# Patient Record
Sex: FEMALE | Race: BLACK OR AFRICAN AMERICAN | Employment: FULL TIME | URBAN - METROPOLITAN AREA
[De-identification: names, ages, dates, MRNs, and addresses within clinical notes are randomized per-mention and may not be internally consistent; named-entity substitution may affect disease eponyms.]

---

## 2017-03-01 DIAGNOSIS — E13.9 LADA (LATENT AUTOIMMUNE DIABETES IN ADULTS), MANAGED AS TYPE 1 (HCC): ICD-10-CM

## 2017-03-30 ENCOUNTER — OFFICE VISIT (OUTPATIENT)
Dept: ENDOCRINOLOGY | Age: 52
End: 2017-03-30

## 2017-03-30 VITALS
SYSTOLIC BLOOD PRESSURE: 121 MMHG | HEART RATE: 76 BPM | HEIGHT: 61 IN | WEIGHT: 103.5 LBS | BODY MASS INDEX: 19.54 KG/M2 | DIASTOLIC BLOOD PRESSURE: 81 MMHG

## 2017-03-30 DIAGNOSIS — E13.9 LADA (LATENT AUTOIMMUNE DIABETES IN ADULTS), MANAGED AS TYPE 1 (HCC): Primary | ICD-10-CM

## 2017-03-30 DIAGNOSIS — J45.20 MILD INTERMITTENT ASTHMA WITHOUT COMPLICATION: ICD-10-CM

## 2017-03-30 RX ORDER — ALBUTEROL SULFATE 90 UG/1
2 AEROSOL, METERED RESPIRATORY (INHALATION)
Qty: 1 INHALER | Refills: 7 | Status: SHIPPED | OUTPATIENT
Start: 2017-03-30 | End: 2018-05-18

## 2017-03-30 NOTE — LETTER
NOTIFICATION RETURN TO WORK / SCHOOL 
 
3/30/2017 4:41 PM 
 
Ms. Urvashi Shultz [address] [address] To Whom It May Concern: 
 
Urvashi Shultz is currently under the care of [address] [place] [address]. She was present today with us in clinic for management of her medical conditions. If there are questions or concerns please have the patient contact our office. Sincerely, Xochilt Martinez MD

## 2017-03-30 NOTE — PROGRESS NOTES
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 female with a PMHx as noted below who is presenting to our endocrine clinic for the f/u evaluation of recently diagnosed diabetes. History of Type 1 Diabetes:  Patient reports that they were diagnosed in the ED with A1c of 15% June 2016     Pancreatic atrophy on CT   Family history is positive for diabetes in mother and maternal grandmother, both on insulin but type of diabetes unclear  Was started on novolog 70/30 insulin    INTERVAL HISTORY:  Current home regimen includes:   Novolog 70/30 Pen, 5-10 U with B, 17 U with Dinner    Home Blood glucose review:   AM:    [address]  Lunch  [address]  Dinner  [address]  Bedtime  [address]    Patient notes that she would like looking into insulin pumps. Had a death in the family, uncle was battling against pancreatic cancer    PAST MEDICAL/SURGICAL HISTORY:   Past Medical History:   Diagnosis Date    Asthma     [facility] ([facility]     Elevated transaminase level 6/29/2016    Insulin dependent diabetes mellitus ([facility] 6/20/2016    Pancreatic atrophy 6/20/2016     Past Surgical History:   Procedure Laterality Date    HX HEENT         ALLERGIES:   Allergies   Allergen Reactions    Contrast Agent [Iodine] Itching    Hydrocodone Rash    Percocet [Oxycodone-Acetaminophen] Rash    Codeine Nausea and Vomiting       MEDICATIONS ON ADMISSION:     Current Outpatient Prescriptions:     insulin NPH/insulin regular (NOVOLIN 70/30) 100 unit/mL (70-30) injection, by SubCUTAneous route.  5 units in AM; 10-15 in PM, Disp: , Rfl:     albuterol (PROVENTIL VENTOLIN) 2.5 mg /3 mL (0.083 %) nebulizer solution, , Disp: , Rfl: 0    Insulin Needles, Disposable, 31 gauge x 5/16\" ndle, Use as directed, Disp: 1 Package, Rfl: 11    glucose blood VI test strips () strip, Use as directed, Disp: 100 Strip, Rfl: 1    Insulin Syringes, Disposable, 1 mL syrg, Use as directed, Disp: 100 Syringe, Rfl: 2    albuterol (PROVENTIL HFA, VENTOLIN HFA, PROAIR HFA) 90 mcg/actuation inhaler, Take 2 Puffs by inhalation every four (4) hours as needed for Wheezing., Disp: 1 Inhaler, Rfl: 0    Lancets misc, Use as directed, Disp: 1 Each, Rfl: 3    SOCIAL HISTORY:   Social History     Social History    Marital status: SINGLE     Spouse name: N/A    Number of children: N/A    Years of education: N/A     Occupational History    Not on file. Social History Main Topics    Smoking status: Never Smoker    Smokeless tobacco: Never Used    Alcohol use No    Drug use: No    Sexual activity: Not Currently     Other Topics Concern    Not on file     Social History Narrative       FAMILY HISTORY:  Family History   Problem Relation Age of Onset    Cancer Father     Diabetes Mother     Diabetes Maternal Grandmother        REVIEW OF SYSTEMS: Complete ROS assessed and noted for that which is described above, all else are negative. Eyes: normal  ENT: normal  CVS: normal  Resp: normal  GI: normal  : normal  GYN: normal  Endocrine: normal  Integument: normal  Musculoskeletal: normal  Neuro: normal  Psych: normal      PHYSICAL EXAMINATION:    VITAL SIGNS:  Visit Vitals    /81 (BP 1 Location: Left arm, BP Patient Position: Sitting)    Pulse 76    Ht 5' 1\" (1.549 m)    Wt 103 lb 8 oz (46.9 kg)    BMI 19.56 kg/m2       GENERAL: NCAT, Sitting comfortably, NAD  EYES: EOMI, non-icteric, no proptosis  HEENT: NCAT, no inflammation, no masses  LYMPH NODES: No LAD  CARDIOVASCULAR: S1 S2, RRR, No murmur, 2+ radial pulses  RESPIRATORY: CTA b/l, no wheeze/rales  ABDOMEN: BS normal  NEUROLOGIC: 5/5 power all extremities, no parasthesias, AAOx3  EXTREMITIES: warm, no edema/rash/ or other skin changes, good pedal pulses      REVIEW OF LABORATORY AND RADIOLOGY DATA:   Labs and documentation have been reviewed as described above.        ASSESSMENT AND PLAN: Zurdo Camarillo is a 46 y.o. female with a PMHx as noted below who is presenting to our endocrine clinic for the f/u evaluation of recent onset diabetes. SHANTEL, late onset autoimmune diabetes of the adult (type-1 like diabetes)    Stable, some higher numbers with dinner but not often  A1c pending    Plan:  Medications:  - 70/30 insulin 10U Breakfast, increase to 17U Dinner  - Advised checking glucose AM, Dinnertime, and Bedtime.   - To provide a 1-2 week log with each visit  - REFERRED to diabetes education to discuss insulin pump options and start learning about carb-counting. Ideally to be on long and short acting (basal bolus) insulin prior to insulin pump therapy but she has been doing very well and we will be able to recommend good starting settings for her if she should move forward with a pump. Suggestions are also welcomed. BP: Stable, not on antihypertensives  Cholesterol: Fasting lipids stable, not on statins    RTC: I would like to see them back in 3 months, advised to call or message through [facility]\" if needed prior to the next visit. >25 minutes spent together with patient today of which >50% of this time was spent in counseling and coordination of care. Nicanor Hicks.  [address] Endocrinology  [address] [place] [address]

## 2017-03-30 NOTE — PATIENT INSTRUCTIONS
Continue current medications  70/30 insulin 7-10 units with breakfast, 17 units with dinner  Check blood sugar regularly as you are, your doing a great job,    ----------------------------------------------------------------------------------------------------------------------    Below you will find a glucose log sheet which you can use to record your blood sugars. Without checking and recording what your home glucose levels are, it will be difficult to make any changes to your medication dose, even when significant changes may be needed. Please feel free to use the log below to record your home glucose levels. At the very least, I would like for you to login the entire 2-3 weeks just before your visit so we can make your visit much more productive and beneficial to you. GLUCOSE LOG SHEET:    Date Breakfast Lunch Dinner Bedtime Comments ? GLUCOSE LOG SHEET:    Date Breakfast Lunch Dinner Bedtime Comments ? GLUCOSE LOG SHEET:    Date Breakfast Lunch Dinner Bedtime Comments ?

## 2017-03-30 NOTE — MR AVS SNAPSHOT
Visit Information Date & Time Provider Department Dept. Phone Encounter #  
 3/30/2017  4:10 PM Daren Valdez, [address] [place] [address] Diabetes and Endocrinology [phone] [id number] Follow-up Instructions Return in about 3 months (around 6/30/2017). Your Appointments 4/20/2017  4:00 PM  
ROUTINE CARE with Laureen Mendez MD  
[facility] [address]) Appt Note: 6m fu  
 [id number] [place] [address] Upcoming Health Maintenance Date Due Hepatitis C Screening 1965 EYE EXAM RETINAL OR DILATED Q1 9/21/1973 Pneumococcal 19-64 Highest Risk (1 of 3 - PCV13) 9/21/1982 DTaP/Tdap/Td series (1 - Tdap) 9/21/1984 PAP AKA CERVICAL CYTOLOGY 9/21/1984 MICROALBUMIN Q1 6/20/2017 HEMOGLOBIN A1C Q6M 6/21/2017 LIPID PANEL Q1 7/27/2017 FOBT Q 1 YEAR AGE 50-75 10/19/2017 FOOT EXAM Q1 12/30/2017 BREAST CANCER SCRN MAMMOGRAM 7/26/2018 Allergies as of 3/30/2017  Review Complete On: 3/30/2017 By: Daren Valdez MD  
  
 Severity Noted Reaction Type Reactions Contrast Agent [Iodine] Medium 06/29/2016    Itching Hydrocodone Medium 10/19/2016    Rash Percocet [Oxycodone-acetaminophen] Medium 10/19/2016    Rash Codeine  12/01/2014   Intolerance Nausea and Vomiting Current Immunizations  Never Reviewed Name Date Influenza Vaccine 9/1/2014 Not reviewed this visit You Were Diagnosed With   
  
 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  
 19.56 kg/m 2 1.42 m 2 Preferred Pharmacy Pharmacy Name Phone [facility] [address] [place] [address] [phone] Your Updated Medication List  
  
   
This list is accurate as of: 3/30/17  4:40 PM.  Always use your most recent med list.  
  
  
  
  
 * albuterol 2.5 mg /3 mL (0.083 %) nebulizer solution Commonly known as:  PROVENTIL VENTOLIN  
  
 * albuterol 90 mcg/actuation inhaler Commonly known as:  PROVENTIL HFA, VENTOLIN HFA, PROAIR HFA Take 2 Puffs by inhalation every four (4) hours as needed for Wheezing. glucose blood VI test strips strip Commonly known as:   For use with Prodigy Meter, Check glucose 4x/day, Dx E13.9 Insulin Needles (Disposable) 31 gauge x 5/16\" Ndle Use as directed Insulin Syringes (Disposable) 1 mL Syrg Use as directed Lancets Misc Use as directed NovoLIN 70/30 100 unit/mL (70-30) injection Generic drug:  insulin NPH/insulin regular  
by SubCUTAneous route. 5 units in AM; 10-15 in PM  
  
 * Notice: This list has 2 medication(s) that are the same as other medications prescribed for you. Read the directions carefully, and ask your doctor or other care provider to review them with you. Prescriptions Sent to Pharmacy Refills  
 albuterol (PROVENTIL HFA, VENTOLIN HFA, PROAIR HFA) 90 mcg/actuation inhaler 7 Sig: Take 2 Puffs by inhalation every four (4) hours as needed for Wheezing. Class: Normal  
 Pharmacy: [facility] Ph #: [phone] Route: Inhalation  
 glucose blood VI test strips () strip 7 Sig: For use with Prodigy Meter, Check glucose 4x/day, Dx E13.9  Class: Normal  
 Pharmacy: [address] Ph #: [phone] We Performed the Following REFERRAL TO DIABETIC EDUCATION [REF20 Custom] Comments:  
 Wishes to discuss insulin pumps. Follow-up Instructions Return in about 3 months (around 6/30/2017). Referral Information Referral ID Referred By Referred To  
  
 [id number] Maricruz Camp Not Available Visits Status Start Date End Date 1 New Request 3/30/17 3/30/18 If your referral has a status of pending review or denied, additional information will be sent to support the outcome of this decision. Patient Instructions Continue current medications 70/30 insulin 7-10 units with breakfast, 17 units with dinner Check blood sugar regularly as you are, your doing a great job, 
 
---------------------------------------------------------------------------------------------------------------------- Below you will find a glucose log sheet which you can use to record your blood sugars. Without checking and recording what your home glucose levels are, it will be difficult to make any changes to your medication dose, even when significant changes may be needed. Please feel free to use the log below to record your home glucose levels. At the very least, I would like for you to login the entire 2-3 weeks just before your visit so we can make your visit much more productive and beneficial to you. GLUCOSE LOG SHEET: 
 
Date Breakfast Lunch Dinner Bedtime Comments ? GLUCOSE LOG SHEET: 
 
Date Breakfast Lunch Dinner Bedtime Comments ? GLUCOSE LOG SHEET: 
 
Date Breakfast Lunch Dinner Bedtime Comments ? Introducing [facility]! [employer] introduces [employer] patient portal. Now you can access parts of your medical record, email your doctor's office, and request medication refills online. 1. In your internet browser, go to [email or web address] 2. Click on the First Time User? Click Here link in the Sign In box. You will see the New Member Sign Up page. 3. Enter your [employer] Access Code exactly as it appears below. You will not need to use this code after youve completed the sign-up process. If you do not sign up before the expiration date, you must request a new code. · [employer] Access Code: [email or web address] Expires: 6/28/2017  4:25 PM 
 
4. Enter the last four digits of your Social Security Number (xxxx) and Date of Birth (mm/dd/yyyy) as indicated and click Submit. You will be taken to the next sign-up page. 5. Create a [employer] ID. This will be your [employer] login ID and cannot be changed, so think of one that is secure and easy to remember. 6. Create a [employer] password. You can change your password at any time. 7. Enter your Password Reset Question and Answer. This can be used at a later time if you forget your password. 8. Enter your e-mail address. You will receive e-mail notification when new information is available in [address] 9. Click Sign Up. You can now view and download portions of your medical record. 10. Click the Download Summary menu link to download a portable copy of your medical information.  
 
If you have questions, please visit the Frequently Asked Questions section of the [employer]. Remember, [employer] is NOT to be used for urgent needs. For medical emergencies, dial 911. Now available from your iPhone and Android! Please provide this summary of care documentation to your next provider. Your primary care clinician is listed as Genevieve Jennings. If you have any questions after today's visit, please call [phone].

## 2017-03-31 ENCOUNTER — TELEPHONE (OUTPATIENT)
Dept: ENDOCRINOLOGY | Age: 52
End: 2017-03-31

## 2017-03-31 LAB
EST. AVERAGE GLUCOSE BLD GHB EST-MCNC: 229 MG/DL
HBA1C MFR BLD: 9.6 % (ref 4.8–5.6)

## 2017-03-31 NOTE — PROGRESS NOTES
Anayeli's A1c went up from 6.9% to 9.6%. Her numbers we reviewed were much better than this, so I suspect that she had much higher numbers in the prior month or so. She should be advised to use the 10U dose with her breakfast more often since her dinner numbers were running closer to the 160's. She can send us a glucose log after a couple weeks to see how she is doing and if she is having any lows, she can let us know. This is a sudden change from the good sugar control we had previously. Thanks,  Josh Philippe.  [address] Endocrinology  [address] [place] [address]

## 2017-03-31 NOTE — TELEPHONE ENCOUNTER
----- Message from Emil Hull MD sent at 3/31/2017  9:29 AM EDT -----  Abhijit Carvalho A1c went up from 6.9% to 9.6%. Her numbers we reviewed were much better than this, so I suspect that she had much higher numbers in the prior month or so. She should be advised to use the 10U dose with her breakfast more often since her dinner numbers were running closer to the 160's. She can send us a glucose log after a couple weeks to see how she is doing and if she is having any lows, she can let us know. This is a sudden change from the good sugar control we had previously. Thanks,  Bryce Lee. [address]    ------------------------------------------    3/31/2017, 10:13 AM    Attempted to call Solmon Brittle, reached her sister. I left a message to return my call. Silvana Griffiths       -------------------------------------------------------------      Addendum: 3/31/2017, 10:27 AM    Spoke with Solmon Brittle by phone and relayed the above message. She understood the information and will do as instructed.       Silvana Griffiths

## 2017-04-14 ENCOUNTER — HOSPITAL ENCOUNTER (OUTPATIENT)
Dept: DIABETES SERVICES | Age: 52
Discharge: HOME | End: 2017-04-14
Payer: COMMERCIAL

## 2017-04-14 PROCEDURE — G0108 DIAB MANAGE TRN  PER INDIV: HCPCS

## 2017-04-14 NOTE — DIABETES MGMT
DTC Progress Note    Recommendations/ Comments: If appropriate, please consider:  1) follow-up discussions with insurer related to coverage options and out of pocket responsibilities for pump/CGM and supplies. 2) review with insurer about coverage for rapid acting insulin as this is the medication that will be used w/in pump. 3) will need assessment and instruction on carb counting prior to starting pump therapy. 4) pt to contact CDE, CPT to arrange for saline trial of insulin pumps if conclusion is to move forward with pump therapy. Chart reviewed on [facility] and visited with pt today to discuss exploring insulin pump systems and therapy for the management of her Type 1 DM. Patient is a 46 y.o. female with known Type 1 Diabetes on Novolin 70/30 10 units at home. Pt is using a Prodigy talking meter for managing her BG - currently testing bid. Pt shared that she is not using intensive insulin therapy at this time due to out-of-pocket expenses with rapid acting insulin ($200/month). The following topics were discussed and pt was provided with pump comparison table along w/ list of topics to investigate with her insurer:  1) insulins used with pump therapy and how insulin to carb ratio along w/ correction factors are used when programming pump. 2) stand alone pump and sensor versus integrated pump/CGM systems. 3) pros and cons to using insulin pump therapy. 4) testing commitment, warranty and features to pump systems and unique to certain companies. 5) personal features to be assessed by patient that are important in her daily life/lifestyle. As we reviewed the comparison table, pt identified both tubeless and tubing pump systems for potential saline trial in future.      A1c:   Lab Results   Component Value Date/Time    Hemoglobin A1c 9.6 03/30/2017 03:08 PM    Hemoglobin A1c 6.9 12/21/2016 07:41 AM       Lab Results   Component Value Date/Time    Creatinine 0.93 10/11/2016 07:49 AM Current DM medication: Novolin 70/30 10 units bid 30 minutes prior to meals. Will continue to follow as needed.     Thank you  Tano Quiroga RD, CDE

## 2018-01-10 ENCOUNTER — APPOINTMENT (OUTPATIENT)
Dept: GENERAL RADIOLOGY | Age: 53
End: 2018-01-10
Payer: COMMERCIAL

## 2018-01-10 ENCOUNTER — HOSPITAL ENCOUNTER (EMERGENCY)
Age: 53
Discharge: HOME | End: 2018-01-10
Attending: EMERGENCY MEDICINE
Payer: COMMERCIAL

## 2018-01-10 VITALS
WEIGHT: 101.19 LBS | RESPIRATION RATE: 16 BRPM | HEART RATE: 92 BPM | OXYGEN SATURATION: 100 % | DIASTOLIC BLOOD PRESSURE: 87 MMHG | TEMPERATURE: 99 F | BODY MASS INDEX: 19.11 KG/M2 | HEIGHT: 61 IN | SYSTOLIC BLOOD PRESSURE: 132 MMHG

## 2018-01-10 DIAGNOSIS — J20.9 ACUTE BRONCHITIS, UNSPECIFIED ORGANISM: Primary | ICD-10-CM

## 2018-01-10 DIAGNOSIS — F17.200 TOBACCO DEPENDENCE: ICD-10-CM

## 2018-01-10 DIAGNOSIS — Z87.09 HISTORY OF ASTHMA: ICD-10-CM

## 2018-01-10 PROCEDURE — 99281 EMR DPT VST MAYX REQ PHY/QHP: CPT

## 2018-01-10 PROCEDURE — 71046 X-RAY EXAM CHEST 2 VIEWS: CPT

## 2018-01-10 RX ORDER — PROMETHAZINE HYDROCHLORIDE AND DEXTROMETHORPHAN HYDROBROMIDE 6.25; 15 MG/5ML; MG/5ML
5 SYRUP ORAL
Qty: 118 ML | Refills: 0 | Status: SHIPPED | OUTPATIENT
Start: 2018-01-10 | End: 2018-01-17

## 2018-01-10 RX ORDER — AZITHROMYCIN 250 MG/1
TABLET, FILM COATED ORAL
Qty: 6 TAB | Refills: 0 | Status: SHIPPED | OUTPATIENT
Start: 2018-01-10 | End: 2018-01-17

## 2018-01-10 RX ORDER — ONDANSETRON 4 MG/1
4 TABLET, ORALLY DISINTEGRATING ORAL
Qty: 12 TAB | Refills: 0 | Status: SHIPPED | OUTPATIENT
Start: 2018-01-10 | End: 2018-01-22

## 2018-01-10 NOTE — LETTER
[address] 
[facility] EMERGENCY DEPT 
[address] [place] [address] [address] [address] [phone] Work/School Note Date: 1/10/2018 To Whom It May concern: 
 
Rito Duque was seen and treated today in the emergency room. She may return to work in 1 to 2 days, as symptoms improve. Sincerely, Christine Augustine

## 2018-01-11 NOTE — DISCHARGE INSTRUCTIONS
Bronchitis: Care Instructions  Your Care Instructions    Bronchitis is inflammation of the bronchial tubes, which carry air to the lungs. The tubes swell and produce mucus, or phlegm. The mucus and inflamed bronchial tubes make you cough. You may have trouble breathing. Most cases of bronchitis are caused by viruses like those that cause colds. Antibiotics usually do not help and they may be harmful. Bronchitis usually develops rapidly and lasts about 2 to 3 weeks in otherwise healthy people. Follow-up care is a key part of your treatment and safety. Be sure to make and go to all appointments, and call your doctor if you are having problems. It's also a good idea to know your test results and keep a list of the medicines you take. How can you care for yourself at home? · Take all medicines exactly as prescribed. Call your doctor if you think you are having a problem with your medicine. · Get some extra rest.  · Take an over-the-counter pain medicine, such as acetaminophen (Tylenol), ibuprofen (Advil, Motrin), or naproxen (Aleve) to reduce fever and relieve body aches. Read and follow all instructions on the label. · Do not take two or more pain medicines at the same time unless the doctor told you to. Many pain medicines have acetaminophen, which is Tylenol. Too much acetaminophen (Tylenol) can be harmful. · Take an over-the-counter cough medicine that contains dextromethorphan to help quiet a dry, hacking cough so that you can sleep. Avoid cough medicines that have more than one active ingredient. Read and follow all instructions on the label. · Breathe moist air from a humidifier, hot shower, or sink filled with hot water. The heat and moisture will thin mucus so you can cough it out. · Do not smoke. Smoking can make bronchitis worse. If you need help quitting, talk to your doctor about stop-smoking programs and medicines. These can increase your chances of quitting for good.   When should you call for help? Call 911 anytime you think you may need emergency care. For example, call if:  ? · You have severe trouble breathing. ?Call your doctor now or seek immediate medical care if:  ? · You have new or worse trouble breathing. ? · You cough up dark brown or bloody mucus (sputum). ? · You have a new or higher fever. ? · You have a new rash. ? Watch closely for changes in your health, and be sure to contact your doctor if:  ? · You cough more deeply or more often, especially if you notice more mucus or a change in the color of your mucus. ? · You are not getting better as expected. Where can you learn more? Go to http://jurgen-molly.info/. Enter H333 in the search box to learn more about \"Bronchitis: Care Instructions. \"  Current as of: May 12, 2017  Content Version: 11.4  © 9957-9306 [employer] Care instructions adapted under license by [employer] (which disclaims liability or warranty for this information). If you have questions about a medical condition or this instruction, always ask your healthcare professional. [address] any warranty or liability for your use of this information.

## 2018-01-11 NOTE — ED PROVIDER NOTES
EMERGENCY DEPARTMENT HISTORY AND PHYSICAL EXAM      Date: 1/10/2018  Patient Name: Lily Kelly    History of Presenting Illness     Chief Complaint   Patient presents with    Chills     pt c/o chills, headache, nausea and cough x3 days. Pt reported her co-worker has been sick with simliar symptoms.  Headache    Nausea       History Provided By: Patient    HPI: Lily Kelly, 45 y.o. female with PMHx significant for asthma, DM, presents ambulatory to the ED with cc of 2 days of mild to moderate intermittent nonproductive cough. Pt reports no improvement at home with home remedies. She also c/o subjective fever, chills, HA, myalgia, and one episode of vomiting and diarrhea but now just some mild nausea remains. Pt states her BG has remained well controlled at home. She specifically denies any urianry sx. Pt denies tobacco use. She has come into contact wiht sick coworkers. Pt declines inhaler refills. PCP: Theda Rubinstein, MD    There are no other complaints, changes, or physical findings at this time. Current Outpatient Prescriptions   Medication Sig Dispense Refill    promethazine-dextromethorphan (PROMETHAZINE-DM) 6.25-15 mg/5 mL syrup Take 5 mL by mouth every four (4) hours as needed for Cough for up to 6 days. 118 mL 0    ondansetron (ZOFRAN ODT) 4 mg disintegrating tablet Take 1 Tab by mouth every eight (8) hours as needed for Nausea for up to 12 days. 12 Tab 0    azithromycin (ZITHROMAX Z-SUGAR) 250 mg tablet Take as directed: 2 pills day 1, 1 pill daily days 2-4 6 Tab 0    albuterol (PROVENTIL HFA, VENTOLIN HFA, PROAIR HFA) 90 mcg/actuation inhaler Take 2 Puffs by inhalation every four (4) hours as needed for Wheezing. 1 Inhaler 7    glucose blood VI test strips () strip For use with Prodigy Meter, Check glucose 4x/day, Dx E13.9 200 Strip 7    insulin NPH/insulin regular (NOVOLIN 70/30) 100 unit/mL (70-30) injection by SubCUTAneous route.  5 units in AM; 10-15 in PM      albuterol (PROVENTIL VENTOLIN) 2.5 mg /3 mL (0.083 %) nebulizer solution   0    Insulin Needles, Disposable, 31 gauge x 5/16\" ndle Use as directed 1 Package 11    Lancets misc Use as directed 1 Each 3    Insulin Syringes, Disposable, 1 mL syrg Use as directed 100 Syringe 2       Past History     Past Medical History:  Past Medical History:   Diagnosis Date    Asthma     Diabetes ([facility] [address]     Elevated transaminase level 6/29/2016    Insulin dependent diabetes mellitus ([facility] [address] 6/20/2016    Pancreatic atrophy 6/20/2016       Past Surgical History:  Past Surgical History:   Procedure Laterality Date    HX HEENT         Family History:  Family History   Problem Relation Age of Onset    Cancer Father     Diabetes Mother     Diabetes Maternal Grandmother        Social History:  Social History   Substance Use Topics    Smoking status: Never Smoker    Smokeless tobacco: Never Used    Alcohol use No       Allergies: Allergies   Allergen Reactions    Contrast Agent [Iodine] Itching    Hydrocodone Rash    Percocet [Oxycodone-Acetaminophen] Rash    Codeine Nausea and Vomiting         Review of Systems   Review of Systems   Constitutional: Positive for chills and fever. HENT: Negative for congestion, rhinorrhea and sore throat. Respiratory: Positive for cough. Negative for shortness of breath. Cardiovascular: Negative for chest pain and palpitations. Gastrointestinal: Positive for diarrhea, nausea and vomiting. Negative for abdominal pain. Genitourinary: Negative for dysuria and hematuria. Musculoskeletal: Negative for neck pain and neck stiffness. Skin: Negative for rash and wound. Neurological: Positive for headaches. Negative for dizziness. Psychiatric/Behavioral: Negative for agitation and confusion. Physical Exam   Physical Exam   Constitutional: She is oriented to person, place, and time. She appears well-developed and well-nourished. No distress. Thin female. NAD. Alert.     HENT:   Head: Normocephalic and atraumatic. Nose: Nose normal.   Mouth/Throat: Oropharynx is clear and moist. No oropharyngeal exudate. Boggy nasal mucosa, clear rhinorrhea, posterior oropharynx injected without exudate. Increased effusion noted to bilat TMs, no erythema, good light reflex noted. Eyes: Conjunctivae and EOM are normal. Right eye exhibits no discharge. Left eye exhibits no discharge. No scleral icterus. Neck: Normal range of motion. Neck supple. No JVD present. No tracheal deviation present. No thyromegaly present. Cardiovascular: Normal rate, regular rhythm and normal heart sounds. Pulmonary/Chest: Effort normal and breath sounds normal. No respiratory distress. She has no wheezes. Lungs clear   Abdominal: Soft. There is no tenderness. Musculoskeletal: Normal range of motion. She exhibits no edema. Lymphadenopathy:     She has no cervical adenopathy. Neurological: She is alert and oriented to person, place, and time. She exhibits normal muscle tone. Coordination normal.   Skin: Skin is warm and dry. She is not diaphoretic. Psychiatric: She has a normal mood and affect. Her behavior is normal. Judgment normal.   Nursing note and vitals reviewed. Diagnostic Study Results     Labs -   No results found for this or any previous visit (from the past 12 hour(s)). Radiologic Studies -   XR CHEST PA LAT   Final Result        CT Results  (Last 48 hours)    None        CXR Results  (Last 48 hours)               01/10/18 2000  XR CHEST PA LAT Final result    Impression:  IMPRESSION: No acute process           Narrative:  INDICATION:  cough        COMPARISON: 6/10/16       FINDINGS: PA and lateral views of the chest demonstrate a stable   cardiomediastinal silhouette and clear lungs bilaterally. The visualized osseous   structures are unremarkable. Medical Decision Making   I am the first provider for this patient.     I reviewed the vital signs, available nursing notes, past medical history, past surgical history, family history and social history. Vital Signs-Reviewed the patient's vital signs. Patient Vitals for the past 12 hrs:   Temp Pulse Resp BP SpO2   01/10/18 1748 99 °F (37.2 °C) 92 16 132/87 100 %       Pulse Oximetry Analysis - 100% on RA      Records Reviewed: Nursing Notes    Provider Notes (Medical Decision Making):   DDx: uri, bronchitis, pna    ED Course:   Initial assessment performed. The patients presenting problems have been discussed, and they are in agreement with the care plan formulated and outlined with them. I have encouraged them to ask questions as they arise throughout their visit. Critical Care Time: none    Disposition:    Discharge Note:  8:27 PM   The pt is ready for discharge. The pt's signs, symptoms, diagnosis, and discharge instructions have been discussed and pt has conveyed their understanding. The pt is to follow up as recommended or return to ER should their symptoms worsen. Plan has been discussed and pt is in agreement. PLAN:  1. Current Discharge Medication List      START taking these medications    Details   promethazine-dextromethorphan (PROMETHAZINE-DM) 6.25-15 mg/5 mL syrup Take 5 mL by mouth every four (4) hours as needed for Cough for up to 6 days. Qty: 118 mL, Refills: 0      ondansetron (ZOFRAN ODT) 4 mg disintegrating tablet Take 1 Tab by mouth every eight (8) hours as needed for Nausea for up to 12 days. Qty: 12 Tab, Refills: 0      azithromycin (ZITHROMAX Z-SUGAR) 250 mg tablet Take as directed: 2 pills day 1, 1 pill daily days 2-4  Qty: 6 Tab, Refills: 0           2.    Follow-up Information     Follow up With Details Comments Contact Info    Cynthia Ortiz MD   [address] [place] [address] [address] [place] [address]  [phone]      [facility] EMERGENCY DEPT  If symptoms worsen [address] [facility] [address]  [phone]        Return to ED if worse     Diagnosis     Clinical Impression:   1. Acute bronchitis, unspecified organism    2. History of asthma    3. Tobacco dependence    4. IDDM (insulin dependent diabetes mellitus) ([facility] [address]        Attestations: This note is prepared by Eric Almeida, acting as Scribe for [employer]. The scribe's documentation has been prepared under my direction and personally reviewed by me in its entirety. I confirm that the note above accurately reflects all work, treatment, procedures, and medical decision making performed by me.

## 2018-01-14 ENCOUNTER — APPOINTMENT (OUTPATIENT)
Dept: GENERAL RADIOLOGY | Age: 53
DRG: 638 | End: 2018-01-14
Attending: EMERGENCY MEDICINE
Payer: COMMERCIAL

## 2018-01-14 ENCOUNTER — APPOINTMENT (OUTPATIENT)
Dept: CT IMAGING | Age: 53
DRG: 638 | End: 2018-01-14
Attending: EMERGENCY MEDICINE
Payer: COMMERCIAL

## 2018-01-14 ENCOUNTER — HOSPITAL ENCOUNTER (INPATIENT)
Age: 53
LOS: 3 days | Discharge: HOME | DRG: 638 | End: 2018-01-17
Attending: EMERGENCY MEDICINE | Admitting: INTERNAL MEDICINE
Payer: COMMERCIAL

## 2018-01-14 DIAGNOSIS — E86.0 DEHYDRATION: ICD-10-CM

## 2018-01-14 DIAGNOSIS — R73.9 HYPERGLYCEMIA: Primary | ICD-10-CM

## 2018-01-14 DIAGNOSIS — N17.9 AKI (ACUTE KIDNEY INJURY) (HCC): ICD-10-CM

## 2018-01-14 PROBLEM — E11.00 UNCONTROLLED TYPE 2 DM WITH HYPEROSMOLAR NONKETOTIC HYPERGLYCEMIA (HCC): Status: ACTIVE | Noted: 2018-01-14

## 2018-01-14 LAB
ADMINISTERED INITIALS, ADMINIT: NORMAL
ALBUMIN SERPL-MCNC: 3.5 G/DL (ref 3.5–5)
ALBUMIN SERPL-MCNC: 3.9 G/DL (ref 3.5–5)
ALBUMIN/GLOB SERPL: 1.1 {RATIO} (ref 1.1–2.2)
ALBUMIN/GLOB SERPL: 1.1 {RATIO} (ref 1.1–2.2)
ALP SERPL-CCNC: 225 U/L (ref 45–117)
ALP SERPL-CCNC: 270 U/L (ref 45–117)
ALT SERPL-CCNC: 45 U/L (ref 12–78)
ALT SERPL-CCNC: 53 U/L (ref 12–78)
ANION GAP SERPL CALC-SCNC: 7 MMOL/L (ref 5–15)
ANION GAP SERPL CALC-SCNC: 7 MMOL/L (ref 5–15)
APPEARANCE UR: CLEAR
AST SERPL-CCNC: 34 U/L (ref 15–37)
AST SERPL-CCNC: 40 U/L (ref 15–37)
BACTERIA URNS QL MICRO: NEGATIVE /HPF
BASOPHILS # BLD: 0 K/UL (ref 0–0.1)
BASOPHILS NFR BLD: 0 % (ref 0–1)
BILIRUB SERPL-MCNC: 0.3 MG/DL (ref 0.2–1)
BILIRUB SERPL-MCNC: 0.5 MG/DL (ref 0.2–1)
BILIRUB UR QL: NEGATIVE
BUN SERPL-MCNC: 15 MG/DL (ref 6–20)
BUN SERPL-MCNC: 18 MG/DL (ref 6–20)
BUN/CREAT SERPL: 9 (ref 12–20)
BUN/CREAT SERPL: 9 (ref 12–20)
CALCIUM SERPL-MCNC: 8.6 MG/DL (ref 8.5–10.1)
CALCIUM SERPL-MCNC: 9.3 MG/DL (ref 8.5–10.1)
CHLORIDE SERPL-SCNC: 80 MMOL/L (ref 97–108)
CHLORIDE SERPL-SCNC: 97 MMOL/L (ref 97–108)
CK SERPL-CCNC: 65 U/L (ref 26–192)
CO2 SERPL-SCNC: 31 MMOL/L (ref 21–32)
CO2 SERPL-SCNC: 33 MMOL/L (ref 21–32)
COLOR UR: ABNORMAL
CREAT SERPL-MCNC: 1.66 MG/DL (ref 0.55–1.02)
CREAT SERPL-MCNC: 1.99 MG/DL (ref 0.55–1.02)
D50 ADMINISTERED, D50ADM: 0 ML
D50 ORDER, D50ORD: 0 ML
EOSINOPHIL # BLD: 0 K/UL (ref 0–0.4)
EOSINOPHIL NFR BLD: 0 % (ref 0–7)
EPITH CASTS URNS QL MICRO: ABNORMAL /LPF
ERYTHROCYTE [DISTWIDTH] IN BLOOD BY AUTOMATED COUNT: 13 % (ref 11.5–14.5)
EST. AVERAGE GLUCOSE BLD GHB EST-MCNC: ABNORMAL MG/DL
GLOBULIN SER CALC-MCNC: 3.3 G/DL (ref 2–4)
GLOBULIN SER CALC-MCNC: 3.7 G/DL (ref 2–4)
GLSCOM COMMENTS: NORMAL
GLUCOSE BLD STRIP.AUTO-MCNC: 301 MG/DL (ref 65–100)
GLUCOSE BLD STRIP.AUTO-MCNC: 353 MG/DL (ref 65–100)
GLUCOSE BLD STRIP.AUTO-MCNC: 513 MG/DL (ref 65–100)
GLUCOSE BLD STRIP.AUTO-MCNC: >600 MG/DL (ref 65–100)
GLUCOSE SERPL-MCNC: 1038 MG/DL (ref 65–100)
GLUCOSE SERPL-MCNC: >1500 MG/DL (ref 65–100)
GLUCOSE UR STRIP.AUTO-MCNC: >1000 MG/DL
GLUCOSE, GLC: 301 MG/DL
GLUCOSE, GLC: 353 MG/DL
GLUCOSE, GLC: 513 MG/DL
GLUCOSE, GLC: 601 MG/DL
GLUCOSE, GLC: 601 MG/DL
HBA1C MFR BLD: >16 % (ref 4.2–6.3)
HCT VFR BLD AUTO: 42.3 % (ref 35–47)
HGB BLD-MCNC: 12.5 G/DL (ref 11.5–16)
HGB UR QL STRIP: NEGATIVE
HIGH TARGET, HITG: 300 MG/DL
INSULIN ADMINSTERED, INSADM: 10.8 UNITS/HOUR
INSULIN ADMINSTERED, INSADM: 11.7 UNITS/HOUR
INSULIN ADMINSTERED, INSADM: 12.1 UNITS/HOUR
INSULIN ADMINSTERED, INSADM: 16.2 UNITS/HOUR
INSULIN ADMINSTERED, INSADM: 18.1 UNITS/HOUR
INSULIN ORDER, INSORD: 10.8 UNITS/HOUR
INSULIN ORDER, INSORD: 11.7 UNITS/HOUR
INSULIN ORDER, INSORD: 12.1 UNITS/HOUR
INSULIN ORDER, INSORD: 16.2 UNITS/HOUR
INSULIN ORDER, INSORD: 18.1 UNITS/HOUR
KETONES UR QL STRIP.AUTO: NEGATIVE MG/DL
LEUKOCYTE ESTERASE UR QL STRIP.AUTO: NEGATIVE
LIPASE SERPL-CCNC: 361 U/L (ref 73–393)
LOW TARGET, LOT: 200 MG/DL
LYMPHOCYTES # BLD: 0.4 K/UL (ref 0.8–3.5)
LYMPHOCYTES NFR BLD: 4 % (ref 12–49)
MCH RBC QN AUTO: 29.5 PG (ref 26–34)
MCHC RBC AUTO-ENTMCNC: 29.6 G/DL (ref 30–36.5)
MCV RBC AUTO: 99.8 FL (ref 80–99)
MINUTES UNTIL NEXT BG, NBG: 60 MIN
MONOCYTES # BLD: 0.2 K/UL (ref 0–1)
MONOCYTES NFR BLD: 2 % (ref 5–13)
MULTIPLIER, MUL: 0.02
MULTIPLIER, MUL: 0.03
MULTIPLIER, MUL: 0.04
MULTIPLIER, MUL: 0.04
MULTIPLIER, MUL: 0.05
NEUTS SEG # BLD: 8.7 K/UL (ref 1.8–8)
NEUTS SEG NFR BLD: 94 % (ref 32–75)
NITRITE UR QL STRIP.AUTO: NEGATIVE
ORDER INITIALS, ORDINIT: NORMAL
PH UR STRIP: 6 [PH] (ref 5–8)
PLATELET # BLD AUTO: 165 K/UL (ref 150–400)
POTASSIUM SERPL-SCNC: 4.3 MMOL/L (ref 3.5–5.1)
POTASSIUM SERPL-SCNC: 4.7 MMOL/L (ref 3.5–5.1)
PROT SERPL-MCNC: 6.8 G/DL (ref 6.4–8.2)
PROT SERPL-MCNC: 7.6 G/DL (ref 6.4–8.2)
PROT UR STRIP-MCNC: NEGATIVE MG/DL
RBC # BLD AUTO: 4.24 M/UL (ref 3.8–5.2)
RBC #/AREA URNS HPF: ABNORMAL /HPF (ref 0–5)
RBC MORPH BLD: ABNORMAL
SERVICE CMNT-IMP: ABNORMAL
SODIUM SERPL-SCNC: 120 MMOL/L (ref 136–145)
SODIUM SERPL-SCNC: 135 MMOL/L (ref 136–145)
SP GR UR REFRACTOMETRY: 1.03 (ref 1–1.03)
TROPONIN I SERPL-MCNC: <0.04 NG/ML
UA: UC IF INDICATED,UAUC: ABNORMAL
UROBILINOGEN UR QL STRIP.AUTO: 0.2 EU/DL (ref 0.2–1)
WBC # BLD AUTO: 9.3 K/UL (ref 3.6–11)
WBC URNS QL MICRO: ABNORMAL /HPF (ref 0–4)

## 2018-01-14 PROCEDURE — 93005 ELECTROCARDIOGRAM TRACING: CPT

## 2018-01-14 PROCEDURE — 96365 THER/PROPH/DIAG IV INF INIT: CPT

## 2018-01-14 PROCEDURE — 65270000029 HC RM PRIVATE

## 2018-01-14 PROCEDURE — 71046 X-RAY EXAM CHEST 2 VIEWS: CPT

## 2018-01-14 PROCEDURE — 83036 HEMOGLOBIN GLYCOSYLATED A1C: CPT | Performed by: EMERGENCY MEDICINE

## 2018-01-14 PROCEDURE — 74011250637 HC RX REV CODE- 250/637: Performed by: EMERGENCY MEDICINE

## 2018-01-14 PROCEDURE — 83690 ASSAY OF LIPASE: CPT | Performed by: EMERGENCY MEDICINE

## 2018-01-14 PROCEDURE — 84484 ASSAY OF TROPONIN QUANT: CPT | Performed by: EMERGENCY MEDICINE

## 2018-01-14 PROCEDURE — 74011250636 HC RX REV CODE- 250/636: Performed by: EMERGENCY MEDICINE

## 2018-01-14 PROCEDURE — 96361 HYDRATE IV INFUSION ADD-ON: CPT

## 2018-01-14 PROCEDURE — 74011250636 HC RX REV CODE- 250/636: Performed by: INTERNAL MEDICINE

## 2018-01-14 PROCEDURE — 36415 COLL VENOUS BLD VENIPUNCTURE: CPT | Performed by: EMERGENCY MEDICINE

## 2018-01-14 PROCEDURE — 82550 ASSAY OF CK (CPK): CPT | Performed by: EMERGENCY MEDICINE

## 2018-01-14 PROCEDURE — 99285 EMERGENCY DEPT VISIT HI MDM: CPT

## 2018-01-14 PROCEDURE — 81001 URINALYSIS AUTO W/SCOPE: CPT | Performed by: EMERGENCY MEDICINE

## 2018-01-14 PROCEDURE — 85025 COMPLETE CBC W/AUTO DIFF WBC: CPT | Performed by: EMERGENCY MEDICINE

## 2018-01-14 PROCEDURE — 82962 GLUCOSE BLOOD TEST: CPT

## 2018-01-14 PROCEDURE — 74011000258 HC RX REV CODE- 258: Performed by: EMERGENCY MEDICINE

## 2018-01-14 PROCEDURE — 80053 COMPREHEN METABOLIC PANEL: CPT | Performed by: EMERGENCY MEDICINE

## 2018-01-14 PROCEDURE — 70450 CT HEAD/BRAIN W/O DYE: CPT

## 2018-01-14 PROCEDURE — 74011636637 HC RX REV CODE- 636/637: Performed by: EMERGENCY MEDICINE

## 2018-01-14 RX ORDER — ACETAMINOPHEN 325 MG/1
650 TABLET ORAL
Status: COMPLETED | OUTPATIENT
Start: 2018-01-14 | End: 2018-01-14

## 2018-01-14 RX ORDER — INSULIN LISPRO 100 [IU]/ML
INJECTION, SOLUTION INTRAVENOUS; SUBCUTANEOUS
Status: DISCONTINUED | OUTPATIENT
Start: 2018-01-14 | End: 2018-01-14

## 2018-01-14 RX ORDER — DEXTROSE 50 % IN WATER (D50W) INTRAVENOUS SYRINGE
12.5-25 AS NEEDED
Status: DISCONTINUED | OUTPATIENT
Start: 2018-01-14 | End: 2018-01-17 | Stop reason: HOSPADM

## 2018-01-14 RX ORDER — MAGNESIUM SULFATE 100 %
4 CRYSTALS MISCELLANEOUS AS NEEDED
Status: DISCONTINUED | OUTPATIENT
Start: 2018-01-14 | End: 2018-01-17 | Stop reason: HOSPADM

## 2018-01-14 RX ORDER — SODIUM CHLORIDE 9 MG/ML
1500 INJECTION, SOLUTION INTRAVENOUS ONCE
Status: COMPLETED | OUTPATIENT
Start: 2018-01-14 | End: 2018-01-14

## 2018-01-14 RX ORDER — SODIUM CHLORIDE 9 MG/ML
150 INJECTION, SOLUTION INTRAVENOUS CONTINUOUS
Status: DISCONTINUED | OUTPATIENT
Start: 2018-01-14 | End: 2018-01-15

## 2018-01-14 RX ADMIN — SODIUM CHLORIDE 150 ML/HR: 900 INJECTION, SOLUTION INTRAVENOUS at 22:09

## 2018-01-14 RX ADMIN — SODIUM CHLORIDE 12.1 UNITS/HR: 900 INJECTION, SOLUTION INTRAVENOUS at 23:09

## 2018-01-14 RX ADMIN — SODIUM CHLORIDE 10.8 UNITS/HR: 900 INJECTION, SOLUTION INTRAVENOUS at 18:39

## 2018-01-14 RX ADMIN — ACETAMINOPHEN 650 MG: 325 TABLET ORAL at 15:31

## 2018-01-14 RX ADMIN — SODIUM CHLORIDE 1500 ML: 900 INJECTION, SOLUTION INTRAVENOUS at 15:32

## 2018-01-14 NOTE — IP AVS SNAPSHOT
[address] 
[phone] Patient: Mitul Taylor MRN: [id number] DVU:[id number] A check humaira indicates which time of day the medication should be taken. My Medications CHANGE how you take these medications Instructions Each Dose to Equal  
 Morning Noon Evening Bedtime  
 insulin NPH/insulin regular 100 unit/mL (70-30) injection Commonly known as:  NovoLIN 70/30 What changed:   
- how much to take - when to take this 
- additional instructions Your last dose was: Your next dose is:    
   
   
 20 Units by SubCUTAneous route two (2) times a day. 20 Units CONTINUE taking these medications Instructions Each Dose to Equal  
 Morning Noon Evening Bedtime * albuterol 2.5 mg /3 mL (0.083 %) nebulizer solution Commonly known as:  PROVENTIL VENTOLIN Your last dose was: Your next dose is:    
   
   
      
   
   
   
  
 * albuterol 90 mcg/actuation inhaler Commonly known as:  PROVENTIL HFA, VENTOLIN HFA, PROAIR HFA Your last dose was: Your next dose is: Take 2 Puffs by inhalation every four (4) hours as needed for Wheezing. 2 Puff  
    
   
   
   
  
 glucose blood VI test strips strip Commonly known as:   Your last dose was: Your next dose is: For use with Prodigy Meter, Check glucose 4x/day, Dx E13.9 Insulin Needles (Disposable) 31 gauge x 5/16\" Ndle Your last dose was: Your next dose is:    
   
   
 Use as directed Insulin Syringes (Disposable) 1 mL Syrg Your last dose was: Your next dose is:    
   
   
 Use as directed Lancets Misc Your last dose was: Your next dose is:    
   
   
 Use as directed  
     
   
   
   
  
 ondansetron 4 mg disintegrating tablet Commonly known as:  ZOFRAN ODT Your last dose was: Your next dose is: Take 1 Tab by mouth every eight (8) hours as needed for Nausea for up to 12 days. 4 mg * Notice: This list has 2 medication(s) that are the same as other medications prescribed for you. Read the directions carefully, and ask your doctor or other care provider to review them with you. STOP taking these medications   
 azithromycin 250 mg tablet Commonly known as:  ZITHROMAX Z-SUGAR  
   
  
 promethazine-dextromethorphan 6.25-15 mg/5 mL syrup Commonly known as:  PROMETHAZINE-DM Where to Get Your Medications Information on where to get these meds will be given to you by the nurse or doctor. ! Ask your nurse or doctor about these medications  
  insulin NPH/insulin regular 100 unit/mL (70-30) injection

## 2018-01-14 NOTE — ED TRIAGE NOTES
Assumed care of pt ambulatory from triage. Pt reports CC of chills and headache with N/V. Pt was seen in this ER Wed. For same. While in waiting room labs were drawn and BG is reading > 1500. POC reading is high. Pt reports when she was at home this morning her machine read 263. Pt A&O x4. On monitor x 3 VSS.

## 2018-01-14 NOTE — ED PROVIDER NOTES
EMERGENCY DEPARTMENT HISTORY AND PHYSICAL EXAM      Date: 1/14/2018  Patient Name: Uriel Dubon    History of Presenting Illness     Chief Complaint   Patient presents with    Headache     Pt presents to ED for headache, nausea, chills and 2 episodes of vomiting x today, recently at ER for similiar symptoms got z-pack and cough medication then     Nausea       History Provided By: Patient    HPI: Uriel Dubon, 45 y.o. female with PMHx significant for IDDM and asthma, presents ambulatory to the ED with cc of persistent chills x 1 week. Pt notes associated symptoms of mild headache, nausea, vomiting, dry cough, urinary frequency, and increased thrist. Pt reports she was seen on 01/10 for similar symptoms and prescribed a Z-fariha, a cough syrup, and zofran without relief. Pt notes her blood glucose was \"more than 200\" when she checked it prior to arriving to ED. She states she takes Novolin (70-30) 10 units BID. Pt denies hx of infectious disease, hx of cancer, dysuria, cardiac hx, tobacco use, chest pain, fever, diarrhea, or any exacerbating/modifying factors. PCP: Pro Dickson MD    There are no other complaints, changes, or physical findings at this time. Current Facility-Administered Medications   Medication Dose Route Frequency Provider Last Rate Last Dose    insulin regular (NOVOLIN R, HUMULIN R) 100 Units in 0.9% sodium chloride 100 mL infusion  0-50 Units/hr IntraVENous TITRATE Sujata Burkett MD        insulin lispro (HUMALOG) injection   SubCUTAneous TIDAC [employer]. Ember Burkett MD        glucose chewable tablet 16 g  4 Tab Oral PRN [employer]. Ember Burkett MD        dextrose (D50W) injection syrg 12.5-25 g  12.5-25 g IntraVENous PRN [employer]. Ember Burkett MD        glucagon [facility]) injection 1 mg  1 mg IntraMUSCular PRN [employer].  Ember Burkett MD         Current Outpatient Prescriptions   Medication Sig Dispense Refill    promethazine-dextromethorphan (PROMETHAZINE-DM) 6.25-15 mg/5 mL syrup Take 5 mL by mouth every four (4) hours as needed for Cough for up to 6 days. 118 mL 0    ondansetron (ZOFRAN ODT) 4 mg disintegrating tablet Take 1 Tab by mouth every eight (8) hours as needed for Nausea for up to 12 days. 12 Tab 0    azithromycin (ZITHROMAX Z-SUGAR) 250 mg tablet Take as directed: 2 pills day 1, 1 pill daily days 2-4 6 Tab 0    albuterol (PROVENTIL HFA, VENTOLIN HFA, PROAIR HFA) 90 mcg/actuation inhaler Take 2 Puffs by inhalation every four (4) hours as needed for Wheezing. 1 Inhaler 7    glucose blood VI test strips () strip For use with Prodigy Meter, Check glucose 4x/day, Dx E13.9 200 Strip 7    insulin NPH/insulin regular (NOVOLIN 70/30) 100 unit/mL (70-30) injection by SubCUTAneous route. 5 units in AM; 10-15 in PM      albuterol (PROVENTIL VENTOLIN) 2.5 mg /3 mL (0.083 %) nebulizer solution   0    Insulin Needles, Disposable, 31 gauge x 5/16\" ndle Use as directed 1 Package 11    Lancets misc Use as directed 1 Each 3    Insulin Syringes, Disposable, 1 mL syrg Use as directed 100 Syringe 2       Past History     Past Medical History:  Past Medical History:   Diagnosis Date    Asthma     Diabetes ([facility] [address]     Elevated transaminase level 6/29/2016    Insulin dependent diabetes mellitus ([facility] [address] 6/20/2016    Pancreatic atrophy 6/20/2016       Past Surgical History:  Past Surgical History:   Procedure Laterality Date    HX HEENT         Family History:  Family History   Problem Relation Age of Onset    Cancer Father     Diabetes Mother     Diabetes Maternal Grandmother        Social History:  Social History   Substance Use Topics    Smoking status: Never Smoker    Smokeless tobacco: Never Used    Alcohol use No       Allergies: Allergies   Allergen Reactions    Contrast Agent [Iodine] Itching    Hydrocodone Rash    Percocet [Oxycodone-Acetaminophen] Rash    Codeine Nausea and Vomiting         Review of Systems   Review of Systems   Constitutional: Positive for chills. Negative for fever. HENT: Negative for congestion. Eyes: Negative for visual disturbance. Respiratory: Positive for cough (dry). Negative for chest tightness. Cardiovascular: Negative for chest pain and leg swelling. Gastrointestinal: Positive for nausea and vomiting. Negative for abdominal pain and diarrhea. Endocrine: Positive for polydipsia. Negative for polyuria. Genitourinary: Positive for frequency. Negative for dysuria. Musculoskeletal: Negative for myalgias. Skin: Negative for color change. Allergic/Immunologic: Negative for immunocompromised state. Neurological: Positive for headaches (mild). Negative for numbness. Physical Exam   Physical Exam  Nursing note and vitals reviewed.   General appearance: non-toxic, resting comfortably   Eyes: PERRL, EOMI, conjunctiva normal, anicteric sclera  HEENT: mucous membranes are dry, oropharynx is clear, right TM partially occluded by cerumen, no erythema noted, left TM occluded by cerumen   Pulmonary: scant expiratory wheeze to left base, otherwise clear  Cardiac: normal rate and regular rhythm, no murmurs, gallops, or rubs, palpable DP pulses, 2+ radial pulses  Abdomen: soft, nontender, nondistended, bowel sounds present  MSK: no pre-tibial edema, no steps offs C/T/L spine, neck supple  Neuro: Alert, answers questions appropriately, CN II-XII intact, VFF, finger to nose normal, strength of extremities grossly intact  Skin: capillary refill 3-4 seconds      Diagnostic Study Results     Labs -     Recent Results (from the past 12 hour(s))   URINALYSIS W/ REFLEX CULTURE    Collection Time: 01/14/18 12:37 PM   Result Value Ref Range    Color YELLOW/STRAW      Appearance CLEAR CLEAR      Specific gravity 1.027 1.003 - 1.030      pH (UA) 6.0 5.0 - 8.0      Protein NEGATIVE  NEG mg/dL    Glucose >1000 (A) NEG mg/dL    Ketone NEGATIVE  NEG mg/dL    Bilirubin NEGATIVE  NEG      Blood NEGATIVE  NEG      Urobilinogen 0.2 0.2 - 1.0 EU/dL    Nitrites NEGATIVE  NEG Leukocyte Esterase NEGATIVE  NEG      WBC 0-4 0 - 4 /hpf    RBC 0-5 0 - 5 /hpf    Epithelial cells FEW FEW /lpf    Bacteria NEGATIVE  NEG /hpf    UA:UC IF INDICATED CULTURE NOT INDICATED BY UA RESULT CNI     CBC WITH AUTOMATED DIFF    Collection Time: 01/14/18  1:51 PM   Result Value Ref Range    WBC 9.3 3.6 - 11.0 K/uL    RBC 4.24 3.80 - 5.20 M/uL    HGB 12.5 11.5 - 16.0 g/dL    HCT 42.3 35.0 - 47.0 %    MCV 99.8 (H) 80.0 - 99.0 FL    MCH 29.5 26.0 - 34.0 PG    MCHC 29.6 (L) 30.0 - 36.5 g/dL    RDW 13.0 11.5 - 14.5 %    PLATELET 075 910 - 377 K/uL    NEUTROPHILS 94 (H) 32 - 75 %    LYMPHOCYTES 4 (L) 12 - 49 %    MONOCYTES 2 (L) 5 - 13 %    EOSINOPHILS 0 0 - 7 %    BASOPHILS 0 0 - 1 %    ABS. NEUTROPHILS 8.7 (H) 1.8 - 8.0 K/UL    ABS. LYMPHOCYTES 0.4 (L) 0.8 - 3.5 K/UL    ABS. MONOCYTES 0.2 0.0 - 1.0 K/UL    ABS. EOSINOPHILS 0.0 0.0 - 0.4 K/UL    ABS. BASOPHILS 0.0 0.0 - 0.1 K/UL    RBC COMMENTS NORMOCYTIC, NORMOCHROMIC     METABOLIC PANEL, COMPREHENSIVE    Collection Time: 01/14/18  1:51 PM   Result Value Ref Range    Sodium 120 (L) 136 - 145 mmol/L    Potassium 4.7 3.5 - 5.1 mmol/L    Chloride 80 (L) 97 - 108 mmol/L    CO2 33 (H) 21 - 32 mmol/L    Anion gap 7 5 - 15 mmol/L    Glucose >1500 (HH) 65 - 100 mg/dL    BUN 18 6 - 20 MG/DL    Creatinine 1.99 (H) 0.55 - 1.02 MG/DL    BUN/Creatinine ratio 9 (L) 12 - 20      GFR est AA 32 (L) >60 ml/min/1.73m2    GFR est non-AA 26 (L) >60 ml/min/1.73m2    Calcium 9.3 8.5 - 10.1 MG/DL    Bilirubin, total 0.5 0.2 - 1.0 MG/DL    ALT (SGPT) 53 12 - 78 U/L    AST (SGOT) 40 (H) 15 - 37 U/L    Alk.  phosphatase 270 (H) 45 - 117 U/L    Protein, total 7.6 6.4 - 8.2 g/dL    Albumin 3.9 3.5 - 5.0 g/dL    Globulin 3.7 2.0 - 4.0 g/dL    A-G Ratio 1.1 1.1 - 2.2     LIPASE    Collection Time: 01/14/18  1:51 PM   Result Value Ref Range    Lipase 361 73 - 393 U/L   CK    Collection Time: 01/14/18  1:51 PM   Result Value Ref Range    CK 65 26 - 192 U/L   TROPONIN I    Collection Time: 01/14/18 1:51 PM   Result Value Ref Range    Troponin-I, Qt. <0.04 <0.05 ng/mL   GLUCOSE, POC    Collection Time: 01/14/18  2:58 PM   Result Value Ref Range    Glucose (POC) >600 (HH) 65 - 100 mg/dL    Performed by Aleksandr Mccormack    GLUCOSE, POC    Collection Time: 01/14/18  4:24 PM   Result Value Ref Range    Glucose (POC) >600 (HH) 65 - 100 mg/dL    Performed by Jean Raymundo    METABOLIC PANEL, COMPREHENSIVE    Collection Time: 01/14/18  5:21 PM   Result Value Ref Range    Sodium 135 (L) 136 - 145 mmol/L    Potassium 4.3 3.5 - 5.1 mmol/L    Chloride 97 97 - 108 mmol/L    CO2 31 21 - 32 mmol/L    Anion gap 7 5 - 15 mmol/L    Glucose 1038 (HH) 65 - 100 mg/dL    BUN 15 6 - 20 MG/DL    Creatinine 1.66 (H) 0.55 - 1.02 MG/DL    BUN/Creatinine ratio 9 (L) 12 - 20      GFR est AA 39 (L) >60 ml/min/1.73m2    GFR est non-AA 32 (L) >60 ml/min/1.73m2    Calcium 8.6 8.5 - 10.1 MG/DL    Bilirubin, total 0.3 0.2 - 1.0 MG/DL    ALT (SGPT) 45 12 - 78 U/L    AST (SGOT) 34 15 - 37 U/L    Alk. phosphatase 225 (H) 45 - 117 U/L    Protein, total 6.8 6.4 - 8.2 g/dL    Albumin 3.5 3.5 - 5.0 g/dL    Globulin 3.3 2.0 - 4.0 g/dL    A-G Ratio 1.1 1.1 - 2.2         Radiologic Studies -   XR CHEST PA LAT   Final Result      CT HEAD WO CONT   Final Result        CT Results  (Last 48 hours)               01/14/18 1555  CT HEAD WO CONT Final result    Impression:  IMPRESSION:        There is no acute intracranial abnormality. Narrative:  EXAM:  CT HEAD WO CONT       INDICATION: Headache with nausea, chills, and vomiting today. COMPARISON: None       TECHNIQUE: Noncontrast head CT. Coronal and sagittal reformats. CT dose   reduction was achieved through use of a standardized protocol tailored for this   examination and automatic exposure control for dose modulation. FINDINGS: The ventricles and sulci are age-appropriate without hydrocephalus. There is no mass effect or midline shift.  There is no intracranial hemorrhage or extra-axial fluid collection. There is no abnormal parenchymal attenuation. The   gray-white matter differentiation is maintained. The basal cisterns are patent. The osseous structures are intact. The visualized paranasal sinuses and mastoid   air cells are clear. CXR Results  (Last 48 hours)               01/14/18 1605  XR CHEST PA LAT Final result    Impression:  IMPRESSION:       No acute process. Stable exam.           Narrative:  EXAM:  XR CHEST PA LAT       INDICATION:  Cough. Hyperglycemia. COMPARISON: 1/10/2018       TECHNIQUE: PA and lateral chest views       FINDINGS: Cardiac monitoring wires overlie the thorax. The cardiomediastinal   contours are stable. The cardiomediastinal and hilar contours are within normal   limits. The pulmonary vasculature is within normal limits. The lungs and pleural spaces are clear. There is no pneumothorax. The bones and   upper abdomen are stable. Medical Decision Making   I am the first provider for this patient. I reviewed the vital signs, available nursing notes, past medical history, past surgical history, family history and social history. Vital Signs-Reviewed the patient's vital signs. Patient Vitals for the past 12 hrs:   Temp Pulse Resp BP SpO2   01/14/18 1523 - - - - 97 %   01/14/18 1516 - 89 18 124/89 97 %   01/14/18 1355 97.5 °F (36.4 °C) (!) 115 17 143/81 98 %   01/14/18 1205 97.9 °F (36.6 °C) (!) 111 16 151/76 96 %       Pulse Oximetry Analysis - 98% on room air    Cardiac Monitor:   Rate: 87 bpm  Rhythm: Normal Sinus Rhythm      EKG interpretation: (Preliminary) 1615  Rhythm: normal sinus rhythm; and regular . Rate (approx.): 67; Axis: normal; QRS interval: normal ; ST/T wave: no ST elevation, no ST depression;   MO interval 116 ms, QRS 66 ms,  ms, QTc 424 ms. Written by Merlin Vázquez, as dictated by Christian Jacob MD.    Records Reviewed:  Old Medical Records    Provider Notes (Medical Decision Making):   DDx: pneumonia, viral syndrome, UTI, medication non-compliance, dietary indescretion    ED Course:   Initial assessment performed. The patients presenting problems have been discussed, and they are in agreement with the care plan formulated and outlined with them. I have encouraged them to ask questions as they arise throughout their visit. CONSULT NOTE:   5:35 PM  Michi Blackwell MD spoke with Dr. Ruba Smith,   Specialty: Hospitalist  Discussed pt's hx, disposition, and available diagnostic and imaging results. Reviewed care plans. Consultant will admit pt. Written by JALEN Miltonibe, as dictated by Michi Blackwell MD.      Critical Care Time:   CRITICAL CARE NOTE :    5:02 PM      IMPENDING DETERIRATION -Metabolic and Renal    ASSOCIATED RISK FACTORS - Metabolic changes and Dehydration    MANAGEMENT- Bedside Assessment and Supervision of Care    INTERPRETATION -  Xrays, CT Scan, ECG and Blood Pressure    INTERVENTIONS - Metobolic interventions    CASE REVIEW - Hospitalist and Nursing    TREATMENT RESPONSE -Improved and Stable    PERFORMED BY - Self        NOTES   :      I have spent 35 minutes of critical care time involved in lab review, consultations with specialist, family decision- making, bedside attention and documentation. During this entire length of time I was immediately available to the patient . Michi Blackwell MD    Disposition:  6:01 PM  Patient is being admitted to the hospital by Dr. Ruba Smith. The results of their tests and reasons for their admission have been discussed with them and/or available family. They convey agreement and understanding for the need to be admitted and for their admission diagnosis. Consultation has been made with the inpatient physician specialist for hospitalization. Written by JALEN Milton, as dictated by Michi Blackwell MD.    PLAN:  1. Admit to hospitalist    Diagnosis     Clinical Impression:   1. Hyperglycemia    2. Dehydration    3. NEERAJ (acute kidney injury) ([facility] [address]        Attestations: This note is prepared by [employer], acting as Scribe for Karuna Dowd MD.    The scribe's documentation has been prepared under my direction and personally reviewed by me in its entirety. I confirm that the note above accurately reflects all work, treatment, procedures, and medical decision making performed by me.   Karuna Dowd MD

## 2018-01-14 NOTE — IP AVS SNAPSHOT
[address] [facility] [address]. 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 [phone] Brooklynn Boyd MD In 1 week Call Sooner, As needed, If symptoms worsen [address] [facility] [address] [facility]. [phone] Discharge Orders None A check humaira indicates which time of day the medication should be taken. My Medications CHANGE how you take these medications Instructions Each Dose to Equal  
 Morning Noon Evening Bedtime  
 insulin NPH/insulin regular 100 unit/mL (70-30) injection Commonly known as:  NovoLIN 70/30 What changed:   
- how much to take - when to take this 
- additional instructions Your last dose was: Your next dose is:    
   
   
 20 Units by SubCUTAneous route two (2) times a day. 20 Units CONTINUE taking these medications Instructions Each Dose to Equal  
 Morning Noon Evening Bedtime * albuterol 2.5 mg /3 mL (0.083 %) nebulizer solution Commonly known as:  PROVENTIL VENTOLIN Your last dose was: Your next dose is:    
   
   
      
   
   
   
  
 * albuterol 90 mcg/actuation inhaler Commonly known as:  PROVENTIL HFA, VENTOLIN HFA, PROAIR HFA Your last dose was: Your next dose is: Take 2 Puffs by inhalation every four (4) hours as needed for Wheezing. 2 Puff  
    
   
   
   
  
 glucose blood VI test strips strip Commonly known as:   Your last dose was: Your next dose is: For use with Prodigy Meter, Check glucose 4x/day, Dx E13.9 Insulin Needles (Disposable) 31 gauge x 5/16\" Ndle Your last dose was: Your next dose is:    
   
   
 Use as directed Insulin Syringes (Disposable) 1 mL Syrg Your last dose was: Your next dose is:    
   
   
 Use as directed Lancets Misc Your last dose was: Your next dose is:    
   
   
 Use as directed  
     
   
   
   
  
 ondansetron 4 mg disintegrating tablet Commonly known as:  ZOFRAN ODT Your last dose was: Your next dose is: Take 1 Tab by mouth every eight (8) hours as needed for Nausea for up to 12 days. 4 mg * Notice: This list has 2 medication(s) that are the same as other medications prescribed for you. Read the directions carefully, and ask your doctor or other care provider to review them with you. STOP taking these medications   
 azithromycin 250 mg tablet Commonly known as:  ZITHROMAX Z-SUGAR  
   
  
 promethazine-dextromethorphan 6.25-15 mg/5 mL syrup Commonly known as:  PROMETHAZINE-DM Where to Get Your Medications Information on where to get these meds will be given to you by the nurse or doctor. ! Ask your nurse or doctor about these medications  
  insulin NPH/insulin regular 100 unit/mL (70-30) injection Discharge Instructions DISCHARGE DIAGNOSIS: 
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 
· It is important that you take the medication exactly as they are prescribed. · Keep your medication in the bottles provided by the pharmacist and keep a list of the medication names, dosages, and times to be taken in your wallet. · Do not take other medications without consulting your doctor. Pain Management: per above medications What to do at [facility] Recommended diet:  Diabetic Diet Recommended activity: Activity as tolerated If you have questions regarding the hospital related prescriptions or hospital related issues please call [address] [place] [address] at . You can always direct your questions to your primary care doctor if you are unable to reach your hospital physician; your PCP works as an extension of your hospital doctor just like your hospital doctor is an extension of your PCP for your time at the hospital [facility], [facility]). If you experience any of the following symptoms then please call your primary care physician or return to the emergency room if you cannot get hold of your doctor: 
Fever, chills, nausea, vomiting, diarrhea, change in mentation, falling, bleeding, shortness of breath Check your blood sugars as instructed by Dr Cheri Welch. NOTE the changes to your medications MyChart Announcement We are excited to announce that we are making your provider's discharge notes available to you in [employer]. You will see these notes when they are completed and signed by the physician that discharged you from your recent hospital stay.   If you have any questions or concerns about any information you see in 9GAG, please call the Health Information Department where you were seen or reach out to your Primary Care Provider for more information about your plan of care. Introducing [facility]! Bailey Marquez introduces 9GAG patient portal. Now you can access parts of your medical record, email your doctor's office, and request medication refills online. 1. In your internet browser, go to [email or web address] 2. Click on the First Time User? Click Here link in the Sign In box. You will see the New Member Sign Up page. 3. Enter your 9GAG Access Code exactly as it appears below. You will not need to use this code after youve completed the sign-up process. If you do not sign up before the expiration date, you must request a new code. · 9GAG Access Code: [email or web address] Expires: 4/10/2018  8:14 PM 
 
4. Enter the last four digits of your Social Security Number (xxxx) and Date of Birth (mm/dd/yyyy) as indicated and click Submit. You will be taken to the next sign-up page. 5. Create a 9GAG ID. This will be your 9GAG login ID and cannot be changed, so think of one that is secure and easy to remember. 6. Create a 9GAG password. You can change your password at any time. 7. Enter your Password Reset Question and Answer. This can be used at a later time if you forget your password. 8. Enter your e-mail address. You will receive e-mail notification when new information is available in [address] 9. Click Sign Up. You can now view and download portions of your medical record. 10. Click the Download Summary menu link to download a portable copy of your medical information. If you have questions, please visit the Frequently Asked Questions section of the 9GAG website. Remember, 9GAG is NOT to be used for urgent needs. For medical emergencies, dial 911. Now available from your iPhone and Android! Providers Seen During Your Hospitalization Provider Specialty Primary office phone Rafaela Bolaños. Nikole Harmon MD Emergency Medicine [phone] Jarad Can MD Internal Medicine [phone] Your Primary Care Physician (PCP) Primary Care Physician Office Phone Office Fax Mariia Cuellar [phone] You are allergic to the following Allergen Reactions Contrast Agent (Iodine) Itching Hydrocodone Rash Percocet (Oxycodone-Acetaminophen) Rash Codeine Nausea and Vomiting Recent Documentation Height Weight BMI OB Status Smoking Status 1.549 m 43.9 kg 18.29 kg/m2 Menopause Never Smoker Emergency Contacts Name Discharge Info Relation Home Work Mobile [address] CAREGIVER [3] Other Relative [6] [phone] Patient Belongings The following personal items are in your possession at time of discharge: 
  Dental Appliances: None  Visual Aid: None      Home Medications: None   Jewelry: None  Clothing: At bedside, Pants, Shirt, Socks, Undergarments    Other Valuables: None Please provide this summary of care documentation to your next provider. Signatures-by signing, you are acknowledging that this After Visit Summary has been reviewed with you and you have received a copy. Patient Signature:  ____________________________________________________________ Date:  ____________________________________________________________  
  
[facility] Provider Signature:  ____________________________________________________________ Date:  ____________________________________________________________

## 2018-01-15 ENCOUNTER — TELEPHONE (OUTPATIENT)
Dept: ENDOCRINOLOGY | Age: 53
End: 2018-01-15

## 2018-01-15 LAB
ADMINISTERED INITIALS, ADMINIT: NORMAL
ANION GAP SERPL CALC-SCNC: 4 MMOL/L (ref 5–15)
ANION GAP SERPL CALC-SCNC: 5 MMOL/L (ref 5–15)
ATRIAL RATE: 67 BPM
BUN SERPL-MCNC: 10 MG/DL (ref 6–20)
BUN SERPL-MCNC: 11 MG/DL (ref 6–20)
BUN/CREAT SERPL: 11 (ref 12–20)
BUN/CREAT SERPL: 9 (ref 12–20)
CALCIUM SERPL-MCNC: 8.2 MG/DL (ref 8.5–10.1)
CALCIUM SERPL-MCNC: 8.7 MG/DL (ref 8.5–10.1)
CALCULATED P AXIS, ECG09: 57 DEGREES
CALCULATED R AXIS, ECG10: 56 DEGREES
CALCULATED T AXIS, ECG11: 46 DEGREES
CARB RATIO, CHOR: 15
CARBOHYDRATE EATEN, CHO: 45 G
CHLORIDE SERPL-SCNC: 107 MMOL/L (ref 97–108)
CHLORIDE SERPL-SCNC: 111 MMOL/L (ref 97–108)
CO2 SERPL-SCNC: 31 MMOL/L (ref 21–32)
CO2 SERPL-SCNC: 31 MMOL/L (ref 21–32)
CREAT SERPL-MCNC: 1.03 MG/DL (ref 0.55–1.02)
CREAT SERPL-MCNC: 1.15 MG/DL (ref 0.55–1.02)
D50 ADMINISTERED, D50ADM: 0 ML
D50 ORDER, D50ORD: 0 ML
DIAGNOSIS, 93000: NORMAL
EST. AVERAGE GLUCOSE BLD GHB EST-MCNC: ABNORMAL MG/DL
GLSCOM COMMENTS: NORMAL
GLUCOSE BLD STRIP.AUTO-MCNC: 113 MG/DL (ref 65–100)
GLUCOSE BLD STRIP.AUTO-MCNC: 131 MG/DL (ref 65–100)
GLUCOSE BLD STRIP.AUTO-MCNC: 132 MG/DL (ref 65–100)
GLUCOSE BLD STRIP.AUTO-MCNC: 159 MG/DL (ref 65–100)
GLUCOSE BLD STRIP.AUTO-MCNC: 161 MG/DL (ref 65–100)
GLUCOSE BLD STRIP.AUTO-MCNC: 162 MG/DL (ref 65–100)
GLUCOSE BLD STRIP.AUTO-MCNC: 168 MG/DL (ref 65–100)
GLUCOSE BLD STRIP.AUTO-MCNC: 170 MG/DL (ref 65–100)
GLUCOSE BLD STRIP.AUTO-MCNC: 175 MG/DL (ref 65–100)
GLUCOSE BLD STRIP.AUTO-MCNC: 176 MG/DL (ref 65–100)
GLUCOSE BLD STRIP.AUTO-MCNC: 183 MG/DL (ref 65–100)
GLUCOSE BLD STRIP.AUTO-MCNC: 193 MG/DL (ref 65–100)
GLUCOSE BLD STRIP.AUTO-MCNC: 201 MG/DL (ref 65–100)
GLUCOSE BLD STRIP.AUTO-MCNC: 203 MG/DL (ref 65–100)
GLUCOSE BLD STRIP.AUTO-MCNC: 206 MG/DL (ref 65–100)
GLUCOSE BLD STRIP.AUTO-MCNC: 228 MG/DL (ref 65–100)
GLUCOSE BLD STRIP.AUTO-MCNC: 308 MG/DL (ref 65–100)
GLUCOSE BLD STRIP.AUTO-MCNC: 308 MG/DL (ref 65–100)
GLUCOSE BLD STRIP.AUTO-MCNC: 338 MG/DL (ref 65–100)
GLUCOSE BLD STRIP.AUTO-MCNC: 345 MG/DL (ref 65–100)
GLUCOSE BLD STRIP.AUTO-MCNC: 450 MG/DL (ref 65–100)
GLUCOSE SERPL-MCNC: 192 MG/DL (ref 65–100)
GLUCOSE SERPL-MCNC: 374 MG/DL (ref 65–100)
GLUCOSE, GLC: 113 MG/DL
GLUCOSE, GLC: 131 MG/DL
GLUCOSE, GLC: 159 MG/DL
GLUCOSE, GLC: 161 MG/DL
GLUCOSE, GLC: 162 MG/DL
GLUCOSE, GLC: 168 MG/DL
GLUCOSE, GLC: 170 MG/DL
GLUCOSE, GLC: 175 MG/DL
GLUCOSE, GLC: 176 MG/DL
GLUCOSE, GLC: 183 MG/DL
GLUCOSE, GLC: 193 MG/DL
GLUCOSE, GLC: 201 MG/DL
GLUCOSE, GLC: 203 MG/DL
GLUCOSE, GLC: 206 MG/DL
GLUCOSE, GLC: 228 MG/DL
GLUCOSE, GLC: 308 MG/DL
GLUCOSE, GLC: 308 MG/DL
GLUCOSE, GLC: 338 MG/DL
GLUCOSE, GLC: 345 MG/DL
GLUCOSE, GLC: 450 MG/DL
HBA1C MFR BLD: >16 % (ref 4.2–6.3)
HIGH TARGET, HITG: 180 MG/DL
HIGH TARGET, HITG: 300 MG/DL
INSULIN ADMINSTERED, INSADM: 0 UNITS/HOUR
INSULIN ADMINSTERED, INSADM: 0 UNITS/HOUR
INSULIN ADMINSTERED, INSADM: 0.1 UNITS/HOUR
INSULIN ADMINSTERED, INSADM: 1.1 UNITS/HOUR
INSULIN ADMINSTERED, INSADM: 1.6 UNITS/HOUR
INSULIN ADMINSTERED, INSADM: 1.6 UNITS/HOUR
INSULIN ADMINSTERED, INSADM: 2 UNITS/HOUR
INSULIN ADMINSTERED, INSADM: 2.1 UNITS/HOUR
INSULIN ADMINSTERED, INSADM: 2.9 UNITS/HOUR
INSULIN ADMINSTERED, INSADM: 3.3 UNITS/HOUR
INSULIN ADMINSTERED, INSADM: 3.9 UNITS/HOUR
INSULIN ADMINSTERED, INSADM: 4 UNITS/HOUR
INSULIN ADMINSTERED, INSADM: 4.6 UNITS/HOUR
INSULIN ADMINSTERED, INSADM: 4.9 UNITS/HOUR
INSULIN ADMINSTERED, INSADM: 5.3 UNITS/HOUR
INSULIN ADMINSTERED, INSADM: 5.6 UNITS/HOUR
INSULIN ADMINSTERED, INSADM: 6.7 UNITS/HOUR
INSULIN ADMINSTERED, INSADM: 7.3 UNITS/HOUR
INSULIN ADMINSTERED, INSADM: 7.4 UNITS/HOUR
INSULIN ADMINSTERED, INSADM: 9.9 UNITS/HOUR
INSULIN BOLUS ADMINISTERED, INSBOLADM: 3 UNITS/HOUR
INSULIN BOLUS ORDERED, INSBOLORD: 3 UNITS/HOUR
INSULIN ORDER, INSORD: 0 UNITS/HOUR
INSULIN ORDER, INSORD: 0 UNITS/HOUR
INSULIN ORDER, INSORD: 0.1 UNITS/HOUR
INSULIN ORDER, INSORD: 1.1 UNITS/HOUR
INSULIN ORDER, INSORD: 1.6 UNITS/HOUR
INSULIN ORDER, INSORD: 1.6 UNITS/HOUR
INSULIN ORDER, INSORD: 2 UNITS/HOUR
INSULIN ORDER, INSORD: 2.1 UNITS/HOUR
INSULIN ORDER, INSORD: 2.9 UNITS/HOUR
INSULIN ORDER, INSORD: 3.3 UNITS/HOUR
INSULIN ORDER, INSORD: 3.9 UNITS/HOUR
INSULIN ORDER, INSORD: 4 UNITS/HOUR
INSULIN ORDER, INSORD: 4.6 UNITS/HOUR
INSULIN ORDER, INSORD: 4.9 UNITS/HOUR
INSULIN ORDER, INSORD: 5.3 UNITS/HOUR
INSULIN ORDER, INSORD: 5.6 UNITS/HOUR
INSULIN ORDER, INSORD: 6.7 UNITS/HOUR
INSULIN ORDER, INSORD: 7.3 UNITS/HOUR
INSULIN ORDER, INSORD: 7.4 UNITS/HOUR
INSULIN ORDER, INSORD: 9.9 UNITS/HOUR
LOW TARGET, LOT: 140 MG/DL
LOW TARGET, LOT: 200 MG/DL
MINUTES UNTIL NEXT BG, NBG: 60 MIN
MULTIPLIER, MUL: 0
MULTIPLIER, MUL: 0.01
MULTIPLIER, MUL: 0.02
MULTIPLIER, MUL: 0.02
MULTIPLIER, MUL: 0.03
MULTIPLIER, MUL: 0.04
MULTIPLIER, MUL: 0.05
ORDER INITIALS, ORDINIT: NORMAL
P-R INTERVAL, ECG05: 116 MS
POTASSIUM SERPL-SCNC: 3 MMOL/L (ref 3.5–5.1)
POTASSIUM SERPL-SCNC: 4.3 MMOL/L (ref 3.5–5.1)
Q-T INTERVAL, ECG07: 402 MS
QRS DURATION, ECG06: 66 MS
QTC CALCULATION (BEZET), ECG08: 424 MS
SERVICE CMNT-IMP: ABNORMAL
SODIUM SERPL-SCNC: 142 MMOL/L (ref 136–145)
SODIUM SERPL-SCNC: 147 MMOL/L (ref 136–145)
VENTRICULAR RATE, ECG03: 67 BPM

## 2018-01-15 PROCEDURE — 65660000000 HC RM CCU STEPDOWN

## 2018-01-15 PROCEDURE — 74011636637 HC RX REV CODE- 636/637: Performed by: INTERNAL MEDICINE

## 2018-01-15 PROCEDURE — 74011000258 HC RX REV CODE- 258: Performed by: EMERGENCY MEDICINE

## 2018-01-15 PROCEDURE — 82962 GLUCOSE BLOOD TEST: CPT

## 2018-01-15 PROCEDURE — 36415 COLL VENOUS BLD VENIPUNCTURE: CPT | Performed by: INTERNAL MEDICINE

## 2018-01-15 PROCEDURE — 74011000258 HC RX REV CODE- 258: Performed by: GENERAL ACUTE CARE HOSPITAL

## 2018-01-15 PROCEDURE — 83036 HEMOGLOBIN GLYCOSYLATED A1C: CPT | Performed by: INTERNAL MEDICINE

## 2018-01-15 PROCEDURE — 80048 BASIC METABOLIC PNL TOTAL CA: CPT | Performed by: INTERNAL MEDICINE

## 2018-01-15 PROCEDURE — 74011250637 HC RX REV CODE- 250/637: Performed by: GENERAL ACUTE CARE HOSPITAL

## 2018-01-15 PROCEDURE — 74011636637 HC RX REV CODE- 636/637: Performed by: EMERGENCY MEDICINE

## 2018-01-15 PROCEDURE — 74011000258 HC RX REV CODE- 258: Performed by: INTERNAL MEDICINE

## 2018-01-15 PROCEDURE — 74011250636 HC RX REV CODE- 250/636: Performed by: INTERNAL MEDICINE

## 2018-01-15 PROCEDURE — 74011250637 HC RX REV CODE- 250/637: Performed by: INTERNAL MEDICINE

## 2018-01-15 PROCEDURE — 74011250636 HC RX REV CODE- 250/636: Performed by: GENERAL ACUTE CARE HOSPITAL

## 2018-01-15 RX ORDER — SODIUM CHLORIDE 0.9 % (FLUSH) 0.9 %
5-10 SYRINGE (ML) INJECTION EVERY 8 HOURS
Status: DISCONTINUED | OUTPATIENT
Start: 2018-01-15 | End: 2018-01-17 | Stop reason: HOSPADM

## 2018-01-15 RX ORDER — ACETAMINOPHEN 325 MG/1
325 TABLET ORAL
Status: DISCONTINUED | OUTPATIENT
Start: 2018-01-15 | End: 2018-01-15

## 2018-01-15 RX ORDER — DEXTROSE, SODIUM CHLORIDE, AND POTASSIUM CHLORIDE 5; .45; .15 G/100ML; G/100ML; G/100ML
100 INJECTION INTRAVENOUS CONTINUOUS
Status: DISCONTINUED | OUTPATIENT
Start: 2018-01-15 | End: 2018-01-16

## 2018-01-15 RX ORDER — POTASSIUM CHLORIDE 750 MG/1
40 TABLET, FILM COATED, EXTENDED RELEASE ORAL
Status: COMPLETED | OUTPATIENT
Start: 2018-01-15 | End: 2018-01-15

## 2018-01-15 RX ORDER — INSULIN LISPRO 100 [IU]/ML
INJECTION, SOLUTION INTRAVENOUS; SUBCUTANEOUS
Status: DISCONTINUED | OUTPATIENT
Start: 2018-01-15 | End: 2018-01-16

## 2018-01-15 RX ORDER — POTASSIUM CHLORIDE 14.9 MG/ML
10 INJECTION INTRAVENOUS
Status: DISPENSED | OUTPATIENT
Start: 2018-01-15 | End: 2018-01-15

## 2018-01-15 RX ORDER — SODIUM CHLORIDE 0.9 % (FLUSH) 0.9 %
5-10 SYRINGE (ML) INJECTION AS NEEDED
Status: DISCONTINUED | OUTPATIENT
Start: 2018-01-15 | End: 2018-01-17 | Stop reason: HOSPADM

## 2018-01-15 RX ORDER — ONDANSETRON 2 MG/ML
4 INJECTION INTRAMUSCULAR; INTRAVENOUS
Status: DISCONTINUED | OUTPATIENT
Start: 2018-01-15 | End: 2018-01-17 | Stop reason: HOSPADM

## 2018-01-15 RX ORDER — DEXTROSE MONOHYDRATE AND SODIUM CHLORIDE 5; .9 G/100ML; G/100ML
75 INJECTION, SOLUTION INTRAVENOUS CONTINUOUS
Status: DISCONTINUED | OUTPATIENT
Start: 2018-01-15 | End: 2018-01-15

## 2018-01-15 RX ORDER — ACETAMINOPHEN 325 MG/1
650 TABLET ORAL
Status: DISCONTINUED | OUTPATIENT
Start: 2018-01-15 | End: 2018-01-17 | Stop reason: HOSPADM

## 2018-01-15 RX ADMIN — DEXTROSE MONOHYDRATE AND SODIUM CHLORIDE 150 ML/HR: 5; .9 INJECTION, SOLUTION INTRAVENOUS at 00:31

## 2018-01-15 RX ADMIN — ACETAMINOPHEN 650 MG: 325 TABLET ORAL at 05:21

## 2018-01-15 RX ADMIN — INSULIN LISPRO 3 UNITS: 100 INJECTION, SOLUTION INTRAVENOUS; SUBCUTANEOUS at 18:48

## 2018-01-15 RX ADMIN — POTASSIUM CHLORIDE 10 MEQ: 200 INJECTION, SOLUTION INTRAVENOUS at 07:22

## 2018-01-15 RX ADMIN — POTASSIUM CHLORIDE 10 MEQ: 200 INJECTION, SOLUTION INTRAVENOUS at 09:30

## 2018-01-15 RX ADMIN — POTASSIUM CHLORIDE 10 MEQ: 200 INJECTION, SOLUTION INTRAVENOUS at 13:00

## 2018-01-15 RX ADMIN — DEXTROSE MONOHYDRATE AND SODIUM CHLORIDE 75 ML/HR: 5; .9 INJECTION, SOLUTION INTRAVENOUS at 08:55

## 2018-01-15 RX ADMIN — Medication 10 ML: at 04:30

## 2018-01-15 RX ADMIN — SODIUM CHLORIDE 5.3 UNITS/HR: 900 INJECTION, SOLUTION INTRAVENOUS at 01:36

## 2018-01-15 RX ADMIN — SODIUM CHLORIDE 7.4 UNITS/HR: 900 INJECTION, SOLUTION INTRAVENOUS at 12:52

## 2018-01-15 RX ADMIN — SODIUM CHLORIDE 6.7 UNITS/HR: 900 INJECTION, SOLUTION INTRAVENOUS at 15:31

## 2018-01-15 RX ADMIN — SODIUM CHLORIDE 2.1 UNITS/HR: 900 INJECTION, SOLUTION INTRAVENOUS at 23:41

## 2018-01-15 RX ADMIN — POTASSIUM CHLORIDE 10 MEQ: 200 INJECTION, SOLUTION INTRAVENOUS at 10:32

## 2018-01-15 RX ADMIN — Medication 10 ML: at 16:05

## 2018-01-15 RX ADMIN — SODIUM CHLORIDE 9.9 UNITS/HR: 900 INJECTION, SOLUTION INTRAVENOUS at 13:56

## 2018-01-15 RX ADMIN — SODIUM CHLORIDE 0.1 UNITS/HR: 900 INJECTION, SOLUTION INTRAVENOUS at 08:22

## 2018-01-15 RX ADMIN — DEXTROSE MONOHYDRATE, SODIUM CHLORIDE, AND POTASSIUM CHLORIDE 150 ML/HR: 50; 4.5; 1.49 INJECTION, SOLUTION INTRAVENOUS at 10:31

## 2018-01-15 RX ADMIN — POTASSIUM CHLORIDE 40 MEQ: 750 TABLET, FILM COATED, EXTENDED RELEASE ORAL at 17:58

## 2018-01-15 RX ADMIN — ONDANSETRON HYDROCHLORIDE 4 MG: 2 INJECTION, SOLUTION INTRAMUSCULAR; INTRAVENOUS at 05:21

## 2018-01-15 RX ADMIN — Medication 10 ML: at 21:29

## 2018-01-15 RX ADMIN — POTASSIUM CHLORIDE 10 MEQ: 200 INJECTION, SOLUTION INTRAVENOUS at 11:42

## 2018-01-15 RX ADMIN — SODIUM CHLORIDE 5.6 UNITS/HR: 900 INJECTION, SOLUTION INTRAVENOUS at 11:46

## 2018-01-15 RX ADMIN — DEXTROSE MONOHYDRATE, SODIUM CHLORIDE, AND POTASSIUM CHLORIDE 100 ML/HR: 50; 4.5; 1.49 INJECTION, SOLUTION INTRAVENOUS at 22:32

## 2018-01-15 RX ADMIN — POTASSIUM CHLORIDE 10 MEQ: 200 INJECTION, SOLUTION INTRAVENOUS at 08:26

## 2018-01-15 RX ADMIN — SODIUM CHLORIDE 3.9 UNITS/HR: 900 INJECTION, SOLUTION INTRAVENOUS at 09:29

## 2018-01-15 RX ADMIN — POTASSIUM CHLORIDE 10 MEQ: 200 INJECTION, SOLUTION INTRAVENOUS at 13:04

## 2018-01-15 RX ADMIN — SODIUM CHLORIDE 2.9 UNITS/HR: 900 INJECTION, SOLUTION INTRAVENOUS at 10:40

## 2018-01-15 NOTE — ED NOTES
Bedside shift change report given to [address]  (oncoming nurse) by Essence RN  (offgoing nurse). Report included the following information SBAR, Kardex, ED Summary and MAR. 1st run of potassium infusing at time.

## 2018-01-15 NOTE — ED NOTES
Bedside and Verbal shift change report given to [facility] (oncoming nurse) by Galo Gilbert RN (offgoing nurse). Report included the following information SBAR and ED Summary.

## 2018-01-15 NOTE — ED NOTES
Case D/W Dr. Bimal Morfin advised to administer all K(+) runs and to change IVF maintenance over when possible.

## 2018-01-15 NOTE — PROGRESS NOTES
1530: TRANSFER - IN REPORT:    Verbal report received from \[facility]\"" (name) on Libertad Atwood  being received from ED (unit) for routine progression of care      Report consisted of patients Situation, Background, Assessment and   Recommendations(SBAR). Information from the following report(s) SBAR, Kardex, ED Summary, Intake/Output, MAR, Recent Results and Cardiac Rhythm NSR was reviewed with the receiving nurse. Opportunity for questions and clarification was provided. Assessment completed upon patients arrival to unit and care assumed. Primary Nurse Angel Cedillo RN and Gretta Steinberg RN performed a dual skin assessment on this patient No impairment noted  Vinh score is 20    1650: Only 7 of 8 runs of IV potassium was given to patient. Spoke with Dr. Devonte Wagner, will give patient  40 meq PO instead of 8th run of Potassium. Spoke with Pharmacy and made them aware.    1900: Bedside shift [facility] by Oriana Zambrano RN (offgoing nurse). Report included the following information SBAR, Kardex, ED Summary, Procedure Summary, Intake/Output, MAR, Recent Results and Cardiac Rhythm NSR.

## 2018-01-15 NOTE — PROGRESS NOTES
Hospitalist Progress Note    NAME: Helen Root   :  1965   MRN:  [id number]     Assessment / Plan:  Non ketotic Hyperglycemia with BS > 1,500, POA in IDDM, SHANTEL  ( Latent Autoimmune  Diabetes in Adults)   Acute renal failure due to Severe Dehydration from hyperglycemia  Hypernatremia, corrected Na of 150  Pancreatic Atrophy ( CT of Abdomen in 2016 shows an atrophic pancreatic head with absent Pancreatic Body and tail)  Chronically elevated  Alk Phos  Asthma with Viral URI, now much improved  Sinus tachycardia  -remains on glucommander at this time and bs fluctuating but overall dropping from ED   -bs is <250 but only briefly then back >400  -fluids have been adjusted to include dextrose even though the sugars are oscillating  -will need potassium added to IVF and supplemented  -hypernatremia mostly corrected with dropping bs  -continue to adjust IVF for insensible losses  -a1c of >16 either means there is underlying hematologic disorder or horrible control of her underlying diabetes  -continue 2 large bore IV at all times  -up OOB when able  -needs PT/OT  -sinus tachycardia resolving with correction of dehydration  -increase diet as tolerated    Code status: Full  Prophylaxis: Hep SQ  Recommended Disposition: Home w/Family, SNF/LTC and  PT, OT, RN     Subjective:     Chief Complaint / Reason for Physician Visit  \"i feel better - I am hungry\". Discussed with RN events overnight. bs up and down    Review of Systems:  Symptom Y/N Comments  Symptom Y/N Comments   Fever/Chills n   Chest Pain     Poor Appetite n   Edema     Cough n   Abdominal Pain n    Sputum n   Joint Pain     SOB/GARLAND n   Pruritis/Rash     Nausea/vomit n   Tolerating PT/OT     Diarrhea    Tolerating Diet y    Constipation    Other       Could NOT obtain due to:      Objective:     VITALS:   Last 24hrs VS reviewed since prior progress note.  Most recent are:  Patient Vitals for the past 24 hrs:   Temp Pulse Resp BP SpO2   01/15/18 0807 - 73 12 - 99 %   01/15/18 0800 - 72 19 106/78 100 %   01/15/18 0745 - 71 22 - 98 %   01/15/18 0736 - 72 18 - 99 %   01/15/18 0715 - 70 21 125/70 98 %   01/15/18 0630 - 74 11 122/73 99 %   01/15/18 0545 - 81 15 126/78 100 %   01/15/18 0415 98.1 °F (36.7 °C) 83 15 118/71 97 %   01/15/18 0330 - 85 12 130/75 99 %   01/15/18 0245 - 88 9 127/74 98 %   01/15/18 0215 - 89 13 128/78 99 %   01/15/18 0145 - 85 20 123/74 99 %   01/15/18 0115 - 100 14 119/70 99 %   01/15/18 0100 - (!) 104 15 110/71 98 %   01/15/18 0030 - (!) 105 15 119/76 99 %   01/15/18 0015 - (!) 104 12 109/68 99 %   01/14/18 2329 - 96 15 - 99 %   01/14/18 2300 - (!) 111 15 109/78 98 %   01/14/18 2200 - (!) 121 16 111/73 99 %   01/14/18 2130 - (!) 104 15 128/78 99 %   01/14/18 2100 - (!) 126 20 127/86 97 %   01/14/18 2000 - (!) 136 17 (!) 136/98 99 %   01/14/18 1930 - (!) 110 21 124/80 99 %   01/14/18 1900 - 80 16 119/77 98 %   01/14/18 1523 - - - - 97 %   01/14/18 1516 - 89 18 124/89 97 %   01/14/18 1355 97.5 °F (36.4 °C) (!) 115 17 143/81 98 %   01/14/18 1205 97.9 °F (36.6 °C) (!) 111 16 151/76 96 %     No intake or output data in the 24 hours ending 01/15/18 0900     PHYSICAL EXAM:  General: WD, thin f sitting up in bed, loquacious, Alert, cooperative, no acute distress    EENT:  EOMI. Anicteric sclerae. MM dry  Resp:  CTA bilaterally, no wheezing or rales. No accessory muscle use  CV:  Regular  rhythm,  No edema  GI:  Soft, Non distended, Non tender.  +Bowel sounds  Neurologic:  Alert and oriented X 3, normal speech  Psych:   Good insight. Not anxious nor agitated  Skin:  no rashes or ulcers.   No jaundice    Reviewed most current lab test results and cultures  YES  Reviewed most current radiology test results   YES  Review and summation of old records today    NO  Reviewed patient's current orders and MAR    YES  PMH/SH reviewed - no change compared to H&P  ________________________________________________________________________  Care Plan discussed with: Comments   Patient x Discussed with patient in room. POC discussed. Questions answered (22   Family      RN x    Care Manager     Consultant:                       Multidiciplinary team rounds were held today with , nursing, pharmacist and clinical coordinator. Patient's plan of care was discussed; medications were reviewed and discharge planning was addressed. ________________________________________________________________________  Total NON critical care TIME:  45   Minutes    Total CRITICAL CARE TIME Spent:   Minutes non procedure based. I have provided critical care time. During this entire length of time I was immediately available to the patient. The reason for providing this level of medical care was due to a critical illness that impaired one or more vital organ systems, such that there was a high probability of imminent or life threatening deterioration in the patient's condition. This care involved high complexity decision making which includes reviewing the patient's past medical records, current laboratory results, and actual Xray films in order to assess, support vital system function, and to treat this degree of vital organ system failure, and to prevent further life threatening deterioration of the patients condition. Comments   >50% of visit spent in counseling and coordination of care x See above   ________________________________________________________________________  Procedures: see electronic medical records for all procedures/Xrays and details which were not copied into this note but were reviewed prior to creation of Plan.       LABS:  Recent Labs      01/14/18   1351   WBC  9.3   HGB  12.5   HCT  42.3   PLT  165     Recent Labs      01/15/18   0433  01/14/18   1721  01/14/18   1351   NA  147*  135*  120*   K  3.0*  4.3  4.7   CL  111*  97  80*   CO2  31  31  33*   BUN  11  15  18   CREA  1.03*  1.66*  1.99*   GLU  192*  1038*  >1500*   CA  8.7  8.6  9.3     Recent Labs      01/14/18   1721  01/14/18   1351   SGOT  34  40*   ALT  45  53   AP  225*  270*   TBILI  0.3  0.5   TP  6.8  7.6   ALB  3.5  3.9   GLOB  3.3  3.7   LPSE   --   361     No results for input(s): INR, PTP, APTT in the last 72 hours. No lab exists for component: INREXT   No results for input(s): FE, TIBC, PSAT, FERR in the last 72 hours. No results found for: FOL, RBCF   No results for input(s): PH, PCO2, PO2 in the last 72 hours. No results for input(s): PHI, PO2I, PCO2I in the last 72 hours. Recent Labs      01/14/18   1351   CPK  65   TROIQ  <0.04     Lab Results   Component Value Date/Time    Cholesterol, total 181 07/27/2016 08:58 AM    HDL Cholesterol 106 07/27/2016 08:58 AM    LDL, calculated 62 07/27/2016 08:58 AM    Triglyceride 67 07/27/2016 08:58 AM     Lab Results   Component Value Date/Time    Glucose (POC) 203 01/15/2018 08:12 AM    Glucose (POC) 175 01/15/2018 07:21 AM    Glucose (POC) 159 01/15/2018 06:27 AM    Glucose (POC) 168 01/15/2018 05:19 AM    Glucose (POC) 162 01/15/2018 04:07 AM     Lab Results   Component Value Date/Time    Color YELLOW/STRAW 01/14/2018 12:37 PM    Appearance CLEAR 01/14/2018 12:37 PM    Specific gravity 1.027 01/14/2018 12:37 PM    pH (UA) 6.0 01/14/2018 12:37 PM    Protein NEGATIVE  01/14/2018 12:37 PM    Glucose >1000 01/14/2018 12:37 PM    Ketone NEGATIVE  01/14/2018 12:37 PM    Bilirubin NEGATIVE  01/14/2018 12:37 PM    Urobilinogen 0.2 01/14/2018 12:37 PM    Nitrites NEGATIVE  01/14/2018 12:37 PM    Leukocyte Esterase NEGATIVE  01/14/2018 12:37 PM    Epithelial cells FEW 01/14/2018 12:37 PM    Bacteria NEGATIVE  01/14/2018 12:37 PM    WBC 0-4 01/14/2018 12:37 PM    RBC 0-5 01/14/2018 12:37 PM       RADIOGRAPHIC STUDIES:  CXR Results  (Last 48 hours)               01/14/18 1605  XR CHEST PA LAT Final result    Impression:  IMPRESSION:       No acute process. Stable exam.           Narrative:  EXAM:  XR CHEST PA LAT       INDICATION:  Cough. Hyperglycemia. COMPARISON: 1/10/2018       TECHNIQUE: PA and lateral chest views       FINDINGS: Cardiac monitoring wires overlie the thorax. The cardiomediastinal   contours are stable. The cardiomediastinal and hilar contours are within normal   limits. The pulmonary vasculature is within normal limits. The lungs and pleural spaces are clear. There is no pneumothorax. The bones and   upper abdomen are stable. CT Results  (Last 48 hours)               01/14/18 1555  CT HEAD WO CONT Final result    Impression:  IMPRESSION:        There is no acute intracranial abnormality. Narrative:  EXAM:  CT HEAD WO CONT       INDICATION: Headache with nausea, chills, and vomiting today. COMPARISON: None       TECHNIQUE: Noncontrast head CT. Coronal and sagittal reformats. CT dose   reduction was achieved through use of a standardized protocol tailored for this   examination and automatic exposure control for dose modulation. FINDINGS: The ventricles and sulci are age-appropriate without hydrocephalus. There is no mass effect or midline shift. There is no intracranial hemorrhage or   extra-axial fluid collection. There is no abnormal parenchymal attenuation. The   gray-white matter differentiation is maintained. The basal cisterns are patent. The osseous structures are intact. The visualized paranasal sinuses and mastoid   air cells are clear. Echo Results  (Last 48 hours)    None          VENOUS DOPPLER results  (Last 48 hours)    None          CULTURES:    No results found for: SDES Lab Results   Component Value Date/Time    Culture result: CITROBACTER AMALONATICUS 12/01/2014 01:42 PM          Signed: Oj Cortes MD    This note will not be viewable in [address]

## 2018-01-15 NOTE — ED NOTES
Bedside and Verbal shift change report given to [facility]  (oncoming nurse) by [facility] (offgoing nurse). Report included the following information SBAR, ED Summary and Recent Results.

## 2018-01-15 NOTE — ED NOTES
Bedside shift change report given to [facility]  (oncoming nurse) by Herb Rockwell (offgoing nurse). Report included the following information SBAR, Kardex, ED Summary and MAR. Pt resting in bed after using bed side commode.

## 2018-01-15 NOTE — ED NOTES
Message sent to pharmacy about K infusion needed. 1559: pharmacy called about K needed was told its going to be delivered. 0715: K arrived in ED.

## 2018-01-15 NOTE — ED NOTES
Bedside and Verbal shift change report given to EMERSON Norman (oncoming nurse) by Cris Stone (offgoing nurse). Report included the following information SBAR, ED Summary, Intake/Output, MAR and Recent Results. Monitor x 3. Call bell in reach. Bed in lowest position, with side rails up x 2. Pt updated on plan of care. Resting at this time.

## 2018-01-15 NOTE — TELEPHONE ENCOUNTER
Sarah(Nurse [phone] [facility] is calling for a hospital consult for Dr. Steph Ruiz.     Patient Lidia Ghosh  :  1965  Room 2261    Reason:    Blood sugars over 1500/ on an insulin drip    Attending:  Dr. Addis High

## 2018-01-15 NOTE — ED NOTES
Pt resting in bed, eating meal tray. Denies nausea or headache. Side rails up for pt safety. Call light within reach.

## 2018-01-15 NOTE — H&P
Hospitalist Admission Note    NAME: Segundo Weinstein   :  1965   MRN:  [id number]     Date/Time:  2018 9:12 PM    Patient PCP: Anderson Farfan MD  ________________________________________________________________________    My assessment of this patient's clinical condition and my plan of care is as follows. 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 She has been started on an insulin Drip and needs to be admitted to the hospital for further Management. Admit to PCU  Aggressive Hydration , IVF, May start Clear liquids with NCS when BS is less than 500  No evidence or identified source of Infection, as a trigger of Hyperglycemia  Glucomander  Titrate Insulin to lower BS into the 200-300 range and not to drop it quickly. Check Lipase ( benign) , check A1c. Consult Endo. Dr. Sophie Goff in am as patient's maintenance will likely need to be increased but             Code Status: Full Code  Surrogate Decision Maker: Puma Jessica, Aunt    DVT Prophylaxis: lovenox  GI Prophylaxis: not indicated    Baseline: works in production   In a [address] [place] [address]        Subjective:   CHIEF COMPLAINT: Headache, felt feverish  HISTORY OF PRESENT ILLNESS:     Lidia Ghosh is a 45 y.o.  female who presents with not feeling well , feverish, cough. PAtient has known Asthma and was also dx with IDDm about 1 year ago.  She developed URI symptoms, and subjective fevers, cough  And was seen in the ED on [address] 10 and dx with a URI and prescribed  A Zpack and cough syrup NO Steroids . . Patient states that she  Took the cough syrup only 1 day. Her cough has improved significantly. She denies noticing more frequent urination, does not particularly feel  More thirsty than usual. She states that she monitors her BS daily and has been running in the mid 100's until today her BS at home were in the  Mid 200 range. She is surprised  To know that her BS he in ED is over 1500. She denies previous drug use, no ETOH, no hx of previous Pancreatitis or Pancreatic Insufficiency or Diarrhea. She states she eats well and otherwise  Has been at her baseline state of health until her URI    We were asked to admit for work up and evaluation of the above problems. Past Medical History:   Diagnosis Date    Asthma     Diabetes ([facility] [address] [facility]     Elevated transaminase level 6/29/2016    Insulin dependent diabetes mellitus ([facility] [address] 6/20/2016    Pancreatic atrophy 6/20/2016        Past Surgical History:   Procedure Laterality Date    HX HEENT         Social History   Substance Use Topics    Smoking status: Never Smoker    Smokeless tobacco: Never Used    Alcohol use No        Family History   Problem Relation Age of Onset    Cancer Father     Diabetes Mother     Diabetes Maternal Grandmother      Allergies   Allergen Reactions    Contrast Agent [Iodine] Itching    Hydrocodone Rash    Percocet [Oxycodone-Acetaminophen] Rash    Codeine Nausea and Vomiting        Prior to Admission medications    Medication Sig Start Date End Date Taking? Authorizing Provider   promethazine-dextromethorphan (PROMETHAZINE-DM) 6.25-15 mg/5 mL syrup Take 5 mL by mouth every four (4) hours as needed for Cough for up to 6 days. 1/10/18 1/16/18  ROWENA Dutton   ondansetron (ZOFRAN ODT) 4 mg disintegrating tablet Take 1 Tab by mouth every eight (8) hours as needed for Nausea for up to 12 days.  1/10/18 1/22/18  ROWENA Dutton   azithromycin (ZITHROMAX Z-SUGAR) 250 mg tablet Take as directed: 2 pills day 1, 1 pill daily days 2-4 1/10/18 1/15/18  ROWENA Sharma   albuterol (PROVENTIL HFA, VENTOLIN HFA, PROAIR HFA) 90 mcg/actuation inhaler Take 2 Puffs by inhalation every four (4) hours as needed for Wheezing. 3/30/17   Luis Daniel Jernigan MD   glucose blood VI test strips () strip For use with Prodigy Meter, Check glucose 4x/day, Dx E13.9 3/30/17   Luis Daniel Jernigan MD   insulin NPH/insulin regular (NOVOLIN 70/30) 100 unit/mL (70-30) injection by SubCUTAneous route. 5 units in AM; 10-15 in PM    Historical Provider   albuterol (PROVENTIL VENTOLIN) 2.5 mg /3 mL (0.083 %) nebulizer solution  5/13/16   Historical Provider   Insulin Needles, Disposable, 31 gauge x 5/16\" ndle Use as directed 6/13/16   Iam Fields MD   Lancets misc Use as directed 6/13/16   Iam Fields MD   Insulin Syringes, Disposable, 1 mL syrg Use as directed 6/13/16   Iam Fields MD       REVIEW OF SYSTEMS:     I am not able to complete the review of systems because:    The patient is intubated and sedated    The patient has altered mental status due to his acute medical problems    The patient has baseline aphasia from prior stroke(s)    The patient has baseline dementia and is not reliable historian    The patient is in acute medical distress and unable to provide information           Total of 12 systems reviewed as follows:       POSITIVE= underlined text  Negative = text not underlined  General:  fever, chills, sweats, generalized weakness, weight loss/gain,      loss of appetite   Eyes:    blurred vision, eye pain, loss of vision, double vision  ENT:    rhinorrhea, pharyngitis   Respiratory:   cough, sputum production, SOB, GARLAND, wheezing, pleuritic pain   Cardiology:   chest pain, palpitations, orthopnea, PND, edema, syncope   Gastrointestinal:  abdominal pain , N/V, diarrhea, dysphagia, constipation, bleeding   Genitourinary:  frequency, urgency, dysuria, hematuria, incontinence   Muskuloskeletal :  arthralgia, myalgia, back pain  Hematology:  easy bruising, nose or gum bleeding, lymphadenopathy   Dermatological: rash, ulceration, pruritis, color change / jaundice  Endocrine:   hot flashes or polydipsia   Neurological:  headache, dizziness, confusion, focal weakness, paresthesia,     Speech difficulties, memory loss, gait difficulty  Psychological: Feelings of anxiety, depression, agitation    Objective:   VITALS:    Visit Vitals    /86    Pulse (!) 126    Temp 97.5 °F (36.4 °C)    Resp 20    Ht 5' 1\" (1.549 m)    Wt 43.9 kg (96 lb 12.5 oz)    SpO2 97%    BMI 18.29 kg/m2       PHYSICAL EXAM:    General:    Alert, cooperative, no distress, appears stated age. HEENT: Atraumatic, anicteric sclerae, pink conjunctivae     No oral ulcers, mucosa Dry, throat clear, dentition fair  Neck:  Supple, symmetrical,  thyroid: non tender  Lungs:   Clear to auscultation bilaterally. No Wheezing or Rhonchi. No rales. Chest wall:  No tenderness  No Accessory muscle use. Heart:   Regular  Rhythm tachycardica ,  No  murmur   No edema  Abdomen:   Soft, non-tender. Not distended. Bowel sounds normal  Extremities: No cyanosis. No clubbing,      Skin turgor normal, Capillary refill normal, Radial dial pulse 2+  Skin:     Not pale. Not Jaundiced  No rashes   Psych:  Good insight. Not depressed. Not anxious or agitated. Neurologic: EOMs intact. No facial asymmetry. No aphasia or slurred speech. Symmetrical strength, Sensation grossly intact.  Alert and oriented X 4.     _______________________________________________________________________  Care Plan discussed with:    Comments   Patient y    Family  y    RN     Care Manager                    Consultant:      _______________________________________________________________________  Expected  Disposition:   Home with Family y   HH/PT/OT/RN    SNF/LTC    JABARI    ________________________________________________________________________  TOTAL TIME:  61 Minutes    Critical Care Provided     Minutes non procedure based Comments    yes Reviewed previous records   >50% of visit spent in counseling and coordination of care  Discussion with patient and/or family and questions answered       ________________________________________________________________________  Signed: Flaco Shin MD    Procedures: see electronic medical records for all procedures/Xrays and details which were not copied into this note but were reviewed prior to creation of Plan. LAB DATA REVIEWED:    Recent Results (from the past 24 hour(s))   URINALYSIS W/ REFLEX CULTURE    Collection Time: 01/14/18 12:37 PM   Result Value Ref Range    Color YELLOW/STRAW      Appearance CLEAR CLEAR      Specific gravity 1.027 1.003 - 1.030      pH (UA) 6.0 5.0 - 8.0      Protein NEGATIVE  NEG mg/dL    Glucose >1000 (A) NEG mg/dL    Ketone NEGATIVE  NEG mg/dL    Bilirubin NEGATIVE  NEG      Blood NEGATIVE  NEG      Urobilinogen 0.2 0.2 - 1.0 EU/dL    Nitrites NEGATIVE  NEG      Leukocyte Esterase NEGATIVE  NEG      WBC 0-4 0 - 4 /hpf    RBC 0-5 0 - 5 /hpf    Epithelial cells FEW FEW /lpf    Bacteria NEGATIVE  NEG /hpf    UA:UC IF INDICATED CULTURE NOT INDICATED BY UA RESULT CNI     CBC WITH AUTOMATED DIFF    Collection Time: 01/14/18  1:51 PM   Result Value Ref Range    WBC 9.3 3.6 - 11.0 K/uL    RBC 4.24 3.80 - 5.20 M/uL    HGB 12.5 11.5 - 16.0 g/dL    HCT 42.3 35.0 - 47.0 %    MCV 99.8 (H) 80.0 - 99.0 FL    MCH 29.5 26.0 - 34.0 PG    MCHC 29.6 (L) 30.0 - 36.5 g/dL    RDW 13.0 11.5 - 14.5 %    PLATELET 837 383 - 368 K/uL    NEUTROPHILS 94 (H) 32 - 75 %    LYMPHOCYTES 4 (L) 12 - 49 %    MONOCYTES 2 (L) 5 - 13 %    EOSINOPHILS 0 0 - 7 %    BASOPHILS 0 0 - 1 %    ABS. NEUTROPHILS 8.7 (H) 1.8 - 8.0 K/UL    ABS. LYMPHOCYTES 0.4 (L) 0.8 - 3.5 K/UL    ABS. MONOCYTES 0.2 0.0 - 1.0 K/UL    ABS. EOSINOPHILS 0.0 0.0 - 0.4 K/UL    ABS.  BASOPHILS 0.0 0.0 - 0.1 K/UL    RBC COMMENTS NORMOCYTIC, NORMOCHROMIC     METABOLIC PANEL, COMPREHENSIVE    Collection Time: 01/14/18  1:51 PM   Result Value Ref Range    Sodium 120 (L) 136 - 145 mmol/L    Potassium 4.7 3.5 - 5.1 mmol/L    Chloride 80 (L) 97 - 108 mmol/L    CO2 33 (H) 21 - 32 mmol/L    Anion gap 7 5 - 15 mmol/L    Glucose >1500 (HH) 65 - 100 mg/dL    BUN 18 6 - 20 MG/DL    Creatinine 1.99 (H) 0.55 - 1.02 MG/DL    BUN/Creatinine ratio 9 (L) 12 - 20      GFR est AA 32 (L) >60 ml/min/1.73m2    GFR est non-AA 26 (L) >60 ml/min/1.73m2    Calcium 9.3 8.5 - 10.1 MG/DL    Bilirubin, total 0.5 0.2 - 1.0 MG/DL    ALT (SGPT) 53 12 - 78 U/L    AST (SGOT) 40 (H) 15 - 37 U/L    Alk. phosphatase 270 (H) 45 - 117 U/L    Protein, total 7.6 6.4 - 8.2 g/dL    Albumin 3.9 3.5 - 5.0 g/dL    Globulin 3.7 2.0 - 4.0 g/dL    A-G Ratio 1.1 1.1 - 2.2     LIPASE    Collection Time: 01/14/18  1:51 PM   Result Value Ref Range    Lipase 361 73 - 393 U/L   CK    Collection Time: 01/14/18  1:51 PM   Result Value Ref Range    CK 65 26 - 192 U/L   TROPONIN I    Collection Time: 01/14/18  1:51 PM   Result Value Ref Range    Troponin-I, Qt. <0.04 <0.05 ng/mL   GLUCOSE, POC    Collection Time: 01/14/18  2:58 PM   Result Value Ref Range    Glucose (POC) >600 (HH) 65 - 100 mg/dL    Performed by Norberto Amador    GLUCOSE, POC    Collection Time: 01/14/18  4:24 PM   Result Value Ref Range    Glucose (POC) >600 (HH) 65 - 100 mg/dL    Performed by Jospeh Cid    METABOLIC PANEL, COMPREHENSIVE    Collection Time: 01/14/18  5:21 PM   Result Value Ref Range    Sodium 135 (L) 136 - 145 mmol/L    Potassium 4.3 3.5 - 5.1 mmol/L    Chloride 97 97 - 108 mmol/L    CO2 31 21 - 32 mmol/L    Anion gap 7 5 - 15 mmol/L    Glucose 1038 (HH) 65 - 100 mg/dL    BUN 15 6 - 20 MG/DL    Creatinine 1.66 (H) 0.55 - 1.02 MG/DL    BUN/Creatinine ratio 9 (L) 12 - 20      GFR est AA 39 (L) >60 ml/min/1.73m2    GFR est non-AA 32 (L) >60 ml/min/1.73m2    Calcium 8.6 8.5 - 10.1 MG/DL    Bilirubin, total 0.3 0.2 - 1.0 MG/DL    ALT (SGPT) 45 12 - 78 U/L    AST (SGOT) 34 15 - 37 U/L    Alk.  phosphatase 225 (H) 45 - 117 U/L    Protein, total 6.8 6.4 - 8.2 g/dL    Albumin 3.5 3.5 - 5.0 g/dL    Globulin 3.3 2.0 - 4.0 g/dL    A-G Ratio 1.1 1.1 - 2.2     GLUCOSTABILIZER    Collection Time: 01/14/18  6:38 PM   Result Value Ref Range    Glucose 601 mg/dL    Insulin order 10.8 units/hour    Insulin adminstered 10.8 units/hour    Multiplier 0.020     Low target 200 mg/dL    High target 300 mg/dL    D50 order 0.0 ml    D50 administered 0.00 ml    Minutes until next BG 60 min    Order initials JSP     Administered initials JSP     GLSCOM Comments     GLUCOSE, POC    Collection Time: 01/14/18  7:46 PM   Result Value Ref Range    Glucose (POC) >600 (HH) 65 - 100 mg/dL    Performed by Haja Shea    Collection Time: 01/14/18  7:46 PM   Result Value Ref Range    Glucose 601 mg/dL    Insulin order 16.2 units/hour    Insulin adminstered 16.2 units/hour    Multiplier 0.030     Low target 200 mg/dL    High target 300 mg/dL    D50 order 0.0 ml    D50 administered 0.00 ml    Minutes until next BG 60 min    Order initials JSP     Administered initials EUGENIO     GLSCOM Comments     GLUCOSE, POC    Collection Time: 01/14/18  8:57 PM   Result Value Ref Range    Glucose (POC) 513 (H) 65 - 100 mg/dL    Performed by Adeline Whiteside    Collection Time: 01/14/18  9:00 PM   Result Value Ref Range    Glucose 513 mg/dL    Insulin order 18.1 units/hour    Insulin adminstered 18.1 units/hour    Multiplier 0.040     Low target 200 mg/dL    High target 300 mg/dL    D50 order 0.0 ml    D50 administered 0.00 ml    Minutes until next BG 60 min    Order initials SK     Administered initials SK     GLSCOM Comments

## 2018-01-15 NOTE — ED NOTES
Pt's  - spoke with Dr. Carmen Barrera, order received to start D5 NS at 150 mL/hr. Pt up to bedside commode without any assistance.

## 2018-01-15 NOTE — ED NOTES
Assumed care of pt from Iesha Mcbride RN. Pt resting comfortably with side rails up and call bell in reach. Pt aware of plan to have BG checked Q1 hour.

## 2018-01-15 NOTE — ED NOTES
TRANSFER - OUT REPORT:    Verbal report given to Alfredo Kolb RN (name) on Tania Mancilla  being transferred to PCU(unit) for routine progression of care       Report consisted of patients Situation, Background, Assessment and   Recommendations(SBAR). Information from the following report(s) SBAR, Kardex, ED Summary, Intake/Output, MAR, Recent Results and Med Rec Status was reviewed with the receiving nurse. Lines:   Peripheral IV 01/14/18 Right Antecubital (Active)   Site Assessment Clean, dry, & intact 1/14/2018  3:25 PM   Phlebitis Assessment 0 1/14/2018  3:25 PM   Infiltration Assessment 0 1/14/2018  3:25 PM   Dressing Status Clean, dry, & intact 1/14/2018  3:25 PM   Dressing Type Transparent 1/14/2018  3:25 PM   Hub Color/Line Status Pink;Flushed 1/14/2018  3:25 PM       Peripheral IV 01/15/18 Left Forearm (Active)   Site Assessment Clean, dry, & intact 1/15/2018  5:46 AM   Phlebitis Assessment 0 1/15/2018  5:46 AM   Infiltration Assessment 0 1/15/2018  5:46 AM   Dressing Status Clean, dry, & intact 1/15/2018  5:46 AM   Dressing Type Tape;Transparent 1/15/2018  5:46 AM   Hub Color/Line Status Pink 1/15/2018  5:46 AM   Alcohol Cap Used Yes 1/15/2018  5:46 AM        Opportunity for questions and clarification was provided.       Patient transported with:   Registered Nurse

## 2018-01-15 NOTE — DIABETES MGMT
DTC Progress Note    Recommendations/ Comments: Discussed pt with Dr. Lili Ramirez. Plan to continue insulin gtt overnight secondary to pt likely glucose toxic, decrease target range to 140-180 to bring BG down further and begin humalog prandial CHO coverage. Discussed with nurse. Chart reviewed on Liliya Betancourt. Patient is a 45 y.o. female with known SHANTEL DM on Novolin 70/30 5units am, 10-15units pm at home. A1c:   Lab Results   Component Value Date/Time    Hemoglobin A1c >16.0 01/15/2018 04:33 AM    Hemoglobin A1c >16.0 01/14/2018 05:21 PM       Recent Glucose Results:   Lab Results   Component Value Date/Time     (H) 01/15/2018 10:15 AM     (H) 01/15/2018 04:33 AM    GLU 1038 (HH) 01/14/2018 05:21 PM    GLUCPOC 228 (H) 01/15/2018 03:27 PM    GLUCPOC 308 (H) 01/15/2018 01:55 PM    GLUCPOC 308 (H) 01/15/2018 12:51 PM        Lab Results   Component Value Date/Time    Creatinine 1.15 01/15/2018 10:15 AM     Estimated Creatinine Clearance: 38.8 mL/min (based on Cr of 1.15). Active Orders   Diet    DIET GI LITE (POST SURGICAL)        PO intake: No data found. Current hospital DM medication: insulin gtt    Will continue to follow as needed.     Thank you  NARDA LopezN, RN, [address] [place] [address]

## 2018-01-15 NOTE — ED NOTES
Monitor x 3. Call bell in reach. Bed in lowest position, with side rails up x 2. Pt updated on plan of care. Appears to be sleeping at this time. Bedside and Verbal shift change report given to EMERSON Dodd (oncoming nurse) by Allison Oconnor (offgoing nurse). Report included the following information SBAR, ED Summary, Intake/Output, MAR and Recent Results. Insulin gtt infusing as ordered.

## 2018-01-16 LAB
ADMINISTERED INITIALS, ADMINIT: NORMAL
ANION GAP SERPL CALC-SCNC: 3 MMOL/L (ref 5–15)
BASOPHILS # BLD: 0 K/UL (ref 0–0.1)
BASOPHILS NFR BLD: 0 % (ref 0–1)
BUN SERPL-MCNC: 3 MG/DL (ref 6–20)
BUN/CREAT SERPL: 4 (ref 12–20)
CALCIUM SERPL-MCNC: 8 MG/DL (ref 8.5–10.1)
CHLORIDE SERPL-SCNC: 109 MMOL/L (ref 97–108)
CO2 SERPL-SCNC: 27 MMOL/L (ref 21–32)
CREAT SERPL-MCNC: 0.82 MG/DL (ref 0.55–1.02)
D50 ADMINISTERED, D50ADM: 0 ML
D50 ORDER, D50ORD: 0 ML
EOSINOPHIL # BLD: 0.5 K/UL (ref 0–0.4)
EOSINOPHIL NFR BLD: 5 % (ref 0–7)
ERYTHROCYTE [DISTWIDTH] IN BLOOD BY AUTOMATED COUNT: 12.1 % (ref 11.5–14.5)
GLSCOM COMMENTS: NORMAL
GLUCOSE BLD STRIP.AUTO-MCNC: 108 MG/DL (ref 65–100)
GLUCOSE BLD STRIP.AUTO-MCNC: 111 MG/DL (ref 65–100)
GLUCOSE BLD STRIP.AUTO-MCNC: 138 MG/DL (ref 65–100)
GLUCOSE BLD STRIP.AUTO-MCNC: 164 MG/DL (ref 65–100)
GLUCOSE BLD STRIP.AUTO-MCNC: 186 MG/DL (ref 65–100)
GLUCOSE BLD STRIP.AUTO-MCNC: 195 MG/DL (ref 65–100)
GLUCOSE BLD STRIP.AUTO-MCNC: 197 MG/DL (ref 65–100)
GLUCOSE BLD STRIP.AUTO-MCNC: 198 MG/DL (ref 65–100)
GLUCOSE BLD STRIP.AUTO-MCNC: 203 MG/DL (ref 65–100)
GLUCOSE BLD STRIP.AUTO-MCNC: 208 MG/DL (ref 65–100)
GLUCOSE BLD STRIP.AUTO-MCNC: 210 MG/DL (ref 65–100)
GLUCOSE BLD STRIP.AUTO-MCNC: 230 MG/DL (ref 65–100)
GLUCOSE SERPL-MCNC: 143 MG/DL (ref 65–100)
GLUCOSE, GLC: 108 MG/DL
GLUCOSE, GLC: 111 MG/DL
GLUCOSE, GLC: 138 MG/DL
GLUCOSE, GLC: 164 MG/DL
GLUCOSE, GLC: 195 MG/DL
GLUCOSE, GLC: 197 MG/DL
GLUCOSE, GLC: 198 MG/DL
GLUCOSE, GLC: 230 MG/DL
HCT VFR BLD AUTO: 31.5 % (ref 35–47)
HGB BLD-MCNC: 10.5 G/DL (ref 11.5–16)
HIGH TARGET, HITG: 180 MG/DL
INSULIN ADMINSTERED, INSADM: 0 UNITS/HOUR
INSULIN ADMINSTERED, INSADM: 0.5 UNITS/HOUR
INSULIN ADMINSTERED, INSADM: 1 UNITS/HOUR
INSULIN ADMINSTERED, INSADM: 1.4 UNITS/HOUR
INSULIN ADMINSTERED, INSADM: 3.1 UNITS/HOUR
INSULIN ADMINSTERED, INSADM: 3.4 UNITS/HOUR
INSULIN ADMINSTERED, INSADM: 4.1 UNITS/HOUR
INSULIN ADMINSTERED, INSADM: 5.5 UNITS/HOUR
INSULIN ORDER, INSORD: 0 UNITS/HOUR
INSULIN ORDER, INSORD: 0.5 UNITS/HOUR
INSULIN ORDER, INSORD: 1 UNITS/HOUR
INSULIN ORDER, INSORD: 1.4 UNITS/HOUR
INSULIN ORDER, INSORD: 3.1 UNITS/HOUR
INSULIN ORDER, INSORD: 3.4 UNITS/HOUR
INSULIN ORDER, INSORD: 4.1 UNITS/HOUR
INSULIN ORDER, INSORD: 5.5 UNITS/HOUR
LOW TARGET, LOT: 140 MG/DL
LYMPHOCYTES # BLD: 1.7 K/UL (ref 0.8–3.5)
LYMPHOCYTES NFR BLD: 18 % (ref 12–49)
MAGNESIUM SERPL-MCNC: 1.1 MG/DL (ref 1.6–2.4)
MCH RBC QN AUTO: 29.2 PG (ref 26–34)
MCHC RBC AUTO-ENTMCNC: 33.3 G/DL (ref 30–36.5)
MCV RBC AUTO: 87.7 FL (ref 80–99)
MINUTES UNTIL NEXT BG, NBG: 60 MIN
MONOCYTES # BLD: 0.5 K/UL (ref 0–1)
MONOCYTES NFR BLD: 5 % (ref 5–13)
MULTIPLIER, MUL: 0
MULTIPLIER, MUL: 0.01
MULTIPLIER, MUL: 0.01
MULTIPLIER, MUL: 0.02
MULTIPLIER, MUL: 0.02
MULTIPLIER, MUL: 0.03
MULTIPLIER, MUL: 0.03
MULTIPLIER, MUL: 0.04
NEUTS SEG # BLD: 6.7 K/UL (ref 1.8–8)
NEUTS SEG NFR BLD: 72 % (ref 32–75)
ORDER INITIALS, ORDINIT: NORMAL
PHOSPHATE SERPL-MCNC: 1.1 MG/DL (ref 2.6–4.7)
PLATELET # BLD AUTO: 137 K/UL (ref 150–400)
POTASSIUM SERPL-SCNC: 4.2 MMOL/L (ref 3.5–5.1)
RBC # BLD AUTO: 3.59 M/UL (ref 3.8–5.2)
SERVICE CMNT-IMP: ABNORMAL
SODIUM SERPL-SCNC: 139 MMOL/L (ref 136–145)
WBC # BLD AUTO: 9.3 K/UL (ref 3.6–11)

## 2018-01-16 PROCEDURE — 65660000000 HC RM CCU STEPDOWN

## 2018-01-16 PROCEDURE — 83735 ASSAY OF MAGNESIUM: CPT | Performed by: INTERNAL MEDICINE

## 2018-01-16 PROCEDURE — 74011250636 HC RX REV CODE- 250/636: Performed by: INTERNAL MEDICINE

## 2018-01-16 PROCEDURE — 36415 COLL VENOUS BLD VENIPUNCTURE: CPT | Performed by: INTERNAL MEDICINE

## 2018-01-16 PROCEDURE — 74011636637 HC RX REV CODE- 636/637: Performed by: INTERNAL MEDICINE

## 2018-01-16 PROCEDURE — 84100 ASSAY OF PHOSPHORUS: CPT | Performed by: INTERNAL MEDICINE

## 2018-01-16 PROCEDURE — 82962 GLUCOSE BLOOD TEST: CPT

## 2018-01-16 PROCEDURE — 74011250636 HC RX REV CODE- 250/636: Performed by: GENERAL ACUTE CARE HOSPITAL

## 2018-01-16 PROCEDURE — 85025 COMPLETE CBC W/AUTO DIFF WBC: CPT | Performed by: INTERNAL MEDICINE

## 2018-01-16 PROCEDURE — 80048 BASIC METABOLIC PNL TOTAL CA: CPT | Performed by: INTERNAL MEDICINE

## 2018-01-16 PROCEDURE — 74011000250 HC RX REV CODE- 250: Performed by: INTERNAL MEDICINE

## 2018-01-16 RX ORDER — INSULIN LISPRO 100 [IU]/ML
10 INJECTION, SOLUTION INTRAVENOUS; SUBCUTANEOUS
Status: DISCONTINUED | OUTPATIENT
Start: 2018-01-16 | End: 2018-01-17

## 2018-01-16 RX ORDER — INSULIN LISPRO 100 [IU]/ML
7 INJECTION, SOLUTION INTRAVENOUS; SUBCUTANEOUS
Status: DISCONTINUED | OUTPATIENT
Start: 2018-01-16 | End: 2018-01-16

## 2018-01-16 RX ORDER — MAGNESIUM SULFATE HEPTAHYDRATE 40 MG/ML
2 INJECTION, SOLUTION INTRAVENOUS
Status: COMPLETED | OUTPATIENT
Start: 2018-01-16 | End: 2018-01-17

## 2018-01-16 RX ORDER — INSULIN LISPRO 100 [IU]/ML
1-10 INJECTION, SOLUTION INTRAVENOUS; SUBCUTANEOUS
Status: DISCONTINUED | OUTPATIENT
Start: 2018-01-16 | End: 2018-01-17 | Stop reason: HOSPADM

## 2018-01-16 RX ADMIN — Medication 10 ML: at 22:10

## 2018-01-16 RX ADMIN — INSULIN LISPRO 2 UNITS: 100 INJECTION, SOLUTION INTRAVENOUS; SUBCUTANEOUS at 08:42

## 2018-01-16 RX ADMIN — DEXTROSE MONOHYDRATE, SODIUM CHLORIDE, AND POTASSIUM CHLORIDE 100 ML/HR: 50; 4.5; 1.49 INJECTION, SOLUTION INTRAVENOUS at 09:37

## 2018-01-16 RX ADMIN — MAGNESIUM SULFATE IN WATER 2 G: 40 INJECTION, SOLUTION INTRAVENOUS at 23:17

## 2018-01-16 RX ADMIN — MAGNESIUM SULFATE IN WATER 2 G: 40 INJECTION, SOLUTION INTRAVENOUS at 20:23

## 2018-01-16 RX ADMIN — INSULIN LISPRO 7 UNITS: 100 INJECTION, SOLUTION INTRAVENOUS; SUBCUTANEOUS at 12:03

## 2018-01-16 RX ADMIN — Medication 10 ML: at 05:32

## 2018-01-16 RX ADMIN — MAGNESIUM SULFATE IN WATER 2 G: 40 INJECTION, SOLUTION INTRAVENOUS at 22:05

## 2018-01-16 RX ADMIN — INSULIN LISPRO 10 UNITS: 100 INJECTION, SOLUTION INTRAVENOUS; SUBCUTANEOUS at 17:28

## 2018-01-16 RX ADMIN — Medication 10 ML: at 15:26

## 2018-01-16 RX ADMIN — Medication 10 ML: at 03:21

## 2018-01-16 RX ADMIN — ONDANSETRON HYDROCHLORIDE 4 MG: 2 INJECTION, SOLUTION INTRAMUSCULAR; INTRAVENOUS at 03:21

## 2018-01-16 RX ADMIN — INSULIN HUMAN 20 UNITS: 100 INJECTION, SUSPENSION SUBCUTANEOUS at 10:05

## 2018-01-16 RX ADMIN — INSULIN LISPRO 2 UNITS: 100 INJECTION, SOLUTION INTRAVENOUS; SUBCUTANEOUS at 17:28

## 2018-01-16 RX ADMIN — POTASSIUM PHOSPHATE, MONOBASIC AND POTASSIUM PHOSPHATE, DIBASIC: 224; 236 INJECTION, SOLUTION INTRAVENOUS at 19:57

## 2018-01-16 RX ADMIN — INSULIN HUMAN 20 UNITS: 100 INJECTION, SUSPENSION SUBCUTANEOUS at 22:09

## 2018-01-16 NOTE — PROGRESS NOTES
Initial Nutrition Assessment:    INTERVENTIONS/RECOMMENDATIONS:   · Meals/Snacks: General/healthful diet: Consistent carbohydrate diet    ASSESSMENT:   Patient medically noted for uncontrolled DM. PMH for pancreatic atrophy. Patient reports an excellent appetite currently and PTA; no recent weight loss. Actually complained that we weren't feeding her enough (in reference to small portions on GI lite diet, now advanced to CCD). Discussed consistent carbohydrate diet and carb counting. Patient able to name carbohydrate foods and appropriate meals. Knowledgeable on CCD and what she needs to do. Using menu and carbohydrate counts to help with meal ordering. Diet Order: Consistent carb  % Eaten:  No data found. Pertinent Medications: [x]Reviewed []Other: humalog, NPH, D5% IVF + KCl  Pertinent Labs: [x]Reviewed []Other: Mg 1.1, Phos 1.1, A1c >16, -[phone]  Food Allergies: [x]None []Other   Last BM:    []Active     []Hyperactive  []Hypoactive       [] Absent BS  Skin:    [x] Intact   [] Incision  [] Breakdown  [] Other:    Anthropometrics:   Height: 5' 1\" (154.9 cm) Weight: 43.9 kg (96 lb 12.5 oz)   IBW (%IBW):   ( ) UBW (%UBW):   (  %)   Last Weight Metrics:  Weight Loss Metrics 1/14/2018 1/10/2018 3/30/2017 12/30/2016 10/19/2016 9/15/2016 8/18/2016   Today's Wt 96 lb 12.5 oz 101 lb 3.1 oz 103 lb 8 oz 103 lb 9.6 oz 107 lb 6.4 oz 106 lb 3.2 oz 105 lb 12.8 oz   BMI 18.29 kg/m2 19.12 kg/m2 19.56 kg/m2 19.58 kg/m2 20.29 kg/m2 20.07 kg/m2 19.99 kg/m2       BMI: Body mass index is 18.29 kg/(m^2). This BMI is indicative of:   [x]Underweight    []Normal    []Overweight    [] Obesity   [] Extreme Obesity (BMI>40)     Estimated Nutrition Needs (Based on):   1521 Kcals/day (BMR (977) x 1. 3AF+250kcal) , 48 g (-57g (1.1-1.3 g/kg bw)) Protein  Carbohydrate:  At Least 130 g/day  Fluids: 1500 mL/day (1ml/kcal)    Pt expected to meet estimated nutrient needs: [x]Yes []No    NUTRITION DIAGNOSES:   Problem:  Altered nutrition-related lab values      Etiology: related to DM     Signs/Symptoms: as evidenced by , A1c >16      NUTRITION INTERVENTIONS:  Meals/Snacks: General/healthful diet                  GOAL:   PO intake >70% of meals/snacks next 5-7 days    LEARNING NEEDS (Diet, Food/Nutrient-Drug Interaction):    [x] None Identified   [] Identified and Education Provided/Documented   [] Identified and Pt declined/was not appropriate     Cultureal, Restorationism, OR Ethnic Dietary Needs:    [x] None Identified   [] Identified and Addressed     [x] Interdisciplinary Care Plan Reviewed/Documented    [x] Discharge Planning: CCD      MONITORING /EVALUATION:   Food/Nutrient Intake Outcomes:  Total energy intake  Physical Signs/Symptoms Outcomes: Weight/weight change, Glucose profile, Electrolyte and renal profile    NUTRITION RISK:    [] High              [] Moderate           [x]  Low  []  Minimal/Uncompromised    PT SEEN FOR:    []  MD Consult: []Calorie Count      []Diabetic Diet Education        []Diet Education     []Electrolyte Management     []General Nutrition Management and Supplements     []Management of Tube Feeding     []TPN Recommendations    []  RN Referral:  []MST score >=2     []Enteral/Parenteral Nutrition PTA     []Pregnant: Gestational DM or Multigestation     []Pressure Ulcer/Wound Care needs        [x]  Low BMI, elevated A1c      Milad Victor  Pager [phone]                 Weekend Pager [phone]

## 2018-01-16 NOTE — CONSULTS
CONSULTATION REQUESTED BY: Teri Vargas MD    REASON FOR CONSULT: Uncontrolled Insulin Dependent Diabetes (SHANTEL)    CHIEF COMPLAINT: uncontrolled sugars    HISTORY OF PRESENT ILLNESS:   Sage Spencer is a 45 y.o. female with a PMHx as noted below who was admitted to the hospital on 1/14/2018  2:49 PM with a diagnosis of Uncontrolled type 2 DM with hyperosmolar nonketotic hyperglycemia ([address] Endocrinology was consulted for the evaluation of her diabetes. .     Patient is known to me in the endocrine clinic. 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 Her diabetes had been controlled previously with A1c  Just below 7% and the patient adapted well to insulin injections. After this, she stopped coming to clinic regularly and her diabetes started to become uncontrolled. Patient was last seen in the clinic in March 2017, during which she continued on Novolin 70/30 insulin at 10 units with breakfast with 17 units suggested with dinner, however, she reports that prior to her recent illness she has continued to take only 10 units BID. In the past week or two she notes she had increased her insulin up to 20 units BID in the setting of recent infection. She describes having developed chills and a cough with recent ED visit, describes that she was not evaluated with blood work, was provided an antibiotic, and discharged. She describes that she went home and then her blood sugars continued to rise regularly. Review of patients labs does not reveal DKA, rather her sugars notably quite elevated. Patient received fluids, IV insulin infusion, potassium, etc. Blood sugars have come under control. Patient is laying in bed awake and alert, communicating well.               PAST MEDICAL/SURGICAL HISTORY:   Past Medical History:   Diagnosis Date    Asthma     Diabetes ([address]     Elevated transaminase level 6/29/2016    Insulin dependent diabetes mellitus ([address] 6/20/2016    Pancreatic atrophy 6/20/2016     Past Surgical History:   Procedure Laterality Date    HX HEENT         ALLERGIES:   Allergies   Allergen Reactions    Contrast Agent [Iodine] Itching    Hydrocodone Rash    Percocet [Oxycodone-Acetaminophen] Rash    Codeine Nausea and Vomiting       MEDICATIONS ON ADMISSION:     Current Facility-Administered Medications:     sodium chloride (NS) flush 5-10 mL, 5-10 mL, IntraVENous, Q8H, Tony Cuenca MD, 10 mL at 01/15/18 1605    sodium chloride (NS) flush 5-10 mL, 5-10 mL, IntraVENous, PRN, Jodee Spain MD    ondansetron (ZOFRAN) injection 4 mg, 4 mg, IntraVENous, Q4H PRN, Lacey Delgado MD, 4 mg at 01/15/18 0521    acetaminophen (TYLENOL) tablet 650 mg, 650 mg, Oral, Q8H PRN, Lacey Delgado MD, 650 mg at 01/15/18 0521    dextrose 5% - 0.45% NaCl with KCl 20 mEq/L infusion, 100 mL/hr, IntraVENous, CONTINUOUS, Darian Santoyo MD, Last Rate: 100 mL/hr at 01/15/18 1634, 100 mL/hr at 01/15/18 1634    insulin lispro (HUMALOG) injection, , SubCUTAneous, TIDAC, Darian Santoyo MD, 3 Units at 01/15/18 1848    insulin regular (NOVOLIN R, HUMULIN R) 100 Units in 0.9% sodium chloride 100 mL infusion, 0-50 Units/hr, IntraVENous, TITRATE, Darian Santoyo MD, Last Rate: 1.6 mL/hr at 01/15/18 2013, 1.6 Units/hr at 01/15/18 2013    glucose chewable tablet 16 g, 4 Tab, Oral, PRN, [employer]. Ember Burkett MD    dextrose (D50W) injection syrg 12.5-25 g, 12.5-25 g, IntraVENous, PRN, [employer]. Ember Burkett MD    glucagon [facility]) injection 1 mg, 1 mg, IntraMUSCular, PRN, [employer]. Ember Burkett MD    SOCIAL HISTORY:   Social History     Social History    Marital status: SINGLE     Spouse name: N/A    Number of children: N/A    Years of education: N/A     Occupational History    Not on file.      Social History Main Topics    Smoking status: Never Smoker    Smokeless tobacco: Never Used    Alcohol use No    Drug use: No    Sexual activity: Not Currently     Other Topics Concern    Not on file     Social History Narrative       FAMILY HISTORY:  Family History   Problem Relation Age of Onset    Cancer Father     Diabetes Mother     Diabetes Maternal Grandmother        REVIEW OF SYSTEMS: Complete ROS assessed and noted for that which is described above, all else are negative. Eyes: normal  ENT: normal  CVS: normal  Resp: normal  GI: normal  : normal  GYN: normal  Endocrine: normal  Integument: normal  Musculoskeletal: normal  Neuro: normal  Psych: normal      PHYSICAL EXAMINATION:    VITAL SIGNS:  Visit Vitals    /64 (BP 1 Location: Left arm, BP Patient Position: At rest)    Pulse 78    Temp 98.7 °F (37.1 °C)    Resp 16    Ht 5' 1\" (1.549 m)    Wt 96 lb 12.5 oz (43.9 kg)    SpO2 100%    BMI 18.29 kg/m2       GENERAL: NCAT, Sitting up comfortably, NAD  EYES: EOMI, non-icteric, no proptosis  Ear/Nose/Throat: NCAT, no inflammation, no masses  LYMPH NODES: No LAD  CARDIOVASCULAR: no JVD, No edema, normal peripheral perfusion  RESPIRATORY: no cyanosis, normal chest expansion, comfortable respirations  GASTROINTESTINAL: soft NT, ND,  MUSCULOSKELETAL: Normal ROM, no atrophy  SKIN: warm, no edema/rash/ or other skin changes  NEUROLOGIC: 5/5 power all extremities, no tremors, AAOx3  PSYCHIATRIC: Normal affect, Normal insight and judgement    REVIEW OF LABORATORY AND RADIOLOGY DATA:   Labs and documentation have been reviewed as described above. ASSESSMENT AND PLAN:   Edy Denis is a 45 y.o. female with a PMHx as noted below who was admitted to the hospital on 1/14/2018  2:49 PM with a diagnosis of Uncontrolled type 2 DM with hyperosmolar nonketotic hyperglycemia ([facility] [facility] Endocrinology was consulted for the evaluation of her uncontrolled diabetes.     Diagnoses:  Diabetes (SHANTEL), uncontrolled    Assessment:  Patient who previously had good diabetes control when she was visiting the endocrine clinic regularly until last March when she did not follow up, A1c steadily sarita from <7% up to >16%. This seems to me like a real estimate considering her blood sugars and lack of follow up in clinic. I am not aware of any changes in her social status, support, etc. I do caution however, though she had not developed DKA, she is predisposed to develop this considering her CT findings of pancreatic atrophy, insulin sensitivity, etc. Currently she is well treated as inpatient, and will work on transitioning her to sub q insulin tomorrow AM, with plan for her to f/u with me in the clinic thereafter. Plan:  IV Insulin, +/- potassium overnight is a good idea,  As she will transition back to her 70/30 insulin at home, will use NPH BID + humalog TID with meals while she is here, this will allow her to transition easily to her next dose of 70/30 insulin at home. Dose to be determined based on overnight and AM blood sugars. I will touch base in the AM,    Thank you, will continue to follow along,    Madison Palomo.  [address] [facility]

## 2018-01-16 NOTE — DIABETES MGMT
DTC Consult Note    Recommendations/ Comments: Noted plan to transition off insulin gtt per Dr. Luz Marina Wang orders. Discussed with nurse re current IV fluid. Nurse to discussed removing dextrose from fluid with MD.    Pt seen for elevated a1c and admission for DKA. Chart reviewed and initial evaluation complete on Rupert Barahona. Patient is a 45 y.o. female with known SHANTEL DM dx 1 yr ago on novolin 70/30 recently 20units ac b/d with illness, however, was previously taking 10units ac b/d at home. Pt reports checking her BG 3x/day with results 100s-200s which is inconsistent with current a1c. Pt was previously seen in the outpt setting by Dr. Luz Marina Wang who has seen pt today and pt plans to f/u with him again after discharge. Pt reports she was ill with an URI prior to admission. She increased her insulin when she noticed her BG running higher. She has never been taught ketone testing. Discussed with pt how to obtain ketostix and how to use. Discussed sick day guidelines. Assessed and instructed patient on the following:   ·  interpretation of lab results, illness management and referred to Diabetes Educator    Provided patient with the following: [x]             Survival skills education materials               [x]             Insulin education materials               []             CHO counting education materials               [x]             Outpatient DTC contact number               []             Glucometer                 Discussed with patient and/or family need for follow up appointment for diabetes management after discharge.       A1c:   Lab Results   Component Value Date/Time    Hemoglobin A1c >16.0 01/15/2018 04:33 AM       Recent Glucose Results:   Lab Results   Component Value Date/Time     (H) 01/16/2018 02:59 AM    GLUCPOC 186 (H) 01/16/2018 11:26 AM    GLUCPOC 208 (H) 01/16/2018 09:46 AM    GLUCPOC 197 (H) 01/16/2018 08:42 AM        Lab Results   Component Value Date/Time Creatinine 0.82 01/16/2018 02:59 AM     Estimated Creatinine Clearance: 54.4 mL/min (based on Cr of 0.82). Active Orders   Diet    DIET DIABETIC CONSISTENT CARB Regular        PO intake: No data found. Current hospital DM medication: insulin gtt    Will continue to follow as needed. Thank you.   NARDA BeanN, RN, [address]

## 2018-01-16 NOTE — PROGRESS NOTES
PCU SHIFT NURSING NOTE      Bedside shift change report given to Rodney Edward RN (oncoming nurse) by Claudia Melton RN (offgoing nurse). Report included the following information SBAR, Kardex, Intake/Output, MAR and Recent Results. Shift Summary:     7:10 PM  Pt resting comfortably in bed with no complaints at this time. Admission Date 1/14/2018   Admission Diagnosis Uncontrolled type 2 DM with hyperosmolar nonketotic hyperglycemia ([facility] [address]   Consults IP CONSULT TO ENDOCRINOLOGY        Consults   []PT   []OT   []Speech   []Case Management      [] Palliative      Cardiac Monitoring Order   []Yes   []No     IV drips   []Yes    Drip:                            Dose:  Drip:                            Dose:  Drip:                            Dose:   []No     GI Prophylaxis   []Yes   []No         DVT Prophylaxis   SCDs:             Yusef stockings:         [] Medication   []Contraindicated   []None      Activity Level Activity Level: Up with Assistance     Activity Assistance: Partial (one person)   Purposeful Rounding every 1-2 hour? [x]Yes   Matute Score  Total Score: 1   Bed Alarm (If score 3 or >)   [x]Yes   [] Refused (See signed refusal form in chart)   Vinh Score  Vinh Score: 20   Vinh Score (if score 14 or less)   []PMT consult   []Wound Care consult      []Specialty bed   [] Nutrition consult          Needs prior to discharge:   Home O2 required:    []Yes   []No    If yes, how much O2 required?     Other:    Last Bowel Movement:        Influenza Vaccine Received Flu Vaccine for Current Season (usually Sept-March): Yes        Pneumonia Vaccine           Diet Active Orders   Diet    DIET GI LITE (POST SURGICAL)      LDAs               Peripheral IV 01/14/18 Right Antecubital (Active)   Site Assessment Clean, dry, & intact 1/15/2018  7:12 PM   Phlebitis Assessment 0 1/15/2018  7:12 PM   Infiltration Assessment 0 1/15/2018  7:12 PM   Dressing Status Clean, dry, & intact 1/15/2018  7:12 PM   Dressing Type Transparent;Tape 1/15/2018  7:12 PM   Hub Color/Line Status Pink;Flushed;Capped 1/15/2018  7:12 PM       Peripheral IV 01/15/18 Left Forearm (Active)   Site Assessment Clean, dry, & intact 1/15/2018  7:12 PM   Phlebitis Assessment 0 1/15/2018  7:12 PM   Infiltration Assessment 0 1/15/2018  7:12 PM   Dressing Status Clean, dry, & intact 1/15/2018  7:12 PM   Dressing Type Transparent;Tape 1/15/2018  7:12 PM   Hub Color/Line Status Pink; Infusing 1/15/2018  7:12 PM   Alcohol Cap Used Yes 1/15/2018  5:46 AM                      Urinary Catheter      Intake & Output   Date 01/15/18 0700 - 01/16/18 0659 01/16/18 0700 - 01/17/18 0659   Shift 2919-3991 5865-8467 24 Hour Total 7496-1373 8495-4016 24 Hour Total   I  N  T  A  K  E   Shift Total  (mL/kg)         O  U  T  P  U  T   Urine  (mL/kg/hr)  550 550         Urine Voided  550 550         Urine Occurrence(s) 2 x 2 x 4 x       Shift Total  (mL/kg)  550  (12.5) 550  (12.5)      NET  -550 -550      Weight (kg) 43.9 43.9 43.9 43.9 43.9 43.9         Readmission Risk Assessment Tool Score Low Risk            4       Total Score        4 Charlson Comorbidity Score (Age + Comorbid Conditions)        Criteria that do not apply:    Has Seen PCP in Last 6 Months (Yes=3, No=0)    . Living with Significant Other. Assisted Living. LTAC. SNF. or   Rehab    Patient Length of Stay (>5 days = 3)    IP Visits Last 12 Months (1-3=4, 4=9, >4=11)    Pt.  Coverage (Medicare=5 , Medicaid, or Self-Pay=4)       Expected Length of Stay 2d 21h   Actual Length of Stay 2

## 2018-01-16 NOTE — PROGRESS NOTES
PCU SHIFT NURSING NOTE      Bedside and Verbal shift change report given to Zulma Lyle RN (oncoming nurse) by Remi Rasmussen RN (offgoing nurse). Report included the following information SBAR, Kardex, MAR, Recent Results and Cardiac Rhythm NSR. Shift Summary:   1000:  Dr. Blanca Ryan has changed insulin orders. Called and spoke with him. Discontinue the insulin infusion with administration of NPH. Will continue to monitor glucose. MD stated he would monitor throughout the day as well. 1315:  Received call from Diabetes treatment asking about the D5 in the IV fluids. Called and spoke with Dr. Bernadette Stewart and he will look at the order and the patient to determine if the fluids need to be changed. Admission Date 1/14/2018   Admission Diagnosis Uncontrolled type 2 DM with hyperosmolar nonketotic hyperglycemia ([facility] [address]   Consults IP CONSULT TO ENDOCRINOLOGY        Consults   []PT   []OT   []Speech   []Case Management      [] Palliative      Cardiac Monitoring Order   [x]Yes   []No     IV drips   []Yes    Drip:                            Dose:  Drip:                            Dose:  Drip:                            Dose:   [x]No     GI Prophylaxis   []Yes   [x]No         DVT Prophylaxis   SCDs:             Yusef stockings:         [] Medication   []Contraindicated   []None      Activity Level Activity Level: Up with Assistance     Activity Assistance: Partial (one person)   Purposeful Rounding every 1-2 hour? [x]Yes   Matute Score  Total Score: 1   Bed Alarm (If score 3 or >)   []Yes   [] Refused (See signed refusal form in chart)   Vinh Score  Vinh Score: 20   Vinh Score (if score 14 or less)   []PMT consult   []Wound Care consult      []Specialty bed   [x] Nutrition consult          Needs prior to discharge:   Home O2 required:    []Yes   [x]No    If yes, how much O2 required?     Other:    Last Bowel Movement:        Influenza Vaccine Received Flu Vaccine for Current Season (usually Sept-March): Yes        Pneumonia Vaccine           Diet Active Orders   Diet    DIET GI LITE (POST SURGICAL)      LDAs               Peripheral IV 01/14/18 Right Antecubital (Active)   Site Assessment Clean, dry, & intact 1/16/2018  2:54 AM   Phlebitis Assessment 0 1/15/2018  7:12 PM   Infiltration Assessment 0 1/15/2018  7:12 PM   Dressing Status Clean, dry, & intact 1/15/2018  7:12 PM   Dressing Type Transparent;Tape 1/15/2018  7:12 PM   Hub Color/Line Status Pink;Flushed;Capped 1/15/2018  7:12 PM       Peripheral IV 01/15/18 Left Forearm (Active)   Site Assessment Clean, dry, & intact 1/15/2018  7:12 PM   Phlebitis Assessment 0 1/15/2018  7:12 PM   Infiltration Assessment 0 1/15/2018  7:12 PM   Dressing Status Clean, dry, & intact 1/15/2018  7:12 PM   Dressing Type Transparent;Tape 1/15/2018  7:12 PM   Hub Color/Line Status Pink; Infusing 1/15/2018  7:12 PM   Alcohol Cap Used Yes 1/15/2018  5:46 AM                      Urinary Catheter      Intake & Output   Date 01/15/18 0700 - 01/16/18 0659 01/16/18 0700 - 01/17/18 0659   Shift 9420-3232 5641-2216 24 Hour Total 6159-5541 6472-3441 24 Hour Total   I  N  T  A  K  E   Shift Total  (mL/kg)         O  U  T  P  U  T   Urine  (mL/kg/hr)  1050  (2) 1050  (1)         Urine Voided  1050 1050         Urine Occurrence(s) 2 x 4 x 6 x       Shift Total  (mL/kg)  1050  (23.9) 1050  (23.9)      NET  -1050 -1050      Weight (kg) 43.9 43.9 43.9 43.9 43.9 43.9         Readmission Risk Assessment Tool Score Low Risk            4       Total Score        4 Charlson Comorbidity Score (Age + Comorbid Conditions)        Criteria that do not apply:    Has Seen PCP in Last 6 Months (Yes=3, No=0)    . Living with Significant Other. Assisted Living. LTAC. SNF. or   Rehab    Patient Length of Stay (>5 days = 3)    IP Visits Last 12 Months (1-3=4, 4=9, >4=11)    Pt.  Coverage (Medicare=5 , Medicaid, or Self-Pay=4)       Expected Length of Stay 2d 21h   Actual Length of Stay 2

## 2018-01-16 NOTE — CDMP QUERY
Patient is noted to have a BMI of 18.29. Please clarify if this patient is:     =>Underweight  =>Cachexia  =>Failure to Thrive  =>Other explanation of clinical findings  =>Unable to determine (no explanation for clinical findings)    Presentation: 5' 1\",96 lbs = BMI 18.29    Please clarify and document your clinical opinion in the progress notes and discharge summary, including the definitive and or presumptive diagnosis, (suspected or probable), related to the above clinical findings. Please include clinical findings supporting your diagnosis.    Thank [address] [place] [address], [address] [place] [address] Rd     [phone]

## 2018-01-16 NOTE — PROGRESS NOTES
Endocrine Chart Review      Lower basal rates overnight, fluctuations with snacks/meals as anticipated. Should be ok to transition to sub q insulin. With consideration to the patient planning to use 70/30 insulin at home, NPH + Humalog while here would be a good transition regimen which would allow her to continue her BID home regimen whenever she is discharged (long actin insulin such as lantus would overlap and create difficulty restarting her mixed insulin at home). Plan:  NPH 20 units BID for now,  Humalog 7 units TID AC for now,  All doses will need modification as her infection resolves (and thus insulin resistance improves),    Upon discharge, if discharged today:   Patient would resume the following home regimen starting with dinner dose:   70/30 insulin:   25 units BID for 2 days,   To decrease by 2 units each day as sugars improve. Patient may need to be more aggressive if sugars improve quickly. * Patient may contact me at the clinic from home for recommendations based on home blood sugars. Thank you,  Feel free to touch base with concerns,    Jemma Ren. [address] [place] [address] Endocrinology  [address] [place] [address]    ------------------------------  Addendum: 1/16/2018, 4:44 PM  As blood sugar rising a bit during the day on injections, I have modified the regimen as follows:     NPH 20 units BID (no change)  Humalog 10 units with meals (up from 3)  Start correction scale of 1:40>150  Discontinued Dextrose containing fluids, Encourage PO intake, GFR >60, labs improved.      Discharge plan:  If goes home,  Novolin 70/30 insulin:  30 units BID  She should call the clinic if sugars start getting lower at home for dose adjustment    Nidia Bran MD

## 2018-01-16 NOTE — PROGRESS NOTES
Hospitalist Progress Note    NAME: Rachel Maynard   :  1965   MRN:  [id number]     Assessment / Plan:  Severe hypomagnesemia  Severe hypophosphatemia  Non ketotic Hyperglycemia with BS > 1,500, POA in IDDM, SHANTEL  ( Latent Autoimmune  Diabetes in Adults)   Acute renal failure due to Severe Dehydration from hyperglycemia  Hypernatremia, corrected Na of 150  Pancreatic Atrophy ( CT of Abdomen in 2016 shows an atrophic pancreatic head with absent Pancreatic Body and tail)  Chronically elevated  Alk Phos  Asthma with Viral URI, now much improved  Sinus tachycardia  -patient tolerating well as we have transitioned off of insulin gtt now to nph bid and scheduled regular with mealtime  -continue to encourage PO intake  -IVF NS  -watch labs in AM if stable then consider dc to home in AM  -sodium has finally corrected  -profoundly low magnesium and Phos - will correct IV now and overnight    Code status: Full  Prophylaxis: Hep SQ  Recommended Disposition: Home w/Family, SNF/LTC and  PT, OT, RN     Subjective:     Chief Complaint / Reason for Physician Visit  \"i feel good\" up moving in room. Off insulin gtt this afternoon. Discussed with RN events overnight. bs overall down    Review of Systems:  Symptom Y/N Comments  Symptom Y/N Comments   Fever/Chills n   Chest Pain     Poor Appetite n   Edema n    Cough n   Abdominal Pain n    Sputum    Joint Pain     SOB/GARLAND    Pruritis/Rash     Nausea/vomit n   Tolerating PT/OT     Diarrhea n   Tolerating Diet y    Constipation n   Other       Could NOT obtain due to:      Objective:     VITALS:   Last 24hrs VS reviewed since prior progress note.  Most recent are:  Patient Vitals for the past 24 hrs:   Temp Pulse Resp BP SpO2   18 1525 98.7 °F (37.1 °C) 74 18 114/74 100 %   18 1135 98.6 °F (37 °C) 76 18 118/72 99 %   18 0727 98.4 °F (36.9 °C) 77 16 116/62 100 %   18 0254 97.8 °F (36.6 °C) 74 16 119/71 100 %   01/15/18 2337 98.1 °F (36.7 °C) 75 16 109/67 99 % 01/15/18 1912 98.7 °F (37.1 °C) 78 16 118/64 100 %       Intake/Output Summary (Last 24 hours) at 01/16/18 1815  Last data filed at 01/16/18 1529   Gross per 24 hour   Intake             3622 ml   Output             1050 ml   Net             2572 ml        PHYSICAL EXAM:  General: WD, thin f interactive in room, Alert, cooperative, no acute distress    EENT:  EOMI. Anicteric sclerae. MM dry  Resp:  CTA bilaterally, no wheezing or rales. No accessory muscle use  CV:  Regular  rhythm,  No edema  GI:  Soft, Non distended, Non tender.  +Bowel sounds  Neurologic:  Alert and oriented X 3, normal speech  Skin:  no rashes or ulcers. No jaundice    Reviewed most current lab test results and cultures  YES  Reviewed most current radiology test results   YES  Review and summation of old records today    NO  Reviewed patient's current orders and MAR    YES  PMH/ reviewed - no change compared to H&P  ________________________________________________________________________  Care Plan discussed with:    Comments   Patient x Discussed with patient in room. POC discussed. Questions answered (25   Family      RN x    Care Manager     Consultant: deb Marinelli Core team rounds were held today with , nursing, pharmacist and clinical coordinator. Patient's plan of care was discussed; medications were reviewed and discharge planning was addressed. ________________________________________________________________________  Total NON critical care TIME:    Minutes    Total CRITICAL CARE TIME Spent:   Minutes non procedure based. I have provided critical care time. During this entire length of time I was immediately available to the patient. The reason for providing this level of medical care was due to a critical illness that impaired one or more vital organ systems, such that there was a high probability of imminent or life threatening deterioration in the patient's condition.  This care involved high complexity decision making which includes reviewing the patient's past medical records, current laboratory results, and actual Xray films in order to assess, support vital system function, and to treat this degree of vital organ system failure, and to prevent further life threatening deterioration of the patients condition. Comments   >50% of visit spent in counseling and coordination of care x See above   ________________________________________________________________________  Procedures: see electronic medical records for all procedures/Xrays and details which were not copied into this note but were reviewed prior to creation of Plan. LABS:  Recent Labs      01/16/18   0259  01/14/18   1351   WBC  9.3  9.3   HGB  10.5*  12.5   HCT  31.5*  42.3   PLT  137*  165     Recent Labs      01/16/18   0259  01/15/18   1015  01/15/18   0433   NA  139  142  147*   K  4.2  4.3  3.0*   CL  109*  107  111*   CO2  27  31  31   BUN  3*  10  11   CREA  0.82  1.15*  1.03*   GLU  143*  374*  192*   CA  8.0*  8.2*  8.7   MG  1.1*   --    --    PHOS  1.1*   --    --      Recent Labs      01/14/18   1721  01/14/18   1351   SGOT  34  40*   ALT  45  53   AP  225*  270*   TBILI  0.3  0.5   TP  6.8  7.6   ALB  3.5  3.9   GLOB  3.3  3.7   LPSE   --   361     No results for input(s): INR, PTP, APTT in the last 72 hours. No lab exists for component: INREXT, INREXT   No results for input(s): FE, TIBC, PSAT, FERR in the last 72 hours. No results found for: FOL, RBCF   No results for input(s): PH, PCO2, PO2 in the last 72 hours. No results for input(s): PHI, PO2I, PCO2I in the last 72 hours.   Recent Labs      01/14/18   1351   CPK  72   TROIQ  <0.04     Lab Results   Component Value Date/Time    Cholesterol, total 181 07/27/2016 08:58 AM    HDL Cholesterol 106 07/27/2016 08:58 AM    LDL, calculated 62 07/27/2016 08:58 AM    Triglyceride 67 07/27/2016 08:58 AM     Lab Results   Component Value Date/Time Glucose (POC) 210 01/16/2018 04:31 PM    Glucose (POC) 186 01/16/2018 11:26 AM    Glucose (POC) 208 01/16/2018 09:46 AM    Glucose (POC) 197 01/16/2018 08:42 AM    Glucose (POC) 164 01/16/2018 07:21 AM     Lab Results   Component Value Date/Time    Color YELLOW/STRAW 01/14/2018 12:37 PM    Appearance CLEAR 01/14/2018 12:37 PM    Specific gravity 1.027 01/14/2018 12:37 PM    pH (UA) 6.0 01/14/2018 12:37 PM    Protein NEGATIVE  01/14/2018 12:37 PM    Glucose >1000 01/14/2018 12:37 PM    Ketone NEGATIVE  01/14/2018 12:37 PM    Bilirubin NEGATIVE  01/14/2018 12:37 PM    Urobilinogen 0.2 01/14/2018 12:37 PM    Nitrites NEGATIVE  01/14/2018 12:37 PM    Leukocyte Esterase NEGATIVE  01/14/2018 12:37 PM    Epithelial cells FEW 01/14/2018 12:37 PM    Bacteria NEGATIVE  01/14/2018 12:37 PM    WBC 0-4 01/14/2018 12:37 PM    RBC 0-5 01/14/2018 12:37 PM       RADIOGRAPHIC STUDIES:  CXR Results  (Last 48 hours)    None          CT Results  (Last 48 hours)    None          Echo Results  (Last 48 hours)    None          VENOUS DOPPLER results  (Last 48 hours)    None          CULTURES:    No results found for: SDES Lab Results   Component Value Date/Time    Culture result: CITROBACTER AMALONATICUS 12/01/2014 01:42 PM          Signed: Reji Boyce MD    This note will not be viewable in [address]

## 2018-01-17 VITALS
BODY MASS INDEX: 18.27 KG/M2 | SYSTOLIC BLOOD PRESSURE: 110 MMHG | HEART RATE: 88 BPM | DIASTOLIC BLOOD PRESSURE: 54 MMHG | RESPIRATION RATE: 16 BRPM | TEMPERATURE: 98.5 F | WEIGHT: 96.78 LBS | HEIGHT: 61 IN | OXYGEN SATURATION: 99 %

## 2018-01-17 LAB
ANION GAP SERPL CALC-SCNC: 5 MMOL/L (ref 5–15)
BUN SERPL-MCNC: 10 MG/DL (ref 6–20)
BUN/CREAT SERPL: 10 (ref 12–20)
CALCIUM SERPL-MCNC: 7.6 MG/DL (ref 8.5–10.1)
CHLORIDE SERPL-SCNC: 103 MMOL/L (ref 97–108)
CO2 SERPL-SCNC: 29 MMOL/L (ref 21–32)
CREAT SERPL-MCNC: 1.01 MG/DL (ref 0.55–1.02)
GLUCOSE BLD STRIP.AUTO-MCNC: 171 MG/DL (ref 65–100)
GLUCOSE BLD STRIP.AUTO-MCNC: 86 MG/DL (ref 65–100)
GLUCOSE SERPL-MCNC: 75 MG/DL (ref 65–100)
MAGNESIUM SERPL-MCNC: 3 MG/DL (ref 1.6–2.4)
PHOSPHATE SERPL-MCNC: 4 MG/DL (ref 2.6–4.7)
POTASSIUM SERPL-SCNC: 4.5 MMOL/L (ref 3.5–5.1)
SERVICE CMNT-IMP: ABNORMAL
SERVICE CMNT-IMP: NORMAL
SODIUM SERPL-SCNC: 137 MMOL/L (ref 136–145)

## 2018-01-17 PROCEDURE — 74011636637 HC RX REV CODE- 636/637: Performed by: INTERNAL MEDICINE

## 2018-01-17 PROCEDURE — 80048 BASIC METABOLIC PNL TOTAL CA: CPT | Performed by: INTERNAL MEDICINE

## 2018-01-17 PROCEDURE — 74011250637 HC RX REV CODE- 250/637: Performed by: GENERAL ACUTE CARE HOSPITAL

## 2018-01-17 PROCEDURE — 82962 GLUCOSE BLOOD TEST: CPT

## 2018-01-17 PROCEDURE — 36415 COLL VENOUS BLD VENIPUNCTURE: CPT | Performed by: INTERNAL MEDICINE

## 2018-01-17 PROCEDURE — 84100 ASSAY OF PHOSPHORUS: CPT | Performed by: INTERNAL MEDICINE

## 2018-01-17 PROCEDURE — 83735 ASSAY OF MAGNESIUM: CPT | Performed by: INTERNAL MEDICINE

## 2018-01-17 RX ORDER — INSULIN LISPRO 100 [IU]/ML
7 INJECTION, SOLUTION INTRAVENOUS; SUBCUTANEOUS
Status: DISCONTINUED | OUTPATIENT
Start: 2018-01-17 | End: 2018-01-17 | Stop reason: HOSPADM

## 2018-01-17 RX ADMIN — INSULIN LISPRO 1 UNITS: 100 INJECTION, SOLUTION INTRAVENOUS; SUBCUTANEOUS at 12:06

## 2018-01-17 RX ADMIN — INSULIN HUMAN 15 UNITS: 100 INJECTION, SUSPENSION SUBCUTANEOUS at 09:29

## 2018-01-17 RX ADMIN — ACETAMINOPHEN 650 MG: 325 TABLET ORAL at 07:48

## 2018-01-17 RX ADMIN — INSULIN LISPRO 7 UNITS: 100 INJECTION, SOLUTION INTRAVENOUS; SUBCUTANEOUS at 12:06

## 2018-01-17 RX ADMIN — Medication 10 ML: at 05:30

## 2018-01-17 NOTE — PROGRESS NOTES
CM Initial Assessment        PMHX--  Significant for IDDM and asthma. Current admission assessment--  Patient came to ed with chills, mild headache, nausea, vomiting , dry cough and urinary frequency along with increased thirst. Per medical records patient blood sugar was over 1500 prior to coming to the hospital.. She lives in one story home alone. She states she was independent in adl's and iadl's prior to coming to the hospital. Patient states she drove herself to the hospital. She works. She sees Dr Christopher Chavarria for her diabetes. She plans to return home when discharged. Care Management Interventions  PCP Verified by CM:  Yes  Discharge Durable Medical Equipment:  (Patient has two glucometer machines at home. )  Current Support Network: Lives Alone  Confirm Follow Up Transport: Family  Plan discussed with Pt/Family/Caregiver: Yes  Discharge Location  Discharge Placement: Home                Chidi NORMANBSNCRM EXT 1920

## 2018-01-17 NOTE — PROGRESS NOTES
PCU SHIFT NURSING NOTE      Bedside and Verbal shift change report given to Giovanna Perera RN (oncoming nurse) by Renetta Prather RN (offgoing nurse). Report included the following information SBAR, Kardex, MAR, Recent Results and Cardiac Rhythm NSR. Shift Summary:   0740:  Pt am glucose 86. Will speak with MD prior to giving insulin  0820:  Called Dr. Anayeli Bennett. 0915:  Dr. Anayeli Bennett changed insulin orders. 1400:  Went over discharge instructions with pt. Pt allowed to ask questions and all questions answered. IV discontinued. Pt given 1 Rx and work note. Pt dressed and out to car in wheelchair with tech. Admission Date 1/14/2018   Admission Diagnosis Uncontrolled type 2 DM with hyperosmolar nonketotic hyperglycemia ([facility] [address]   Consults IP CONSULT TO ENDOCRINOLOGY        Consults   []PT   []OT   []Speech   []Case Management      [] Palliative      Cardiac Monitoring Order   [x]Yes   []No     IV drips   []Yes    Drip:                            Dose:  Drip:                            Dose:  Drip:                            Dose:   [x]No     GI Prophylaxis   []Yes   [x]No         DVT Prophylaxis   SCDs:             Yusef stockings:         [x] Medication   []Contraindicated   []None      Activity Level Activity Level: Up with Assistance     Activity Assistance: No assistance needed   Purposeful Rounding every 1-2 hour? [x]Yes   Matute Score  Total Score: 1   Bed Alarm (If score 3 or >)   []Yes   [] Refused (See signed refusal form in chart)   Vinh Score  Vinh Score: 20   Vinh Score (if score 14 or less)   []PMT consult   []Wound Care consult      []Specialty bed   [] Nutrition consult          Needs prior to discharge:   Home O2 required:    []Yes   [x]No    If yes, how much O2 required?     Other:    Last Bowel Movement: Last Bowel Movement Date: 01/16/18      Influenza Vaccine Received Flu Vaccine for Current Season (usually Sept-March): Yes        Pneumonia Vaccine           Diet Active Orders   Diet    DIET DIABETIC CONSISTENT CARB Regular      LDAs               Peripheral IV 01/15/18 Left Forearm (Active)   Site Assessment Clean, dry, & intact 1/17/2018  7:48 AM   Phlebitis Assessment 0 1/17/2018  7:48 AM   Infiltration Assessment 0 1/17/2018  7:48 AM   Dressing Status Clean, dry, & intact 1/17/2018  7:48 AM   Dressing Type Tape;Transparent 1/17/2018  7:48 AM   Hub Color/Line Status Pink 1/17/2018  7:48 AM   Alcohol Cap Used Yes 1/15/2018  5:46 AM                      Urinary Catheter      Intake & Output   Date 01/16/18 0700 - 01/17/18 0659 01/17/18 0700 - 01/18/18 0659   Shift 5756-1633 7303-7157 24 Hour Total 2821-5648 2391-0075 24 Hour Total   I  N  T  A  K  E   I.V.  (mL/kg/hr) 860.2  (1.6)  860.2  (0.8)         Insulin Volume 10.2  10.2         Volume (dextrose 5% - 0.45% NaCl with KCl 20 mEq/L infusion) 850  850       Shift Total  (mL/kg) 860.2  (19.6)  860.2  (19.6)      O  U  T  P  U  T   Shift Total  (mL/kg)         .2  860.2      Weight (kg) 43.9 43.9 43.9 43.9 43.9 43.9         Readmission Risk Assessment Tool Score Low Risk            4       Total Score        4 Charlson Comorbidity Score (Age + Comorbid Conditions)        Criteria that do not apply:    Has Seen PCP in Last 6 Months (Yes=3, No=0)    . Living with Significant Other. Assisted Living. LTAC. SNF. or   Rehab    Patient Length of Stay (>5 days = 3)    IP Visits Last 12 Months (1-3=4, 4=9, >4=11)    Pt.  Coverage (Medicare=5 , Medicaid, or Self-Pay=4)       Expected Length of Stay 2d 21h   Actual Length of Stay 3

## 2018-01-17 NOTE — DISCHARGE SUMMARY
Hospitalist Discharge Summary    NAME: Pierce Nance   :  1965   MRN:  [id number]     DISCHARGE DIAGNOSIS:  Non ketotic Hyperglycemia with BS > 1,500, POA in IDDM, SHANTEL  ( Latent Autoimmune  Diabetes in Adults)   Acute renal failure due to Severe Dehydration from hyperglycemia  Hypernatremia, corrected Na of 150  Pancreatic Atrophy ( CT of Abdomen in 2016 shows an atrophic pancreatic head with absent Pancreatic Body and tail)  Severe hypomagnesemia  Severe hypophosphatemia  Chronically elevated  Alk Phos  Asthma with Viral URI, now much improved  Sinus tachycardia    CONSULTATIONS:  Endocrine    Follow Up: Follow-up Information     Follow up With Details Comments [address] [facility] MD Shalini Schedule an appointment as soon as possible for a visit in 1 week Call Sooner, As needed, If symptoms worsen [address]      Margaree Hatchet, MD In 1 week Call Sooner, As needed, If symptoms worsen [address] [facility]  [phone]            Procedures: see electronic medical records for all procedures/Xrays and details which were not copied into this note but were reviewed prior to creation of Plan. Please follow-up tests/labs that are still pendin. None     PMH/SH reviewed - no change compared to H&P    DISCHARGE SUMMARY/HOSPITAL COURSE: for full details see H&P, daily progress notes, labs, consult notes. Briefly As Per HPI:   Josselin Ocampo is a 45 y.o.  female who presents with not feeling well , feverish, cough.     PAtient has known Asthma and was also dx with IDDm about 1 year ago. She developed URI symptoms, and subjective fevers, cough  And was seen in the ED on [address] and dx with a URI and prescribed  A Zpack and cough syrup NO Steroids . Levoclayton Yanes Patient states that she  Took the cough syrup only 1 day. Her cough has improved significantly.  She denies noticing more frequent urination, does not particularly feel  More thirsty than usual. She states that she monitors her BS daily and has been running in the mid 100's until today her BS at home were in the  Mid 200 range. She is surprised  To know that her BS he in ED is over 1500.     She denies previous drug use, no ETOH, no hx of previous Pancreatitis or Pancreatic Insufficiency or Diarrhea. She states she eats well and otherwise  Has been at her baseline state of health until her URI     We were asked to admit for work up and evaluation of the above problems. The patient's hospital course was complicated by:  Non ketotic Hyperglycemia with BS > 1,500, POA in IDDM, SHANTEL  ( Latent Autoimmune  Diabetes in Adults)   Acute renal failure due to Severe Dehydration from hyperglycemia  Hypernatremia, corrected Na of 150  Pancreatic Atrophy ( CT of Abdomen in 2016 shows an atrophic pancreatic head with absent Pancreatic Body and tail)  Severe hypomagnesemia  Severe hypophosphatemia  Chronically elevated  Alk Phos  Asthma with Viral URI, now much improved  Sinus tachycardia    Patient with complex inpatient course. Please see all notes, labs, vitals, testing and procedures for details, briefly the patient was admitted following presentation to ED with headache and feeling poorly found to have bs > 1500. She was placed on insulin gtt, seen by Endocrinology whom helped with transition off gtt and on to nph. She will remain on 20u bid at this time. Safe for dc with correction of lytes with correction of bs and supplementation. _______________________________________________________________________   Patient seen and examined by me on day of discharge. Pertinent findings are:  Gen: thin f in nad  HEENT: nc at  Chest: cta b  Cv: no r/g  Abd: s/nd/tn +bs  Neuro: cn 2-12 gi    See Discharge Instructions for further details.   _______________________________________________________________________    Medications Reviewed:  Current Discharge Medication List      CONTINUE these medications which have CHANGED    Details   insulin NPH/insulin regular (NOVOLIN 70/30) 100 unit/mL (70-30) injection 20 Units by SubCUTAneous route two (2) times a day. Qty: 10 mL, Refills: 1         CONTINUE these medications which have NOT CHANGED    Details   ondansetron (ZOFRAN ODT) 4 mg disintegrating tablet Take 1 Tab by mouth every eight (8) hours as needed for Nausea for up to 12 days. Qty: 12 Tab, Refills: 0      albuterol (PROVENTIL HFA, VENTOLIN HFA, PROAIR HFA) 90 mcg/actuation inhaler Take 2 Puffs by inhalation every four (4) hours as needed for Wheezing.   Qty: 1 Inhaler, Refills: 7    Associated Diagnoses: Mild intermittent asthma without complication      glucose blood VI test strips () strip For use with Prodigy Meter, Check glucose 4x/day, Dx E13.9  Qty: 200 Strip, Refills: 7      albuterol (PROVENTIL VENTOLIN) 2.5 mg /3 mL (0.083 %) nebulizer solution Refills: 0      Insulin Needles, Disposable, 31 gauge x 5/16\" ndle Use as directed  Qty: 1 Package, Refills: 11      Lancets misc Use as directed  Qty: 1 Each, Refills: 3      Insulin Syringes, Disposable, 1 mL syrg Use as directed  Qty: 100 Syringe, Refills: 2         STOP taking these medications       promethazine-dextromethorphan (PROMETHAZINE-DM) 6.25-15 mg/5 mL syrup Comments:   Reason for Stopping:         azithromycin (ZITHROMAX Z-SUGAR) 250 mg tablet Comments:   Reason for Stopping:             _______________________________________________________________________    Risk of deterioration: Moderate  ________________________________________________________________________    Disposition  Home with family, no needs  ________________________________________________________________________    Care Plan discussed with:   Patient, RN, Care Manager, Consultant  Comment:   ________________________________________________________________________    Code Status: Full Code  ________________________________________________________________________    Total time spent in discharge (min): 39   ________________________________________________________________________    CDMP Checked: Yes    Signed: Teri Vargas MD    This note will not be viewable in [address]

## 2018-01-17 NOTE — PROGRESS NOTES
A f/u with Dr Barbara Brian is scheduled for Jan 19 at 10;30am    A PCP f/u is scheduled with Dr Gregg Guerra for Jan 29 at 12;00pm

## 2018-01-17 NOTE — PROGRESS NOTES
Endocrine Chart Review:    Overnight blood sugars assessed while having been of dextrose containing fluids. AM Sugar in 80's. Recommend discharge on her 70/30 insulin at the following dose: 20 units with breakfast and dinner,     Inpatient regimen modified in case she stays today: NPH 15u BID, Humalog 7 units with meals. Anticipating lower insulin needs over time. Thanks ! Feel free to touch base with any questions,        Allison Chavez.  [address] Endocrinology  [address] [place] [address]

## 2018-01-17 NOTE — DISCHARGE INSTRUCTIONS
DISCHARGE DIAGNOSIS:  Non ketotic Hyperglycemia with BS > 1,500, POA in IDDM, SHANTEL  ( Latent Autoimmune  Diabetes in Adults)   Acute renal failure due to Severe Dehydration from hyperglycemia  Hypernatremia, corrected Na of 150  Pancreatic Atrophy ( CT of Abdomen in 2016 shows an atrophic pancreatic head with absent Pancreatic Body and tail)  Severe hypomagnesemia  Severe hypophosphatemia  Chronically elevated  Alk Phos  Asthma with Viral URI, now much improved  Sinus tachycardia    MEDICATIONS:  · It is important that you take the medication exactly as they are prescribed. · Keep your medication in the bottles provided by the pharmacist and keep a list of the medication names, dosages, and times to be taken in your wallet. · Do not take other medications without consulting your doctor. Pain Management: per above medications    What to do at Home    Recommended diet:  Diabetic Diet    Recommended activity: Activity as tolerated    If you have questions regarding the hospital related prescriptions or hospital related issues please call [employer] [facility] [address] at . You can always direct your questions to your primary care doctor if you are unable to reach your hospital physician; your PCP works as an extension of your hospital doctor just like your hospital doctor is an extension of your PCP for your time at the hospital [facility]). If you experience any of the following symptoms then please call your primary care physician or return to the emergency room if you cannot get hold of your doctor:  Fever, chills, nausea, vomiting, diarrhea, change in mentation, falling, bleeding, shortness of breath    Check your blood sugars as instructed by Dr Bon Garces.  NOTE the changes to your medications

## 2018-01-17 NOTE — PROGRESS NOTES
[address]            1/17/2018      RE: Lily Kelly (YOB: 1965)    To Whom it May Concern: This is to certify that Lily Kelly was admitted to [facility] from 1/14/2018 to 1/17/2018. She is advised to rest for a few days and may return to work on 1/18/18 with no new restrictions. Please feel free to contact my office if you have any questions or concerns. Thank you for your assistance in this matter.         Vincent Daniels MD  [phone] office

## 2018-01-17 NOTE — PROGRESS NOTES
PCU SHIFT NURSING NOTE      Bedside shift change report given to Julieta Mota RN (oncoming nurse) by Dharmesh Avalos RN (offgoing nurse). Report included the following information SBAR, Kardex, Intake/Output, MAR and Recent Results. Shift Summary:     7:15 PM  Pt resting comfortably in bed. 8:15 PM  Spoke with pharmacist, Terry Ortiz, in relation to running potassium phosphate and magnesium together and Terry Ortiz verified that it is fine to do so. Admission Date 1/14/2018   Admission Diagnosis Uncontrolled type 2 DM with hyperosmolar nonketotic hyperglycemia ([facility] [address]   Consults IP CONSULT TO ENDOCRINOLOGY        Consults   []PT   []OT   []Speech   []Case Management      [] Palliative      Cardiac Monitoring Order   []Yes   []No     IV drips   []Yes    Drip:                            Dose:  Drip:                            Dose:  Drip:                            Dose:   []No     GI Prophylaxis   []Yes   []No         DVT Prophylaxis   SCDs:             Yusef stockings:         [] Medication   []Contraindicated   []None      Activity Level Activity Level: Up with Assistance     Activity Assistance: Partial (one person)   Purposeful Rounding every 1-2 hour? [x]Yes   Matute Score  Total Score: 1   Bed Alarm (If score 3 or >)   [x]Yes   [] Refused (See signed refusal form in chart)   Vinh Score  Vinh Score: 18   Vinh Score (if score 14 or less)   []PMT consult   []Wound Care consult      []Specialty bed   [] Nutrition consult          Needs prior to discharge:   Home O2 required:    []Yes   []No    If yes, how much O2 required?     Other:    Last Bowel Movement:        Influenza Vaccine Received Flu Vaccine for Current Season (usually Sept-March): Yes        Pneumonia Vaccine           Diet Active Orders   Diet    DIET DIABETIC CONSISTENT CARB Regular      LDAs               Peripheral IV 01/15/18 Left Forearm (Active)   Site Assessment Clean, dry, & intact 1/16/2018  3:29 PM   Phlebitis Assessment 0 1/16/2018  3:29 PM Infiltration Assessment 0 1/16/2018  3:29 PM   Dressing Status Clean, dry, & intact 1/16/2018  3:29 PM   Dressing Type Transparent 1/16/2018  3:29 PM   Hub Color/Line Status Pink; Infusing 1/16/2018  3:29 PM   Alcohol Cap Used Yes 1/15/2018  5:46 AM                      Urinary Catheter      Intake & Output   Date 01/15/18 1900 - 01/16/18 0659 01/16/18 0700 - 01/17/18 0659   Shift 5963-4320 24 Hour Total 0153-4313 7630-8550 24 Hour Total   I  N  T  A  K  E   P.O. 240 240         P. O. 240 240       I.V.  (mL/kg/hr) 2521.9 2521.9 860.2  (1.6)  860.2      Insulin Volume 172.7 172.7 10.2  10.2      Volume (dextrose 5% - 0.45% NaCl with KCl 20 mEq/L infusion) 2349.2 2349. 2 850  850    Shift Total  (mL/kg) 2761.9  (62.9) 2761.9  (62.9) 860.2  (19.6)  860.2  (19.6)   O  U  T  P  U  T   Urine  (mL/kg/hr) 1050 1050         Urine Voided 1050 1050         Urine Occurrence(s) 4 x 6 x       Shift Total  (mL/kg) 1050  (23.9) 1050  (23.9)      NET 1711.9 1711.9 860.2  860.2   Weight (kg) 43.9 43.9 43.9 43.9 43.9         Readmission Risk Assessment Tool Score Low Risk            4       Total Score        4 Charlson Comorbidity Score (Age + Comorbid Conditions)        Criteria that do not apply:    Has Seen PCP in Last 6 Months (Yes=3, No=0)    . Living with Significant Other. Assisted Living. LTAC. SNF. or   Rehab    Patient Length of Stay (>5 days = 3)    IP Visits Last 12 Months (1-3=4, 4=9, >4=11)    Pt.  Coverage (Medicare=5 , Medicaid, or Self-Pay=4)       Expected Length of Stay 2d 21h   Actual Length of Stay 2

## 2018-01-19 ENCOUNTER — OFFICE VISIT (OUTPATIENT)
Dept: ENDOCRINOLOGY | Age: 53
End: 2018-01-19

## 2018-01-19 VITALS
DIASTOLIC BLOOD PRESSURE: 71 MMHG | HEART RATE: 118 BPM | SYSTOLIC BLOOD PRESSURE: 107 MMHG | WEIGHT: 99.3 LBS | BODY MASS INDEX: 18.75 KG/M2 | HEIGHT: 61 IN

## 2018-01-19 DIAGNOSIS — E13.9 LADA (LATENT AUTOIMMUNE DIABETES IN ADULTS), MANAGED AS TYPE 1 (HCC): Primary | ICD-10-CM

## 2018-01-19 NOTE — LETTER
NOTIFICATION RETURN TO WORK / SCHOOL 
 
1/19/2018 11:07 AM 
 
Ms. Nidhi Jauregui [address] [place] [id number] To Whom It May Concern: 
 
Nidhi Jauregui is currently under the care of [address] [place] [address]. She will return to work/school on Monday 1/22/18. If there are questions or concerns please have the patient contact our office. Sincerely, Rozella Harada, MD

## 2018-01-19 NOTE — MR AVS SNAPSHOT
[address] [facility] [address] [id number] [phone] Patient: Johann Wilson MRN: [id number] SANTOS:2[birth year]0247 Visit Information Date & Time Provider Department Dept. Phone Encounter #  
 1/19/2018 10:30 AM Jorge Cedeno, [address] [place] [address] Diabetes and Endocrinology [phone] [id number] Follow-up Instructions Return in about 2 months (around 3/19/2018). Your Appointments 1/29/2018 12:00 PM  
Office Visit with Leighton Barron MD  
[facility] at [facility] [address]) Appt Note: [facility] d/c 1/17/2018  
 [address] [address]  
[facility] Upcoming Health Maintenance Date Due Hepatitis C Screening 1965 EYE EXAM RETINAL OR DILATED Q1 9/21/1973 Pneumococcal 19-64 Medium Risk (1 of 1 - PPSV23) 9/21/1982 DTaP/Tdap/Td series (1 - Tdap) 9/21/1984 PAP AKA CERVICAL CYTOLOGY 9/21/1984 MICROALBUMIN Q1 6/20/2017 LIPID PANEL Q1 7/27/2017 Influenza Age 5 to Adult 8/1/2017 FOBT Q 1 YEAR AGE 50-75 10/19/2017 FOOT EXAM Q1 12/30/2017 HEMOGLOBIN A1C Q6M 7/15/2018 BREAST CANCER SCRN MAMMOGRAM 7/26/2018 Allergies as of 1/19/2018  Review Complete On: 1/19/2018 By: Jorge Cedeno MD  
  
 Severity Noted Reaction Type Reactions Contrast Agent [Iodine] Medium 06/29/2016    Itching Hydrocodone Medium 10/19/2016    Rash Percocet [Oxycodone-acetaminophen] Medium 10/19/2016    Rash Codeine  12/01/2014   Intolerance Nausea and Vomiting Current Immunizations  Never Reviewed Name Date Influenza Vaccine 9/1/2014 Not reviewed this visit You Were Diagnosed With   
  
 Codes Comments 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), [facility] as [facility]    -  Primary ICD-10-CM: E10.9 ICD-9-CM: 250.00 Vitals BP Pulse Height(growth percentile) Weight(growth percentile) BMI OB Status 107/71 (BP 1 Location: Left arm, BP Patient Position: Sitting) (!) 118 5' 1\" (1.549 m) 99 lb 4.8 oz (45 kg) 18.76 kg/m2 Menopause Smoking Status Never Smoker BMI and BSA Data Body Mass Index Body Surface Area 18.76 kg/m 2 1.39 m 2 Preferred Pharmacy Pharmacy Name Phone [id number]- [place], [address] [facility] [phone] Your Updated Medication List  
  
   
This list is accurate as of: 1/19/18 11:03 AM.  Always use your most recent med list.  
  
  
  
  
 * albuterol 2.5 mg /3 mL (0.083 %) nebulizer solution Commonly known as:  PROVENTIL VENTOLIN  
  
 * albuterol 90 mcg/actuation inhaler Commonly known as:  PROVENTIL HFA, VENTOLIN HFA, PROAIR HFA Take 2 Puffs by inhalation every four (4) hours as needed for Wheezing. glucose blood VI test strips strip Commonly known as:   For use with Prodigy Meter, Check glucose 4x/day, Dx E13.9 Insulin Needles (Disposable) 31 gauge x 5/16\" Ndle Use as directed  
  
 insulin NPH/insulin regular 100 unit/mL (70-30) injection Commonly known as:  NovoLIN 70/30  
20 Units by SubCUTAneous route two (2) times a day. Insulin Syringes (Disposable) 1 mL Syrg Use as directed Lancets Misc Use as directed  
  
 ondansetron 4 mg disintegrating tablet Commonly known as:  ZOFRAN ODT Take 1 Tab by mouth every eight (8) hours as needed for Nausea for up to 12 days. * Notice: This list has 2 medication(s) that are the same as other medications prescribed for you. Read the directions carefully, and ask your doctor or other care provider to review them with you. We Performed the Following C-PEPTIDE P8627564 CPT(R)] GLUTAMIC ACID DECARB AB [57878 CPT(R)]  DIABETES FOOT EXAM [HM7 Custom] LIPID PANEL [97010 CPT(R)] METABOLIC PANEL, COMPREHENSIVE [19207 CPT(R)] MICROALBUMIN, UR, RAND W/ MICROALBUMIN/CREA RATIO [id number] CPT(R)] Follow-up Instructions Return in about 2 months (around 3/19/2018). To-Do List   
 01/19/2018 Lab:  IA-2 AUTO-ANTIBODY Patient Instructions Blood work at the lab at [facility], should be fasting in the AM. Insulin dose adjustment:  
Breakfast 25 units Dinner 20 units Also can use this correction scale to determine how many extra units you may need to take in addition to the insulin dose above. This is particularly useful when your sugars are higher temporarily due to infection etc.  
 
Correction Scale   1:30>150 IF GLUCOSE IS:                 THEN TAKE: 
    0   Extra Unit 151-180   1   Extra Unit 181-210   2   Extra Units 211-240   3   Extra Units 241-270   4   Extra Units 271-300   5   Extra Units >300    6   Extra Units Jessie Vallejo ........... ---------------------------------------------------------------------------------------------------------------------- Below you will find a glucose log sheet which you can use to record your blood sugars. Without checking and recording what your home glucose levels are, it will be difficult to make any changes to your medication dose, even when significant changes may be needed. Please feel free to use the log below to record your home glucose levels. At the very least, I would like for you to login the entire 2-3 weeks just before your visit so we can make your visit much more productive and beneficial to you. GLUCOSE LOG SHEET: 
 
Date Breakfast Lunch Dinner Bedtime Comments ? GLUCOSE LOG SHEET: 
 
Date Breakfast Lunch Dinner Bedtime Comments ? GLUCOSE LOG SHEET: 
 
Date Breakfast Lunch Dinner Bedtime Comments ? Introducing [facility]! [facility] introduces [employer] patient portal. Now you can access parts of your medical record, email your doctor's office, and request medication refills online. 1. In your internet browser, go to [email or web address]. [employer] 2. Click on the First Time User? Click Here link in the Sign In box. You will see the New Member Sign Up page. 3. Enter your [employer] Access Code exactly as it appears below. You will not need to use this code after youve completed the sign-up process. If you do not sign up before the expiration date, you must request a new code. · [employer] Access Code: [email or web address] Expires: 4/10/2018  8:14 PM 
 
4. Enter the last four digits of your Social Security Number (xxxx) and Date of Birth (mm/dd/yyyy) as indicated and click Submit. You will be taken to the next sign-up page. 5. Create a [employer]urlink ID. This will be your [employer] login ID and cannot be changed, so think of one that is secure and easy to remember. 6. Create a [employer] password. You can change your password at any time. 7. Enter your Password Reset Question and Answer. This can be used at a later time if you forget your password. 8. Enter your e-mail address. You will receive e-mail notification when new information is available in [address] 9. Click Sign Up. You can now view and download portions of your medical record. 10. Click the Download Summary menu link to download a portable copy of your medical information. If you have questions, please visit the Frequently Asked Questions section of the [employer] website. Remember, [employer] is NOT to be used for urgent needs. For medical emergencies, dial 911. Now available from your iPhone and Android! Please provide this summary of care documentation to your next provider. Your primary care clinician is listed as Rodolfo Chatterjee. If you have any questions after today's visit, please call [phone].

## 2018-01-19 NOTE — PATIENT INSTRUCTIONS
Blood work at the lab at [facility], should be fasting in the AM.     Insulin dose adjustment:   Breakfast 25 units  Dinner 20 units    Also can use this correction scale to determine how many extra units you may need to take in addition to the insulin dose above. This is particularly useful when your sugars are higher temporarily due to infection etc.     Correction Scale   1:30>150  IF GLUCOSE IS:                 THEN TAKE:      0   Extra Unit  151-180   1   Extra Unit  181-210   2   Extra Units  211-240   3   Extra Units  241-270   4   Extra Units  271-300   5   Extra Units  >300    6   Extra Units    . ........... ----------------------------------------------------------------------------------------------------------------------    Below you will find a glucose log sheet which you can use to record your blood sugars. Without checking and recording what your home glucose levels are, it will be difficult to make any changes to your medication dose, even when significant changes may be needed. Please feel free to use the log below to record your home glucose levels. At the very least, I would like for you to login the entire 2-3 weeks just before your visit so we can make your visit much more productive and beneficial to you. GLUCOSE LOG SHEET:    Date Breakfast Lunch Dinner Bedtime Comments ? GLUCOSE LOG SHEET:    Date Breakfast Lunch Dinner Bedtime Comments ?                                                                                                                                                                                                                                                  GLUCOSE LOG SHEET:    Date Breakfast Lunch Dinner Bedtime Comments ?

## 2018-01-19 NOTE — PROGRESS NOTES
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 female with a PMHx as noted below who is presenting to our endocrine clinic for the f/u evaluation of recently diagnosed diabetes. History of Type 1 Diabetes:  Patient reports that they were diagnosed in the ED with A1c of 15% June 2016     Pancreatic atrophy on CT   Family history is positive for diabetes in mother and maternal grandmother, both on insulin but type of diabetes unclear  Was started on novolog 70/30 insulin    INTERVAL HISTORY:    I had recently seen the patient in the hospital as she was admitted with very high blood sugars in the setting of recent infection. We had adjusted her inpatient dose, and then recommended discharge on home regiment of 20 units BID mixed insulin. Recent A1c >16% for reasons unclear. Current home regimen includes:   Novolog 70/30 Pen, 20 units BID    Home Blood glucose review: No log, reported sugars.   AM:     100-200  Lunch    Dinner  200 +/-  Bedtime  100-200    Review of most recent diabetes-related labs:  Lab Results   Component Value Date    HBA1C >16.0 (H) 01/15/2018    HBA1C >16.0 (H) 01/14/2018    HBA1C 9.6 (H) 03/30/2017    CHOL 181 07/27/2016    LDLC 62 07/27/2016    GFRAA >60 01/17/2018    GFRNA 57 (L) 01/17/2018    MCACR 32.5 (H) 06/20/2016    TSH 1.010 06/20/2016     Lab Key:  [id number] = IA-2 pancreatic islet cell autoantibody  GADLT = JORDANA-65 autoantibody   MCACR = Urine Microalbumin  INSUL = Insulin level  CPEPL = C-peptide level        PAST MEDICAL/SURGICAL HISTORY:   Past Medical History:   Diagnosis Date    Asthma     Diabetes ([address]     Elevated transaminase level 6/29/2016    Insulin dependent diabetes mellitus ([address] 6/20/2016    Pancreatic atrophy 6/20/2016     Past Surgical History:   Procedure Laterality Date    HX HEENT         ALLERGIES:   Allergies   Allergen Reactions    Contrast Agent [Iodine] Itching    Hydrocodone Rash    Percocet [Oxycodone-Acetaminophen] Rash    Codeine Nausea and Vomiting       MEDICATIONS ON ADMISSION:     Current Outpatient Prescriptions:     insulin NPH/insulin regular (NOVOLIN 70/30) 100 unit/mL (70-30) injection, 20 Units by SubCUTAneous route two (2) times a day., Disp: 10 mL, Rfl: 1    glucose blood VI test strips () strip, For use with Prodigy Meter, Check glucose 4x/day, Dx E13.9, Disp: 200 Strip, Rfl: 7    Insulin Needles, Disposable, 31 gauge x 5/16\" ndle, Use as directed, Disp: 1 Package, Rfl: 11    Lancets misc, Use as directed, Disp: 1 Each, Rfl: 3    Insulin Syringes, Disposable, 1 mL syrg, Use as directed, Disp: 100 Syringe, Rfl: 2    ondansetron (ZOFRAN ODT) 4 mg disintegrating tablet, Take 1 Tab by mouth every eight (8) hours as needed for Nausea for up to 12 days. , Disp: 12 Tab, Rfl: 0    albuterol (PROVENTIL HFA, VENTOLIN HFA, PROAIR HFA) 90 mcg/actuation inhaler, Take 2 Puffs by inhalation every four (4) hours as needed for Wheezing., Disp: 1 Inhaler, Rfl: 7    albuterol (PROVENTIL VENTOLIN) 2.5 mg /3 mL (0.083 %) nebulizer solution, , Disp: , Rfl: 0    SOCIAL HISTORY:   Social History     Social History    Marital status: SINGLE     Spouse name: N/A    Number of children: N/A    Years of education: N/A     Occupational History    Not on file. Social History Main Topics    Smoking status: Never Smoker    Smokeless tobacco: Never Used    Alcohol use No    Drug use: No    Sexual activity: Not Currently     Other Topics Concern    Not on file     Social History Narrative       FAMILY HISTORY:  Family History   Problem Relation Age of Onset    Cancer Father     Diabetes Mother     Diabetes Maternal Grandmother        REVIEW OF SYSTEMS: Complete ROS assessed and noted for that which is described above, all else are negative.   Eyes: normal  ENT: normal  CVS: normal  Resp: normal  GI: normal  : normal  GYN: normal  Endocrine: normal  Integument: normal  Musculoskeletal: normal  Neuro: normal  Psych: normal      PHYSICAL EXAMINATION:    VITAL SIGNS:  Visit Vitals    /71 (BP 1 Location: Left arm, BP Patient Position: Sitting)    Pulse (!) 118    Ht 5' 1\" (1.549 m)    Wt 99 lb 4.8 oz (45 kg)    BMI 18.76 kg/m2       GENERAL: NCAT, Sitting comfortably, NAD  EYES: EOMI, non-icteric, no proptosis  HEENT: NCAT, no inflammation, no masses  LYMPH NODES: No LAD  CARDIOVASCULAR: S1 S2, RRR, No murmur, 2+ radial pulses  RESPIRATORY: CTA b/l, no wheeze/rales  ABDOMEN: BS normal  NEUROLOGIC: 5/5 power all extremities, no parasthesias, AAOx3  EXTREMITIES: warm, no edema/rash/ or other skin changes, good pedal pulses      REVIEW OF LABORATORY AND RADIOLOGY DATA:   Labs and documentation have been reviewed as described above. Diabetic foot exam performed by Aletha Ruff MD     Measurement  Response Nurse Comment Physician Comment   Monofilament  R - normal sensation with micro filament  L - normal sensation with micro filament     Pulse DP R - 2+ (normal)  L - 2+ (normal)     Vibration R - Normal    L - Normal     Structural deformity R - None  L - None     Skin Integrity / Deformity R - None  L - Yes - callus at ball of foot        Reviewed by:          ASSESSMENT AND PLAN:   Rachel Maynard is a 45 y.o. female with a PMHx as noted below who is presenting to our endocrine clinic for the f/u evaluation of recent onset diabetes. 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 BP: Stable, not on antihypertensives  Cholesterol: rechecking lipids    Labs: Checking annual DM panel.  Will include pancreatic autoantibodies and c-peptide level considering her sugar were so high during recent illness/hospitalizaiton w/o any sign of ketosis or DKA in general.     RTC: I would like to see them back in 2 months    >25 minutes spent together with patient today of which >50% of this time was spent in counseling and coordination of care. Rik Valverde.  [facility] Endocrinology  [address] [place] [address]

## 2018-01-24 RX ORDER — INSULIN PUMP SYRINGE, 3 ML
EACH MISCELLANEOUS
Qty: 1 KIT | Refills: 0 | Status: SHIPPED | OUTPATIENT
Start: 2018-01-24 | End: 2018-08-24

## 2018-02-21 ENCOUNTER — TELEPHONE (OUTPATIENT)
Dept: ENDOCRINOLOGY | Age: 53
End: 2018-02-21

## 2018-02-21 NOTE — TELEPHONE ENCOUNTER
Patient is requesting a call back regarding her meter. She stated that her sugars have been running high. She can be reached at [phone].

## 2018-02-21 NOTE — TELEPHONE ENCOUNTER
2/21/2018, 4:00 PM    Attempted to call Noris Clark, reached voice mail. I left a message to return my call.       Soni Mckee

## 2018-03-10 LAB
ALBUMIN SERPL-MCNC: 4.3 G/DL (ref 3.5–5.5)
ALBUMIN/CREAT UR: 24.9 MG/G CREAT (ref 0–30)
ALBUMIN/GLOB SERPL: 1.9 {RATIO} (ref 1.2–2.2)
ALP SERPL-CCNC: 219 IU/L (ref 39–117)
ALT SERPL-CCNC: 50 IU/L (ref 0–32)
AST SERPL-CCNC: 44 IU/L (ref 0–40)
BILIRUB SERPL-MCNC: 0.5 MG/DL (ref 0–1.2)
BUN SERPL-MCNC: 20 MG/DL (ref 6–24)
BUN/CREAT SERPL: 20 (ref 9–23)
C PEPTIDE SERPL-MCNC: 2.2 NG/ML (ref 1.1–4.4)
CALCIUM SERPL-MCNC: 9.5 MG/DL (ref 8.7–10.2)
CHLORIDE SERPL-SCNC: 97 MMOL/L (ref 96–106)
CHOLEST SERPL-MCNC: 202 MG/DL (ref 100–199)
CO2 SERPL-SCNC: 27 MMOL/L (ref 18–29)
CREAT SERPL-MCNC: 0.99 MG/DL (ref 0.57–1)
CREAT UR-MCNC: 41 MG/DL
GAD65 AB SER IA-ACNC: <5 U/ML (ref 0–5)
GFR SERPLBLD CREATININE-BSD FMLA CKD-EPI: 65 ML/MIN/1.73
GFR SERPLBLD CREATININE-BSD FMLA CKD-EPI: 75 ML/MIN/1.73
GLOBULIN SER CALC-MCNC: 2.3 G/DL (ref 1.5–4.5)
GLUCOSE SERPL-MCNC: 325 MG/DL (ref 65–99)
HDLC SERPL-MCNC: 125 MG/DL
INTERPRETATION, 910389: NORMAL
ISLET CELL512 AB SER-ACNC: <1 U/ML
LDLC SERPL CALC-MCNC: 66 MG/DL (ref 0–99)
MICROALBUMIN UR-MCNC: 10.2 UG/ML
POTASSIUM SERPL-SCNC: 4.7 MMOL/L (ref 3.5–5.2)
PROT SERPL-MCNC: 6.6 G/DL (ref 6–8.5)
SODIUM SERPL-SCNC: 140 MMOL/L (ref 134–144)
TRIGL SERPL-MCNC: 53 MG/DL (ref 0–149)
VLDLC SERPL CALC-MCNC: 11 MG/DL (ref 5–40)

## 2018-03-13 ENCOUNTER — TELEPHONE (OUTPATIENT)
Dept: ENDOCRINOLOGY | Age: 53
End: 2018-03-13

## 2018-03-13 ENCOUNTER — OFFICE VISIT (OUTPATIENT)
Dept: ENDOCRINOLOGY | Age: 53
End: 2018-03-13

## 2018-03-13 VITALS
HEART RATE: 102 BPM | WEIGHT: 102.4 LBS | HEIGHT: 61 IN | DIASTOLIC BLOOD PRESSURE: 75 MMHG | BODY MASS INDEX: 19.33 KG/M2 | SYSTOLIC BLOOD PRESSURE: 121 MMHG

## 2018-03-13 DIAGNOSIS — E10.65 HYPERGLYCEMIA DUE TO TYPE 1 DIABETES MELLITUS (HCC): Primary | ICD-10-CM

## 2018-03-13 RX ORDER — INSULIN GLARGINE 100 [IU]/ML
INJECTION, SOLUTION SUBCUTANEOUS
Qty: 10 PEN | Refills: 3 | Status: SHIPPED | OUTPATIENT
Start: 2018-03-13 | End: 2018-06-05 | Stop reason: ALTCHOICE

## 2018-03-13 RX ORDER — METFORMIN HYDROCHLORIDE 500 MG/1
1000 TABLET, EXTENDED RELEASE ORAL
Qty: 360 TAB | Refills: 2 | Status: SHIPPED | OUTPATIENT
Start: 2018-03-13 | End: 2018-03-22 | Stop reason: ALTCHOICE

## 2018-03-13 NOTE — MR AVS SNAPSHOT
[address] [facility] [address] [id number] [phone] Patient: Patrica Hare MRN: [id number] OSJ:[id number]34[id number] Visit Information Date & Time Provider Department Dept. Phone Encounter #  
 3/13/2018  4:10 PM Yoselyn Barrett, [address] [place] [address] Diabetes and Endocrinology [phone] [id number] Follow-up Instructions Return in about 6 weeks (around 4/24/2018). Upcoming Health Maintenance Date Due Hepatitis C Screening 1965 EYE EXAM RETINAL OR DILATED Q1 9/21/1973 Pneumococcal 19-64 Medium Risk (1 of 1 - PPSV23) 9/21/1982 DTaP/Tdap/Td series (1 - Tdap) 9/21/1984 PAP AKA CERVICAL CYTOLOGY 9/21/1984 Influenza Age 5 to Adult 8/1/2017 FOBT Q 1 YEAR AGE 50-75 10/19/2017 HEMOGLOBIN A1C Q6M 7/15/2018 BREAST CANCER SCRN MAMMOGRAM 7/26/2018 FOOT EXAM Q1 1/19/2019 MICROALBUMIN Q1 3/6/2019 LIPID PANEL Q1 3/6/2019 Allergies as of 3/13/2018  Review Complete On: 3/13/2018 By: Krystal Taylor Severity Noted Reaction Type Reactions Contrast Agent [Iodine] Medium 06/29/2016    Itching Hydrocodone Medium 10/19/2016    Rash Percocet [Oxycodone-acetaminophen] Medium 10/19/2016    Rash Codeine  12/01/2014   Intolerance Nausea and Vomiting Current Immunizations  Never Reviewed Name Date Influenza Vaccine 9/1/2014 Not reviewed this visit You Were Diagnosed With   
  
 Codes Comments Hyperglycemia due to type 1 diabetes mellitus ([facility]    -  Primary ICD-10-CM: E10.65 ICD-9-CM: 250.01 Vitals BP Pulse Height(growth percentile) Weight(growth percentile) BMI OB Status 121/75 (BP 1 Location: Left arm, BP Patient Position: Sitting) (!) 102 5' 1\" (1.549 m) 102 lb 6.4 oz (46.4 kg) 19.35 kg/m2 Menopause Smoking Status Never Smoker BMI and BSA Data Body Mass Index Body Surface Area  
 19.35 kg/m 2 1.41 m 2 Preferred Pharmacy Pharmacy Name Phone [facility]/PHARMACY #[id number] Schmitz, [address] [facility] [address] [phone] Your Updated Medication List  
  
   
This list is accurate as of 3/13/18  5:20 PM.  Always use your most recent med list.  
  
  
  
  
 * albuterol 2.5 mg /3 mL (0.083 %) nebulizer solution Commonly known as:  PROVENTIL VENTOLIN  
  
 * albuterol 90 mcg/actuation inhaler Commonly known as:  PROVENTIL HFA, VENTOLIN HFA, PROAIR HFA Take 2 Puffs by inhalation every four (4) hours as needed for Wheezing. Blood-Glucose Meter monitoring kit One Touch Ultras glucometer, Diagnosis E10.9  
  
 glucose blood VI test strips strip Commonly known as:  PHARMACIST CHOICE For use with Prodigy meter; Check glucose 4 times daily, Diagnosis E10.9  
  
 insulin glargine 100 unit/mL (3 mL) Inpn Commonly known as:  BASAGLAR KWIKPEN U-100 INSULIN Take 40 units once daily subcutaneously Insulin Needles (Disposable) 31 gauge x 5/16\" Ndle Use as directed Insulin Syringes (Disposable) 1 mL Syrg Use as directed Lancets Misc Use as directed  
  
 metFORMIN  mg tablet Commonly known as:  GLUCOPHAGE XR Take 2 Tabs by mouth Before breakfast and dinner. * Notice: This list has 2 medication(s) that are the same as other medications prescribed for you. Read the directions carefully, and ask your doctor or other care provider to review them with you. Prescriptions Sent to Pharmacy Refills  
 insulin glargine (BASAGLAR KWIKPEN U-100 INSULIN) 100 unit/mL (3 mL) inpn 3 Sig: Take 40 units once daily subcutaneously Class: Normal  
 Pharmacy: [facility]/pharmacy #[id number] [place] [address] Ph #: [phone]  
 metFORMIN ER (GLUCOPHAGE XR) 500 mg tablet 2 Sig: Take 2 Tabs by mouth Before breakfast and dinner.   
 Class: Normal  
 Pharmacy: [facility]pharmacy #[id number] [facility] #: [phone] Route: Oral  
 glucose blood VI test strips (PHARMACIST CHOICE) strip 3 Sig: For use with Prodigy meter; Check glucose 4 times daily, Diagnosis E10.9 Class: Normal  
 Pharmacy: [address] [place] [address] Ph #: [phone] We Performed the Following METABOLIC PANEL, COMPREHENSIVE [11173 CPT(R)] Follow-up Instructions Return in about 6 weeks (around 4/24/2018). Patient Instructions Stop the mixed insulin for now, We will try a new regimen of the following:  
 
Basaglar (once daily long acting insulin) 40 units once daily, Metformin 500 mg twice daily, we will increase further to 1000 mg (2 tabs) twice daily if you tolerate it after 2 weeks, 
 
------------- Blood test using slip provided in 2 weeks,  
 
------------ Plan for follow up in clinic in 6 weeks,  
 
---------------------------------------------------------------------------------------------------------------------- Below you will find a glucose log sheet which you can use to record your blood sugars. Without checking and recording what your home glucose levels are, it will be difficult to make any changes to your medication dose, even when significant changes may be needed. Please feel free to use the log below to record your home glucose levels. At the very least, I would like for you to login the entire 2-3 weeks just before your visit so we can make your visit much more productive and beneficial to you. GLUCOSE LOG SHEET: 
 
Date Breakfast Lunch Dinner Bedtime Comments ? GLUCOSE LOG SHEET: 
 
Date Breakfast Lunch Dinner Bedtime Comments ? GLUCOSE LOG SHEET: 
 
Date Breakfast Lunch Dinner Bedtime Comments ? Introducing [facility]! [employer] introduces treadalong patient portal. Now you can access parts of your medical record, email your doctor's office, and request medication refills online. 1. In your internet browser, go to [email or web address] 2. Click on the First Time User? Click Here link in the Sign In box. You will see the New Member Sign Up page. 3. Enter your treadalong Access Code exactly as it appears below. You will not need to use this code after youve completed the sign-up process. If you do not sign up before the expiration date, you must request a new code. · treadalong Access Code: [email or web address] Expires: 4/10/2018  9:14 PM 
 
4. Enter the last four digits of your Social Security Number (xxxx) and Date of Birth (mm/dd/yyyy) as indicated and click Submit. You will be taken to the next sign-up page. 5. Create a [employer] ID. This will be your treadalong login ID and cannot be changed, so think of one that is secure and easy to remember. 6. Create a treadalong password. You can change your password at any time. 7. Enter your Password Reset Question and Answer. This can be used at a later time if you forget your password. 8. Enter your e-mail address. You will receive e-mail notification when new information is available in [address] 9. Click Sign Up. You can now view and download portions of your medical record. 10. Click the Download Summary menu link to download a portable copy of your medical information. If you have questions, please visit the Frequently Asked Questions section of the [email or web address] website. Remember, [email or web address] is NOT to be used for urgent needs. For medical emergencies, dial 911. Now available from your iPhone and Android! Please provide this summary of care documentation to your next provider. Your primary care clinician is listed as Sylvie Chan. If you have any questions after today's visit, please call [phone].

## 2018-03-13 NOTE — PATIENT INSTRUCTIONS
Stop the mixed insulin for now,     We will try a new regimen of the following:     Basaglar (once daily long acting insulin) 40 units once daily,   Metformin 500 mg twice daily, we will increase further to 1000 mg (2 tabs) twice daily if you tolerate it after 2 weeks,    -------------    Blood test using slip provided in 2 weeks,     ------------    Plan for follow up in clinic in 6 weeks,     ----------------------------------------------------------------------------------------------------------------------    Below you will find a glucose log sheet which you can use to record your blood sugars. Without checking and recording what your home glucose levels are, it will be difficult to make any changes to your medication dose, even when significant changes may be needed. Please feel free to use the log below to record your home glucose levels. At the very least, I would like for you to login the entire 2-3 weeks just before your visit so we can make your visit much more productive and beneficial to you. GLUCOSE LOG SHEET:    Date Breakfast Lunch Dinner Bedtime Comments ? GLUCOSE LOG SHEET:    Date Breakfast Lunch Dinner Bedtime Comments ? GLUCOSE LOG SHEET:    Date Breakfast Lunch Dinner Bedtime Comments ?

## 2018-03-13 NOTE — PROGRESS NOTES
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 female with a PMHx as noted below who is presenting to our endocrine clinic for the f/u evaluation of recently diagnosed diabetes. History of Type 1 Diabetes:  Patient reports that they were diagnosed in the ED with A1c of 15% June 2016     Pancreatic atrophy on CT   Family history is positive for diabetes in mother and maternal grandmother, both on insulin but type of diabetes unclear  Was started on novolog 70/30 insulin    INTERVAL HISTORY:    Previously hospitalized with hyperglycemia without DKA, this raised question about her diagnosis. We checked her JORDANA and AI-2 antibodies and both were negative. We checked a c-peptide level and it was normal. This raises question of whether she is type 1 or type 2 diabetic. Current home regimen includes:   Novolog 70/30 Pen, 25/20    Home Blood glucose review: No log, reported sugars.   AM:         Review of most recent diabetes-related labs:  Lab Results   Component Value Date    HBA1C >16.0 (H) 01/15/2018    HBA1C >16.0 (H) 01/14/2018    HBA1C 9.6 (H) 03/30/2017    CHOL 202 (H) 03/06/2018    LDLC 66 03/06/2018    GFRAA 75 03/06/2018    GFRNA 65 03/06/2018    MCACR 24.9 03/06/2018    TSH 1.010 06/20/2016    173777 <1.0 03/06/2018    GADLT <5.0 03/06/2018    CPEPLT 2.2 03/06/2018     Lab Key:  [id number] = IA-2 pancreatic islet cell autoantibody  GADLT = JORDANA-65 autoantibody   MCACR = Urine Microalbumin  INSUL = Insulin level  CPEPL = C-peptide level        PAST MEDICAL/SURGICAL HISTORY:   Past Medical History:   Diagnosis Date    Asthma     Diabetes ([address]     Elevated transaminase level 6/29/2016    Insulin dependent diabetes mellitus ([address] 6/20/2016    Pancreatic atrophy 6/20/2016     Past Surgical History:   Procedure Laterality Date    HX HEENT         ALLERGIES:   Allergies   Allergen Reactions    Contrast Agent [Iodine] Itching    Hydrocodone Rash    Percocet [Oxycodone-Acetaminophen] Rash    Codeine Nausea and Vomiting       MEDICATIONS ON ADMISSION:     Current Outpatient Prescriptions:     glucose blood VI test strips (PHARMACIST CHOICE) strip, For use with One Touch Ultra; Check glucose 4 times daily, Diagnosis E10.9, Disp: 400 Strip, Rfl: 3    insulin NPH/insulin regular (NOVOLIN 70/30) 100 unit/mL (70-30) injection, 20 Units by SubCUTAneous route two (2) times a day., Disp: 10 mL, Rfl: 1    Insulin Needles, Disposable, 31 gauge x 5/16\" ndle, Use as directed, Disp: 1 Package, Rfl: 11    Lancets misc, Use as directed, Disp: 1 Each, Rfl: 3    Insulin Syringes, Disposable, 1 mL syrg, Use as directed, Disp: 100 Syringe, Rfl: 2    Blood-Glucose Meter monitoring kit, One Touch Ultras glucometer, Diagnosis E10.9, Disp: 1 Kit, Rfl: 0    albuterol (PROVENTIL HFA, VENTOLIN HFA, PROAIR HFA) 90 mcg/actuation inhaler, Take 2 Puffs by inhalation every four (4) hours as needed for Wheezing., Disp: 1 Inhaler, Rfl: 7    albuterol (PROVENTIL VENTOLIN) 2.5 mg /3 mL (0.083 %) nebulizer solution, , Disp: , Rfl: 0    SOCIAL HISTORY:   Social History     Social History    Marital status: SINGLE     Spouse name: N/A    Number of children: N/A    Years of education: N/A     Occupational History    Not on file. Social History Main Topics    Smoking status: Never Smoker    Smokeless tobacco: Never Used    Alcohol use No    Drug use: No    Sexual activity: Not Currently     Other Topics Concern    Not on file     Social History Narrative       FAMILY HISTORY:  Family History   Problem Relation Age of Onset    Cancer Father     Diabetes Mother     Diabetes Maternal Grandmother        REVIEW OF SYSTEMS: Complete ROS assessed and noted for that which is described above, all else are negative.   Eyes: normal  ENT: normal  CVS: normal  Resp: normal  GI: normal  : normal  GYN: normal  Endocrine: normal  Integument: normal  Musculoskeletal: normal  Neuro: normal  Psych: normal      PHYSICAL EXAMINATION:    VITAL SIGNS:  Visit Vitals    /75 (BP 1 Location: Left arm, BP Patient Position: Sitting)    Pulse (!) 102    Ht 5' 1\" (1.549 m)    Wt 102 lb 6.4 oz (46.4 kg)    BMI 19.35 kg/m2       GENERAL: NCAT, Sitting comfortably, NAD  EYES: EOMI, non-icteric, no proptosis  HEENT: NCAT, no inflammation, no masses  LYMPH NODES: No LAD  CARDIOVASCULAR: S1 S2, RRR, No murmur, 2+ radial pulses  RESPIRATORY: CTA b/l, no wheeze/rales  ABDOMEN: BS normal  NEUROLOGIC: 5/5 power all extremities, no parasthesias, AAOx3  EXTREMITIES: warm, no edema/rash/ or other skin changes, good pedal pulses      REVIEW OF LABORATORY AND RADIOLOGY DATA:   Labs and documentation have been reviewed as described above. Diabetic foot exam performed by Yue Bui MD     Measurement  Response Nurse Comment Physician Comment   Monofilament  R - normal sensation with micro filament  L - normal sensation with micro filament     Pulse DP R - 2+ (normal)  L - 2+ (normal)     Vibration R - Normal    L - Normal     Structural deformity R - None  L - None     Skin Integrity / Deformity R - None  L - Yes - callus at ball of foot        Reviewed by:          ASSESSMENT AND PLAN:   Helen Root is a 45 y.o. female with a PMHx as noted below who is presenting to our endocrine clinic for the f/u evaluation of recent onset diabetes. 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 We would consider this carefully because although she has negative antibodies and a normal c-peptide level, she has pancreatic atrophy on a prior CT. We did consider trial of a basal insulin along with metformin.  Once per day insulin that she can take at bedtime along with metformin will accommodate her better with her working schedule and may improve her blood sugar control as well. A1c prev >16%, today is 10.7%. Plan:  Medications:  STOP 70/30 insulin: increase to 25 units breakfast,  20 units dinner  Start Basaglar 40 units at bedtime, to taper down as appropriate with starting metformin,  Start metformin 500mg BID, to increase further to 1000mg BID if tolerating,  CMP in 2 weeks    BP: Stable, not on antihypertensives  Cholesterol: LDL 66      RTC: I would like to see them back in 6 weeks    >25 minutes spent together with patient today of which >50% of this time was spent in counseling and coordination of care. Mary Langston.  [facility] Endocrinology  [address] [place] [address]

## 2018-03-13 NOTE — TELEPHONE ENCOUNTER
Eileen Dumont,  Could you let the patient know 2 important points. 1. As she is on basaglar, she should not attempt to increase it based on prior sliding scale as she did with other mixed insulin when her sugar is higher than usual. She cannot do this with long acting insulin. 2. As her sugars come down with starting metformin, she will need to reduce her basaglar insulin incrementally as needed to make sure her AM blood sugars are not dropping below 100. Thanks !

## 2018-03-14 LAB — HBA1C MFR BLD HPLC: 10.7 %

## 2018-03-14 NOTE — TELEPHONE ENCOUNTER
I attempted to call Ms. Troy. I reached her voice mail and left a message to return my call.   Jarett Johnson

## 2018-03-14 NOTE — TELEPHONE ENCOUNTER
Patient stated that she is returning your call and can be reached at [phone].  She stated that she is on her lunch break until 12:30pm.

## 2018-03-14 NOTE — TELEPHONE ENCOUNTER
I called Ms Mabel Miranda again and relayed the massage from Dr. Charmayne Malm. She understood the information and will do as instructed.   María Cohen

## 2018-03-16 ENCOUNTER — TELEPHONE (OUTPATIENT)
Dept: ENDOCRINOLOGY | Age: 53
End: 2018-03-16

## 2018-03-16 RX ORDER — PEN NEEDLE, DIABETIC 30 GX3/16"
NEEDLE, DISPOSABLE MISCELLANEOUS
Qty: 3 PACKAGE | Refills: 6 | Status: SHIPPED | OUTPATIENT
Start: 2018-03-16 | End: 2018-08-24

## 2018-03-16 NOTE — TELEPHONE ENCOUNTER
----- Message from Vivi Aldana sent at 3/16/2018  8:19 AM EDT -----  Regarding: Dr. Moris Oconnor  Pt is requesting the needles for her pen that was given to her last week to be called into her [facility] pharmacy which is on file with the practice. Pt best contact number is [phone].

## 2018-03-18 ENCOUNTER — TELEPHONE (OUTPATIENT)
Dept: ENDOCRINOLOGY | Age: 53
End: 2018-03-18

## 2018-03-24 LAB
ALBUMIN SERPL-MCNC: 4.1 G/DL (ref 3.5–5.5)
ALBUMIN/GLOB SERPL: 1.7 {RATIO} (ref 1.2–2.2)
ALP SERPL-CCNC: 196 IU/L (ref 39–117)
ALT SERPL-CCNC: 59 IU/L (ref 0–32)
AST SERPL-CCNC: 61 IU/L (ref 0–40)
BILIRUB SERPL-MCNC: 0.6 MG/DL (ref 0–1.2)
BUN SERPL-MCNC: 20 MG/DL (ref 6–24)
BUN/CREAT SERPL: 20 (ref 9–23)
CALCIUM SERPL-MCNC: 9.1 MG/DL (ref 8.7–10.2)
CHLORIDE SERPL-SCNC: 97 MMOL/L (ref 96–106)
CO2 SERPL-SCNC: 26 MMOL/L (ref 18–29)
CREAT SERPL-MCNC: 1.01 MG/DL (ref 0.57–1)
GFR SERPLBLD CREATININE-BSD FMLA CKD-EPI: 63 ML/MIN/1.73
GFR SERPLBLD CREATININE-BSD FMLA CKD-EPI: 73 ML/MIN/1.73
GLOBULIN SER CALC-MCNC: 2.4 G/DL (ref 1.5–4.5)
GLUCOSE SERPL-MCNC: 310 MG/DL (ref 65–99)
POTASSIUM SERPL-SCNC: 4.5 MMOL/L (ref 3.5–5.2)
PROT SERPL-MCNC: 6.5 G/DL (ref 6–8.5)
SODIUM SERPL-SCNC: 139 MMOL/L (ref 134–144)

## 2018-03-26 ENCOUNTER — TELEPHONE (OUTPATIENT)
Dept: ENDOCRINOLOGY | Age: 53
End: 2018-03-26

## 2018-03-26 NOTE — TELEPHONE ENCOUNTER
Patient is requesting a call back in regards to [facility]. She stated that she cannot take that medication because it is making her sick. She can be reached at [phone].

## 2018-03-26 NOTE — PROGRESS NOTES
Serum bicarb level is normal,  No evidence of acidosis  Liver enzymes remain elevated will need to monitor, alk phos slowly trending down over past few months,   Ok to continue trial of basal + januvia (didnt tolerate metformin)    Samul Osler T.  [facility] Endocrinology  [address] [place] [address]

## 2018-03-27 NOTE — TELEPHONE ENCOUNTER
3/27/2018, 3:19 PM    Attempted to call Genevieve Wing, reached voice mail. I left a message to return my call.       Yina Anderson

## 2018-04-06 NOTE — TELEPHONE ENCOUNTER
I attempted to call Ms. Troy again and again reached her voice mail. I left another message for her to return my call.   Zollie Skiff

## 2018-04-10 NOTE — TELEPHONE ENCOUNTER
Ms. 44 Cir[address] Barreto called me back. She has not been taking her Januvia due to stomach issues. She has been doing diet control and her sugars have been running in the low 100's. She has a appointment here on 4/27 and will bring in her sugar logs then.   Pedro Nguyen

## 2018-04-27 ENCOUNTER — OFFICE VISIT (OUTPATIENT)
Dept: ENDOCRINOLOGY | Age: 53
End: 2018-04-27

## 2018-04-27 VITALS
HEIGHT: 61 IN | HEART RATE: 74 BPM | SYSTOLIC BLOOD PRESSURE: 126 MMHG | WEIGHT: 100.4 LBS | DIASTOLIC BLOOD PRESSURE: 78 MMHG | BODY MASS INDEX: 18.96 KG/M2

## 2018-04-27 DIAGNOSIS — E13.9 LADA (LATENT AUTOIMMUNE DIABETES IN ADULTS), MANAGED AS TYPE 1 (HCC): Primary | ICD-10-CM

## 2018-04-27 DIAGNOSIS — E10.65 HYPERGLYCEMIA DUE TO TYPE 1 DIABETES MELLITUS (HCC): ICD-10-CM

## 2018-04-27 NOTE — PROGRESS NOTES
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 female with a PMHx as noted below who is presenting to our endocrine clinic for the f/u evaluation of recently diagnosed diabetes. History of Type 1 Diabetes:  Patient reports that they were diagnosed in the ED with A1c of 15% June 2016     Pancreatic atrophy on CT   Family history is positive for diabetes in mother and maternal grandmother, both on insulin but type of diabetes unclear  Was started on novolog 70/30 insulin  Previously hospitalized with hyperglycemia without DKA, this raised question about her diagnosis. We checked her JORDANA and AI-2 antibodies and both were negative. We checked a c-peptide level and it was normal. This raises question of whether she is type 1 or type 2 diabetic. INTERVAL HISTORY:  Patient did not tolerate metformin previously and we considered a trial of januvia with her basal insulin, basaglar. Metabolic function and acid-base status appeared to be stable when evaluated on basaglar/januvia. Today patient notes that [place] had made her her nauseous also, and in fact she stopped taking the basaglar and the [place] all together. She report changing her diet and drinking more water and her sugars had stayed stable. Current home regimen includes:   basaglar 40 units once daily (Not taking)  januvia 100mg once daily (Not taking)    Home Blood glucose review:   AM:   90, 100  Lunch:  95, 145, 88, 215  Dinner: 115, 150  Bedtime 120, 140  The above reflects the past 2 weeks, as she checked once per day at different times.      Review of most recent diabetes-related labs:  Lab Results   Component Value Date    HBA1C >16.0 (H) 01/15/2018    HBA1C >16.0 (H) 01/14/2018    HBA1C 9.6 (H) 03/30/2017    LSI6YLSZ 10.7 03/13/2018    CHOL 202 (H) 03/06/2018    LDLC 66 03/06/2018    GFRAA 73 03/23/2018    GFRNA 63 03/23/2018    MCACR 24.9 03/06/2018    TSH 1.010 06/20/2016    [id number] <1.0 03/06/2018    GADLT <5.0 03/06/2018    CPEPLT 2.2 03/06/2018     Lab Key:  [id number] = IA-2 pancreatic islet cell autoantibody  GADLT = OJRDANA-65 autoantibody   MCACR = Urine Microalbumin  INSUL = Insulin level  CPEPL = C-peptide level    PAST MEDICAL/SURGICAL HISTORY:   Past Medical History:   Diagnosis Date    Asthma     Diabetes [facility] [address]     Elevated transaminase level 6/29/2016    Insulin dependent diabetes mellitus ([facility] [address] 6/20/2016    Pancreatic atrophy 6/20/2016     Past Surgical History:   Procedure Laterality Date    HX HEENT         ALLERGIES:   Allergies   Allergen Reactions    Contrast Agent [Iodine] Itching    Hydrocodone Rash    Percocet [Oxycodone-Acetaminophen] Rash    Codeine Nausea and Vomiting       MEDICATIONS ON ADMISSION:     Current Outpatient Prescriptions:     SITagliptin (JANUVIA) 100 mg tablet, Take 1 Tab by mouth daily. , Disp: 90 Tab, Rfl: 2    Insulin Needles, Disposable, 31 gauge x 5/16\" ndle, Use with Basaglar insulin pen, Disp: 3 Package, Rfl: 6    insulin glargine (BASAGLAR KWIKPEN U-100 INSULIN) 100 unit/mL (3 mL) inpn, Take 40 units once daily subcutaneously, Disp: 10 Pen, Rfl: 3    glucose blood VI test strips (PHARMACIST CHOICE) strip, For use with Prodigy meter;  Check glucose 4 times daily, Diagnosis E10.9, Disp: 400 Strip, Rfl: 3    Blood-Glucose Meter monitoring kit, One Touch Ultras glucometer, Diagnosis E10.9, Disp: 1 Kit, Rfl: 0    albuterol (PROVENTIL HFA, VENTOLIN HFA, PROAIR HFA) 90 mcg/actuation inhaler, Take 2 Puffs by inhalation every four (4) hours as needed for Wheezing., Disp: 1 Inhaler, Rfl: 7    albuterol (PROVENTIL VENTOLIN) 2.5 mg /3 mL (0.083 %) nebulizer solution, , Disp: , Rfl: 0    Lancets misc, Use as directed, Disp: 1 Each, Rfl: 3    Insulin Syringes, Disposable, 1 mL syrg, Use as directed, Disp: 100 Syringe, Rfl: 2    SOCIAL HISTORY:   Social History     Social History    Marital status: SINGLE Spouse name: N/A    Number of children: N/A    Years of education: N/A     Occupational History    Not on file. Social History Main Topics    Smoking status: Never Smoker    Smokeless tobacco: Never Used    Alcohol use No    Drug use: No    Sexual activity: Not Currently     Other Topics Concern    Not on file     Social History Narrative       FAMILY HISTORY:  Family History   Problem Relation Age of Onset    Cancer Father     Diabetes Mother     Diabetes Maternal Grandmother        REVIEW OF SYSTEMS: Complete ROS assessed and noted for that which is described above, all else are negative. Eyes: normal  ENT: normal  CVS: normal  Resp: normal  GI: normal  : normal  GYN: normal  Endocrine: normal  Integument: normal  Musculoskeletal: normal  Neuro: normal  Psych: normal      PHYSICAL EXAMINATION:    VITAL SIGNS:  Visit Vitals    /78 (BP 1 Location: Left arm, BP Patient Position: Sitting)    Pulse 74    Ht 5' 1\" (1.549 m)    Wt 100 lb 6.4 oz (45.5 kg)    BMI 18.97 kg/m2       GENERAL: NCAT, Sitting comfortably, NAD  EYES: EOMI, non-icteric, no proptosis  HEENT: NCAT, no inflammation, no masses  LYMPH NODES: No LAD  CARDIOVASCULAR: S1 S2, RRR, No murmur, 2+ radial pulses  RESPIRATORY: CTA b/l, no wheeze/rales  ABDOMEN: BS normal  NEUROLOGIC: 5/5 power all extremities, no parasthesias, AAOx3  EXTREMITIES: warm, no edema/rash/ or other skin changes, good pedal pulses      REVIEW OF LABORATORY AND RADIOLOGY DATA:   Labs and documentation have been reviewed as described above. ASSESSMENT AND PLAN:   Sandhya Clancy is a 45 y.o. female with a PMHx as noted below who is presenting to our endocrine clinic for the f/u evaluation of recent onset diabetes. SHANTEL, late onset autoimmune diabetes of the adult (type-1 like diabetes)     Today we discussed her having not taken her medications. She reports feeling well and without symptoms. She has kept her sugars in a reasonable range.  I have considered these things along with the fact of negative pancreatic antibodies and a normal c-peptide level. I have also weighed this against her hospitalization earlier this year during which her sugar was quite high, but again she had not developed DKA in that setting and had been metabolically stable at the time. Patient is aware that if she continues of her insulin / medications, that it is at her own risk with consideration to potential associated complications, and that she is welcome to call me with any concerns. This is how she wishes to proceed and I advised we will check a CMP before she leaves today to assure stability. Plan:  Medications:  Off meds for now as above    BP: Stable, not on antihypertensives  Cholesterol: LDL 66 stable    RTC: I would like to see them back in 3 months, call with concerns, CMP/C-peptide level prior to visit    >25 minutes spent together with patient today of which >50% of this time was spent in counseling and coordination of care. Niyah Burkett.  [address] Endocrinology  [address] [place] [address]

## 2018-04-27 NOTE — MR AVS SNAPSHOT
[address] [facility] [address] [address] [phone] Patient: Ravi Blandon MRN: [id number] JPB:[id number] Visit Information Date & Time Provider Department Dept. Phone Encounter #  
 4/27/2018  3:30 PM [facility] [id number] Follow-up Instructions Return in about 3 months (around 7/27/2018). Upcoming Health Maintenance Date Due Hepatitis C Screening 1965 EYE EXAM RETINAL OR DILATED Q1 9/21/1973 Pneumococcal 19-64 Medium Risk (1 of 1 - PPSV23) 9/21/1982 DTaP/Tdap/Td series (1 - Tdap) 9/21/1984 PAP AKA CERVICAL CYTOLOGY 9/21/1984 FOBT Q 1 YEAR AGE 50-75 10/19/2017 BREAST CANCER SCRN MAMMOGRAM 7/26/2018 Influenza Age 5 to Adult 8/1/2018 HEMOGLOBIN A1C Q6M 9/13/2018 FOOT EXAM Q1 1/19/2019 MICROALBUMIN Q1 3/6/2019 LIPID PANEL Q1 3/6/2019 Allergies as of 4/27/2018  Review Complete On: 4/27/2018 By: Flower Bonilla MD  
  
 Severity Noted Reaction Type Reactions Contrast Agent [Iodine] Medium 06/29/2016    Itching Hydrocodone Medium 10/19/2016    Rash Percocet [Oxycodone-acetaminophen] Medium 10/19/2016    Rash Codeine  12/01/2014   Intolerance Nausea and Vomiting Current Immunizations  Never Reviewed Name Date Influenza Vaccine 9/1/2014 Not reviewed this visit You Were Diagnosed With   
  
 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  
 18.97 kg/m 2 1.4 m 2 Preferred Pharmacy Pharmacy Name Phone [id number] [place], [address] [facility] [phone] Your Updated Medication List  
  
   
This list is accurate as of 4/27/18  3:50 PM.  Always use your most recent med list.  
  
  
  
  
 * albuterol 2.5 mg /3 mL (0.083 %) nebulizer solution Commonly known as:  PROVENTIL VENTOLIN  
  
 * albuterol 90 mcg/actuation inhaler Commonly known as:  PROVENTIL HFA, VENTOLIN HFA, PROAIR HFA Take 2 Puffs by inhalation every four (4) hours as needed for Wheezing. Blood-Glucose Meter monitoring kit One Touch Ultras glucometer, Diagnosis E10.9  
  
 glucose blood VI test strips strip Commonly known as:  PHARMACIST CHOICE For use with Prodigy meter; Check glucose 4 times daily, Diagnosis E10.9  
  
 insulin glargine 100 unit/mL (3 mL) Inpn Commonly known as:  BASAGLAR KWIKPEN U-100 INSULIN Take 40 units once daily subcutaneously Insulin Needles (Disposable) 31 gauge x 5/16\" Ndle Use with Basaglar insulin pen Insulin Syringes (Disposable) 1 mL Syrg Use as directed Lancets Misc Use as directed SITagliptin 100 mg tablet Commonly known as:  Sandi Bodily Take 1 Tab by mouth daily. * Notice: This list has 2 medication(s) that are the same as other medications prescribed for you. Read the directions carefully, and ask your doctor or other care provider to review them with you. We Performed the Following METABOLIC PANEL, COMPREHENSIVE [02116 CPT(R)] Follow-up Instructions Return in about 3 months (around 7/27/2018). Introducing [facility]! [facility] introduces [employer] patient portal. Now you can access parts of your medical record, email your doctor's office, and request medication refills online.    
 
1. In your internet browser, go to [email or web address] 2. Click on the First Time User? Click Here link in the Sign In box. You will see the New Member Sign Up page. 3. Enter your Quikey Access Code exactly as it appears below. You will not need to use this code after youve completed the sign-up process. If you do not sign up before the expiration date, you must request a new code. · Quikey Access Code: [email or web address] Expires: 7/26/2018  3:50 PM 
 
4. Enter the last four digits of your Social Security Number (xxxx) and Date of Birth (mm/dd/yyyy) as indicated and click Submit. You will be taken to the next sign-up page. 5. Create a Quikey ID. This will be your Quikey login ID and cannot be changed, so think of one that is secure and easy to remember. 6. Create a Quikey password. You can change your password at any time. 7. Enter your Password Reset Question and Answer. This can be used at a later time if you forget your password. 8. Enter your e-mail address. You will receive e-mail notification when new information is available in [address] 9. Click Sign Up. You can now view and download portions of your medical record. 10. Click the Download Summary menu link to download a portable copy of your medical information. If you have questions, please visit the Frequently Asked Questions section of the Quikey website. Remember, Quikey is NOT to be used for urgent needs. For medical emergencies, dial 911. Now available from your iPhone and Android! Please provide this summary of care documentation to your next provider. Your primary care clinician is listed as Michelle Vilchis. If you have any questions after today's visit, please call [phone].

## 2018-04-28 ENCOUNTER — TELEPHONE (OUTPATIENT)
Dept: ENDOCRINOLOGY | Age: 53
End: 2018-04-28

## 2018-04-28 LAB
ALBUMIN SERPL-MCNC: 4.6 G/DL (ref 3.5–5.5)
ALBUMIN/GLOB SERPL: 1.8 {RATIO} (ref 1.2–2.2)
ALP SERPL-CCNC: 257 IU/L (ref 39–117)
ALT SERPL-CCNC: 45 IU/L (ref 0–32)
AST SERPL-CCNC: 35 IU/L (ref 0–40)
BILIRUB SERPL-MCNC: 0.3 MG/DL (ref 0–1.2)
BUN SERPL-MCNC: 16 MG/DL (ref 6–24)
BUN/CREAT SERPL: 12 (ref 9–23)
CALCIUM SERPL-MCNC: 10 MG/DL (ref 8.7–10.2)
CHLORIDE SERPL-SCNC: 88 MMOL/L (ref 96–106)
CO2 SERPL-SCNC: 26 MMOL/L (ref 18–29)
CREAT SERPL-MCNC: 1.35 MG/DL (ref 0.57–1)
GFR SERPLBLD CREATININE-BSD FMLA CKD-EPI: 45 ML/MIN/1.73
GFR SERPLBLD CREATININE-BSD FMLA CKD-EPI: 51 ML/MIN/1.73
GLOBULIN SER CALC-MCNC: 2.5 G/DL (ref 1.5–4.5)
GLUCOSE SERPL-MCNC: 597 MG/DL (ref 65–99)
INTERPRETATION: NORMAL
POTASSIUM SERPL-SCNC: 4.5 MMOL/L (ref 3.5–5.2)
PROT SERPL-MCNC: 7.1 G/DL (ref 6–8.5)
SODIUM SERPL-SCNC: 131 MMOL/L (ref 134–144)

## 2018-04-28 NOTE — TELEPHONE ENCOUNTER
[employer] paged for a critical glucose of 597. AG calculates to 17. Cr has increased to 1.35 from previous value of ~1.0. See below for results. I called pt with this information and to advise she seek urgent medical attention if her blood sugar is still this high, however the call went straight to . I left a message with my advice. She obviously was not forthcoming at her appointment yesterday. She reported her blood sugars had normalized off medication and no POC glucose was checked. Lab Results   Component Value Date/Time    Sodium 131 (L) 04/27/2018 03:59 PM    Potassium 4.5 04/27/2018 03:59 PM    Chloride 88 (L) 04/27/2018 03:59 PM    CO2 26 04/27/2018 03:59 PM    Anion gap 5 01/17/2018 05:35 AM    Glucose 597 (>) 04/27/2018 03:59 PM    BUN 16 04/27/2018 03:59 PM    Creatinine 1.35 (H) 04/27/2018 03:59 PM    BUN/Creatinine ratio 12 04/27/2018 03:59 PM    GFR est AA 51 (L) 04/27/2018 03:59 PM    GFR est non-AA 45 (L) 04/27/2018 03:59 PM    Calcium 10.0 04/27/2018 03:59 PM    Bilirubin, total 0.3 04/27/2018 03:59 PM    AST (SGOT) 35 04/27/2018 03:59 PM    Alk.  phosphatase 257 (H) 04/27/2018 03:59 PM    Protein, total 7.1 04/27/2018 03:59 PM    Albumin 4.6 04/27/2018 03:59 PM    Globulin 3.3 01/14/2018 05:21 PM    A-G Ratio 1.8 04/27/2018 03:59 PM    ALT (SGPT) 45 (H) 04/27/2018 03:59 PM

## 2018-04-29 NOTE — TELEPHONE ENCOUNTER
Received a notice from my partner Dr. Wanetta Gowers who is on call, who noted the result of a CMP I obtained yesterday in clinic after finding that the patient had self discontinued their insulin / meds for the past 2 weeks. The findings are consistent with hyperglycemia >500's with clear evidence of a rising creatinine and decreasing GFR. I called the patient and advised them of this, and recommended they restart their lantus at their usual dose which can be adjusted as needed. She agreed. Advised that she let me know if she feels sick, and that she should rehydrate herself with water. See recent clinic note for full details which preceded. Shireen Ambrosio.  [address] [facility] Endocrinology  [facility]

## 2018-05-18 ENCOUNTER — HOSPITAL ENCOUNTER (EMERGENCY)
Age: 53
Discharge: HOME | End: 2018-05-18
Attending: EMERGENCY MEDICINE
Payer: COMMERCIAL

## 2018-05-18 VITALS
SYSTOLIC BLOOD PRESSURE: 145 MMHG | OXYGEN SATURATION: 99 % | BODY MASS INDEX: 19.26 KG/M2 | WEIGHT: 102 LBS | RESPIRATION RATE: 16 BRPM | HEIGHT: 61 IN | DIASTOLIC BLOOD PRESSURE: 86 MMHG | TEMPERATURE: 98 F | HEART RATE: 77 BPM

## 2018-05-18 DIAGNOSIS — J45.21 MILD INTERMITTENT ASTHMA WITH ACUTE EXACERBATION: Primary | ICD-10-CM

## 2018-05-18 DIAGNOSIS — R07.89 CHEST TIGHTNESS: ICD-10-CM

## 2018-05-18 PROCEDURE — 93005 ELECTROCARDIOGRAM TRACING: CPT

## 2018-05-18 PROCEDURE — 74011636637 HC RX REV CODE- 636/637: Performed by: EMERGENCY MEDICINE

## 2018-05-18 PROCEDURE — 74011000250 HC RX REV CODE- 250: Performed by: EMERGENCY MEDICINE

## 2018-05-18 PROCEDURE — 77030029684 HC NEB SM VOL KT MONA -A

## 2018-05-18 PROCEDURE — 94640 AIRWAY INHALATION TREATMENT: CPT

## 2018-05-18 PROCEDURE — 99283 EMERGENCY DEPT VISIT LOW MDM: CPT

## 2018-05-18 RX ORDER — PREDNISONE 20 MG/1
60 TABLET ORAL
Status: COMPLETED | OUTPATIENT
Start: 2018-05-18 | End: 2018-05-18

## 2018-05-18 RX ORDER — IPRATROPIUM BROMIDE AND ALBUTEROL SULFATE 2.5; .5 MG/3ML; MG/3ML
3 SOLUTION RESPIRATORY (INHALATION)
Status: COMPLETED | OUTPATIENT
Start: 2018-05-18 | End: 2018-05-18

## 2018-05-18 RX ORDER — ALBUTEROL SULFATE 90 UG/1
2 AEROSOL, METERED RESPIRATORY (INHALATION)
Qty: 1 INHALER | Refills: 0 | Status: SHIPPED | OUTPATIENT
Start: 2018-05-18 | End: 2019-08-21 | Stop reason: SDUPTHER

## 2018-05-18 RX ORDER — PREDNISONE 50 MG/1
50 TABLET ORAL DAILY
Qty: 3 TAB | Refills: 0 | Status: SHIPPED | OUTPATIENT
Start: 2018-05-18 | End: 2018-05-21

## 2018-05-18 RX ADMIN — PREDNISONE 60 MG: 20 TABLET ORAL at 17:57

## 2018-05-18 RX ADMIN — IPRATROPIUM BROMIDE AND ALBUTEROL SULFATE 3 ML: .5; 3 SOLUTION RESPIRATORY (INHALATION) at 17:57

## 2018-05-18 NOTE — DISCHARGE INSTRUCTIONS
Asthma Attack: Care Instructions  Your Care Instructions    During an asthma attack, the airways swell and narrow. This makes it hard to breathe. Severe asthma attacks can be life-threatening, but you can help prevent them by keeping your asthma under control and treating symptoms before they get bad. Symptoms include being short of breath, having chest tightness, coughing, and wheezing. Noting and treating these symptoms can also help you avoid future trips to the emergency room. The doctor has checked you carefully, but problems can develop later. If you notice any problems or new symptoms, get medical treatment right away. Follow-up care is a key part of your treatment and safety. Be sure to make and go to all appointments, and call your doctor if you are having problems. It's also a good idea to know your test results and keep a list of the medicines you take. How can you care for yourself at home? · Follow your asthma action plan to prevent and treat attacks. If you don't have an asthma action plan, work with your doctor to create one. · Take your asthma medicines exactly as prescribed. Talk to your doctor right away if you have any questions about how to take them. ¨ Use your quick-relief medicine when you have symptoms of an attack. Quick-relief medicine is usually an albuterol inhaler. Some people need to use quick-relief medicine before they exercise. ¨ Take your controller medicine every day, not just when you have symptoms. Controller medicine is usually an inhaled corticosteroid. The goal is to prevent problems before they occur. Don't use your controller medicine to treat an attack that has already started. It doesn't work fast enough to help. ¨ If your doctor prescribed corticosteroid pills to use during an attack, take them exactly as prescribed. It may take hours for the pills to work, but they may make the episode shorter and help you breathe better.   ¨ Keep your quick-relief medicine with you at all times. · Talk to your doctor before using other medicines. Some medicines, such as aspirin, can cause asthma attacks in some people. · If you have a peak flow meter, use it to check how well you are breathing. This can help you predict when an asthma attack is going to occur. Then you can take medicine to prevent the asthma attack or make it less severe. · Do not smoke or allow others to smoke around you. Avoid smoky places. Smoking makes asthma worse. If you need help quitting, talk to your doctor about stop-smoking programs and medicines. These can increase your chances of quitting for good. · Learn what triggers an asthma attack for you, and avoid the triggers when you can. Common triggers include colds, smoke, air pollution, dust, pollen, mold, pets, cockroaches, stress, and cold air. · Avoid colds and the flu. Get a pneumococcal vaccine shot. If you have had one before, ask your doctor if you need a second dose. Get a flu vaccine every fall. If you must be around people with colds or the flu, wash your hands often. When should you call for help? Call 911 anytime you think you may need emergency care. For example, call if:  ? · You have severe trouble breathing. ?Call your doctor now or seek immediate medical care if:  ? · Your symptoms do not get better after you have followed your asthma action plan. ? · You have new or worse trouble breathing. ? · Your coughing and wheezing get worse. ? · You cough up dark brown or bloody mucus (sputum). ? · You have a new or higher fever. ? Watch closely for changes in your health, and be sure to contact your doctor if:  ? · You need to use quick-relief medicine on more than 2 days a week (unless it is just for exercise). ? · You cough more deeply or more often, especially if you notice more mucus or a change in the color of your mucus. ? · You are not getting better as expected. Where can you learn more?   Go to http://jurgen-molly.info/. Enter X445 in the search box to learn more about \"Asthma Attack: Care Instructions. \"  Current as of: May 12, 2017  Content Version: 11.4  © 8401-5024 [employer]. Care instructions adapted under license by [employer] (which disclaims liability or warranty for this information). If you have questions about a medical condition or this instruction, always ask your healthcare professional. [place] [address] any warranty or liability for your use of this information. Musculoskeletal Chest Pain: Care Instructions  Your Care Instructions    Chest pain is not always a sign that something is wrong with your heart or that you have another serious problem. The doctor thinks your chest pain is caused by strained muscles or ligaments, inflamed chest cartilage, or another problem in your chest, rather than by your heart. You may need more tests to find the cause of your chest pain. Follow-up care is a key part of your treatment and safety. Be sure to make and go to all appointments, and call your doctor if you are having problems. It's also a good idea to know your test results and keep a list of the medicines you take. How can you care for yourself at home? · Take pain medicines exactly as directed. ¨ If the doctor gave you a prescription medicine for pain, take it as prescribed. ¨ If you are not taking a prescription pain medicine, ask your doctor if you can take an over-the-counter medicine. · Rest and protect the sore area. · Stop, change, or take a break from any activity that may be causing your pain or soreness. · Put ice or a cold pack on the sore area for 10 to 20 minutes at a time. Try to do this every 1 to 2 hours for the next 3 days (when you are awake) or until the swelling goes down. Put a thin cloth between the ice and your skin.   · After 2 or 3 days, apply a heating pad set on low or a warm cloth to the area that hurts. Some doctors suggest that you go back and forth between hot and cold. · Do not wrap or tape your ribs for support. This may cause you to take smaller breaths, which could increase your risk of lung problems. · Mentholated creams such as Bengay or Icy Hot may soothe sore muscles. Follow the instructions on the package. · Follow your doctor's instructions for exercising. · Gentle stretching and massage may help you get better faster. Stretch slowly to the point just before pain begins, and hold the stretch for at least 15 to 30 seconds. Do this 3 or 4 times a day. Stretch just after you have applied heat. · As your pain gets better, slowly return to your normal activities. Any increased pain may be a sign that you need to rest a while longer. When should you call for help? Call 911 anytime you think you may need emergency care. For example, call if:  ? · You have chest pain or pressure. This may occur with:  ¨ Sweating. ¨ Shortness of breath. ¨ Nausea or vomiting. ¨ Pain that spreads from the chest to the neck, jaw, or one or both shoulders or arms. ¨ Dizziness or lightheadedness. ¨ A fast or uneven pulse. After calling 911, chew 1 adult-strength aspirin. Wait for an ambulance. Do not try to drive yourself. ? · You have sudden chest pain and shortness of breath, or you cough up blood. ?Call your doctor now or seek immediate medical care if:  ? · You have any trouble breathing. ? · Your chest pain gets worse. ? · Your chest pain occurs consistently with exercise and is relieved by rest.   ? Watch closely for changes in your health, and be sure to contact your doctor if:  ? · Your chest pain does not get better after 1 week. Where can you learn more? Go to http://jurgen-molly.info/. Enter V293 in the search box to learn more about \"Musculoskeletal Chest Pain: Care Instructions. \"  Current as of: March 20, 2017  Content Version: 11.4  © 7170-5279 [employer]. Care instructions adapted under license by [employer] (which disclaims liability or warranty for this information). If you have questions about a medical condition or this instruction, always ask your healthcare professional. [address] any warranty or liability for your use of this information.

## 2018-05-18 NOTE — ED NOTES
Pt c/o intermittent mid chest tightness since this morning,denies n/v/d,fever,chills. pt alert,oriented x 4,no s/s of respiratory/cardiac distress noticed on arrival.   Emergency Department Nursing Plan of Care       The Nursing Plan of Care is developed from the Nursing assessment and Emergency Department Attending provider initial evaluation. The plan of care may be reviewed in the ED Provider note.     The Plan of Care was developed with the following considerations:   Patient / Family readiness to learn indicated by:verbalized understanding  Persons(s) to be included in education: patient  Barriers to Learning/Limitations:No    Signed     Tor Bell RN    5/18/2018   5:46 PM

## 2018-05-18 NOTE — ED PROVIDER NOTES
EMERGENCY DEPARTMENT HISTORY AND PHYSICAL EXAM      Date: 5/18/2018  Patient Name: Rosa Mcgarry    History of Presenting Illness     Chief Complaint   Patient presents with    Chest Pain     intermittent starting this morning with mild SOB       History Provided By: Patient    HPI: Rosa Mcgarry, 45 y.o. female with PMHx significant for asthma, DM, and pancreatic atrophy, presents ambulatory to the ED with cc of intermittent chest tightness which started this morning. Pt states the pain is mild. She has tried her nebulizer this morning with minimal relief. Pt states it feels similar to her previous asthma and bronchitis. She explains the sensation is worse with breathing in. Pt states the feeling is not worse with lifting things at work. She reports no recent cough or fevers. Pt is otherwise without complaint. Chief Complaint: chest tightness  Duration: 10 Hours  Timing:  Progressive  Location: chest  Quality: Tightness  Severity: Mild  Modifying Factors: worse with breathing in  Associated Symptoms: denies any other associated signs or symptoms    There are no other complaints, changes, or physical findings at this time. PCP: Eleazar Dye MD    Current Outpatient Prescriptions   Medication Sig Dispense Refill    predniSONE (DELTASONE) 50 mg tablet Take 1 Tab by mouth daily for 3 days. 3 Tab 0    albuterol (PROVENTIL HFA, VENTOLIN HFA, PROAIR HFA) 90 mcg/actuation inhaler Take 2 Puffs by inhalation every four (4) hours as needed for Wheezing. 1 Inhaler 0    albuterol (PROVENTIL VENTOLIN) 2.5 mg /3 mL (0.083 %) nebulizer solution   0    SITagliptin (JANUVIA) 100 mg tablet Take 1 Tab by mouth daily.  90 Tab 2    Insulin Needles, Disposable, 31 gauge x 5/16\" ndle Use with Basaglar insulin pen 3 Package 6    insulin glargine (BASAGLAR KWIKPEN U-100 INSULIN) 100 unit/mL (3 mL) inpn Take 40 units once daily subcutaneously 10 Pen 3    glucose blood VI test strips (PHARMACIST CHOICE) strip For use with Prodigy meter; Check glucose 4 times daily, Diagnosis E10.9 400 Strip 3    Blood-Glucose Meter monitoring kit One Touch Ultras glucometer, Diagnosis E10.9 1 Kit 0    Lancets misc Use as directed 1 Each 3    Insulin Syringes, Disposable, 1 mL syrg Use as directed 100 Syringe 2       Past History     Past Medical History:  Past Medical History:   Diagnosis Date    Asthma     Diabetes ([facility] [address]     Elevated transaminase level 6/29/2016    Insulin dependent diabetes mellitus ([facility] [address] 6/20/2016    Pancreatic atrophy 6/20/2016       Past Surgical History:  Past Surgical History:   Procedure Laterality Date    HX HEENT         Family History:  Family History   Problem Relation Age of Onset    Cancer Father     Diabetes Mother     Diabetes Maternal Grandmother        Social History:  Social History   Substance Use Topics    Smoking status: Never Smoker    Smokeless tobacco: Never Used    Alcohol use No       Allergies: Allergies   Allergen Reactions    Contrast Agent [Iodine] Itching    Hydrocodone Rash    Percocet [Oxycodone-Acetaminophen] Rash    Codeine Nausea and Vomiting         Review of Systems   Review of Systems   Constitutional: Negative for chills and fever. HENT: Negative for congestion, rhinorrhea, sneezing and sore throat. Eyes: Negative for redness and visual disturbance. Respiratory: Positive for chest tightness. Negative for cough and shortness of breath. Cardiovascular: Negative for leg swelling. Gastrointestinal: Negative for abdominal pain, nausea and vomiting. Genitourinary: Negative for difficulty urinating and frequency. Musculoskeletal: Negative for back pain, myalgias and neck stiffness. Skin: Negative for rash. Neurological: Negative for dizziness, syncope, weakness and headaches. Hematological: Negative for adenopathy. All other systems reviewed and are negative. Physical Exam   Physical Exam   Constitutional: She is oriented to person, place, and time. She appears well-developed and well-nourished. HENT:   Head: Normocephalic and atraumatic. Mouth/Throat: Oropharynx is clear and moist.   Eyes: Conjunctivae and EOM are normal.   Neck: Normal range of motion and full passive range of motion without pain. Neck supple. Cardiovascular: Normal rate, regular rhythm, S1 normal, S2 normal, normal heart sounds, intact distal pulses and normal pulses. No murmur heard. Pulmonary/Chest: Effort normal. No respiratory distress. She has wheezes. Speaking in full sentences. Poor air movement. Faint end expiratory wheezing. Abdominal: Soft. Normal appearance and bowel sounds are normal. She exhibits no distension. There is no tenderness. There is no rebound. Musculoskeletal: Normal range of motion. Neurological: She is alert and oriented to person, place, and time. She has normal strength. Skin: Skin is warm, dry and intact. No rash noted. Psychiatric: She has a normal mood and affect. Her speech is normal and behavior is normal. Judgment and thought content normal.   Nursing note and vitals reviewed. Diagnostic Study Results     Labs -     Recent Results (from the past 12 hour(s))   EKG, 12 LEAD, INITIAL    Collection Time: 05/18/18  5:39 PM   Result Value Ref Range    Ventricular Rate 83 BPM    Atrial Rate 83 BPM    P-R Interval 140 ms    QRS Duration 66 ms    Q-T Interval 362 ms    QTC Calculation (Bezet) 425 ms    Calculated P Axis 72 degrees    Calculated R Axis 52 degrees    Calculated T Axis 39 degrees    Diagnosis       Normal sinus rhythm  Normal ECG  When compared with ECG of 14-JAN-2018 16:15,  Nonspecific T wave abnormality, worse in Inferior leads  Nonspecific T wave abnormality, worse in Anterior leads       Medical Decision Making   I am the first provider for this patient. I reviewed the vital signs, available nursing notes, past medical history, past surgical history, family history and social history.     Vital Signs-Reviewed the patient's vital signs. Patient Vitals for the past 12 hrs:   Temp Pulse Resp BP SpO2   05/18/18 1734 98 °F (36.7 °C) 77 16 145/86 99 %       Pulse Oximetry Analysis - 99% on RA    Cardiac Monitor:   Rate: 77 bpm  Rhythm: Normal Sinus Rhythm     EKG interpretation: (Preliminary) 1739  Rhythm: normal sinus rhythm; and regular . Rate (approx.): 83; Axis: normal; AK interval: normal; QRS interval: normal ; ST/T wave: normal; Other findings: normal.  Written by JALEN Duarte, as dictated by Yusef Mancilla MD.    Records Reviewed: Nursing Notes and Old Medical Records    Provider Notes (Medical Decision Making):   DDx: asthma exacerbation, URI, bronchitis. Low concern for ACS. ED Course:   Initial assessment performed. The patients presenting problems have been discussed, and they are in agreement with the care plan formulated and outlined with them. I have encouraged them to ask questions as they arise throughout their visit. 6:38 PM  The patient has been examined after this most recent nebulizer breathing treatment. They are showing some clinically improved aeration upon auscultation and some decreased work of breathing. The patient also states that they are improving. Discussed the affects of steroids on her diabetes. She agrees with steroids for 3 days and will schedule a follow up appointment with her PCP. Disposition:  DISCHARGE NOTE  6:40 PM  The patient has been re-evaluated and is ready for discharge. Reviewed available results with patient. Counseled patient on diagnosis and care plan. Patient has expressed understanding, and all questions have been answered. Patient agrees with plan and agrees to follow up as recommended, or return to the ED if their symptoms worsen. Discharge instructions have been provided and explained to the patient, along with reasons to return to the ED. PLAN:  1.    Current Discharge Medication List      START taking these medications    Details predniSONE (DELTASONE) 50 mg tablet Take 1 Tab by mouth daily for 3 days. Qty: 3 Tab, Refills: 0         CONTINUE these medications which have CHANGED    Details   albuterol (PROVENTIL HFA, VENTOLIN HFA, PROAIR HFA) 90 mcg/actuation inhaler Take 2 Puffs by inhalation every four (4) hours as needed for Wheezing. Qty: 1 Inhaler, Refills: 0         CONTINUE these medications which have NOT CHANGED    Details   albuterol (PROVENTIL VENTOLIN) 2.5 mg /3 mL (0.083 %) nebulizer solution Refills: 0           2. Follow-up Information     Follow up With Details Comments [address] [place] [address] MD Shalini Schedule an appointment as soon as possible for a visit  [facility]   [address] [address] [phone]      [facility] EMERGENCY DEPT  As needed, If symptoms worsen [address] [facility]  [phone]        Return to ED if worse     Diagnosis     Clinical Impression:   1. Mild intermittent asthma with acute exacerbation    2. Chest tightness        Attestations:    Attestation Note:  This note is prepared by R. Merlinda Marlin, acting as Scribe for MD Edwin Campos MD: The scribe's documentation has been prepared under my direction and personally reviewed by me in its entirety. I confirm that the note above accurately reflects all work, treatment, procedures, and medical decision making performed by me.

## 2018-05-18 NOTE — LETTER
[facility] EMERGENCY DEPT 
[address] [facility] [address] [id number] [phone] Work/School Note Date: 5/18/2018 To Whom It May concern: 
 
Madan Zamora was seen and treated today in the emergency room by the following provider(s): 
Attending Provider: Renato Luke MD. Madan Zamora may return to work on 05/22/2018.  
 
Sincerely, 
 
 
 
 
Renato Luke MD

## 2018-05-19 LAB
ATRIAL RATE: 83 BPM
CALCULATED P AXIS, ECG09: 72 DEGREES
CALCULATED R AXIS, ECG10: 52 DEGREES
CALCULATED T AXIS, ECG11: 39 DEGREES
DIAGNOSIS, 93000: NORMAL
P-R INTERVAL, ECG05: 140 MS
Q-T INTERVAL, ECG07: 362 MS
QRS DURATION, ECG06: 66 MS
QTC CALCULATION (BEZET), ECG08: 425 MS
VENTRICULAR RATE, ECG03: 83 BPM

## 2018-05-21 ENCOUNTER — HOSPITAL ENCOUNTER (EMERGENCY)
Age: 53
Discharge: HOME | End: 2018-05-21
Attending: EMERGENCY MEDICINE
Payer: COMMERCIAL

## 2018-05-21 ENCOUNTER — TELEPHONE (OUTPATIENT)
Dept: FAMILY MEDICINE CLINIC | Age: 53
End: 2018-05-21

## 2018-05-21 VITALS
SYSTOLIC BLOOD PRESSURE: 135 MMHG | DIASTOLIC BLOOD PRESSURE: 64 MMHG | HEIGHT: 61 IN | BODY MASS INDEX: 19.26 KG/M2 | TEMPERATURE: 98.2 F | HEART RATE: 94 BPM | OXYGEN SATURATION: 97 % | WEIGHT: 102 LBS | RESPIRATION RATE: 18 BRPM

## 2018-05-21 DIAGNOSIS — R07.89 CHEST WALL PAIN: Primary | ICD-10-CM

## 2018-05-21 LAB
ATRIAL RATE: 80 BPM
CALCULATED P AXIS, ECG09: 71 DEGREES
CALCULATED R AXIS, ECG10: 73 DEGREES
CALCULATED T AXIS, ECG11: 60 DEGREES
DIAGNOSIS, 93000: NORMAL
P-R INTERVAL, ECG05: 140 MS
Q-T INTERVAL, ECG07: 356 MS
QRS DURATION, ECG06: 76 MS
QTC CALCULATION (BEZET), ECG08: 410 MS
VENTRICULAR RATE, ECG03: 80 BPM

## 2018-05-21 PROCEDURE — 93005 ELECTROCARDIOGRAM TRACING: CPT

## 2018-05-21 PROCEDURE — 99283 EMERGENCY DEPT VISIT LOW MDM: CPT

## 2018-05-21 PROCEDURE — 74011250637 HC RX REV CODE- 250/637: Performed by: EMERGENCY MEDICINE

## 2018-05-21 RX ORDER — NAPROXEN 250 MG/1
500 TABLET ORAL
Status: COMPLETED | OUTPATIENT
Start: 2018-05-21 | End: 2018-05-21

## 2018-05-21 RX ORDER — NAPROXEN 500 MG/1
500 TABLET ORAL
Qty: 20 TAB | Refills: 0 | Status: SHIPPED | OUTPATIENT
Start: 2018-05-21 | End: 2018-06-22

## 2018-05-21 RX ADMIN — NAPROXEN 500 MG: 250 TABLET ORAL at 14:46

## 2018-05-21 NOTE — TELEPHONE ENCOUNTER
Pls give pt a call about f/u to ER - no apptmnts available at this office today       Describing Chest wall pain - had EKG already in ER at [address] [place] [address]   She has put a call in to Dr. Dianna Hernandez' -  diabetic MD     Also, she is doing breathing treatments      [phone]

## 2018-05-21 NOTE — DISCHARGE INSTRUCTIONS

## 2018-05-21 NOTE — LETTER
[facility] EMERGENCY DEPT 
[address] [place] [address] [address] 
[phone] Work/School Note Date: 5/21/2018 To Whom It May concern: 
 
Deysi Talbot was seen and treated today in the emergency room by the following provider(s): 
Attending Provider: Alex Tejada MD. Deysi Talbot may return to work on 05/23/2018.  
 
Sincerely, 
 
 
 
 
Alex Tejada MD

## 2018-05-21 NOTE — ED NOTES
Patient (s)   given copy of dc instructions and 1 script(s). Patient(s)   verbalized understanding of instructions and script (s). Patient given a current medication reconciliation form and verbalized understanding of their medications. Patient (s)  verbalized understanding of the importance of discussing medications with  his or her physician or clinic when they follow up. Patient alert and oriented and in no acute distress. Pt verbalizes pain scale of 7 out of 10. Patient discharged home ambulatory with self.

## 2018-05-21 NOTE — ED PROVIDER NOTES
EMERGENCY DEPARTMENT HISTORY AND PHYSICAL EXAM      Date: 5/21/2018  Patient Name: Fermin Sweeney    History of Presenting Illness     Chief Complaint   Patient presents with    Chest Pain     with shortness of breath. Reports being seen for the same on Friday and had an EKG and breathing treatment. History Provided By: Patient    HPI: Fermin Sweeney, 45 y.o. female with PMHx significant for asthma and DM, presents ambulatory to the ED with cc of persistent, constant midline CP, radiating to her left chest x ~3-4 days. Pt reports her pain is exacerbated by deep inspiration. She states she was seen at [facility] ED (5/18/18) for similar symptoms, and has not experienced significant improvement since discharge. Pt states she completed her prescribed steroids today. She states she spoke with on-call physician at her PCP's office, and has follow up scheduled following ED visit today. Pt specifically denies cough, SOB, nausea, vomiting, fever, or chills. There are no other complaints, changes, or physical findings at this time. PCP: Hannah Aguirre MD    Current Facility-Administered Medications   Medication Dose Route Frequency Provider Last Rate Last Dose    naproxen (NAPROSYN) tablet 500 mg  500 mg Oral NOW Julian Gregg MD         Current Outpatient Prescriptions   Medication Sig Dispense Refill    naproxen (NAPROSYN) 500 mg tablet Take 1 Tab by mouth every twelve (12) hours as needed for Pain. 20 Tab 0    albuterol (PROVENTIL HFA, VENTOLIN HFA, PROAIR HFA) 90 mcg/actuation inhaler Take 2 Puffs by inhalation every four (4) hours as needed for Wheezing. 1 Inhaler 0    Insulin Needles, Disposable, 31 gauge x 5/16\" ndle Use with Basaglar insulin pen 3 Package 6    insulin glargine (BASAGLAR KWIKPEN U-100 INSULIN) 100 unit/mL (3 mL) inpn Take 40 units once daily subcutaneously 10 Pen 3    glucose blood VI test strips (PHARMACIST CHOICE) strip For use with Prodigy meter;  Check glucose 4 times daily, Diagnosis E10.9 400 Strip 3    Blood-Glucose Meter monitoring kit One Touch Ultras glucometer, Diagnosis E10.9 1 Kit 0    albuterol (PROVENTIL VENTOLIN) 2.5 mg /3 mL (0.083 %) nebulizer solution   0    Lancets misc Use as directed 1 Each 3    Insulin Syringes, Disposable, 1 mL syrg Use as directed 100 Syringe 2    SITagliptin (JANUVIA) 100 mg tablet Take 1 Tab by mouth daily. 80 Tab 2       Past History     Past Medical History:  Past Medical History:   Diagnosis Date    Asthma     Diabetes ([facility] [address]     Elevated transaminase level 6/29/2016    Insulin dependent diabetes mellitus ([facility] [address] 6/20/2016    Pancreatic atrophy 6/20/2016       Past Surgical History:  Past Surgical History:   Procedure Laterality Date    HX HEENT         Family History:  Family History   Problem Relation Age of Onset    Cancer Father     Diabetes Mother     Diabetes Maternal Grandmother        Social History:  Social History   Substance Use Topics    Smoking status: Never Smoker    Smokeless tobacco: Never Used    Alcohol use No       Allergies: Allergies   Allergen Reactions    Contrast Agent [Iodine] Itching    Hydrocodone Rash    Percocet [Oxycodone-Acetaminophen] Rash    Codeine Nausea and Vomiting         Review of Systems   Review of Systems   Constitutional: Negative for chills and fever. HENT: Negative for congestion, rhinorrhea, sneezing and sore throat. Eyes: Negative for redness and visual disturbance. Respiratory: Negative for cough and shortness of breath. Cardiovascular: Positive for chest pain. Negative for leg swelling. Gastrointestinal: Negative for abdominal pain, nausea and vomiting. Genitourinary: Negative for difficulty urinating and frequency. Musculoskeletal: Negative for back pain, myalgias and neck stiffness. Skin: Negative for rash. Neurological: Negative for dizziness, syncope, weakness and headaches. Hematological: Negative for adenopathy.    All other systems reviewed and are negative. Physical Exam   Physical Exam   Constitutional: She is oriented to person, place, and time. She appears well-developed and well-nourished. HENT:   Head: Normocephalic and atraumatic. Mouth/Throat: Oropharynx is clear and moist.   Eyes: Conjunctivae and EOM are normal.   Neck: Normal range of motion and full passive range of motion without pain. Neck supple. Cardiovascular: Normal rate, regular rhythm, S1 normal, S2 normal, normal heart sounds, intact distal pulses and normal pulses. No murmur heard. Pulmonary/Chest: Effort normal and breath sounds normal. No respiratory distress. She has no wheezes. Abdominal: Soft. Normal appearance and bowel sounds are normal. She exhibits no distension. There is no tenderness. There is no rebound. Musculoskeletal: Normal range of motion. Reproducible chest wall pain   Neurological: She is alert and oriented to person, place, and time. She has normal strength. Skin: Skin is warm, dry and intact. No rash noted. Psychiatric: She has a normal mood and affect. Her speech is normal and behavior is normal. Judgment and thought content normal.   Nursing note and vitals reviewed. Diagnostic Study Results     Labs -     Recent Results (from the past 12 hour(s))   EKG, 12 LEAD, INITIAL    Collection Time: 05/21/18  1:51 PM   Result Value Ref Range    Ventricular Rate 80 BPM    Atrial Rate 80 BPM    P-R Interval 140 ms    QRS Duration 76 ms    Q-T Interval 356 ms    QTC Calculation (Bezet) 410 ms    Calculated P Axis 71 degrees    Calculated R Axis 73 degrees    Calculated T Axis 60 degrees    Diagnosis       Normal sinus rhythm  Possible Left atrial enlargement  Nonspecific T wave abnormality  Abnormal ECG  When compared with ECG of 18-MAY-2018 17:39,  No significant change was found         Medical Decision Making   I am the first provider for this patient.     I reviewed the vital signs, available nursing notes, past medical history, past surgical history, family history and social history. Vital Signs-Reviewed the patient's vital signs. Patient Vitals for the past 12 hrs:   Temp Pulse Resp BP SpO2   05/21/18 1333 98.2 °F (36.8 °C) 94 18 135/64 97 %     Pulse Oximetry Analysis - 97% on RA    EKG interpretation: 13:51  Rhythm: normal sinus rhythm; and regular . Rate (approx.): 80; Axis: normal; PA interval: normal; QRS interval: normal ; ST/T wave: normal; Other findings: normal.    Records Reviewed: Nursing Notes, Old Medical Records, Previous electrocardiograms, Previous Radiology Studies and Previous Laboratory Studies    Provider Notes (Medical Decision Making):     DDx: costochondritis, MSK pain    ED Course:   Initial assessment performed. The patients presenting problems have been discussed, and they are in agreement with the care plan formulated and outlined with them. I have encouraged them to ask questions as they arise throughout their visit. Disposition:  DISCHARGE NOTE:  2:39 PM  The patient is ready for discharge. The patients signs, symptoms, diagnosis, and instructions for discharge have been discussed and the pt has conveyed their understanding. The patient is to follow up as recommended or return to the ER should their symptoms worsen. Plan has been discussed and patient has conveyed their agreement. PLAN:  1. Current Discharge Medication List      START taking these medications    Details   naproxen (NAPROSYN) 500 mg tablet Take 1 Tab by mouth every twelve (12) hours as needed for Pain. Qty: 20 Tab, Refills: 0           2.    Follow-up Information     Follow up With Details Comments [address] MD Shalini Schedule an appointment as soon as possible for a visit  [facility]   [address] [address]  [phone]      [facility] EMERGENCY DEPT  As needed, If symptoms worsen [address] [facility]  [phone]        Return to ED if worse     Diagnosis Clinical Impression:   1. Chest wall pain        Attestations: This note is prepared by Chery Zhu, acting as Scribe for Elaina Bryson MD.    Elaina Bryson MD: The scribe's documentation has been prepared under my direction and personally reviewed by me in its entirety. I confirm that the note above accurately reflects all work, treatment, procedures, and medical decision making performed by me.

## 2018-05-21 NOTE — ED NOTES
Pt arrives in ED with complaints of chest tightness and slight pain x 4 days. Pt reports being seen 4 days ago for same and was given breathing treatment and Prednisone which she has now finished with no improvement.

## 2018-05-22 NOTE — TELEPHONE ENCOUNTER
Pt is returning a call to this #    Currently she is in the [employer] bldg so cannot be on her phone   Call back and leave a message or she will try again later

## 2018-05-23 ENCOUNTER — OFFICE VISIT (OUTPATIENT)
Dept: FAMILY MEDICINE CLINIC | Age: 53
End: 2018-05-23

## 2018-05-23 VITALS
RESPIRATION RATE: 20 BRPM | HEIGHT: 61 IN | OXYGEN SATURATION: 98 % | DIASTOLIC BLOOD PRESSURE: 65 MMHG | BODY MASS INDEX: 18.69 KG/M2 | WEIGHT: 99 LBS | HEART RATE: 78 BPM | SYSTOLIC BLOOD PRESSURE: 121 MMHG | TEMPERATURE: 97.8 F

## 2018-05-23 DIAGNOSIS — R07.9 CHEST PAIN AT REST: ICD-10-CM

## 2018-05-23 DIAGNOSIS — R07.9 CHEST PAIN ON EXERTION: ICD-10-CM

## 2018-05-23 DIAGNOSIS — E11.00 UNCONTROLLED TYPE 2 DM WITH HYPEROSMOLAR NONKETOTIC HYPERGLYCEMIA (HCC): Primary | ICD-10-CM

## 2018-05-23 PROBLEM — E11.21 TYPE 2 DIABETES WITH NEPHROPATHY (HCC): Status: ACTIVE | Noted: 2018-05-23

## 2018-05-23 NOTE — PROGRESS NOTES
Chief Complaint   Patient presents with   [facility] Follow Up     HPI:  Brina Presley is a 45 y.o. AA female presents for hospital follow up. patient was seen in ER Friday then Monday for chest pain. She was treated with breathing treatment. EKG was normal.  She was diagnosed with Costochondritis and started on Naproxen. Today, she is still complaining of chest pain with exertion with sob. Review of Systems  As per hpi    Past Medical History:   Diagnosis Date    Asthma     Diabetes ([facility]     Elevated transaminase level 6/29/2016    Insulin dependent diabetes mellitus ([facility] [address] 6/20/2016    Pancreatic atrophy 6/20/2016     Past Surgical History:   Procedure Laterality Date    HX HEENT       Social History     Social History    Marital status: SINGLE     Spouse name: N/A    Number of children: N/A    Years of education: N/A     Social History Main Topics    Smoking status: Never Smoker    Smokeless tobacco: Never Used    Alcohol use No    Drug use: No    Sexual activity: Not Currently     Other Topics Concern    None     Social History Narrative     Family History   Problem Relation Age of Onset    Cancer Father     Diabetes Mother     Diabetes Maternal Grandmother      Current Outpatient Prescriptions   Medication Sig Dispense Refill    naproxen (NAPROSYN) 500 mg tablet Take 1 Tab by mouth every twelve (12) hours as needed for Pain. 20 Tab 0    albuterol (PROVENTIL HFA, VENTOLIN HFA, PROAIR HFA) 90 mcg/actuation inhaler Take 2 Puffs by inhalation every four (4) hours as needed for Wheezing. 1 Inhaler 0    Insulin Needles, Disposable, 31 gauge x 5/16\" ndle Use with Basaglar insulin pen 3 Package 6    insulin glargine (BASAGLAR KWIKPEN U-100 INSULIN) 100 unit/mL (3 mL) inpn Take 40 units once daily subcutaneously 10 Pen 3    glucose blood VI test strips (PHARMACIST CHOICE) strip For use with Prodigy meter;  Check glucose 4 times daily, Diagnosis E10.9 400 Strip 3    Blood-Glucose Meter monitoring kit One Touch Ultras glucometer, Diagnosis E10.9 1 Kit 0    albuterol (PROVENTIL VENTOLIN) 2.5 mg /3 mL (0.083 %) nebulizer solution   0    Lancets misc Use as directed 1 Each 3    Insulin Syringes, Disposable, 1 mL syrg Use as directed 100 Syringe 2    SITagliptin (JANUVIA) 100 mg tablet Take 1 Tab by mouth daily. 90 Tab 2     Allergies   Allergen Reactions    Contrast Agent [Iodine] Itching    Hydrocodone Rash    Percocet [Oxycodone-Acetaminophen] Rash    Codeine Nausea and Vomiting     Objective:  Visit Vitals    /65    Pulse 78    Temp 97.8 °F (36.6 °C) (Oral)    Resp 20    Ht 5' 1\" (1.549 m)    Wt 99 lb (44.9 kg)    SpO2 98%    BMI 18.71 kg/m2     Physical Exam:   General appearance - alert, well appearing in no distress  EYE-PERRL, EOMI  Neck - supple, no significant adenopathy   Chest - clear to auscultation, no wheezes, rales or rhonchi  Heart - normal rate, regular rhythm, normal blood pressure  Abdomen - soft, nontender, nondistended, no organomegaly  Ext-peripheral pulses normal, no pedal edema  Neuro -alert, oriented, normal speech, no focal findings or movement disorder noted    Assessment/Plan:  Diagnoses and all orders for this visit:    Uncontrolled type 2 DM with hyperosmolar nonketotic hyperglycemia (HCC)    Chest pain at rest  -     [address]  -     ECHO TTE STRESS EXERCISE TREADMILL COMP; Future  -     ECHO TTE STRESS EXRCSE COMP W OR WO CONTR; Future    Chest pain on exertion  -     [facility]  -     ECHO TTE STRESS EXERCISE TREADMILL COMP; Future  -     ECHO TTE STRESS EXRCSE COMP W OR WO CONTR; Future      Patient Instructions          Chest Pain: Care Instructions  Your Care Instructions    There are many things that can cause chest pain. Some are not serious and will get better on their own in a few days. But some kinds of chest pain need more testing and treatment. Your doctor may have recommended a follow-up visit in the next 8 to 12 hours. If you are not getting better, you may need more tests or treatment. Even though your doctor has released you, you still need to watch for any problems. The doctor carefully checked you, but sometimes problems can develop later. If you have new symptoms or if your symptoms do not get better, get medical care right away. If you have worse or different chest pain or pressure that lasts more than 5 minutes or you passed out (lost consciousness), call 911 or seek other emergency help right away. A medical visit is only one step in your treatment. Even if you feel better, you still need to do what your doctor recommends, such as going to all suggested follow-up appointments and taking medicines exactly as directed. This will help you recover and help prevent future problems. How can you care for yourself at home? · Rest until you feel better. · Take your medicine exactly as prescribed. Call your doctor if you think you are having a problem with your medicine. · Do not drive after taking a prescription pain medicine. When should you call for help? Call 911 if:  ? · You passed out (lost consciousness). ? · You have severe difficulty breathing. ? · You have symptoms of a heart attack. These may include:  ¨ Chest pain or pressure, or a strange feeling in your chest.  ¨ Sweating. ¨ Shortness of breath. ¨ Nausea or vomiting. ¨ Pain, pressure, or a strange feeling in your back, neck, jaw, or upper belly or in one or both shoulders or arms. ¨ Lightheadedness or sudden weakness. ¨ A fast or irregular heartbeat. After you call 911, the  may tell you to chew 1 adult-strength or 2 to 4 low-dose aspirin. Wait for an ambulance. Do not try to drive yourself. ?Call your doctor today if:  ? · You have any trouble breathing. ? · Your chest pain gets worse. ? · You are dizzy or lightheaded, or you feel like you may faint. ? · You are not getting better as expected.    ? · You are having new or different chest pain. Where can you learn more? Go to http://jurgen-molly.info/. Enter A120 in the search box to learn more about \"Chest Pain: Care Instructions. \"  Current as of: March 20, 2017  Content Version: 11.4  © 1415-5309 [employer]. Care instructions adapted under license by [employer] (which disclaims liability or warranty for this information). If you have questions about a medical condition or this instruction, always ask your healthcare professional. [place] [address] any warranty or liability for your use of this information. Follow-up Disposition:  Return if symptoms worsen or fail to improve, for with pcp.

## 2018-05-23 NOTE — PATIENT INSTRUCTIONS
Chest Pain: Care Instructions  Your Care Instructions    There are many things that can cause chest pain. Some are not serious and will get better on their own in a few days. But some kinds of chest pain need more testing and treatment. Your doctor may have recommended a follow-up visit in the next 8 to 12 hours. If you are not getting better, you may need more tests or treatment. Even though your doctor has released you, you still need to watch for any problems. The doctor carefully checked you, but sometimes problems can develop later. If you have new symptoms or if your symptoms do not get better, get medical care right away. If you have worse or different chest pain or pressure that lasts more than 5 minutes or you passed out (lost consciousness), call 911 or seek other emergency help right away. A medical visit is only one step in your treatment. Even if you feel better, you still need to do what your doctor recommends, such as going to all suggested follow-up appointments and taking medicines exactly as directed. This will help you recover and help prevent future problems. How can you care for yourself at home? · Rest until you feel better. · Take your medicine exactly as prescribed. Call your doctor if you think you are having a problem with your medicine. · Do not drive after taking a prescription pain medicine. When should you call for help? Call 911 if:  ? · You passed out (lost consciousness). ? · You have severe difficulty breathing. ? · You have symptoms of a heart attack. These may include:  ¨ Chest pain or pressure, or a strange feeling in your chest.  ¨ Sweating. ¨ Shortness of breath. ¨ Nausea or vomiting. ¨ Pain, pressure, or a strange feeling in your back, neck, jaw, or upper belly or in one or both shoulders or arms. ¨ Lightheadedness or sudden weakness. ¨ A fast or irregular heartbeat.   After you call 911, the  may tell you to chew 1 adult-strength or 2 to 4 low-dose aspirin. Wait for an ambulance. Do not try to drive yourself. ?Call your doctor today if:  ? · You have any trouble breathing. ? · Your chest pain gets worse. ? · You are dizzy or lightheaded, or you feel like you may faint. ? · You are not getting better as expected. ? · You are having new or different chest pain. Where can you learn more? Go to http://jurgen-molly.info/. Enter A120 in the search box to learn more about \"Chest Pain: Care Instructions. \"  Current as of: March 20, 2017  Content Version: 11.4  © 7586-8788 [employer]. Care instructions adapted under license by [employer]) (which disclaims liability or warranty for this information). If you have questions about a medical condition or this instruction, always ask your healthcare professional. [address] any warranty or liability for your use of this information.

## 2018-05-23 NOTE — LETTER
NOTIFICATION RETURN TO WORK / SCHOOL 
 
5/23/2018 3:28 PM 
 
Ms. Jer Benson [address] [place] [id number] To Whom It May Concern: 
 
Jer Benson is currently under the care of Jessika Conway. She will return to work/school on: 5/24/2018 If there are questions or concerns please feel free to contact our office. Sincerely, Marguerite Napier MD

## 2018-05-23 NOTE — PROGRESS NOTES
Chief Complaint   [facility]   [facility]     Patient here today for a hospital follow up from the ER on 5/18 and 5/21/2017 for chest pain.

## 2018-05-23 NOTE — MR AVS SNAPSHOT
[facility] [address] [facility] 
[phone] Patient: Brina Presley MRN: [id number] KNM:[id number] Visit Information Date & Time Provider Department Dept. Phone Encounter #  
 5/23/2018  2:15 PM Mike Alfred MD [facility] at [address] [id number] Follow-up Instructions Return if symptoms worsen or fail to improve, for with pcp. Your Appointments 7/30/2018  3:50 PM  
Follow Up with Wanda Sim MD  
[facility]) Appt Note: 3 month f/u  Diabetes [address] [phone] [address] Upcoming Health Maintenance Date Due Hepatitis C Screening 1965 EYE EXAM RETINAL OR DILATED Q1 9/21/1973 Pneumococcal 19-64 Medium Risk (1 of 1 - PPSV23) 9/21/1982 DTaP/Tdap/Td series (1 - Tdap) 9/21/1984 PAP AKA CERVICAL CYTOLOGY 9/21/1984 FOBT Q 1 YEAR AGE 50-75 10/19/2017 BREAST CANCER SCRN MAMMOGRAM 7/26/2018 Influenza Age 5 to Adult 8/1/2018 HEMOGLOBIN A1C Q6M 9/13/2018 FOOT EXAM Q1 1/19/2019 MICROALBUMIN Q1 3/6/2019 LIPID PANEL Q1 3/6/2019 Allergies as of 5/23/2018  Review Complete On: 5/23/2018 By: Mike Alfred MD  
  
 Severity Noted Reaction Type Reactions Contrast Agent [Iodine] Medium 06/29/2016    Itching Hydrocodone Medium 10/19/2016    Rash Percocet [Oxycodone-acetaminophen] Medium 10/19/2016    Rash Codeine  12/01/2014   Intolerance Nausea and Vomiting Current Immunizations  Never Reviewed Name Date Influenza Vaccine 9/1/2014 Not reviewed this visit You Were Diagnosed With   
  
 Codes Comments Uncontrolled type 2 DM with hyperosmolar nonketotic hyperglycemia (HCC)    -  Primary ICD-10-CM: E11.00 ICD-9-CM: 250.22   
 Chest pain at rest     ICD-10-CM: R07.9 ICD-9-CM: 786.50 Chest pain on exertion     ICD-10-CM: R07.9 ICD-9-CM: 786.50 Vitals BP Pulse Temp Resp Height(growth percentile) Weight(growth percentile) 121/65 78 97.8 °F (36.6 °C) (Oral) 20 5' 1\" (1.549 m) 99 lb (44.9 kg) SpO2 BMI OB Status Smoking Status 98% 18.71 kg/m2 Menopause Never Smoker Vitals History BMI and BSA Data Body Mass Index Body Surface Area 18.71 kg/m 2 1.39 m 2 Preferred Pharmacy Pharmacy Name Phone [id number] [place], [address] [facility] 1 [phone] Your Updated Medication List  
  
   
This list is accurate as of 5/23/18  3:22 PM.  Always use your most recent med list.  
  
  
  
  
 * albuterol 2.5 mg /3 mL (0.083 %) nebulizer solution Commonly known as:  PROVENTIL VENTOLIN  
  
 * albuterol 90 mcg/actuation inhaler Commonly known as:  PROVENTIL HFA, VENTOLIN HFA, PROAIR HFA Take 2 Puffs by inhalation every four (4) hours as needed for Wheezing. Blood-Glucose Meter monitoring kit One Touch Ultras glucometer, Diagnosis E10.9  
  
 glucose blood VI test strips strip Commonly known as:  PHARMACIST CHOICE For use with Prodigy meter; Check glucose 4 times daily, Diagnosis E10.9  
  
 insulin glargine 100 unit/mL (3 mL) Inpn Commonly known as:  BASAGLAR KWIKPEN U-100 INSULIN Take 40 units once daily subcutaneously Insulin Needles (Disposable) 31 gauge x 5/16\" Ndle Use with Basaglar insulin pen Insulin Syringes (Disposable) 1 mL Syrg Use as directed Lancets Misc Use as directed  
  
 naproxen 500 mg tablet Commonly known as:  NAPROSYN Take 1 Tab by mouth every twelve (12) hours as needed for Pain. * Notice: This list has 2 medication(s) that are the same as other medications prescribed for you.  Read the directions carefully, and ask your doctor or other care provider to review them with you. We Performed the Following REFERRAL TO CARDIOLOGY [TOG71 Custom] Comments:  
 Please evaluate for intermittent chest pain, patient has uncontrolled diabetes Follow-up Instructions Return if symptoms worsen or fail to improve, for with pcp. To-Do List   
 05/23/2018 ECHO:  ECHO TTE STRESS COMP W OR WO CONTR   
  
 05/23/2018 ECHO:  ECHO TTE STRESS EXERCISE TREADMILL COMP Referral Information Referral ID Referred By Referred To  
  
 [id number] Eleno Delarosa MD   
   [facility] [place] [address] Phone: [phone] Fax: [phone] Visits Status Start Date End Date 1 New Request 5/23/18 5/23/19 If your referral has a status of pending review or denied, additional information will be sent to support the outcome of this decision. Patient Instructions Chest Pain: Care Instructions Your Care Instructions There are many things that can cause chest pain. Some are not serious and will get better on their own in a few days. But some kinds of chest pain need more testing and treatment. Your doctor may have recommended a follow-up visit in the next 8 to 12 hours. If you are not getting better, you may need more tests or treatment. Even though your doctor has released you, you still need to watch for any problems. The doctor carefully checked you, but sometimes problems can develop later. If you have new symptoms or if your symptoms do not get better, get medical care right away. If you have worse or different chest pain or pressure that lasts more than 5 minutes or you passed out (lost consciousness), call 911 or seek other emergency help right away. A medical visit is only one step in your treatment.  Even if you feel better, you still need to do what your doctor recommends, such as going to all suggested follow-up appointments and taking medicines exactly as directed. This will help you recover and help prevent future problems. How can you care for yourself at home? · Rest until you feel better. · Take your medicine exactly as prescribed. Call your doctor if you think you are having a problem with your medicine. · Do not drive after taking a prescription pain medicine. When should you call for help? Call 911 if: 
? · You passed out (lost consciousness). ? · You have severe difficulty breathing. ? · You have symptoms of a heart attack. These may include: ¨ Chest pain or pressure, or a strange feeling in your chest. 
¨ Sweating. ¨ Shortness of breath. ¨ Nausea or vomiting. ¨ Pain, pressure, or a strange feeling in your back, neck, jaw, or upper belly or in one or both shoulders or arms. ¨ Lightheadedness or sudden weakness. ¨ A fast or irregular heartbeat. After you call 911, the  may tell you to chew 1 adult-strength or 2 to 4 low-dose aspirin. Wait for an ambulance. Do not try to drive yourself. ?Call your doctor today if: 
? · You have any trouble breathing. ? · Your chest pain gets worse. ? · You are dizzy or lightheaded, or you feel like you may faint. ? · You are not getting better as expected. ? · You are having new or different chest pain. Where can you learn more? Go to http://jurgen-molly.info/. Enter A120 in the search box to learn more about \"Chest Pain: Care Instructions. \" Current as of: March 20, 2017 Content Version: 11.4 © 8677-3275 cloudControl. Care instructions adapted under license by CinemaNow (which disclaims liability or warranty for this information). If you have questions about a medical condition or this instruction, always ask your healthcare professional. [address] any warranty or liability for your use of this information. Introducing [facility] & HE[facility] SERVICES! [employer] introduces Sandata patient portal. Now you can access parts of your medical record, email your doctor's office, and request medication refills online. 1. In your internet browser, go to [email or web address] 2. Click on the First Time User? Click Here link in the Sign In box. You will see the New Member Sign Up page. 3. Enter your Sandata Access Code exactly as it appears below. You will not need to use this code after youve completed the sign-up process. If you do not sign up before the expiration date, you must request a new code. · Sandata Access Code: [email or web address] Expires: 7/26/2018  3:50 PM 
 
4. Enter the last four digits of your Social Security Number (xxxx) and Date of Birth (mm/dd/yyyy) as indicated and click Submit. You will be taken to the next sign-up page. 5. Create a Sandata ID. This will be your Sandata login ID and cannot be changed, so think of one that is secure and easy to remember. 6. Create a Sandata password. You can change your password at any time. 7. Enter your Password Reset Question and Answer. This can be used at a later time if you forget your password. 8. Enter your e-mail address. You will receive e-mail notification when new information is available in [facility] [address] [place] [address]. 9. Click Sign Up. You can now view and download portions of your medical record. 10. Click the Download Summary menu link to download a portable copy of your medical information. If you have questions, please visit the Frequently Asked Questions section of the Sandata website. Remember, Sandata is NOT to be used for urgent needs. For medical emergencies, dial 911. Now available from your iPhone and Android! Please provide this summary of care documentation to your next provider. Your primary care clinician is listed as Jing Patton. If you have any questions after today's visit, please call [phone].

## 2018-05-29 ENCOUNTER — HOSPITAL ENCOUNTER (OUTPATIENT)
Dept: NON INVASIVE DIAGNOSTICS | Age: 53
Discharge: HOME | End: 2018-05-29
Attending: INTERNAL MEDICINE
Payer: COMMERCIAL

## 2018-05-29 DIAGNOSIS — R07.9 CHEST PAIN ON EXERTION: ICD-10-CM

## 2018-05-29 DIAGNOSIS — R07.9 CHEST PAIN AT REST: ICD-10-CM

## 2018-05-29 LAB
ATTENDING PHYSICIAN, CST07: NORMAL
DIAGNOSIS, 93000: NORMAL
DUKE TM SCORE RESULT, CST14: NORMAL
DUKE TREADMILL SCORE, CST13: 5456
ECG INTERP BEFORE EX, CST11: NORMAL
ECG INTERP DURING EX, CST12: NORMAL
FUNCTIONAL CAPACITY, CST17: NORMAL
KNOWN CARDIAC CONDITION, CST08: NORMAL
MAX. DIASTOLIC BP, CST04: 70 MMHG
MAX. HEART RATE, CST05: 162 BPM
MAX. SYSTOLIC BP, CST03: 140 MMHG
OVERALL BP RESPONSE TO EXERCISE, CST16: NORMAL
OVERALL HR RESPONSE TO EXERCISE, CST15: NORMAL
PEAK EX METS, CST10: 5.2 METS
PROTOCOL NAME, CST01: NORMAL
TEST INDICATION, CST09: NORMAL

## 2018-05-29 PROCEDURE — 93351 STRESS TTE COMPLETE: CPT

## 2018-06-04 ENCOUNTER — TELEPHONE (OUTPATIENT)
Dept: ENDOCRINOLOGY | Age: 53
End: 2018-06-04

## 2018-06-04 NOTE — TELEPHONE ENCOUNTER
I attempted to call Ms. Woodrow. I reached her voice mail and left a message asking her to give me a call back.   Lianne Hassan

## 2018-06-04 NOTE — TELEPHONE ENCOUNTER
Attempted to call mobile number, no answer. Current documented home number is not in service. Patient should consider in this case, going back to the mixed insulin she was on previously or adding short acting insulin to her meals in addition to the basaglar which would mean 4 injections per day. If she desires using the mixed insulin with syringes as prev it will involve 2 injections per day. She can let us know and I will then provide the best possible doses for her at this time. Reggie Santa.  [address] [facility]  [facility]

## 2018-06-04 NOTE — TELEPHONE ENCOUNTER
Patient is returning your call. She can be reached at:  [phone], but her next break at work isn't until 12:00 noon today. MARLENI Lee

## 2018-06-04 NOTE — TELEPHONE ENCOUNTER
----- Message from Palak Cisneros sent at 6/4/2018  7:22 AM EDT -----  Regarding: Dr. Yolis Correa  Pt stated she took a stress test on 05/29/18, and her blood sugar is still elevated and she was advised to let the doctor know,and has an appt with the cardiologist on 06/22/18. Best contact number [phone].

## 2018-06-04 NOTE — TELEPHONE ENCOUNTER
I returned Ms. Troy's call. She said her heart has been racing and she is short of breath for the past 1-2 weeks. She went to the ER on the 25th and they gave her some prednisone which she took for three days. Since then her blood sugar have been in the 300-400 range. She had a stress test which came back normal and has a appointment to see cardiology on the 22nd. She also is taken naproxen. She said that she is taking 40 units of Basaglar at . I told her I would pass this on to Dr. Scott Corbin.   Chris Lake

## 2018-06-04 NOTE — TELEPHONE ENCOUNTER
----- Message from Olaf Harris sent at 6/4/2018  1:02 PM EDT -----  Regarding: Dr. Toyin Flores  Pt returned call from the nurse.   Best contact number [phone](please leave voicemail or call at 3:00 p.m.)

## 2018-06-05 NOTE — TELEPHONE ENCOUNTER
Lets start with 25 units both before breakfast and before dinner. For now she can use additional insulin as needed using the following correction scale until her sugars have stabilized. She can provide us with an updated log in a couple days of using this dose for a dose adjustment. ** Sent to Trell Vallecillo on [address] [facility] for the affordable cost of the ReliOn brand.     ------------ SUMMARY-------------  Novolin 70/30 mixed insulin: 25 units with breakfast and dinner. Correction Scale   1:30>150  IF GLUCOSE IS:                 THEN TAKE:      0   Extra Unit  151-180   1   Extra Unit  181-210   2   Extra Units  211-240   3   Extra Units  241-270   4   Extra Units  271-300   5   Extra Units  >300    6   Extra Units    Thanks,    Mary SZYMANSKI.  [address] [facility]  [address] [place] [address]

## 2018-06-05 NOTE — TELEPHONE ENCOUNTER
Ms. Ruckernda Vannesa called me back and I relayed the message from Dr. Raffaele Hayden. She has decided to do the Mixed Insulin because it will only be 2 injections and it will also be cheaper for her.    Lamont Starkey

## 2018-06-05 NOTE — TELEPHONE ENCOUNTER
Ms. Jonel Abbieirena called me back and I relayed the message from Dr. Jessi Price. She wrote this down and understood the information and will do as instructed.   Nancy

## 2018-06-05 NOTE — TELEPHONE ENCOUNTER
I attempted to call Ms. Woodrow. I reached her voice mail. I left a message for her to give me a call back.   Faith Bethea

## 2018-06-05 NOTE — TELEPHONE ENCOUNTER
6/5/2018, 2:07 PM    Attempted to call Deysi Talbot, reached voice mail. I left a message to return my call.       Montell Cowden

## 2018-06-22 ENCOUNTER — OFFICE VISIT (OUTPATIENT)
Dept: CARDIOLOGY CLINIC | Age: 53
End: 2018-06-22

## 2018-06-22 VITALS
RESPIRATION RATE: 16 BRPM | HEIGHT: 61 IN | WEIGHT: 103 LBS | OXYGEN SATURATION: 99 % | HEART RATE: 76 BPM | DIASTOLIC BLOOD PRESSURE: 76 MMHG | SYSTOLIC BLOOD PRESSURE: 114 MMHG | BODY MASS INDEX: 19.45 KG/M2

## 2018-06-22 DIAGNOSIS — E11.00 UNCONTROLLED TYPE 2 DM WITH HYPEROSMOLAR NONKETOTIC HYPERGLYCEMIA (HCC): ICD-10-CM

## 2018-06-22 DIAGNOSIS — R06.09 DOE (DYSPNEA ON EXERTION): ICD-10-CM

## 2018-06-22 DIAGNOSIS — R00.2 PALPITATION: ICD-10-CM

## 2018-06-22 DIAGNOSIS — R07.9 CHEST PAIN, UNSPECIFIED TYPE: Primary | ICD-10-CM

## 2018-06-22 DIAGNOSIS — Z76.89 ENCOUNTER TO ESTABLISH CARE: ICD-10-CM

## 2018-06-22 RX ORDER — METOPROLOL TARTRATE 25 MG/1
12.5 TABLET, FILM COATED ORAL 2 TIMES DAILY
Qty: 30 TAB | Refills: 6 | Status: SHIPPED | OUTPATIENT
Start: 2018-06-22 | End: 2018-06-22

## 2018-06-22 RX ORDER — INSULIN GLARGINE 100 [IU]/ML
INJECTION, SOLUTION SUBCUTANEOUS
Refills: 3 | COMMUNITY
Start: 2018-05-19 | End: 2018-06-25 | Stop reason: SDUPTHER

## 2018-06-22 RX ORDER — NAPROXEN SODIUM 220 MG
220 TABLET ORAL 2 TIMES DAILY WITH MEALS
Qty: 60 TAB | Refills: 1 | Status: SHIPPED | OUTPATIENT
Start: 2018-06-22 | End: 2018-08-24 | Stop reason: DRUGHIGH

## 2018-06-22 NOTE — MR AVS SNAPSHOT
[address] [place] [facility] 
[phone] Patient: Yuliana Villa MRN: [id number] AGC:6/96/0575 Visit Information Date & Time Provider Department Dept. Phone Encounter #  
 6/22/2018  1:30 PM Apolonia Segun, [address] [facility] [phone] Your Appointments 7/30/2018  3:50 PM  
Follow Up with MD Stephane Hartman Diabetes and Endocrinology [facility] Appt Note: 3 month f/u  Diabetes [address] [id number] [facility] Upcoming Health Maintenance Date Due Hepatitis C Screening 1965 EYE EXAM RETINAL OR DILATED Q1 9/21/1973 Pneumococcal 19-64 Medium Risk (1 of 1 - PPSV23) 9/21/1982 DTaP/Tdap/Td series (1 - Tdap) 9/21/1984 PAP AKA CERVICAL CYTOLOGY 9/21/1984 FOBT Q 1 YEAR AGE 50-75 10/19/2017 BREAST CANCER SCRN MAMMOGRAM 7/26/2018 Influenza Age 5 to Adult 8/1/2018 HEMOGLOBIN A1C Q6M 9/13/2018 FOOT EXAM Q1 1/19/2019 MICROALBUMIN Q1 3/6/2019 LIPID PANEL Q1 3/6/2019 Allergies as of 6/22/2018  Review Complete On: 6/22/2018 By: Arabella Gracia Severity Noted Reaction Type Reactions Contrast Agent [Iodine] Medium 06/29/2016    Itching Hydrocodone Medium 10/19/2016    Rash Percocet [Oxycodone-acetaminophen] Medium 10/19/2016    Rash Codeine  12/01/2014   Intolerance Nausea and Vomiting Current Immunizations  Never Reviewed Name Date Influenza Vaccine 9/1/2014 Not reviewed this visit You Were Diagnosed With   
  
 Codes Comments Chest pain, unspecified type    -  Primary ICD-10-CM: R07.9 ICD-9-CM: 786.50 Palpitation     ICD-10-CM: R00.2 ICD-9-CM: 785.1 GARLAND (dyspnea on exertion)     ICD-10-CM: R06.09 
ICD-9-CM: 786.09  Encounter to establish care     ICD-10-CM: Z76.89 
 ICD-9-CM: V65.8 Uncontrolled type 2 DM with hyperosmolar nonketotic hyperglycemia (HCC)     ICD-10-CM: E11.00 ICD-9-CM: 250.22 Vitals BP Pulse Resp Height(growth percentile) Weight(growth percentile) SpO2  
 114/76 (BP 1 Location: Right arm, BP Patient Position: Sitting) 76 16 5' 1\" (1.549 m) 103 lb (46.7 kg) 99% BMI OB Status Smoking Status 19.46 kg/m2 Menopause Never Smoker Vitals History BMI and BSA Data Body Mass Index Body Surface Area  
 19.46 kg/m 2 1.42 m 2 Preferred Pharmacy Pharmacy Name Phone [id number] [place]ureen Mehta, [address] [facility] [phone] Your Updated Medication List  
  
   
This list is accurate as of 6/22/18  4:10 PM.  Always use your most recent med list.  
  
  
  
  
 * albuterol 2.5 mg /3 mL (0.083 %) nebulizer solution Commonly known as:  PROVENTIL VENTOLIN  
  
 * albuterol 90 mcg/actuation inhaler Commonly known as:  PROVENTIL HFA, VENTOLIN HFA, PROAIR HFA Take 2 Puffs by inhalation every four (4) hours as needed for Wheezing. BASAGLAR KWIKPEN U-100 INSULIN 100 unit/mL (3 mL) Inpn Generic drug:  insulin glargine TAKE 40 UNITS ONCE DAILY SUBCUTANEOUSLY Blood-Glucose Meter monitoring kit One Touch Ultras glucometer, Diagnosis E10.9  
  
 glucose blood VI test strips strip Commonly known as:  PHARMACIST CHOICE For use with Prodigy meter; Check glucose 4 times daily, Diagnosis E10.9 Insulin Needles (Disposable) 31 gauge x 5/16\" Ndle Use with Basaglar insulin pen  
  
 insulin NPH/insulin regular 100 unit/mL (70-30) injection Commonly known as:  NOVOLIN 70/30, HUMULIN 70/30 Novolin 70/30 mixed insulin, 25 units with breakfast and dinner + correction insulin Insulin Syringes (Disposable) 1 mL Syrg Use as directed Lancets Misc Use as directed  
  
 naproxen sodium 220 mg tablet Commonly known as:  Amanda Cummings  
 Take 1 Tab by mouth two (2) times daily (with meals). * Notice: This list has 2 medication(s) that are the same as other medications prescribed for you. Read the directions carefully, and ask your doctor or other care provider to review them with you. Prescriptions Printed Refills  
 naproxen sodium (ALEVE) 220 mg tablet 1 Sig: Take 1 Tab by mouth two (2) times daily (with meals). Class: Print Route: Oral  
  
We Performed the Following AMB POC EKG ROUTINE W/ 12 LEADS, INTER & REP [20577 CPT(R)] THYROID PANEL W/TSH [63917 CPT(R)] To-Do List   
 06/22/2018 Cardiac Services:  LOOP MONITOR Please provide this summary of care documentation to your next provider. Your primary care clinician is listed as Shantell Nance. If you have any questions after today's visit, please call [phone].

## 2018-06-22 NOTE — PROGRESS NOTES
[address] [address]  [phone]     Subjective:      Martín Handley is a 45 y.o. female with pmhx DM, asthma is here to establish care and further evaluation of intermittent SSCP, palpitation with some SOB x 2 mos, 3-4 episodes a week. Denies orthopnea, PND, LE edema, syncope, or presyncope. Seen in the ED 5/18, felt it was d/t asthma and dc on prednisone taper which improved her symptoms. Then went back to ED 5/21 same complaint, felt likely MSK and was recommended to take Naproxen which ddnt help. Cardiac risk factors: DM, post menopausal F    Hx smoking: denies   Alcohol: social   Illegal drug use: denies    Family hx: mother: unk  father: Cancer  Siblings: n/a    Patient Active Problem List    Diagnosis Date Noted    Type 2 diabetes with nephropathy ([address] 05/23/2018    Uncontrolled type 2 DM with hyperosmolar nonketotic hyperglycemia ([address] 01/14/2018    Elevated transaminase level 06/29/2016    Insulin dependent diabetes mellitus ([address] 06/20/2016    Pancreatic atrophy 06/20/2016    Hyperosmolality syndrome 06/10/2016      Jet Quiñonez MD  Past Medical History:   Diagnosis Date    Asthma     Diabetes ([address]     Elevated transaminase level 6/29/2016    Insulin dependent diabetes mellitus ([address] 6/20/2016    Pancreatic atrophy 6/20/2016      Past Surgical History:   Procedure Laterality Date    HX HEENT       Allergies   Allergen Reactions    Contrast Agent [Iodine] Itching    Hydrocodone Rash    Percocet [Oxycodone-Acetaminophen] Rash    Codeine Nausea and Vomiting      Family History   Problem Relation Age of Onset    Cancer Father      prostate and colon    Diabetes Mother     Diabetes Maternal Grandmother     Cancer Maternal Aunt      breast      Social History     Social History    Marital status: SINGLE     Spouse name: N/A    Number of children: N/A    Years of education: N/A     Occupational History    Not on file.      Social History Main Topics    Smoking status: Never Smoker    Smokeless tobacco: Never Used    Alcohol use Yes      Comment: occasionally    Drug use: No    Sexual activity: Not Currently     Other Topics Concern    Not on file     Social History Narrative      Current Outpatient Prescriptions   Medication Sig    BASAGLSAM EDMONDSPEN U-100 INSULIN 100 unit/mL (3 mL) inpn TAKE 40 UNITS ONCE DAILY SUBCUTANEOUSLY    naproxen sodium (ALEVE) 220 mg tablet Take 1 Tab by mouth two (2) times daily (with meals).  albuterol (PROVENTIL HFA, VENTOLIN HFA, PROAIR HFA) 90 mcg/actuation inhaler Take 2 Puffs by inhalation every four (4) hours as needed for Wheezing.  Insulin Needles, Disposable, 31 gauge x 5/16\" ndle Use with Basaglar insulin pen    glucose blood VI test strips (PHARMACIST CHOICE) strip For use with Prodigy meter; Check glucose 4 times daily, Diagnosis E10.9    Blood-Glucose Meter monitoring kit One Touch Ultras glucometer, Diagnosis E10.9    albuterol (PROVENTIL VENTOLIN) 2.5 mg /3 mL (0.083 %) nebulizer solution     Lancets misc Use as directed    Insulin Syringes, Disposable, 1 mL syrg Use as directed    insulin NPH/insulin regular (NOVOLIN 70/30, HUMULIN 70/30) 100 unit/mL (70-30) injection Novolin 70/30 mixed insulin, 25 units with breakfast and dinner + correction insulin     No current facility-administered medications for this visit. Review of Symptoms:  11 systems reviewed, negative other than as stated in the HPI    Physical ExamPhysical Exam:    Vitals:    06/22/18 1440 06/22/18 1453   BP: 110/74 114/76   Pulse: 76    Resp: 16    SpO2: 99%    Weight: 103 lb (46.7 kg)    Height: 5' 1\" (1.549 m)      Body mass index is 19.46 kg/(m^2). General PE   Gen:  NAD  Mental Status - Alert. General Appearance - Not in acute distress. Chest and Lung Exam   Inspection: Accessory muscles - No use of accessory muscles in breathing.    Auscultation:   Breath sounds: - Normal.   Cardiovascular   Inspection: Jugular vein - Bilateral - Inspection Normal.   Palpation/Percussion:   Apical Impulse: - Normal.   Auscultation: Rhythm - Regular. Heart Sounds - S1 WNL and S2 WNL. No S3 or S4. Murmurs & Other Heart Sounds: Auscultation of the heart reveals - No Murmurs. Peripheral Vascular   Upper Extremity: Inspection - Bilateral - No Cyanotic nailbeds or Digital clubbing. Lower Extremity:   Palpation: Edema - Bilateral - No edema. Abdomen:   Soft, non-tender, bowel sounds are active. Neuro: A&O times 3, CN and motor grossly WNL    Labs:   Lab Results   Component Value Date/Time    Cholesterol, total 202 (H) 03/06/2018 07:53 AM    Cholesterol, total 181 07/27/2016 08:58 AM    Cholesterol, Total 175 06/20/2016 03:31 PM    HDL Cholesterol 125 03/06/2018 07:53 AM    HDL Cholesterol 106 07/27/2016 08:58 AM    LDL, calculated 66 03/06/2018 07:53 AM    LDL, calculated 62 07/27/2016 08:58 AM    Triglyceride 53 03/06/2018 07:53 AM    Triglyceride 67 07/27/2016 08:58 AM     Lab Results   Component Value Date/Time    CK 65 01/14/2018 01:51 PM     Lab Results   Component Value Date/Time    Sodium 131 (L) 04/27/2018 03:59 PM    Potassium 4.5 04/27/2018 03:59 PM    Chloride 88 (L) 04/27/2018 03:59 PM    CO2 26 04/27/2018 03:59 PM    Anion gap 5 01/17/2018 05:35 AM    Glucose 597 (>) 04/27/2018 03:59 PM    BUN 16 04/27/2018 03:59 PM    Creatinine 1.35 (H) 04/27/2018 03:59 PM    BUN/Creatinine ratio 12 04/27/2018 03:59 PM    GFR est AA 51 (L) 04/27/2018 03:59 PM    GFR est non-AA 45 (L) 04/27/2018 03:59 PM    Calcium 10.0 04/27/2018 03:59 PM    Bilirubin, total 0.3 04/27/2018 03:59 PM    AST (SGOT) 35 04/27/2018 03:59 PM    Alk. phosphatase 257 (H) 04/27/2018 03:59 PM    Protein, total 7.1 04/27/2018 03:59 PM    Albumin 4.6 04/27/2018 03:59 PM    Globulin 3.3 01/14/2018 05:21 PM    A-G Ratio 1.8 04/27/2018 03:59 PM    ALT (SGPT) 45 (H) 04/27/2018 03:59 PM       EKG:  NSR      Assessment:     Assessment:      1.  Chest pain, unspecified type    2. Palpitation    3. GARLAND (dyspnea on exertion)    4. Encounter to establish care    5. Uncontrolled type 2 DM with hyperosmolar nonketotic hyperglycemia ([address]        Orders Placed This Encounter    THYROID PANEL W/TSH    LOOP MONITOR, Clinic Performed     Standing Status:   Future     Standing Expiration Date:   12/22/2018     Order Specific Question:   Reason for Exam:     Answer:   palpitation    AMB POC EKG ROUTINE W/ 12 LEADS, INTER & REP     Order Specific Question:   Reason for Exam:     Answer:   Routine    BASAGLAR KWIKPEN U-100 INSULIN 100 unit/mL (3 mL) inpn     Sig: TAKE 40 UNITS ONCE DAILY SUBCUTANEOUSLY     Refill:  3    DISCONTD: metoprolol tartrate (LOPRESSOR) 25 mg tablet     Sig: Take 0.5 Tabs by mouth two (2) times a day. Dispense:  30 Tab     Refill:  6    naproxen sodium (ALEVE) 220 mg tablet     Sig: Take 1 Tab by mouth two (2) times daily (with meals). Dispense:  60 Tab     Refill:  1        Plan:     Pt is a 44 YO F with pmhx asthma, DM with pmhx DM, asthma is here to establish care and further evaluation of intermittent SSCP, palpitation with some SOB x 2 mos, 3-4 episodes a week. Atypical SSCP, pain reproducible with palpation  Normal stress echo in 5/18  C/o 3-4 episodes / wk of SSCP both at rest and with exertion x 2 mos. Seen in the ED twice in May 2018. EKG SR. States improvement of symptom with prednisone 3 day taper. Will Start Alleve 220 mg BID     Palpitation, intermittent  Unknown thyroid panel. Denies excessive caffeine intake  Will obtain event monitor to look for underlying arrhythmia as possible etiology of pt's symptom of intermittent palpitation that occur greater than 72 hrs apart.     Will check thryoid    BP normotensive, not on any medications    DM  On Insulin therapy      Lipids and labs followed by PCP      Continue current care and f/u     Kayden Maldonado MD

## 2018-06-22 NOTE — PROGRESS NOTES
1. Have you been to the ER, urgent care clinic since your last visit? Hospitalized since your last visit? Yes, ED [facility] for chest pain on 5/21/18    2. Have you seen or consulted any other health care providers outside of the [address] [place] [address] since your last visit? Include any pap smears or colon screening.  No    Chief Complaint   Patient presents with   [facility]     new patient; referred by pcp for chest discomfort; onset x 1 mo ago; pt reports chest tightness with some stabbing pain; pt denies pain radiating to arms    Shortness of Breath     pt reports hx of asthma    Palpitations

## 2018-06-25 ENCOUNTER — TELEPHONE (OUTPATIENT)
Dept: ENDOCRINOLOGY | Age: 53
End: 2018-06-25

## 2018-06-25 RX ORDER — INSULIN GLARGINE 100 [IU]/ML
INJECTION, SOLUTION SUBCUTANEOUS
Qty: 15 PEN | Refills: 3 | Status: SHIPPED | OUTPATIENT
Start: 2018-06-25 | End: 2018-07-30 | Stop reason: SDUPTHER

## 2018-06-25 NOTE — TELEPHONE ENCOUNTER
Patient is requesting a refill on her test strips and insulin to be sent to [address] [place] [address] [phone].

## 2018-06-25 NOTE — TELEPHONE ENCOUNTER
Ms. Dolly Diego called me back ans said that her numbers were running high with the Novolin so she switched back to just using the [address] [place] [address] and her numbers have been running in the 100\"S.   Federico Killer

## 2018-06-25 NOTE — TELEPHONE ENCOUNTER
I called Ms. Troy back and reached her voice mail. I left her a message letting her know that I did a script for her test strips but we had just done a script for her insulin on 6/5/2018 so she should be all set on that. If that was not the case then I said to give me a call back.   Brandi Claire

## 2018-06-28 LAB
FT4I SERPL CALC-MCNC: 1.8 (ref 1.2–4.9)
T3RU NFR SERPL: 25 % (ref 24–39)
T4 SERPL-MCNC: 7 UG/DL (ref 4.5–12)
TSH SERPL DL<=0.005 MIU/L-ACNC: 1.5 UIU/ML (ref 0.45–4.5)

## 2018-06-29 ENCOUNTER — CLINICAL SUPPORT (OUTPATIENT)
Dept: CARDIOLOGY CLINIC | Age: 53
End: 2018-06-29

## 2018-06-29 DIAGNOSIS — R00.2 PALPITATION: ICD-10-CM

## 2018-07-19 NOTE — PROGRESS NOTES
Spoke with patient  Verified patient with 2 patient identifiers  Informed no abnormal heart beats seen on loop monitor per Dr Quyen Coronado.   Patient verbalized understanding

## 2018-07-24 LAB
ALBUMIN SERPL-MCNC: 4.1 G/DL (ref 3.5–5.5)
ALBUMIN/GLOB SERPL: 1.8 {RATIO} (ref 1.2–2.2)
ALP SERPL-CCNC: 190 IU/L (ref 39–117)
ALT SERPL-CCNC: 41 IU/L (ref 0–32)
AST SERPL-CCNC: 39 IU/L (ref 0–40)
BILIRUB SERPL-MCNC: 0.4 MG/DL (ref 0–1.2)
BUN SERPL-MCNC: 16 MG/DL (ref 6–24)
BUN/CREAT SERPL: 14 (ref 9–23)
C PEPTIDE SERPL-MCNC: 1.7 NG/ML (ref 1.1–4.4)
CALCIUM SERPL-MCNC: 9.1 MG/DL (ref 8.7–10.2)
CHLORIDE SERPL-SCNC: 102 MMOL/L (ref 96–106)
CO2 SERPL-SCNC: 27 MMOL/L (ref 20–29)
CREAT SERPL-MCNC: 1.18 MG/DL (ref 0.57–1)
GLOBULIN SER CALC-MCNC: 2.3 G/DL (ref 1.5–4.5)
GLUCOSE SERPL-MCNC: 239 MG/DL (ref 65–99)
INTERPRETATION: NORMAL
POTASSIUM SERPL-SCNC: 4.8 MMOL/L (ref 3.5–5.2)
PROT SERPL-MCNC: 6.4 G/DL (ref 6–8.5)
SODIUM SERPL-SCNC: 143 MMOL/L (ref 134–144)

## 2018-07-30 ENCOUNTER — OFFICE VISIT (OUTPATIENT)
Dept: ENDOCRINOLOGY | Age: 53
End: 2018-07-30

## 2018-07-30 VITALS
BODY MASS INDEX: 20.01 KG/M2 | DIASTOLIC BLOOD PRESSURE: 75 MMHG | HEIGHT: 61 IN | SYSTOLIC BLOOD PRESSURE: 115 MMHG | HEART RATE: 71 BPM | WEIGHT: 106 LBS

## 2018-07-30 DIAGNOSIS — E13.9 LADA (LATENT AUTOIMMUNE DIABETES IN ADULTS), MANAGED AS TYPE 1 (HCC): Primary | ICD-10-CM

## 2018-07-30 RX ORDER — INSULIN GLARGINE 100 [IU]/ML
INJECTION, SOLUTION SUBCUTANEOUS
Qty: 15 PEN | Refills: 3 | Status: SHIPPED | OUTPATIENT
Start: 2018-07-30 | End: 2018-09-12 | Stop reason: ALTCHOICE

## 2018-07-30 RX ORDER — GLIMEPIRIDE 1 MG/1
1 TABLET ORAL
Qty: 90 TAB | Refills: 3 | Status: SHIPPED | OUTPATIENT
Start: 2018-07-30 | End: 2018-09-06

## 2018-07-30 NOTE — MR AVS SNAPSHOT
[address] [facility] [address] [address] [phone] Patient: Emily Contreras MRN: [id number] CPD:5/50/3385 Visit Information Date & Time Provider Department Dept. Phone Encounter #  
 7/30/2018  3:50 PM Mikel Broderick, [address] [place] [address] Diabetes and Endocrinology [phone] [id number] Follow-up Instructions Return in about 4 months (around 11/30/2018). Upcoming Health Maintenance Date Due Hepatitis C Screening 1965 EYE EXAM RETINAL OR DILATED Q1 9/21/1973 Pneumococcal 19-64 Medium Risk (1 of 1 - PPSV23) 9/21/1982 DTaP/Tdap/Td series (1 - Tdap) 9/21/1984 PAP AKA CERVICAL CYTOLOGY 9/21/1984 FOBT Q 1 YEAR AGE 50-75 10/19/2017 BREAST CANCER SCRN MAMMOGRAM 7/26/2018 Influenza Age 5 to Adult 8/1/2018 HEMOGLOBIN A1C Q6M 9/13/2018 FOOT EXAM Q1 1/19/2019 MICROALBUMIN Q1 3/6/2019 LIPID PANEL Q1 3/6/2019 Allergies as of 7/30/2018  Review Complete On: 7/30/2018 By: Canelo Reynolds Severity Noted Reaction Type Reactions Contrast Agent [Iodine] Medium 06/29/2016    Itching Hydrocodone Medium 10/19/2016    Rash Percocet [Oxycodone-acetaminophen] Medium 10/19/2016    Rash Codeine  12/01/2014   Intolerance Nausea and Vomiting Current Immunizations  Never Reviewed Name Date Influenza Vaccine 9/1/2014 Not reviewed this visit You Were Diagnosed With   
  
 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  
  
  
 Preferred Pharmacy Pharmacy Name Phone [id number]- [place] Pi, [address]  [address] [phone] Your Updated Medication List  
  
   
This list is accurate as of 7/30/18  4:28 PM.  Always use your most recent med list.  
  
  
  
  
 * albuterol 2.5 mg /3 mL (0.083 %) nebulizer solution Commonly known as:  PROVENTIL VENTOLIN  
  
 * albuterol 90 mcg/actuation inhaler Commonly known as:  PROVENTIL HFA, VENTOLIN HFA, PROAIR HFA Take 2 Puffs by inhalation every four (4) hours as needed for Wheezing. BASAGLAR KWIKPEN U-100 INSULIN 100 unit/mL (3 mL) Inpn Generic drug:  insulin glargine TAKE 40 UNITS ONCE DAILY SUBCUTANEOUSLY Blood-Glucose Meter monitoring kit One Touch Ultras glucometer, Diagnosis E10.9  
  
 glimepiride 1 mg tablet Commonly known as:  AMARYL Take 1 Tab by mouth Daily (before breakfast). glucose blood VI test strips strip Commonly known as:  PHARMACIST CHOICE For use with Prodigy meter; Check glucose 4 times daily, Diagnosis E10.9 Insulin Needles (Disposable) 31 gauge x 5/16\" Ndle Use with Basaglar insulin pen Insulin Syringes (Disposable) 1 mL Syrg Use as directed Lancets Misc Use as directed  
  
 naproxen sodium 220 mg tablet Commonly known as:  Juan Antonio Zuleta Take 1 Tab by mouth two (2) times daily (with meals). * Notice: This list has 2 medication(s) that are the same as other medications prescribed for you. Read the directions carefully, and ask your doctor or other care provider to review them with you. Prescriptions Sent to Pharmacy Refills  
 glimepiride (AMARYL) 1 mg tablet 3 Sig: Take 1 Tab by mouth Daily (before breakfast). Class: Normal  
 Pharmacy: [address] [place] [address], [address] [facility]  #: [phone] Route: Oral  
  
Follow-up Instructions Return in about 4 months (around 11/30/2018). Patient Instructions Reduce basaglar to 34 units each night,  
Start glimepiride 1mg each morning before breakfast, Continue to reduce basaglar as needed based on morning blood sugar, can reduce by few units every few days, to keep AM sugars >100. Please note our new policy, you must arrive to the clinic 15 minutes before your appointment time to allow enough time for proper check-in, adequate time to spend with your doctor, and also to respect the appointment time of the next patient. Not arriving 15 minutes in advance may result in having your appointment rescheduled for the next available day/time. 
---------------------------------------------------------------------------------------------------------------------- Below you will find a glucose log sheet which you can use to record your blood sugars. Without checking and recording what your home glucose levels are, it will be difficult to make any changes to your medication dose, even when significant changes may be needed. Please feel free to use the log below to record your home glucose levels. At the very least, I would like for you to login the entire 2-3 weeks just before your visit so we can make your visit much more productive and beneficial to you. GLUCOSE LOG SHEET: 
 
Date Breakfast Lunch Dinner Bedtime Comments ? GLUCOSE LOG SHEET: 
 
Date Breakfast Lunch Dinner Bedtime Comments ? GLUCOSE LOG SHEET: 
 
Date Breakfast Lunch Dinner Bedtime Comments ? Please provide this summary of care documentation to your next provider. Your primary care clinician is listed as Constantin Bonilla. If you have any questions after today's visit, please call [phone].

## 2018-07-30 NOTE — LETTER
NOTIFICATION RETURN TO WORK / SCHOOL 
 
7/30/2018 4:37 PM 
 
Ms. Marlon Ortiz [address] [place] [id number] To Whom It May Concern: 
 
Marlon Ortiz is currently under the care of [address] [place] [address]. She was required to visit my office today for an important medical evaluation. Because of this she may have missed work today. Please excuse her for this. Sincerely, Gigi Fontana MD

## 2018-07-30 NOTE — PATIENT INSTRUCTIONS
Reduce basaglar to 34 units each night,   Start glimepiride 1mg each morning before breakfast,   Continue to reduce basaglar as needed based on morning blood sugar, can reduce by few units every few days, to keep AM sugars >100. Please note our new policy, you must arrive to the clinic 15 minutes before your appointment time to allow enough time for proper check-in, adequate time to spend with your doctor, and also to respect the appointment time of the next patient. Not arriving 15 minutes in advance may result in having your appointment rescheduled for the next available day/time.  ----------------------------------------------------------------------------------------------------------------------    Below you will find a glucose log sheet which you can use to record your blood sugars. Without checking and recording what your home glucose levels are, it will be difficult to make any changes to your medication dose, even when significant changes may be needed. Please feel free to use the log below to record your home glucose levels. At the very least, I would like for you to login the entire 2-3 weeks just before your visit so we can make your visit much more productive and beneficial to you. GLUCOSE LOG SHEET:    Date Breakfast Lunch Dinner Bedtime Comments ? GLUCOSE LOG SHEET:    Date Breakfast Lunch Dinner Bedtime Comments ? GLUCOSE LOG SHEET:    Date Breakfast Lunch Dinner Bedtime Comments ?

## 2018-07-30 NOTE — PROGRESS NOTES
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 female with a PMHx as noted below who is presenting to our endocrine clinic for the f/u evaluation of recently diagnosed diabetes. History of Type 1 Diabetes:  Patient reports that they were diagnosed in the ED with A1c of 15% June 2016     Pancreatic atrophy on CT   Family history is positive for diabetes in mother and maternal grandmother, both on insulin but type of diabetes unclear  Was started on novolog 70/30 insulin, did not tolerate any oral medications (neg antibodies, normal c-peptide)    INTERVAL HISTORY:  Previously patient had taken herself off insulin, however later her labs had suggested this was not a good idea. She was restarted back on insulin, her preference was to restart basaglar. Her HbA1c today is 7.7% down from prior 10.7% which is a good improvement.      Current home regimen includes:   Basaglar, taking 40 units each night    Home Blood glucose review:   AM:   110-140 mostly  Lunch:    Dinner: 130-190 mostly  Bedtime       Review of most recent diabetes-related labs:  Lab Results   Component Value Date    HBA1C >16.0 (H) 01/15/2018    HBA1C >16.0 (H) 01/14/2018    HBA1C 9.6 (H) 03/30/2017    XHM0XILO 10.7 03/13/2018    CHOL 202 (H) 03/06/2018    LDLC 66 03/06/2018    GFRAA 60 07/23/2018    GFRNA 52 (L) 07/23/2018    MCACR 24.9 03/06/2018    TSH 1.500 06/27/2018    [id number] <1.0 03/06/2018    GADLT <5.0 03/06/2018    CPEPLT 1.7 07/23/2018     Lab Key:  [id number] = IA-2 pancreatic islet cell autoantibody  GADLT = JORDANA-65 autoantibody   MCACR = Urine Microalbumin  INSUL = Insulin level  CPEPL = C-peptide level    PAST MEDICAL/SURGICAL HISTORY:   Past Medical History:   Diagnosis Date    Asthma     Diabetes ([address]     Elevated transaminase level 6/29/2016    Insulin dependent diabetes mellitus ([address] 6/20/2016    Pancreatic atrophy 6/20/2016 Past Surgical History:   Procedure Laterality Date    HX HEENT         ALLERGIES:   Allergies   Allergen Reactions    Contrast Agent [Iodine] Itching    Hydrocodone Rash    Percocet [Oxycodone-Acetaminophen] Rash    Codeine Nausea and Vomiting       MEDICATIONS ON ADMISSION:     Current Outpatient Prescriptions:     glimepiride (AMARYL) 1 mg tablet, Take 1 Tab by mouth Daily (before breakfast). , Disp: 90 Tab, Rfl: 3    BASAGLAR KWIKPEN U-100 INSULIN 100 unit/mL (3 mL) inpn, TAKE 34 UNITS ONCE DAILY SUBCUTANEOUSLY, Disp: 15 Pen, Rfl: 3    glucose blood VI test strips (PHARMACIST CHOICE) strip, For use with Prodigy meter; Check glucose 4 times daily, Diagnosis E10.9, Disp: 400 Strip, Rfl: 3    Insulin Needles, Disposable, 31 gauge x 5/16\" ndle, Use with Basaglar insulin pen, Disp: 3 Package, Rfl: 6    Blood-Glucose Meter monitoring kit, One Touch Ultras glucometer, Diagnosis E10.9, Disp: 1 Kit, Rfl: 0    albuterol (PROVENTIL VENTOLIN) 2.5 mg /3 mL (0.083 %) nebulizer solution, , Disp: , Rfl: 0    Lancets misc, Use as directed, Disp: 1 Each, Rfl: 3    Insulin Syringes, Disposable, 1 mL syrg, Use as directed, Disp: 100 Syringe, Rfl: 2    naproxen sodium (ALEVE) 220 mg tablet, Take 1 Tab by mouth two (2) times daily (with meals). , Disp: 60 Tab, Rfl: 1    albuterol (PROVENTIL HFA, VENTOLIN HFA, PROAIR HFA) 90 mcg/actuation inhaler, Take 2 Puffs by inhalation every four (4) hours as needed for Wheezing., Disp: 1 Inhaler, Rfl: 0    SOCIAL HISTORY:   Social History     Social History    Marital status: SINGLE     Spouse name: N/A    Number of children: N/A    Years of education: N/A     Occupational History    Not on file.      Social History Main Topics    Smoking status: Never Smoker    Smokeless tobacco: Never Used    Alcohol use Yes      Comment: occasionally    Drug use: No    Sexual activity: Not Currently     Other Topics Concern    Not on file     Social History Narrative       FAMILY HISTORY:  Family History   Problem Relation Age of Onset   24 [facility] Cancer Father      prostate and colon    Diabetes Mother     Diabetes Maternal Grandmother     Cancer Maternal Aunt      breast       REVIEW OF SYSTEMS: Complete ROS assessed and noted for that which is described above, all else are negative. Eyes: normal  ENT: normal  CVS: normal  Resp: normal  GI: normal  : normal  GYN: normal  Endocrine: normal  Integument: normal  Musculoskeletal: normal  Neuro: normal  Psych: normal      PHYSICAL EXAMINATION:    VITAL SIGNS:  Visit Vitals    /75 (BP 1 Location: Left arm, BP Patient Position: Sitting)    Pulse 71    Ht 5' 1\" (1.549 m)    Wt 106 lb (48.1 kg)    BMI 20.03 kg/m2       GENERAL: NCAT, Sitting comfortably, NAD  EYES: EOMI, non-icteric, no proptosis  HEENT: NCAT, no inflammation, no masses  LYMPH NODES: No LAD  CARDIOVASCULAR: S1 S2, RRR, No murmur, 2+ radial pulses  RESPIRATORY: CTA b/l, no wheeze/rales  ABDOMEN: BS normal  NEUROLOGIC: 5/5 power all extremities, no parasthesias, AAOx3  EXTREMITIES: warm, no edema/rash/ or other skin changes, good pedal pulses      REVIEW OF LABORATORY AND RADIOLOGY DATA:   Labs and documentation have been reviewed as described above. ASSESSMENT AND PLAN:   Rosette Harp is a 45 y.o. female with a PMHx as noted below who is presenting to our endocrine clinic for the f/u evaluation of recent onset diabetes. SHANTEL, late onset autoimmune diabetes of the adult (type-1 like diabetes)     Patient has been on her basaglar and her A1c has come down to 7.7% from almost 11% previously. Her C-peptide level looks normal and her pancreatic antibodies were negative and so it is reasonable to have a trial of a sulfonylurea to help with prandial glucose where it is needed. She did not tolerate metformin or [facility] [place] previously. She is advised of how to manage these two medications to avoid low blood sugars. Her labs and foot exam are up todate. Plan:  Medications:  Basaglar: Reduce to 34 units daily  Start glimepiride 1 mg each morning before breakfast,    If AM sugars start getting low, reduce basaglar by few units as needed to keep AM sugars >100    BP: Stable, not on antihypertensives  Cholesterol: LDL 66 stable    RTC: I would like to see them back in 3 months, call with concerns, CMP/C-peptide level prior to visit    >25 minutes spent together with patient today of which >50% of this time was spent in counseling and coordination of care. Susan Trinidad.  [facility]  [address] [place] [address]

## 2018-07-31 LAB — HBA1C MFR BLD HPLC: 7.7 %

## 2018-08-24 ENCOUNTER — APPOINTMENT (OUTPATIENT)
Dept: INTERVENTIONAL RADIOLOGY/VASCULAR | Age: 53
DRG: 637 | End: 2018-08-24
Attending: INTERNAL MEDICINE
Payer: COMMERCIAL

## 2018-08-24 ENCOUNTER — HOSPITAL ENCOUNTER (INPATIENT)
Age: 53
LOS: 13 days | Discharge: HOME | DRG: 637 | End: 2018-09-06
Attending: EMERGENCY MEDICINE | Admitting: INTERNAL MEDICINE
Payer: COMMERCIAL

## 2018-08-24 ENCOUNTER — APPOINTMENT (OUTPATIENT)
Dept: GENERAL RADIOLOGY | Age: 53
DRG: 637 | End: 2018-08-24
Attending: RADIOLOGY
Payer: COMMERCIAL

## 2018-08-24 ENCOUNTER — APPOINTMENT (OUTPATIENT)
Dept: GENERAL RADIOLOGY | Age: 53
DRG: 637 | End: 2018-08-24
Attending: INTERNAL MEDICINE
Payer: COMMERCIAL

## 2018-08-24 ENCOUNTER — APPOINTMENT (OUTPATIENT)
Dept: ULTRASOUND IMAGING | Age: 53
DRG: 637 | End: 2018-08-24
Attending: EMERGENCY MEDICINE
Payer: COMMERCIAL

## 2018-08-24 ENCOUNTER — APPOINTMENT (OUTPATIENT)
Dept: GENERAL RADIOLOGY | Age: 53
DRG: 637 | End: 2018-08-24
Attending: EMERGENCY MEDICINE
Payer: COMMERCIAL

## 2018-08-24 ENCOUNTER — APPOINTMENT (OUTPATIENT)
Dept: CT IMAGING | Age: 53
DRG: 637 | End: 2018-08-24
Attending: INTERNAL MEDICINE
Payer: COMMERCIAL

## 2018-08-24 DIAGNOSIS — E87.5 ACUTE HYPERKALEMIA: ICD-10-CM

## 2018-08-24 DIAGNOSIS — G93.41 ACUTE METABOLIC ENCEPHALOPATHY: ICD-10-CM

## 2018-08-24 DIAGNOSIS — E13.10 DIABETIC KETOACIDOSIS WITHOUT COMA ASSOCIATED WITH OTHER SPECIFIED DIABETES MELLITUS (HCC): Primary | ICD-10-CM

## 2018-08-24 DIAGNOSIS — N17.9 ACUTE RENAL FAILURE, UNSPECIFIED ACUTE RENAL FAILURE TYPE (HCC): ICD-10-CM

## 2018-08-24 PROBLEM — E11.10 DKA (DIABETIC KETOACIDOSES): Status: ACTIVE | Noted: 2018-08-24

## 2018-08-24 PROBLEM — A41.9 SEPSIS (HCC): Status: ACTIVE | Noted: 2018-08-24

## 2018-08-24 PROBLEM — E87.1 HYPONATREMIA: Status: ACTIVE | Noted: 2018-08-24

## 2018-08-24 LAB
ADMINISTERED INITIALS, ADMINIT: NORMAL
ALBUMIN SERPL-MCNC: 2.4 G/DL (ref 3.5–5)
ALBUMIN/GLOB SERPL: 0.5 {RATIO} (ref 1.1–2.2)
ALP SERPL-CCNC: 238 U/L (ref 45–117)
ALT SERPL-CCNC: 32 U/L (ref 12–78)
ANION GAP BLD CALC-SCNC: 28 MMOL/L (ref 10–20)
ANION GAP SERPL CALC-SCNC: 15 MMOL/L (ref 5–15)
ANION GAP SERPL CALC-SCNC: 16 MMOL/L (ref 5–15)
ANION GAP SERPL CALC-SCNC: 24 MMOL/L (ref 5–15)
APPEARANCE UR: ABNORMAL
AST SERPL-CCNC: 11 U/L (ref 15–37)
ATRIAL RATE: 89 BPM
BACTERIA URNS QL MICRO: ABNORMAL /HPF
BASOPHILS # BLD: 0 K/UL (ref 0–0.1)
BASOPHILS NFR BLD: 0 % (ref 0–1)
BILIRUB SERPL-MCNC: 1.1 MG/DL (ref 0.2–1)
BILIRUB UR QL: NEGATIVE
BUN BLD-MCNC: >140 MG/DL (ref 9–20)
BUN SERPL-MCNC: 151 MG/DL (ref 6–20)
BUN SERPL-MCNC: 187 MG/DL (ref 6–20)
BUN SERPL-MCNC: 198 MG/DL (ref 6–20)
BUN/CREAT SERPL: 19 (ref 12–20)
BUN/CREAT SERPL: 21 (ref 12–20)
BUN/CREAT SERPL: 22 (ref 12–20)
CA-I BLD-MCNC: 0.96 MMOL/L (ref 1.12–1.32)
CALCIUM SERPL-MCNC: 7.5 MG/DL (ref 8.5–10.1)
CALCIUM SERPL-MCNC: 7.8 MG/DL (ref 8.5–10.1)
CALCIUM SERPL-MCNC: 8 MG/DL (ref 8.5–10.1)
CALCULATED P AXIS, ECG09: 81 DEGREES
CALCULATED R AXIS, ECG10: 73 DEGREES
CALCULATED T AXIS, ECG11: 61 DEGREES
CHLORIDE BLD-SCNC: 91 MMOL/L (ref 98–107)
CHLORIDE SERPL-SCNC: 104 MMOL/L (ref 97–108)
CHLORIDE SERPL-SCNC: 87 MMOL/L (ref 97–108)
CHLORIDE SERPL-SCNC: 94 MMOL/L (ref 97–108)
CO2 BLD-SCNC: 13 MMOL/L (ref 21–32)
CO2 SERPL-SCNC: 11 MMOL/L (ref 21–32)
CO2 SERPL-SCNC: 23 MMOL/L (ref 21–32)
CO2 SERPL-SCNC: 24 MMOL/L (ref 21–32)
COLOR UR: ABNORMAL
CREAT BLD-MCNC: 9.5 MG/DL (ref 0.6–1.3)
CREAT SERPL-MCNC: 7.77 MG/DL (ref 0.55–1.02)
CREAT SERPL-MCNC: 8.68 MG/DL (ref 0.55–1.02)
CREAT SERPL-MCNC: 9.64 MG/DL (ref 0.55–1.02)
CREAT UR-MCNC: 30.5 MG/DL
D50 ADMINISTERED, D50ADM: 0 ML
D50 ORDER, D50ORD: 0 ML
DIAGNOSIS, 93000: NORMAL
DIFFERENTIAL METHOD BLD: ABNORMAL
EOSINOPHIL # BLD: 0 K/UL (ref 0–0.4)
EOSINOPHIL NFR BLD: 0 % (ref 0–7)
EPITH CASTS URNS QL MICRO: ABNORMAL /LPF
ERYTHROCYTE [DISTWIDTH] IN BLOOD BY AUTOMATED COUNT: 15.2 % (ref 11.5–14.5)
EST. AVERAGE GLUCOSE BLD GHB EST-MCNC: 197 MG/DL
GLOBULIN SER CALC-MCNC: 4.7 G/DL (ref 2–4)
GLSCOM COMMENTS: NORMAL
GLUCOSE BLD STRIP.AUTO-MCNC: 413 MG/DL (ref 65–100)
GLUCOSE BLD STRIP.AUTO-MCNC: 510 MG/DL (ref 65–100)
GLUCOSE BLD STRIP.AUTO-MCNC: 552 MG/DL (ref 65–100)
GLUCOSE BLD STRIP.AUTO-MCNC: >600 MG/DL (ref 65–100)
GLUCOSE BLD-MCNC: >700 MG/DL (ref 65–100)
GLUCOSE SERPL-MCNC: 1013 MG/DL (ref 65–100)
GLUCOSE SERPL-MCNC: 1338 MG/DL (ref 65–100)
GLUCOSE SERPL-MCNC: 516 MG/DL (ref 65–100)
GLUCOSE UR STRIP.AUTO-MCNC: >1000 MG/DL
GLUCOSE, GLC: 366 MG/DL
GLUCOSE, GLC: 413 MG/DL
GLUCOSE, GLC: 510 MG/DL
GLUCOSE, GLC: 600 MG/DL
GLUCOSE, GLC: 601 MG/DL
GLUCOSE, GLC: 601 MG/DL
HBA1C MFR BLD: 8.5 % (ref 4.2–6.3)
HCT VFR BLD AUTO: 28.5 % (ref 35–47)
HCT VFR BLD CALC: 31 % (ref 35–47)
HGB BLD-MCNC: 9.5 G/DL (ref 11.5–16)
HGB UR QL STRIP: ABNORMAL
HIGH TARGET, HITG: 250 MG/DL
IMM GRANULOCYTES # BLD: 0 K/UL (ref 0–0.04)
IMM GRANULOCYTES NFR BLD AUTO: 0 % (ref 0–0.5)
INSULIN ADMINSTERED, INSADM: 10.8 UNITS/HOUR
INSULIN ADMINSTERED, INSADM: 16.2 UNITS/HOUR
INSULIN ADMINSTERED, INSADM: 21.6 UNITS/HOUR
INSULIN ADMINSTERED, INSADM: 27 UNITS/HOUR
INSULIN ADMINSTERED, INSADM: 30.6 UNITS/HOUR
INSULIN ADMINSTERED, INSADM: 31.8 UNITS/HOUR
INSULIN ADMINSTERED, INSADM: 32.4 UNITS/HOUR
INSULIN ADMINSTERED, INSADM: 36 UNITS/HOUR
INSULIN ADMINSTERED, INSADM: 37.8 UNITS/HOUR
INSULIN ORDER, INSORD: 10.8 UNITS/HOUR
INSULIN ORDER, INSORD: 16.2 UNITS/HOUR
INSULIN ORDER, INSORD: 21.6 UNITS/HOUR
INSULIN ORDER, INSORD: 27 UNITS/HOUR
INSULIN ORDER, INSORD: 30.6 UNITS/HOUR
INSULIN ORDER, INSORD: 31.8 UNITS/HOUR
INSULIN ORDER, INSORD: 32.4 UNITS/HOUR
INSULIN ORDER, INSORD: 36 UNITS/HOUR
INSULIN ORDER, INSORD: 37.8 UNITS/HOUR
KETONES UR QL STRIP.AUTO: 15 MG/DL
LEUKOCYTE ESTERASE UR QL STRIP.AUTO: ABNORMAL
LIPASE SERPL-CCNC: 75 U/L (ref 73–393)
LOW TARGET, LOT: 150 MG/DL
LYMPHOCYTES # BLD: 0 K/UL (ref 0.8–3.5)
LYMPHOCYTES NFR BLD: 0 % (ref 12–49)
MAGNESIUM SERPL-MCNC: 2.6 MG/DL (ref 1.6–2.4)
MAGNESIUM SERPL-MCNC: 2.9 MG/DL (ref 1.6–2.4)
MAGNESIUM SERPL-MCNC: 3.4 MG/DL (ref 1.6–2.4)
MCH RBC QN AUTO: 31.4 PG (ref 26–34)
MCHC RBC AUTO-ENTMCNC: 33.3 G/DL (ref 30–36.5)
MCV RBC AUTO: 94.1 FL (ref 80–99)
MINUTES UNTIL NEXT BG, NBG: 60 MIN
MONOCYTES # BLD: 1.2 K/UL (ref 0–1)
MONOCYTES NFR BLD: 4 % (ref 5–13)
MULTIPLIER, MUL: 0.02
MULTIPLIER, MUL: 0.03
MULTIPLIER, MUL: 0.04
MULTIPLIER, MUL: 0.05
MULTIPLIER, MUL: 0.06
MULTIPLIER, MUL: 0.07
MULTIPLIER, MUL: 0.08
MULTIPLIER, MUL: 0.09
MULTIPLIER, MUL: 0.1
MYELOCYTES NFR BLD MANUAL: 1 %
NEUTS BAND NFR BLD MANUAL: 7 %
NEUTS SEG # BLD: 29.3 K/UL (ref 1.8–8)
NEUTS SEG NFR BLD: 88 % (ref 32–75)
NITRITE UR QL STRIP.AUTO: NEGATIVE
NRBC # BLD: 0 K/UL (ref 0–0.01)
NRBC BLD-RTO: 0 PER 100 WBC
ORDER INITIALS, ORDINIT: NORMAL
P-R INTERVAL, ECG05: 142 MS
PH UR STRIP: 5 [PH] (ref 5–8)
PHOSPHATE SERPL-MCNC: 7.7 MG/DL (ref 2.6–4.7)
PHOSPHATE SERPL-MCNC: 9.7 MG/DL (ref 2.6–4.7)
PLATELET # BLD AUTO: 156 K/UL (ref 150–400)
POTASSIUM BLD-SCNC: 7.7 MMOL/L (ref 3.5–5.1)
POTASSIUM SERPL-SCNC: 3.9 MMOL/L (ref 3.5–5.1)
POTASSIUM SERPL-SCNC: 5.4 MMOL/L (ref 3.5–5.1)
POTASSIUM SERPL-SCNC: 7.4 MMOL/L (ref 3.5–5.1)
PROT SERPL-MCNC: 7.1 G/DL (ref 6.4–8.2)
PROT UR STRIP-MCNC: 100 MG/DL
Q-T INTERVAL, ECG07: 382 MS
QRS DURATION, ECG06: 88 MS
QTC CALCULATION (BEZET), ECG08: 464 MS
RBC # BLD AUTO: 3.03 M/UL (ref 3.8–5.2)
RBC #/AREA URNS HPF: ABNORMAL /HPF (ref 0–5)
RBC MORPH BLD: ABNORMAL
SERVICE CMNT-IMP: ABNORMAL
SODIUM BLD-SCNC: 124 MMOL/L (ref 136–145)
SODIUM SERPL-SCNC: 122 MMOL/L (ref 136–145)
SODIUM SERPL-SCNC: 134 MMOL/L (ref 136–145)
SODIUM SERPL-SCNC: 142 MMOL/L (ref 136–145)
SODIUM UR-SCNC: 71 MMOL/L
SP GR UR REFRACTOMETRY: 1.02 (ref 1–1.03)
UA: UC IF INDICATED,UAUC: ABNORMAL
UROBILINOGEN UR QL STRIP.AUTO: 1 EU/DL (ref 0.2–1)
VENTRICULAR RATE, ECG03: 89 BPM
WBC # BLD AUTO: 30.8 K/UL (ref 3.6–11)
WBC URNS QL MICRO: ABNORMAL /HPF (ref 0–4)

## 2018-08-24 PROCEDURE — 93005 ELECTROCARDIOGRAM TRACING: CPT

## 2018-08-24 PROCEDURE — 80047 BASIC METABLC PNL IONIZED CA: CPT

## 2018-08-24 PROCEDURE — 80053 COMPREHEN METABOLIC PANEL: CPT | Performed by: EMERGENCY MEDICINE

## 2018-08-24 PROCEDURE — 74011000258 HC RX REV CODE- 258: Performed by: INTERNAL MEDICINE

## 2018-08-24 PROCEDURE — 77030034849

## 2018-08-24 PROCEDURE — 76937 US GUIDE VASCULAR ACCESS: CPT

## 2018-08-24 PROCEDURE — 83735 ASSAY OF MAGNESIUM: CPT | Performed by: EMERGENCY MEDICINE

## 2018-08-24 PROCEDURE — 96365 THER/PROPH/DIAG IV INF INIT: CPT

## 2018-08-24 PROCEDURE — 74011250637 HC RX REV CODE- 250/637: Performed by: INTERNAL MEDICINE

## 2018-08-24 PROCEDURE — 65660000000 HC RM CCU STEPDOWN

## 2018-08-24 PROCEDURE — 74011636637 HC RX REV CODE- 636/637: Performed by: INTERNAL MEDICINE

## 2018-08-24 PROCEDURE — 74011250636 HC RX REV CODE- 250/636: Performed by: EMERGENCY MEDICINE

## 2018-08-24 PROCEDURE — 83036 HEMOGLOBIN GLYCOSYLATED A1C: CPT | Performed by: EMERGENCY MEDICINE

## 2018-08-24 PROCEDURE — 74011250636 HC RX REV CODE- 250/636: Performed by: INTERNAL MEDICINE

## 2018-08-24 PROCEDURE — 70450 CT HEAD/BRAIN W/O DYE: CPT

## 2018-08-24 PROCEDURE — 76770 US EXAM ABDO BACK WALL COMP: CPT

## 2018-08-24 PROCEDURE — 85025 COMPLETE CBC W/AUTO DIFF WBC: CPT | Performed by: EMERGENCY MEDICINE

## 2018-08-24 PROCEDURE — 87340 HEPATITIS B SURFACE AG IA: CPT | Performed by: INTERNAL MEDICINE

## 2018-08-24 PROCEDURE — 96375 TX/PRO/DX INJ NEW DRUG ADDON: CPT

## 2018-08-24 PROCEDURE — 82570 ASSAY OF URINE CREATININE: CPT | Performed by: INTERNAL MEDICINE

## 2018-08-24 PROCEDURE — 77030018719 HC DRSG PTCH ANTIMIC J&J -A

## 2018-08-24 PROCEDURE — 90935 HEMODIALYSIS ONE EVALUATION: CPT

## 2018-08-24 PROCEDURE — 80048 BASIC METABOLIC PNL TOTAL CA: CPT | Performed by: INTERNAL MEDICINE

## 2018-08-24 PROCEDURE — 87186 SC STD MICRODIL/AGAR DIL: CPT | Performed by: EMERGENCY MEDICINE

## 2018-08-24 PROCEDURE — 84100 ASSAY OF PHOSPHORUS: CPT | Performed by: INTERNAL MEDICINE

## 2018-08-24 PROCEDURE — C1752 CATH,HEMODIALYSIS,SHORT-TERM: HCPCS

## 2018-08-24 PROCEDURE — 74011000250 HC RX REV CODE- 250: Performed by: INTERNAL MEDICINE

## 2018-08-24 PROCEDURE — 71045 X-RAY EXAM CHEST 1 VIEW: CPT

## 2018-08-24 PROCEDURE — 82962 GLUCOSE BLOOD TEST: CPT

## 2018-08-24 PROCEDURE — 51702 INSERT TEMP BLADDER CATH: CPT

## 2018-08-24 PROCEDURE — 83735 ASSAY OF MAGNESIUM: CPT | Performed by: INTERNAL MEDICINE

## 2018-08-24 PROCEDURE — 36415 COLL VENOUS BLD VENIPUNCTURE: CPT | Performed by: INTERNAL MEDICINE

## 2018-08-24 PROCEDURE — 84300 ASSAY OF URINE SODIUM: CPT | Performed by: INTERNAL MEDICINE

## 2018-08-24 PROCEDURE — 96361 HYDRATE IV INFUSION ADD-ON: CPT

## 2018-08-24 PROCEDURE — 87077 CULTURE AEROBIC IDENTIFY: CPT | Performed by: EMERGENCY MEDICINE

## 2018-08-24 PROCEDURE — 81001 URINALYSIS AUTO W/SCOPE: CPT | Performed by: EMERGENCY MEDICINE

## 2018-08-24 PROCEDURE — 87086 URINE CULTURE/COLONY COUNT: CPT | Performed by: EMERGENCY MEDICINE

## 2018-08-24 PROCEDURE — 84100 ASSAY OF PHOSPHORUS: CPT | Performed by: EMERGENCY MEDICINE

## 2018-08-24 PROCEDURE — 5A1D70Z PERFORMANCE OF URINARY FILTRATION, INTERMITTENT, LESS THAN 6 HOURS PER DAY: ICD-10-PCS | Performed by: INTERNAL MEDICINE

## 2018-08-24 PROCEDURE — 74019 RADEX ABDOMEN 2 VIEWS: CPT

## 2018-08-24 PROCEDURE — 83690 ASSAY OF LIPASE: CPT | Performed by: EMERGENCY MEDICINE

## 2018-08-24 PROCEDURE — 74011250636 HC RX REV CODE- 250/636: Performed by: RADIOLOGY

## 2018-08-24 PROCEDURE — C1892 INTRO/SHEATH,FIXED,PEEL-AWAY: HCPCS

## 2018-08-24 PROCEDURE — 99285 EMERGENCY DEPT VISIT HI MDM: CPT

## 2018-08-24 PROCEDURE — 74176 CT ABD & PELVIS W/O CONTRAST: CPT

## 2018-08-24 PROCEDURE — 74011000258 HC RX REV CODE- 258: Performed by: EMERGENCY MEDICINE

## 2018-08-24 PROCEDURE — 74011636637 HC RX REV CODE- 636/637: Performed by: EMERGENCY MEDICINE

## 2018-08-24 RX ORDER — DEXTROSE 50 % IN WATER (D50W) INTRAVENOUS SYRINGE
25-50 AS NEEDED
Status: DISCONTINUED | OUTPATIENT
Start: 2018-08-24 | End: 2018-08-24

## 2018-08-24 RX ORDER — MORPHINE SULFATE 2 MG/ML
2 INJECTION, SOLUTION INTRAMUSCULAR; INTRAVENOUS
Status: DISCONTINUED | OUTPATIENT
Start: 2018-08-24 | End: 2018-08-24

## 2018-08-24 RX ORDER — INSULIN LISPRO 100 [IU]/ML
INJECTION, SOLUTION INTRAVENOUS; SUBCUTANEOUS
Status: DISCONTINUED | OUTPATIENT
Start: 2018-08-24 | End: 2018-08-24

## 2018-08-24 RX ORDER — LIDOCAINE HYDROCHLORIDE 20 MG/ML
10 INJECTION, SOLUTION INFILTRATION; PERINEURAL ONCE
Status: COMPLETED | OUTPATIENT
Start: 2018-08-24 | End: 2018-08-24

## 2018-08-24 RX ORDER — FENTANYL CITRATE 50 UG/ML
50 INJECTION, SOLUTION INTRAMUSCULAR; INTRAVENOUS
Status: DISCONTINUED | OUTPATIENT
Start: 2018-08-24 | End: 2018-09-06 | Stop reason: HOSPADM

## 2018-08-24 RX ORDER — HEPARIN 100 UNIT/ML
500 SYRINGE INTRAVENOUS ONCE
Status: COMPLETED | OUTPATIENT
Start: 2018-08-24 | End: 2018-08-24

## 2018-08-24 RX ORDER — SODIUM CHLORIDE 9 MG/ML
1000 INJECTION, SOLUTION INTRAVENOUS ONCE
Status: COMPLETED | OUTPATIENT
Start: 2018-08-24 | End: 2018-08-24

## 2018-08-24 RX ORDER — INSULIN LISPRO 100 [IU]/ML
INJECTION, SOLUTION INTRAVENOUS; SUBCUTANEOUS
Status: DISCONTINUED | OUTPATIENT
Start: 2018-08-25 | End: 2018-08-25

## 2018-08-24 RX ORDER — SODIUM CHLORIDE 9 MG/ML
100 INJECTION, SOLUTION INTRAVENOUS CONTINUOUS
Status: DISCONTINUED | OUTPATIENT
Start: 2018-08-24 | End: 2018-08-25

## 2018-08-24 RX ORDER — SODIUM POLYSTYRENE SULFONATE 15 G/60ML
30 SUSPENSION ORAL; RECTAL
Status: COMPLETED | OUTPATIENT
Start: 2018-08-24 | End: 2018-08-24

## 2018-08-24 RX ORDER — MAGNESIUM SULFATE 100 %
4 CRYSTALS MISCELLANEOUS AS NEEDED
Status: DISCONTINUED | OUTPATIENT
Start: 2018-08-24 | End: 2018-08-24

## 2018-08-24 RX ORDER — NAPROXEN SODIUM 220 MG
220 TABLET ORAL
COMMUNITY
End: 2018-09-06

## 2018-08-24 RX ORDER — MAGNESIUM SULFATE 100 %
4 CRYSTALS MISCELLANEOUS AS NEEDED
Status: DISCONTINUED | OUTPATIENT
Start: 2018-08-24 | End: 2018-08-25

## 2018-08-24 RX ORDER — HEPARIN SODIUM 5000 [USP'U]/ML
5000 INJECTION, SOLUTION INTRAVENOUS; SUBCUTANEOUS EVERY 12 HOURS
Status: DISCONTINUED | OUTPATIENT
Start: 2018-08-24 | End: 2018-09-01

## 2018-08-24 RX ORDER — DEXTROSE 50 % IN WATER (D50W) INTRAVENOUS SYRINGE
25-50 AS NEEDED
Status: DISCONTINUED | OUTPATIENT
Start: 2018-08-24 | End: 2018-08-25

## 2018-08-24 RX ORDER — SODIUM BICARBONATE 1 MEQ/ML
100 SYRINGE (ML) INTRAVENOUS
Status: COMPLETED | OUTPATIENT
Start: 2018-08-24 | End: 2018-08-24

## 2018-08-24 RX ORDER — HEPARIN SODIUM 200 [USP'U]/100ML
200 INJECTION, SOLUTION INTRAVENOUS ONCE
Status: COMPLETED | OUTPATIENT
Start: 2018-08-24 | End: 2018-08-24

## 2018-08-24 RX ORDER — ACETAMINOPHEN 325 MG/1
650 TABLET ORAL
Status: DISCONTINUED | OUTPATIENT
Start: 2018-08-24 | End: 2018-09-06 | Stop reason: HOSPADM

## 2018-08-24 RX ORDER — LABETALOL HYDROCHLORIDE 5 MG/ML
10 INJECTION, SOLUTION INTRAVENOUS
Status: DISCONTINUED | OUTPATIENT
Start: 2018-08-24 | End: 2018-09-06 | Stop reason: HOSPADM

## 2018-08-24 RX ORDER — CALCIUM GLUCONATE 94 MG/ML
1 INJECTION, SOLUTION INTRAVENOUS
Status: DISCONTINUED | OUTPATIENT
Start: 2018-08-24 | End: 2018-08-24

## 2018-08-24 RX ADMIN — SODIUM CHLORIDE, PRESERVATIVE FREE 500 UNITS: 5 INJECTION INTRAVENOUS at 16:35

## 2018-08-24 RX ADMIN — SODIUM CHLORIDE 16.2 UNITS/HR: 900 INJECTION, SOLUTION INTRAVENOUS at 15:39

## 2018-08-24 RX ADMIN — SODIUM CHLORIDE 100 ML/HR: 900 INJECTION, SOLUTION INTRAVENOUS at 18:19

## 2018-08-24 RX ADMIN — SODIUM CHLORIDE 1000 ML: 900 INJECTION, SOLUTION INTRAVENOUS at 12:00

## 2018-08-24 RX ADMIN — LIDOCAINE HYDROCHLORIDE 200 MG: 20 INJECTION, SOLUTION INFILTRATION; PERINEURAL at 16:35

## 2018-08-24 RX ADMIN — HEPARIN SODIUM IN SODIUM CHLORIDE 200 UNITS: 200 INJECTION INTRAVENOUS at 16:35

## 2018-08-24 RX ADMIN — SODIUM CHLORIDE 27 UNITS/HR: 900 INJECTION, SOLUTION INTRAVENOUS at 18:18

## 2018-08-24 RX ADMIN — SODIUM CHLORIDE 31.8 UNITS/HR: 900 INJECTION, SOLUTION INTRAVENOUS at 22:55

## 2018-08-24 RX ADMIN — CEFTRIAXONE 1 G: 1 INJECTION, POWDER, FOR SOLUTION INTRAMUSCULAR; INTRAVENOUS at 17:10

## 2018-08-24 RX ADMIN — SODIUM CHLORIDE 32.4 UNITS/HR: 900 INJECTION, SOLUTION INTRAVENOUS at 19:20

## 2018-08-24 RX ADMIN — SODIUM CHLORIDE 1000 ML: 900 INJECTION, SOLUTION INTRAVENOUS at 13:22

## 2018-08-24 RX ADMIN — SODIUM BICARBONATE 100 MEQ: 84 INJECTION INTRAVENOUS at 17:03

## 2018-08-24 RX ADMIN — SODIUM POLYSTYRENE SULFONATE 30 G: 15 SUSPENSION ORAL; RECTAL at 17:15

## 2018-08-24 RX ADMIN — ACETAMINOPHEN 650 MG: 325 TABLET ORAL at 23:51

## 2018-08-24 RX ADMIN — CALCIUM GLUCONATE 1 G: 94 INJECTION, SOLUTION INTRAVENOUS at 14:34

## 2018-08-24 RX ADMIN — HEPARIN SODIUM 5000 UNITS: 5000 INJECTION INTRAVENOUS; SUBCUTANEOUS at 17:15

## 2018-08-24 RX ADMIN — SODIUM CHLORIDE 10.8 UNITS/HR: 900 INJECTION, SOLUTION INTRAVENOUS at 14:26

## 2018-08-24 NOTE — ED NOTES
Bedside and Verbal shift change received from [facility]. Report included the following information: SBAR, ED Summary, MAR and Recent Results. Patient off the floor to CT at this time.

## 2018-08-24 NOTE — IP AVS SNAPSHOT
[address] [place] [address] [address] [facility] 83. 
[phone] Patient: Pari Hercules MRN: [id number] DLU:[id number] About your hospitalization You were admitted on:  August 24, 2018 You last received care in the:  [facility] You were discharged on:  September 6, 2018 Why you were hospitalized Your primary diagnosis was:  Not on File Your diagnoses also included:  Dka (Diabetic Ketoacidoses) (Hcc), Sepsis (Hcc), Uncontrolled Type 2 Dm With Hyperosmolar Nonketotic Hyperglycemia (Hcc), Hyponatremia, Arf (Acute Renal Failure) (Hcc), Hyperkalemia Follow-up Information Follow up With Details Comments Contact Info Caitie Victoria MD Go on 9/12/2018 For hospital follow up appointment at 12:00PM  [address] [facility] [address] [facility] 83. 
[phone] Leonora Merino MD On 9/18/2018 1: 30 pm.  Please come 30 min to 1 hour  because you are a New patient [address] [facility] 
[address] [facility] 83. 
[phone] Edita Herrera MD Schedule an appointment as soon as possible for a visit 7-10 days [address] [facility] [address] [facility] 83. 
[phone] Your Scheduled Appointments Wednesday September 12, 2018 12:00 PM EDT Office Visit with Caitie Victoria MD  
[facility] at [address]) [address] [place] [address] Nathaniel [address] [facility] 83.  
[phone] Discharge Orders None A check humaira indicates which time of day the medication should be taken. My Medications CONTINUE taking these medications Instructions Each Dose to Equal  
 Morning Noon Evening Bedtime * albuterol 2.5 mg /3 mL (0.083 %) nebulizer solution Commonly known as:  PROVENTIL VENTOLIN Your last dose was: Your next dose is: 2.5 mg by Nebulization route every four (4) hours as needed for Shortness of Breath. 2.5 mg  
    
   
   
   
  
 * albuterol 90 mcg/actuation inhaler Commonly known as:  PROVENTIL HFA, VENTOLIN HFA, PROAIR HFA Your last dose was: Your next dose is: Take 2 Puffs by inhalation every four (4) hours as needed for Wheezing. 2 Puff BASAGLAR KWIKPEN U-100 INSULIN 100 unit/mL (3 mL) Inpn Generic drug:  insulin glargine Your last dose was: Your next dose is: TAKE 34 UNITS ONCE DAILY SUBCUTANEOUSLY * Notice: This list has 2 medication(s) that are the same as other medications prescribed for you. Read the directions carefully, and ask your doctor or other care provider to review them with you. STOP taking these medications ALEVE 220 mg tablet Generic drug:  naproxen sodium  
   
  
 glimepiride 1 mg tablet Commonly known as:  HEIDI  
   
  
  
ASK your doctor about these medications Instructions Each Dose to Equal  
 Morning Noon Evening Bedtime  
 naproxen sodium 220 mg tablet Commonly known as:  Anh Said Your last dose was: Your next dose is: Take 1 Tab by mouth two (2) times daily (with meals). 220 mg Discharge Instructions HOSPITALIST DISCHARGE INSTRUCTIONS 
 
NAME: Bonny Werner :  1965 MRN:  [id number] Date/Time:  2018 11:42 AM 
 
ADMIT DATE: 2018 DISCHARGE DATE: 2018 Diagnosis Acute Renal Failure S/P Transient Hemodialysis due to Hyperkalemia 2nd to Dehydration, ATN, ? Role of Nsaids Admitting , Cr 9.64, K was 7.4 Discharge BUN 43    Cr 2.3   K is  4.7 Diabetes, SHANTEL, pancreatic Atrophy Admitting Blood sugar is 1338 A1c is 8.4 E COli UTI, POA  Completed treatment in hospital 
 Severe Leukocytosis likely a reflection of Hemoconcentration ? Dehydration, also now resolved with Hydration Anemia of Chronic Disease 2nd to CKD Patient treated in hospital  8/24 to 9/6, 2018 · It is important that you take the medication exactly as they are prescribed. · Keep your medication in the bottles provided by the pharmacist and keep a list of the medication names, dosages, and times to be taken in your wallet. · Do not take other medications without consulting your doctor. What to do at [facility] Recommended diet:  Diabetic Diet  No concentrated Sweets Recommended activity: Activity as tolerated If you have questions regarding the hospital related prescriptions or hospital related issues please call [facility] at [phone]. You can always direct your questions to your primary care doctor if you are unable to reach your hospital physician; your PCP works as an extension of your hospital doctor just like your hospital doctor is an extension of your PCP for your time at the hospital [facility], [facility]) If you experience any of the following symptoms then please call your primary care physician or return to the emergency room if you cannot get hold of your doctor: 
 
Fever, chills, nausea, vomiting, or persistent diarrhea Worsening weakness or new problems with your speech or balance Dark stools or visible blood in your stools New Leg swelling or shortness of breath as these could be signs of a clot Additional Instructions: 
 
PLease Start Insulin at 20  units a day then increase by 4 units every 3 days or ONLY if your Blood sugars are between 100-200 until  to your usual 34 Units a day STOP AMARYL until you see Dr. Sammy Valero STOP TAKING any Over the counter Pain Meds:  No Alleve, Naproxen, Motrin, Ibuprofen DRINK  At least 3 Pitcher fulls of your Jim May  At least 1.5 Liters to 2 Liters of WATER on a hot day Please make sure you Followup with the Nephrologist 
 
 
 Bring these papers with you to your follow up appointments. The papers will help your doctors be sure to continue the care plan from the hospital. 
 
 
 
 
 
 
Information obtained by : 
I understand that if any problems occur once I am at home I am to contact my physician. I understand and acknowledge receipt of the instructions indicated above. Physician's or R.N.'s Signature                                                                  Date/Time Patient or Representative Signature [employer] Announcement We are excited to announce that we are making your provider's discharge notes available to you in [employer]. You will see these notes when they are completed and signed by the physician that discharged you from your recent hospital stay. If you have any questions or concerns about any information you see in Branded [employer], please call the Health Information Department where you were seen or reach out to your Primary Care Provider for more information about your plan of care. Introducing Lalit Mccoy As a [employer] patient, I wanted to make you aware of our electronic visit tool called Lalit [employer]ik[employer]. [employer] 24/7 allows you to connect within minutes with a medical provider 24 hours a day, seven days a week via a mobile device or tablet or logging into a secure website from your computer. You can access Lalit Mccoy from anywhere in the [place].  
 
A virtual visit might be right for you when you have a simple condition and feel like you just dont want to get out of bed, or cant get away from work for an appointment, when your regular [facility] provider is not available (evenings, weekends or holidays), or when youre out of town and need minor care. Electronic visits cost only $49 and if the [facility] 24/7 provider determines a prescription is needed to treat your condition, one can be electronically transmitted to a nearby pharmacy*. Please take a moment to enroll today if you have not already done so. The enrollment process is free and takes just a few minutes. To enroll, please download the [facility] 24/7 sara to your tablet or phone, or visit [email or web address] to enroll on your computer. And, as an [address] [place] [address] patient with a [employer] account, the results of your visits will be scanned into your electronic medical record and your primary care provider will be able to view the scanned results. We urge you to continue to see your regular [facility] provider for your ongoing medical care. And while your primary care provider may not be the one available when you seek a [facility] virtual visit, the peace of mind you get from getting a real diagnosis real time can be priceless. For more information on [employer], view our Frequently Asked Questions (FAQs) at [email or web address]. Sincerely, 
 
Sabine Hamilton MD 
Chief Medical Officer [address] *:  certain medications cannot be prescribed via [employer] Unresulted Labs-Please follow up with your PCP about these lab tests Order Current Status CULTURE, BLOOD Preliminary result CULTURE, BLOOD Preliminary result Providers Seen During Your Hospitalization Provider Specialty Primary office phone Abdullahi Reyes DO Emergency Medicine [phone] Roddy Oshea MD Internal Medicine [phone] Sukhi Adkins MD Internal Medicine [phone] Your Primary Care Physician (PCP) Primary Care Physician Office Phone Office Fax Norm Lay [phone] You are allergic to the following Allergen Reactions Contrast Agent (Iodine) Itching Hydrocodone Rash Percocet (Oxycodone-Acetaminophen) Rash Codeine Nausea and Vomiting Recent Documentation Height Weight BMI OB Status Smoking Status 1.549 m 44.5 kg 18.54 kg/m2 Menopause Never Smoker Emergency Contacts Name Discharge Info Relation Home Work Mobile [address] CAREGIVER [3] Other Relative [6] [phone] Patient Belongings The following personal items are in your possession at time of discharge: 
  Dental Appliances: None  Visual Aid: None      Home Medications: None   Jewelry: None  Clothing: Footwear, Jacket/Coat, Pants, Pajamas, Shirt, With patient, At bedside    Other Valuables: None Please provide this summary of care documentation to your next provider. Signatures-by signing, you are acknowledging that this After Visit Summary has been reviewed with you and you have received a copy. Patient Signature:  ____________________________________________________________ Date:  ____________________________________________________________  
  
Abel Craft Provider Signature:  ____________________________________________________________ Date:  ____________________________________________________________

## 2018-08-24 NOTE — ED NOTES
Pt to CT. Bedside shift change report given to Chaim Allison 42  (oncoming nurse) by Krys Massey RN (offgoing nurse). Report included the following information SBAR, Kardex, ED Summary and MAR.

## 2018-08-24 NOTE — H&P
Hospitalist Admission Note    NAME: Rosette Harp   :  1965   MRN:  [id number]     Date/Time:  2018 3:11 PM    Patient PCP: Татьяна Smith MD  ______________________________________________________________________  Given the patient's current clinical presentation, I have a high level of concern for decompensation if discharged from the emergency department. Complex decision making was performed, which includes reviewing the patient's available past medical records, laboratory results, and x-ray films. My assessment of this patient's clinical condition and my plan of care is as follows. Assessment / Plan:  DKA: will start Insulin drip, monitor GAP, keep NPO for now, start IVF. ARF: unknown baseline, but seems to be all pre-renal, check US to r/o obstruction, Renal help appreciated. Hyperkalemia: given Insulin, Calcium Gluconate, Bicarbonate and Kayexalate, monitor, Renal help appreciated. Sepsis POA: tachycardia, leukocytosis, UTI, start CTX, F/U cultures. UTI: start CTX, F/U cultures. Hyponatremia: likely 2ry to DKA, c/w IVF and monitor. Abdominal Pain and Gastric Distention: will keep NPO, check CT abdomen, monitor., give symptomatic treatment for now. Code Status: Full Code  Surrogate Decision Maker: Cheryle Genta aunt   DVT Prophylaxis: Heparin  GI Prophylaxis: PPI  Baseline: fairly independent, but very weak lately      Subjective:   CHIEF COMPLAINT: \"I feel very weak and tired\"    HISTORY OF PRESENT ILLNESS:     Eugene Peace is a 45 y.o.  female  with PMHx significant for DM, pancreatic atrophy, presents to the ED with cc of low blood sugar x 1 week. She reports HA, nausea, and mild abd pain x 5 days. Pt reports mild chest discomfort on exertion. Pt's friend reports worsening fatigue, noting pt is not normally this lethargic. Pt states she has been checking her sugars twice a day, once in the morning and once at night.  She notes that her numbers have been in the 50s both times of day over the past week. Pt endorses taking her insulin as directed, 40 units BID; last dose was last night. She affirms eating normally. Pt reports that she saw her doctor 4 months ago at which point everything is fine. She states she has a regular follow up scheduled for November. Pt denies associated syncope. She denies black or bloody stool. At this time patient is lying in bed c/o abdominal pain, feeling weak and tired not eating nor drinking at home, constipated for 1 week having Nausea and vomiting, denies chest pain, no SOB, no fever, no diarrhea, no other associated symptoms. We were asked to admit for work up and evaluation of the above problems. Past Medical History:   Diagnosis Date    Asthma     Diabetes ([facility] [facility]     Elevated transaminase level 6/29/2016    Insulin dependent diabetes mellitus ([facility] [address] 6/20/2016    Pancreatic atrophy 6/20/2016        Past Surgical History:   Procedure Laterality Date    HX HEENT         Social History   Substance Use Topics    Smoking status: Never Smoker    Smokeless tobacco: Never Used    Alcohol use Yes      Comment: occasionally        Family History   Problem Relation Age of Onset    Cancer Father      prostate and colon    Diabetes Mother     Diabetes Maternal Grandmother     Cancer Maternal Aunt      breast     Allergies   Allergen Reactions    Contrast Agent [Iodine] Itching    Hydrocodone Rash    Percocet [Oxycodone-Acetaminophen] Rash    Codeine Nausea and Vomiting        Prior to Admission medications    Medication Sig Start Date End Date Taking? Authorizing Provider   naproxen sodium (ALEVE) 220 mg tablet Take 220 mg by mouth two (2) times daily as needed for Pain. Yes Ede Holland MD   glimepiride (AMARYL) 1 mg tablet Take 1 Tab by mouth Daily (before breakfast).  7/30/18  Yes MD ERNESTO Becker U-100 INSULIN 100 unit/mL (3 mL) inpn TAKE 34 UNITS ONCE DAILY SUBCUTANEOUSLY 7/30/18  Yes Kareem Hale MD   albuterol (PROVENTIL HFA, VENTOLIN HFA, PROAIR HFA) 90 mcg/actuation inhaler Take 2 Puffs by inhalation every four (4) hours as needed for Wheezing. 5/18/18  Yes Aidan Miranda MD   albuterol (PROVENTIL VENTOLIN) 2.5 mg /3 mL (0.083 %) nebulizer solution 2.5 mg by Nebulization route every four (4) hours as needed for Shortness of Breath. 5/13/16  Yes Historical Provider       REVIEW OF SYSTEMS:     I am not able to complete the review of systems because:    The patient is intubated and sedated    The patient has altered mental status due to his acute medical problems    The patient has baseline aphasia from prior stroke(s)    The patient has baseline dementia and is not reliable historian    The patient is in acute medical distress and unable to provide information           Total of 12 systems reviewed as follows:       POSITIVE= underlined text  Negative = text not underlined  General:  fever, chills, sweats, generalized weakness, weight loss/gain,      loss of appetite   Eyes:    blurred vision, eye pain, loss of vision, double vision  ENT:    rhinorrhea, pharyngitis   Respiratory:   cough, sputum production, SOB, GARLAND, wheezing, pleuritic pain   Cardiology:   chest pain, palpitations, orthopnea, PND, edema, syncope   Gastrointestinal:  abdominal pain , N/V, diarrhea, dysphagia, constipation, bleeding   Genitourinary:  frequency, urgency, dysuria, hematuria, incontinence   Muskuloskeletal :  arthralgia, myalgia, back pain  Hematology:  easy bruising, nose or gum bleeding, lymphadenopathy   Dermatological: rash, ulceration, pruritis, color change / jaundice  Endocrine:   hot flashes or polydipsia   Neurological:  headache, dizziness, confusion, focal weakness, paresthesia,     Speech difficulties, memory loss, gait difficulty  Psychological: Feelings of anxiety, depression, agitation    Objective:   VITALS:    Visit Vitals    /60    Pulse 89    Temp 97.3 °F (36.3 °C)    Resp 18    Ht 5' 1\" (1.549 m)    Wt 45.4 kg (100 lb)    SpO2 97%    BMI 18.89 kg/m2       PHYSICAL EXAM:    General:    Alert, cooperative, no distress, appears stated age. HEENT: Atraumatic, anicteric sclerae, pink conjunctivae     No oral ulcers, mucosa moist, throat clear, dentition poor  Neck:  Supple, symmetrical,  thyroid: non tender  Lungs:   Coarse BS  Chest wall:  No tenderness  No Accessory muscle use. Heart:   Regular  rhythm,  No  murmur   No edema  Abdomen:   Soft, diffuse tender. +  distended. Bowel sounds normal  Extremities: No cyanosis. No clubbing,      Skin turgor normal, Capillary refill normal, Radial  pulse 2+  Skin:     Not pale. Not Jaundiced  No rashes   Psych:  Good insight. Not depressed. Not anxious or agitated. Neurologic: EOMs intact. No facial asymmetry. No aphasia or slurred speech. Symmetrical strength, Sensation grossly intact. Alert and oriented X 4.     _______________________________________________________________________  Care Plan discussed with:    Comments   Patient y    Family      RN y    Care Manager                    Consultant:      _______________________________________________________________________  Expected  Disposition:   Home with Family    HH/PT/OT/RN y   SNF/LTC    JABARI    ________________________________________________________________________  TOTAL TIME:   Minutes    Critical Care Provided  61   Minutes non procedure based      Comments    y Reviewed previous records   >50% of visit spent in counseling and coordination of care y Discussion with patient and/or family and questions answered       ________________________________________________________________________  Signed: Vinny Cox MD    Procedures: see electronic medical records for all procedures/Xrays and details which were not copied into this note but were reviewed prior to creation of Plan.     LAB DATA REVIEWED:    Recent Results (from the past 24 hour(s))   GLUCOSE, POC    Collection Time: 08/24/18 11:12 AM   Result Value Ref Range    Glucose (POC) >600 (HH) 65 - 100 mg/dL    Performed by [address] [place] [address] Drive, POC    Collection Time: 08/24/18 11:13 AM   Result Value Ref Range    Glucose (POC) >600 (HH) 65 - 100 mg/dL    Performed by Zeenat Lynne    CBC WITH AUTOMATED DIFF    Collection Time: 08/24/18 11:50 AM   Result Value Ref Range    WBC 30.8 (H) 3.6 - 11.0 K/uL    RBC 3.03 (L) 3.80 - 5.20 M/uL    HGB 9.5 (L) 11.5 - 16.0 g/dL    HCT 28.5 (L) 35.0 - 47.0 %    MCV 94.1 80.0 - 99.0 FL    MCH 31.4 26.0 - 34.0 PG    MCHC 33.3 30.0 - 36.5 g/dL    RDW 15.2 (H) 11.5 - 14.5 %    PLATELET 657 158 - 846 K/uL    NRBC 0.0 0  WBC    ABSOLUTE NRBC 0.00 0.00 - 0.01 K/uL    NEUTROPHILS 88 (H) 32 - 75 %    BAND NEUTROPHILS 7 %    LYMPHOCYTES 0 (L) 12 - 49 %    MONOCYTES 4 (L) 5 - 13 %    EOSINOPHILS 0 0 - 7 %    BASOPHILS 0 0 - 1 %    MYELOCYTES 1 %    IMMATURE GRANULOCYTES 0 0.0 - 0.5 %    ABS. NEUTROPHILS 29.3 (H) 1.8 - 8.0 K/UL    ABS. LYMPHOCYTES 0.0 (L) 0.8 - 3.5 K/UL    ABS. MONOCYTES 1.2 (H) 0.0 - 1.0 K/UL    ABS. EOSINOPHILS 0.0 0.0 - 0.4 K/UL    ABS. BASOPHILS 0.0 0.0 - 0.1 K/UL    ABS. IMM.  GRANS. 0.0 0.00 - 0.04 K/UL    DF MANUAL      RBC COMMENTS NORMOCYTIC, NORMOCHROMIC     HEMOGLOBIN A1C WITH EAG    Collection Time: 08/24/18 11:50 AM   Result Value Ref Range    Hemoglobin A1c 8.5 (H) 4.2 - 6.3 %    Est. average glucose 905 mg/dL   METABOLIC PANEL, COMPREHENSIVE    Collection Time: 08/24/18  1:05 PM   Result Value Ref Range    Sodium 122 (L) 136 - 145 mmol/L    Potassium 7.4 (HH) 3.5 - 5.1 mmol/L    Chloride 87 (L) 97 - 108 mmol/L    CO2 11 (LL) 21 - 32 mmol/L    Anion gap 24 (H) 5 - 15 mmol/L    Glucose 1338 (HH) 65 - 100 mg/dL     (H) 6 - 20 MG/DL    Creatinine 9.64 (H) 0.55 - 1.02 MG/DL    BUN/Creatinine ratio 21 (H) 12 - 20      GFR est AA 5 (L) >60 ml/min/1.73m2    GFR est non-AA 4 (L) >60 ml/min/1.73m2    Calcium 8.0 (L) 8.5 - 10.1 MG/DL    Bilirubin, total 1.1 (H) 0.2 - 1.0 MG/DL    ALT (SGPT) 32 12 - 78 U/L    AST (SGOT) 11 (L) 15 - 37 U/L    Alk. phosphatase 238 (H) 45 - 117 U/L    Protein, total 7.1 6.4 - 8.2 g/dL    Albumin 2.4 (L) 3.5 - 5.0 g/dL    Globulin 4.7 (H) 2.0 - 4.0 g/dL    A-G Ratio 0.5 (L) 1.1 - 2.2     LIPASE    Collection Time: 08/24/18  1:05 PM   Result Value Ref Range    Lipase 75 73 - 393 U/L   MAGNESIUM    Collection Time: 08/24/18  1:05 PM   Result Value Ref Range    Magnesium 3.4 (H) 1.6 - 2.4 mg/dL   PHOSPHORUS    Collection Time: 08/24/18  1:05 PM   Result Value Ref Range    Phosphorus 9.7 (H) 2.6 - 4.7 MG/DL   POC CHEM8    Collection Time: 08/24/18  1:15 PM   Result Value Ref Range    Calcium, ionized (POC) 0.96 (L) 1.12 - 1.32 mmol/L    Sodium (POC) 124 (L) 136 - 145 mmol/L    Potassium (POC) 7.7 (HH) 3.5 - 5.1 mmol/L    Chloride (POC) 91 (L) 98 - 107 mmol/L    CO2 (POC) 13 (LL) 21 - 32 mmol/L    Anion gap (POC) 28 (H) 10 - 20 mmol/L    Glucose (POC) >700 (HH) 65 - 100 mg/dL    BUN (POC) >140 (H) 9 - 20 mg/dL    Creatinine (POC) 9.5 (H) 0.6 - 1.3 mg/dL    GFRAA, POC 5 (L) >60 ml/min/1.73m2    GFRNA, POC 4 (L) >60 ml/min/1.73m2    Hematocrit (POC) 31 (L) 35.0 - 47.0 %    Comment Comment Not Indicated.      URINALYSIS W/ REFLEX CULTURE    Collection Time: 08/24/18  2:13 PM   Result Value Ref Range    Color YELLOW/STRAW      Appearance CLOUDY (A) CLEAR      Specific gravity 1.020 1.003 - 1.030      pH (UA) 5.0 5.0 - 8.0      Protein 100 (A) NEG mg/dL    Glucose >1000 (A) NEG mg/dL    Ketone 15 (A) NEG mg/dL    Bilirubin NEGATIVE  NEG      Blood MODERATE (A) NEG      Urobilinogen 1.0 0.2 - 1.0 EU/dL    Nitrites NEGATIVE  NEG      Leukocyte Esterase MODERATE (A) NEG      WBC  0 - 4 /hpf    RBC 0-5 0 - 5 /hpf    Epithelial cells FEW FEW /lpf    Bacteria 3+ (A) NEG /hpf    UA:UC IF INDICATED URINE CULTURE ORDERED (A) CNI     GLUCOSE, POC    Collection Time: 08/24/18  2:23 PM   Result Value Ref Range    Glucose (POC) >600 (HH) 65 - 100 mg/dL Performed by Isis Felipe    GLUCOSTABILIZER    Collection Time: 08/24/18  2:26 PM   Result Value Ref Range    Glucose 600 mg/dL    Insulin order 10.8 units/hour    Insulin adminstered 10.8 units/hour    Multiplier 0.020     Low target 150 mg/dL    High target 250 mg/dL    D50 order 0.0 ml    D50 administered 0.00 ml    Minutes until next BG 60 min    Order initials mp     Administered initials mp     GLSCOM Comments     EKG, 12 LEAD, INITIAL    Collection Time: 08/24/18  2:56 PM   Result Value Ref Range    Ventricular Rate 89 BPM    Atrial Rate 89 BPM    P-R Interval 142 ms    QRS Duration 88 ms    Q-T Interval 382 ms    QTC Calculation (Bezet) 464 ms    Calculated P Axis 81 degrees    Calculated R Axis 73 degrees    Calculated T Axis 61 degrees    Diagnosis       Normal sinus rhythm  Possible Left atrial enlargement  When compared with ECG of 21-MAY-2018 13:51,  T wave inversion less evident in Anterior leads  QT has lengthened

## 2018-08-24 NOTE — ED NOTES
Family friend Al Paige left and informed this nurse that she does not think she has ate or drank in a week. She left work early on Monday and since then she does not think she has had anything toe at.   However pt says Nighat Crockett can not make that statement because she was not there since she was on vacation\"

## 2018-08-24 NOTE — ED NOTES
Pt reports her insulin was changed a few weeks ago by Dr. Erinn Gallegos from 70/30 NPH to another medication (unable to hear pt say the name) 34units in the morning. Pt took BS which was 54 this morning then took insulin and had breakfast, 2 slices of pizano and 1 egg. Pt reports urinating a lot and increase thirst and dry mouth. FSBS READING high in the ED. Pt very soft spoken and has to repeat herself several times due to miscommunication. Friend reports she usually doesn't speak this low or take this long to talk.

## 2018-08-24 NOTE — ROUTINE PROCESS
TRANSFER - IN REPORT:    Verbal report received from Betsy Esteban RN (name) on Twan Escamilla  being received from ED (unit) for routine progression of care      Report consisted of patients Situation, Background, Assessment and   Recommendations(SBAR). Information from the following report(s) SBAR, ED Summary, Procedure Summary, Intake/Output, MAR and Recent Results was reviewed with the receiving nurse. Opportunity for questions and clarification was provided. Assessment completed upon patients arrival to unit and care assumed.

## 2018-08-24 NOTE — ED NOTES
TRANSFER - OUT REPORT:    Verbal report given to Jose Kennedy RN (name) on Jefe Osei  being transferred to PCU (unit) for routine progression of care       Report consisted of patients Situation, Background, Assessment and   Recommendations(SBAR). Information from the following report(s) SBAR, Kardex, ED Summary, MAR, Accordion, Recent Results, Med Rec Status and Cardiac Rhythm NSR was reviewed with the receiving nurse. Lines:   Peripheral IV 08/24/18 Left Wrist (Active)   Site Assessment Clean, dry, & intact 8/24/2018  2:14 PM   Phlebitis Assessment 0 8/24/2018  2:14 PM   Infiltration Assessment 0 8/24/2018  2:14 PM   Dressing Status Clean, dry, & intact 8/24/2018  2:14 PM       Peripheral IV 08/24/18 Right Hand (Active)   Site Assessment Clean, dry, & intact 8/24/2018  2:28 PM   Phlebitis Assessment 0 8/24/2018  2:28 PM   Infiltration Assessment 0 8/24/2018  2:28 PM   Dressing Status Clean, dry, & intact 8/24/2018  2:28 PM        Opportunity for questions and clarification was provided. Patient transported with:   Monitor  Registered Nurse          IR at bedside for [facility] cath placement then patient will be taken upstairs.

## 2018-08-24 NOTE — PROGRESS NOTES
Pharmacy Clarification of Prior to Admission Medication Regimen     The patient was interviewed regarding clarification of the prior to admission medication regimen. Sister was present in room and obtained permission from patient to discuss drug regimen with visitor(s) present. Patient was questioned regarding use of any other inhalers, topical products, over the counter medications, herbal medications, vitamin products or ophthalmic/nasal/otic medication use. Information Obtained From: Patient, RX Query    Pertinent Pharmacy Findings: NONE    PTA medication list was corrected to the following:     Prior to Admission Medications   Prescriptions Last Dose Informant Patient Reported? Taking? BASAGLAR KWIKPEN U-100 INSULIN 100 unit/mL (3 mL) inpn 2018 at Unknown time Self No Yes   Sig: TAKE 34 UNITS ONCE DAILY SUBCUTANEOUSLY   albuterol (PROVENTIL HFA, VENTOLIN HFA, PROAIR HFA) 90 mcg/actuation inhaler 2018 at Unknown time Self No Yes   Sig: Take 2 Puffs by inhalation every four (4) hours as needed for Wheezing. albuterol (PROVENTIL VENTOLIN) 2.5 mg /3 mL (0.083 %) nebulizer solution 2018 at Unknown time Self Yes Yes   Si.5 mg by Nebulization route every four (4) hours as needed for Shortness of Breath. glimepiride (AMARYL) 1 mg tablet 2018 at Unknown time Self No Yes   Sig: Take 1 Tab by mouth Daily (before breakfast). naproxen sodium (ALEVE) 220 mg tablet 2018 at Unknown time Self Yes Yes   Sig: Take 220 mg by mouth two (2) times daily as needed for Pain.       Facility-Administered Medications: None          Thank you,  Ibis Ortiz CPhT  Medication History Pharmacy Technician

## 2018-08-24 NOTE — DIABETES MGMT
DTC Progress Note      Recommendations/ Comments: If appropriate, please consider continuing insulin gtt until anion gap is less than 12 on two consecutive BMPs and BG is less than 200 mg/dl and transition per hospital guidelines. Would recommend resuming home glargine (Lantus) 34 units to transition off drip per protocol. Chart reviewed on Verenice Feldman. Patient admitted with blood sugar of 1338 mg/dL. Patient currently on insulin drip. Anion gap of 24. Patient sees Dr. Ping Salazar endocrinologist- last seen 7/30/18. DTC to follow up with patient at a later time given current elevated sugars. Patient is a 45 y.o. female with known diabetes on Basaglar (glargine) 34 units daily, and Glimepiride  1 mg acb at home. A1c:   Lab Results   Component Value Date/Time    Hemoglobin A1c 8.5 (H) 08/24/2018 11:50 AM    Hemoglobin A1c >16.0 (H) 01/15/2018 04:33 AM       Recent Glucose Results:   Lab Results   Component Value Date/Time    GLU 1338 (HH) 08/24/2018 01:05 PM    GLUCPOC >600 ([facility]) 08/24/2018 02:23 PM    GLUCPOC >700 ([facility]) 08/24/2018 01:15 PM    GLUCPOC >600 (HH) 08/24/2018 11:13 AM    GLUCPOC >600 () 08/24/2018 11:12 AM        Lab Results   Component Value Date/Time    Creatinine 9.64 (H) 08/24/2018 01:05 PM     Estimated Creatinine Clearance: 4.8 mL/min (based on Cr of 9.64). Active Orders   There are no active orders of the following type(s): Diet. PO intake: No data found. Current hospital DM medication: insulin gtt    Will continue to follow as needed. Thank you.   Jacqui Zamorano, [address], [address]  Office:  [phone]

## 2018-08-24 NOTE — ED PROVIDER NOTES
EMERGENCY DEPARTMENT HISTORY AND PHYSICAL EXAM      Date: 8/24/2018  Patient Name: Urvashi Shultz    History of Presenting Illness     Chief Complaint   Patient presents with    Low Blood Sugar     states her BG was 50 this morning, states she has been running low for the past week. BS HI in triage    Fatigue     increasing this week    Headache       History Provided By: Patient and pt's friend    HPI: Urvashi Shultz, 45 y.o. female with PMHx significant for DM, pancreatic atrophy, presents to the ED with cc of low blood sugar x 1 week. She reports HA, nausea, and mild abd pain x 5 days. Pt reports mild chest discomfort on exertion. Pt's friend reports worsening fatigue, noting pt is not normally this lethargic. Pt states she has been checking her sugars twice a day, once in the morning and once at night. She notes that her numbers have been in the 50s both times of day over the past week. Pt endorses taking her insulin as directed, 40 units once a day; last dose was last night. She affirms eating normally. Pt reports that she saw her doctor 4 months ago at which point everything is fine. She states she has a regular follow up scheduled for November. Pt denies associated syncope. She denies black or bloody stool. There are no other complaints, changes, or physical findings at this time.     PCP: Chandra Sheikh MD    Current Facility-Administered Medications   Medication Dose Route Frequency Provider Last Rate Last Dose    calcium gluconate 1 g in 0.9% sodium chloride 100 mL IVPB  1 g IntraVENous ONCE Jesse Splinter,  mL/hr at 08/24/18 1434 1 g at 08/24/18 1434    sodium bicarbonate 8.4 % (1 mEq/mL) injection 100 mEq  100 mEq IntraVENous NOW Michela Chan III, MD        0.9% sodium chloride infusion  100 mL/hr IntraVENous CONTINUOUS Aliyah Cabello MD        acetaminophen (TYLENOL) tablet 650 mg  650 mg Oral Q4H PRN Aliyah Cabello MD        prochlorperazine (COMPAZINE) with saline injection 10 mg  10 mg IntraVENous Q6H PRN Deni Paula MD        sodium polystyrene (KAYEXALATE) 15 gram/60 mL oral suspension 30 g  30 g Oral NOW Deni Paula MD        labetalol (NORMODYNE;TRANDATE) injection 10 mg  10 mg IntraVENous Q6H PRN Deni Paula MD        morphine injection 2 mg  2 mg IntraVENous Q4H PRN Deni Paula MD       Aecrisseric Nazia Escobedo ON 8/25/2018] pantoprazole (PROTONIX) 40 mg in sodium chloride 0.9% 10 mL injection  40 mg IntraVENous DAILY Deni Paula MD        heparin (porcine) injection 5,000 Units  5,000 Units SubCUTAneous Q12H Deni Paula MD        insulin regular (NOVOLIN R, HUMULIN R) 100 Units in 0.9% sodium chloride 100 mL infusion  0-50 Units/hr IntraVENous TITRATE MD Evelyn Cotto ON 8/25/2018] insulin lispro (HUMALOG) injection   SubCUTAneous Pierce Aj MD        glucose chewable tablet 16 g  4 Tab Oral PRN Deni Paula MD        dextrose (D50W) injection syrg 12.5-25 g  25-50 mL IntraVENous PRN Deni Paula MD        glucagon (GLUCAGEN) injection 1 mg  1 mg IntraMUSCular PRN Deni Paula MD        cefTRIAXone (ROCEPHIN) 1 g in 0.9% sodium chloride (MBP/ADV) 50 mL  1 g IntraVENous Q24H Deni Paula MD         Current Outpatient Prescriptions   Medication Sig Dispense Refill    naproxen sodium (ALEVE) 220 mg tablet Take 220 mg by mouth two (2) times daily as needed for Pain.  glimepiride (AMARYL) 1 mg tablet Take 1 Tab by mouth Daily (before breakfast). 90 Tab 3    BASAGLAR KWIKPEN U-100 INSULIN 100 unit/mL (3 mL) inpn TAKE 34 UNITS ONCE DAILY SUBCUTANEOUSLY 15 Pen 3    albuterol (PROVENTIL HFA, VENTOLIN HFA, PROAIR HFA) 90 mcg/actuation inhaler Take 2 Puffs by inhalation every four (4) hours as needed for Wheezing. 1 Inhaler 0    albuterol (PROVENTIL VENTOLIN) 2.5 mg /3 mL (0.083 %) nebulizer solution 2.5 mg by Nebulization route every four (4) hours as needed for Shortness of Breath.   0       Past History     Past Medical History:  Past Medical History:   Diagnosis Date    Asthma     Diabetes ([facility]     Elevated transaminase level 6/29/2016    Insulin dependent diabetes mellitus ([facility] 6/20/2016    Pancreatic atrophy 6/20/2016       Past Surgical History:  Past Surgical History:   Procedure Laterality Date    HX HEENT         Family History:  Family History   Problem Relation Age of Onset    Cancer Father      prostate and colon    Diabetes Mother     Diabetes Maternal Grandmother     Cancer Maternal Aunt      breast       Social History:  Social History   Substance Use Topics    Smoking status: Never Smoker    Smokeless tobacco: Never Used    Alcohol use Yes      Comment: occasionally       Allergies: Allergies   Allergen Reactions    Contrast Agent [Iodine] Itching    Hydrocodone Rash    Percocet [Oxycodone-Acetaminophen] Rash    Codeine Nausea and Vomiting         Review of Systems   Review of Systems   Constitutional: Positive for fatigue. Negative for appetite change, chills and fever. HENT: Negative. Negative for congestion, rhinorrhea, sinus pressure and sore throat. Eyes: Negative. Respiratory: Negative. Negative for cough, choking, chest tightness, shortness of breath and wheezing. Cardiovascular: Positive for chest pain (on exertion). Negative for palpitations and leg swelling. Gastrointestinal: Positive for abdominal pain and nausea. Negative for blood in stool, constipation, diarrhea and vomiting. Endocrine: Negative. Genitourinary: Negative. Negative for difficulty urinating, dysuria, flank pain and urgency. Musculoskeletal: Negative. Skin: Negative. Neurological: Positive for headaches. Negative for dizziness, syncope, speech difficulty, weakness, light-headedness and numbness. Psychiatric/Behavioral: Negative. All other systems reviewed and are negative.       Physical Exam   Physical Exam   Constitutional: She is oriented to person, place, and time. She appears well-developed and well-nourished. Appears ill     HENT:   Head: Normocephalic and atraumatic. Mouth/Throat: No oropharyngeal exudate. Dry mucous membranes   Eyes: Conjunctivae and EOM are normal. Pupils are equal, round, and reactive to light. Neck: Normal range of motion. Neck supple. No JVD present. No tracheal deviation present. Cardiovascular: Regular rhythm, normal heart sounds and intact distal pulses. No murmur heard. Tachycardic    Pulmonary/Chest: Effort normal and breath sounds normal. No stridor. No respiratory distress. She has no wheezes. She has no rales. She exhibits no tenderness. Abdominal: Soft. She exhibits no distension. There is no tenderness. There is no rebound and no guarding. Musculoskeletal: Normal range of motion. She exhibits no edema or tenderness. Neurological: She is alert and oriented to person, place, and time. No cranial nerve deficit. No gross motor or sensory deficits    Skin: Skin is warm and dry. She is not diaphoretic. Very dry skin, poor turgor   Psychiatric: She has a normal mood and affect. Her behavior is normal.   Nursing note and vitals reviewed.       Diagnostic Study Results     Labs -     Recent Results (from the past 12 hour(s))   GLUCOSE, POC    Collection Time: 08/24/18 11:12 AM   Result Value Ref Range    Glucose (POC) >600 (HH) 65 - 100 mg/dL    Performed by [address] [place] [address], POC    Collection Time: 08/24/18 11:13 AM   Result Value Ref Range    Glucose (POC) >600 (HH) 65 - 100 mg/dL    Performed by Crystal Sifuentes    CBC WITH AUTOMATED DIFF    Collection Time: 08/24/18 11:50 AM   Result Value Ref Range    WBC 30.8 (H) 3.6 - 11.0 K/uL    RBC 3.03 (L) 3.80 - 5.20 M/uL    HGB 9.5 (L) 11.5 - 16.0 g/dL    HCT 28.5 (L) 35.0 - 47.0 %    MCV 94.1 80.0 - 99.0 FL    MCH 31.4 26.0 - 34.0 PG    MCHC 33.3 30.0 - 36.5 g/dL    RDW 15.2 (H) 11.5 - 14.5 %    PLATELET 831 266 - 197 K/uL    NRBC 0.0 0  WBC    ABSOLUTE NRBC 0.00 0.00 - 0.01 K/uL    NEUTROPHILS 88 (H) 32 - 75 %    BAND NEUTROPHILS 7 %    LYMPHOCYTES 0 (L) 12 - 49 %    MONOCYTES 4 (L) 5 - 13 %    EOSINOPHILS 0 0 - 7 %    BASOPHILS 0 0 - 1 %    MYELOCYTES 1 %    IMMATURE GRANULOCYTES 0 0.0 - 0.5 %    ABS. NEUTROPHILS 29.3 (H) 1.8 - 8.0 K/UL    ABS. LYMPHOCYTES 0.0 (L) 0.8 - 3.5 K/UL    ABS. MONOCYTES 1.2 (H) 0.0 - 1.0 K/UL    ABS. EOSINOPHILS 0.0 0.0 - 0.4 K/UL    ABS. BASOPHILS 0.0 0.0 - 0.1 K/UL    ABS. IMM. GRANS. 0.0 0.00 - 0.04 K/UL    DF MANUAL      RBC COMMENTS NORMOCYTIC, NORMOCHROMIC     HEMOGLOBIN A1C WITH EAG    Collection Time: 08/24/18 11:50 AM   Result Value Ref Range    Hemoglobin A1c 8.5 (H) 4.2 - 6.3 %    Est. average glucose 890 mg/dL   METABOLIC PANEL, COMPREHENSIVE    Collection Time: 08/24/18  1:05 PM   Result Value Ref Range    Sodium 122 (L) 136 - 145 mmol/L    Potassium 7.4 (HH) 3.5 - 5.1 mmol/L    Chloride 87 (L) 97 - 108 mmol/L    CO2 11 (LL) 21 - 32 mmol/L    Anion gap 24 (H) 5 - 15 mmol/L    Glucose 1338 (HH) 65 - 100 mg/dL     (H) 6 - 20 MG/DL    Creatinine 9.64 (H) 0.55 - 1.02 MG/DL    BUN/Creatinine ratio 21 (H) 12 - 20      GFR est AA 5 (L) >60 ml/min/1.73m2    GFR est non-AA 4 (L) >60 ml/min/1.73m2    Calcium 8.0 (L) 8.5 - 10.1 MG/DL    Bilirubin, total 1.1 (H) 0.2 - 1.0 MG/DL    ALT (SGPT) 32 12 - 78 U/L    AST (SGOT) 11 (L) 15 - 37 U/L    Alk.  phosphatase 238 (H) 45 - 117 U/L    Protein, total 7.1 6.4 - 8.2 g/dL    Albumin 2.4 (L) 3.5 - 5.0 g/dL    Globulin 4.7 (H) 2.0 - 4.0 g/dL    A-G Ratio 0.5 (L) 1.1 - 2.2     LIPASE    Collection Time: 08/24/18  1:05 PM   Result Value Ref Range    Lipase 75 73 - 393 U/L   MAGNESIUM    Collection Time: 08/24/18  1:05 PM   Result Value Ref Range    Magnesium 3.4 (H) 1.6 - 2.4 mg/dL   PHOSPHORUS    Collection Time: 08/24/18  1:05 PM   Result Value Ref Range    Phosphorus 9.7 (H) 2.6 - 4.7 MG/DL   POC CHEM8    Collection Time: 08/24/18  1:15 PM   Result Value Ref Range    Calcium, ionized (POC) 0.96 (L) 1.12 - 1.32 mmol/L    Sodium (POC) 124 (L) 136 - 145 mmol/L    Potassium (POC) 7.7 (HH) 3.5 - 5.1 mmol/L    Chloride (POC) 91 (L) 98 - 107 mmol/L    CO2 (POC) 13 (LL) 21 - 32 mmol/L    Anion gap (POC) 28 (H) 10 - 20 mmol/L    Glucose (POC) >700 (HH) 65 - 100 mg/dL    BUN (POC) >140 (H) 9 - 20 mg/dL    Creatinine (POC) 9.5 (H) 0.6 - 1.3 mg/dL    GFRAA, POC 5 (L) >60 ml/min/1.73m2    GFRNA, POC 4 (L) >60 ml/min/1.73m2    Hematocrit (POC) 31 (L) 35.0 - 47.0 %    Comment Comment Not Indicated.      URINALYSIS W/ REFLEX CULTURE    Collection Time: 08/24/18  2:13 PM   Result Value Ref Range    Color YELLOW/STRAW      Appearance CLOUDY (A) CLEAR      Specific gravity 1.020 1.003 - 1.030      pH (UA) 5.0 5.0 - 8.0      Protein 100 (A) NEG mg/dL    Glucose >1000 (A) NEG mg/dL    Ketone 15 (A) NEG mg/dL    Bilirubin NEGATIVE  NEG      Blood MODERATE (A) NEG      Urobilinogen 1.0 0.2 - 1.0 EU/dL    Nitrites NEGATIVE  NEG      Leukocyte Esterase MODERATE (A) NEG      WBC  0 - 4 /hpf    RBC 0-5 0 - 5 /hpf    Epithelial cells FEW FEW /lpf    Bacteria 3+ (A) NEG /hpf    UA:UC IF INDICATED URINE CULTURE ORDERED (A) CNI     GLUCOSE, POC    Collection Time: 08/24/18  2:23 PM   Result Value Ref Range    Glucose (POC) >600 (HH) 65 - 100 mg/dL    Performed by [address] [place] [address]    GLUCOSTABILIZER    Collection Time: 08/24/18  2:26 PM   Result Value Ref Range    Glucose 600 mg/dL    Insulin order 10.8 units/hour    Insulin adminstered 10.8 units/hour    Multiplier 0.020     Low target 150 mg/dL    High target 250 mg/dL    D50 order 0.0 ml    D50 administered 0.00 ml    Minutes until next BG 60 min    Order initials mp     Administered initials mp     GLSCOM Comments     EKG, 12 LEAD, INITIAL    Collection Time: 08/24/18  2:56 PM   Result Value Ref Range    Ventricular Rate 89 BPM    Atrial Rate 89 BPM    P-R Interval 142 ms    QRS Duration 88 ms    Q-T Interval 382 ms    QTC Calculation (Bezet) 464 ms    Calculated P Axis 81 degrees Calculated R Axis 73 degrees    Calculated T Axis 61 degrees    Diagnosis       Normal sinus rhythm  Possible Left atrial enlargement  When compared with ECG of 21-MAY-2018 13:51,  T wave inversion less evident in Anterior leads  QT has lengthened         Radiologic Studies -   XR CHEST PORT   Final Result      US RETROPERITONEUM COMP    (Results Pending)   XR ABD FLAT/ ERECT    (Results Pending)   CT ABD PELV WO CONT    (Results Pending)     CT Results  (Last 48 hours)    None        CXR Results  (Last 48 hours)               08/24/18 1222  XR CHEST PORT Final result    Impression:  IMPRESSION: Normal.       Narrative:  EXAM:  XR CHEST PORT       INDICATION:  Chest pain. Low blood sugar and fatigue increased times 1 week. COMPARISON:  Chest x-ray 1/14/2018. FINDINGS: A portable AP radiograph of the chest was obtained at 12:18 hours. The   patient is on a cardiac monitor. The lungs are clear. The cardiac and   mediastinal contours and pulmonary vascularity are normal.  The bones and soft   tissues are grossly within normal limits. Medical Decision Making   I am the first provider for this patient. I reviewed the vital signs, available nursing notes, past medical history, past surgical history, family history and social history. Vital Signs-Reviewed the patient's vital signs.   Patient Vitals for the past 12 hrs:   Temp Pulse Resp BP SpO2   08/24/18 1500 - 92 16 106/54 97 %   08/24/18 1445 - 89 18 101/60 97 %   08/24/18 1432 - 88 19 93/56 99 %   08/24/18 1415 - 92 20 114/54 98 %   08/24/18 1409 - 92 22 - 97 %   08/24/18 1315 - 89 20 111/63 -   08/24/18 1312 - 90 19 - 98 %   08/24/18 1300 - 90 19 101/59 100 %   08/24/18 1210 - - - 98/49 99 %   08/24/18 1115 97.3 °F (36.3 °C) (!) 102 20 105/40 100 %       EKG interpretation: (Preliminary)  Sinus, rate 89, normal axis/pr/qrs, T waves peaked, no acute ST changes, Milan Aranda DO      Records Reviewed: Nursing Notes and Old Medical Records    Provider Notes (Medical Decision Making):   DDx: hyperglycemia, DKA, electrolyte abnormality, thyroid dysfunction, pancreatitis     ED Course:   Initial assessment performed. The patients presenting problems have been discussed, and they are in agreement with the care plan formulated and outlined with them. I have encouraged them to ask questions as they arise throughout their visit. Pt upon arrival tachycardia with glucose > 600, IV fluids started, labs hemolyzed x 2, on third lab draw asked for nursing staff to obtain POC Chem 8. POC CHem 8 and subsequent CMP show acute Renal Failure w/ elevated K+, very high Bun and Cr, will start glucose stabilizer, pt getting IV fluids, given peak T waves, pt given IV Calcium gluconate will consult hospitalist for admission and consult Nephrology. Consult Note:  1:48 PM  Rach Eason DO spoke with Néstor Walter MD  Specialty: Hospitalist  Discussed pt's hx, disposition, and available diagnostic and imaging results. 2:45 PM  Dr. Chato Pastrana in ED to evaluate pt. Likely with hydration and glucose stabilization renal function should improve, hold on Lawrence placement and dialysis. 2:32 PM  Pt's glucose level is 1338.     3:50 PM  DIscussed with Dr. Anneliese Merida fifi place order for Dialysis cath and order dialysis for pt. I called IR to notify them of the order.      Critical Care Time:   3:28 PM    IMPENDING DETERIORATION -Cardiovascular, CNS, Metabolic and Renal  ASSOCIATED RISK FACTORS - Hypotension, Dysrhythmia, Metabolic changes, Dehydration and CNS Decompensation  MANAGEMENT- Bedside Assessment and Supervision of Care  INTERPRETATION -  Xrays, ECG, Blood Pressure, Cardiac Output Measures  and Labs  INTERVENTIONS - hemodynamic mngmt and Neurologic interventions   CASE REVIEW - Hospitalist, Medical Sub-Specialist, Nursing and Family  TREATMENT RESPONSE -Stable  PERFORMED BY - Self      NOTES   :  I have spent 90 minutes of critical care time involved in lab review, consultations with specialist, family decision- making, bedside attention and documentation. During this entire length of time I was immediately available to the patient . Rj Mills, DO    Pt symptoms and evaluation c/w DKA and renal failure, leukocytosis that could be a result of DKA, pt afebrile. Pt had not urinated due to dehydration will have combs cath placed to assess Uo, Hospitalist will follow up on Urinalysis results to assess for need of Antibiotics. Jr Corrales DO    Disposition:  Admit Note:  1:50 PM  Pt is being admitted by Dr. Stefanie Zapata. The results of their tests and reason(s) for their admission have been discussed with pt and/or available family. They convey agreement and understanding for the need to be admitted and for admission diagnosis. Diagnosis     Clinical Impression:   1. Diabetic ketoacidosis without coma associated with other specified diabetes mellitus ([address]    2. Acute renal failure, unspecified acute renal failure type ([address]    3. Acute hyperkalemia    4. Acute metabolic encephalopathy        Attestations: This note is prepared by Cirilo Schneider, acting as a Scribe for Rj Mills, [facility], DO: The scribe's documentation has been prepared under my direction and personally reviewed by me in its entirety. I confirm that the notes above accurately reflects all work, treatment, procedures, and medical decision making performed by me.

## 2018-08-24 NOTE — CONSULTS
Consultation Note    NAME: Shannan Cano   :  1965   MRN:  [id number]     Date/Time:  2018 4:07 PM    I have been asked to see this patient by Dr. Adalberto Serrano  for advice/opinion re: NEERAJ. Assessment :    Plan: NEERAJ  DKA  Pseuohyponatremia  Severe hyperkalemia  proteniuria  hematuria   Peaked T's on EKG. Azotemic. Modest UO. Will plan on HD today with temporary HD cath. Pull no fluid. 1K bath. Reassess in AM.  Suspect her renal function will recover once DKA improves. Subjective:   CHIEF COMPLAINT:  \"My sugar was high. \"    HISTORY OF PRESENT ILLNESS:     Holly Lagunas is a 45 y.o.   female who has a history of DM admitted with DKA. She is a limited historian and additional history is obtained from the chart. She says that she has had DM for 2 years. She says that she had had NEERAJ in the past and that she might have needed dialysis but that she never got it. She says that she does not have a nephrologist.  She says that she regularly checks her blood glucose and that it has been running low. She says it has been in the 50's. She has had increasing fatigue and nausea over the past several days. No emesis. She does c/o fever/chills.              Past Medical History:   Diagnosis Date    Asthma     Diabetes ([address]     Elevated transaminase level 2016    Insulin dependent diabetes mellitus ([facility] [address] 2016    Pancreatic atrophy 2016      Past Surgical History:   Procedure Laterality Date    HX HEENT       Social History   Substance Use Topics    Smoking status: Never Smoker    Smokeless tobacco: Never Used    Alcohol use Yes      Comment: occasionally      Family History   Problem Relation Age of Onset    Cancer Father      prostate and colon    Diabetes Mother     Diabetes Maternal Grandmother     Cancer Maternal Aunt      breast      Allergies   Allergen Reactions    Contrast Agent [Iodine] Itching    Hydrocodone Rash    Percocet [Oxycodone-Acetaminophen] Rash    Codeine Nausea and Vomiting      Prior to Admission medications    Medication Sig Start Date End Date Taking? Authorizing Provider   naproxen sodium (ALEVE) 220 mg tablet Take 220 mg by mouth two (2) times daily as needed for Pain. Yes Ede Holland MD   glimepiride (AMARYL) 1 mg tablet Take 1 Tab by mouth Daily (before breakfast). 7/30/18  Yes MD ERNESTO Miguel U-100 INSULIN 100 unit/mL (3 mL) inpn TAKE 34 UNITS ONCE DAILY SUBCUTANEOUSLY 7/30/18  Yes Susannah Hoyt MD   albuterol (PROVENTIL HFA, VENTOLIN HFA, PROAIR HFA) 90 mcg/actuation inhaler Take 2 Puffs by inhalation every four (4) hours as needed for Wheezing. 5/18/18  Yes Octavio Hernandez MD   albuterol (PROVENTIL VENTOLIN) 2.5 mg /3 mL (0.083 %) nebulizer solution 2.5 mg by Nebulization route every four (4) hours as needed for Shortness of Breath.  5/13/16  Yes Historical Provider     REVIEW OF SYSTEMS:     []  Unable to obtain reliable ROS due to  [] mental status  [] sedated   [] intubated   [x] Total of 12 systems reviewed as follows:  Constitutional: + fever/chills, negative weight loss  Eyes:   negative visual changes  ENT:   negative sore throat, tongue or lip swelling  Respiratory:  negative cough, negative dyspnea  Cards:  negative for chest pain, palpitations, lower extremity edema  GI:   pos for nausea, no vomiting, diarrhea, and no bdominal pain  :  negative for frequency, dysuria  Integument:  negative for rash and pruritus  Heme:  negative for easy bruising and gum/nose bleeding  Musculoskel: negative for myalgias,  back pain and muscle weakness  Neuro:  negative for headaches, dizziness, vertigo  Psych:  negative for feelings of anxiety, depression   Travel?: none    Objective:   VITALS:    Visit Vitals    /63    Pulse 87    Temp 97.3 °F (36.3 °C)    Resp 16    Ht 5' 1\" (1.549 m)    Wt 45.4 kg (100 lb)    SpO2 98%    BMI 18.89 kg/m2     PHYSICAL EXAM:  Gen:  []  WD []  WN  [] cachectic []  thin [] obese []  disheveled             [x]  ill apearing  []   Critical  [x]   Chronic    []  No acute distress    HEENT:   [x] NC/AT/PERRL    [x] pink conjunctivae      [] pale conjunctivae                  PERRL  [] yes  [] no      [] moist mucosa    [] dry mucosa    hearing intact to voice [] yes  [] No                 NECK:   supple [x] yes  [] no        masses [] yes  [x] No               thyroid  []  non tender  []  tender    RESP:   [x] CTA bilaterally/no wheezing/rhonchi/rales/crackles    [] rhonchi bilaterally - no dullness  [] wheezing   [] rhonchi   [] crackles     use of accessory muscles [] yes [] no    CARD:   [x]  regular rate and rhythm/No murmurs/rubs/gallops    murmur  [] yes ()  [] no      Rubs  [] yes  [] no       Gallops [] yes  [] no    Rate []  regular  []  irregular        carotid bruits  [] Right  []  Left                 LE edema [] yes  [x] no           JVP  []  yes   []  no    ABD:    [x] soft/non distended/non tender/+bowel sounds/no HSM/ + Loza    []  Rigid    tenderness [] yes [] no   Liver enlargement  []  yes []  no                Spleen enlargement  []  yes []  no     distended []  yes [] no     bowel sound  [] hypoactive   [] hyperactive    LYMPH:    Neck []  yes [x]  no       Axillae []  yes [x]  no    SKIN:   Rashes []  yes   [x]  no    Ulcers []  yes   [x]  no               [] tight to palpitation    skin turgor []  good  [] poor  [] decreased               Cyanosis/clubbing []  yes []  no    NEUR:   [x] cranial nerves II-XII grossly intact       [] Cranial nerves deficit                 []  facial droop    []  slurred speech   [] aphasic     [] Strength normal     []  weakness  []  LUE  []   RUE/ []  LLE  []   RLE    follows commands  [x]  yes []  no           PSYCH:   insight [x] fair [] good   Alert and Oriented to  [] person  [] place  []  time                    [] depressed [] anxious [] agitated  [] lethargic [] stuporous  [] sedated     LAB DATA REVIEWED:    Recent Labs 08/24/18   1150   WBC  30.8*   HGB  9.5*   HCT  28.5*   PLT  156     Recent Labs      08/24/18   1305   NA  122*   K  7.4*   CL  87*   CO2  11*   BUN  198*   CREA  9.64*   GLU  1338*   CA  8.0*   MG  3.4*   PHOS  9.7*     Recent Labs      08/24/18   1305   SGOT  11*   ALT  32   AP  238*   TBILI  1.1*   ALB  2.4*   GLOB  4.7*   LPSE  75     No results for input(s): INR, PTP, APTT in the last 72 hours. No lab exists for component: INREXT   No results for input(s): FE, TIBC, PSAT, FERR in the last 72 hours. No results for input(s): PH, PCO2, PO2 in the last 72 hours. No results for input(s): CPK, CKMB in the last 72 hours.     No lab exists for component: TROPONINI  Lab Results   Component Value Date/Time    Glucose (POC) >600 (HH) 08/24/2018 04:03 PM    Glucose (POC) >600 (HH) 08/24/2018 03:37 PM    Glucose (POC) >600 (HH) 08/24/2018 03:35 PM    Glucose (POC) >600 (HH) 08/24/2018 02:23 PM    Glucose (POC) >700 (HH) 08/24/2018 01:15 PM    Glucose (POC) >600 (HH) 08/24/2018 11:13 AM       Procedures: see electronic medical records for all procedures/Xrays and details which were not copied into this note but were reviewed prior to creation of Plan.    ________________________________________________________________________       ___________________________________________________  Consulting Physician: Inge Mackay MD

## 2018-08-25 LAB
ADMINISTERED INITIALS, ADMINIT: NORMAL
ALBUMIN SERPL-MCNC: 2 G/DL (ref 3.5–5)
ALBUMIN/GLOB SERPL: 0.5 {RATIO} (ref 1.1–2.2)
ALP SERPL-CCNC: 204 U/L (ref 45–117)
ALT SERPL-CCNC: 36 U/L (ref 12–78)
ANION GAP SERPL CALC-SCNC: 11 MMOL/L (ref 5–15)
ANION GAP SERPL CALC-SCNC: 12 MMOL/L (ref 5–15)
ANION GAP SERPL CALC-SCNC: 13 MMOL/L (ref 5–15)
AST SERPL-CCNC: 73 U/L (ref 15–37)
BASOPHILS # BLD: 0 K/UL (ref 0–0.1)
BASOPHILS NFR BLD: 0 % (ref 0–1)
BILIRUB SERPL-MCNC: 0.9 MG/DL (ref 0.2–1)
BUN SERPL-MCNC: 143 MG/DL (ref 6–20)
BUN SERPL-MCNC: 147 MG/DL (ref 6–20)
BUN SERPL-MCNC: 150 MG/DL (ref 6–20)
BUN/CREAT SERPL: 20 (ref 12–20)
CALCIUM SERPL-MCNC: 7.4 MG/DL (ref 8.5–10.1)
CALCIUM SERPL-MCNC: 7.5 MG/DL (ref 8.5–10.1)
CALCIUM SERPL-MCNC: 7.5 MG/DL (ref 8.5–10.1)
CALCIUM SERPL-MCNC: 7.6 MG/DL (ref 8.5–10.1)
CHLORIDE SERPL-SCNC: 109 MMOL/L (ref 97–108)
CHLORIDE SERPL-SCNC: 110 MMOL/L (ref 97–108)
CHLORIDE SERPL-SCNC: 111 MMOL/L (ref 97–108)
CO2 SERPL-SCNC: 25 MMOL/L (ref 21–32)
CO2 SERPL-SCNC: 25 MMOL/L (ref 21–32)
CO2 SERPL-SCNC: 27 MMOL/L (ref 21–32)
CREAT SERPL-MCNC: 7.26 MG/DL (ref 0.55–1.02)
CREAT SERPL-MCNC: 7.39 MG/DL (ref 0.55–1.02)
CREAT SERPL-MCNC: 7.43 MG/DL (ref 0.55–1.02)
CREAT UR-MCNC: 55.7 MG/DL
D50 ADMINISTERED, D50ADM: 0 ML
D50 ADMINISTERED, D50ADM: 10 ML
D50 ORDER, D50ORD: 0 ML
D50 ORDER, D50ORD: 10 ML
DIFFERENTIAL METHOD BLD: ABNORMAL
EOSINOPHIL # BLD: 0 K/UL (ref 0–0.4)
EOSINOPHIL NFR BLD: 0 % (ref 0–7)
ERYTHROCYTE [DISTWIDTH] IN BLOOD BY AUTOMATED COUNT: 12.4 % (ref 11.5–14.5)
EST. AVERAGE GLUCOSE BLD GHB EST-MCNC: 206 MG/DL
GLOBULIN SER CALC-MCNC: 3.7 G/DL (ref 2–4)
GLSCOM COMMENTS: NORMAL
GLUCOSE BLD STRIP.AUTO-MCNC: 110 MG/DL (ref 65–100)
GLUCOSE BLD STRIP.AUTO-MCNC: 119 MG/DL (ref 65–100)
GLUCOSE BLD STRIP.AUTO-MCNC: 133 MG/DL (ref 65–100)
GLUCOSE BLD STRIP.AUTO-MCNC: 161 MG/DL (ref 65–100)
GLUCOSE BLD STRIP.AUTO-MCNC: 164 MG/DL (ref 65–100)
GLUCOSE BLD STRIP.AUTO-MCNC: 165 MG/DL (ref 65–100)
GLUCOSE BLD STRIP.AUTO-MCNC: 182 MG/DL (ref 65–100)
GLUCOSE BLD STRIP.AUTO-MCNC: 190 MG/DL (ref 65–100)
GLUCOSE BLD STRIP.AUTO-MCNC: 197 MG/DL (ref 65–100)
GLUCOSE BLD STRIP.AUTO-MCNC: 202 MG/DL (ref 65–100)
GLUCOSE BLD STRIP.AUTO-MCNC: 205 MG/DL (ref 65–100)
GLUCOSE BLD STRIP.AUTO-MCNC: 214 MG/DL (ref 65–100)
GLUCOSE BLD STRIP.AUTO-MCNC: 303 MG/DL (ref 65–100)
GLUCOSE BLD STRIP.AUTO-MCNC: 366 MG/DL (ref 65–100)
GLUCOSE BLD STRIP.AUTO-MCNC: 74 MG/DL (ref 65–100)
GLUCOSE BLD STRIP.AUTO-MCNC: 81 MG/DL (ref 65–100)
GLUCOSE BLD STRIP.AUTO-MCNC: 92 MG/DL (ref 65–100)
GLUCOSE SERPL-MCNC: 100 MG/DL (ref 65–100)
GLUCOSE SERPL-MCNC: 197 MG/DL (ref 65–100)
GLUCOSE SERPL-MCNC: 67 MG/DL (ref 65–100)
GLUCOSE, GLC: 110 MG/DL
GLUCOSE, GLC: 119 MG/DL
GLUCOSE, GLC: 133 MG/DL
GLUCOSE, GLC: 161 MG/DL
GLUCOSE, GLC: 164 MG/DL
GLUCOSE, GLC: 165 MG/DL
GLUCOSE, GLC: 182 MG/DL
GLUCOSE, GLC: 197 MG/DL
GLUCOSE, GLC: 205 MG/DL
GLUCOSE, GLC: 214 MG/DL
GLUCOSE, GLC: 303 MG/DL
GLUCOSE, GLC: 74 MG/DL
GLUCOSE, GLC: 81 MG/DL
GLUCOSE, GLC: 92 MG/DL
HBA1C MFR BLD: 8.8 % (ref 4.2–6.3)
HBV SURFACE AG SER QL: <0.1 INDEX
HBV SURFACE AG SER QL: NEGATIVE
HCT VFR BLD AUTO: 21.1 % (ref 35–47)
HGB BLD-MCNC: 7.9 G/DL (ref 11.5–16)
HIGH TARGET, HITG: 250 MG/DL
IMM GRANULOCYTES # BLD: 0 K/UL (ref 0–0.04)
IMM GRANULOCYTES NFR BLD AUTO: 0 % (ref 0–0.5)
INSULIN ADMINSTERED, INSADM: 0 UNITS/HOUR
INSULIN ADMINSTERED, INSADM: 1.2 UNITS/HOUR
INSULIN ADMINSTERED, INSADM: 1.2 UNITS/HOUR
INSULIN ADMINSTERED, INSADM: 1.3 UNITS/HOUR
INSULIN ADMINSTERED, INSADM: 11.6 UNITS/HOUR
INSULIN ADMINSTERED, INSADM: 16.9 UNITS/HOUR
INSULIN ADMINSTERED, INSADM: 2.2 UNITS/HOUR
INSULIN ADMINSTERED, INSADM: 2.6 UNITS/HOUR
INSULIN ADMINSTERED, INSADM: 2.6 UNITS/HOUR
INSULIN ADMINSTERED, INSADM: 26.7 UNITS/HOUR
INSULIN ADMINSTERED, INSADM: 3.1 UNITS/HOUR
INSULIN ADMINSTERED, INSADM: 5.2 UNITS/HOUR
INSULIN ADMINSTERED, INSADM: 6.2 UNITS/HOUR
INSULIN ADMINSTERED, INSADM: 6.5 UNITS/HOUR
INSULIN ORDER, INSORD: 0 UNITS/HOUR
INSULIN ORDER, INSORD: 1.2 UNITS/HOUR
INSULIN ORDER, INSORD: 1.2 UNITS/HOUR
INSULIN ORDER, INSORD: 1.3 UNITS/HOUR
INSULIN ORDER, INSORD: 11.6 UNITS/HOUR
INSULIN ORDER, INSORD: 16.9 UNITS/HOUR
INSULIN ORDER, INSORD: 2.2 UNITS/HOUR
INSULIN ORDER, INSORD: 2.6 UNITS/HOUR
INSULIN ORDER, INSORD: 2.6 UNITS/HOUR
INSULIN ORDER, INSORD: 26.7 UNITS/HOUR
INSULIN ORDER, INSORD: 3.1 UNITS/HOUR
INSULIN ORDER, INSORD: 5.2 UNITS/HOUR
INSULIN ORDER, INSORD: 6.2 UNITS/HOUR
INSULIN ORDER, INSORD: 6.5 UNITS/HOUR
LOW TARGET, LOT: 150 MG/DL
LYMPHOCYTES # BLD: 0.2 K/UL (ref 0.8–3.5)
LYMPHOCYTES NFR BLD: 1 % (ref 12–49)
MAGNESIUM SERPL-MCNC: 2.2 MG/DL (ref 1.6–2.4)
MAGNESIUM SERPL-MCNC: 2.4 MG/DL (ref 1.6–2.4)
MAGNESIUM SERPL-MCNC: 2.4 MG/DL (ref 1.6–2.4)
MCH RBC QN AUTO: 31 PG (ref 26–34)
MCHC RBC AUTO-ENTMCNC: 37.4 G/DL (ref 30–36.5)
MCV RBC AUTO: 82.7 FL (ref 80–99)
MINUTES UNTIL NEXT BG, NBG: 15 MIN
MINUTES UNTIL NEXT BG, NBG: 60 MIN
MONOCYTES # BLD: 1.2 K/UL (ref 0–1)
MONOCYTES NFR BLD: 5 % (ref 5–13)
MULTIPLIER, MUL: 0.03
MULTIPLIER, MUL: 0.04
MULTIPLIER, MUL: 0.06
MULTIPLIER, MUL: 0.07
MULTIPLIER, MUL: 0.09
MULTIPLIER, MUL: 0.11
NEUTS BAND NFR BLD MANUAL: 8 %
NEUTS SEG # BLD: 22.8 K/UL (ref 1.8–8)
NEUTS SEG NFR BLD: 86 % (ref 32–75)
NRBC # BLD: 0 K/UL (ref 0–0.01)
NRBC BLD-RTO: 0 PER 100 WBC
ORDER INITIALS, ORDINIT: NORMAL
PHOSPHATE SERPL-MCNC: 3.1 MG/DL (ref 2.6–4.7)
PLATELET # BLD AUTO: 116 K/UL (ref 150–400)
PMV BLD AUTO: 12.7 FL (ref 8.9–12.9)
POTASSIUM SERPL-SCNC: 3.1 MMOL/L (ref 3.5–5.1)
POTASSIUM SERPL-SCNC: 3.4 MMOL/L (ref 3.5–5.1)
POTASSIUM SERPL-SCNC: 3.7 MMOL/L (ref 3.5–5.1)
PROT SERPL-MCNC: 5.7 G/DL (ref 6.4–8.2)
PROT UR-MCNC: 87 MG/DL (ref 0–11.9)
PROT/CREAT UR-RTO: 1.6
PTH-INTACT SERPL-MCNC: 360.7 PG/ML (ref 18.4–88)
RBC # BLD AUTO: 2.55 M/UL (ref 3.8–5.2)
RBC MORPH BLD: ABNORMAL
SERVICE CMNT-IMP: ABNORMAL
SERVICE CMNT-IMP: NORMAL
SODIUM SERPL-SCNC: 147 MMOL/L (ref 136–145)
SODIUM SERPL-SCNC: 147 MMOL/L (ref 136–145)
SODIUM SERPL-SCNC: 149 MMOL/L (ref 136–145)
WBC # BLD AUTO: 24.2 K/UL (ref 3.6–11)
WBC MORPH BLD: ABNORMAL

## 2018-08-25 PROCEDURE — C9113 INJ PANTOPRAZOLE SODIUM, VIA: HCPCS | Performed by: INTERNAL MEDICINE

## 2018-08-25 PROCEDURE — 82962 GLUCOSE BLOOD TEST: CPT

## 2018-08-25 PROCEDURE — 80053 COMPREHEN METABOLIC PANEL: CPT | Performed by: INTERNAL MEDICINE

## 2018-08-25 PROCEDURE — 83735 ASSAY OF MAGNESIUM: CPT | Performed by: INTERNAL MEDICINE

## 2018-08-25 PROCEDURE — 83970 ASSAY OF PARATHORMONE: CPT | Performed by: INTERNAL MEDICINE

## 2018-08-25 PROCEDURE — 74011000250 HC RX REV CODE- 250: Performed by: INTERNAL MEDICINE

## 2018-08-25 PROCEDURE — 74011250637 HC RX REV CODE- 250/637: Performed by: INTERNAL MEDICINE

## 2018-08-25 PROCEDURE — 74011000258 HC RX REV CODE- 258: Performed by: INTERNAL MEDICINE

## 2018-08-25 PROCEDURE — 74011636637 HC RX REV CODE- 636/637: Performed by: INTERNAL MEDICINE

## 2018-08-25 PROCEDURE — 74011000258 HC RX REV CODE- 258: Performed by: EMERGENCY MEDICINE

## 2018-08-25 PROCEDURE — 84100 ASSAY OF PHOSPHORUS: CPT | Performed by: INTERNAL MEDICINE

## 2018-08-25 PROCEDURE — 85025 COMPLETE CBC W/AUTO DIFF WBC: CPT | Performed by: INTERNAL MEDICINE

## 2018-08-25 PROCEDURE — 84156 ASSAY OF PROTEIN URINE: CPT | Performed by: INTERNAL MEDICINE

## 2018-08-25 PROCEDURE — 65660000000 HC RM CCU STEPDOWN

## 2018-08-25 PROCEDURE — 74011250636 HC RX REV CODE- 250/636: Performed by: INTERNAL MEDICINE

## 2018-08-25 PROCEDURE — 83036 HEMOGLOBIN GLYCOSYLATED A1C: CPT | Performed by: INTERNAL MEDICINE

## 2018-08-25 PROCEDURE — 36415 COLL VENOUS BLD VENIPUNCTURE: CPT | Performed by: INTERNAL MEDICINE

## 2018-08-25 PROCEDURE — 80048 BASIC METABOLIC PNL TOTAL CA: CPT | Performed by: INTERNAL MEDICINE

## 2018-08-25 RX ORDER — NYSTATIN 100000 [USP'U]/ML
500000 SUSPENSION ORAL 4 TIMES DAILY
Status: DISCONTINUED | OUTPATIENT
Start: 2018-08-25 | End: 2018-09-06 | Stop reason: HOSPADM

## 2018-08-25 RX ORDER — INSULIN GLARGINE 100 [IU]/ML
15 INJECTION, SOLUTION SUBCUTANEOUS DAILY
Status: DISCONTINUED | OUTPATIENT
Start: 2018-08-26 | End: 2018-08-26

## 2018-08-25 RX ORDER — SODIUM CHLORIDE 450 MG/100ML
50 INJECTION, SOLUTION INTRAVENOUS CONTINUOUS
Status: DISCONTINUED | OUTPATIENT
Start: 2018-08-25 | End: 2018-08-28

## 2018-08-25 RX ORDER — POTASSIUM CHLORIDE 750 MG/1
20 TABLET, FILM COATED, EXTENDED RELEASE ORAL
Status: COMPLETED | OUTPATIENT
Start: 2018-08-25 | End: 2018-08-25

## 2018-08-25 RX ORDER — GLIMEPIRIDE 1 MG/1
1 TABLET ORAL
Status: DISCONTINUED | OUTPATIENT
Start: 2018-08-25 | End: 2018-08-25

## 2018-08-25 RX ORDER — DEXTROSE MONOHYDRATE AND SODIUM CHLORIDE 5; .9 G/100ML; G/100ML
100 INJECTION, SOLUTION INTRAVENOUS CONTINUOUS
Status: DISCONTINUED | OUTPATIENT
Start: 2018-08-25 | End: 2018-08-25

## 2018-08-25 RX ORDER — FAMOTIDINE 20 MG/1
20 TABLET, FILM COATED ORAL DAILY
Status: DISCONTINUED | OUTPATIENT
Start: 2018-08-26 | End: 2018-09-06 | Stop reason: HOSPADM

## 2018-08-25 RX ORDER — MAGNESIUM SULFATE 100 %
4 CRYSTALS MISCELLANEOUS AS NEEDED
Status: DISCONTINUED | OUTPATIENT
Start: 2018-08-25 | End: 2018-09-06 | Stop reason: HOSPADM

## 2018-08-25 RX ORDER — INSULIN LISPRO 100 [IU]/ML
INJECTION, SOLUTION INTRAVENOUS; SUBCUTANEOUS
Status: DISCONTINUED | OUTPATIENT
Start: 2018-08-25 | End: 2018-09-06

## 2018-08-25 RX ORDER — DEXTROSE MONOHYDRATE AND SODIUM CHLORIDE 5; .45 G/100ML; G/100ML
100 INJECTION, SOLUTION INTRAVENOUS CONTINUOUS
Status: DISPENSED | OUTPATIENT
Start: 2018-08-25 | End: 2018-08-25

## 2018-08-25 RX ORDER — DEXTROSE 50 % IN WATER (D50W) INTRAVENOUS SYRINGE
12.5-25 AS NEEDED
Status: DISCONTINUED | OUTPATIENT
Start: 2018-08-25 | End: 2018-09-06 | Stop reason: HOSPADM

## 2018-08-25 RX ORDER — INSULIN GLARGINE 100 [IU]/ML
34 INJECTION, SOLUTION SUBCUTANEOUS DAILY
Status: DISCONTINUED | OUTPATIENT
Start: 2018-08-25 | End: 2018-08-25

## 2018-08-25 RX ADMIN — NYSTATIN 500000 UNITS: 100000 SUSPENSION ORAL at 22:13

## 2018-08-25 RX ADMIN — INSULIN GLARGINE 34 UNITS: 100 INJECTION, SOLUTION SUBCUTANEOUS at 12:16

## 2018-08-25 RX ADMIN — INSULIN LISPRO 2 UNITS: 100 INJECTION, SOLUTION INTRAVENOUS; SUBCUTANEOUS at 22:13

## 2018-08-25 RX ADMIN — HEPARIN SODIUM 5000 UNITS: 5000 INJECTION INTRAVENOUS; SUBCUTANEOUS at 02:51

## 2018-08-25 RX ADMIN — HEPARIN SODIUM 5000 UNITS: 5000 INJECTION INTRAVENOUS; SUBCUTANEOUS at 15:58

## 2018-08-25 RX ADMIN — DEXTROSE MONOHYDRATE 12.5 G: 25 INJECTION, SOLUTION INTRAVENOUS at 06:55

## 2018-08-25 RX ADMIN — ACETAMINOPHEN 650 MG: 325 TABLET ORAL at 18:26

## 2018-08-25 RX ADMIN — POTASSIUM CHLORIDE 20 MEQ: 750 TABLET, EXTENDED RELEASE ORAL at 12:19

## 2018-08-25 RX ADMIN — SODIUM CHLORIDE 75 ML/HR: 450 INJECTION, SOLUTION INTRAVENOUS at 14:11

## 2018-08-25 RX ADMIN — CEFTRIAXONE 1 G: 1 INJECTION, POWDER, FOR SOLUTION INTRAMUSCULAR; INTRAVENOUS at 15:58

## 2018-08-25 RX ADMIN — SODIUM CHLORIDE 16.9 UNITS/HR: 900 INJECTION, SOLUTION INTRAVENOUS at 02:01

## 2018-08-25 RX ADMIN — NYSTATIN 500000 UNITS: 100000 SUSPENSION ORAL at 17:20

## 2018-08-25 RX ADMIN — DEXTROSE MONOHYDRATE AND SODIUM CHLORIDE 100 ML/HR: 5; .9 INJECTION, SOLUTION INTRAVENOUS at 02:47

## 2018-08-25 RX ADMIN — SODIUM CHLORIDE 40 MG: 9 INJECTION, SOLUTION INTRAMUSCULAR; INTRAVENOUS; SUBCUTANEOUS at 08:01

## 2018-08-25 RX ADMIN — INSULIN LISPRO 2 UNITS: 100 INJECTION, SOLUTION INTRAVENOUS; SUBCUTANEOUS at 17:20

## 2018-08-25 RX ADMIN — ACETAMINOPHEN 650 MG: 325 TABLET ORAL at 09:45

## 2018-08-25 RX ADMIN — DEXTROSE MONOHYDRATE AND SODIUM CHLORIDE 100 ML/HR: 5; .45 INJECTION, SOLUTION INTRAVENOUS at 11:07

## 2018-08-25 RX ADMIN — NYSTATIN 500000 UNITS: 100000 SUSPENSION ORAL at 12:19

## 2018-08-25 NOTE — PROCEDURES
[facility] Team [facility]  [phone]    Vitals   Pre   Post   Assessment   Pre   Post     Temp  Temp: 97.6 °F (36.4 °C) (08/24/18 2037)   LOC  A/OX3 DISORIENTED   HR   Pulse (Heart Rate): (!) 119 (08/24/18 2037) 130 Lungs   CLEAR  CLEAR,RAPID BREATHING   B/P   BP: 96/66 (08/24/18 2037) 62/37 Cardiac   NORMAL SINUS  TACHYCARDIC   Resp   Resp Rate: 20 (08/24/18 2037) 24 Skin   DRY   DRY   Pain level  Pain Intensity 1: 0 (08/24/18 1752)  Edema    NO SIGNS OF EDEMA   NO SIGNS OF EDEMA   Orders:    Duration:   Start:    2037 End:    2100 Total:   20MINUTES   Dialyzer:   Dialyzer/Set Up Inspection: Trish Rodriguez (08/24/18 2037)   K Bath:   Dialysate K (mEq/L): 1 (08/24/18 2037)   Ca Bath:   Dialysate CA (mEq/L): 2.5 (08/24/18 2037)   Na/Bicarb:   Dialysate NA (mEq/L): 140 (08/24/18 2037)   Target Fluid Removal:   Goal/Amount of Fluid to Remove (mL): 0 mL (08/24/18 2037)   Access     Type & Location:   RIJ CVC NON TUNNELED-Assess Lawrence/permcath disinfected with Alcohol per policy. Each lumen aspirated for blood return and flushed with Normal Saline per policy. Dialysis initiated.      Labs     Obtained/Reviewed   Critical Results Called   Date when labs were drawn-  Hgb-    HGB   Date Value Ref Range Status   08/24/2018 9.5 (L) 11.5 - 16.0 g/dL Final     K-    Potassium   Date Value Ref Range Status   08/24/2018 5.4 (H) 3.5 - 5.1 mmol/L Final     Comment:     INVESTIGATED PER DELTA CHECK PROTOCOL  SPECIMEN HEMOLYZED, RESULTS MAY BE AFFECTED       Ca-   Calcium   Date Value Ref Range Status   08/24/2018 7.5 (L) 8.5 - 10.1 MG/DL Final     Bun-   BUN   Date Value Ref Range Status   08/24/2018 187 (H) 6 - 20 MG/DL Final     Creat-   Creatinine   Date Value Ref Range Status   08/24/2018 8.68 (H) 0.55 - 1.02 MG/DL Final        Medications/ Blood Products Given     Name   Dose   Route and Time                     Blood Volume Processed (BVP):    0 Net Fluid   Removed:  0   Comments   Time Out Done: 2030  Primary Nurse Rpt Pre:KAVIN Sung  Primary Nurse Rpt Post:KAVIN Sung  Pt Education:procedural,s/s of infection with catheter,purpose of dialysis,  Care Plan:Continue to follow the orders of nephrologist    Tx Summary:Patient tolerated 1st treatment poorly. Twenty minutes into treatment without any fluid being pulled patient blood pressure went down to 62/37 heart rate of 130bpm.  Patient became disoriented and incoherent. Rapid Response called and Code S.  Gave patient 600mls bolus and rinse back all possible blood back to patient. Central line catheter flushed with normal saline per policy. Ports disinfected with Alcohol per policy and lines disconnected and capped using aseptic technique. See LDA docflowsheet for dressing information. Patient sent to CT. Paged Dr. Krista Perez to make aware. Admiting Diagnosis: Diabetic ketoacidosis,Acute renal failure, Acute hyperkalemia    Pt's previous clinic-n/a  Consent signed - Informed Consent Verified: Yes (08/24/18 2037)  [facility] Consent -Yes  Hepatitis Status- Drawn on [id number]  Machine #- Machine Number: [id number] (08/24/18 2037)  Telemetry status-bedside  Pre-dialysis wt. - Pre-Dialysis Weight: 45.4 kg (100 lb 1.4 oz) (08/24/18 2037)

## 2018-08-25 NOTE — PROGRESS NOTES
0530- follow up note from Code Stroke on 8/24/18 at 2130. Pt now  moving all extremities, A and Ox4  Speech clear. VSS,    Glucose=81. No further actions needed. Neurologist to follow up with pt today.

## 2018-08-25 NOTE — PROGRESS NOTES
Bedside shift change report given to EMERSON Kelly (oncoming nurse) by Reji Edward RN (offgoing nurse). Report included the following information SBAR, Kardex, ED Summary, Procedure Summary, MAR, Accordion and Recent Results. Summary of shift:  Had to call a rapid response when patient became unresponsive with a blood pressure in the 60'Y systolically during her first dialysis treatment. Treatment was immediately stopped and a bolus was given bringing the blood pressure back up. Blood sugar was in the 500's when checked, down from 1300's on admission. Pt. On insulin gtt. Took patient down for head CT and tele neurologist was contacted. Negative head CT. Pt. Was more responsive after head CT and neurologist felt that this was all metabolic due to patient's renal problems. Labs sent. VSS after rapid response. Nephrologist notified of failed H.D. Attempt and will re-evaluate patient in the morning. Pt. Was doing much better by the end of the night, even having to place insulin gtt on hold.

## 2018-08-25 NOTE — PROGRESS NOTES
Responded to RRT call in room 2251/01 at 2058 on 8/24/2018. This 45 y.o.  female,  Vaibhav Florez  is a patient of Selena Leal MD.   Admitting diagnosis DKA (diabetic ketoacidoses) ([facility] [address] Allergies   Allergen Reactions    Contrast Agent [Iodine] Itching    Hydrocodone Rash    Percocet [Oxycodone-Acetaminophen] Rash    Codeine Nausea and Vomiting        Reason for call: AMS She is alert, well appearing, and in no distress and in mild to moderate distress    Blood pressure (!) 62/37, pulse (!) 132, temperature 97.6 °F (36.4 °C), resp. rate 20, height 5' 1\" (1.549 m), weight 45.4 kg (100 lb), SpO2 98 %.     MD Notified: Randolph Hernández were: Machelle Merchant    Past Medical History:   Diagnosis Date    Asthma     Diabetes ([facility] [address]     Elevated transaminase level 6/29/2016    Insulin dependent diabetes mellitus ([facility] [address] 6/20/2016    Pancreatic atrophy 6/20/2016          Past Surgical History:   Procedure Laterality Date    HX HEENT         Outcome: Pending Tele Neuro    Time Ended: 2130    Mavis Coles, EMERSON RRT Nurse

## 2018-08-25 NOTE — PROGRESS NOTES
NAME: Pari Hercules        :  1965        MRN:  [id number]        Assessment :    Plan:  --NEERAJ  DKA  Pseuohyponatremia  Severe hyperkalemia  proteniuria  hematuria --Did not tolerated HD due to drop in BP. Potassium much better. Creatinine trending down. Hold Hd for now. Continue labs q4h. Change to hypotonic fluids. Subjective:     Chief Complaint:  \" I feel OK. \"  No n/V. No dyspnea. No pain. Review of Systems:    Symptom Y/N Comments  Symptom Y/N Comments   Fever/Chills    Chest Pain     Poor Appetite    Edema     Cough    Abdominal Pain     Sputum    Joint Pain     SOB/GARLAND    Pruritis/Rash     Nausea/vomit    Tolerating PT/OT     Diarrhea    Tolerating Diet     Constipation    Other       Could not obtain due to:      Objective:     VITALS:   Last 24hrs VS reviewed since prior progress note.  Most recent are:  Visit Vitals    /57    Pulse 81    Temp 98.6 °F (37 °C)    Resp 20    Ht 5' 1\" (1.549 m)    Wt 45.4 kg (100 lb)    SpO2 97%    BMI 18.89 kg/m2       Intake/Output Summary (Last 24 hours) at 18 1003  Last data filed at 18 0947   Gross per 24 hour   Intake          2459.56 ml   Output              785 ml   Net          1674.56 ml      Telemetry Reviewed:     PHYSICAL EXAM:  General: NAD  CTA  abd soft  No edema      Lab Data Reviewed: (see below)    Medications Reviewed: (see below)    PMH/SH reviewed - no change compared to H&P  ________________________________________________________________________  Care Plan discussed with:  Patient     Family      RN     Care Manager                    Consultant:          Comments   >50% of visit spent in counseling and coordination of care       ________________________________________________________________________  Bharath Deluca MD     Procedures: see electronic medical records for all procedures/Xrays and details which  were not copied into this note but were reviewed prior to creation of Plan. LABS:  Recent Labs      08/25/18   0207  08/24/18   1150   WBC  24.2*  30.8*   HGB  7.9*  9.5*   HCT  21.1*  28.5*   PLT  116*  156     Recent Labs      08/25/18   0617  08/25/18   0207  08/24/18   2156  08/24/18   1732  08/24/18   1305   NA  149*  147*  142  134*  122*   K  3.4*  3.7  3.9  5.4*  7.4*   CL  111*  109*  104  94*  87*   CO2  27  25 23 24  11*   BUN  150*  147*  151*  187*  198*   CREA  7.39*  7.43*  7.77*  8.68*  9.64*   GLU  67  197*  516*  1013*  1338*   CA  7.6*  7.4*  7.8*  7.5*  8.0*   MG  2.4  2.4  2.6*  2.9*  3.4*   PHOS   --   3.1   --   7.7*  9.7*     Recent Labs      08/25/18   0207  08/24/18   1305   SGOT  73*  11*   AP  204*  238*   TP  5.7*  7.1   ALB  2.0*  2.4*   GLOB  3.7  4.7*   LPSE   --   75     No results for input(s): INR, PTP, APTT in the last 72 hours. No lab exists for component: INREXT   No results for input(s): FE, TIBC, PSAT, FERR in the last 72 hours. No results found for: FOL, RBCF   No results for input(s): PH, PCO2, PO2 in the last 72 hours. No results for input(s): CPK, CKMB in the last 72 hours.     No lab exists for component: TROPONINI  No components found for: [place]  Lab Results   Component Value Date/Time    Color YELLOW/STRAW 08/24/2018 02:13 PM    Appearance CLOUDY (A) 08/24/2018 02:13 PM    Specific gravity 1.020 08/24/2018 02:13 PM    pH (UA) 5.0 08/24/2018 02:13 PM    Protein 100 (A) 08/24/2018 02:13 PM    Glucose >1000 (A) 08/24/2018 02:13 PM    Ketone 15 (A) 08/24/2018 02:13 PM    Bilirubin NEGATIVE  08/24/2018 02:13 PM    Urobilinogen 1.0 08/24/2018 02:13 PM    Nitrites NEGATIVE  08/24/2018 02:13 PM    Leukocyte Esterase MODERATE (A) 08/24/2018 02:13 PM    Epithelial cells FEW 08/24/2018 02:13 PM    Bacteria 3+ (A) 08/24/2018 02:13 PM    WBC  08/24/2018 02:13 PM    RBC 0-5 08/24/2018 02:13 PM       MEDICATIONS:  Current Facility-Administered Medications   Medication Dose Route Frequency    dextrose 5 % - 0.45% NaCl infusion  100 mL/hr IntraVENous CONTINUOUS    acetaminophen (TYLENOL) tablet 650 mg  650 mg Oral Q4H PRN    prochlorperazine (COMPAZINE) with saline injection 10 mg  10 mg IntraVENous Q6H PRN    labetalol (NORMODYNE;TRANDATE) injection 10 mg  10 mg IntraVENous Q6H PRN    pantoprazole (PROTONIX) 40 mg in sodium chloride 0.9% 10 mL injection  40 mg IntraVENous DAILY    heparin (porcine) injection 5,000 Units  5,000 Units SubCUTAneous Q12H    insulin regular (NOVOLIN R, HUMULIN R) 100 Units in 0.9% sodium chloride 100 mL infusion  0-50 Units/hr IntraVENous TITRATE    insulin lispro (HUMALOG) injection   SubCUTAneous TIDAC    glucose chewable tablet 16 g  4 Tab Oral PRN    dextrose (D50W) injection syrg 12.5-25 g  25-50 mL IntraVENous PRN    glucagon (GLUCAGEN) injection 1 mg  1 mg IntraMUSCular PRN    cefTRIAXone (ROCEPHIN) 1 g in 0.9% sodium chloride (MBP/ADV) 50 mL  1 g IntraVENous Q24H    fentaNYL citrate (PF) injection 50 mcg  50 mcg IntraVENous Q4H PRN

## 2018-08-25 NOTE — PROGRESS NOTES
PCU SHIFT NURSING NOTE      Shift Summary:   Patient arrived to the unit at [phone]. Report taken from ED nurse at [id number]. A/Ox4. Patient moves all extremities but is extremely weak and BUE are tremorous. Lung sounds clear. Patient reports no pain. IV Insulin drip going at 21.6 units/hr. 1608 Blood sugar check reading \"High\". Documented in [address] as 600 and increased rate per glucostabilizer to 27 units. 1620 Collected urine samples for labs. Received critical lab result call for Glucose on BMP of 1013. Called Dr. Latoya Carlisle to let him know about glucose result and to point out the low Calcium of 7.5. Dr. Latoya Carlisle told me to continue treatment as scheduled. He will consult with Luis Miguel treatment needed for calcium. 1930 Bedside and Verbal shift change report given to EMERSON Kelly (oncoming nurse) by Vikram Manrique RN (offgoing nurse). Report included the following information SBAR, Kardex, ED Summary, Intake/Output, MAR and Recent Results. Admission Date 8/24/2018   Admission Diagnosis DKA (diabetic ketoacidoses) ([facility] [address]   Consults IP CONSULT TO NEPHROLOGY        Consults   []PT   []OT   []Speech   []Case Management      [] Palliative      Cardiac Monitoring Order   [x]Yes   []No     IV drips   [x]Yes    Drip:               Insulin             Dose: Adjusted per Glucostabilizer protocol  Drip:              NS              Dose: 100 ml/hr  Drip:                            Dose:   []No     GI Prophylaxis   [x]Yes   []No         DVT Prophylaxis   SCDs:             Yusef stockings:         [x] Medication   []Contraindicated   []None      Activity Level           Purposeful Rounding every 1-2 hour?    [x]Yes   Matute Score      Bed Alarm (If score 3 or >)   [x]Yes   [] Refused (See signed refusal form in chart)   Vinh Score      Vinh Score (if score 14 or less)   []PMT consult   []Wound Care consult      []Specialty bed   [] Nutrition consult          Needs prior to discharge:   Home O2 required:    []Yes [x]No    If yes, how much O2 required? Other:    Last Bowel Movement: Last Bowel Movement Date: 08/17/18      Influenza Vaccine          Pneumonia Vaccine           Diet Active Orders   Diet    DIET NPO Except Meds      LDAs               Peripheral IV 08/24/18 Left Wrist (Active)   Site Assessment Clean, dry, & intact 8/24/2018  2:14 PM   Phlebitis Assessment 0 8/24/2018  2:14 PM   Infiltration Assessment 0 8/24/2018  2:14 PM   Dressing Status Clean, dry, & intact 8/24/2018  2:14 PM       Peripheral IV 08/24/18 Right Hand (Active)   Site Assessment Clean, dry, & intact 8/24/2018  2:28 PM   Phlebitis Assessment 0 8/24/2018  2:28 PM   Infiltration Assessment 0 8/24/2018  2:28 PM   Dressing Status Clean, dry, & intact 8/24/2018  2:28 PM                      Urinary Catheter Urinary Catheter 08/24/18 2- way-Indications for Use: Accurate measurement of urinary output    Intake & Output        Readmission Risk Assessment Tool Score Medium Risk            14       Total Score        4 IP Visits Last 12 Months (1-3=4, 4=9, >4=11)    10 Charlson Comorbidity Score (Age + Comorbid Conditions)        Criteria that do not apply:    Has Seen PCP in Last 6 Months (Yes=3, No=0)    . Living with Significant Other. Assisted Living. LTAC. SNF. or   Rehab    Patient Length of Stay (>5 days = 3)    Pt.  Coverage (Medicare=5 , Medicaid, or Self-Pay=4)       Expected Length of Stay - - -   Actual Length of Stay 0

## 2018-08-25 NOTE — PROGRESS NOTES
Physical Therapy  Consult received and chart reviewed. Nursing requesting therapy be deferred at this time secondary to patient somewhat lethargic. Will defer therapy today and follow back tomorrow as able.   Thank you,  Marcy Obando, PT

## 2018-08-25 NOTE — PROGRESS NOTES
Rapid response team was called on the patient secondary to altered mental status and also not able to move both her arms  Patient was seen and examined  Patient was admitted earlier for DKA, acute kidney injury and hyperkalemia and is currently undergoing hemodialysis session  Patient is currently alert, awake able to track eyes but not able to move both arms  She is able to wiggle her toes  She did drop her blood pressure to systolic of 60 but got 704 mL of fluid bolus with improvement of blood pressure to 96    Currently heart rate is around 105, blood pressure is 96/60    General examination: Alert and awake  Heart: First and second heart sounds are present regular no murmurs  Lungs: Bilateral air entry is present, no crackles  Abdomen: Soft, nontender    Labs WBC 30,000 hemoglobin 9.5 platelets 756  BMP shows a sodium of 134 potassium 5.4 glucose 1000 and anion gap 16 bicarb 24    Acute encephalopathy likely metabolic and may be related to cerebral hypoperfusion syndrome  Code S  was initiated on this patient and patient was evaluated by telemetry neurologist who felt that patient most likely has acute metabolic encephalopathy and cerebral hypoperfusion syndrome as a combination causing cerebral symptoms  She underwent a CT head as per protocol which showed no evidence of acute abdominal process  Was recommended by telemetry neurologist to treat for medical problems  Continue insulin drip  Continue hemodialysis sessions as tolerated per nephrology recommendations  Obtain serial labs  Discussed with Dr Josh brown who provided above recommendations  I was present at bed side providing critical care for the pt for about 45 min   Total time for direct and indirect patient care is about 65 minutes.

## 2018-08-25 NOTE — PROGRESS NOTES
Hospitalist Progress Note    NAME: Yousif Bocanegra   :  1965   MRN:  [id number]       Assessment / Plan:  DKA (resolved) in setting of uncontrolled insulin-dependent DM2 with nephropathy: HgA1c 8.8  - glucose stabilized and anion gap is closed, will stop insulin gtt  - restart lantus at reduced dose. Holding amaryl.  - lispro sliding scale  - diet as tolerated, supplements added  NEERAJ with severe hyperkalemia in setting of dehydration/DKA and sepsis/UTI, present on admission:   - renal US  normal renal ultrasound examination  - urine cx >[id number] gram negative rods  - con't emperic rocephin  - appreciate nephrology assistance; HD stopped overnight due to symptomatic hypotension. Monitor Cr closely. - con't IV fluids  Pseudohyponatremia, resolved  Abdominal pain, unclear etiology: CT A/P without contrast  negative    Code Status: Full  Surrogate Decision Maker: Ladonna nelsont   DVT Prophylaxis: heparin     Subjective:     Chief Complaint / Reason for Physician Visit  Appears weak, fatigued. No acute complaints  Discussed with RN events overnight. Review of Systems:  Symptom Y/N Comments  Symptom Y/N Comments   Fever/Chills n   Chest Pain n    Poor Appetite y   Edema n    Cough n   Abdominal Pain y    Sputum n   Joint Pain     SOB/GARLAND n   Pruritis/Rash     Nausea/vomit    Tolerating PT/OT     Diarrhea    Tolerating Diet     Constipation    Other       Could NOT obtain due to:      Objective:     VITALS:   Last 24hrs VS reviewed since prior progress note.  Most recent are:  Patient Vitals for the past 24 hrs:   Temp Pulse Resp BP SpO2   18 1100 99 °F (37.2 °C) 81 - 108/56 98 %   18 1000 - 85 - 112/55 98 %   18 0900 - 81 - 109/57 97 %   18 0800 - 85 - 100/59 97 %   18 0700 98.6 °F (37 °C) 74 20 101/55 100 %   18 0600 - 77 - 102/57 100 %   18 0400 98.5 °F (36.9 °C) 94 16 104/54 100 %   18 0000 100.4 °F (38 °C) (!) 115 20 108/49 99 % 08/24/18 2137 - (!) 132 - (!) 62/37 98 %   08/24/18 2130 - (!) 111 - 102/42 100 %   08/24/18 2100 - (!) 130 24 (!) 62/37 98 %   08/24/18 2037 97.6 °F (36.4 °C) (!) 119 20 96/66 98 %   08/24/18 2000 99.8 °F (37.7 °C) (!) 113 18 (!) 142/115 97 %   08/24/18 1752 97.6 °F (36.4 °C) 83 16 119/55 99 %   08/24/18 1600 - 87 16 104/63 98 %   08/24/18 1500 - 92 16 106/54 97 %   08/24/18 1445 - 89 18 101/60 97 %   08/24/18 1432 - 88 19 93/56 99 %   08/24/18 1415 - 92 20 114/54 98 %   08/24/18 1409 - 92 22 - 97 %   08/24/18 1315 - 89 20 111/63 -   08/24/18 1312 - 90 19 - 98 %   08/24/18 1300 - 90 19 101/59 100 %   08/24/18 1210 - - - 98/49 99 %       Intake/Output Summary (Last 24 hours) at 08/25/18 1117  Last data filed at 08/25/18 1111   Gross per 24 hour   Intake          2472.21 ml   Output              832 ml   Net          1640.21 ml        PHYSICAL EXAM:  General: Thin. Alert, cooperative, no acute distress    EENT:  EOMI. Anicteric sclerae. MMM  Resp:  CTA bilaterally, no wheezing or rales. No accessory muscle use  CV:  Regular rhythm,  No edema  GI:  Soft, minimally distended, Non tender.  +Bowel sounds  Neurologic:  Alert and oriented X 3, normal speech,   Psych:   Fair insight. Not anxious nor agitated  Skin:  No rashes. No jaundice    Reviewed most current lab test results and cultures  YES  Reviewed most current radiology test results   YES  Review and summation of old records today    NO  Reviewed patient's current orders and MAR    YES  PMH/SH reviewed - no change compared to H&P  ________________________________________________________________________  Care Plan discussed with:    Comments   Patient x    Family      RN x    Care Manager     Consultant                        Multidiciplinary team rounds were held today with , nursing, pharmacist and clinical coordinator. Patient's plan of care was discussed; medications were reviewed and discharge planning was addressed. ________________________________________________________________________  Total NON critical care TIME:  35 Minutes    Total CRITICAL CARE TIME Spent:   Minutes non procedure based      Comments   >50% of visit spent in counseling and coordination of care x    ________________________________________________________________________  Hodan Quevedo MD     Procedures: see electronic medical records for all procedures/Xrays and details which were not copied into this note but were reviewed prior to creation of Plan. LABS:  I reviewed today's most current labs and imaging studies. Pertinent labs include:  Recent Labs      08/25/18   0207  08/24/18   1150   WBC  24.2*  30.8*   HGB  7.9*  9.5*   HCT  21.1*  28.5*   PLT  116*  156     Recent Labs      08/25/18   1006  08/25/18   0617  08/25/18   0207   08/24/18   1732  08/24/18   1305   NA  147*  149*  147*   < >  134*  122*   K  3.1*  3.4*  3.7   < >  5.4*  7.4*   CL  110*  111*  109*   < >  94*  87*   CO2  25  27  25   < >  24  11*   GLU  100  67  197*   < >  1013*  1338*   BUN  143*  150*  147*   < >  187*  198*   CREA  7.26*  7.39*  7.43*   < >  8.68*  9.64*   CA  7.5*  7.6*  7.4*   < >  7.5*  8.0*   MG  2.2  2.4  2.4   < >  2.9*  3.4*   PHOS   --    --   3.1   --   7.7*  9.7*   ALB   --    --   2.0*   --    --   2.4*   TBILI   --    --   0.9   --    --   1.1*   SGOT   --    --   73*   --    --   11*   ALT   --    --   36   --    --   32    < > = values in this interval not displayed.        Signed: Hodan Quevedo MD

## 2018-08-25 NOTE — PROGRESS NOTES
0700-Bedside shift change report given to Ra Mays (oncoming nurse) by  Nirali Sanchez nurse). Report included the following information SBAR, Kardex and MAR.   0245- Patient assessed. See flowsheet. 1030- Labs drawn and sent. 1100- Reassessed. See flowsheet. 26- Dr. Barry Dickey paged in regards to [facility]. 1216- Lantus given. Reassessed. No changes. 1406- Reassessed. No changes. Dr. Barry Dickey called for maintenance IV fluid orders. Orders Received. 1800- Temp 101.8. Tylenol given. Dr. Barry Dickey notified of temp.   1900- Report given to [address] Lorne Nugent.

## 2018-08-26 LAB
ANION GAP SERPL CALC-SCNC: 13 MMOL/L (ref 5–15)
BACTERIA SPEC CULT: ABNORMAL
BUN SERPL-MCNC: 139 MG/DL (ref 6–20)
BUN/CREAT SERPL: 20 (ref 12–20)
CALCIUM SERPL-MCNC: 7.7 MG/DL (ref 8.5–10.1)
CC UR VC: ABNORMAL
CHLORIDE SERPL-SCNC: 107 MMOL/L (ref 97–108)
CO2 SERPL-SCNC: 24 MMOL/L (ref 21–32)
CREAT SERPL-MCNC: 7.07 MG/DL (ref 0.55–1.02)
ERYTHROCYTE [DISTWIDTH] IN BLOOD BY AUTOMATED COUNT: 13.2 % (ref 11.5–14.5)
GLUCOSE BLD STRIP.AUTO-MCNC: 221 MG/DL (ref 65–100)
GLUCOSE BLD STRIP.AUTO-MCNC: 227 MG/DL (ref 65–100)
GLUCOSE BLD STRIP.AUTO-MCNC: 236 MG/DL (ref 65–100)
GLUCOSE BLD STRIP.AUTO-MCNC: 248 MG/DL (ref 65–100)
GLUCOSE SERPL-MCNC: 234 MG/DL (ref 65–100)
HCT VFR BLD AUTO: 21.2 % (ref 35–47)
HGB BLD-MCNC: 7.9 G/DL (ref 11.5–16)
MAGNESIUM SERPL-MCNC: 2.1 MG/DL (ref 1.6–2.4)
MCH RBC QN AUTO: 30.9 PG (ref 26–34)
MCHC RBC AUTO-ENTMCNC: 37.3 G/DL (ref 30–36.5)
MCV RBC AUTO: 82.8 FL (ref 80–99)
NRBC # BLD: 0 K/UL (ref 0–0.01)
NRBC BLD-RTO: 0 PER 100 WBC
PHOSPHATE SERPL-MCNC: 4 MG/DL (ref 2.6–4.7)
PLATELET # BLD AUTO: 104 K/UL (ref 150–400)
POTASSIUM SERPL-SCNC: 3.1 MMOL/L (ref 3.5–5.1)
RBC # BLD AUTO: 2.56 M/UL (ref 3.8–5.2)
SERVICE CMNT-IMP: ABNORMAL
SODIUM SERPL-SCNC: 144 MMOL/L (ref 136–145)
WBC # BLD AUTO: 23.4 K/UL (ref 3.6–11)

## 2018-08-26 PROCEDURE — 65660000000 HC RM CCU STEPDOWN

## 2018-08-26 PROCEDURE — 74011250636 HC RX REV CODE- 250/636: Performed by: INTERNAL MEDICINE

## 2018-08-26 PROCEDURE — 74011250637 HC RX REV CODE- 250/637: Performed by: INTERNAL MEDICINE

## 2018-08-26 PROCEDURE — 74011000258 HC RX REV CODE- 258: Performed by: INTERNAL MEDICINE

## 2018-08-26 PROCEDURE — 97161 PT EVAL LOW COMPLEX 20 MIN: CPT

## 2018-08-26 PROCEDURE — 85027 COMPLETE CBC AUTOMATED: CPT | Performed by: INTERNAL MEDICINE

## 2018-08-26 PROCEDURE — 97116 GAIT TRAINING THERAPY: CPT

## 2018-08-26 PROCEDURE — 82962 GLUCOSE BLOOD TEST: CPT

## 2018-08-26 PROCEDURE — 74011636637 HC RX REV CODE- 636/637: Performed by: INTERNAL MEDICINE

## 2018-08-26 PROCEDURE — 36415 COLL VENOUS BLD VENIPUNCTURE: CPT | Performed by: INTERNAL MEDICINE

## 2018-08-26 PROCEDURE — 84100 ASSAY OF PHOSPHORUS: CPT | Performed by: INTERNAL MEDICINE

## 2018-08-26 PROCEDURE — 83735 ASSAY OF MAGNESIUM: CPT | Performed by: INTERNAL MEDICINE

## 2018-08-26 PROCEDURE — 80048 BASIC METABOLIC PNL TOTAL CA: CPT | Performed by: INTERNAL MEDICINE

## 2018-08-26 RX ORDER — INSULIN GLARGINE 100 [IU]/ML
25 INJECTION, SOLUTION SUBCUTANEOUS DAILY
Status: DISCONTINUED | OUTPATIENT
Start: 2018-08-27 | End: 2018-08-27

## 2018-08-26 RX ORDER — POTASSIUM CHLORIDE 750 MG/1
40 TABLET, FILM COATED, EXTENDED RELEASE ORAL
Status: COMPLETED | OUTPATIENT
Start: 2018-08-26 | End: 2018-08-26

## 2018-08-26 RX ADMIN — POTASSIUM CHLORIDE 40 MEQ: 750 TABLET, EXTENDED RELEASE ORAL at 09:54

## 2018-08-26 RX ADMIN — INSULIN LISPRO 3 UNITS: 100 INJECTION, SOLUTION INTRAVENOUS; SUBCUTANEOUS at 08:05

## 2018-08-26 RX ADMIN — CEFTRIAXONE 1 G: 1 INJECTION, POWDER, FOR SOLUTION INTRAMUSCULAR; INTRAVENOUS at 15:02

## 2018-08-26 RX ADMIN — INSULIN LISPRO 2 UNITS: 100 INJECTION, SOLUTION INTRAVENOUS; SUBCUTANEOUS at 21:42

## 2018-08-26 RX ADMIN — NYSTATIN 500000 UNITS: 100000 SUSPENSION ORAL at 12:07

## 2018-08-26 RX ADMIN — NYSTATIN 500000 UNITS: 100000 SUSPENSION ORAL at 21:42

## 2018-08-26 RX ADMIN — NYSTATIN 500000 UNITS: 100000 SUSPENSION ORAL at 08:05

## 2018-08-26 RX ADMIN — ACETAMINOPHEN 650 MG: 325 TABLET ORAL at 23:07

## 2018-08-26 RX ADMIN — FAMOTIDINE 20 MG: 20 TABLET ORAL at 08:05

## 2018-08-26 RX ADMIN — INSULIN LISPRO 3 UNITS: 100 INJECTION, SOLUTION INTRAVENOUS; SUBCUTANEOUS at 12:07

## 2018-08-26 RX ADMIN — NYSTATIN 500000 UNITS: 100000 SUSPENSION ORAL at 17:10

## 2018-08-26 RX ADMIN — HEPARIN SODIUM 5000 UNITS: 5000 INJECTION INTRAVENOUS; SUBCUTANEOUS at 03:09

## 2018-08-26 RX ADMIN — HEPARIN SODIUM 5000 UNITS: 5000 INJECTION INTRAVENOUS; SUBCUTANEOUS at 15:02

## 2018-08-26 RX ADMIN — INSULIN LISPRO 3 UNITS: 100 INJECTION, SOLUTION INTRAVENOUS; SUBCUTANEOUS at 15:30

## 2018-08-26 RX ADMIN — INSULIN GLARGINE 15 UNITS: 100 INJECTION, SOLUTION SUBCUTANEOUS at 08:05

## 2018-08-26 NOTE — PROGRESS NOTES
Problem: Mobility Impaired (Adult and Pediatric)  Goal: *Acute Goals and Plan of Care (Insert Text)  Physical Therapy Goals  Initiated 8/26/2018  1. Patient will move from supine to sit and sit to supine , scoot up and down and roll side to side in bed with independence within 7 day(s). 2.  Patient will transfer from bed to chair and chair to bed with supervision using the least restrictive device within 7 day(s). 3.  Patient will perform sit to stand with supervision/set-up within 7 day(s). 4.  Patient will ambulate with supervision/set-up for 656 feet with the least restrictive device within 7 day(s). 5.  Patient will ascend/descend 4 stairs with dual handrail(s) with supervision/set-up within 7 day(s). physical Therapy EVALUATION  Patient: Twan Escamilla (55 y.o. female)  Date: 8/26/2018  Primary Diagnosis: DKA (diabetic ketoacidoses) [facility])        Precautions:  Bed Alarm, Fall    ASSESSMENT :  Based on the objective data described below, the patient presents with generalized weakness, impaired stability, and at times decreased safety awareness impacting functioning. Patient also with extremely flat affect and quiet voice. She reported full I PTA while working and living alone. Received in supine with supportive Aunt present. Patient initially reluctant to participate, appearing frail and concerned over activity ahead. She mobilized with S to EOB where able to connor grippy socks with S and crossed-leg technique. She then stood with CGA, where immediately noted posterior lean onto heels with LOB requiring mod A to sustain upright; with multi-modal cueing able to facilitate return to midline. Patient became increasingly withdrawn at that point, with slight cervical flexion and R rotation although VSS with negative orthostatics. After brief and adequate pre-gait march, mobilized without device into hallway where instability remained with multiple episodes of LOB requiring mod A to correct.  Patient with frequent pauses where due to lack of momentum she would destabilize; also noted minimal arm swing despite facilitation. During activity, educated on benefit from [address] but patient declined in the moment. Returned to room where patient and aunt educated on [address] ahead and benefit from continued upright activity with staff ahead. VS remained stable. Given above deficits, patient would benefit from acute PT ahead. If she was to DC at this time, IP rehabilitation would be most appropriate given large benefit to be had from intensive therapies. She would be able to tolerate 3hrs daily. If progress is made, such as safe use of RW device, consideration of return to home with 24/7 supervision could be pursued with patient/aunt reporting 24/7 support as a possibility. Recommend further workup of flat affect     Patient will benefit from skilled intervention to address the above impairments. Patients rehabilitation potential is considered to be Good  Factors which may influence rehabilitation potential include:   []         None noted  [x]         Mental ability/status  [x]         Medical condition  [x]         Home/family situation and support systems  []         Safety awareness  []         Pain tolerance/management  []         Other:      PLAN :  Recommendations and Planned Interventions:  [x]           Bed Mobility Training             [x]    Neuromuscular Re-Education  [x]           Transfer Training                   []    Orthotic/Prosthetic Training  [x]           Gait Training                         []    Modalities  [x]           Therapeutic Exercises           []    Edema Management/Control  [x]           Therapeutic Activities            [x]    Patient and Family Training/Education  []           Other (comment):    Frequency/Duration: Patient will be followed by physical therapy  5 times a week to address goals. Discharge Recommendations:  To Be Determined - IP rehab vs return to home with 24/7 support  Further Equipment Recommendations for Discharge: defer     SUBJECTIVE:   Patient stated I'm fine.  Patient with very quiet and flat affect throughout. OBJECTIVE DATA SUMMARY:   HISTORY:    Past Medical History:   Diagnosis Date    Asthma     Diabetes ([facility] [address]     Elevated transaminase level 6/29/2016    Insulin dependent diabetes mellitus ([facility] [address] 6/20/2016    Pancreatic atrophy 6/20/2016     Past Surgical History:   Procedure Laterality Date    HX HEENT       Prior Level of Function/Home Situation: Independent community ambulator, driving, working FT (at [employer]), living alone, without falls. She reports that she would be able to have 24/7 S at DC with Aunt present voicing agreement. Personal factors and/or comorbidities impacting plan of care: poor DM management    Home Situation  Home Environment: Private residence  # Steps to Enter: 2  Rails to Enter: Yes  Hand Rails : Bilateral  One/Two Story Residence: One story  Living Alone: Yes  Support Systems: Family member(s)  Patient Expects to be Discharged to[de-identified] Rehabilitation facility  Current DME Used/Available at Home: None  Tub or Shower Type: Tub/Shower combination    EXAMINATION/PRESENTATION/DECISION MAKING:   Critical Behavior:  Neurologic State: Alert  Orientation Level: Oriented X4  Cognition: Follows commands     Hearing: Auditory  Auditory Impairment: None    Range Of Motion:  AROM: Generally decreased, functional                       Strength:    Strength: Within functional limits                    Tone & Sensation:                  Sensation: Intact                    Vision: denies concerns      Functional Mobility:  Bed Mobility:  Rolling: Supervision  Supine to Sit: Supervision     Scooting: Supervision  Transfers:  Sit to Stand: Contact guard assistance  Stand to Sit: Contact guard assistance                       Balance:   Sitting: Intact; Without support  Standing: Impaired  Standing - Static: Poor;Fair;Constant support (initial posterior lean)  Standing - Dynamic : Fair (episodes of poor )  Ambulation/Gait Training:  Distance (ft): 380 Feet (ft)  Assistive Device: Gait belt  Ambulation - Level of Assistance: Minimal assistance; Moderate assistance (episodes of instability with mod A to correct )     Gait Description (WDL): Exceptions to WDL  Gait Abnormalities: Altered arm swing;Decreased step clearance; Path deviations;Lurching        Base of Support: Narrowed     Speed/Connie: Slow;Fluctuations           Interventions: Safety awareness training; Tactile cues; Verbal cues;Manual cues         Frequent pauses led to instability as she had excessive swing phase and lateral LOB from such; would benefit from use of RW ahead. Pain:  Pain Scale 1: Numeric (0 - 10)  Pain Intensity 1: 0              Activity Tolerance:   VSS with negative orthostatics     Please refer to the flowsheet for vital signs taken during this treatment. After treatment:   [x]         Patient left in no apparent distress sitting up in chair  []         Patient left in no apparent distress in bed  [x]         Call bell left within reach  [x]         Nursing notified  [x]         Caregiver present  [x]         Bed alarm activated    COMMUNICATION/EDUCATION:   The patients plan of care was discussed with: Registered Nurse. [x]         Fall prevention education was provided and the patient/caregiver indicated understanding. [x]         Patient/family have participated as able in goal setting and plan of care. [x]         Patient/family agree to work toward stated goals and plan of care. []         Patient understands intent and goals of therapy, but is neutral about his/her participation. []         Patient is unable to participate in goal setting and plan of care.     Thank you for this referral.  Jena Harden, PT, DPT   Board-Certified Geriatric Clinical Specialist   Certified Exercise Expert for Aging Adults      Time Calculation: 20 mins

## 2018-08-26 NOTE — PROGRESS NOTES
PCU SHIFT NURSING NOTE      Bedside and Verbal shift change report given to Lorie Reyes RN (oncoming nurse) by Nisha Man RN (offgoing nurse). Report included the following information SBAR, Kardex, ED Summary, Intake/Output, MAR, Accordion, Recent Results, Med Rec Status and Cardiac Rhythm NSR. Shift Summary:   1912: Pt resting quietly in bed. VSS. HR NSR. No complaints at this time. Pt assisted back to bed from chair & bed alarm placed in bed. Call bell within reach. Will continue to monitor. 2307: PRN tylenol given for temp of 100.4  Bedside and Verbal shift change report given to Maryellen Disla (oncoming nurse) by Lorie Reyes RN (offgoing nurse). Report included the following information SBAR, Kardex, ED Summary, Intake/Output, MAR, Accordion, Recent Results, Med Rec Status and Cardiac Rhythm NSR. Admission Date 8/24/2018   Admission Diagnosis DKA (diabetic ketoacidoses) ([facility] [address]   Consults IP CONSULT TO NEPHROLOGY        Consults   [x]PT   [x]OT   []Speech   []Case Management      [] Palliative      Cardiac Monitoring Order   [x]Yes   []No     IV drips   []Yes    Drip:                            Dose:  Drip:                            Dose:  Drip:                            Dose:   [x]No     GI Prophylaxis   []Yes   []No         DVT Prophylaxis   SCDs:             Yusef stockings:         [x] Medication   []Contraindicated   []None      Activity Level Activity Level: Up with Assistance     Activity Assistance: Partial (one person)   Purposeful Rounding every 1-2 hour? [x]Yes   Matute Score  Total Score: 3   Bed Alarm (If score 3 or >)   [x]Yes   [] Refused (See signed refusal form in chart)   Vinh Score  Vinh Score: 15   Vinh Score (if score 14 or less)   []PMT consult   []Wound Care consult      []Specialty bed   [] Nutrition consult          Needs prior to discharge:   Home O2 required:    []Yes   [x]No    If yes, how much O2 required?     Other:    Last Bowel Movement: Last Bowel Movement Date: 08/25/18 Influenza Vaccine Received Flu Vaccine for Current Season (usually Sept-March): Not Flu Season        Pneumonia Vaccine           Diet Active Orders   Diet    DIET DIABETIC CONSISTENT CARB Regular      LDAs               Peripheral IV 08/24/18 Left Wrist (Active)   Site Assessment Clean, dry, & intact 8/26/2018 12:00 PM   Phlebitis Assessment 0 8/26/2018 12:00 PM   Infiltration Assessment 0 8/26/2018 12:00 PM   Dressing Status Clean, dry, & intact 8/26/2018 12:00 PM   Dressing Type Disk with Chlorhexadine gluconate (CHG); Transparent 8/26/2018 12:00 PM   Hub Color/Line Status Infusing 8/26/2018 12:00 PM   Action Taken Open ports on tubing capped 8/26/2018 12:00 PM   Alcohol Cap Used Yes 8/26/2018 12:00 PM       Peripheral IV 08/24/18 Right Hand (Active)   Site Assessment Clean, dry, & intact 8/26/2018  4:00 PM   Phlebitis Assessment 0 8/26/2018  4:00 PM   Infiltration Assessment 0 8/26/2018  4:00 PM   Dressing Status Clean, dry, & intact 8/26/2018  4:00 PM   Dressing Type Trach dressing 8/26/2018  4:00 PM   Hub Color/Line Status Infusing 8/26/2018  4:00 PM   Action Taken Open ports on tubing capped 8/26/2018  4:00 PM   Alcohol Cap Used Yes 8/26/2018  4:00 PM                      Urinary Catheter Urinary Catheter 08/24/18 2- way-Indications for Use: Accurate measurement of urinary output    Intake & Output   Date 08/25/18 0700 - 08/26/18 0659 08/26/18 0700 - 08/27/18 0659   Shift 9720-0994 9002-6230 24 Hour Total 3980-3629 4357-7168 24 Hour Total   I  N  T  A  K  E   P.O. 480 200 680         P. O. 480 200 680       I.V.  (mL/kg/hr) 1097.5  (2) 881.3  (1.6) 1978.8  (1.8) 844.6  844.6      Insulin Volume 25.9  25.9         Volume (dextrose 5% and 0.9% NaCl infusion) 378. 3  378.3         Volume (dextrose 5 % - 0.45% NaCl infusion) 288.3  288. 3         Volume (0.45% sodium chloride infusion) 305 881.3 1186.3 794.6  794.6      Volume (cefTRIAXone (ROCEPHIN) 1 g in 0.9% sodium chloride (MBP/ADV) 50 mL) 100  100 50  50 Shift Total  (mL/kg) 1577.5  (34.8) 1081.3  (23.8) 2658.8  (58.6) 844.6  (18.6)  844.6  (18.6)   O  U  T  P  U  T   Urine  (mL/kg/hr) 410  (0.8) 450  (0.8) 860  (0.8) 530  530      Urine Output (mL) (Urinary Catheter 08/24/18 2- way) 410 450 860 530  530    Shift Total  (mL/kg) 410  (9) 450  (9.9) 860  (19) 530  (11.7)  530  (11.7)   NET 1167.5 631.3 1798.8 314.6  314.6   Weight (kg) 45.4 45.4 45.4 45.4 45.4 45.4         Readmission Risk Assessment Tool Score Medium Risk            17       Total Score        3 Has Seen PCP in Last 6 Months (Yes=3, No=0)    4 IP Visits Last 12 Months (1-3=4, 4=9, >4=11)    10 Charlson Comorbidity Score (Age + Comorbid Conditions)        Criteria that do not apply:    . Living with Significant Other. Assisted Living. LTAC. SNF. or   Rehab    Patient Length of Stay (>5 days = 3)    Pt.  Coverage (Medicare=5 , Medicaid, or Self-Pay=4)       Expected Length of Stay - - -   Actual Length of Stay 2

## 2018-08-26 NOTE — PROGRESS NOTES
Bedside shift change report given to EMERSON Kelly (oncoming nurse) by Katie Dowling RN (offgoing nurse). Report included the following information SBAR, Kardex, ED Summary, Procedure Summary, Intake/Output, MAR, Recent Results and Alarm Parameters . Pt. Did well overnight. No c/o pain or discomfort. VSS. No longer on insulin gtt. Bedtime bloodsugar 207. Pt taking in liquids well.

## 2018-08-26 NOTE — PROGRESS NOTES
Hospitalist Progress Note    NAME: Rosette Harp   :  1965   MRN:  [id number]       Assessment / Plan:  DKA (resolved) in setting of uncontrolled insulin-dependent DM2 with nephropathy: HgA1c 8.8  - off insulin gtt  - increase lantus tomorrow AM, but not at baseline dose due to ongoing poor po intake  - con't holding amaryl.  - lispro sliding scale  - diet as tolerated, supplements added  NEERAJ and acute encephalopathy with severe hyperkalemia in setting of dehydration/DKA and sepsis/UTI, present on admission:   - renal US  normal renal ultrasound examination  - urine cx >[id number] pansensitive E Coli. - con't rocephin  - appreciate nephrology assistance; HD stopped due to symptomatic hypotension  - con't IV fluids, urine output has been good  Pseudohyponatremia, resolved  Abdominal pain, unclear etiology: CT A/P without contrast  negative     Code Status: Full  Surrogate Decision Maker:  Holger Robertson   DVT Prophylaxis: heparin     Subjective:     Chief Complaint / Reason for Physician Visit  More alert and conversive than yesterday, but remains flat. No complaints at this time. Discussed with RN events overnight. Review of Systems:  Symptom Y/N Comments  Symptom Y/N Comments   Fever/Chills n   Chest Pain n    Poor Appetite n   Edema n    Cough n   Abdominal Pain n    Sputum n   Joint Pain     SOB/GARLAND n   Pruritis/Rash     Nausea/vomit    Tolerating PT/OT     Diarrhea    Tolerating Diet     Constipation    Other       Could NOT obtain due to:      Objective:     VITALS:   Last 24hrs VS reviewed since prior progress note.  Most recent are:  Patient Vitals for the past 24 hrs:   Temp Pulse Resp BP SpO2   18 0900 99 °F (37.2 °C) 70 - 110/60 99 %   18 0400 98.9 °F (37.2 °C) 84 18 118/49 97 %   18 0000 99.1 °F (37.3 °C) 81 18 112/51 99 %   18 2000 99.3 °F (37.4 °C) 93 16 110/53 98 %   18 1800 (!) 101.8 °F (38.8 °C) 90 20 114/55 97 %   18 1700 - 90 - 118/55 96 %   08/25/18 1607 100.4 °F (38 °C) - - - -   08/25/18 1600 - 89 - 115/56 97 %   08/25/18 1500 - 87 - 118/56 98 %   08/25/18 1400 99.2 °F (37.3 °C) 77 - 112/58 98 %   08/25/18 1300 - 77 - 107/54 98 %   08/25/18 1200 - 87 - 96/50 97 %   08/25/18 1100 99 °F (37.2 °C) 81 - 108/56 98 %       Intake/Output Summary (Last 24 hours) at 08/26/18 1040  Last data filed at 08/26/18 0900   Gross per 24 hour   Intake           1977.3 ml   Output              925 ml   Net           1052.3 ml        PHYSICAL EXAM:  General: Thin. Alert, cooperative, no acute distress    EENT:  EOMI. Anicteric sclerae. MMM  Resp:  CTA bilaterally, no wheezing or rales. No accessory muscle use  CV:  Regular  rhythm,  No edema  GI:  Soft, non distended, Non tender.  +Bowel sounds  Neurologic:  Alert and oriented X 3, normal speech,   Psych:   Some insight. Not anxious nor agitated  Skin:  No rashes. No jaundice    Reviewed most current lab test results and cultures  YES  Reviewed most current radiology test results   YES  Review and summation of old records today    NO  Reviewed patient's current orders and MAR    YES  PMH/ reviewed - no change compared to H&P  ________________________________________________________________________  Care Plan discussed with:    Comments   Patient x    Family      RN x    Care Manager     Consultant                        Multidiciplinary team rounds were held today with , nursing, pharmacist and clinical coordinator. Patient's plan of care was discussed; medications were reviewed and discharge planning was addressed.      ________________________________________________________________________  Total NON critical care TIME:  25 Minutes    Total CRITICAL CARE TIME Spent:   Minutes non procedure based      Comments   >50% of visit spent in counseling and coordination of care x    ________________________________________________________________________  Roddy Oshea MD     Procedures: see electronic medical records for all procedures/Xrays and details which were not copied into this note but were reviewed prior to creation of Plan. LABS:  I reviewed today's most current labs and imaging studies. Pertinent labs include:  Recent Labs      08/26/18   0320  08/25/18   0207  08/24/18   1150   WBC  23.4*  24.2*  30.8*   HGB  7.9*  7.9*  9.5*   HCT  21.2*  21.1*  28.5*   PLT  104*  116*  156     Recent Labs      08/26/18   0320  08/25/18   1036  08/25/18   1006  08/25/18   0617  08/25/18   0207   08/24/18   1732  08/24/18   1305   NA  144   --   147*  149*  147*   < >  134*  122*   K  3.1*   --   3.1*  3.4*  3.7   < >  5.4*  7.4*   CL  107   --   110*  111*  109*   < >  94*  87*   CO2  24   --   25  27  25   < >  24  11*   GLU  234*   --   100  67  197*   < >  1013*  1338*   BUN  139*   --   143*  150*  147*   < >  187*  198*   CREA  7.07*   --   7.26*  7.39*  7.43*   < >  8.68*  9.64*   CA  7.7*  7.5*  7.5*  7.6*  7.4*   < >  7.5*  8.0*   MG  2.1   --   2.2  2.4  2.4   < >  2.9*  3.4*   PHOS  4.0   --    --    --   3.1   --   7.7*  9.7*   ALB   --    --    --    --   2.0*   --    --   2.4*   TBILI   --    --    --    --   0.9   --    --   1.1*   SGOT   --    --    --    --   73*   --    --   11*   ALT   --    --    --    --   36   --    --   32    < > = values in this interval not displayed.        Signed: Donnell Kirby MD

## 2018-08-26 NOTE — PROGRESS NOTES
NAME: Nathaniel Montejo        :  1965        MRN:  [id number]        Assessment :    Plan:  --NEERAJ  DKA  Pseuohyponatremia  Severe hyperkalemia  proteniuria  hematuria --Did not tolerate HD due to drop in BP. Creatinine trending down. Hold Hd for now. Good UO.    continue hypotonic fluids. Replete K po. Subjective:     Chief Complaint:  Quiet. Not talking. Review of Systems:    Symptom Y/N Comments  Symptom Y/N Comments   Fever/Chills    Chest Pain     Poor Appetite    Edema     Cough    Abdominal Pain     Sputum    Joint Pain     SOB/GARLAND    Pruritis/Rash     Nausea/vomit    Tolerating PT/OT     Diarrhea    Tolerating Diet     Constipation    Other       Could not obtain due to:      Objective:     VITALS:   Last 24hrs VS reviewed since prior progress note.  Most recent are:  Visit Vitals    /60    Pulse 70    Temp 99 °F (37.2 °C)    Resp 18    Ht 5' 1\" (1.549 m)    Wt 45.4 kg (100 lb)    SpO2 99%    BMI 18.89 kg/m2       Intake/Output Summary (Last 24 hours) at 18 0912  Last data filed at 18 0600   Gross per 24 hour   Intake          2413.01 ml   Output              760 ml   Net          1653.01 ml      Telemetry Reviewed:     PHYSICAL EXAM:  General: NAD  CTA  abd soft  No edema      Lab Data Reviewed: (see below)    Medications Reviewed: (see below)    PMH/SH reviewed - no change compared to H&P  ________________________________________________________________________  Care Plan discussed with:  Patient     Family      RN     Care Manager                    Consultant:          Comments   >50% of visit spent in counseling and coordination of care       ________________________________________________________________________  Angel Rodriguez MD     Procedures: see electronic medical records for all procedures/Xrays and details which  were not copied into this note but were reviewed prior to creation of Plan. LABS:  Recent Labs      08/26/18   0320  08/25/18   0207   WBC  23.4*  24.2*   HGB  7.9*  7.9*   HCT  21.2*  21.1*   PLT  104*  116*     Recent Labs      08/26/18   0320  08/25/18   1036  08/25/18   1006  08/25/18   0617  08/25/18   0207   08/24/18   1732   NA  144   --   147*  149*  147*   < >  134*   K  3.1*   --   3.1*  3.4*  3.7   < >  5.4*   CL  107   --   110*  111*  109*   < >  94*   CO2  24   --   25  27  25   < >  24   BUN  139*   --   143*  150*  147*   < >  187*   CREA  7.07*   --   7.26*  7.39*  7.43*   < >  8.68*   GLU  234*   --   100  67  197*   < >  1013*   CA  7.7*  7.5*  7.5*  7.6*  7.4*   < >  7.5*   MG  2.1   --   2.2  2.4  2.4   < >  2.9*   PHOS  4.0   --    --    --   3.1   --   7.7*    < > = values in this interval not displayed. Recent Labs      08/25/18   0207  08/24/18   1305   SGOT  73*  11*   AP  204*  238*   TP  5.7*  7.1   ALB  2.0*  2.4*   GLOB  3.7  4.7*   LPSE   --   75     No results for input(s): INR, PTP, APTT in the last 72 hours. No lab exists for component: INREXT, INREXT   No results for input(s): FE, TIBC, PSAT, FERR in the last 72 hours. No results found for: FOL, RBCF   No results for input(s): PH, PCO2, PO2 in the last 72 hours. No results for input(s): CPK, CKMB in the last 72 hours.     No lab exists for component: TROPONINI  No components found for: [place]  Lab Results   Component Value Date/Time    Color YELLOW/STRAW 08/24/2018 02:13 PM    Appearance CLOUDY (A) 08/24/2018 02:13 PM    Specific gravity 1.020 08/24/2018 02:13 PM    pH (UA) 5.0 08/24/2018 02:13 PM    Protein 100 (A) 08/24/2018 02:13 PM    Glucose >1000 (A) 08/24/2018 02:13 PM    Ketone 15 (A) 08/24/2018 02:13 PM    Bilirubin NEGATIVE  08/24/2018 02:13 PM    Urobilinogen 1.0 08/24/2018 02:13 PM    Nitrites NEGATIVE  08/24/2018 02:13 PM    Leukocyte Esterase MODERATE (A) 08/24/2018 02:13 PM    Epithelial cells FEW 08/24/2018 02:13 PM    Bacteria 3+ (A) 08/24/2018 02:13 PM    WBC  08/24/2018 02:13 PM    RBC 0-5 08/24/2018 02:13 PM       MEDICATIONS:  Current Facility-Administered Medications   Medication Dose Route Frequency    potassium chloride SR (KLOR-CON 10) tablet 40 mEq  40 mEq Oral NOW    nystatin (MYCOSTATIN) 100,000 unit/mL oral suspension 500,000 Units  500,000 Units Oral QID    insulin lispro (HUMALOG) injection   SubCUTAneous AC&HS    glucose chewable tablet 16 g  4 Tab Oral PRN    dextrose (D50W) injection syrg 12.5-25 g  12.5-25 g IntraVENous PRN    glucagon (GLUCAGEN) injection 1 mg  1 mg IntraMUSCular PRN    0.45% sodium chloride infusion  75 mL/hr IntraVENous CONTINUOUS    famotidine (PEPCID) tablet 20 mg  20 mg Oral DAILY    insulin glargine (LANTUS) injection 15 Units  15 Units SubCUTAneous DAILY    acetaminophen (TYLENOL) tablet 650 mg  650 mg Oral Q4H PRN    prochlorperazine (COMPAZINE) with saline injection 10 mg  10 mg IntraVENous Q6H PRN    labetalol (NORMODYNE;TRANDATE) injection 10 mg  10 mg IntraVENous Q6H PRN    heparin (porcine) injection 5,000 Units  5,000 Units SubCUTAneous Q12H    cefTRIAXone (ROCEPHIN) 1 g in 0.9% sodium chloride (MBP/ADV) 50 mL  1 g IntraVENous Q24H    fentaNYL citrate (PF) injection 50 mcg  50 mcg IntraVENous Q4H PRN

## 2018-08-27 LAB
ANION GAP SERPL CALC-SCNC: 10 MMOL/L (ref 5–15)
BUN SERPL-MCNC: 138 MG/DL (ref 6–20)
BUN/CREAT SERPL: 21 (ref 12–20)
CALCIUM SERPL-MCNC: 7.9 MG/DL (ref 8.5–10.1)
CHLORIDE SERPL-SCNC: 105 MMOL/L (ref 97–108)
CO2 SERPL-SCNC: 23 MMOL/L (ref 21–32)
CREAT SERPL-MCNC: 6.7 MG/DL (ref 0.55–1.02)
ERYTHROCYTE [DISTWIDTH] IN BLOOD BY AUTOMATED COUNT: 14.5 % (ref 11.5–14.5)
GLUCOSE BLD STRIP.AUTO-MCNC: 151 MG/DL (ref 65–100)
GLUCOSE BLD STRIP.AUTO-MCNC: 201 MG/DL (ref 65–100)
GLUCOSE BLD STRIP.AUTO-MCNC: 215 MG/DL (ref 65–100)
GLUCOSE BLD STRIP.AUTO-MCNC: 383 MG/DL (ref 65–100)
GLUCOSE BLD STRIP.AUTO-MCNC: 399 MG/DL (ref 65–100)
GLUCOSE SERPL-MCNC: 163 MG/DL (ref 65–100)
HCT VFR BLD AUTO: 20.2 % (ref 35–47)
HGB BLD-MCNC: 7.1 G/DL (ref 11.5–16)
MCH RBC QN AUTO: 30.2 PG (ref 26–34)
MCHC RBC AUTO-ENTMCNC: 35.1 G/DL (ref 30–36.5)
MCV RBC AUTO: 86 FL (ref 80–99)
NRBC # BLD: 0 K/UL (ref 0–0.01)
NRBC BLD-RTO: 0 PER 100 WBC
PLATELET # BLD AUTO: 107 K/UL (ref 150–400)
PMV BLD AUTO: 12.5 FL (ref 8.9–12.9)
POTASSIUM SERPL-SCNC: 3.7 MMOL/L (ref 3.5–5.1)
RBC # BLD AUTO: 2.35 M/UL (ref 3.8–5.2)
SERVICE CMNT-IMP: ABNORMAL
SODIUM SERPL-SCNC: 138 MMOL/L (ref 136–145)
WBC # BLD AUTO: 25.1 K/UL (ref 3.6–11)

## 2018-08-27 PROCEDURE — 36415 COLL VENOUS BLD VENIPUNCTURE: CPT | Performed by: INTERNAL MEDICINE

## 2018-08-27 PROCEDURE — G8988 SELF CARE GOAL STATUS: HCPCS

## 2018-08-27 PROCEDURE — 85027 COMPLETE CBC AUTOMATED: CPT | Performed by: INTERNAL MEDICINE

## 2018-08-27 PROCEDURE — 74011250636 HC RX REV CODE- 250/636: Performed by: INTERNAL MEDICINE

## 2018-08-27 PROCEDURE — 74011636637 HC RX REV CODE- 636/637: Performed by: INTERNAL MEDICINE

## 2018-08-27 PROCEDURE — 97116 GAIT TRAINING THERAPY: CPT

## 2018-08-27 PROCEDURE — G8987 SELF CARE CURRENT STATUS: HCPCS

## 2018-08-27 PROCEDURE — 74011000258 HC RX REV CODE- 258: Performed by: INTERNAL MEDICINE

## 2018-08-27 PROCEDURE — 80048 BASIC METABOLIC PNL TOTAL CA: CPT | Performed by: INTERNAL MEDICINE

## 2018-08-27 PROCEDURE — 97165 OT EVAL LOW COMPLEX 30 MIN: CPT

## 2018-08-27 PROCEDURE — 82962 GLUCOSE BLOOD TEST: CPT

## 2018-08-27 PROCEDURE — 74011250637 HC RX REV CODE- 250/637: Performed by: INTERNAL MEDICINE

## 2018-08-27 PROCEDURE — 97535 SELF CARE MNGMENT TRAINING: CPT

## 2018-08-27 PROCEDURE — 65660000000 HC RM CCU STEPDOWN

## 2018-08-27 RX ORDER — INSULIN LISPRO 100 [IU]/ML
14 INJECTION, SOLUTION INTRAVENOUS; SUBCUTANEOUS ONCE
Status: COMPLETED | OUTPATIENT
Start: 2018-08-27 | End: 2018-08-27

## 2018-08-27 RX ORDER — INSULIN GLARGINE 100 [IU]/ML
34 INJECTION, SOLUTION SUBCUTANEOUS DAILY
Status: DISCONTINUED | OUTPATIENT
Start: 2018-08-28 | End: 2018-08-28

## 2018-08-27 RX ORDER — INSULIN LISPRO 100 [IU]/ML
4 INJECTION, SOLUTION INTRAVENOUS; SUBCUTANEOUS
Status: DISCONTINUED | OUTPATIENT
Start: 2018-08-27 | End: 2018-08-29

## 2018-08-27 RX ADMIN — NYSTATIN 500000 UNITS: 100000 SUSPENSION ORAL at 10:14

## 2018-08-27 RX ADMIN — INSULIN LISPRO 3 UNITS: 100 INJECTION, SOLUTION INTRAVENOUS; SUBCUTANEOUS at 18:30

## 2018-08-27 RX ADMIN — INSULIN LISPRO 3 UNITS: 100 INJECTION, SOLUTION INTRAVENOUS; SUBCUTANEOUS at 10:13

## 2018-08-27 RX ADMIN — HEPARIN SODIUM 5000 UNITS: 5000 INJECTION INTRAVENOUS; SUBCUTANEOUS at 14:28

## 2018-08-27 RX ADMIN — NYSTATIN 500000 UNITS: 100000 SUSPENSION ORAL at 18:46

## 2018-08-27 RX ADMIN — HEPARIN SODIUM 5000 UNITS: 5000 INJECTION INTRAVENOUS; SUBCUTANEOUS at 02:52

## 2018-08-27 RX ADMIN — NYSTATIN 500000 UNITS: 100000 SUSPENSION ORAL at 14:00

## 2018-08-27 RX ADMIN — SODIUM CHLORIDE 50 ML/HR: 450 INJECTION, SOLUTION INTRAVENOUS at 14:37

## 2018-08-27 RX ADMIN — FAMOTIDINE 20 MG: 20 TABLET ORAL at 10:14

## 2018-08-27 RX ADMIN — INSULIN LISPRO 7 UNITS: 100 INJECTION, SOLUTION INTRAVENOUS; SUBCUTANEOUS at 13:00

## 2018-08-27 RX ADMIN — NYSTATIN 500000 UNITS: 100000 SUSPENSION ORAL at 21:15

## 2018-08-27 RX ADMIN — INSULIN LISPRO 14 UNITS: 100 INJECTION, SOLUTION INTRAVENOUS; SUBCUTANEOUS at 16:16

## 2018-08-27 RX ADMIN — CEFTRIAXONE 1 G: 1 INJECTION, POWDER, FOR SOLUTION INTRAMUSCULAR; INTRAVENOUS at 16:17

## 2018-08-27 RX ADMIN — INSULIN GLARGINE 25 UNITS: 100 INJECTION, SOLUTION SUBCUTANEOUS at 10:13

## 2018-08-27 NOTE — PROGRESS NOTES
NAME: Pari Hercules        :  1965        MRN:  [id number]        Assessment :    Plan:  --NEERAJ  DKA  Pseuohyponatremia  proteniuria  hematuria --Did not tolerate HD due to drop in BP. Creatinine trending down (7.1 to 6.7). Hold HD for now. Good UO.    hypotonic fluids. hgb 7.1    UOP (1180 ml)           Subjective:     Chief Complaint:  oob in a chair. Not very talkative. Poor historian. Review of Systems:    Symptom Y/N Comments  Symptom Y/N Comments   Fever/Chills    Chest Pain     Poor Appetite    Edema     Cough    Abdominal Pain     Sputum    Joint Pain     SOB/GARLAND    Pruritis/Rash     Nausea/vomit    Tolerating PT/OT     Diarrhea    Tolerating Diet     Constipation    Other       Could not obtain due to: See above     Objective:     VITALS:   Last 24hrs VS reviewed since prior progress note.  Most recent are:  Visit Vitals    /51    Pulse 66    Temp 98.3 °F (36.8 °C)    Resp 16    Ht 5' 1\" (1.549 m)    Wt 45.4 kg (100 lb)    SpO2 99%    BMI 18.89 kg/m2       Intake/Output Summary (Last 24 hours) at 18 0641  Last data filed at 18 0219   Gross per 24 hour   Intake           844.58 ml   Output             1180 ml   Net          -335.42 ml      Telemetry Reviewed:     PHYSICAL EXAM:  General: NAD  CTA  abd soft  No edema      Lab Data Reviewed: (see below)    Medications Reviewed: (see below)    PMH/SH reviewed - no change compared to H&P  ________________________________________________________________________  Care Plan discussed with:  Patient     Family      RN     Care Manager                    Consultant:          Comments   >50% of visit spent in counseling and coordination of care       ________________________________________________________________________  No Mcdaniel, MD     Procedures: see electronic medical records for all procedures/Xrays and details which  were not copied into this note but were reviewed prior to creation of Plan. LABS:  Recent Labs      08/27/18   0258  08/26/18   0320   WBC  25.1*  23.4*   HGB  7.1*  7.9*   HCT  20.2*  21.2*   PLT  107*  104*     Recent Labs      08/27/18   0258  08/26/18   0320  08/25/18   1036  08/25/18   1006  08/25/18   0617  08/25/18   0207   08/24/18   1732   NA  138  144   --   147*  149*  147*   < >  134*   K  3.7  3.1*   --   3.1*  3.4*  3.7   < >  5.4*   CL  105  107   --   110*  111*  109*   < >  94*   CO2  23  24   --   25  27  25   < >  24   BUN  138*  139*   --   143*  150*  147*   < >  187*   CREA  6.70*  7.07*   --   7.26*  7.39*  7.43*   < >  8.68*   GLU  163*  234*   --   100  67  197*   < >  1013*   CA  7.9*  7.7*  7.5*  7.5*  7.6*  7.4*   < >  7.5*   MG   --   2.1   --   2.2  2.4  2.4   < >  2.9*   PHOS   --   4.0   --    --    --   3.1   --   7.7*    < > = values in this interval not displayed. Recent Labs      08/25/18   0207  08/24/18   1305   SGOT  73*  11*   AP  204*  238*   TP  5.7*  7.1   ALB  2.0*  2.4*   GLOB  3.7  4.7*   LPSE   --   75     No results for input(s): INR, PTP, APTT in the last 72 hours. No lab exists for component: INREXT, INREXT   No results for input(s): FE, TIBC, PSAT, FERR in the last 72 hours. No results found for: FOL, RBCF   No results for input(s): PH, PCO2, PO2 in the last 72 hours. No results for input(s): CPK, CKMB in the last 72 hours.     No lab exists for component: TROPONINI  No components found for: [place]  Lab Results   Component Value Date/Time    Color YELLOW/STRAW 08/24/2018 02:13 PM    Appearance CLOUDY (A) 08/24/2018 02:13 PM    Specific gravity 1.020 08/24/2018 02:13 PM    pH (UA) 5.0 08/24/2018 02:13 PM    Protein 100 (A) 08/24/2018 02:13 PM    Glucose >1000 (A) 08/24/2018 02:13 PM    Ketone 15 (A) 08/24/2018 02:13 PM    Bilirubin NEGATIVE  08/24/2018 02:13 PM    Urobilinogen 1.0 08/24/2018 02:13 PM    Nitrites NEGATIVE  08/24/2018 02:13 PM    Leukocyte Esterase MODERATE (A) 08/24/2018 02:13 PM    Epithelial cells FEW 08/24/2018 02:13 PM    Bacteria 3+ (A) 08/24/2018 02:13 PM    WBC  08/24/2018 02:13 PM    RBC 0-5 08/24/2018 02:13 PM       MEDICATIONS:  Current Facility-Administered Medications   Medication Dose Route Frequency    insulin glargine (LANTUS) injection 25 Units  25 Units SubCUTAneous DAILY    nystatin (MYCOSTATIN) 100,000 unit/mL oral suspension 500,000 Units  500,000 Units Oral QID    insulin lispro (HUMALOG) injection   SubCUTAneous AC&HS    glucose chewable tablet 16 g  4 Tab Oral PRN    dextrose (D50W) injection syrg 12.5-25 g  12.5-25 g IntraVENous PRN    glucagon (GLUCAGEN) injection 1 mg  1 mg IntraMUSCular PRN    0.45% sodium chloride infusion  50 mL/hr IntraVENous CONTINUOUS    famotidine (PEPCID) tablet 20 mg  20 mg Oral DAILY    acetaminophen (TYLENOL) tablet 650 mg  650 mg Oral Q4H PRN    prochlorperazine (COMPAZINE) with saline injection 10 mg  10 mg IntraVENous Q6H PRN    labetalol (NORMODYNE;TRANDATE) injection 10 mg  10 mg IntraVENous Q6H PRN    heparin (porcine) injection 5,000 Units  5,000 Units SubCUTAneous Q12H    cefTRIAXone (ROCEPHIN) 1 g in 0.9% sodium chloride (MBP/ADV) 50 mL  1 g IntraVENous Q24H    fentaNYL citrate (PF) injection 50 mcg  50 mcg IntraVENous Q4H PRN

## 2018-08-27 NOTE — PROGRESS NOTES
Problem: Mobility Impaired (Adult and Pediatric)  Goal: *Acute Goals and Plan of Care (Insert Text)  Physical Therapy Goals  Initiated 8/26/2018  1. Patient will move from supine to sit and sit to supine , scoot up and down and roll side to side in bed with independence within 7 day(s). 2.  Patient will transfer from bed to chair and chair to bed with supervision using the least restrictive device within 7 day(s). 3.  Patient will perform sit to stand with supervision/set-up within 7 day(s). 4.  Patient will ambulate with supervision/set-up for 656 feet with the least restrictive device within 7 day(s). 5.  Patient will ascend/descend 4 stairs with dual handrail(s) with supervision/set-up within 7 day(s). physical Therapy TREATMENT  Patient: Emily Contreras (63 y.o. female)  Date: 8/27/2018  Diagnosis: DKA [facility]diabetic ketoacidoses[facility] <principal problem not specified>       Precautions: Bed Alarm, Fall  Chart, physical therapy assessment, plan of care and goals were reviewed. ASSESSMENT:  Pt cleared by nurse to mobilize. Pt received in bed supine. Pt agreeable to therapy. Pt performed supine to sit at supervision. Pt performed sit to stand transfer at min A. Pt with posterior lean with standing. Pt requiring cueing and tactile cueing to improve lean. Pt ambulated 60ft x2 with HHA at min A. Pt with 2 significant LOB requiring mod A to correct. Pt requiring a standing rest break. Pt requiring constant cueing to keep toes on the floor to improve posterior lean. Pt will benefit from IP Rehab to improve strength and balance for safe mobility to return to independent prior level of function. Progression toward goals:  []    Improving appropriately and progressing toward goals  [x]    Improving slowly and progressing toward goals  []    Not making progress toward goals and plan of care will be adjusted     PLAN:  Patient continues to benefit from skilled intervention to address the above impairments. Continue treatment per established plan of care. Discharge Recommendations:  Inpatient Rehab  Further Equipment Recommendations for Discharge:  TBD by rehab      SUBJECTIVE:   Patient stated  I don't talk much when I don't feel well.     OBJECTIVE DATA SUMMARY:   Critical Behavior:  Neurologic State: Alert  Orientation Level: Oriented X4  Cognition: Follows commands     Functional Mobility Training:  Bed Mobility:  Rolling: Supervision  Supine to Sit: Supervision     Scooting: Supervision        Transfers:  Sit to Stand: Minimum assistance (with posterior lean )  Stand to Sit: Contact guard assistance                             Balance:  Sitting: Intact  Standing: Impaired  Standing - Static: Fair;Constant support  Standing - Dynamic : Fair (episodes of poor)  Ambulation/Gait Training:  Distance (ft):  (60ft x2)  Assistive Device: Gait belt (HHA )  Ambulation - Level of Assistance: Minimal assistance (mod A for LOB )        Gait Abnormalities: Decreased step clearance; Step to gait; Altered arm swing (posterior lean )        Base of Support: Narrowed     Speed/Connie: Slow;Shuffled           Interventions: Verbal cues; Safety awareness training  Pain:  Pain Scale 1: Numeric (0 - 10)  Pain Intensity 1: 0              Activity Tolerance:   Pt with difficulty maintaining  balance due to posterior lean.       After treatment:   [x]    Patient left in no apparent distress sitting up in chair  []    Patient left in no apparent distress in bed  [x]    Call bell left within reach  [x]    Nursing notified  []    Caregiver present  [x]    Bed alarm activated    COMMUNICATION/COLLABORATION:   The patients plan of care was discussed with: Registered Nurse    Natasha Clark PTA   Time Calculation: 24 mins

## 2018-08-27 NOTE — DIABETES MGMT
DTC Consult Note    Recommendations/ Comments: Attempted to see pt this am due to consult. Pt working with PT/OT. DTC will f/u with pt when pt available for education. Pt required 11 units of correction yesterday. noted increase in lantus this am to 25 units. If appropriate, please consider :   1. titrating Lantus until fasting BG < 180 mg/dL      Current hospital DM medication:  lantus 25 units (increased from 15 units), lispro correction - normal sensitivity     Consult received for:  [x]             Assessment of home management                [x]      Medication Recommendations                []             Meter/monitoring     []             Insulin instruction     []             New diagnosis     []             Outpatient education     []             Insulin pump patient     []             Insulin infusion     []             DKA/HHS    Chart reviewed and initial evaluation complete on Ira Mccauley. Patient is a 45 y.o. female with known diabetes on Basaglar (glargine) 34 units daily, and Glimepiride  1 mg acb at home. Patient sees Dr. Oc Stroud endocrinologist- last seen 7/30/18. Discussed with patient and/or family need for follow up appointment for diabetes management after discharge. A1c:   Lab Results   Component Value Date/Time    Hemoglobin A1c 8.8 (H) 08/25/2018 10:36 AM       Recent Glucose Results: Lab Results   Component Value Date/Time     (H) 08/27/2018 02:58 AM    GLUCPOC 215 (H) 08/27/2018 07:09 AM    GLUCPOC 236 (H) 08/26/2018 09:11 PM    GLUCPOC 227 (H) 08/26/2018 03:25 PM        Lab Results   Component Value Date/Time    Creatinine 6.70 (H) 08/27/2018 02:58 AM     Estimated Creatinine Clearance: 6.9 mL/min (based on Cr of 6.7). Active Orders   Diet    DIET DIABETIC CONSISTENT CARB Regular        PO intake: Patient Vitals for the past 72 hrs:   % Diet Eaten   08/25/18 1815 0 %   08/25/18 1343 0 %       Will continue to follow as needed. Thank you. Michael Lopez, [facility]    Office: [phone]

## 2018-08-27 NOTE — PROGRESS NOTES
Problem: Pressure Injury - Risk of  Goal: *Prevention of pressure injury  Document Vinh Scale and appropriate interventions in the flowsheet. Outcome: Progressing Towards Goal  Pressure Injury Interventions:  Sensory Interventions: Assess changes in LOC, Turn and reposition approx. every two hours (pillows and wedges if needed), Discuss PT/OT consult with provider, Pressure redistribution bed/mattress (bed type), Minimize linen layers, Keep linens dry and wrinkle-free    Moisture Interventions: Absorbent underpads, Offer toileting Q_hr, Internal/External urinary devices, Check for incontinence Q2 hours and as needed, Limit adult briefs, Minimize layers    Activity Interventions: Increase time out of bed, Pressure redistribution bed/mattress(bed type), PT/OT evaluation    Mobility Interventions: HOB 30 degrees or less, Pressure redistribution bed/mattress (bed type), PT/OT evaluation, Turn and reposition approx.  every two hours(pillow and wedges)    Nutrition Interventions: Document food/fluid/supplement intake, Offer support with meals,snacks and hydration    Friction and Shear Interventions: Lift sheet, HOB 30 degrees or less, Minimize layers, Transferring/repositioning devices

## 2018-08-27 NOTE — PROGRESS NOTES
Problem: Self Care Deficits Care Plan (Adult)  Goal: *Acute Goals and Plan of Care (Insert Text)  Occupational Therapy Goals  Initiated 8/27/2018  1. Patient will perform grooming at the sink with supervision/set-up within 7 day(s). 2.  Patient will perform bathing at the sink with moderate assistance  within 7 day(s). 3.  Patient will perform lower body dressing with moderate assistance  within 7 day(s). 4.  Patient will perform toilet transfers with supervision/set-up within 7 day(s). 5.  Patient will perform all aspects of toileting with supervision/set-up within 7 day(s). 6.  Patient will participate in upper extremity therapeutic exercise/activities with supervision/set-up for 5 minutes within 7 day(s). 7.  Patient will utilize energy conservation techniques during functional activities with verbal cues within 7 day(s). Occupational Therapy EVALUATION  Patient: Valeri Borges (38 y.o. female)  Date: 8/27/2018  Primary Diagnosis: DKA (diabetic ketoacidoses) [facility])        Precautions:   Bed Alarm, Fall    ASSESSMENT :  Based on the objective data described below, the patient presents with generalized weakness, decreased endurance, strength, functional mobility, and ADLs. Pt was living with family and per pt was independent with ADLs prior. She was cleared to be seen for eval and all VSS. Pt was stating that she was hurting all over and all joints were sore. She is very soft spoken and keeps her head down and talks min amount. She was able to perform bed mobility with SBA and was min assist  of 2 for standing and walking due to pt have posterior lean and needed VC and to keep weight on her toes. Pt was able to walk to the [facility] where she asked for sherbet and then back to her room. She ws CGA to min asisst for transfer to toilet and she was able to stand with CGA and walk to the chair.   Pt has functional range in BUE and her strength is functional.  She was left sitting in chair and call bell with in reach and pt states that her body does not feel as sore . Recommend that pt have further therapy at discharge , pt would be a good In pt rehab candidate. Patient will benefit from skilled intervention to address the above impairments. Patients rehabilitation potential is considered to be Good  Factors which may influence rehabilitation potential include:   [x]             None noted  []             Mental ability/status  []             Medical condition  []             Home/family situation and support systems  []             Safety awareness  []             Pain tolerance/management  []             Other:      PLAN :  Recommendations and Planned Interventions:  [x]               Self Care Training                  []        Therapeutic Activities  [x]               Functional Mobility Training    []        Cognitive Retraining  [x]               Therapeutic Exercises           [x]        Endurance Activities  []               Balance Training                   []        Neuromuscular Re-Education  []               Visual/Perceptual Training     [x]   Home Safety Training  [x]               Patient Education                 [x]        Family Training/Education  []               Other (comment):    Frequency/Duration: Patient will be followed by occupational therapy 4 times a week to address goals. Discharge Recommendations: Inpatient Rehab  Further Equipment Recommendations for Discharge: tbd     SUBJECTIVE:   Patient stated I dont talk much when I am feeling bad.     OBJECTIVE DATA SUMMARY:   HISTORY:   Past Medical History:   Diagnosis Date    Asthma     Diabetes ([facility] [address]     Elevated transaminase level 6/29/2016    Insulin dependent diabetes mellitus ([facility] [address] 6/20/2016    Pancreatic atrophy 6/20/2016     Past Surgical History:   Procedure Laterality Date    HX HEENT         Prior Level of Function/Environment/Context: pt lives with family and was independent in ADLs and ILS prior per pt    Expanded or extensive additional review of patient history:     Home Situation  Home Environment: Private residence  # Steps to Enter: 2  Rails to Enter: Yes  Hand Rails : Bilateral  One/Two Story Residence: One story  Living Alone: Yes  Support Systems: Family member(s)  Patient Expects to be Discharged to[de-identified] Rehabilitation facility  Current DME Used/Available at Home: None  Tub or Shower Type: Tub/Shower combination    Hand dominance: Right    EXAMINATION OF PERFORMANCE DEFICITS:  Cognitive/Behavioral Status:           Perception: Appears intact  Perseveration: No perseveration noted  Safety/Judgement: Awareness of environment    Skin: in good heallth    Edema: none     Hearing: Auditory  Auditory Impairment: None    Vision/Perceptual:                 intact                    Range of Motion:    AROM: Generally decreased, functional  PROM: Generally decreased, functional                      Strength:    Strength: Generally decreased, functional                Coordination:  Coordination: Within functional limits  Fine Motor Skills-Upper: Left Intact; Right Intact    Gross Motor Skills-Upper: Left Intact; Right Intact    Tone & Sensation:    Tone: Normal  Sensation: Intact                      Balance:  Sitting: Intact  Standing: Impaired  Standing - Static: Fair;Constant support  Standing - Dynamic : Fair (episodes of poor)    Functional Mobility and Transfers for ADLs:  Bed Mobility:  Rolling: Supervision  Supine to Sit: Supervision  Scooting: Supervision    Transfers:  Sit to Stand: Minimum assistance (with posterior lean )  Stand to Sit: Contact guard assistance  Bed to Chair: Contact guard assistance;Minimum assistance    ADL Assessment:  Feeding: Setup    Oral Facial Hygiene/Grooming: Setup    Bathing: Maximum assistance;Minimum assistance    Upper Body Dressing: Setup    Lower Body Dressing: Maximum assistance;Minimum assistance    Toileting: Minimum assistance;Contact guard assistance Cognitive Retraining  Safety/Judgement: Awareness of environment         Functional Measure:  Barthel Index:    Bathin  Bladder: 0  Bowels: 5  Groomin  Dressin  Feedin  Mobility: 10  Stairs: 0  Toilet Use: 5  Transfer (Bed to Chair and Back): 10  Total: 40       Barthel and G-code impairment scale:  Percentage of impairment CH  0% CI  1-19% CJ  20-39% CK  40-59% CL  60-79% CM  80-99% CN  100%   Barthel Score 0-100 100 99-80 79-60 59-40 20-39 1-19   0   Barthel Score 0-20 20 17-19 13-16 9-12 5-8 1-4 0      The Barthel ADL Index: Guidelines  1. The index should be used as a record of what a patient does, not as a record of what a patient could do. 2. The main aim is to establish degree of independence from any help, physical or verbal, however minor and for whatever reason. 3. The need for supervision renders the patient not independent. 4. A patient's performance should be established using the best available evidence. Asking the patient, friends/relatives and nurses are the usual sources, but direct observation and common sense are also important. However direct testing is not needed. 5. Usually the patient's performance over the preceding 24-48 hours is important, but occasionally longer periods will be relevant. 6. Middle categories imply that the patient supplies over 50 per cent of the effort. 7. Use of aids to be independent is allowed. Ping Foster., Barthel, D.W. (0886). Functional evaluation: the Barthel Index. [facility] (14)2. MÓNICA PotterF, Archibald Art., Conner [place]., [place], [address] [facility] (). Measuring the change indisability after inpatient rehabilitation; comparison of the responsiveness of the Barthel Index and Functional [place] Measure. Journal of Neurology, Neurosurgery, and Psychiatry, 66(4), 204-063. CECELIA Morton.J.PÉREZ, LUCIANA PaytonJ.MINOO, & Sandy Williamson, M.A. (2004.) Assessment of post-stroke quality of life in cost-effectiveness studies:  The usefulness of the Barthel Index and the EuroQoL-5D. Quality of Life Research, 13, 238-06         G codes: In compliance with CMSs Claims Based Outcome Reporting, the following G-code set was chosen for this patient based on their primary functional limitation being treated: The outcome measure chosen to determine the severity of the functional limitation was the Barthel with a score of 40/100 which was correlated with the impairment scale. ? Self Care:     - CURRENT STATUS: CK - 40%-59% impaired, limited or restricted    - GOAL STATUS: CJ - 20%-39% impaired, limited or restricted    - D/C STATUS:  ---------------To be determined---------------     Occupational Therapy Evaluation Charge Determination   History Examination Decision-Making   MEDIUM Complexity : Expanded review of history including physical, cognitive and psychosocial  history  MEDIUM Complexity : 3-5 performance deficits relating to physical, cognitive , or psychosocial skils that result in activity limitations and / or participation restrictions MEDIUM Complexity : Patient may present with comorbidities that affect occupational performnce. Miniml to moderate modification of tasks or assistance (eg, physical or verbal ) with assesment(s) is necessary to enable patient to complete evaluation       Based on the above components, the patient evaluation is determined to be of the following complexity level: MEDIUM     Activity Tolerance:   vss/fair  Please refer to the flowsheet for vital signs taken during this treatment. After treatment:   [x] Patient left in no apparent distress sitting up in chair  [] Patient left in no apparent distress in bed  [x] Call bell left within reach  [x] Nursing notified  [] Caregiver present  [x] Bed alarm activated    COMMUNICATION/EDUCATION:   The patients plan of care was discussed with: Physical Therapist and Registered Nurse.   [x] Home safety education was provided and the patient/caregiver indicated understanding. [] Patient/family have participated as able in goal setting and plan of care. [] Patient/family agree to work toward stated goals and plan of care. [x] Patient understands intent and goals of therapy, but is neutral about his/her participation. [] Patient is unable to participate in goal setting and plan of care. This patients plan of care is appropriate for delegation to PATRIZIA.     Thank you for this referral.  Gilford Milder, OT  Time Calculation: 24 mins

## 2018-08-27 NOTE — DIABETES MGMT
DTC Consult Note    Recommendations/ Comments: Pt with BG > 300 mg/dL. Noted meal time insulin added and basal insulin increased to pt's home dose. Current hospital DM medication:   Lispro correction - normal sensitivity, lispro with meals 4 units, Lantus 34 units     Consult received for:  [x]             Assessment of home management                [x]      Medication Recommendations                []             Meter/monitoring     []             Insulin instruction     []             New diagnosis     []             Outpatient education     []             Insulin pump patient     []             Insulin infusion     []             DKA/HHS    Chart reviewed and initial evaluation complete on Vaibhav Florez. Patient is a 45 y.o. female with known diabetes on Basaglar (glargine) 34 units daily, and Glimepiride  1 mg acb at home. Patient sees Dr. Deluna Call endocrinologist- last seen 7/30/18. Pt shared that she checks her BG 2x/daily before breakfast and lunch and that her BG are in 150 mg/dL range. Pt shared that her meter has not read \"high\" or above 300 mg/dL prior to coming into hospital.     Pt shared that she has not missed taking her insulin, injects insulin pen in abdomen and rotates injection sites. Pt stores insulin in fridge. Pt shared that she eats 3 meals and 3 snacks daily, reported that Dr. Colby Grossman directed her to consume 3 meals and 3 snacks daily (pt last saw Dr Colby Grossman in July 2018 and no dietary hx /suggestions noted). Pt shared that she drinks regular soda  \"on occasion\" and will not drink diet drinks.     Assessed and instructed patient on the following:   ·  blood sugar goals - discussed importance in calling endocrinologist if BG above or below target, discussed checking expiration date on BG strips given pt reported that her BG meter did not indicate that her BG was higher than 150 mg/dL (per pt report), educator suggested that pt purchase new meter if BG values are \"normal on meter\" but s/s of high BG occur, complications of diabetes mellitus, illness management - discussed DKA and dangers of elevated BG, discussed that meal-time insulin is ordered and the difference between basal insulin and meal time insulin, discussed importance in taking insulin as directed and to discuss DM management regimen with endorcinologist,  nutrition - discussed avoiding sugary beverages however pt was not receptive to this, discussed aiming to have balance at meals using MyPlate method - however pt was not interested in education ( pt stated \"I have this whole book because my family has Diabetes), referred to Diabetes Educator - encouraged pt to attend DM education in outpt setting but pt declined and site rotation     Encouraged the following:   · dietary modifications: avoid sugary beverages, avoid over-snacking in between meals, regular blood sugar monitoring: check BG 3x/daily     Provided patient with the following: [x]             Survival skills education materials               []             Insulin education materials               []             CHO counting education materials               [x]             Outpatient DTC contact number               []             Glucometer    Discussed with patient and/or family need for follow up appointment for diabetes management after discharge. Pt was not willing to set outpt education appointment. A1c:   Lab Results   Component Value Date/Time    Hemoglobin A1c 8.8 (H) 08/25/2018 10:36 AM       Recent Glucose Results: Lab Results   Component Value Date/Time     (H) 08/27/2018 02:58 AM    GLUCPOC 399 (H) 08/27/2018 02:11 PM    GLUCPOC 383 (H) 08/27/2018 11:36 AM    GLUCPOC 215 (H) 08/27/2018 07:09 AM        Lab Results   Component Value Date/Time    Creatinine 6.70 (H) 08/27/2018 02:58 AM     Estimated Creatinine Clearance: 6.9 mL/min (based on Cr of 6.7).     Active Orders   Diet    DIET DIABETIC CONSISTENT CARB Regular        PO intake: Patient Vitals for the past 72 hrs:   % Diet Eaten   08/27/18 1600 25 %   08/25/18 1815 0 %   08/25/18 1343 0 %       Will continue to follow as needed. Thank you.          Beckie Donohue, [address]    Office: [phone]

## 2018-08-27 NOTE — PROGRESS NOTES
Hospitalist Progress Note    NAME: Bonny Werner   :  1965   MRN:  [id number]       Assessment / Plan:  DKA (resolved) in setting of uncontrolled insulin-dependent DM2 with nephropathy: HgA1c 8.8  - off insulin gtt  - increase back to home lantus in AM.  Po intake remains poor but glucoses are increasing.  - con't holding amaryl for now  - lispro sliding scale  - diet as tolerated  NEERAJ and acute encephalopathy with severe hyperkalemia in setting of dehydration/DKA and sepsis/UTI, present on admission: febrile again overnight. Cr continues to slowly improve. - renal US  normal renal ultrasound examination  - urine cx >[id number] pansensitive E Coli  - con't rocephin  - appreciate nephrology assistance; HD stopped due to symptomatic hypotension  - con't IV fluids, urine output has been good. Con't to monitor Cr. Pseudohyponatremia, resolved  Abdominal pain, unclear etiology: CT A/P without contrast  negative      Code Status: Full  Surrogate Decision Maker:  Isabella Samuels   DVT Prophylaxis: heparin     Subjective:     Chief Complaint / Reason for Physician Visit  \"I'm feeling a little better\". Up in chair today. Discussed with RN events overnight. Review of Systems:  Symptom Y/N Comments  Symptom Y/N Comments   Fever/Chills n   Chest Pain n    Poor Appetite y   Edema n    Cough n   Abdominal Pain n    Sputum n   Joint Pain     SOB/GARLAND n   Pruritis/Rash     Nausea/vomit    Tolerating PT/OT     Diarrhea    Tolerating Diet     Constipation    Other       Could NOT obtain due to:      Objective:     VITALS:   Last 24hrs VS reviewed since prior progress note.  Most recent are:  Patient Vitals for the past 24 hrs:   Temp Pulse Resp BP SpO2   18 0800 98 °F (36.7 °C) 69 16 115/60 99 %   18 0219 98.3 °F (36.8 °C) 66 16 101/51 99 %   18 2350 98.9 °F (37.2 °C) - - - -   18 2300 100.4 °F (38 °C) 78 16 122/54 100 %   189 - - - 112/54 -   18 1912 99.4 °F (37.4 °C) 88 16 91/61 100 %   08/26/18 1600 99 °F (37.2 °C) 79 - 115/60 100 %   08/26/18 1505 99 °F (37.2 °C) - - - -   08/26/18 1500 - 80 16 111/61 99 %   08/26/18 1200 99.3 °F (37.4 °C) 69 - 114/60 98 %       Intake/Output Summary (Last 24 hours) at 08/27/18 1104  Last data filed at 08/27/18 0755   Gross per 24 hour   Intake           844.58 ml   Output             1280 ml   Net          -435.42 ml        PHYSICAL EXAM:  General: WD, WN. Alert, cooperative, no acute distress    EENT:  EOMI. Anicteric sclerae. MMM  Resp:  CTA bilaterally, no wheezing or rales. No accessory muscle use  CV:  Regular rhythm,  No edema  GI:  Soft, mildly distended, Non tender.  +Bowel sounds  Neurologic:  Alert and oriented X 3, normal speech,   Psych:   Fair insight. Not anxious nor agitated  Skin:  No rashes. No jaundice    Reviewed most current lab test results and cultures  YES  Reviewed most current radiology test results   YES  Review and summation of old records today    NO  Reviewed patient's current orders and MAR    YES  PMH/ reviewed - no change compared to H&P  ________________________________________________________________________  Care Plan discussed with:    Comments   Patient x    Family      RN x    Care Manager x    Consultant                       x Multidiciplinary team rounds were held today with , nursing, pharmacist and clinical coordinator. Patient's plan of care was discussed; medications were reviewed and discharge planning was addressed.      ________________________________________________________________________  Total NON critical care TIME:  30 Minutes    Total CRITICAL CARE TIME Spent:   Minutes non procedure based      Comments   >50% of visit spent in counseling and coordination of care x    ________________________________________________________________________  Tyler Cindi, MD     Procedures: see electronic medical records for all procedures/Xrays and details which were not copied into this note but were reviewed prior to creation of Plan. LABS:  I reviewed today's most current labs and imaging studies. Pertinent labs include:  Recent Labs      08/27/18   0258 08/26/18 0320 08/25/18   0207   WBC  25.1*  23.4*  24.2*   HGB  7.1*  7.9*  7.9*   HCT  20.2*  21.2*  21.1*   PLT  107*  104*  116*     Recent Labs      08/27/18   0258  08/26/18   0320  08/25/18   1036  08/25/18   1006  08/25/18   0617  08/25/18   0207   08/24/18   1732  08/24/18   1305   NA  138  144   --   147*  149*  147*   < >  134*  122*   K  3.7  3.1*   --   3.1*  3.4*  3.7   < >  5.4*  7.4*   CL  105  107   --   110*  111*  109*   < >  94*  87*   CO2  23  24   --   25  27  25   < >  24  11*   GLU  163*  234*   --   100  67  197*   < >  1013*  1338*   BUN  138*  139*   --   143*  150*  147*   < >  187*  198*   CREA  6.70*  7.07*   --   7.26*  7.39*  7.43*   < >  8.68*  9.64*   CA  7.9*  7.7*  7.5*  7.5*  7.6*  7.4*   < >  7.5*  8.0*   MG   --   2.1   --   2.2  2.4  2.4   < >  2.9*  3.4*   PHOS   --   4.0   --    --    --   3.1   --   7.7*  9.7*   ALB   --    --    --    --    --   2.0*   --    --   2.4*   TBILI   --    --    --    --    --   0.9   --    --   1.1*   SGOT   --    --    --    --    --   73*   --    --   11*   ALT   --    --    --    --    --   36   --    --   32    < > = values in this interval not displayed.        Signed: Ismael Caicedo MD

## 2018-08-27 NOTE — PROGRESS NOTES
Reason for Admission:   Patient came to ed with low blood sugar. She reports having headache, nausea and mild abdominal pain along with fatigue. She states she checks her sugars in the morning and at night. She lives in one story home alone. She states she has family to assist if needed. She states prior to becoming ill she was independent in adl's and iadl's. She works. She sees Dr Soren Frias for her diabetes. RRAT Score:     17               Do you (patient/family) have any concerns for transition/discharge? No                  Plan for utilizing home health:     She states she has not used home health or snf services in the past.  Physical and occupational therapy worked with patient today and they are recommending IPR. Discussed this with patient and she states she would like to go to [address] [place] [address] for inpatient rehab. Referral sent to them to review and will await their response. Diabetes staff has been up to talk with patient. Likelihood of readmission? Low to moderate. Transition of Care Plan:    Patient will like to go to [address] [place] [address] for inpatient rehab prior to returning home. Discussed with Dr Nargis Flores and referral sent for inpatient rehab of patient's choice. JAY Ivey was inbasket of patient's admission. Care Management Interventions  PCP Verified by CM:  Yes  Transition of Care Consult (CM Consult): Discharge Planning  Discharge Durable Medical Equipment:  (Patient has glucometer at home. )  Physical Therapy Consult: Yes  Occupational Therapy Consult: Yes  Current Support Network: Lives Alone  Confirm Follow Up Transport: Family  Plan discussed with Pt/Family/Caregiver: Yes  Discharge Location  Discharge Placement: Home

## 2018-08-27 NOTE — PROGRESS NOTES
0725  Bedside and Verbal shift change report received from Ne RN (outgoing nurse) to this nurse, EMERSON nEciso (oncoming nurse). Report included the following information SBAR, Kardex, ED Summary, Intake/Output, MAR, Recent Results, Med Rec Status and Cardiac monitor, SR/ST. 1300  BG results called to MD, order to follow. 1630  Back to bed, up in chair several hours. Tearful at times. Calms with verbal support. 1926  Bedside and Verbal shift change report given to EMERSON Olivia (oncoming nurse) by Kris Seals nurse). Report included the following information SBAR, Kardex, ED Summary, Intake/Output, MAR, Accordion, Recent Results, Med Rec Status and Cardiac Rhythm NSR/ST.

## 2018-08-27 NOTE — PROGRESS NOTES
Problem: Falls - Risk of  Goal: *Absence of Falls  Document Stephen Fall Risk and appropriate interventions in the flowsheet.    Outcome: Progressing Towards Goal  Fall Risk Interventions:  Mobility Interventions: Bed/chair exit alarm, Communicate number of staff needed for ambulation/transfer, OT consult for ADLs, Patient to call before getting OOB, PT Consult for mobility concerns, PT Consult for assist device competence         Medication Interventions: Assess postural VS orthostatic hypotension, Bed/chair exit alarm, Evaluate medications/consider consulting pharmacy, Patient to call before getting OOB, Teach patient to arise slowly    Elimination Interventions: Bed/chair exit alarm, Call light in reach, Patient to call for help with toileting needs, Toilet paper/wipes in reach, Toileting schedule/hourly rounds

## 2018-08-27 NOTE — PROGRESS NOTES
PCU SHIFT NURSING NOTE      Bedside and Verbal shift change report given to May Nieves RN (oncoming nurse) by Vaibhav Mercer RN (offgoing nurse). Report included the following information SBAR, Kardex, ED Summary, Intake/Output, MAR, Accordion, Recent Results, Med Rec Status and Cardiac Rhythm NSR. Shift Summary:   1908: Pt resting quietly in bed. VSS. HR NSR. No complaints at this time. Loza patent and draining. Call bell within reach. Will continue to monitor. 0200: Lab notified RN of critical hct 17.4 & hgb also being 6.2. Paged MD.  8715: Repaged MD Dr. Tatyana Mcgraw. Instructed to call back if no response in 10 minutes and request to call Dr. Tatyana Mcgraw directly. 0230: Spoke with Dr. Tatyana Mcgraw and made aware of lab history, current labs, medications, and history of hospital stay. Per Dr. Tatyana Mcgraw, hold heparin until further instructed by attending MD. [facility] order for 1 unit of PRBC. Due to new policy of physicians needing to obtain & sign consent form for blood transfusion with patient; Dr. Tatyana Mcgraw spoke with pt over the phone and explained reason for blood transfusion & the risks and benefits of receiving transfusion. This RN signed as witness with patient on blood consent form. Will relay to primary dayshift RN to make sure attending MD signs form. 0245: Pink top blood samples sent to lab. Blood bank normally requires 2 pink top samples when pt has not received blood recently in facility. Two pink tops sent one with sun quest label and one with patient label. Notified Destinee Murrieta with the lab of 1 tube having patient label on it since sunquest only gives you one label. 0445: 1 unit PRBC started @ 75ml/hr. This RN in with pt for 1st 15 mins to ensure no allergic reaction. Bedside and Verbal shift change report given to Adolph Crocker 70. (oncoming nurse) by Mario Velazquez (offgoing nurse). Report included the following information SBAR, Kardex, ED Summary, Intake/Output, MAR, Accordion, Recent Results, Med Rec Status and Cardiac Rhythm NSR.        Admission Date 8/24/2018   Admission Diagnosis DKA (diabetic ketoacidoses) ([facility] [address] [facility]   Consults IP CONSULT TO NEPHROLOGY        Consults   [x]PT   [x]OT   []Speech   []Case Management      [] Palliative      Cardiac Monitoring Order   [x]Yes   []No     IV drips   []Yes    Drip:                            Dose:  Drip:                            Dose:  Drip:                            Dose:   [x]No     GI Prophylaxis   []Yes   []No         DVT Prophylaxis   SCDs:             Yusef stockings:         [x] Medication   []Contraindicated   []None      Activity Level Activity Level: Up with Assistance     Activity Assistance: Partial (one person)   Purposeful Rounding every 1-2 hour? [x]Yes   Matute Score  Total Score: 3   Bed Alarm (If score 3 or >)   [x]Yes   [] Refused (See signed refusal form in chart)   Vinh Score  Vinh Score: 16   Vinh Score (if score 14 or less)   []PMT consult   []Wound Care consult      []Specialty bed   [] Nutrition consult          Needs prior to discharge:   Home O2 required:    []Yes   [x]No    If yes, how much O2 required? Other:    Last Bowel Movement: Last Bowel Movement Date: 08/25/18      Influenza Vaccine Received Flu Vaccine for Current Season (usually Sept-March): Not Flu Season        Pneumonia Vaccine           Diet Active Orders   Diet    DIET DIABETIC CONSISTENT CARB Regular      LDAs               Peripheral IV 08/24/18 Left Wrist (Active)   Site Assessment Clean, dry, & intact 8/27/2018  7:08 PM   Phlebitis Assessment 0 8/27/2018  7:08 PM   Infiltration Assessment 0 8/27/2018  7:08 PM   Dressing Status Clean, dry, & intact 8/27/2018  7:08 PM   Dressing Type Tape;Transparent 8/27/2018  7:08 PM   Hub Color/Line Status Pink; Infusing 8/27/2018  7:08 PM   Action Taken Open ports on tubing capped 8/27/2018  7:08 PM   Alcohol Cap Used Yes 8/27/2018  7:08 PM       Peripheral IV 08/24/18 Right Hand (Active)   Site Assessment Clean, dry, & intact 8/27/2018  7:08 PM   Phlebitis Assessment 0 8/27/2018  7:08 PM   Infiltration Assessment 0 8/27/2018  7:08 PM   Dressing Status Clean, dry, & intact 8/27/2018  7:08 PM   Dressing Type Tape;Transparent 8/27/2018  7:08 PM   Hub Color/Line Status Blue;Capped 8/27/2018  7:08 PM   Action Taken Open ports on tubing capped 8/27/2018  7:08 PM   Alcohol Cap Used Yes 8/27/2018  7:08 PM                      Urinary Catheter Urinary Catheter 08/24/18 2- way-Indications for Use: Acute urinary retention/bladder outlet obstruction    Intake & Output   Date 08/26/18 1900 - 08/27/18 0659 08/27/18 0700 - 08/28/18 0659   Shift 0444-3728 24 Hour Total 2329-8692 4727-6580 24 Hour Total   I  N  T  A  K  E   P. O.   640  640      P. O.   640  640    I.V.  (mL/kg/hr)  844.6 450  (0.8)  450      Volume (0.45% sodium chloride infusion)  794.6 450  450      Volume (cefTRIAXone (ROCEPHIN) 1 g in 0.9% sodium chloride (MBP/ADV) 50 mL)  50 0  0    Shift Total  (mL/kg)  844.6  (18.6) 1090  (24)  1090  (24)   O  U  T  P  U  T   Urine  (mL/kg/hr) 650 1180 1750  (3.2)  1750      Urine Voided   325  325      Urine Output (mL) (Urinary Catheter 08/24/18 2- way) 650 1180 1425  1425    Shift Total  (mL/kg) 650  (14.3) 1180  (26) 1750  (38.6)  1750  (38.6)   NET -650 -335.4 -660  -660   Weight (kg) 45.4 45.4 45.4 45.4 45.4         Readmission Risk Assessment Tool Score Medium Risk            17       Total Score        3 Has Seen PCP in Last 6 Months (Yes=3, No=0)    4 IP Visits Last 12 Months (1-3=4, 4=9, >4=11)    10 Charlson Comorbidity Score (Age + Comorbid Conditions)        Criteria that do not apply:    . Living with Significant Other. Assisted Living. LTAC. SNF. or   Rehab    Patient Length of Stay (>5 days = 3)    Pt.  Coverage (Medicare=5 , Medicaid, or Self-Pay=4)       Expected Length of Stay 4d 21h   Actual Length of Stay 3

## 2018-08-28 LAB
ANION GAP SERPL CALC-SCNC: 11 MMOL/L (ref 5–15)
APPEARANCE UR: ABNORMAL
BACTERIA URNS QL MICRO: NEGATIVE /HPF
BILIRUB UR QL: NEGATIVE
BUN SERPL-MCNC: 144 MG/DL (ref 6–20)
BUN/CREAT SERPL: 23 (ref 12–20)
CALCIUM SERPL-MCNC: 8 MG/DL (ref 8.5–10.1)
CHLORIDE SERPL-SCNC: 104 MMOL/L (ref 97–108)
CO2 SERPL-SCNC: 23 MMOL/L (ref 21–32)
COLOR UR: ABNORMAL
CREAT SERPL-MCNC: 6.35 MG/DL (ref 0.55–1.02)
EPITH CASTS URNS QL MICRO: ABNORMAL /LPF
ERYTHROCYTE [DISTWIDTH] IN BLOOD BY AUTOMATED COUNT: 14.9 % (ref 11.5–14.5)
GLUCOSE BLD STRIP.AUTO-MCNC: 196 MG/DL (ref 65–100)
GLUCOSE BLD STRIP.AUTO-MCNC: 196 MG/DL (ref 65–100)
GLUCOSE BLD STRIP.AUTO-MCNC: 197 MG/DL (ref 65–100)
GLUCOSE BLD STRIP.AUTO-MCNC: 262 MG/DL (ref 65–100)
GLUCOSE SERPL-MCNC: 148 MG/DL (ref 65–100)
GLUCOSE UR STRIP.AUTO-MCNC: NEGATIVE MG/DL
HAPTOGLOB SERPL-MCNC: 281 MG/DL (ref 30–200)
HCT VFR BLD AUTO: 17.4 % (ref 35–47)
HGB BLD-MCNC: 6.2 G/DL (ref 11.5–16)
HGB UR QL STRIP: ABNORMAL
HYALINE CASTS URNS QL MICRO: ABNORMAL /LPF (ref 0–5)
KETONES UR QL STRIP.AUTO: NEGATIVE MG/DL
LDH SERPL L TO P-CCNC: 290 U/L (ref 81–246)
LEUKOCYTE ESTERASE UR QL STRIP.AUTO: ABNORMAL
MAGNESIUM SERPL-MCNC: 1.8 MG/DL (ref 1.6–2.4)
MCH RBC QN AUTO: 30.1 PG (ref 26–34)
MCHC RBC AUTO-ENTMCNC: 35.6 G/DL (ref 30–36.5)
MCV RBC AUTO: 84.5 FL (ref 80–99)
MUCOUS THREADS URNS QL MICRO: ABNORMAL /LPF
NITRITE UR QL STRIP.AUTO: NEGATIVE
NRBC # BLD: 0.03 K/UL (ref 0–0.01)
NRBC BLD-RTO: 0.1 PER 100 WBC
PH UR STRIP: 5 [PH] (ref 5–8)
PHOSPHATE SERPL-MCNC: 3.8 MG/DL (ref 2.6–4.7)
PLATELET # BLD AUTO: 105 K/UL (ref 150–400)
PMV BLD AUTO: 12.7 FL (ref 8.9–12.9)
POTASSIUM SERPL-SCNC: 3.5 MMOL/L (ref 3.5–5.1)
PROT UR STRIP-MCNC: ABNORMAL MG/DL
RBC # BLD AUTO: 2.06 M/UL (ref 3.8–5.2)
RBC #/AREA URNS HPF: ABNORMAL /HPF (ref 0–5)
SERVICE CMNT-IMP: ABNORMAL
SODIUM SERPL-SCNC: 138 MMOL/L (ref 136–145)
SP GR UR REFRACTOMETRY: 1.01 (ref 1–1.03)
UROBILINOGEN UR QL STRIP.AUTO: 1 EU/DL (ref 0.2–1)
WBC # BLD AUTO: 30.9 K/UL (ref 3.6–11)
WBC URNS QL MICRO: ABNORMAL /HPF (ref 0–4)

## 2018-08-28 PROCEDURE — 65660000000 HC RM CCU STEPDOWN

## 2018-08-28 PROCEDURE — 30233N1 TRANSFUSION OF NONAUTOLOGOUS RED BLOOD CELLS INTO PERIPHERAL VEIN, PERCUTANEOUS APPROACH: ICD-10-PCS | Performed by: INTERNAL MEDICINE

## 2018-08-28 PROCEDURE — 86256 FLUORESCENT ANTIBODY TITER: CPT | Performed by: INTERNAL MEDICINE

## 2018-08-28 PROCEDURE — 97116 GAIT TRAINING THERAPY: CPT

## 2018-08-28 PROCEDURE — 85027 COMPLETE CBC AUTOMATED: CPT | Performed by: INTERNAL MEDICINE

## 2018-08-28 PROCEDURE — 86900 BLOOD TYPING SEROLOGIC ABO: CPT | Performed by: HOSPITALIST

## 2018-08-28 PROCEDURE — 82962 GLUCOSE BLOOD TEST: CPT

## 2018-08-28 PROCEDURE — 86923 COMPATIBILITY TEST ELECTRIC: CPT | Performed by: HOSPITALIST

## 2018-08-28 PROCEDURE — 83010 ASSAY OF HAPTOGLOBIN QUANT: CPT | Performed by: INTERNAL MEDICINE

## 2018-08-28 PROCEDURE — 84100 ASSAY OF PHOSPHORUS: CPT | Performed by: INTERNAL MEDICINE

## 2018-08-28 PROCEDURE — 36415 COLL VENOUS BLD VENIPUNCTURE: CPT | Performed by: INTERNAL MEDICINE

## 2018-08-28 PROCEDURE — 74011000258 HC RX REV CODE- 258: Performed by: INTERNAL MEDICINE

## 2018-08-28 PROCEDURE — 74011250636 HC RX REV CODE- 250/636: Performed by: INTERNAL MEDICINE

## 2018-08-28 PROCEDURE — 86038 ANTINUCLEAR ANTIBODIES: CPT | Performed by: INTERNAL MEDICINE

## 2018-08-28 PROCEDURE — 80048 BASIC METABOLIC PNL TOTAL CA: CPT | Performed by: INTERNAL MEDICINE

## 2018-08-28 PROCEDURE — 74011636637 HC RX REV CODE- 636/637: Performed by: INTERNAL MEDICINE

## 2018-08-28 PROCEDURE — 36430 TRANSFUSION BLD/BLD COMPNT: CPT

## 2018-08-28 PROCEDURE — 83735 ASSAY OF MAGNESIUM: CPT | Performed by: INTERNAL MEDICINE

## 2018-08-28 PROCEDURE — 81001 URINALYSIS AUTO W/SCOPE: CPT | Performed by: INTERNAL MEDICINE

## 2018-08-28 PROCEDURE — P9016 RBC LEUKOCYTES REDUCED: HCPCS | Performed by: HOSPITALIST

## 2018-08-28 PROCEDURE — 74011250637 HC RX REV CODE- 250/637: Performed by: INTERNAL MEDICINE

## 2018-08-28 PROCEDURE — 83615 LACTATE (LD) (LDH) ENZYME: CPT | Performed by: INTERNAL MEDICINE

## 2018-08-28 PROCEDURE — 83883 ASSAY NEPHELOMETRY NOT SPEC: CPT | Performed by: INTERNAL MEDICINE

## 2018-08-28 PROCEDURE — 97535 SELF CARE MNGMENT TRAINING: CPT

## 2018-08-28 PROCEDURE — 84155 ASSAY OF PROTEIN SERUM: CPT | Performed by: INTERNAL MEDICINE

## 2018-08-28 PROCEDURE — 87086 URINE CULTURE/COLONY COUNT: CPT | Performed by: INTERNAL MEDICINE

## 2018-08-28 RX ORDER — SODIUM CHLORIDE 9 MG/ML
250 INJECTION, SOLUTION INTRAVENOUS AS NEEDED
Status: DISCONTINUED | OUTPATIENT
Start: 2018-08-28 | End: 2018-09-02 | Stop reason: ALTCHOICE

## 2018-08-28 RX ORDER — INSULIN GLARGINE 100 [IU]/ML
38 INJECTION, SOLUTION SUBCUTANEOUS DAILY
Status: DISCONTINUED | OUTPATIENT
Start: 2018-08-29 | End: 2018-08-29

## 2018-08-28 RX ADMIN — CEFTRIAXONE 1 G: 1 INJECTION, POWDER, FOR SOLUTION INTRAMUSCULAR; INTRAVENOUS at 15:42

## 2018-08-28 RX ADMIN — NYSTATIN 500000 UNITS: 100000 SUSPENSION ORAL at 07:57

## 2018-08-28 RX ADMIN — NYSTATIN 500000 UNITS: 100000 SUSPENSION ORAL at 13:45

## 2018-08-28 RX ADMIN — ACETAMINOPHEN 650 MG: 325 TABLET ORAL at 07:57

## 2018-08-28 RX ADMIN — INSULIN LISPRO 4 UNITS: 100 INJECTION, SOLUTION INTRAVENOUS; SUBCUTANEOUS at 11:49

## 2018-08-28 RX ADMIN — HEPARIN SODIUM 5000 UNITS: 5000 INJECTION INTRAVENOUS; SUBCUTANEOUS at 15:43

## 2018-08-28 RX ADMIN — NYSTATIN 500000 UNITS: 100000 SUSPENSION ORAL at 18:06

## 2018-08-28 RX ADMIN — INSULIN LISPRO 3 UNITS: 100 INJECTION, SOLUTION INTRAVENOUS; SUBCUTANEOUS at 22:28

## 2018-08-28 RX ADMIN — INSULIN LISPRO 4 UNITS: 100 INJECTION, SOLUTION INTRAVENOUS; SUBCUTANEOUS at 16:40

## 2018-08-28 RX ADMIN — INSULIN LISPRO 2 UNITS: 100 INJECTION, SOLUTION INTRAVENOUS; SUBCUTANEOUS at 07:58

## 2018-08-28 RX ADMIN — NYSTATIN 500000 UNITS: 100000 SUSPENSION ORAL at 21:31

## 2018-08-28 RX ADMIN — INSULIN LISPRO 4 UNITS: 100 INJECTION, SOLUTION INTRAVENOUS; SUBCUTANEOUS at 07:57

## 2018-08-28 RX ADMIN — FAMOTIDINE 20 MG: 20 TABLET ORAL at 07:58

## 2018-08-28 RX ADMIN — INSULIN LISPRO 2 UNITS: 100 INJECTION, SOLUTION INTRAVENOUS; SUBCUTANEOUS at 16:40

## 2018-08-28 RX ADMIN — INSULIN LISPRO 2 UNITS: 100 INJECTION, SOLUTION INTRAVENOUS; SUBCUTANEOUS at 11:49

## 2018-08-28 RX ADMIN — INSULIN GLARGINE 34 UNITS: 100 INJECTION, SOLUTION SUBCUTANEOUS at 07:57

## 2018-08-28 NOTE — PROGRESS NOTES
Received call from [facility] with [employer] stating patient healthcare plan will  on Friday and is not being renewed . Spoke with patient and she states the plan is expiring on 18 and will renew on the first of September. Informed [facility] of this and she came in to see patient and will follow up with patient's human resource department because they will need to obtain authorization. She will let me know.

## 2018-08-28 NOTE — PROGRESS NOTES
Hospitalist Progress Note    NAME: Elfego Bonilla   :  1965   MRN:  [id number]       Assessment / Plan:  Acute anemia unclear etiology:   - transfused 1u pRBCs overnight  - although already transfused, will check iron studies, ferritin, J96 and folic acid level  - no apparent bleeding in stools per nursing; no recent BM  - does have hematuria but do not feel this is enough blood loss alone to explain anemia  DKA (resolved) in setting of uncontrolled insulin-dependent DM2 with nephropathy: HgA1c 8.8. Now off insulin gtt. - increasing lantus, now above home dose but glucoses stable  - con't holding amaryl for now  - lispro scheduled with meals and per sliding scale  - diet as tolerated  NEERAJ and acute encephalopathy with severe hyperkalemia in setting of dehydration/DKA and sepsis/UTI, present on admission: febrile again overnight. Cr continues to slowly improve but BUN remains very high. HD stopped  due to rapid response for hypotension on HD.  - renal US  normal renal ultrasound examination  - urine cx >[id number] pansensitive E Coli, treated with rocephin  - appreciate nephrology assistance; additional labs sent including LDH/haptoglobin, JUN, ANCA, myeloma. - con't IV fluids, urine output has been good. Con't to monitor Cr. Pseudohyponatremia, resolved  Abdominal pain, unclear etiology: CT A/P without contrast  negative      Code Status: Full  Surrogate Decision Maker: Soumya Figueroa [facility]   DVT Prophylaxis: heparin     Subjective:     Chief Complaint / Reason for Physician Visit  \"Doing okay\". Discussed with RN events overnight.      Review of Systems:  Symptom Y/N Comments  Symptom Y/N Comments   Fever/Chills n   Chest Pain n    Poor Appetite n   Edema n    Cough n   Abdominal Pain n    Sputum n   Joint Pain     SOB/GARLAND n   Pruritis/Rash     Nausea/vomit    Tolerating PT/OT     Diarrhea    Tolerating Diet     Constipation    Other       Could NOT obtain due to:      Objective: VITALS:   Last 24hrs VS reviewed since prior progress note. Most recent are:  Patient Vitals for the past 24 hrs:   Temp Pulse Resp BP SpO2   08/28/18 1052 98 °F (36.7 °C) 72 16 100/51 100 %   08/28/18 0748 98.5 °F (36.9 °C) 66 20 124/57 100 %   08/28/18 0645 98.7 °F (37.1 °C) 63 16 108/48 99 %   08/28/18 0615 98.9 °F (37.2 °C) 64 16 117/48 100 %   08/28/18 0545 98.8 °F (37.1 °C) 64 16 103/44 100 %   08/28/18 0530 98.9 °F (37.2 °C) 66 16 107/46 100 %   08/28/18 0515 98.9 °F (37.2 °C) 71 16 113/44 100 %   08/28/18 0500 98.9 °F (37.2 °C) 74 16 120/47 100 %   08/28/18 0431 98.8 °F (37.1 °C) 77 16 108/43 100 %   08/28/18 0246 98.7 °F (37.1 °C) 76 16 127/53 100 %   08/27/18 1908 98.9 °F (37.2 °C) 83 16 111/52 100 %   08/27/18 1626 99 °F (37.2 °C) 83 - 106/52 -       Intake/Output Summary (Last 24 hours) at 08/28/18 1326  Last data filed at 08/28/18 1143   Gross per 24 hour   Intake          2315.84 ml   Output             2735 ml   Net          -419.16 ml        PHYSICAL EXAM:  General: WD, WN. Alert, cooperative, no acute distress    EENT:  EOMI. Anicteric sclerae. MMM  Resp:  CTA bilaterally, no wheezing or rales. No accessory muscle use  CV:  Regular rhythm,  No edema  GI:  Soft, mildly distended, Non tender.  +Bowel sounds  Neurologic:  Alert and oriented X 3, normal speech,   Psych:   Fair insight. Not anxious nor agitated  Skin:  No rashes. No jaundice    Reviewed most current lab test results and cultures  YES  Reviewed most current radiology test results   YES  Review and summation of old records today    NO  Reviewed patient's current orders and MAR    YES  PMH/SH reviewed - no change compared to H&P  ________________________________________________________________________  Care Plan discussed with:    Comments   Patient x    Family      RN x    Care Manager x    Consultant                       x Multidiciplinary team rounds were held today with , nursing, pharmacist and clinical coordinator. Patient's plan of care was discussed; medications were reviewed and discharge planning was addressed. ________________________________________________________________________  Total NON critical care TIME:  25 Minutes    Total CRITICAL CARE TIME Spent:   Minutes non procedure based      Comments   >50% of visit spent in counseling and coordination of care x    ________________________________________________________________________  Ren Velásquez MD     Procedures: see electronic medical records for all procedures/Xrays and details which were not copied into this note but were reviewed prior to creation of Plan. LABS:  I reviewed today's most current labs and imaging studies.   Pertinent labs include:  Recent Labs      08/28/18   0116  08/27/18 0258  08/26/18   0320   WBC  30.9*  25.1*  23.4*   HGB  6.2*  7.1*  7.9*   HCT  17.4*  20.2*  21.2*   PLT  105*  107*  104*     Recent Labs      08/28/18   0116  08/27/18   0258  08/26/18   0320   NA  138  138  144   K  3.5  3.7  3.1*   CL  104  105  107   CO2  23  23  24   GLU  148*  163*  234*   BUN  144*  138*  139*   CREA  6.35*  6.70*  7.07*   CA  8.0*  7.9*  7.7*   MG  1.8   --   2.1   PHOS  3.8   --   4.0       Signed: Ren Velásquez MD

## 2018-08-28 NOTE — PROGRESS NOTES
Problem: Pressure Injury - Risk of  Goal: *Prevention of pressure injury  Document Vinh Scale and appropriate interventions in the flowsheet. Outcome: Progressing Towards Goal  Pressure Injury Interventions:  Sensory Interventions: Assess changes in LOC, Turn and reposition approx. every two hours (pillows and wedges if needed), Pressure redistribution bed/mattress (bed type), Minimize linen layers, Keep linens dry and wrinkle-free, Discuss PT/OT consult with provider    Moisture Interventions: Absorbent underpads, Internal/External urinary devices, Limit adult briefs, Maintain skin hydration (lotion/cream), Minimize layers, Moisture barrier, Offer toileting Q_hr    Activity Interventions: Increase time out of bed, Pressure redistribution bed/mattress(bed type), PT/OT evaluation    Mobility Interventions: HOB 30 degrees or less, Pressure redistribution bed/mattress (bed type), PT/OT evaluation, Turn and reposition approx.  every two hours(pillow and wedges)    Nutrition Interventions: Document food/fluid/supplement intake, Offer support with meals,snacks and hydration, Discuss nutritional consult with provider    Friction and Shear Interventions: Lift sheet

## 2018-08-28 NOTE — PROGRESS NOTES
0700-Bedside shift change report given to Emmanuel Rose (oncoming nurse) by Chandrika Looney (offgoing nurse). Report included the following information SBAR, Kardex and MAR.   1651- Transfusion completed without incidence. Patient assessed. See flowsheet. 1- Bath given by PCT. Patient OOB to the chair. 1150- Reassessed. No changes. Patient moved back to bed.  1156- Loza removed. [place] drawn and sent. 1500- Reassessed. No changes. 1900-Bedside shift change report given to Yesica Rollins (oncoming nurse) by Emmanuel Rose (offgoing nurse). Report included the following information SBAR, Kardex and MAR.

## 2018-08-28 NOTE — PROGRESS NOTES
Problem: Falls - Risk of  Goal: *Absence of Falls  Document Stephen Fall Risk and appropriate interventions in the flowsheet.    Outcome: Progressing Towards Goal  Fall Risk Interventions:  Mobility Interventions: Bed/chair exit alarm, Communicate number of staff needed for ambulation/transfer, OT consult for ADLs, Patient to call before getting OOB, PT Consult for mobility concerns, PT Consult for assist device competence         Medication Interventions: Assess postural VS orthostatic hypotension, Bed/chair exit alarm, Evaluate medications/consider consulting pharmacy, Patient to call before getting OOB, Teach patient to arise slowly    Elimination Interventions: Bed/chair exit alarm, Call light in reach, Patient to call for help with toileting needs, Toilet paper/wipes in reach, Toileting schedule/hourly rounds    History of Falls Interventions: Bed/chair exit alarm

## 2018-08-28 NOTE — PROGRESS NOTES
Problem: Self Care Deficits Care Plan (Adult)  Goal: *Acute Goals and Plan of Care (Insert Text)  Occupational Therapy Goals  Initiated 8/27/2018  1. Patient will perform grooming at the sink with supervision/set-up within 7 day(s). 2.  Patient will perform bathing at the sink with moderate assistance  within 7 day(s). 3.  Patient will perform lower body dressing with moderate assistance  within 7 day(s). 4.  Patient will perform toilet transfers with supervision/set-up within 7 day(s). 5.  Patient will perform all aspects of toileting with supervision/set-up within 7 day(s). 6.  Patient will participate in upper extremity therapeutic exercise/activities with supervision/set-up for 5 minutes within 7 day(s). 7.  Patient will utilize energy conservation techniques during functional activities with verbal cues within 7 day(s). Occupational Therapy TREATMENT  Patient: Rosette Harp (20 y.o. female)  Date: 8/28/2018  Diagnosis[facility] <principal problem not specified>       Precautions: Bed Alarm, Fall  Chart, occupational therapy assessment, plan of care, and goals were reviewed. ASSESSMENT:  Pt was cleared to be seen for therapy and all VSS and pt was sitting up in chair when OT arrived. She stated that she was not as sore today and had been up in the chair most of the am.  Pt was educated on standing balance with ADLs and she reports that she was bathed by the CPT in bed. OT explained that she needs to stand at the sink and work on increasing her balance and endurance, along with ADLs. Pt was willing to stand at the chair and worked with balance while pt was reaching up over her head, in front of her and down to her knees. Pt had some posterior  lean for the first time she stood and was reaching and simulating ADLS. Pt needed to sit and OT educated pt on squeezing her buttocks and making sure that she keeps weight on her whole foot and not on her heels.   She was able to reach to her ankles and no loss of balance and was able to move more with her simulation of ADLs. Pt walked in the room and her balance was better and pt was CGA for walking and transfers. Pt was left sitting up in chair with call bell with in reach. Recommend that pt have further therapy at discharge and at this time, would benefit from In pt rehab. Progression toward goals:  [x]       Improving appropriately and progressing toward goals  []       Improving slowly and progressing toward goals  []       Not making progress toward goals and plan of care will be adjusted     PLAN:  Patient continues to benefit from skilled intervention to address the above impairments. Continue treatment per established plan of care. Discharge Recommendations:  Inpatient Rehab  Further Equipment Recommendations for Discharge:  tbd     SUBJECTIVE:   Patient stated I guess I feel much better .     OBJECTIVE DATA SUMMARY:   Cognitive/Behavioral Status:  Neurologic State: Alert  Orientation Level: Appropriate for age;Oriented X4  Cognition: Appropriate decision making; Follows commands  Perception: Appears intact  Perseveration: No perseveration noted  Safety/Judgement: Awareness of environment    Functional Mobility and Transfers for ADLs:    Transfers:   CGA          Balance:  Sitting: Intact  Standing: Impaired; Without support  Standing - Static: Good  Standing - Dynamic : Fair    ADL Intervention:  Feeding  Feeding Assistance: Independent     pt is mod to max for ADLs       Toileting  Toileting Assistance: Contact guard assistance  Bladder Hygiene: Contact guard assistance  Bowel Hygiene: Contact guard assistance    Cognitive Retraining  Safety/Judgement: Awareness of environment      Pain:  Pain Scale 1: Numeric (0 - 10)  Pain Intensity 1: 0  Pain Location 1: Abdomen  Pain Orientation 1: Anterior  Pain Description 1: Aching  Pain Intervention(s) 1: Medication (see MAR)  Activity Tolerance:   vss/good  Please refer to the flowsheet for vital signs taken during this treatment.   After treatment:   [x] Patient left in no apparent distress sitting up in chair  [] Patient left in no apparent distress in bed  [x] Call bell left within reach  [x] Nursing notified  [] Caregiver present  [] Bed alarm activated    COMMUNICATION/COLLABORATION:   The patients plan of care was discussed with: Physical Therapist and Registered Nurse    Johnny Ibarra OT  Time Calculation: 28 mins

## 2018-08-28 NOTE — PROGRESS NOTES
Problem: Mobility Impaired (Adult and Pediatric)  Goal: *Acute Goals and Plan of Care (Insert Text)  Physical Therapy Goals  Initiated 8/26/2018  1. Patient will move from supine to sit and sit to supine , scoot up and down and roll side to side in bed with independence within 7 day(s). 2.  Patient will transfer from bed to chair and chair to bed with supervision using the least restrictive device within 7 day(s). 3.  Patient will perform sit to stand with supervision/set-up within 7 day(s). 4.  Patient will ambulate with supervision/set-up for 656 feet with the least restrictive device within 7 day(s). 5.  Patient will ascend/descend 4 stairs with dual handrail(s) with supervision/set-up within 7 day(s). physical Therapy TREATMENT  Patient: Yvette Espinosa (89 y.o. female)  Date: 8/28/2018  Diagn[facility]: [facility] <principal problem not specified>       Precautions: Bed Alarm, Fall  Chart, physical therapy assessment, plan of care and goals were reviewed. ASSESSMENT:  Pt was received standing with OT in the room. She was agreeable to ambulate with therapy. She was able to ambulate into the goins for a total of 100ft with CGA, no major LOB noted. She continued to have slowed gait and shuffling steps. She declined any further activity once returned to the room and left sitting up in the chair. She would benefit from continue therapy to progress with gait and balance training. Continue to recommend IP rehab at discharge due to high PLOF. Noted HGB 6.2 today and had received 1 unit of blood. Progression toward goals:  []    Improving appropriately and progressing toward goals  [x]    Improving slowly and progressing toward goals  []    Not making progress toward goals and plan of care will be adjusted     PLAN:  Patient continues to benefit from skilled intervention to address the above impairments. Continue treatment per established plan of care.   Discharge Recommendations: Inpatient Rehab  Further Equipment Recommendations for Discharge:  TBD     SUBJECTIVE:   Patient stated I don't want to go any further today.     OBJECTIVE DATA SUMMARY:   Critical Behavior:  Neurologic State: Alert  Orientation Level: Oriented X4  Cognition: Follows commands  Safety/Judgement: Awareness of environment  Functional Mobility Training:  Bed Mobility:         session began in standing and ended in sitting           Transfers:     Stand to Sit: Contact guard assistance                             Balance:  Sitting: Intact  Standing: Impaired  Standing - Static: Good  Standing - Dynamic : Fair  Ambulation/Gait Training:  Distance (ft): 100 Feet (ft)  Assistive Device: Gait belt  Ambulation - Level of Assistance: Contact guard assistance        Gait Abnormalities: Decreased step clearance        Base of Support: Narrowed     Speed/Connie: Slow                   Pain:  Pain Scale 1: Numeric (0 - 10)  Pain Intensity 1: 2  Pain Location 1: Abdomen  Pain Orientation 1: Anterior  Pain Description 1: Aching  Pain Intervention(s) 1: Medication (see MAR)  Activity Tolerance:   WFL  Please refer to the flowsheet for vital signs taken during this treatment.   After treatment:   [x]    Patient left in no apparent distress sitting up in chair  []    Patient left in no apparent distress in bed  [x]    Call bell left within reach  [x]    Nursing notified  []    Caregiver present  []    Bed alarm activated    COMMUNICATION/COLLABORATION:   The patients plan of care was discussed with: Occupational Therapist and Registered Nurse    Romulo Alcantara PT, DPT   Time Calculation: 10 mins

## 2018-08-28 NOTE — PROGRESS NOTES
NAME: Meri Woo        :  1965        MRN:  [id number]        Assessment :    Plan:  --NEERAJ    S/p DKA  DM    Anemia  Thrombocytopenia    Leukocytosis  UTI on admission    proteniuria  hematuria --Did not tolerate HD due to drop in BP. Creatinine trending down (7.1 to 6.7 to 6.35). BUN quite high. Hold HD for now, but keep shikha for now. Good UO. Urine with 1.6 grams proteint    D/c combs, d/c IVF's    Likely ATN, but just to make sure, will check LDH/haptoglobin (hgb quite low and platelets mildly low). Urine sediment still not bland. Will send other serologic w/u as well (JUN, ANCA, myeloma).    hgb 6.2; transfusion? UOP (2650 ml)           Subjective:     Chief Complaint:  oob in a chair. More talkative. Tolerating PO. Combs. Review of Systems:    Symptom Y/N Comments  Symptom Y/N Comments   Fever/Chills    Chest Pain n    Poor Appetite    Edema n    Cough    Abdominal Pain n    Sputum    Joint Pain     SOB/GARLAND n   Pruritis/Rash     Nausea/vomit n   Tolerating PT/OT     Diarrhea    Tolerating Diet     Constipation    Other       Could not obtain due to:      Objective:     VITALS:   Last 24hrs VS reviewed since prior progress note.  Most recent are:  Visit Vitals    /51 (BP 1 Location: Right arm, BP Patient Position: Sitting)    Pulse 72    Temp 98 °F (36.7 °C)    Resp 16    Ht 5' 1\" (1.549 m)    Wt 45.4 kg (100 lb)    SpO2 100%    BMI 18.89 kg/m2       Intake/Output Summary (Last 24 hours) at 18 1150  Last data filed at 18 1143   Gross per 24 hour   Intake          2315.84 ml   Output             2735 ml   Net          -419.16 ml      Telemetry Reviewed:     PHYSICAL EXAM:  General: NAD  CTA  abd soft  No edema      Lab Data Reviewed: (see below)    Medications Reviewed: (see below)    PMH/SH reviewed - no change compared to H&P  ________________________________________________________________________  Care Plan discussed with:  Patient     Family      RN     Care Manager                    Consultant:          Comments   >50% of visit spent in counseling and coordination of care       ________________________________________________________________________  Xiomara Aranda MD     Procedures: see electronic medical records for all procedures/Xrays and details which  were not copied into this note but were reviewed prior to creation of Plan. LABS:  Recent Labs      08/28/18 0116 08/27/18   0258   WBC  30.9*  25.1*   HGB  6.2*  7.1*   HCT  17.4*  20.2*   PLT  105*  107*     Recent Labs      08/28/18   0116  08/27/18 0258  08/26/18   0320   NA  138  138  144   K  3.5  3.7  3.1*   CL  104  105  107   CO2  23  23  24   BUN  144*  138*  139*   CREA  6.35*  6.70*  7.07*   GLU  148*  163*  234*   CA  8.0*  7.9*  7.7*   MG  1.8   --   2.1   PHOS  3.8   --   4.0     No results for input(s): SGOT, GPT, AP, TBIL, TP, ALB, GLOB, GGT, AML, LPSE in the last 72 hours. No lab exists for component: AMYP, HLPSE  No results for input(s): INR, PTP, APTT in the last 72 hours. No lab exists for component: INREXT, INREXT   No results for input(s): FE, TIBC, PSAT, FERR in the last 72 hours. No results found for: FOL, RBCF   No results for input(s): PH, PCO2, PO2 in the last 72 hours. No results for input(s): CPK, CKMB in the last 72 hours.     No lab exists for component: TROPONINI  No components found for: [place]  Lab Results   Component Value Date/Time    Color YELLOW/STRAW 08/28/2018 09:26 AM    Appearance CLOUDY (A) 08/28/2018 09:26 AM    Specific gravity 1.010 08/28/2018 09:26 AM    pH (UA) 5.0 08/28/2018 09:26 AM    Protein TRACE (A) 08/28/2018 09:26 AM    Glucose NEGATIVE  08/28/2018 09:26 AM    Ketone NEGATIVE  08/28/2018 09:26 AM    Bilirubin NEGATIVE  08/28/2018 09:26 AM    Urobilinogen 1.0 08/28/2018 09:26 AM    Nitrites NEGATIVE 08/28/2018 09:26 AM    Leukocyte Esterase LARGE (A) 08/28/2018 09:26 AM    Epithelial cells MODERATE (A) 08/28/2018 09:26 AM    Bacteria NEGATIVE  08/28/2018 09:26 AM    WBC 20-50 08/28/2018 09:26 AM    RBC 5-10 08/28/2018 09:26 AM       MEDICATIONS:  Current Facility-Administered Medications   Medication Dose Route Frequency    0.9% sodium chloride infusion 250 mL  250 mL IntraVENous PRN    insulin glargine (LANTUS) injection 34 Units  34 Units SubCUTAneous DAILY    insulin lispro (HUMALOG) injection 4 Units  4 Units SubCUTAneous TIDAC    nystatin (MYCOSTATIN) 100,000 unit/mL oral suspension 500,000 Units  500,000 Units Oral QID    insulin lispro (HUMALOG) injection   SubCUTAneous AC&HS    glucose chewable tablet 16 g  4 Tab Oral PRN    dextrose (D50W) injection syrg 12.5-25 g  12.5-25 g IntraVENous PRN    glucagon (GLUCAGEN) injection 1 mg  1 mg IntraMUSCular PRN    0.45% sodium chloride infusion  50 mL/hr IntraVENous CONTINUOUS    famotidine (PEPCID) tablet 20 mg  20 mg Oral DAILY    acetaminophen (TYLENOL) tablet 650 mg  650 mg Oral Q4H PRN    prochlorperazine (COMPAZINE) with saline injection 10 mg  10 mg IntraVENous Q6H PRN    labetalol (NORMODYNE;TRANDATE) injection 10 mg  10 mg IntraVENous Q6H PRN    heparin (porcine) injection 5,000 Units  5,000 Units SubCUTAneous Q12H    cefTRIAXone (ROCEPHIN) 1 g in 0.9% sodium chloride (MBP/ADV) 50 mL  1 g IntraVENous Q24H    fentaNYL citrate (PF) injection 50 mcg  50 mcg IntraVENous Q4H PRN

## 2018-08-29 LAB
ALBUMIN SERPL-MCNC: 1.8 G/DL (ref 3.5–5)
ALBUMIN/GLOB SERPL: 0.5 {RATIO} (ref 1.1–2.2)
ALP SERPL-CCNC: 284 U/L (ref 45–117)
ALT SERPL-CCNC: 48 U/L (ref 12–78)
ANION GAP SERPL CALC-SCNC: 11 MMOL/L (ref 5–15)
AST SERPL-CCNC: 47 U/L (ref 15–37)
BACTERIA SPEC CULT: NORMAL
BILIRUB SERPL-MCNC: 1.3 MG/DL (ref 0.2–1)
BUN SERPL-MCNC: 133 MG/DL (ref 6–20)
BUN/CREAT SERPL: 23 (ref 12–20)
CALCIUM SERPL-MCNC: 8.2 MG/DL (ref 8.5–10.1)
CC UR VC: NORMAL
CHLORIDE SERPL-SCNC: 105 MMOL/L (ref 97–108)
CO2 SERPL-SCNC: 23 MMOL/L (ref 21–32)
CREAT SERPL-MCNC: 5.88 MG/DL (ref 0.55–1.02)
EOSINOPHIL #/AREA URNS HPF: NEGATIVE /[HPF]
ERYTHROCYTE [DISTWIDTH] IN BLOOD BY AUTOMATED COUNT: 19.1 % (ref 11.5–14.5)
FERRITIN SERPL-MCNC: 1139 NG/ML (ref 8–252)
FOLATE SERPL-MCNC: 19.8 NG/ML (ref 5–21)
GLOBULIN SER CALC-MCNC: 4 G/DL (ref 2–4)
GLUCOSE BLD STRIP.AUTO-MCNC: 153 MG/DL (ref 65–100)
GLUCOSE BLD STRIP.AUTO-MCNC: 181 MG/DL (ref 65–100)
GLUCOSE BLD STRIP.AUTO-MCNC: 234 MG/DL (ref 65–100)
GLUCOSE BLD STRIP.AUTO-MCNC: 47 MG/DL (ref 65–100)
GLUCOSE BLD STRIP.AUTO-MCNC: 52 MG/DL (ref 65–100)
GLUCOSE BLD STRIP.AUTO-MCNC: 85 MG/DL (ref 65–100)
GLUCOSE SERPL-MCNC: 214 MG/DL (ref 65–100)
HCT VFR BLD AUTO: 21.2 % (ref 35–47)
HGB BLD-MCNC: 7.4 G/DL (ref 11.5–16)
IRON SATN MFR SERPL: 74 % (ref 20–50)
IRON SERPL-MCNC: 128 UG/DL (ref 35–150)
MCH RBC QN AUTO: 27.8 PG (ref 26–34)
MCHC RBC AUTO-ENTMCNC: 34.9 G/DL (ref 30–36.5)
MCV RBC AUTO: 79.7 FL (ref 80–99)
NRBC # BLD: 0.02 K/UL (ref 0–0.01)
NRBC BLD-RTO: 0.1 PER 100 WBC
PLATELET # BLD AUTO: 138 K/UL (ref 150–400)
PMV BLD AUTO: 12.8 FL (ref 8.9–12.9)
POTASSIUM SERPL-SCNC: 3.6 MMOL/L (ref 3.5–5.1)
PROT SERPL-MCNC: 5.8 G/DL (ref 6.4–8.2)
RBC # BLD AUTO: 2.66 M/UL (ref 3.8–5.2)
SERVICE CMNT-IMP: ABNORMAL
SERVICE CMNT-IMP: NORMAL
SERVICE CMNT-IMP: NORMAL
SODIUM SERPL-SCNC: 139 MMOL/L (ref 136–145)
TIBC SERPL-MCNC: 173 UG/DL (ref 250–450)
VIT B12 SERPL-MCNC: 1793 PG/ML (ref 193–986)
WBC # BLD AUTO: 27.1 K/UL (ref 3.6–11)

## 2018-08-29 PROCEDURE — 85027 COMPLETE CBC AUTOMATED: CPT | Performed by: INTERNAL MEDICINE

## 2018-08-29 PROCEDURE — 82746 ASSAY OF FOLIC ACID SERUM: CPT | Performed by: INTERNAL MEDICINE

## 2018-08-29 PROCEDURE — 74011250637 HC RX REV CODE- 250/637: Performed by: INTERNAL MEDICINE

## 2018-08-29 PROCEDURE — 36415 COLL VENOUS BLD VENIPUNCTURE: CPT | Performed by: INTERNAL MEDICINE

## 2018-08-29 PROCEDURE — 82728 ASSAY OF FERRITIN: CPT | Performed by: INTERNAL MEDICINE

## 2018-08-29 PROCEDURE — 87205 SMEAR GRAM STAIN: CPT | Performed by: INTERNAL MEDICINE

## 2018-08-29 PROCEDURE — 97116 GAIT TRAINING THERAPY: CPT | Performed by: PHYSICAL THERAPIST

## 2018-08-29 PROCEDURE — 65660000000 HC RM CCU STEPDOWN

## 2018-08-29 PROCEDURE — 83540 ASSAY OF IRON: CPT | Performed by: INTERNAL MEDICINE

## 2018-08-29 PROCEDURE — 74011636637 HC RX REV CODE- 636/637: Performed by: INTERNAL MEDICINE

## 2018-08-29 PROCEDURE — 82607 VITAMIN B-12: CPT | Performed by: INTERNAL MEDICINE

## 2018-08-29 PROCEDURE — 82962 GLUCOSE BLOOD TEST: CPT

## 2018-08-29 PROCEDURE — 80053 COMPREHEN METABOLIC PANEL: CPT | Performed by: INTERNAL MEDICINE

## 2018-08-29 PROCEDURE — 74011250636 HC RX REV CODE- 250/636: Performed by: INTERNAL MEDICINE

## 2018-08-29 PROCEDURE — 74011000258 HC RX REV CODE- 258: Performed by: INTERNAL MEDICINE

## 2018-08-29 RX ORDER — INSULIN GLARGINE 100 [IU]/ML
34 INJECTION, SOLUTION SUBCUTANEOUS DAILY
Status: DISCONTINUED | OUTPATIENT
Start: 2018-08-30 | End: 2018-08-30

## 2018-08-29 RX ADMIN — HEPARIN SODIUM 5000 UNITS: 5000 INJECTION INTRAVENOUS; SUBCUTANEOUS at 15:24

## 2018-08-29 RX ADMIN — NYSTATIN 500000 UNITS: 100000 SUSPENSION ORAL at 08:57

## 2018-08-29 RX ADMIN — INSULIN LISPRO 2 UNITS: 100 INJECTION, SOLUTION INTRAVENOUS; SUBCUTANEOUS at 12:23

## 2018-08-29 RX ADMIN — INSULIN LISPRO 4 UNITS: 100 INJECTION, SOLUTION INTRAVENOUS; SUBCUTANEOUS at 08:56

## 2018-08-29 RX ADMIN — NYSTATIN 500000 UNITS: 100000 SUSPENSION ORAL at 21:01

## 2018-08-29 RX ADMIN — CEFTRIAXONE 1 G: 1 INJECTION, POWDER, FOR SOLUTION INTRAMUSCULAR; INTRAVENOUS at 15:24

## 2018-08-29 RX ADMIN — FAMOTIDINE 20 MG: 20 TABLET ORAL at 08:57

## 2018-08-29 RX ADMIN — INSULIN LISPRO 2 UNITS: 100 INJECTION, SOLUTION INTRAVENOUS; SUBCUTANEOUS at 21:03

## 2018-08-29 RX ADMIN — NYSTATIN 500000 UNITS: 100000 SUSPENSION ORAL at 17:23

## 2018-08-29 RX ADMIN — HEPARIN SODIUM 5000 UNITS: 5000 INJECTION INTRAVENOUS; SUBCUTANEOUS at 03:42

## 2018-08-29 RX ADMIN — INSULIN LISPRO 4 UNITS: 100 INJECTION, SOLUTION INTRAVENOUS; SUBCUTANEOUS at 12:24

## 2018-08-29 RX ADMIN — NYSTATIN 500000 UNITS: 100000 SUSPENSION ORAL at 12:24

## 2018-08-29 RX ADMIN — INSULIN GLARGINE 38 UNITS: 100 INJECTION, SOLUTION SUBCUTANEOUS at 08:56

## 2018-08-29 RX ADMIN — INSULIN LISPRO 2 UNITS: 100 INJECTION, SOLUTION INTRAVENOUS; SUBCUTANEOUS at 08:56

## 2018-08-29 NOTE — PROGRESS NOTES
Primary Nurse Zonia Linn, EMERSON and Yoav Pierre RN performed a dual skin assessment on this patient No impairment noted  Vinh score is 17

## 2018-08-29 NOTE — PROGRESS NOTES
Bedside and Verbal shift change report given to Erika (oncoming nurse) by Sebastian Sweeney RN (offgoing nurse). Report included the following information Kardex, MAR and Recent Results.

## 2018-08-29 NOTE — PROGRESS NOTES
Bedside and Verbal shift change report given to Erika (oncoming nurse) by Beverley Guillaume RN (offgoing nurse). Report included the following information Kardex, MAR and Recent Results.

## 2018-08-29 NOTE — PROGRESS NOTES
Patient transferred to [facility] on yesterday evening. Handoff given to Itzel Bradley CM to follow for dcp.

## 2018-08-29 NOTE — PROGRESS NOTES
[facility] has accepted the pt but will need to obtain [facility]. 1500  D/C plans discussed with Dr Juliet Bryant who does not anticipate d/c until next week. Due to pt's new insurance starting 9/1/18 and Monday being a holiday, Karla Gip will not be initiated until Tuesday.

## 2018-08-29 NOTE — PROGRESS NOTES
Problem: Falls - Risk of  Goal: *Absence of Falls  Document Stephen Fall Risk and appropriate interventions in the flowsheet.    Outcome: Progressing Towards Goal  Fall Risk Interventions:  Mobility Interventions: Bed/chair exit alarm, Communicate number of staff needed for ambulation/transfer, OT consult for ADLs, PT Consult for mobility concerns, Patient to call before getting OOB, PT Consult for assist device competence         Medication Interventions: Assess postural VS orthostatic hypotension, Bed/chair exit alarm, Evaluate medications/consider consulting pharmacy, Patient to call before getting OOB, Teach patient to arise slowly    Elimination Interventions: Bed/chair exit alarm, Call light in reach, Patient to call for help with toileting needs, Toilet paper/wipes in reach, Toileting schedule/hourly rounds    History of Falls Interventions: Bed/chair exit alarm

## 2018-08-29 NOTE — PROGRESS NOTES
Problem: Mobility Impaired (Adult and Pediatric)  Goal: *Acute Goals and Plan of Care (Insert Text)  Physical Therapy Goals  Initiated 8/26/2018  1. Patient will move from supine to sit and sit to supine , scoot up and down and roll side to side in bed with independence within 7 day(s). 2.  Patient will transfer from bed to chair and chair to bed with supervision using the least restrictive device within 7 day(s). 3.  Patient will perform sit to stand with supervision/set-up within 7 day(s). 4.  Patient will ambulate with supervision/set-up for 656 feet with the least restrictive device within 7 day(s). 5.  Patient will ascend/descend 4 stairs with dual handrail(s) with supervision/set-up within 7 day(s). physical Therapy TREATMENT  Seen 1150 to 1210      Patient: Ira Mccauley (73 y.o. female)  Date: 8/29/2018   Diagnosis: DKA [facility]di[facility] k[facility]acidoses[facility] <principal problem not specified>   Precautions: Bed Alarm, Fall  Chart, physical therapy assessment, plan of care and goals were reviewed. ASSESSMENT:  Chart reviewed and cleared by nurse to see. Patient up in recliner chair sleeping upon arrival.  She was easily awakened. Needed lots of encouragement to participate. Stated that she didn't feel like it today. Stated that she felt weak. After much encouragement, she finally agreed to participate. She stood easily with min assist.  Stated that she needed to use the bathroom. Wouldn't go into the bathroom, had to bring the [facility] to her. She was able to void and do her own hygiene. Stood easily from the [facility]. Walked around the room with CGA/verbal cues for safety. 60', brief rest and then another 60'. She firmly declined walking in the hallway, stating that her legs were just too weak. Narrowed BILL. Up in chair at end of session, reclined at her request.  Reviewed LE exercises and encouraged her strongly to be doing these on her own.     Progression toward goals:  []    Improving appropriately and progressing toward goals  [x]    Improving slowly and progressing toward goals  []    Not making progress toward goals and plan of care will be adjusted     PLAN:  Patient continues to benefit from skilled intervention to address the above impairments. Continue treatment per established plan of care. Discharge Recommendations:  Inpatient Rehab  Further Equipment Recommendations for Discharge:  Defer to rehab     SUBJECTIVE:   Patient stated I don't want to go in the hallway, my legs are too weak.     OBJECTIVE DATA SUMMARY:   Critical Behavior:  Neurologic State: Alert, Appropriate for age  Orientation Level: Oriented X4  Cognition: Appropriate decision making, Appropriate for age attention/concentration, Appropriate safety awareness, Follows commands  Safety/Judgement: Awareness of environment     Functional Mobility Training:  Bed Mobility:  Not assessed today, already up in recliner chair and returned to recliner at end of session. Transfers:  Sit to Stand: Minimum assistance  Stand to Sit: Contact guard assistance  Bed to Chair: Contact guard assistance        Balance:  Sitting: Intact  Standing: Impaired; Without support  Standing - Static: Constant support;Good  Standing - Dynamic : Fair    Ambulation/Gait Training:  Distance (ft): 120 Feet (ft) (60', brief rest and then another 60')  Assistive Device: Gait belt  Ambulation - Level of Assistance: Contact guard assistance  Gait Abnormalities: Decreased step clearance  Base of Support: Narrowed  Speed/Connie: Pace decreased (<100 feet/min)  Interventions: Safety awareness training;Verbal cues    Therapeutic Exercises:   Reviewed LE exercises for her to be doing on her own. Pain:  Pain Scale 1: Numeric (0 - 10)  Pain Intensity 1: 0     Activity Tolerance: Tolerated PT session well. Needs encouragement to do more. Please refer to the flowsheet for vital signs taken during this treatment.   After treatment:   [x]    Patient left in no apparent distress sitting up in chair reclined  []    Patient left in no apparent distress in bed  [x]    Call bell left within reach  [x]    Nursing notified  []    Caregiver present  [x]    Bed alarm activated    COMMUNICATION/COLLABORATION:   The patients plan of care was discussed with: Registered Nurse    William Grady, PT  Time Calculation: 20 mins

## 2018-08-29 NOTE — PROGRESS NOTES
Attempted to see patient for OT treatment, but patient presently has a blood glucose level of 52 which is being addressed by nursing.  Will defer and follow back later today or tomorrow

## 2018-08-29 NOTE — PROGRESS NOTES
Bedside shift change report given to Ruddy Ch (oncoming nurse) by Laureano Boss RN (offgoing nurse). Report included the following information SBAR, Kardex, Intake/Output, MAR and Recent Results.

## 2018-08-29 NOTE — PROGRESS NOTES
Problem: Falls - Risk of  Goal: *Absence of Falls  Document Stephen Fall Risk and appropriate interventions in the flowsheet.    Outcome: Progressing Towards Goal  Fall Risk Interventions:  Mobility Interventions: Bed/chair exit alarm, Patient to call before getting OOB, Strengthening exercises (ROM-active/passive)         Medication Interventions: Bed/chair exit alarm, Teach patient to arise slowly, Patient to call before getting OOB    Elimination Interventions: Call light in reach, Patient to call for help with toileting needs, Toileting schedule/hourly rounds    History of Falls Interventions: Bed/chair exit alarm, Room close to nurse's station, Door open when patient unattended

## 2018-08-29 NOTE — PROGRESS NOTES
HYPOGLYCEMIC EPISODE DOCUMENTATION    Patient with hypoglycemic episode(s) at 1600(time) on 08/29(date). BG value(s) pre-treatment 46    Was patient symptomatic?  [] yes, [x] no  Patient was treated with the following rescue medications/treatments: [] D50                [] Glucose tablets                [] Glucagon                [x] 4oz juice                [] 6oz reg soda                [] 8oz low fat milk  BG value post-treatment: 85  Once BG treated and value greater than 80mg/dl, pt was provided with the following:  [x] snack  [] meal  Name of MD notified:Tramaine  The following orders were received: Hold mealtime 4 Units of insulin

## 2018-08-29 NOTE — PROGRESS NOTES
NAME: Corbin Archibald        :  1965        MRN:  [id number]        Assessment :    Plan:  --NEERAJ    S/p DKA  DM    Anemia  Thrombocytopenia    Leukocytosis  UTI on admission    proteniuria  hematuria --Did not tolerate HD due to drop in BP. Creatinine is slowly improving (6.35 to 5.9). BUN a bit better. Hold HD for now, but keep shikha for now. Good UO. Urine with 1.6 grams protein    Likely ATN. Also had been on NSAIDs (check urine eos). pending serologic w/u just to make sure (JUN, ANCA, myeloma). LDH/haptoglobin not c/w hemolysis    hgb 7.4 (s/p prbc's)               Subjective:     Chief Complaint:  oob in a chair. Oriented x 4 (I would think back to baseline). Tolerating PO. Making urine without difficulty - a bit of urgency though. Reviewed her chart and spoke with Ms. Alberto Milly now that her mentation has improved. She was feeling fine (had just seen her endocrine doc on  and all was ok). She had been having some cp, palpitations and sob this summer; prior to admission the ER note indicates HA, nausea and abd pain - she had been on naproxen and advil for the pain. Review of Systems:    Symptom Y/N Comments  Symptom Y/N Comments   Fever/Chills    Chest Pain n    Poor Appetite    Edema n    Cough    Abdominal Pain n    Sputum    Joint Pain     SOB/GARLAND n   Pruritis/Rash     Nausea/vomit n   Tolerating PT/OT     Diarrhea    Tolerating Diet     Constipation    Other       Could not obtain due to:      Objective:     VITALS:   Last 24hrs VS reviewed since prior progress note.  Most recent are:  Visit Vitals    /52 (BP 1 Location: Right arm, BP Patient Position: Sitting)    Pulse 77    Temp 97.9 °F (36.6 °C)    Resp 20    Ht 5' 1\" (1.549 m)    Wt 52.4 kg (115 lb 9.6 oz)    SpO2 98%    BMI 21.84 kg/m2       Intake/Output Summary (Last 24 hours) at 18 1023  Last data filed at 18 1931   Gross per 24 hour   Intake           724.17 ml   Output              900 ml   Net          -175.83 ml      Telemetry Reviewed:     PHYSICAL EXAM:  General: NAD  CTA  abd soft  No edema      Lab Data Reviewed: (see below)    Medications Reviewed: (see below)    PMH/SH reviewed - no change compared to H&P  ________________________________________________________________________  Care Plan discussed with:  Patient y    Family      RN     Care Manager                    Consultant:          Comments   >50% of visit spent in counseling and coordination of care       ________________________________________________________________________  Russel Davila MD     Procedures: see electronic medical records for all procedures/Xrays and details which  were not copied into this note but were reviewed prior to creation of Plan. LABS:  Recent Labs      08/29/18 0314 08/28/18 0116   WBC  27.1*  30.9*   HGB  7.4*  6.2*   HCT  21.2*  17.4*   PLT  138*  105*     Recent Labs      08/29/18 0314 08/28/18 0116 08/27/18   0258   NA  139  138  138   K  3.6  3.5  3.7   CL  105  104  105   CO2  23  23  23   BUN  133*  144*  138*   CREA  5.88*  6.35*  6.70*   GLU  214*  148*  163*   CA  8.2*  8.0*  7.9*   MG   --   1.8   --    PHOS   --   3.8   --      Recent Labs      08/29/18 0314   SGOT  47*   AP  284*   TP  5.8*   ALB  1.8*   GLOB  4.0     No results for input(s): INR, PTP, APTT in the last 72 hours. No lab exists for component: INREXT, INREXT   Recent Labs      08/29/18 0314   TIBC  173*   PSAT  74*   FERR  1139*      Lab Results   Component Value Date/Time    Folate 19.8 08/29/2018 03:14 AM      No results for input(s): PH, PCO2, PO2 in the last 72 hours. No results for input(s): CPK, CKMB in the last 72 hours.     No lab exists for component: TROPONINI  No components found for: [place]  Lab Results   Component Value Date/Time    Color YELLOW/STRAW 08/28/2018 09:26 AM    Appearance CLOUDY (A) 08/28/2018 09:26 AM    Specific gravity 1.010 08/28/2018 09:26 AM    pH (UA) 5.0 08/28/2018 09:26 AM    Protein TRACE (A) 08/28/2018 09:26 AM    Glucose NEGATIVE  08/28/2018 09:26 AM    Ketone NEGATIVE  08/28/2018 09:26 AM    Bilirubin NEGATIVE  08/28/2018 09:26 AM    Urobilinogen 1.0 08/28/2018 09:26 AM    Nitrites NEGATIVE  08/28/2018 09:26 AM    Leukocyte Esterase LARGE (A) 08/28/2018 09:26 AM    Epithelial cells MODERATE (A) 08/28/2018 09:26 AM    Bacteria NEGATIVE  08/28/2018 09:26 AM    WBC 20-50 08/28/2018 09:26 AM    RBC 5-10 08/28/2018 09:26 AM       MEDICATIONS:  Current Facility-Administered Medications   Medication Dose Route Frequency    0.9% sodium chloride infusion 250 mL  250 mL IntraVENous PRN    insulin glargine (LANTUS) injection 38 Units  38 Units SubCUTAneous DAILY    insulin lispro (HUMALOG) injection 4 Units  4 Units SubCUTAneous TIDAC    nystatin (MYCOSTATIN) 100,000 unit/mL oral suspension 500,000 Units  500,000 Units Oral QID    insulin lispro (HUMALOG) injection   SubCUTAneous AC&HS    glucose chewable tablet 16 g  4 Tab Oral PRN    dextrose (D50W) injection syrg 12.5-25 g  12.5-25 g IntraVENous PRN    glucagon (GLUCAGEN) injection 1 mg  1 mg IntraMUSCular PRN    famotidine (PEPCID) tablet 20 mg  20 mg Oral DAILY    acetaminophen (TYLENOL) tablet 650 mg  650 mg Oral Q4H PRN    prochlorperazine (COMPAZINE) with saline injection 10 mg  10 mg IntraVENous Q6H PRN    labetalol (NORMODYNE;TRANDATE) injection 10 mg  10 mg IntraVENous Q6H PRN    heparin (porcine) injection 5,000 Units  5,000 Units SubCUTAneous Q12H    cefTRIAXone (ROCEPHIN) 1 g in 0.9% sodium chloride (MBP/ADV) 50 mL  1 g IntraVENous Q24H    fentaNYL citrate (PF) injection 50 mcg  50 mcg IntraVENous Q4H PRN

## 2018-08-29 NOTE — PROGRESS NOTES
Hospitalist Progress Note    NAME: Rosette Harp   :  1965   MRN:  [id number]       Assessment / Plan:  Persistent leukocytosis, unclear etiology:  - CXR  with no pneumothorax or acute finding. Heart size normal.  - repeat UA sent yesterday, no bacteria. Still on rocephin for E Coli UTI. - blood cultures if temp >99  - +bands on CBC , will resend in AM  Acute anemia, unclear etiology:   - transfused 1u pRBCs overnight  - haptoglobin slightly elevated, but labs drawn after transfused 1u  - iron, A53 and folic acid levels WNL. Ferritin elevated, ?acute phase reactant  - does have hematuria but do not feel this is enough blood loss alone to explain anemia  DKA (resolved) in setting of uncontrolled insulin-dependent DM2 with nephropathy: HgA1c 8.8. Now off insulin gtt. - increasing lantus, now above home dose but glucoses stable  - con't holding amaryl for now, not sure if this will be restarted on discharge as she is already on insulin  - lispro scheduled with meals and per sliding scale  - diet as tolerated  NEERAJ and acute encephalopathy with severe hyperkalemia in setting of dehydration/DKA and sepsis/UTI, present on admission: Cr continues to slowly improve but BUN remains very high. HD stopped  due to rapid response for hypotension on HD.  - renal US  normal renal ultrasound examination  - urine cx >[id number] pansensitive E Coli, treated with rocephin  - appreciate nephrology assistance; additional labs sent including LDH/haptoglobin, JUN, ANCA, myeloma. - con't IV fluids, urine output has been good.  Con't to monitor Cr. Pseudohyponatremia, resolved  Abdominal pain, unclear etiology: CT A/P without contrast  negative      Code Status: Full  Surrogate Decision Maker: Lillian JOHNSON   DVT Prophylaxis: heparin     Subjective:     Chief Complaint / Reason for Physician Visit  \"I'm slowly getting better\". Discussed with RN events overnight.      Review of Systems:  Symptom Y/N Comments  Symptom Y/N Comments   Fever/Chills n   Chest Pain n    Poor Appetite n   Edema n    Cough n   Abdominal Pain n    Sputum n   Joint Pain     SOB/GARLAND n   Pruritis/Rash     Nausea/vomit    Tolerating PT/OT     Diarrhea    Tolerating Diet     Constipation    Other       Could NOT obtain due to:      Objective:     VITALS:   Last 24hrs VS reviewed since prior progress note. Most recent are:  Patient Vitals for the past 24 hrs:   Temp Pulse Resp BP SpO2   08/29/18 0751 97.9 °F (36.6 °C) 77 20 113/52 98 %   08/29/18 0337 98.6 °F (37 °C) 86 18 121/64 99 %   08/28/18 2340 98.2 °F (36.8 °C) 98 18 105/55 98 %   08/28/18 1926 99.1 °F (37.3 °C) 99 20 115/51 99 %   08/28/18 1530 98.6 °F (37 °C) 84 20 139/62 98 %   08/28/18 1052 98 °F (36.7 °C) 72 16 100/51 100 %       Intake/Output Summary (Last 24 hours) at 08/29/18 1021  Last data filed at 08/28/18 1931   Gross per 24 hour   Intake           724.17 ml   Output              900 ml   Net          -175.83 ml        PHYSICAL EXAM:  General: WD, WN. Alert, cooperative, no acute distress    EENT:  EOMI. Anicteric sclerae. MMM  Resp:  CTA bilaterally, no wheezing or rales. No accessory muscle use  CV:  Regular rhythm,  No edema  GI:  Soft, mildly distended, Non tender.  +Bowel sounds  Neurologic:  Alert and oriented X 3, normal speech,   Psych:   Good insight. Not anxious nor agitated  Skin:  No rashes.   No jaundice    Reviewed most current lab test results and cultures  YES  Reviewed most current radiology test results   YES  Review and summation of old records today    NO  Reviewed patient's current orders and MAR    YES  PMH/SH reviewed - no change compared to H&P  ________________________________________________________________________  Care Plan discussed with:    Comments   Patient x    Family      RN x    Care Manager     Consultant                        Multidiciplinary team rounds were held today with , nursing, pharmacist and clinical coordinator. Patient's plan of care was discussed; medications were reviewed and discharge planning was addressed. ________________________________________________________________________  Total NON critical care TIME:  25 Minutes    Total CRITICAL CARE TIME Spent:   Minutes non procedure based      Comments   >50% of visit spent in counseling and coordination of care x    ________________________________________________________________________  Montrell Rodriguez MD     Procedures: see electronic medical records for all procedures/Xrays and details which were not copied into this note but were reviewed prior to creation of Plan. LABS:  I reviewed today's most current labs and imaging studies.   Pertinent labs include:  Recent Labs      08/29/18 0314 08/28/18 0116  08/27/18   0258   WBC  27.1*  30.9*  25.1*   HGB  7.4*  6.2*  7.1*   HCT  21.2*  17.4*  20.2*   PLT  138*  105*  107*     Recent Labs      08/29/18 0314 08/28/18 0116  08/27/18   0258   NA  139  138  138   K  3.6  3.5  3.7   CL  105  104  105   CO2  23  23  23   GLU  214*  148*  163*   BUN  133*  144*  138*   CREA  5.88*  6.35*  6.70*   CA  8.2*  8.0*  7.9*   MG   --   1.8   --    PHOS   --   3.8   --    ALB  1.8*   --    --    TBILI  1.3*   --    --    SGOT  47*   --    --    ALT  48   --    --        Signed: Montrell Rodriguez MD

## 2018-08-30 ENCOUNTER — APPOINTMENT (OUTPATIENT)
Dept: CT IMAGING | Age: 53
DRG: 637 | End: 2018-08-30
Attending: INTERNAL MEDICINE
Payer: COMMERCIAL

## 2018-08-30 LAB
ALBUMIN SERPL ELPH-MCNC: 2.1 G/DL (ref 2.9–4.4)
ALBUMIN SERPL-MCNC: 1.9 G/DL (ref 3.5–5)
ALBUMIN/GLOB SERPL: 0.5 {RATIO} (ref 1.1–2.2)
ALBUMIN/GLOB SERPL: 0.7 {RATIO} (ref 0.7–1.7)
ALP SERPL-CCNC: 275 U/L (ref 45–117)
ALPHA1 GLOB SERPL ELPH-MCNC: 0.4 G/DL (ref 0–0.4)
ALPHA2 GLOB SERPL ELPH-MCNC: 0.9 G/DL (ref 0.4–1)
ALT SERPL-CCNC: 40 U/L (ref 12–78)
ANA SER QL: NEGATIVE
ANION GAP SERPL CALC-SCNC: 12 MMOL/L (ref 5–15)
AST SERPL-CCNC: 40 U/L (ref 15–37)
B-GLOBULIN SERPL ELPH-MCNC: 0.9 G/DL (ref 0.7–1.3)
BILIRUB SERPL-MCNC: 0.9 MG/DL (ref 0.2–1)
BUN SERPL-MCNC: 110 MG/DL (ref 6–20)
BUN/CREAT SERPL: 22 (ref 12–20)
C-ANCA TITR SER IF: NORMAL TITER
CALCIUM SERPL-MCNC: 8.2 MG/DL (ref 8.5–10.1)
CHLORIDE SERPL-SCNC: 107 MMOL/L (ref 97–108)
CO2 SERPL-SCNC: 25 MMOL/L (ref 21–32)
CREAT SERPL-MCNC: 4.98 MG/DL (ref 0.55–1.02)
ERYTHROCYTE [DISTWIDTH] IN BLOOD BY AUTOMATED COUNT: 19.1 % (ref 11.5–14.5)
GAMMA GLOB SERPL ELPH-MCNC: 1 G/DL (ref 0.4–1.8)
GLOBULIN SER CALC-MCNC: 3.1 G/DL (ref 2.2–3.9)
GLOBULIN SER CALC-MCNC: 4 G/DL (ref 2–4)
GLUCOSE BLD STRIP.AUTO-MCNC: 209 MG/DL (ref 65–100)
GLUCOSE BLD STRIP.AUTO-MCNC: 210 MG/DL (ref 65–100)
GLUCOSE BLD STRIP.AUTO-MCNC: 253 MG/DL (ref 65–100)
GLUCOSE BLD STRIP.AUTO-MCNC: 270 MG/DL (ref 65–100)
GLUCOSE BLD STRIP.AUTO-MCNC: 79 MG/DL (ref 65–100)
GLUCOSE BLD STRIP.AUTO-MCNC: 79 MG/DL (ref 65–100)
GLUCOSE BLD STRIP.AUTO-MCNC: 91 MG/DL (ref 65–100)
GLUCOSE SERPL-MCNC: 48 MG/DL (ref 65–100)
HCT VFR BLD AUTO: 20.4 % (ref 35–47)
HGB BLD-MCNC: 7.3 G/DL (ref 11.5–16)
KAPPA LC FREE SER-MCNC: 128.2 MG/L (ref 3.3–19.4)
KAPPA LC FREE/LAMBDA FREE SER: 1.65 {RATIO} (ref 0.26–1.65)
LAMBDA LC FREE SERPL-MCNC: 77.5 MG/L (ref 5.7–26.3)
M PROTEIN SERPL ELPH-MCNC: ABNORMAL G/DL
MAGNESIUM SERPL-MCNC: 1.7 MG/DL (ref 1.6–2.4)
MCH RBC QN AUTO: 28.9 PG (ref 26–34)
MCHC RBC AUTO-ENTMCNC: 35.8 G/DL (ref 30–36.5)
MCV RBC AUTO: 80.6 FL (ref 80–99)
NRBC # BLD: 0.02 K/UL (ref 0–0.01)
NRBC BLD-RTO: 0.1 PER 100 WBC
P-ANCA ATYPICAL TITR SER IF: NORMAL TITER
P-ANCA TITR SER IF: NORMAL TITER
PHOSPHATE SERPL-MCNC: 2.9 MG/DL (ref 2.6–4.7)
PLATELET # BLD AUTO: 177 K/UL (ref 150–400)
PMV BLD AUTO: 12.7 FL (ref 8.9–12.9)
POTASSIUM SERPL-SCNC: 3.6 MMOL/L (ref 3.5–5.1)
PROT SERPL-MCNC: 5.2 G/DL (ref 6–8.5)
PROT SERPL-MCNC: 5.9 G/DL (ref 6.4–8.2)
RBC # BLD AUTO: 2.53 M/UL (ref 3.8–5.2)
SEE BELOW:, 164879: NORMAL
SERVICE CMNT-IMP: ABNORMAL
SERVICE CMNT-IMP: NORMAL
SODIUM SERPL-SCNC: 144 MMOL/L (ref 136–145)
WBC # BLD AUTO: 30 K/UL (ref 3.6–11)

## 2018-08-30 PROCEDURE — 85027 COMPLETE CBC AUTOMATED: CPT | Performed by: INTERNAL MEDICINE

## 2018-08-30 PROCEDURE — 74011000258 HC RX REV CODE- 258: Performed by: INTERNAL MEDICINE

## 2018-08-30 PROCEDURE — 74011250636 HC RX REV CODE- 250/636: Performed by: INTERNAL MEDICINE

## 2018-08-30 PROCEDURE — 80053 COMPREHEN METABOLIC PANEL: CPT | Performed by: INTERNAL MEDICINE

## 2018-08-30 PROCEDURE — 97530 THERAPEUTIC ACTIVITIES: CPT | Performed by: PHYSICAL THERAPIST

## 2018-08-30 PROCEDURE — 71250 CT THORAX DX C-: CPT

## 2018-08-30 PROCEDURE — 83735 ASSAY OF MAGNESIUM: CPT | Performed by: INTERNAL MEDICINE

## 2018-08-30 PROCEDURE — 74011250637 HC RX REV CODE- 250/637: Performed by: INTERNAL MEDICINE

## 2018-08-30 PROCEDURE — 82962 GLUCOSE BLOOD TEST: CPT

## 2018-08-30 PROCEDURE — 97535 SELF CARE MNGMENT TRAINING: CPT

## 2018-08-30 PROCEDURE — 74011000250 HC RX REV CODE- 250: Performed by: INTERNAL MEDICINE

## 2018-08-30 PROCEDURE — 36415 COLL VENOUS BLD VENIPUNCTURE: CPT | Performed by: INTERNAL MEDICINE

## 2018-08-30 PROCEDURE — 84100 ASSAY OF PHOSPHORUS: CPT | Performed by: INTERNAL MEDICINE

## 2018-08-30 PROCEDURE — 74011636637 HC RX REV CODE- 636/637: Performed by: INTERNAL MEDICINE

## 2018-08-30 PROCEDURE — 97116 GAIT TRAINING THERAPY: CPT | Performed by: PHYSICAL THERAPIST

## 2018-08-30 PROCEDURE — 65660000000 HC RM CCU STEPDOWN

## 2018-08-30 RX ORDER — INSULIN GLARGINE 100 [IU]/ML
30 INJECTION, SOLUTION SUBCUTANEOUS DAILY
Status: DISCONTINUED | OUTPATIENT
Start: 2018-08-30 | End: 2018-09-01

## 2018-08-30 RX ADMIN — INSULIN GLARGINE 30 UNITS: 100 INJECTION, SOLUTION SUBCUTANEOUS at 08:28

## 2018-08-30 RX ADMIN — INSULIN LISPRO 3 UNITS: 100 INJECTION, SOLUTION INTRAVENOUS; SUBCUTANEOUS at 21:03

## 2018-08-30 RX ADMIN — INSULIN LISPRO 3 UNITS: 100 INJECTION, SOLUTION INTRAVENOUS; SUBCUTANEOUS at 17:34

## 2018-08-30 RX ADMIN — CEFTRIAXONE 1 G: 1 INJECTION, POWDER, FOR SOLUTION INTRAMUSCULAR; INTRAVENOUS at 16:10

## 2018-08-30 RX ADMIN — INSULIN LISPRO 5 UNITS: 100 INJECTION, SOLUTION INTRAVENOUS; SUBCUTANEOUS at 12:34

## 2018-08-30 RX ADMIN — INSULIN LISPRO 3 UNITS: 100 INJECTION, SOLUTION INTRAVENOUS; SUBCUTANEOUS at 08:27

## 2018-08-30 RX ADMIN — NYSTATIN 500000 UNITS: 100000 SUSPENSION ORAL at 08:29

## 2018-08-30 RX ADMIN — NYSTATIN 500000 UNITS: 100000 SUSPENSION ORAL at 21:03

## 2018-08-30 RX ADMIN — DEXTROSE MONOHYDRATE 25 G: 25 INJECTION, SOLUTION INTRAVENOUS at 04:29

## 2018-08-30 RX ADMIN — HEPARIN SODIUM 5000 UNITS: 5000 INJECTION INTRAVENOUS; SUBCUTANEOUS at 03:08

## 2018-08-30 RX ADMIN — NYSTATIN 500000 UNITS: 100000 SUSPENSION ORAL at 17:35

## 2018-08-30 RX ADMIN — FAMOTIDINE 20 MG: 20 TABLET ORAL at 08:29

## 2018-08-30 RX ADMIN — HEPARIN SODIUM 5000 UNITS: 5000 INJECTION INTRAVENOUS; SUBCUTANEOUS at 16:10

## 2018-08-30 RX ADMIN — NYSTATIN 500000 UNITS: 100000 SUSPENSION ORAL at 12:35

## 2018-08-30 NOTE — PROGRESS NOTES
Problem: Falls - Risk of  Goal: *Absence of Falls  Document Stephen Fall Risk and appropriate interventions in the flowsheet.    Outcome: Progressing Towards Goal  Fall Risk Interventions:  Mobility Interventions: Bed/chair exit alarm, Patient to call before getting OOB         Medication Interventions: Bed/chair exit alarm, Patient to call before getting OOB, Teach patient to arise slowly    Elimination Interventions: Call light in reach, Patient to call for help with toileting needs, Toileting schedule/hourly rounds, Toilet paper/wipes in reach    History of Falls Interventions: Bed/chair exit alarm, Room close to nurse's station, Door open when patient unattended

## 2018-08-30 NOTE — PROGRESS NOTES
0229 - Blood glucose 48, per lab.    0312 - BS rechecked via glucometer resulted 79  0329 - BS rechecked via glucometer resulted 79  1030 -  Dr. Kandi Blevins notified, order received, to give amp of dextrose

## 2018-08-30 NOTE — PROGRESS NOTES
Physical Therapy Goals  Initiated 8/26/2018  1. Patient will move from supine to sit and sit to supine , scoot up and down and roll side to side in bed with independence within 7 day(s). 2.  Patient will transfer from bed to chair and chair to bed with supervision using the least restrictive device within 7 day(s). 3.  Patient will perform sit to stand with supervision/set-up within 7 day(s). 4.  Patient will ambulate with supervision/set-up for 656 feet with the least restrictive device within 7 day(s). 5.  Patient will ascend/descend 4 stairs with dual handrail(s) with supervision/set-up within 7 day(s). physical Therapy TREATMENT  Patient: Yvette Espinosa (8 y.o. female)  Date: 8/30/2018  Diagnosis: DKA (diabetic ketoacidoses) [facility] <principal problem not specified>       Precautions: Bed Alarm, Fall  Chart, physical therapy assessment, plan of care and goals were reviewed. ASSESSMENT: Patient reporting improved LE strength and noted to demonstrate improvements in endurance. Continues to require RW for safe ambulation. Patient able to perform transfers and gait training at [place] [id number] level. Will assess ambulation without assistive device next tx session. Patient lives alone and is completely independent and working full-time at baseline. Would benefit from short course of rehab to regain complete independence prior to returning to home    Progression toward goals:  [x]    Improving appropriately and progressing toward goals  []    Improving slowly and progressing toward goals  []    Not making progress toward goals and plan of care will be adjusted     PLAN:  Patient continues to benefit from skilled intervention to address the above impairments. Continue treatment per established plan of care. Discharge Recommendations:  Rehab  Further Equipment Recommendations for Discharge:  None anticipated     SUBJECTIVE:   Patient stated My legs are feeling stronger today.     OBJECTIVE DATA SUMMARY:   Critical Behavior:  Neurologic State: Alert, Appropriate for age, Eyes open spontaneously  Orientation Level: Oriented X4  Cognition: Appropriate decision making, Appropriate for age attention/concentration, Appropriate safety awareness, Follows commands  Safety/Judgement: Awareness of environment  Functional Mobility Training:  Bed Mobility:      deferred; patient sitting in chair upon arrival              Transfers:  Sit to Stand: Contact guard assistance  Stand to Sit: Contact guard assistance                             Balance:  Sitting: Intact  Standing: Impaired  Standing - Static: Good; Unsupported  Standing - Dynamic : Good (using RW for support)  Ambulation/Gait Training:  Distance (ft):  (125 feet x2 with brief standing rest)  Assistive Device: Walker, rolling;Gait belt  Ambulation - Level of Assistance: Contact guard assistance        Gait Abnormalities: Decreased step clearance (guarded)        Base of Support: Narrowed     Speed/Connie: Pace decreased (<100 feet/min) (minimally)      Gait is steady using RW for support; mildly decreased pace but guarded overall       Pain:   Patient without c/o pain       After treatment:   [x]    Patient left in no apparent distress sitting up in chair  []    Patient left in no apparent distress in bed  [x]    Call bell left within reach  [x]    Nursing notified  []    Caregiver present  [x]    Bed alarm activated    COMMUNICATION/COLLABORATION:   The patients plan of care was discussed with: Registered Nurse    Flavio Stoner, PT   Time Calculation: 24 mins

## 2018-08-30 NOTE — PROGRESS NOTES
Hospitalist Progress Note    NAME: Ira Mccauley   :  1965   MRN:  [id number]       Assessment / Plan:  Persistent leukocytosis, unclear etiology:  Has not had temperatures beyond 99  - CXR  with no pneumothorax or acute finding. Heart size normal.  - will get CT chest  - blood cultures if she has temperature  - E Coli UTI has cleared on repeat urine cutlure  Acute anemia, unclear etiology: likely multifactorial including acute illness and renal failure, hematuria  - transfused 1u pRBCs this admission  - haptoglobin slightly elevated, but labs drawn after transfused 1u  - iron, Z93 and folic acid levels WNL. Ferritin elevated, ?acute phase reactant  - does have hematuria but do not feel this is enough blood loss alone to explain anemia  DKA (resolved) in setting of uncontrolled insulin-dependent DM2 with nephropathy: HgA1c 8.8.  Now off insulin gtt. - hypoglycemic this morning, decreasing lantus  - con't holding amaryl for now, not sure if this will be restarted on discharge as she is already on insulin  - lispro scheduled with meals and per sliding scale  NEERAJ and acute encephalopathy with severe hyperkalemia in setting of dehydration/DKA and sepsis/UTI, present on admission: Cr continues to slowly improve but BUN remains very high.  HD stopped  due to rapid response for hypotension on HD.  - renal US  normal renal ultrasound examination  - urine cx >[id number] pansensitive E Coli, treated with rocephin  - appreciate nephrology assistance; additional labs sent including JUN negative, ANCA pending, normal kappa/lambda ratio. - off IV fluids, urine output has been good.  Con't to monitor Cr. Pseudohyponatremia, resolved  Abdominal pain, unclear etiology: CT A/P without contrast  negative      Code Status: Full  Surrogate Decision Maker: Gwen Motta [facility]   DVT Prophylaxis: heparin     Subjective:     Chief Complaint / Reason for Physician Visit  \"I'm feeling pretty good\". Discussed with RN events overnight. Review of Systems:  Symptom Y/N Comments  Symptom Y/N Comments   Fever/Chills n   Chest Pain n    Poor Appetite n   Edema n    Cough n   Abdominal Pain n    Sputum n   Joint Pain     SOB/GARLAND n   Pruritis/Rash     Nausea/vomit    Tolerating PT/OT     Diarrhea    Tolerating Diet     Constipation    Other       Could NOT obtain due to:      Objective:     VITALS:   Last 24hrs VS reviewed since prior progress note. Most recent are:  Patient Vitals for the past 24 hrs:   Temp Pulse Resp BP SpO2   08/30/18 1209 98.3 °F (36.8 °C) 87 18 116/53 99 %   08/30/18 0755 98.9 °F (37.2 °C) 87 16 106/60 97 %   08/30/18 0234 99.2 °F (37.3 °C) 96 16 121/61 99 %   08/29/18 2312 98.8 °F (37.1 °C) 86 16 133/76 98 %   08/29/18 1947 98.1 °F (36.7 °C) 96 16 118/64 97 %   08/29/18 1552 99 °F (37.2 °C) 100 18 125/67 99 %       Intake/Output Summary (Last 24 hours) at 08/30/18 1547  Last data filed at 08/29/18 1740   Gross per 24 hour   Intake              240 ml   Output              300 ml   Net              -60 ml        PHYSICAL EXAM:  General: WD, WN. Alert, cooperative, no acute distress    EENT:  EOMI. Anicteric sclerae. MMM  Resp:  CTA bilaterally, no wheezing or rales. No accessory muscle use  CV:  Regular rhythm,  No edema  GI:  Soft, Non distended, Non tender.  +Bowel sounds  Neurologic:  Alert and oriented X 3, normal speech,   Psych:   Fair insight. Not anxious nor agitated  Skin:  No rashes.   No jaundice    Reviewed most current lab test results and cultures  YES  Reviewed most current radiology test results   YES  Review and summation of old records today    NO  Reviewed patient's current orders and MAR    YES  PMH/SH reviewed - no change compared to H&P  ________________________________________________________________________  Care Plan discussed with:    Comments   Patient x    Family  x In room   RN x    Care Manager x    Consultant                        Multidiciplinary team rounds were held today with , nursing, pharmacist and clinical coordinator. Patient's plan of care was discussed; medications were reviewed and discharge planning was addressed. ________________________________________________________________________  Total NON critical care TIME:  25 Minutes    Total CRITICAL CARE TIME Spent:   Minutes non procedure based      Comments   >50% of visit spent in counseling and coordination of care x    ________________________________________________________________________  Jakub Rojas MD     Procedures: see electronic medical records for all procedures/Xrays and details which were not copied into this note but were reviewed prior to creation of Plan. LABS:  I reviewed today's most current labs and imaging studies.   Pertinent labs include:  Recent Labs      08/30/18 0229 08/29/18 0314  08/28/18   0116   WBC  30.0*  27.1*  30.9*   HGB  7.3*  7.4*  6.2*   HCT  20.4*  21.2*  17.4*   PLT  177  138*  105*     Recent Labs      08/30/18 0229 08/29/18 0314  08/28/18   0116   NA  144  139  138   K  3.6  3.6  3.5   CL  107  105  104   CO2  25  23  23   GLU  48*  214*  148*   BUN  110*  133*  144*   CREA  4.98*  5.88*  6.35*   CA  8.2*  8.2*  8.0*   MG  1.7   --   1.8   PHOS  2.9   --   3.8   ALB  1.9*  1.8*   --    TBILI  0.9  1.3*   --    SGOT  40*  47*   --    ALT  40  48   --        Signed: Jakub Rojas MD

## 2018-08-30 NOTE — PROGRESS NOTES
Problem: Self Care Deficits Care Plan (Adult)  Goal: *Acute Goals and Plan of Care (Insert Text)  Occupational Therapy Goals  Initiated 8/27/2018  1. Patient will perform grooming at the sink with supervision/set-up within 7 day(s). 2.  Patient will perform bathing at the sink with moderate assistance  within 7 day(s). 3.  Patient will perform lower body dressing with moderate assistance  within 7 day(s). 4.  Patient will perform toilet transfers with supervision/set-up within 7 day(s). 5.  Patient will perform all aspects of toileting with supervision/set-up within 7 day(s). 6.  Patient will participate in upper extremity therapeutic exercise/activities with supervision/set-up for 5 minutes within 7 day(s). 7.  Patient will utilize energy conservation techniques during functional activities with verbal cues within 7 day(s). Occupational Therapy TREATMENT  Patient: Edgar Oates (73 y.o. female)  Date: 8/30/2018  Diagnosis: [facility] <principal problem not specified>       Precautions: Bed Alarm, Fall  Chart, occupational therapy assessment, plan of care, and goals were reviewed. ASSESSMENT:  Nursing cleared for therapy. Received on toilet with PCT present. Provided education on RW placement with toilet transfer and grooming standing at sink, patient resistant to education. CGA for standing at sink secondary to posterior lean. Returned to chair. Encouraged simulated toileting task without RW however patient requesting to rest and visit with sister. Reports fatigue from PT. Provided education on need to increase tolerance for 3 hours of therapy a day for [facility], patient denies any concerns. Recommend with nursing patient to complete as able in order to maintain strength, endurance and independence: ADLs with CGA-supervision, OOB to chair 3x/day and mobilizing to the bathroom for toileting with CGA at RW level. Thank you for your assistance.      Recommend rehab as patient is below baseline. Anticipate good progression with skilled therapy service. Progression toward goals:  []       Improving appropriately and progressing toward goals  [x]       Improving slowly and progressing toward goals  []       Not making progress toward goals and plan of care will be adjusted     PLAN:  Patient continues to benefit from skilled intervention to address the above impairments. Continue treatment per established plan of care. Discharge Recommendations:  Rehab  Further Equipment Recommendations for Discharge:  TBD     SUBJECTIVE:   Patient stated I am going to [facility] Tuesday, I am not worried.     OBJECTIVE DATA SUMMARY:   Cognitive/Behavioral Status:                      Functional Mobility and Transfers for ADLs:  Bed Mobility:       Transfers:  Sit to Stand: Stand-by assistance  Functional Transfers  Toilet Transfer : Stand-by assistance       Balance:  Sitting: Intact  Standing: Impaired  Standing - Static: Good  Standing - Dynamic : Fair    ADL Intervention:    Provided education on RW placement with toilet transfer and grooming standing at sink, patient resistant to education. Not compliant to placing RW anterior with grooming. CGA for tactile support with standing at sink secondary to posterior lean. Amb back to chair with CGA. Recommend additional OT tasks with returning to bathroom with RW as patient reports she was not using RW prior to admission. Patient resistant, wanting to rest from walking with PT earlier this AM.   Provided education on need to increase tolerance for 3 hours of therapy a day for [facility], patient denies any concerns.      Grooming  Washing Hands: Contact guard assistance (mild posterior lean)    Toileting  Bladder Hygiene: Supervision/set-up (seated)         Activity Tolerance:   Impaired reporting fatigue from PT this AM.     After treatment:   [x] Patient left in no apparent distress sitting up in chair  [] Patient left in no apparent distress in bed  [x] Call bell left within reach  [x] Nursing notified  [] Caregiver present  [] Bed alarm activated    COMMUNICATION/COLLABORATION:   The patients plan of care was discussed with: Physical Therapist and Registered Nurse    Tatum Barron OT  Time Calculation: 9 mins

## 2018-08-30 NOTE — PROGRESS NOTES
Bedside and Verbal shift change report given to [facility] (oncoming nurse) by Sotero Dubois (offgoing nurse). Report included the following information Kardex, MAR and Recent Results.

## 2018-08-30 NOTE — PROGRESS NOTES
Bedside shift change report given to Thomas Peterson RN (oncoming nurse) by Patricia Benson (offgoing nurse). Report included the following information Kardex, Intake/Output, MAR and Recent Results.

## 2018-08-30 NOTE — PROGRESS NOTES
NAME: Jhonathan Corrales        :  1965        MRN:  [id number]        Assessment :    Plan:  --NEERAJ    S/p DKA  DM    Anemia  Thrombocytopenia    Leukocytosis  UTI on admission    proteniuria  hematuria --Did not tolerate HD due to drop in BP. Creatinine is slowly improving (6.35 to 5.9 to 5). BUN better. Hold HD for now, but keep shikha for now (likely out tomorrow). Good UO. Urine with 1.6 grams protein    Likely ATN. Also had been on NSAIDs prior to admission. pending serologic w/u just to make sure (JUN, ANCA, myeloma). LDH/haptoglobin not c/w hemolysis. Urine eos neg.    hgb 7.3 (s/p prbc's)               Subjective:     Chief Complaint:  oob in a chair. Oriented x 4. Not a great appetite and some nausea earlier. Still urinary urgency. No vomiting or diarrhea. Review of Systems:    Symptom Y/N Comments  Symptom Y/N Comments   Fever/Chills    Chest Pain n    Poor Appetite    Edema n    Cough    Abdominal Pain n    Sputum    Joint Pain     SOB/GARLAND n   Pruritis/Rash     Nausea/vomit y/n   Tolerating PT/OT     Diarrhea    Tolerating Diet     Constipation    Other       Could not obtain due to:      Objective:     VITALS:   Last 24hrs VS reviewed since prior progress note.  Most recent are:  Visit Vitals    /53 (BP Patient Position: Sitting)    Pulse 87    Temp 98.3 °F (36.8 °C)    Resp 18    Ht 5' 1\" (1.549 m)    Wt 53.7 kg (118 lb 4.8 oz)    SpO2 99%    BMI 22.35 kg/m2       Intake/Output Summary (Last 24 hours) at 18 1230  Last data filed at 18 1740   Gross per 24 hour   Intake              480 ml   Output              600 ml   Net             -120 ml      Telemetry Reviewed:     PHYSICAL EXAM:  General: NAD  CTA  abd soft  No edema      Lab Data Reviewed: (see below)    Medications Reviewed: (see below)    PMH/SH reviewed - no change compared to H&P  ________________________________________________________________________  Care Plan discussed with:  Patient y    Family      RN     Care Manager                    Consultant:          Comments   >50% of visit spent in counseling and coordination of care       ________________________________________________________________________  Raimundo Bentley MD     Procedures: see electronic medical records for all procedures/Xrays and details which  were not copied into this note but were reviewed prior to creation of Plan. LABS:  Recent Labs      08/30/18 0229 08/29/18 0314   WBC  30.0*  27.1*   HGB  7.3*  7.4*   HCT  20.4*  21.2*   PLT  177  138*     Recent Labs      08/30/18 0229 08/29/18 0314 08/28/18   0116   NA  144  139  138   K  3.6  3.6  3.5   CL  107  105  104   CO2  25  23  23   BUN  110*  133*  144*   CREA  4.98*  5.88*  6.35*   GLU  48*  214*  148*   CA  8.2*  8.2*  8.0*   MG  1.7   --   1.8   PHOS  2.9   --   3.8     Recent Labs      08/30/18 0229 08/29/18 0314   SGOT  40*  47*   AP  275*  284*   TP  5.9*  5.8*   ALB  1.9*  1.8*   GLOB  4.0  4.0     No results for input(s): INR, PTP, APTT in the last 72 hours. No lab exists for component: INREXT, INREXT   Recent Labs      08/29/18 0314   TIBC  173*   PSAT  74*   FERR  1139*      Lab Results   Component Value Date/Time    Folate 19.8 08/29/2018 03:14 AM      No results for input(s): PH, PCO2, PO2 in the last 72 hours. No results for input(s): CPK, CKMB in the last 72 hours.     No lab exists for component: TROPONINI  No components found for: Mikie [place]  Lab Results   Component Value Date/Time    Color YELLOW/STRAW 08/28/2018 09:26 AM    Appearance CLOUDY (A) 08/28/2018 09:26 AM    Specific gravity 1.010 08/28/2018 09:26 AM    pH (UA) 5.0 08/28/2018 09:26 AM    Protein TRACE (A) 08/28/2018 09:26 AM    Glucose NEGATIVE  08/28/2018 09:26 AM    Ketone NEGATIVE  08/28/2018 09:26 AM    Bilirubin NEGATIVE  08/28/2018 09:26 AM    Urobilinogen 1.0 08/28/2018 09:26 AM    Nitrites NEGATIVE  08/28/2018 09:26 AM    Leukocyte Esterase LARGE (A) 08/28/2018 09:26 AM    Epithelial cells MODERATE (A) 08/28/2018 09:26 AM    Bacteria NEGATIVE  08/28/2018 09:26 AM    WBC 20-50 08/28/2018 09:26 AM    RBC 5-10 08/28/2018 09:26 AM       MEDICATIONS:  Current Facility-Administered Medications   Medication Dose Route Frequency    insulin glargine (LANTUS) injection 30 Units  30 Units SubCUTAneous DAILY    0.9% sodium chloride infusion 250 mL  250 mL IntraVENous PRN    nystatin (MYCOSTATIN) 100,000 unit/mL oral suspension 500,000 Units  500,000 Units Oral QID    insulin lispro (HUMALOG) injection   SubCUTAneous AC&HS    glucose chewable tablet 16 g  4 Tab Oral PRN    dextrose (D50W) injection syrg 12.5-25 g  12.5-25 g IntraVENous PRN    glucagon (GLUCAGEN) injection 1 mg  1 mg IntraMUSCular PRN    famotidine (PEPCID) tablet 20 mg  20 mg Oral DAILY    acetaminophen (TYLENOL) tablet 650 mg  650 mg Oral Q4H PRN    prochlorperazine (COMPAZINE) with saline injection 10 mg  10 mg IntraVENous Q6H PRN    labetalol (NORMODYNE;TRANDATE) injection 10 mg  10 mg IntraVENous Q6H PRN    heparin (porcine) injection 5,000 Units  5,000 Units SubCUTAneous Q12H    cefTRIAXone (ROCEPHIN) 1 g in 0.9% sodium chloride (MBP/ADV) 50 mL  1 g IntraVENous Q24H    fentaNYL citrate (PF) injection 50 mcg  50 mcg IntraVENous Q4H PRN

## 2018-08-31 LAB
ALBUMIN SERPL-MCNC: 1.9 G/DL (ref 3.5–5)
ALBUMIN/GLOB SERPL: 0.4 {RATIO} (ref 1.1–2.2)
ALP SERPL-CCNC: 280 U/L (ref 45–117)
ALT SERPL-CCNC: 39 U/L (ref 12–78)
ANION GAP SERPL CALC-SCNC: 9 MMOL/L (ref 5–15)
AST SERPL-CCNC: 30 U/L (ref 15–37)
BASOPHILS # BLD: 0 K/UL (ref 0–0.1)
BASOPHILS NFR BLD: 0 % (ref 0–1)
BILIRUB SERPL-MCNC: 0.7 MG/DL (ref 0.2–1)
BUN SERPL-MCNC: 89 MG/DL (ref 6–20)
BUN/CREAT SERPL: 21 (ref 12–20)
CALCIUM SERPL-MCNC: 8.3 MG/DL (ref 8.5–10.1)
CHLORIDE SERPL-SCNC: 106 MMOL/L (ref 97–108)
CO2 SERPL-SCNC: 27 MMOL/L (ref 21–32)
CREAT SERPL-MCNC: 4.26 MG/DL (ref 0.55–1.02)
DIFFERENTIAL METHOD BLD: ABNORMAL
EOSINOPHIL # BLD: 0 K/UL (ref 0–0.4)
EOSINOPHIL NFR BLD: 0 % (ref 0–7)
ERYTHROCYTE [DISTWIDTH] IN BLOOD BY AUTOMATED COUNT: 18.8 % (ref 11.5–14.5)
GLOBULIN SER CALC-MCNC: 4.3 G/DL (ref 2–4)
GLUCOSE BLD STRIP.AUTO-MCNC: 197 MG/DL (ref 65–100)
GLUCOSE BLD STRIP.AUTO-MCNC: 228 MG/DL (ref 65–100)
GLUCOSE BLD STRIP.AUTO-MCNC: 236 MG/DL (ref 65–100)
GLUCOSE BLD STRIP.AUTO-MCNC: 249 MG/DL (ref 65–100)
GLUCOSE SERPL-MCNC: 186 MG/DL (ref 65–100)
HCT VFR BLD AUTO: 18.2 % (ref 35–47)
HGB BLD-MCNC: 6.3 G/DL (ref 11.5–16)
IMM GRANULOCYTES # BLD: 0 K/UL (ref 0–0.04)
IMM GRANULOCYTES NFR BLD AUTO: 0 % (ref 0–0.5)
LYMPHOCYTES # BLD: 0.9 K/UL (ref 0.8–3.5)
LYMPHOCYTES NFR BLD: 3 % (ref 12–49)
MCH RBC QN AUTO: 28.3 PG (ref 26–34)
MCHC RBC AUTO-ENTMCNC: 34.6 G/DL (ref 30–36.5)
MCV RBC AUTO: 81.6 FL (ref 80–99)
METAMYELOCYTES NFR BLD MANUAL: 5 %
MONOCYTES # BLD: 1.5 K/UL (ref 0–1)
MONOCYTES NFR BLD: 5 % (ref 5–13)
MYELOCYTES NFR BLD MANUAL: 4 %
NEUTS BAND NFR BLD MANUAL: 9 %
NEUTS SEG # BLD: 25.2 K/UL (ref 1.8–8)
NEUTS SEG NFR BLD: 74 % (ref 32–75)
NRBC # BLD: 0.05 K/UL (ref 0–0.01)
NRBC BLD-RTO: 0.2 PER 100 WBC
PLATELET # BLD AUTO: 240 K/UL (ref 150–400)
PMV BLD AUTO: 12.3 FL (ref 8.9–12.9)
POTASSIUM SERPL-SCNC: 4.1 MMOL/L (ref 3.5–5.1)
PROT SERPL-MCNC: 6.2 G/DL (ref 6.4–8.2)
RBC # BLD AUTO: 2.23 M/UL (ref 3.8–5.2)
RBC MORPH BLD: ABNORMAL
RBC MORPH BLD: ABNORMAL
SERVICE CMNT-IMP: ABNORMAL
SODIUM SERPL-SCNC: 142 MMOL/L (ref 136–145)
WBC # BLD AUTO: 30.3 K/UL (ref 3.6–11)

## 2018-08-31 PROCEDURE — P9016 RBC LEUKOCYTES REDUCED: HCPCS | Performed by: HOSPITALIST

## 2018-08-31 PROCEDURE — 74011636637 HC RX REV CODE- 636/637: Performed by: INTERNAL MEDICINE

## 2018-08-31 PROCEDURE — 65660000000 HC RM CCU STEPDOWN

## 2018-08-31 PROCEDURE — 36430 TRANSFUSION BLD/BLD COMPNT: CPT

## 2018-08-31 PROCEDURE — 74011250637 HC RX REV CODE- 250/637: Performed by: INTERNAL MEDICINE

## 2018-08-31 PROCEDURE — 80053 COMPREHEN METABOLIC PANEL: CPT | Performed by: INTERNAL MEDICINE

## 2018-08-31 PROCEDURE — 82962 GLUCOSE BLOOD TEST: CPT

## 2018-08-31 PROCEDURE — 85025 COMPLETE CBC W/AUTO DIFF WBC: CPT | Performed by: INTERNAL MEDICINE

## 2018-08-31 PROCEDURE — 97116 GAIT TRAINING THERAPY: CPT | Performed by: PHYSICAL THERAPIST

## 2018-08-31 PROCEDURE — 36415 COLL VENOUS BLD VENIPUNCTURE: CPT | Performed by: INTERNAL MEDICINE

## 2018-08-31 PROCEDURE — 74011250636 HC RX REV CODE- 250/636: Performed by: INTERNAL MEDICINE

## 2018-08-31 RX ORDER — SODIUM CHLORIDE 0.9 % (FLUSH) 0.9 %
5-10 SYRINGE (ML) INJECTION AS NEEDED
Status: DISCONTINUED | OUTPATIENT
Start: 2018-08-31 | End: 2018-09-06 | Stop reason: HOSPADM

## 2018-08-31 RX ORDER — SODIUM CHLORIDE 0.9 % (FLUSH) 0.9 %
5-10 SYRINGE (ML) INJECTION EVERY 8 HOURS
Status: DISCONTINUED | OUTPATIENT
Start: 2018-08-31 | End: 2018-09-06 | Stop reason: HOSPADM

## 2018-08-31 RX ORDER — SODIUM CHLORIDE 9 MG/ML
250 INJECTION, SOLUTION INTRAVENOUS AS NEEDED
Status: DISCONTINUED | OUTPATIENT
Start: 2018-08-31 | End: 2018-09-02 | Stop reason: ALTCHOICE

## 2018-08-31 RX ADMIN — Medication 10 ML: at 10:04

## 2018-08-31 RX ADMIN — NYSTATIN 500000 UNITS: 100000 SUSPENSION ORAL at 09:25

## 2018-08-31 RX ADMIN — NYSTATIN 500000 UNITS: 100000 SUSPENSION ORAL at 12:24

## 2018-08-31 RX ADMIN — INSULIN LISPRO 3 UNITS: 100 INJECTION, SOLUTION INTRAVENOUS; SUBCUTANEOUS at 12:24

## 2018-08-31 RX ADMIN — INSULIN GLARGINE 30 UNITS: 100 INJECTION, SOLUTION SUBCUTANEOUS at 09:25

## 2018-08-31 RX ADMIN — FAMOTIDINE 20 MG: 20 TABLET ORAL at 09:25

## 2018-08-31 RX ADMIN — Medication 10 ML: at 21:20

## 2018-08-31 RX ADMIN — HEPARIN SODIUM 5000 UNITS: 5000 INJECTION INTRAVENOUS; SUBCUTANEOUS at 16:06

## 2018-08-31 RX ADMIN — NYSTATIN 500000 UNITS: 100000 SUSPENSION ORAL at 17:31

## 2018-08-31 RX ADMIN — INSULIN LISPRO 2 UNITS: 100 INJECTION, SOLUTION INTRAVENOUS; SUBCUTANEOUS at 21:20

## 2018-08-31 RX ADMIN — ACETAMINOPHEN 650 MG: 325 TABLET ORAL at 02:06

## 2018-08-31 RX ADMIN — Medication 20 ML: at 08:41

## 2018-08-31 RX ADMIN — INSULIN LISPRO 3 UNITS: 100 INJECTION, SOLUTION INTRAVENOUS; SUBCUTANEOUS at 09:24

## 2018-08-31 RX ADMIN — INSULIN LISPRO 2 UNITS: 100 INJECTION, SOLUTION INTRAVENOUS; SUBCUTANEOUS at 17:31

## 2018-08-31 RX ADMIN — HEPARIN SODIUM 5000 UNITS: 5000 INJECTION INTRAVENOUS; SUBCUTANEOUS at 02:22

## 2018-08-31 NOTE — DIABETES MGMT
DTC Consult Note    Recommendations/ Comments: Noted pt Lantus dose decreased 8/30/18 due to fasting hypoglycemia, however fasting BG now >200mg/dL. If appropriate, please consider increasing Lantus to 35 units daily. Also may consider adding prandial insulin, Humalog 3 units ac tid, to aid in daytime glucose control. Current hospital DM medication:   Lispro correction - normal sensitivity, lispro with meals 4 units, Lantus 30 units       Chart reviewed and initial evaluation complete on Anayeli Troy. Patient is a 45 y.o. female with known diabetes on Basaglar (glargine) 34 units daily, and Glimepiride  1 mg acb at home. A1c:   Lab Results   Component Value Date/Time    Hemoglobin A1c 8.8 (H) 08/25/2018 10:36 AM       Recent Glucose Results:   Lab Results   Component Value Date/Time     (H) 08/31/2018 01:50 AM    GLUCPOC 228 (H) 08/31/2018 08:13 AM    GLUCPOC 253 (H) 08/30/2018 08:36 PM    GLUCPOC 210 (H) 08/30/2018 04:18 PM        Lab Results   Component Value Date/Time    Creatinine 4.26 (H) 08/31/2018 01:50 AM     Estimated Creatinine Clearance: 11.4 mL/min (based on Cr of 4.26). Active Orders   Diet    DIET DIABETIC CONSISTENT CARB Regular        PO intake:   Patient Vitals for the past 72 hrs:   % Diet Eaten   08/30/18 1734 75 %   08/30/18 1500 75 %   08/30/18 0828 50 %   08/29/18 1740 60 %   08/29/18 1500 50 %   08/29/18 1318 50 %   08/29/18 0751 25 %   08/28/18 1700 50 %   08/28/18 1344 25 %       Will continue to follow as needed. Thank you.     Red Dense, RD, [address]

## 2018-08-31 NOTE — PROGRESS NOTES
NAME: Jennifer Bruce        :  1965        MRN:  [id number]        Assessment :    Plan:  --NEERAJ    S/p DKA  DM    Anemia  Thrombocytopenia    Leukocytosis  UTI on admission    proteniuria  hematuria --Creatinine is slowly improving (5 to 4.3). Good UO. Creatinine was 1.2 on 18    Remove shikha (attempted HD on , but BP dropped and HD was stopped after 20 minutes; no other HD needed/attempted). Urine originally with 1.6 grams protein    Likely ATN. Also had been on NSAIDs prior to admission. negative serologic (JUN, ANCA, myeloma). LDH/haptoglobin not c/w hemolysis. Urine eos neg.    hgb 6.3 (prn prbc's - for a unit today - we discussed the need, risks/benefits and signed consent); persistent high WBC               Subjective:     Chief Complaint:  oob in a chair. Oriented x 4. Not a great appetite. less urinary urgency. No vomiting or diarrhea. We discussed the above. Review of Systems:    Symptom Y/N Comments  Symptom Y/N Comments   Fever/Chills    Chest Pain n    Poor Appetite    Edema n    Cough    Abdominal Pain n    Sputum    Joint Pain     SOB/GARLAND n   Pruritis/Rash     Nausea/vomit n   Tolerating PT/OT     Diarrhea    Tolerating Diet     Constipation    Other       Could not obtain due to:      Objective:     VITALS:   Last 24hrs VS reviewed since prior progress note.  Most recent are:  Visit Vitals    /51    Pulse (!) 101    Temp 99.2 °F (37.3 °C)    Resp 16    Ht 5' 1\" (1.549 m)    Wt 54.5 kg (120 lb 3.2 oz)    SpO2 97%    BMI 22.71 kg/m2       Intake/Output Summary (Last 24 hours) at 18 0640  Last data filed at 18 2122   Gross per 24 hour   Intake              680 ml   Output              625 ml   Net               55 ml      Telemetry Reviewed:     PHYSICAL EXAM:  General: NAD  CTA  abd soft  No edema      Lab Data Reviewed: (see below)    Medications Reviewed: (see below)    PMH/SH reviewed - no change compared to H&P  ________________________________________________________________________  Care Plan discussed with:  Patient y    [facility]:          Comments   >50% of visit spent in counseling and coordination of care       ________________________________________________________________________  Severo Holm, MD     Procedures: see electronic medical records for all procedures/Xrays and details which  were not copied into this note but were reviewed prior to creation of Plan. LABS:  Recent Labs      08/31/18 0150 08/30/18 0229   WBC  30.3*  30.0*   HGB  6.3*  7.3*   HCT  18.2*  20.4*   PLT  240  177     Recent Labs      08/31/18 0150 08/30/18 0229 08/29/18 0314   NA  142  144  139   K  4.1  3.6  3.6   CL  106  107  105   CO2  27  25  23   BUN  89*  110*  133*   CREA  4.26*  4.98*  5.88*   GLU  186*  48*  214*   CA  8.3*  8.2*  8.2*   MG   --   1.7   --    PHOS   --   2.9   --      Recent Labs      08/31/18 0150 08/30/18 0229 08/29/18 0314   SGOT  30  40*  47*   AP  280*  275*  284*   TP  6.2*  5.9*  5.8*   ALB  1.9*  1.9*  1.8*   GLOB  4.3*  4.0  4.0     No results for input(s): INR, PTP, APTT in the last 72 hours. No lab exists for component: INREXT, INREXT   Recent Labs      08/29/18 0314   TIBC  173*   PSAT  74*   FERR  1139*      Lab Results   Component Value Date/Time    Folate 19.8 08/29/2018 03:14 AM      No results for input(s): PH, PCO2, PO2 in the last 72 hours. No results for input(s): CPK, CKMB in the last 72 hours.     No lab exists for component: TROPONINI  No components found for: [place]  Lab Results   Component Value Date/Time    Color YELLOW/STRAW 08/28/2018 09:26 AM    Appearance CLOUDY (A) 08/28/2018 09:26 AM    Specific gravity 1.010 08/28/2018 09:26 AM    pH (UA) 5.0 08/28/2018 09:26 AM    Protein TRACE (A) 08/28/2018 09:26 AM    Glucose NEGATIVE  08/28/2018 09:26 AM Ketone NEGATIVE  08/28/2018 09:26 AM    Bilirubin NEGATIVE  08/28/2018 09:26 AM    Urobilinogen 1.0 08/28/2018 09:26 AM    Nitrites NEGATIVE  08/28/2018 09:26 AM    Leukocyte Esterase LARGE (A) 08/28/2018 09:26 AM    Epithelial cells MODERATE (A) 08/28/2018 09:26 AM    Bacteria NEGATIVE  08/28/2018 09:26 AM    WBC 20-50 08/28/2018 09:26 AM    RBC 5-10 08/28/2018 09:26 AM       MEDICATIONS:  Current Facility-Administered Medications   Medication Dose Route Frequency    0.9% sodium chloride infusion 250 mL  250 mL IntraVENous PRN    insulin glargine (LANTUS) injection 30 Units  30 Units SubCUTAneous DAILY    0.9% sodium chloride infusion 250 mL  250 mL IntraVENous PRN    nystatin (MYCOSTATIN) 100,000 unit/mL oral suspension 500,000 Units  500,000 Units Oral QID    insulin lispro (HUMALOG) injection   SubCUTAneous AC&HS    glucose chewable tablet 16 g  4 Tab Oral PRN    dextrose (D50W) injection syrg 12.5-25 g  12.5-25 g IntraVENous PRN    glucagon (GLUCAGEN) injection 1 mg  1 mg IntraMUSCular PRN    famotidine (PEPCID) tablet 20 mg  20 mg Oral DAILY    acetaminophen (TYLENOL) tablet 650 mg  650 mg Oral Q4H PRN    prochlorperazine (COMPAZINE) with saline injection 10 mg  10 mg IntraVENous Q6H PRN    labetalol (NORMODYNE;TRANDATE) injection 10 mg  10 mg IntraVENous Q6H PRN    heparin (porcine) injection 5,000 Units  5,000 Units SubCUTAneous Q12H    fentaNYL citrate (PF) injection 50 mcg  50 mcg IntraVENous Q4H PRN

## 2018-08-31 NOTE — PROGRESS NOTES
Initial Nutrition Assessment:    INTERVENTIONS/RECOMMENDATIONS:   · Meals/Snacks: General/healthful diet: Consistent carbohydrate diet    ASSESSMENT:   Patient medically noted for DKA, acute renal failure, sepsis, and UTI. Receiving a consistent carbohydrate diet which is appropriate to help optimize BG control. BG still elevated. Patient reports her appetite is picking up. PO 50-75% of meals over the past 2 days per flowsheets. Using menu and room service. Noted to decline nutrition education from DTC. Patient without questions or concerns about diet today. Encouraged intake of meals. Diet Order: Consistent carb  % Eaten:  Patient Vitals for the past 72 hrs:   % Diet Eaten   08/30/18 1734 75 %   08/30/18 1500 75 %   08/30/18 0828 50 %   08/29/18 1740 60 %   08/29/18 1500 50 %   08/29/18 1318 50 %   08/29/18 0751 25 %   08/28/18 1700 50 %   08/28/18 1344 25 %       Pertinent Medications: [x]Reviewed []Other: Famotidine, Lantus, Humalog  Pertinent Labs: [x]Reviewed []Other: -[phone], Hgb 6.3  Food Allergies: [x]None []Other   Last BM: 8/30   [x]Active     []Hyperactive  []Hypoactive       [] Absent BS  Skin:    [x] Intact   [] Incision  [] Breakdown: [] Edema []Other:    Anthropometrics:   Height: 5' 1\" (154.9 cm) Weight: 54.5 kg (120 lb 3.2 oz)   IBW (%IBW):   ( ) UBW (%UBW):   (  %)   Last Weight Metrics:  Weight Loss Metrics 8/31/2018 7/30/2018 6/22/2018 5/23/2018 5/21/2018 5/18/2018 4/27/2018   Today's Wt 120 lb 3.2 oz 106 lb 103 lb 99 lb 102 lb 102 lb 100 lb 6.4 oz   BMI 22.71 kg/m2 20.03 kg/m2 19.46 kg/m2 18.71 kg/m2 19.27 kg/m2 19.27 kg/m2 18.97 kg/m2       BMI: Body mass index is 22.71 kg/(m^2). This BMI is indicative of:   []Underweight    [x]Normal    []Overweight    [] Obesity   [] Extreme Obesity (BMI>40)     Estimated Nutrition Needs (Based on):   1414 Kcals/day (BMR (1087) x 1. 3AF) , 44 g (-55g (0.8-1.0 g/kg bw)) Protein  Carbohydrate:  At Least 130 g/day  Fluids: 1400 mL/day (1ml/kcal)    Pt expected to meet estimated nutrient needs: [x]Yes []No    NUTRITION DIAGNOSES:   Problem:  Altered nutrition-related lab values      Etiology: related to DM     Signs/Symptoms: as evidenced by       NUTRITION INTERVENTIONS:  Meals/Snacks: General/healthful diet                  GOAL:   PO intake >70% of meals and BG trend <180 next 5-7 days    LEARNING NEEDS (Diet, Food/Nutrient-Drug Interaction):    [x] None Identified   [] Identified and Education Provided/Documented   [] Identified and Pt declined/was not appropriate     Cultural, Adventism, OR Ethnic Dietary Needs:    [x] None Identified   [] Identified and Addressed     [x] Interdisciplinary Care Plan Reviewed/Documented    [x] Discharge Planning:  Consistent carbohydrate diet     MONITORING /EVALUATION:   Food/Nutrient Intake Outcomes:  Total energy intake  Physical Signs/Symptoms Outcomes: Weight/weight change    NUTRITION RISK:    [] High              [] Moderate           [x]  Low  []  Minimal/Uncompromised    PT SEEN FOR:    []  MD Consult: []Calorie Count      []Diabetic Diet Education        []Diet Education     []Electrolyte Management     []General Nutrition Management and Supplements     []Management of Tube Feeding     []TPN Recommendations    []  RN Referral:  []MST score >=2     []Enteral/Parenteral Nutrition PTA     []Pregnant: Gestational DM or Multigestation     []Pressure Ulcer/Wound Care needs        []  Low BMI  [x]  SONAM Pacheco [facility]  Pager [phone]                 Weekend Pager [phone]

## 2018-08-31 NOTE — PROGRESS NOTES
Hospitalist Progress Note    NAME: Verenice Feldman   :  1965   MRN:  [id number]       Assessment / Plan:  Persistent leukocytosis, unclear etiology:  Temp 99.2 overnight but nursing did not send blood cultures. WBC remains elevated with bandemia.  - CT chest  with no evidence of acute process in the chest  - CT A/P  with no significant gastric distention. No bowel obstruction. No acute finding is noted in the abdomen or pelvis within limits of unenhanced technique. - Arina Virginia catheter to be removed today - ?if this is source. Will monitor WBC now that catheter has been removed. - competed rocephin for E Coli UTI, has cleared on repeat urine cutlure  - blood cultures tonight at [facility] (seems to be highest at night) and if she has temperature  Acute anemia, unclear etiology: likely multifactorial including acute illness and renal failure, hematuria. Hg lower today  - transfusing 1 additional unit today (already transfused 1u pRBCs this admission)  - haptoglobin slightly elevated  - iron, S31 and folic acid levels WNL.  Ferritin elevated, ?acute phase reactant  - does have hematuria but do not feel this is enough blood loss alone to explain anemia  DKA (resolved) in setting of uncontrolled insulin-dependent DM2 with nephropathy: HgA1c 8.8.  Now off insulin gtt. - glucose now high on reduced dose lantus. Has had both hypoglycemia and hyperglycemia.   - con't holding amaryl for now, not sure if this will be restarted on discharge as she is already on insulin  - lispro scheduled with meals and per sliding scale  NEERAJ and acute encephalopathy with severe hyperkalemia in setting of dehydration/DKA and sepsis/UTI, present on admission: Cr continues to improve.  HD stopped  due to rapid response for hypotension on HD.    - renal US  normal renal ultrasound examination  - urine cx >[id number] pansensitive E Coli, treated with rocephin  - appreciate nephrology assistance; additional labs sent including JUN negative, ANCA pending, normal kappa/lambda ratio. - remove Lawrence today as above  Pseudohyponatremia, resolved  Abdominal pain, unclear etiology: CT A/P without contrast 8/24 negative      Code Status: Full  Surrogate Decision Maker: Yaz Hanna [facility]   DVT Prophylaxis: heparin     Subjective:     Chief Complaint / Reason for Physician Visit  No acute complaints; eating better. Discussed with RN events overnight. Review of Systems:  Symptom Y/N Comments  Symptom Y/N Comments   Fever/Chills n   Chest Pain n    Poor Appetite n   Edema n    Cough n   Abdominal Pain n    Sputum n   Joint Pain     SOB/GARLAND n   Pruritis/Rash     Nausea/vomit    Tolerating PT/OT     Diarrhea    Tolerating Diet     Constipation    Other       Could NOT obtain due to:      Objective:     VITALS:   Last 24hrs VS reviewed since prior progress note. Most recent are:  Patient Vitals for the past 24 hrs:   Temp Pulse Resp BP SpO2   08/31/18 0806 97.9 °F (36.6 °C) 88 17 120/55 99 %   08/31/18 0248 99.2 °F (37.3 °C) (!) 101 16 118/51 97 %   08/30/18 2306 99.1 °F (37.3 °C) 89 16 125/62 100 %   08/30/18 1936 97.9 °F (36.6 °C) 87 18 108/50 100 %   08/30/18 1209 98.3 °F (36.8 °C) 87 18 116/53 99 %       Intake/Output Summary (Last 24 hours) at 08/31/18 1028  Last data filed at 08/30/18 2122   Gross per 24 hour   Intake              480 ml   Output              625 ml   Net             -145 ml        PHYSICAL EXAM:  General: WD, WN. Alert, cooperative, no acute distress    EENT:  EOMI. Anicteric sclerae. MMM  Resp:  CTA bilaterally, no wheezing or rales. No accessory muscle use  CV:  Regular rhythm,  No edema  GI:  Soft, Non distended, Non tender.  +Bowel sounds  Neurologic:  Alert and oriented X 3, normal speech,   Psych:   Fair insight. Not anxious nor agitated  Skin:  No rashes.   No jaundice    Reviewed most current lab test results and cultures  YES  Reviewed most current radiology test results   YES  Review and summation of old records today    NO  Reviewed patient's current orders and MAR    YES  PMH/SH reviewed - no change compared to H&P  ________________________________________________________________________  Care Plan discussed with:    Comments   Patient x    Family  x Cousin at bedside   RN x    Care Manager x    Consultant                        Multidiciplinary team rounds were held today with , nursing, pharmacist and clinical coordinator. Patient's plan of care was discussed; medications were reviewed and discharge planning was addressed. ________________________________________________________________________  Total NON critical care TIME:  25 Minutes    Total CRITICAL CARE TIME Spent:   Minutes non procedure based      Comments   >50% of visit spent in counseling and coordination of care x    ________________________________________________________________________  Юлия Huitron MD     Procedures: see electronic medical records for all procedures/Xrays and details which were not copied into this note but were reviewed prior to creation of Plan. LABS:  I reviewed today's most current labs and imaging studies.   Pertinent labs include:  Recent Labs      08/31/18   0150  08/30/18   0229  08/29/18   0314   WBC  30.3*  30.0*  27.1*   HGB  6.3*  7.3*  7.4*   HCT  18.2*  20.4*  21.2*   PLT  240  177  138*     Recent Labs      08/31/18   0150  08/30/18   0229  08/29/18   0314   NA  142  144  139   K  4.1  3.6  3.6   CL  106  107  105   CO2  27  25  23   GLU  186*  48*  214*   BUN  89*  110*  133*   CREA  4.26*  4.98*  5.88*   CA  8.3*  8.2*  8.2*   MG   --   1.7   --    PHOS   --   2.9   --    ALB  1.9*  1.9*  1.8*   TBILI  0.7  0.9  1.3*   SGOT  30  40*  47*   ALT  39  40  48       Signed: Юлия Huitron MD

## 2018-08-31 NOTE — PROGRESS NOTES
Refilled.  VIDAL Mary RN     Unable to start IVP #20 for blood transfusion, Dr. Vanessa Padron notified.  new orders received

## 2018-08-31 NOTE — PROGRESS NOTES
[facility] has accepted the pt but will need to obtain Ashlyn Brown on Tuesday.  D/C plans discussed with Dr Nargis Flores who does not anticipate d/c until next week. Due to pt's new insurance starting 9/1/18 and Monday being a holiday, [place] will not be initiated until Tuesday. Updates ecin'd to [facility]    The family called me and the pt has the new [facility] [employer].   Mariluz Goins from [facility] will pick it up today

## 2018-08-31 NOTE — PROGRESS NOTES
Spiritual Care Assessment/Progress Note  [facility]      NAME: Omar Hussein      MRN: [id number]  AGE: 45 y.o.  SEX: female  Latter-day Affiliation: No preference   Language: English     8/31/2018     Total Time (in minutes): 10     Spiritual Assessment begun in [facility] 3 MED TELE through conversation with:         [x]Patient        [x] Family    [] Friend(s)        Reason for Consult: Initial/Spiritual assessment, patient floor     Spiritual beliefs: (Please include comment if needed)     [x] Identifies with a joshua tradition:         [x] Supported by a joshua community:            [] Claims no spiritual orientation:           [] Seeking spiritual identity:                [] Adheres to an individual form of spirituality:           [] Not able to assess:                           Identified resources for coping:      [x] Prayer                               [] Music                  [] Guided Imagery     [x] Family/friends                 [] Pet visits     [] Devotional reading                         [] Unknown     [x] Other: Anglican Family                                             Interventions offered during this visit: (See comments for more details)    Patient Interventions: Initial/Spiritual assessment, patient floor, Catharsis/review of pertinent events in supportive environment, Prayer (actual), Iconic (affirming the presence of God/Higher Power), Coping skills reviewed/reinforced, Affirmation of joshua           Plan of Care:     [x] Support spiritual and/or cultural needs    [] Support AMD and/or advance care planning process      [] Support grieving process   [] Coordinate Rites and/or Rituals    [] Coordination with community clergy   [] No spiritual needs identified at this time   [] Detailed Plan of Care below (See Comments)  [] Make referral to Music Therapy  [] Make referral to Pet Therapy     [] Make referral to Addiction services  [] Make referral to [facility]  [] Make referral to Spiritual Care Partner  [] No future visits requested        [x] Follow up visits as needed   Sitting up at bedside, good eye contact, smiling, friendly. Says she has been through a great deal in the recent past, but feels that she has reached a turning point; feels her life is finally getting back on track. These events have resulted in connecting more deeply to her joshua and her joshua community. Finds strength in God, in prayer, and in her Gnosticist family. Patient requested prayer and a prayer was offered. Patient appeared comforted as a result of this visit and prayer and expressed gratitude for this prayer and visit. Documented by QUINTIN Cabello on behalf of Kb Suh.

## 2018-08-31 NOTE — PROGRESS NOTES
Physical Therapy Goals  Initiated 8/26/2018  1. Patient will move from supine to sit and sit to supine , scoot up and down and roll side to side in bed with independence within 7 day(s). 2.  Patient will transfer from bed to chair and chair to bed with supervision using the least restrictive device within 7 day(s). 3.  Patient will perform sit to stand with supervision/set-up within 7 day(s). 4.  Patient will ambulate with supervision/set-up for 656 feet with the least restrictive device within 7 day(s). 5.  Patient will ascend/descend 4 stairs with dual handrail(s) with supervision/set-up within 7 day(s). physical Therapy TREATMENT  Patient: Jose C Paul (6 y.o. female)  Date: 8/31/2018  Diagnosis: DKA (diabetic ketoacidoses) [facility] <principal problem not specified>       Precautions: Bed Alarm, Fall  Chart, physical therapy assessment, plan of care and goals were reviewed. ASSESSMENT: Patient's sister present during tx session today and patient demonstrating child-like behaviors. Patient agreeable to therapy and actively participated throughout tx session. Patient able to complete transfers with CGA today. Patient ambulated in halls without AD today- gait is slow and guarded demonstrating very narrowed base of support and absent arm swing; several mild balance checks but no overt LOB noted. Patient progressing well overall. Recommend short course of rehab vs HHPT with 24 hour supervision initially    Progression toward goals:  []    Improving appropriately and progressing toward goals  [x]    Improving slowly and progressing toward goals  []    Not making progress toward goals and plan of care will be adjusted     PLAN:  Patient continues to benefit from skilled intervention to address the above impairments. Continue treatment per established plan of care.   Discharge Recommendations:  Rehab vs home with HHPT with 24 hour supervision  Further Equipment Recommendations for Discharge:  None anticipated     SUBJECTIVE:   Patient stated I'm doing okay today.     OBJECTIVE DATA SUMMARY:   Critical Behavior:  Neurologic State: Alert, Appropriate for age  Orientation Level: Oriented X4  Cognition: Appropriate for age attention/concentration, Appropriate safety awareness, Follows commands  Safety/Judgement: Awareness of environment  Functional Mobility Training:  Bed Mobility:  Rolling: Supervision  Supine to Sit: Supervision     Scooting: Supervision        Transfers:  Sit to Stand: Contact guard assistance  Stand to Sit: Contact guard assistance        Bed to Chair: Contact guard assistance                    Balance:  Sitting: Intact  Standing: Impaired  Standing - Static: Good  Standing - Dynamic : Fair (without use of AD)  Ambulation/Gait Training:     Assistive Device: Gait belt  Ambulation - Level of Assistance: Contact guard assistance        Gait Abnormalities: Decreased step clearance; Altered arm swing (guarded)        Base of Support: Narrowed     Speed/Connie: Pace decreased (<100 feet/min); Slow      Patient demonstrating slow and guarded gait with absent arm swing and very narrowed base of support; occasional increased extraneous trunk movement noted but no overt LOB         Therapeutic Exercises:   Patient performing balance exercises including standing on one foot in which she required min A to maintain balance; tandem standing, standing with eye closed and retrieving pen from floor in which patient required CGA.    Pain:   Patient without c/o pain    After treatment:   [x]    Patient left in no apparent distress sitting up in chair  []    Patient left in no apparent distress in bed  [x]    Call bell left within reach  [x]    Nursing notified  [x]    Caregiver present  [x]    Bed alarm activated    COMMUNICATION/COLLABORATION:   The patients plan of care was discussed with: Occupational Therapist and Registered Nurse    Ana Paula Boucher PT   Time Calculation: 15 mins

## 2018-09-01 LAB
ABO + RH BLD: NORMAL
ANION GAP SERPL CALC-SCNC: 7 MMOL/L (ref 5–15)
BLD PROD TYP BPU: NORMAL
BLD PROD TYP BPU: NORMAL
BLOOD GROUP ANTIBODIES SERPL: NORMAL
BPU ID: NORMAL
BPU ID: NORMAL
BUN SERPL-MCNC: 70 MG/DL (ref 6–20)
BUN/CREAT SERPL: 20 (ref 12–20)
CALCIUM SERPL-MCNC: 8.6 MG/DL (ref 8.5–10.1)
CHLORIDE SERPL-SCNC: 104 MMOL/L (ref 97–108)
CO2 SERPL-SCNC: 28 MMOL/L (ref 21–32)
CREAT SERPL-MCNC: 3.58 MG/DL (ref 0.55–1.02)
CROSSMATCH RESULT,%XM: NORMAL
CROSSMATCH RESULT,%XM: NORMAL
ERYTHROCYTE [DISTWIDTH] IN BLOOD BY AUTOMATED COUNT: 17.9 % (ref 11.5–14.5)
GLUCOSE BLD STRIP.AUTO-MCNC: 166 MG/DL (ref 65–100)
GLUCOSE BLD STRIP.AUTO-MCNC: 245 MG/DL (ref 65–100)
GLUCOSE BLD STRIP.AUTO-MCNC: 250 MG/DL (ref 65–100)
GLUCOSE BLD STRIP.AUTO-MCNC: 267 MG/DL (ref 65–100)
GLUCOSE BLD STRIP.AUTO-MCNC: 537 MG/DL (ref 65–100)
GLUCOSE SERPL-MCNC: 117 MG/DL (ref 65–100)
HCT VFR BLD AUTO: 24.2 % (ref 35–47)
HGB BLD-MCNC: 8.2 G/DL (ref 11.5–16)
MCH RBC QN AUTO: 27.9 PG (ref 26–34)
MCHC RBC AUTO-ENTMCNC: 33.9 G/DL (ref 30–36.5)
MCV RBC AUTO: 82.3 FL (ref 80–99)
NRBC # BLD: 0.04 K/UL (ref 0–0.01)
NRBC BLD-RTO: 0.1 PER 100 WBC
PLATELET # BLD AUTO: 285 K/UL (ref 150–400)
PMV BLD AUTO: 12.1 FL (ref 8.9–12.9)
POTASSIUM SERPL-SCNC: 4.3 MMOL/L (ref 3.5–5.1)
RBC # BLD AUTO: 2.94 M/UL (ref 3.8–5.2)
SERVICE CMNT-IMP: ABNORMAL
SODIUM SERPL-SCNC: 139 MMOL/L (ref 136–145)
SPECIMEN EXP DATE BLD: NORMAL
STATUS OF UNIT,%ST: NORMAL
STATUS OF UNIT,%ST: NORMAL
UNIT DIVISION, %UDIV: 0
UNIT DIVISION, %UDIV: 0
WBC # BLD AUTO: 28.7 K/UL (ref 3.6–11)

## 2018-09-01 PROCEDURE — 74011636637 HC RX REV CODE- 636/637: Performed by: INTERNAL MEDICINE

## 2018-09-01 PROCEDURE — 77030033269 HC SLV COMPR SCD KNE2 CARD -B

## 2018-09-01 PROCEDURE — 36415 COLL VENOUS BLD VENIPUNCTURE: CPT | Performed by: INTERNAL MEDICINE

## 2018-09-01 PROCEDURE — 74011250636 HC RX REV CODE- 250/636: Performed by: INTERNAL MEDICINE

## 2018-09-01 PROCEDURE — 82962 GLUCOSE BLOOD TEST: CPT

## 2018-09-01 PROCEDURE — 65660000000 HC RM CCU STEPDOWN

## 2018-09-01 PROCEDURE — 77030032490 HC SLV COMPR SCD KNE COVD -B

## 2018-09-01 PROCEDURE — 87040 BLOOD CULTURE FOR BACTERIA: CPT | Performed by: INTERNAL MEDICINE

## 2018-09-01 PROCEDURE — 74011250637 HC RX REV CODE- 250/637: Performed by: INTERNAL MEDICINE

## 2018-09-01 PROCEDURE — 85027 COMPLETE CBC AUTOMATED: CPT | Performed by: INTERNAL MEDICINE

## 2018-09-01 PROCEDURE — 80048 BASIC METABOLIC PNL TOTAL CA: CPT | Performed by: INTERNAL MEDICINE

## 2018-09-01 RX ORDER — INSULIN GLARGINE 100 [IU]/ML
34 INJECTION, SOLUTION SUBCUTANEOUS DAILY
Status: DISCONTINUED | OUTPATIENT
Start: 2018-09-02 | End: 2018-09-03

## 2018-09-01 RX ADMIN — INSULIN LISPRO 5 UNITS: 100 INJECTION, SOLUTION INTRAVENOUS; SUBCUTANEOUS at 11:41

## 2018-09-01 RX ADMIN — INSULIN GLARGINE 30 UNITS: 100 INJECTION, SOLUTION SUBCUTANEOUS at 09:18

## 2018-09-01 RX ADMIN — HEPARIN SODIUM 5000 UNITS: 5000 INJECTION INTRAVENOUS; SUBCUTANEOUS at 03:31

## 2018-09-01 RX ADMIN — FAMOTIDINE 20 MG: 20 TABLET ORAL at 09:19

## 2018-09-01 RX ADMIN — INSULIN LISPRO 5 UNITS: 100 INJECTION, SOLUTION INTRAVENOUS; SUBCUTANEOUS at 09:18

## 2018-09-01 RX ADMIN — Medication 10 ML: at 15:39

## 2018-09-01 RX ADMIN — Medication 10 ML: at 03:31

## 2018-09-01 RX ADMIN — Medication 10 ML: at 21:30

## 2018-09-01 RX ADMIN — ACETAMINOPHEN 650 MG: 325 TABLET ORAL at 19:55

## 2018-09-01 RX ADMIN — INSULIN LISPRO 2 UNITS: 100 INJECTION, SOLUTION INTRAVENOUS; SUBCUTANEOUS at 17:08

## 2018-09-01 RX ADMIN — Medication 10 ML: at 19:55

## 2018-09-01 RX ADMIN — ACETAMINOPHEN 650 MG: 325 TABLET ORAL at 03:30

## 2018-09-01 RX ADMIN — INSULIN LISPRO 2 UNITS: 100 INJECTION, SOLUTION INTRAVENOUS; SUBCUTANEOUS at 22:00

## 2018-09-01 RX ADMIN — NYSTATIN 500000 UNITS: 100000 SUSPENSION ORAL at 09:19

## 2018-09-01 NOTE — PROGRESS NOTES
Bedside and Verbal shift change report given to Dom Zaragoza (oncoming nurse) by Krystin Mendoza (offgoing nurse). Report included the following information SBAR, Kardex, Intake/Output, MAR, Recent Results and Med Rec Status.

## 2018-09-01 NOTE — PROGRESS NOTES
Problem: Falls - Risk of  Goal: *Absence of Falls  Document Stephen Fall Risk and appropriate interventions in the flowsheet.    Outcome: Progressing Towards Goal  Fall Risk Interventions:  Mobility Interventions: Bed/chair exit alarm, Communicate number of staff needed for ambulation/transfer, Patient to call before getting OOB         Medication Interventions: Evaluate medications/consider consulting pharmacy, Patient to call before getting OOB, Teach patient to arise slowly    Elimination Interventions: Call light in reach, Patient to call for help with toileting needs    History of Falls Interventions: Bed/chair exit alarm, Door open when patient unattended

## 2018-09-01 NOTE — PROGRESS NOTES
NAME: Jennifer Bruce        :  1965        MRN:  [id number]        Assessment :    Plan:  --NEERAJ    S/p DKA  DM    Anemia  Thrombocytopenia    Leukocytosis  UTI on admission    proteniuria  hematuria --Creatinine is slowly improving . Good UO. Creatinine was 1.2 on 18    Removed shikha (attempted HD on , but BP dropped and HD was stopped after 20 minutes; no other HD needed/attempted). Urine originally with 1.6 grams protein    Likely ATN. Also had been on NSAIDs prior to admission. negative serologic (JUN, ANCA, myeloma). LDH/haptoglobin not c/w hemolysis. Urine eos neg. Hb better,still high WBC             Subjective:     Chief Complaint:   Doing fair    Objective:     VITALS:   Last 24hrs VS reviewed since prior progress note. Most recent are:  Visit Vitals    /67 (BP 1 Location: Right arm, BP Patient Position: At rest)    Pulse 74    Temp 98.8 °F (37.1 °C)    Resp 16    Ht 5' 1\" (1.549 m)    Wt 51.9 kg (114 lb 8 oz)    SpO2 98%    BMI 21.63 kg/m2       Intake/Output Summary (Last 24 hours) at 18 0855  Last data filed at 18 0015   Gross per 24 hour   Intake              360 ml   Output              725 ml   Net             -365 ml      Telemetry Reviewed:     PHYSICAL EXAM:  General: NAD  CTA  abd soft  No edema       ________________________________________________________________________  Ryne Shukla MD     Procedures: see electronic medical records for all procedures/Xrays and details which  were not copied into this note but were reviewed prior to creation of Plan.       LABS:  Recent Labs      18   0150   WBC  28.7*  30.3*   HGB  8.2*  6.3*   HCT  24.2*  18.2*   PLT  285  240     Recent Labs      18   0150  18   0229   NA  139  142  144   K  4.3  4.1  3.6   CL  104  106  107   CO2  28  27  25   BUN  70*  89*  110*   CREA  3.58* 4.26*  4.98*   GLU  117*  186*  48*   CA  8.6  8.3*  8.2*   MG   --    --   1.7   PHOS   --    --   2.9     Recent Labs      08/31/18   0150  08/30/18   0229   SGOT  30  40*   AP  280*  275*   TP  6.2*  5.9*   ALB  1.9*  1.9*   GLOB  4.3*  4.0     No results for input(s): INR, PTP, APTT in the last 72 hours. No lab exists for component: INREXT, INREXT   No results for input(s): FE, TIBC, PSAT, FERR in the last 72 hours. Lab Results   Component Value Date/Time    Folate 19.8 08/29/2018 03:14 AM      No results for input(s): PH, PCO2, PO2 in the last 72 hours. No results for input(s): CPK, CKMB in the last 72 hours.     No lab exists for component: TROPONINI  No components found for: [place]  Lab Results   Component Value Date/Time    Color YELLOW/STRAW 08/28/2018 09:26 AM    Appearance CLOUDY (A) 08/28/2018 09:26 AM    Specific gravity 1.010 08/28/2018 09:26 AM    pH (UA) 5.0 08/28/2018 09:26 AM    Protein TRACE (A) 08/28/2018 09:26 AM    Glucose NEGATIVE  08/28/2018 09:26 AM    Ketone NEGATIVE  08/28/2018 09:26 AM    Bilirubin NEGATIVE  08/28/2018 09:26 AM    Urobilinogen 1.0 08/28/2018 09:26 AM    Nitrites NEGATIVE  08/28/2018 09:26 AM    Leukocyte Esterase LARGE (A) 08/28/2018 09:26 AM    Epithelial cells MODERATE (A) 08/28/2018 09:26 AM    Bacteria NEGATIVE  08/28/2018 09:26 AM    WBC 20-50 08/28/2018 09:26 AM    RBC 5-10 08/28/2018 09:26 AM       MEDICATIONS:

## 2018-09-02 LAB
ALBUMIN SERPL-MCNC: 2.3 G/DL (ref 3.5–5)
ALBUMIN/GLOB SERPL: 0.5 {RATIO} (ref 1.1–2.2)
ALP SERPL-CCNC: 386 U/L (ref 45–117)
ALT SERPL-CCNC: 43 U/L (ref 12–78)
ANION GAP SERPL CALC-SCNC: 7 MMOL/L (ref 5–15)
AST SERPL-CCNC: 36 U/L (ref 15–37)
BASOPHILS # BLD: 0 K/UL (ref 0–0.1)
BASOPHILS NFR BLD: 0 % (ref 0–1)
BILIRUB SERPL-MCNC: 1 MG/DL (ref 0.2–1)
BUN SERPL-MCNC: 60 MG/DL (ref 6–20)
BUN/CREAT SERPL: 18 (ref 12–20)
CALCIUM SERPL-MCNC: 8.8 MG/DL (ref 8.5–10.1)
CHLORIDE SERPL-SCNC: 104 MMOL/L (ref 97–108)
CO2 SERPL-SCNC: 30 MMOL/L (ref 21–32)
CREAT SERPL-MCNC: 3.28 MG/DL (ref 0.55–1.02)
DIFFERENTIAL METHOD BLD: ABNORMAL
EOSINOPHIL # BLD: 0 K/UL (ref 0–0.4)
EOSINOPHIL NFR BLD: 0 % (ref 0–7)
ERYTHROCYTE [DISTWIDTH] IN BLOOD BY AUTOMATED COUNT: 19.7 % (ref 11.5–14.5)
GLOBULIN SER CALC-MCNC: 4.7 G/DL (ref 2–4)
GLUCOSE BLD STRIP.AUTO-MCNC: 114 MG/DL (ref 65–100)
GLUCOSE BLD STRIP.AUTO-MCNC: 144 MG/DL (ref 65–100)
GLUCOSE BLD STRIP.AUTO-MCNC: 263 MG/DL (ref 65–100)
GLUCOSE BLD STRIP.AUTO-MCNC: 277 MG/DL (ref 65–100)
GLUCOSE BLD STRIP.AUTO-MCNC: 284 MG/DL (ref 65–100)
GLUCOSE SERPL-MCNC: 76 MG/DL (ref 65–100)
HCT VFR BLD AUTO: 25 % (ref 35–47)
HGB BLD-MCNC: 8.2 G/DL (ref 11.5–16)
IMM GRANULOCYTES # BLD: 0 K/UL (ref 0–0.04)
IMM GRANULOCYTES NFR BLD AUTO: 0 % (ref 0–0.5)
LYMPHOCYTES # BLD: 2.2 K/UL (ref 0.8–3.5)
LYMPHOCYTES NFR BLD: 7 % (ref 12–49)
MCH RBC QN AUTO: 28 PG (ref 26–34)
MCHC RBC AUTO-ENTMCNC: 32.8 G/DL (ref 30–36.5)
MCV RBC AUTO: 85.3 FL (ref 80–99)
METAMYELOCYTES NFR BLD MANUAL: 5 %
MONOCYTES # BLD: 0.9 K/UL (ref 0–1)
MONOCYTES NFR BLD: 3 % (ref 5–13)
NEUTS BAND NFR BLD MANUAL: 4 %
NEUTS SEG # BLD: 26.8 K/UL (ref 1.8–8)
NEUTS SEG NFR BLD: 81 % (ref 32–75)
NRBC # BLD: 0.02 K/UL (ref 0–0.01)
NRBC BLD-RTO: 0.1 PER 100 WBC
PLATELET # BLD AUTO: 344 K/UL (ref 150–400)
PMV BLD AUTO: 11.3 FL (ref 8.9–12.9)
POTASSIUM SERPL-SCNC: 4.3 MMOL/L (ref 3.5–5.1)
PROT SERPL-MCNC: 7 G/DL (ref 6.4–8.2)
RBC # BLD AUTO: 2.93 M/UL (ref 3.8–5.2)
RBC MORPH BLD: ABNORMAL
RBC MORPH BLD: ABNORMAL
SERVICE CMNT-IMP: ABNORMAL
SODIUM SERPL-SCNC: 141 MMOL/L (ref 136–145)
WBC # BLD AUTO: 31.5 K/UL (ref 3.6–11)

## 2018-09-02 PROCEDURE — 74011636637 HC RX REV CODE- 636/637: Performed by: INTERNAL MEDICINE

## 2018-09-02 PROCEDURE — 85025 COMPLETE CBC W/AUTO DIFF WBC: CPT | Performed by: INTERNAL MEDICINE

## 2018-09-02 PROCEDURE — 65660000000 HC RM CCU STEPDOWN

## 2018-09-02 PROCEDURE — 74011250636 HC RX REV CODE- 250/636: Performed by: INTERNAL MEDICINE

## 2018-09-02 PROCEDURE — 74011000250 HC RX REV CODE- 250: Performed by: INTERNAL MEDICINE

## 2018-09-02 PROCEDURE — 82962 GLUCOSE BLOOD TEST: CPT

## 2018-09-02 PROCEDURE — 74011250637 HC RX REV CODE- 250/637: Performed by: INTERNAL MEDICINE

## 2018-09-02 PROCEDURE — 80053 COMPREHEN METABOLIC PANEL: CPT | Performed by: INTERNAL MEDICINE

## 2018-09-02 PROCEDURE — 36415 COLL VENOUS BLD VENIPUNCTURE: CPT | Performed by: INTERNAL MEDICINE

## 2018-09-02 RX ADMIN — Medication 10 ML: at 13:38

## 2018-09-02 RX ADMIN — Medication 10 ML: at 22:34

## 2018-09-02 RX ADMIN — INSULIN LISPRO 2 UNITS: 100 INJECTION, SOLUTION INTRAVENOUS; SUBCUTANEOUS at 08:58

## 2018-09-02 RX ADMIN — INSULIN LISPRO 3 UNITS: 100 INJECTION, SOLUTION INTRAVENOUS; SUBCUTANEOUS at 22:34

## 2018-09-02 RX ADMIN — INSULIN GLARGINE 34 UNITS: 100 INJECTION, SOLUTION SUBCUTANEOUS at 08:58

## 2018-09-02 RX ADMIN — PROCHLORPERAZINE EDISYLATE 10 MG: 5 INJECTION INTRAMUSCULAR; INTRAVENOUS at 08:56

## 2018-09-02 RX ADMIN — FAMOTIDINE 20 MG: 20 TABLET ORAL at 08:58

## 2018-09-02 RX ADMIN — INSULIN LISPRO 5 UNITS: 100 INJECTION, SOLUTION INTRAVENOUS; SUBCUTANEOUS at 17:31

## 2018-09-02 RX ADMIN — Medication 10 ML: at 04:02

## 2018-09-02 NOTE — PROGRESS NOTES
Hospitalist Progress Note    NAME: Valeri Borges   :  1965   MRN:  [id number]       Assessment / Plan:  SIRS unclear etiology, present on admission:  persistent leukocytosis with temp 100.2 overnight. Pt continues to deny any infectious sx (no rash, dental pain, diarrhea, respiratory or urinary sx). - CT chest  with no evidence of acute process in the chest  - CT A/P  with no significant gastric distention. No bowel obstruction. No acute finding is noted in the abdomen or pelvis within limits of unenhanced technique. - Lawrence catheter removed , possible source but nursing unable to obtain culture off Lawrence prior to its removal.  - ?consider contrasted CT of A/P (oral contrast) or echo (but no positive blood cultures)  - competed rocephin for E Coli UTI, has cleared on repeat urine cutlure  - blood cultures no growth  Acute anemia, unclear etiology: likely multifactorial including acute illness and renal failure, hematuria.  Hg overall stable today but low. - transfused 2u pRBCs this admission  - haptoglobin slightly elevated, but not felt to be c/w hemolysis  - iron, N21 and folic acid levels WNL.  Ferritin elevated, ?acute phase reactant  - does have hematuria but do not feel this is enough blood loss alone to explain anemia  DKA (resolved) in setting of uncontrolled insulin-dependent DM2 with nephropathy: HgA1c 8.8.  Now off insulin gtt. - lantus now at home dose (34u daily)  - con't holding amaryl for now, not sure if this will be restarted on discharge as she is already on insulin  - lispro scheduled with meals and per sliding scale  NEERAJ and acute encephalopathy with severe hyperkalemia in setting of dehydration/DKA and sepsis/UTI, present on admission: Cr continues to improve.  HD stopped  due to rapid response for hypotension on HD.    - renal US  normal renal ultrasound examination  - urine cx >[id number] pansensitive E Coli, completed rocephin as above  - appreciate nephrology assistance; additional labs sent: JUN negative, ANCA pending, normal kappa/lambda ratio, iPTH very elevated  - removed Lawrence 8/31  Pseudohyponatremia, resolved  Abdominal pain, unclear etiology: CT A/P without contrast 8/24 negative      Code Status: full  Surrogate Decision Maker: Aliya Diamond OWBMK   DVT Prophylaxis: SCDs with anemia     Subjective:     Chief Complaint / Reason for Physician Visit  \"I feel fine\". Flat affect today but denies complaints. Discussed with RN events overnight. Review of Systems:  Symptom Y/N Comments  Symptom Y/N Comments   Fever/Chills n   Chest Pain n    Poor Appetite n   Edema n    Cough n   Abdominal Pain n    Sputum n   Joint Pain     SOB/GARLAND n   Pruritis/Rash     Nausea/vomit    Tolerating PT/OT     Diarrhea    Tolerating Diet     Constipation    Other       Could NOT obtain due to:      Objective:     VITALS:   Last 24hrs VS reviewed since prior progress note. Most recent are:  Patient Vitals for the past 24 hrs:   Temp Pulse Resp BP SpO2   09/02/18 0809 98.9 °F (37.2 °C) 99 18 121/62 97 %   09/02/18 0304 97.5 °F (36.4 °C) 84 18 128/58 100 %   09/01/18 2251 98.1 °F (36.7 °C) 89 18 106/50 99 %   09/01/18 1927 100.2 °F (37.9 °C) 100 18 115/67 99 %   09/01/18 1544 97.5 °F (36.4 °C) 98 18 114/68 97 %   09/01/18 1118 98.1 °F (36.7 °C) 92 20 105/55 100 %       Intake/Output Summary (Last 24 hours) at 09/02/18 0944  Last data filed at 09/02/18 4856   Gross per 24 hour   Intake              240 ml   Output             1200 ml   Net             -960 ml        PHYSICAL EXAM:  General: Thin. Alert, cooperative, no acute distress    EENT:  EOMI. Anicteric sclerae. MMM  Resp:  CTA bilaterally, no wheezing or rales. No accessory muscle use  CV:  Regular rhythm,  No edema  GI:  Soft, Non distended, Non tender.  +Bowel sounds  Neurologic:  Alert and oriented X 3, normal speech,   Psych:   Fair insight. Not anxious nor agitated  Skin:  No rashes. No jaundice.   R Lawrence site without erythema or edema. Reviewed most current lab test results and cultures  YES  Reviewed most current radiology test results   YES  Review and summation of old records today    NO  Reviewed patient's current orders and MAR    YES  PMH/SH reviewed - no change compared to H&P  ________________________________________________________________________  Care Plan discussed with:    Comments   Patient x    Family      RN x    Care Manager     Consultant                        Multidiciplinary team rounds were held today with , nursing, pharmacist and clinical coordinator. Patient's plan of care was discussed; medications were reviewed and discharge planning was addressed. ________________________________________________________________________  Total NON critical care TIME:  25 Minutes    Total CRITICAL CARE TIME Spent:   Minutes non procedure based      Comments   >50% of visit spent in counseling and coordination of care x    ________________________________________________________________________  Ismael Caicedo MD     Procedures: see electronic medical records for all procedures/Xrays and details which were not copied into this note but were reviewed prior to creation of Plan. LABS:  I reviewed today's most current labs and imaging studies.   Pertinent labs include:  Recent Labs      09/02/18 0412 09/01/18 0019  08/31/18   0150   WBC  31.5*  28.7*  30.3*   HGB  8.2*  8.2*  6.3*   HCT  25.0*  24.2*  18.2*   PLT  344  285  240     Recent Labs      09/02/18 0412 09/01/18   0019  08/31/18   0150   NA  141  139  142   K  4.3  4.3  4.1   CL  104  104  106   CO2  30  28  27   GLU  76  117*  186*   BUN  60*  70*  89*   CREA  3.28*  3.58*  4.26*   CA  8.8  8.6  8.3*   ALB  2.3*   --   1.9*   TBILI  1.0   --   0.7   SGOT  36   --   30   ALT  43   --   39       Signed: Ismael Caicedo MD

## 2018-09-02 NOTE — PROGRESS NOTES
NAME: Zurdo Camarillo        :  1965        MRN:  [id number]        Assessment :    Plan:  --NEERAJ    S/p DKA  DM    Anemia  Thrombocytopenia    Leukocytosis  UTI on admission    proteniuria  hematuria --Creatinine is slowly improving . Good UO. Creatinine was 1.2 on 18    Removed shikha (attempted HD on , but BP dropped and HD was stopped after 20 minutes; no other HD needed/attempted). Urine originally with 1.6 grams protein    Likely ATN. Also had been on NSAIDs prior to admission. negative serologic (JUN, ANCA, myeloma). LDH/haptoglobin not c/w hemolysis. Urine eos neg. Hb better,still high WBC             Subjective:     Chief Complaint:   \"I'm doing better. \"  No n/V. No dyspnea. No pain. Good appetite. Objective:     VITALS:   Last 24hrs VS reviewed since prior progress note. Most recent are:  Visit Vitals    /62    Pulse 99    Temp 98.9 °F (37.2 °C)    Resp 18    Ht 5' 1\" (1.549 m)    Wt 48.5 kg (107 lb)    SpO2 97%    BMI 20.22 kg/m2       Intake/Output Summary (Last 24 hours) at 18 0929  Last data filed at 18 6623   Gross per 24 hour   Intake              240 ml   Output             1200 ml   Net             -960 ml      Telemetry Reviewed:     PHYSICAL EXAM:  General: NAD  CTA  abd soft  No edema       ________________________________________________________________________  Yoselyn Lewis MD     Procedures: see electronic medical records for all procedures/Xrays and details which  were not copied into this note but were reviewed prior to creation of Plan.       LABS:  Recent Labs      18   001   WBC  31.5*  28.7*   HGB  8.2*  8.2*   HCT  25.0*  24.2*   PLT  344  285     Recent Labs      18   0412  18   0019  18   0150   NA  141  139  142   K  4.3  4.3  4.1   CL  104  104  106   CO2  30  28  27   BUN  60*  70*  89*   CREA  3.28* 3.58*  4.26*   GLU  76  117*  186*   CA  8.8  8.6  8.3*     Recent Labs      09/02/18   0412  08/31/18   0150   SGOT  36  30   AP  386*  280*   TP  7.0  6.2*   ALB  2.3*  1.9*   GLOB  4.7*  4.3*     No results for input(s): INR, PTP, APTT in the last 72 hours. No lab exists for component: INREXT, INREXT   No results for input(s): FE, TIBC, PSAT, FERR in the last 72 hours. Lab Results   Component Value Date/Time    Folate 19.8 08/29/2018 03:14 AM      No results for input(s): PH, PCO2, PO2 in the last 72 hours. No results for input(s): CPK, CKMB in the last 72 hours.     No lab exists for component: TROPONINI  No components found for: [place]  Lab Results   Component Value Date/Time    Color YELLOW/STRAW 08/28/2018 09:26 AM    Appearance CLOUDY (A) 08/28/2018 09:26 AM    Specific gravity 1.010 08/28/2018 09:26 AM    pH (UA) 5.0 08/28/2018 09:26 AM    Protein TRACE (A) 08/28/2018 09:26 AM    Glucose NEGATIVE  08/28/2018 09:26 AM    Ketone NEGATIVE  08/28/2018 09:26 AM    Bilirubin NEGATIVE  08/28/2018 09:26 AM    Urobilinogen 1.0 08/28/2018 09:26 AM    Nitrites NEGATIVE  08/28/2018 09:26 AM    Leukocyte Esterase LARGE (A) 08/28/2018 09:26 AM    Epithelial cells MODERATE (A) 08/28/2018 09:26 AM    Bacteria NEGATIVE  08/28/2018 09:26 AM    WBC 20-50 08/28/2018 09:26 AM    RBC 5-10 08/28/2018 09:26 AM       MEDICATIONS:

## 2018-09-02 NOTE — PROGRESS NOTES
Bedside and Verbal shift change report given to Tylor Phelan (oncoming nurse) by Chelsy Hernandez (offgoing nurse). Report included the following information SBAR, Kardex, Intake/Output, MAR, Recent Results and Med Rec Status.

## 2018-09-02 NOTE — PROGRESS NOTES
Problem: Falls - Risk of  Goal: *Absence of Falls  Document Stephen Fall Risk and appropriate interventions in the flowsheet.    Outcome: Progressing Towards Goal  Fall Risk Interventions:  Mobility Interventions: Communicate number of staff needed for ambulation/transfer, Patient to call before getting OOB         Medication Interventions: Evaluate medications/consider consulting pharmacy, Patient to call before getting OOB, Teach patient to arise slowly    Elimination Interventions: Call light in reach    History of Falls Interventions: Door open when patient unattended, Evaluate medications/consider consulting pharmacy

## 2018-09-03 LAB
ANION GAP SERPL CALC-SCNC: 7 MMOL/L (ref 5–15)
BUN SERPL-MCNC: 50 MG/DL (ref 6–20)
BUN/CREAT SERPL: 17 (ref 12–20)
CALCIUM SERPL-MCNC: 8.6 MG/DL (ref 8.5–10.1)
CHLORIDE SERPL-SCNC: 106 MMOL/L (ref 97–108)
CO2 SERPL-SCNC: 30 MMOL/L (ref 21–32)
CREAT SERPL-MCNC: 3 MG/DL (ref 0.55–1.02)
GLUCOSE BLD STRIP.AUTO-MCNC: 107 MG/DL (ref 65–100)
GLUCOSE BLD STRIP.AUTO-MCNC: 151 MG/DL (ref 65–100)
GLUCOSE BLD STRIP.AUTO-MCNC: 283 MG/DL (ref 65–100)
GLUCOSE BLD STRIP.AUTO-MCNC: 287 MG/DL (ref 65–100)
GLUCOSE BLD STRIP.AUTO-MCNC: 75 MG/DL (ref 65–100)
GLUCOSE SERPL-MCNC: 73 MG/DL (ref 65–100)
POTASSIUM SERPL-SCNC: 4.5 MMOL/L (ref 3.5–5.1)
SERVICE CMNT-IMP: ABNORMAL
SERVICE CMNT-IMP: NORMAL
SODIUM SERPL-SCNC: 143 MMOL/L (ref 136–145)

## 2018-09-03 PROCEDURE — 82962 GLUCOSE BLOOD TEST: CPT

## 2018-09-03 PROCEDURE — 74011636637 HC RX REV CODE- 636/637: Performed by: INTERNAL MEDICINE

## 2018-09-03 PROCEDURE — 65660000000 HC RM CCU STEPDOWN

## 2018-09-03 PROCEDURE — 36415 COLL VENOUS BLD VENIPUNCTURE: CPT | Performed by: INTERNAL MEDICINE

## 2018-09-03 PROCEDURE — 80048 BASIC METABOLIC PNL TOTAL CA: CPT | Performed by: INTERNAL MEDICINE

## 2018-09-03 PROCEDURE — 74011250637 HC RX REV CODE- 250/637: Performed by: INTERNAL MEDICINE

## 2018-09-03 RX ORDER — INSULIN GLARGINE 100 [IU]/ML
17 INJECTION, SOLUTION SUBCUTANEOUS DAILY
Status: DISCONTINUED | OUTPATIENT
Start: 2018-09-04 | End: 2018-09-04

## 2018-09-03 RX ORDER — SODIUM CHLORIDE 450 MG/100ML
75 INJECTION, SOLUTION INTRAVENOUS CONTINUOUS
Status: DISPENSED | OUTPATIENT
Start: 2018-09-04 | End: 2018-09-04

## 2018-09-03 RX ADMIN — INSULIN LISPRO 2 UNITS: 100 INJECTION, SOLUTION INTRAVENOUS; SUBCUTANEOUS at 08:58

## 2018-09-03 RX ADMIN — FAMOTIDINE 20 MG: 20 TABLET ORAL at 08:58

## 2018-09-03 RX ADMIN — Medication 10 ML: at 21:27

## 2018-09-03 RX ADMIN — Medication 10 ML: at 05:16

## 2018-09-03 RX ADMIN — INSULIN GLARGINE 34 UNITS: 100 INJECTION, SOLUTION SUBCUTANEOUS at 08:58

## 2018-09-03 RX ADMIN — INSULIN LISPRO 5 UNITS: 100 INJECTION, SOLUTION INTRAVENOUS; SUBCUTANEOUS at 17:54

## 2018-09-03 RX ADMIN — Medication 10 ML: at 14:55

## 2018-09-03 RX ADMIN — INSULIN LISPRO 5 UNITS: 100 INJECTION, SOLUTION INTRAVENOUS; SUBCUTANEOUS at 12:39

## 2018-09-03 NOTE — PROGRESS NOTES
NAME: Zurdo Camarillo        :  1965        MRN:  [id number]        Assessment :    Plan:  --NEERAJ    S/p DKA  DM    Anemia  Thrombocytopenia    Leukocytosis  UTI on admission    proteniuria  hematuria --Creatinine is slowly improving . Good UO. Creatinine was 1.2 on 18    Removed shikha (attempted HD on , but BP dropped and HD was stopped after 20 minutes; no other HD needed/attempted). Urine originally with 1.6 grams protein    Likely ATN. Also had been on NSAIDs prior to admission. negative serologic (JUN, ANCA, myeloma). LDH/haptoglobin not c/w hemolysis. Urine eos neg. Subjective:     Chief Complaint:   \"I'm feel fine. \"  No n/V. No dyspnea. No pain. Good appetite. Objective:     VITALS:   Last 24hrs VS reviewed since prior progress note. Most recent are:  Visit Vitals    /62 (BP 1 Location: Left arm, BP Patient Position: Sitting)    Pulse 86    Temp 98.6 °F (37 °C)    Resp 20    Ht 5' 1\" (1.549 m)    Wt 44.7 kg (98 lb 9.6 oz)    SpO2 100%    BMI 18.63 kg/m2       Intake/Output Summary (Last 24 hours) at 18 0944  Last data filed at 18 1856   Gross per 24 hour   Intake              560 ml   Output                0 ml   Net              560 ml      Telemetry Reviewed:     PHYSICAL EXAM:  General: NAD  CTA  abd soft  No edema       ________________________________________________________________________  Yoselyn Lewis MD     Procedures: see electronic medical records for all procedures/Xrays and details which  were not copied into this note but were reviewed prior to creation of Plan.       LABS:  Recent Labs      18   001   WBC  31.5*  28.7*   HGB  8.2*  8.2*   HCT  25.0*  24.2*   PLT  344  285     Recent Labs      18   0511  18   0019   NA  143  141  139   K  4.5  4.3  4.3   CL  106  104  104   CO2  30  30  28 BUN  50*  60*  70*   CREA  3.00*  3.28*  3.58*   GLU  73  76  117*   CA  8.6  8.8  8.6     Recent Labs      09/02/18   0412   SGOT  36   AP  386*   TP  7.0   ALB  2.3*   GLOB  4.7*     No results for input(s): INR, PTP, APTT in the last 72 hours. No lab exists for component: INREXT, INREXT   No results for input(s): FE, TIBC, PSAT, FERR in the last 72 hours. Lab Results   Component Value Date/Time    Folate 19.8 08/29/2018 03:14 AM      No results for input(s): PH, PCO2, PO2 in the last 72 hours. No results for input(s): CPK, CKMB in the last 72 hours.     No lab exists for component: TROPONINI  No components found for: [place]  Lab Results   Component Value Date/Time    Color YELLOW/STRAW 08/28/2018 09:26 AM    Appearance CLOUDY (A) 08/28/2018 09:26 AM    Specific gravity 1.010 08/28/2018 09:26 AM    pH (UA) 5.0 08/28/2018 09:26 AM    Protein TRACE (A) 08/28/2018 09:26 AM    Glucose NEGATIVE  08/28/2018 09:26 AM    Ketone NEGATIVE  08/28/2018 09:26 AM    Bilirubin NEGATIVE  08/28/2018 09:26 AM    Urobilinogen 1.0 08/28/2018 09:26 AM    Nitrites NEGATIVE  08/28/2018 09:26 AM    Leukocyte Esterase LARGE (A) 08/28/2018 09:26 AM    Epithelial cells MODERATE (A) 08/28/2018 09:26 AM    Bacteria NEGATIVE  08/28/2018 09:26 AM    WBC 20-50 08/28/2018 09:26 AM    RBC 5-10 08/28/2018 09:26 AM       MEDICATIONS:

## 2018-09-03 NOTE — DIABETES MGMT
DTC Progress Note    Recommendations/ Comments: Noted pt's glucose on blood chemistry this am 73 mg/dL. If appropriate, please consider:  1. Decreasing Lantus to 32 units  2. Adding prandial insulin 3 units  3. Changing correction to high sensitivity given renal status    Current hospital DM medication:   Lispro correction - normal sensitivity, Lantus 34 units      Chart reviewed and initial evaluation complete on Anayeli Troy. Patient is a 45 y.o. female with known diabetes on Basaglar (glargine) 34 units daily, and Glimepiride  1 mg acb at home. A1c:   Lab Results   Component Value Date/Time    Hemoglobin A1c 8.8 (H) 08/25/2018 10:36 AM       Recent Glucose Results:   Lab Results   Component Value Date/Time    GLU 73 09/03/2018 05:11 AM    GLUCPOC 287 (H) 09/03/2018 11:46 AM    GLUCPOC 151 (H) 09/03/2018 08:00 AM    GLUCPOC 263 (H) 09/02/2018 08:48 PM        Lab Results   Component Value Date/Time    Creatinine 3.00 (H) 09/03/2018 05:11 AM     Estimated Creatinine Clearance: 15.1 mL/min (based on Cr of 3). Active Orders   Diet    DIET DIABETIC CONSISTENT CARB Regular        PO intake:   Patient Vitals for the past 72 hrs:   % Diet Eaten   09/02/18 1856 75 %   09/02/18 1146 50 %   09/02/18 0809 0 %   08/31/18 1700 25 %       Will continue to follow as needed. Thank you.     Abram Lujan, [facility]

## 2018-09-03 NOTE — PROGRESS NOTES
Bedside and Verbal shift change report given to [place] (oncoming nurse) by Ignacio Thomas (offgoing nurse). Report included the following information SBAR, Kardex, Intake/Output, MAR and Recent Results.

## 2018-09-03 NOTE — PROGRESS NOTES
Hospitalist Progress Note    NAME: Valeri Borges   :  1965   MRN:  [id number]       Assessment / Plan:  SIRS unclear etiology, present on admission:  persistent leukocytosis with low grade fevers. Pt continues to deny any infectious sx (no rash, dental pain, diarrhea, respiratory or urinary sx). - CT chest  with no evidence of acute process in the chest  - CT A/P  with no significant gastric distention. No bowel obstruction. No acute finding is noted in the abdomen or pelvis within limits of unenhanced technique. - Lawrence catheter removed   - will get HAYDER tomorrow, have consulted cardiology  - competed rocephin for E Coli UTI, has cleared on repeat urine cutlure  - blood cultures no growth  Acute anemia, unclear etiology: likely multifactorial including acute illness and renal failure, hematuria. Hg overall stable. - transfused 2u pRBCs this admission  - haptoglobin slightly elevated, but not felt to be c/w hemolysis  - iron, J23 and folic acid levels WNL.  Ferritin elevated, ?acute phase reactant  - does have hematuria but do not feel this is enough blood loss alone to explain anemia  DKA (resolved) in setting of uncontrolled insulin-dependent DM2 with nephropathy: HgA1c 8.8.  Now off insulin gtt. - lantus dose decreased by half as she will be NPO for HAYDER in AM  - con't holding amaryl for now, not sure if this will be restarted on discharge as she is already on insulin  - lispro scheduled with meals and per sliding scale  NEERAJ and acute encephalopathy with severe hyperkalemia in setting of dehydration/DKA and sepsis/UTI, present on admission: Cr continues to improve. HD stopped  due to rapid response for hypotension on HD.    - renal US  normal renal US examination  - urine cx >[id number] pansensitive E Coli, completed rocephin.  Not currently on Abx.  - appreciate nephrology assistance; additional labs sent: JUN negative, ANCA pending, normal kappa/lambda ratio, iPTH very elevated  - removed Lawrence 8/31  Pseudohyponatremia, resolved  Abdominal pain, unclear etiology: CT A/P without contrast 8/24 negative      Code Status: full  Surrogate Decision Maker: Radames Chaudhry FSBNX [phone]  DVT Prophylaxis: SCDs with anemia     Subjective:     Chief Complaint / Reason for Physician Visit  \"I'm fine\". Denies any sx. Discussed with RN events overnight. Review of Systems:  Symptom Y/N Comments  Symptom Y/N Comments   Fever/Chills n   Chest Pain n    Poor Appetite n   Edema n    Cough n   Abdominal Pain n    Sputum n   Joint Pain     SOB/GARLAND n   Pruritis/Rash     Nausea/vomit    Tolerating PT/OT     Diarrhea    Tolerating Diet     Constipation    Other       Could NOT obtain due to:      Objective:     VITALS:   Last 24hrs VS reviewed since prior progress note. Most recent are:  Patient Vitals for the past 24 hrs:   Temp Pulse Resp BP SpO2   09/03/18 0827 98.6 °F (37 °C) 86 20 109/62 100 %   09/02/18 2312 99.1 °F (37.3 °C) 99 20 110/63 100 %   09/02/18 1548 98.4 °F (36.9 °C) 89 18 115/72 99 %   09/02/18 1146 98.4 °F (36.9 °C) 85 16 117/64 98 %       Intake/Output Summary (Last 24 hours) at 09/03/18 1106  Last data filed at 09/02/18 1856   Gross per 24 hour   Intake              560 ml   Output                0 ml   Net              560 ml        PHYSICAL EXAM:  General: WD, WN. Alert, cooperative, no acute distress    EENT:  EOMI. Anicteric sclerae. MMM  Resp:  CTA bilaterally, no wheezing or rales. No accessory muscle use  CV:  Regular rhythm, +ADALGISA. No edema  GI:  Soft, Non distended, Non tender.  +Bowel sounds  Neurologic:  Alert and oriented X 3, normal speech,   Psych:   Fair insight. Not anxious nor agitated  Skin:  No rashes.   No jaundice    Reviewed most current lab test results and cultures  YES  Reviewed most current radiology test results   YES  Review and summation of old records today    NO  Reviewed patient's current orders and MAR    YES  PMH/SH reviewed - no change compared to H&P  ________________________________________________________________________  Care Plan discussed with:    Comments   Patient x    Family  x Multiple family members in room   RN x    Care Manager     Consultant  x cards                     Multidiciplinary team rounds were held today with , nursing, pharmacist and clinical coordinator. Patient's plan of care was discussed; medications were reviewed and discharge planning was addressed. ________________________________________________________________________  Total NON critical care TIME:  30 Minutes    Total CRITICAL CARE TIME Spent:   Minutes non procedure based      Comments   >50% of visit spent in counseling and coordination of care x    ________________________________________________________________________  Kristyn Singleton MD     Procedures: see electronic medical records for all procedures/Xrays and details which were not copied into this note but were reviewed prior to creation of Plan. LABS:  I reviewed today's most current labs and imaging studies.   Pertinent labs include:  Recent Labs      09/02/18 0412 09/01/18   0019   WBC  31.5*  28.7*   HGB  8.2*  8.2*   HCT  25.0*  24.2*   PLT  344  285     Recent Labs      09/03/18   0511  09/02/18 0412 09/01/18   0019   NA  143  141  139   K  4.5  4.3  4.3   CL  106  104  104   CO2  30  30  28   GLU  73  76  117*   BUN  50*  60*  70*   CREA  3.00*  3.28*  3.58*   CA  8.6  8.8  8.6   ALB   --   2.3*   --    TBILI   --   1.0   --    SGOT   --   36   --    ALT   --   43   --        Signed: Kristyn Singleton MD

## 2018-09-04 LAB
ANION GAP SERPL CALC-SCNC: 7 MMOL/L (ref 5–15)
BASOPHILS # BLD: 0 K/UL (ref 0–0.1)
BASOPHILS NFR BLD: 0 % (ref 0–1)
BUN SERPL-MCNC: 47 MG/DL (ref 6–20)
BUN/CREAT SERPL: 18 (ref 12–20)
CALCIUM SERPL-MCNC: 8.8 MG/DL (ref 8.5–10.1)
CHLORIDE SERPL-SCNC: 102 MMOL/L (ref 97–108)
CO2 SERPL-SCNC: 31 MMOL/L (ref 21–32)
CREAT SERPL-MCNC: 2.56 MG/DL (ref 0.55–1.02)
DIFFERENTIAL METHOD BLD: ABNORMAL
EOSINOPHIL # BLD: 0.2 K/UL (ref 0–0.4)
EOSINOPHIL NFR BLD: 1 % (ref 0–7)
ERYTHROCYTE [DISTWIDTH] IN BLOOD BY AUTOMATED COUNT: 20.2 % (ref 11.5–14.5)
GLUCOSE BLD STRIP.AUTO-MCNC: 151 MG/DL (ref 65–100)
GLUCOSE BLD STRIP.AUTO-MCNC: 160 MG/DL (ref 65–100)
GLUCOSE BLD STRIP.AUTO-MCNC: 285 MG/DL (ref 65–100)
GLUCOSE BLD STRIP.AUTO-MCNC: 335 MG/DL (ref 65–100)
GLUCOSE BLD STRIP.AUTO-MCNC: 58 MG/DL (ref 65–100)
GLUCOSE SERPL-MCNC: 49 MG/DL (ref 65–100)
HCT VFR BLD AUTO: 22.7 % (ref 35–47)
HGB BLD-MCNC: 7.2 G/DL (ref 11.5–16)
IMM GRANULOCYTES # BLD: 0 K/UL (ref 0–0.04)
IMM GRANULOCYTES NFR BLD AUTO: 0 % (ref 0–0.5)
LYMPHOCYTES # BLD: 0.9 K/UL (ref 0.8–3.5)
LYMPHOCYTES NFR BLD: 5 % (ref 12–49)
MCH RBC QN AUTO: 28 PG (ref 26–34)
MCHC RBC AUTO-ENTMCNC: 31.7 G/DL (ref 30–36.5)
MCV RBC AUTO: 88.3 FL (ref 80–99)
METAMYELOCYTES NFR BLD MANUAL: 1 %
MONOCYTES # BLD: 0.2 K/UL (ref 0–1)
MONOCYTES NFR BLD: 1 % (ref 5–13)
MYELOCYTES NFR BLD MANUAL: 1 %
NEUTS BAND NFR BLD MANUAL: 1 %
NEUTS SEG # BLD: 16 K/UL (ref 1.8–8)
NEUTS SEG NFR BLD: 90 % (ref 32–75)
NRBC # BLD: 0 K/UL (ref 0–0.01)
NRBC BLD-RTO: 0 PER 100 WBC
PLATELET # BLD AUTO: 355 K/UL (ref 150–400)
PMV BLD AUTO: 11.2 FL (ref 8.9–12.9)
POTASSIUM SERPL-SCNC: 4.6 MMOL/L (ref 3.5–5.1)
RBC # BLD AUTO: 2.57 M/UL (ref 3.8–5.2)
RBC MORPH BLD: ABNORMAL
SERVICE CMNT-IMP: ABNORMAL
SODIUM SERPL-SCNC: 140 MMOL/L (ref 136–145)
WBC # BLD AUTO: 17.6 K/UL (ref 3.6–11)

## 2018-09-04 PROCEDURE — 87040 BLOOD CULTURE FOR BACTERIA: CPT | Performed by: INTERNAL MEDICINE

## 2018-09-04 PROCEDURE — B246ZZ4 ULTRASONOGRAPHY OF RIGHT AND LEFT HEART, TRANSESOPHAGEAL: ICD-10-PCS | Performed by: INTERNAL MEDICINE

## 2018-09-04 PROCEDURE — 99152 MOD SED SAME PHYS/QHP 5/>YRS: CPT

## 2018-09-04 PROCEDURE — 97535 SELF CARE MNGMENT TRAINING: CPT | Performed by: OCCUPATIONAL THERAPIST

## 2018-09-04 PROCEDURE — 74011636637 HC RX REV CODE- 636/637: Performed by: INTERNAL MEDICINE

## 2018-09-04 PROCEDURE — 65660000000 HC RM CCU STEPDOWN

## 2018-09-04 PROCEDURE — 82962 GLUCOSE BLOOD TEST: CPT

## 2018-09-04 PROCEDURE — 74011000250 HC RX REV CODE- 250: Performed by: INTERNAL MEDICINE

## 2018-09-04 PROCEDURE — 74011250637 HC RX REV CODE- 250/637: Performed by: INTERNAL MEDICINE

## 2018-09-04 PROCEDURE — 93312 ECHO TRANSESOPHAGEAL: CPT

## 2018-09-04 PROCEDURE — 85025 COMPLETE CBC W/AUTO DIFF WBC: CPT | Performed by: INTERNAL MEDICINE

## 2018-09-04 PROCEDURE — 80048 BASIC METABOLIC PNL TOTAL CA: CPT | Performed by: INTERNAL MEDICINE

## 2018-09-04 PROCEDURE — 97530 THERAPEUTIC ACTIVITIES: CPT | Performed by: PHYSICAL THERAPIST

## 2018-09-04 PROCEDURE — 74011250636 HC RX REV CODE- 250/636

## 2018-09-04 PROCEDURE — 74011000258 HC RX REV CODE- 258: Performed by: INTERNAL MEDICINE

## 2018-09-04 PROCEDURE — 97530 THERAPEUTIC ACTIVITIES: CPT | Performed by: OCCUPATIONAL THERAPIST

## 2018-09-04 PROCEDURE — 36415 COLL VENOUS BLD VENIPUNCTURE: CPT | Performed by: INTERNAL MEDICINE

## 2018-09-04 PROCEDURE — 97116 GAIT TRAINING THERAPY: CPT | Performed by: PHYSICAL THERAPIST

## 2018-09-04 PROCEDURE — 74011000250 HC RX REV CODE- 250

## 2018-09-04 PROCEDURE — 74011250636 HC RX REV CODE- 250/636: Performed by: INTERNAL MEDICINE

## 2018-09-04 RX ORDER — LIDOCAINE HYDROCHLORIDE 20 MG/ML
15 SOLUTION OROPHARYNGEAL ONCE
Status: COMPLETED | OUTPATIENT
Start: 2018-09-04 | End: 2018-09-04

## 2018-09-04 RX ORDER — MIDAZOLAM HYDROCHLORIDE 1 MG/ML
INJECTION, SOLUTION INTRAMUSCULAR; INTRAVENOUS
Status: COMPLETED
Start: 2018-09-04 | End: 2018-09-04

## 2018-09-04 RX ORDER — MIDAZOLAM HYDROCHLORIDE 1 MG/ML
.5-2 INJECTION, SOLUTION INTRAMUSCULAR; INTRAVENOUS
Status: DISCONTINUED | OUTPATIENT
Start: 2018-09-04 | End: 2018-09-04 | Stop reason: ALTCHOICE

## 2018-09-04 RX ORDER — INSULIN LISPRO 100 [IU]/ML
5 INJECTION, SOLUTION INTRAVENOUS; SUBCUTANEOUS
Status: DISCONTINUED | OUTPATIENT
Start: 2018-09-04 | End: 2018-09-06

## 2018-09-04 RX ORDER — FENTANYL CITRATE 50 UG/ML
INJECTION, SOLUTION INTRAMUSCULAR; INTRAVENOUS
Status: COMPLETED
Start: 2018-09-04 | End: 2018-09-04

## 2018-09-04 RX ORDER — LIDOCAINE HYDROCHLORIDE 20 MG/ML
SOLUTION OROPHARYNGEAL
Status: COMPLETED
Start: 2018-09-04 | End: 2018-09-04

## 2018-09-04 RX ORDER — INSULIN GLARGINE 100 [IU]/ML
28 INJECTION, SOLUTION SUBCUTANEOUS DAILY
Status: DISCONTINUED | OUTPATIENT
Start: 2018-09-05 | End: 2018-09-05

## 2018-09-04 RX ORDER — SODIUM CHLORIDE 9 MG/ML
100 INJECTION, SOLUTION INTRAVENOUS CONTINUOUS
Status: DISCONTINUED | OUTPATIENT
Start: 2018-09-04 | End: 2018-09-06 | Stop reason: HOSPADM

## 2018-09-04 RX ORDER — FENTANYL CITRATE 50 UG/ML
25-50 INJECTION, SOLUTION INTRAMUSCULAR; INTRAVENOUS
Status: DISCONTINUED | OUTPATIENT
Start: 2018-09-04 | End: 2018-09-04 | Stop reason: ALTCHOICE

## 2018-09-04 RX ADMIN — LIDOCAINE HYDROCHLORIDE 15 ML: 20 SOLUTION OROPHARYNGEAL at 08:20

## 2018-09-04 RX ADMIN — INSULIN LISPRO 7 UNITS: 100 INJECTION, SOLUTION INTRAVENOUS; SUBCUTANEOUS at 11:51

## 2018-09-04 RX ADMIN — Medication 10 ML: at 16:24

## 2018-09-04 RX ADMIN — FAMOTIDINE 20 MG: 20 TABLET ORAL at 10:03

## 2018-09-04 RX ADMIN — FENTANYL CITRATE 25 MCG: 50 INJECTION, SOLUTION INTRAMUSCULAR; INTRAVENOUS at 08:19

## 2018-09-04 RX ADMIN — Medication 10 ML: at 21:43

## 2018-09-04 RX ADMIN — MIDAZOLAM HYDROCHLORIDE 1 MG: 1 INJECTION, SOLUTION INTRAMUSCULAR; INTRAVENOUS at 08:19

## 2018-09-04 RX ADMIN — INSULIN GLARGINE 17 UNITS: 100 INJECTION, SOLUTION SUBCUTANEOUS at 10:03

## 2018-09-04 RX ADMIN — DEXTROSE MONOHYDRATE 25 G: 25 INJECTION, SOLUTION INTRAVENOUS at 06:07

## 2018-09-04 RX ADMIN — MIDAZOLAM HYDROCHLORIDE 1 MG: 1 INJECTION, SOLUTION INTRAMUSCULAR; INTRAVENOUS at 08:26

## 2018-09-04 RX ADMIN — Medication 10 ML: at 06:07

## 2018-09-04 RX ADMIN — INSULIN LISPRO 2 UNITS: 100 INJECTION, SOLUTION INTRAVENOUS; SUBCUTANEOUS at 16:24

## 2018-09-04 RX ADMIN — INSULIN LISPRO 5 UNITS: 100 INJECTION, SOLUTION INTRAVENOUS; SUBCUTANEOUS at 16:24

## 2018-09-04 RX ADMIN — LIDOCAINE HYDROCHLORIDE 15 ML: 20 SOLUTION ORAL; TOPICAL at 08:20

## 2018-09-04 RX ADMIN — BENZOCAINE, BUTAMBEN, AND TETRACAINE HYDROCHLORIDE 1 SPRAY: .028; .004; .004 AEROSOL, SPRAY TOPICAL at 08:21

## 2018-09-04 RX ADMIN — SODIUM CHLORIDE 75 ML/HR: 900 INJECTION, SOLUTION INTRAVENOUS at 21:34

## 2018-09-04 RX ADMIN — SODIUM CHLORIDE 75 ML/HR: 450 INJECTION, SOLUTION INTRAVENOUS at 06:15

## 2018-09-04 NOTE — PROGRESS NOTES
Physical Therapy Goals  Revised 9/4/2018  1. Patient will move from supine to sit and sit to supine , scoot up and down and roll side to side in bed with independence within 7 day(s). 2.  Patient will transfer from bed to chair and chair to bed with independence using the least restrictive device within 7 day(s). 3.  Patient will perform sit to stand with independence within 7 day(s). 4.  Patient will ambulate with independence for 400 feet with the least restrictive device within 7 day(s). 5.  Patient will ascend/descend 4 stairs with 1 handrail(s) with independence within 7 day(s). Physical Therapy Goals  Initiated 8/26/2018  1. Patient will move from supine to sit and sit to supine , scoot up and down and roll side to side in bed with independence within 7 day(s). 2.  Patient will transfer from bed to chair and chair to bed with supervision using the least restrictive device within 7 day(s). 3.  Patient will perform sit to stand with supervision/set-up within 7 day(s). 4.  Patient will ambulate with supervision/set-up for 656 feet with the least restrictive device within 7 day(s). 5.  Patient will ascend/descend 4 stairs with dual handrail(s) with supervision/set-up within 7 day(s). physical Therapy TREATMENT: WEEKLY REASSESSMENT  Patient: Elfego Bonilla (86 y.o. female)  Date: 9/4/2018  Diagnosis: [facility]diabetic k[facility]acidoses[facility] <principal problem not specified>       Precautions: Bed Alarm, Fall  Chart, physical therapy assessment, plan of care and goals were reviewed. ASSESSMENT: Patient demonstrating improvements in all aspects of mobility. Patient only requiring supervision for overall mobility. Gait is slow and mildly guarded demonstrating decreased arm swing but steady overall with no LOB noted. Patient able to ambulate 275 feet in halls today.    At this time recommend OP PT for further balance training and work hardening program. Patient reports concerns regarding ability to return to job at present level of function/deconditioning as she works in a [employer] and is on her feet all day. Patient has good family support and should be able to return to home with brief OP services to ensure safe return to work. Discussed recommendation with patient and family and they appear agreeable to recommendations. Patient's progression toward goals since last assessment: good progress in therapy; goals achieved and updated     PLAN:  Goals have been updated based on progression since last assessment. Patient continues to benefit from skilled intervention to address the above impairments. Continue to follow the patient 3 times a week to address goals. Planned Interventions:  [x]              Bed Mobility Training             []       Neuromuscular Re-Education  [x]              Transfer Training                   []       Orthotic/Prosthetic Training  [x]              Gait Training                         []       Modalities  [x]              Therapeutic Exercises           []       Edema Management/Control  [x]              Therapeutic Activities            [x]       Patient and Family Training/Education  []              Other (comment):  Discharge Recommendations: Outpatient  Further Equipment Recommendations for Discharge: none anticipated     SUBJECTIVE:   Patient stated I'm okay.     OBJECTIVE DATA SUMMARY:   Critical Behavior:  Neurologic State: Alert  Orientation Level: Oriented X4  Cognition: Appropriate decision making, Appropriate for age attention/concentration, Appropriate safety awareness, Follows commands  Safety/Judgement: Awareness of environment, Insight into deficits, Good awareness of safety precautions    Strength:   Strength: Generally decreased, functional   generally decreased but functional                   Functional Mobility Training:  Bed Mobility:     Supine to Sit: Independent  Sit to Supine: Independent  Scooting: Independent        Transfers:  Sit to Stand: Supervision  Stand to Sit: Supervision        Bed to Chair: Supervision                    Balance:  Sitting: Intact  Standing: Impaired  Standing - Static: Good  Standing - Dynamic : Good  Ambulation/Gait Training:  Distance (ft): 275 Feet (ft)  Assistive Device: Gait belt  Ambulation - Level of Assistance: Stand-by assistance        Gait Abnormalities: Altered arm swing (mildly guarded)        Base of Support: Narrowed     Speed/Connie: Pace decreased (<100 feet/min)  Step Length: Left shortened;Right shortened (minimally)      gait is steady overall but mildly guarded; decreased arm swing; no overt LOB noted          Pain:  Pain Scale 1: Numeric (0 - 10)  Pain Intensity 1: 0       After treatment:   [x]  Patient left in no apparent distress sitting up in chair  []  Patient left in no apparent distress in bed  [x]  Call bell left within reach  []  Nursing notified  [x]  Caregiver present  []  Bed alarm activated    COMMUNICATION/COLLABORATION:   The patients plan of care was discussed with: Occupational Therapist, Registered Nurse and     Mabel Caruso PT   Time Calculation: 22 mins

## 2018-09-04 NOTE — DIABETES MGMT
DTC Progress Note    Recommendations/ Comments: Pt with hypoglycemia this am.  Please consider:  1. Resuming Lantus at 28 units  2. Adding prandial humalog insulin 5 units    Current hospital DM medication:   Lispro correction - normal sensitivity, Lantus 17 units      Chart reviewed and initial evaluation complete on Anayeli Troy. Patient is a 45 y.o. female with known diabetes on Basaglar (glargine) 34 units daily, and Glimepiride  1 mg acb at home. A1c:   Lab Results   Component Value Date/Time    Hemoglobin A1c 8.8 (H) 08/25/2018 10:36 AM       Recent Glucose Results:   Lab Results   Component Value Date/Time    GLU 49 (LL) 09/04/2018 04:08 AM    GLUCPOC 335 (H) 09/04/2018 11:39 AM    GLUCPOC 285 (H) 09/04/2018 06:32 AM    GLUCPOC 58 (L) 09/04/2018 06:06 AM        Lab Results   Component Value Date/Time    Creatinine 2.56 (H) 09/04/2018 04:08 AM     Estimated Creatinine Clearance: 17.6 mL/min (based on Cr of 2.56). Active Orders   Diet    DIET DIABETIC CONSISTENT CARB Regular        PO intake:   Patient Vitals for the past 72 hrs:   % Diet Eaten   09/02/18 1856 75 %   09/02/18 1146 50 %   09/02/18 0809 0 %       Will continue to follow as needed. Thank you.   NARDA BerriosN, RN, [address]

## 2018-09-04 NOTE — PROGRESS NOTES
Pt may be too high functioning for [facility] per OT note. They are proceeding with insurance auth pending PT note.

## 2018-09-04 NOTE — PROGRESS NOTES
TRANSFER - OUT REPORT:    Verbal report given to Landon Crews on Omar Keenan  being transferred back to [facility] for routine progression of care       Report consisted of patients Situation, Background, Assessment and   Recommendations(SBAR). Information from the following report(s) Procedure Summary was reviewed with the receiving nurse. Lines:   Peripheral IV 08/30/18 Left Antecubital (Active)   Site Assessment Clean, dry, & intact 9/4/2018  3:35 AM   Phlebitis Assessment 0 9/4/2018  3:35 AM   Infiltration Assessment 0 9/4/2018  3:35 AM   Dressing Status Clean, dry, & intact 9/4/2018  3:35 AM   Dressing Type Tape;Transparent 9/4/2018  3:35 AM   Hub Color/Line Status Yellow;Capped;Flushed 9/4/2018  3:35 AM   Alcohol Cap Used Yes 9/4/2018  3:35 AM       Peripheral IV 09/02/18 Left Antecubital (Active)   Site Assessment Clean, dry, & intact 9/4/2018  3:35 AM   Phlebitis Assessment 0 9/4/2018  3:35 AM   Infiltration Assessment 0 9/4/2018  3:35 AM   Dressing Status Clean, dry, & intact 9/4/2018  3:35 AM   Dressing Type Tape;Transparent 9/4/2018  3:35 AM   Hub Color/Line Status Blue;Capped;Flushed 9/4/2018  3:35 AM   Alcohol Cap Used Yes 9/4/2018  3:35 AM        Opportunity for questions and clarification was provided.

## 2018-09-04 NOTE — PROGRESS NOTES
Pt sedated with 25 mcg IV Fentanyl and 2 mg IV Versed for HAYDER. Pt tolerated well.  Monitored sedation time 8:17 to 8:30

## 2018-09-04 NOTE — PROGRESS NOTES
Problem: Self Care Deficits Care Plan (Adult)  Goal: *Acute Goals and Plan of Care (Insert Text)  Occupational Therapy Goals  Initiated 8/27/2018  1. Patient will perform grooming at the sink with supervision/set-up within 7 day(s). 2.  Patient will perform bathing at the sink with moderate assistance  within 7 day(s). 3.  Patient will perform lower body dressing with moderate assistance  within 7 day(s). 4.  Patient will perform toilet transfers with supervision/set-up within 7 day(s). 5.  Patient will perform all aspects of toileting with supervision/set-up within 7 day(s). 6.  Patient will participate in upper extremity therapeutic exercise/activities with supervision/set-up for 5 minutes within 7 day(s). 7.  Patient will utilize energy conservation techniques during functional activities with verbal cues within 7 day(s). Occupational Therapy TREATMENT/Discharge  Patient: Venancio Salazar (22 y.o. female)  Date: 9/4/2018  Diagnosis: DKA (diabetic ketoacidoses) [facility] <principal problem not specified>       Precautions: Bed Alarm, Fall    ASSESSMENT:  Patient has now made excellent progress, now performing ADLs, functional mobility and an IADL task this session at an independent level, surpassing all set goals. Patient now demonstrating adequate strength, balance, cognitive function and endurance needed to manage independently if she were to be discharged to home. No further OT needs indicated at this time.    Progression toward goals:  [x]       Improving appropriately and progressing toward goals  []       Improving slowly and progressing toward goals  []       Not making progress toward goals and plan of care will be adjusted     PLAN:  Discharge from OT  Discharge Recommendations:  Outpatient PT only VS. None, pending progress with PT  Further Equipment Recommendations for Discharge:  none         OBJECTIVE DATA SUMMARY:   Cognitive/Behavioral Status:  Neurologic State: Alert  Orientation Level: Oriented X4  Cognition: Appropriate decision making; Appropriate for age attention/concentration; Appropriate safety awareness; Follows commands  Perception: Appears intact  Perseveration: No perseveration noted  Safety/Judgement: Awareness of environment; Insight into deficits;Good awareness of safety precautions  Functional Mobility and Transfers for ADLs:  Bed Mobility:     Supine to Sit: Independent  Sit to Supine: Independent  Scooting: Independent  Transfers:  Sit to Stand: Independent     Bed to Chair: Independent          Toilet Transfer : Independent           Bathroom Mobility: Independent      Balance:  Sitting: Intact  Standing: Intact  ADL Intervention:  Grooming  Grooming Assistance: Independent  Washing Hands: Independent    Lower Body Dressing Assistance  Underpants: Independent  Socks: Independent    Toileting  Toileting Assistance: Independent  Bladder Hygiene: Independent  Clothing Management: Independent    Cognitive Retraining  Safety/Judgement: Awareness of environment; Insight into deficits;Good awareness of safety precautions     IADL training:  Patient performed bed making independently     Barthel Index:    Bathin  Bladder: 10  Bowels: 10  Groomin  Dressing: 10  Feeding: 10  Mobility: 15  Stairs: 10  Toilet Use: 10  Transfer (Bed to Chair and Back): 15  Total: 100       Barthel and G-code impairment scale:  Percentage of impairment CH  0% CI  1-19% CJ  20-39% CK  40-59% CL  60-79% CM  80-99% CN  100%   Barthel Score 0-100 100 99-80 79-60 59-40 20-39 1-19   0   Barthel Score 0-20 20 17-19 13-16 9-12 5-8 1-4 0      The Barthel ADL Index: Guidelines  1. The index should be used as a record of what a patient does, not as a record of what a patient could do. 2. The main aim is to establish degree of independence from any help, physical or verbal, however minor and for whatever reason. 3. The need for supervision renders the patient not independent.   4. A patient's performance should be established using the best available evidence. Asking the patient, friends/relatives and nurses are the usual sources, but direct observation and common sense are also important. However direct testing is not needed. 5. Usually the patient's performance over the preceding 24-48 hours is important, but occasionally longer periods will be relevant. 6. Middle categories imply that the patient supplies over 50 per cent of the effort. 7. Use of aids to be independent is allowed. Sangita Negrete., Barthel, DLdW. ([id number]). Functional evaluation: the Barthel Index. [address] [facility] (14)2. Charles Brown marv LARRY Caraballo, Michael Cantu., Adwoa Rosenthalow., [place], [address] (1999). Measuring the change indisability after inpatient rehabilitation; comparison of the responsiveness of the Barthel Index and Functional Keldron Measure. Journal of Neurology, Neurosurgery, and Psychiatry, 66(4), 448-158. Serge Miranda, NLdJ.A, JACKSON Payton, & Lucia Antonio, MLIZETT. (2004.) Assessment of post-stroke quality of life in cost-effectiveness studies: The usefulness of the Barthel Index and the EuroQoL-5D.  Quality of Life Research, 13, 427-43     Pain:  Pain Scale 1: Numeric (0 - 10)  Pain Intensity 1: 0      Activity Tolerance:   Good overall for functional activity     After treatment:   [x] Patient left in no apparent distress sitting up in chair  [] Patient left in no apparent distress in bed  [x] Call bell left within reach  [x] Nursing notified  [] Caregiver present  [] Bed alarm activated    COMMUNICATION/COLLABORATION:   The patients plan of care was discussed with: Physical Therapist and Registered Nurse    Stana Nageotte, OTR/L  Time Calculation: 25 mins

## 2018-09-04 NOTE — PROGRESS NOTES
Pt arrived to Non-Invasive Lab. Pt identified with 2 patient identifiers. HAYDER procedure reviewed with patient and questions answered.   ASA score 2  Mallampati score 2  per MD.

## 2018-09-04 NOTE — PROGRESS NOTES
Bedside and Verbal shift change report given to Israel Calzada (oncoming nurse) by Jessica Almaguer (offgoing nurse). Report included the following information SBAR, Kardex, Intake/Output, MAR and Recent Results.

## 2018-09-05 LAB
ANION GAP SERPL CALC-SCNC: 9 MMOL/L (ref 5–15)
BUN SERPL-MCNC: 47 MG/DL (ref 6–20)
BUN/CREAT SERPL: 19 (ref 12–20)
CALCIUM SERPL-MCNC: 8.8 MG/DL (ref 8.5–10.1)
CHLORIDE SERPL-SCNC: 100 MMOL/L (ref 97–108)
CO2 SERPL-SCNC: 29 MMOL/L (ref 21–32)
CREAT SERPL-MCNC: 2.52 MG/DL (ref 0.55–1.02)
ERYTHROCYTE [DISTWIDTH] IN BLOOD BY AUTOMATED COUNT: 20.3 % (ref 11.5–14.5)
GLUCOSE BLD STRIP.AUTO-MCNC: 211 MG/DL (ref 65–100)
GLUCOSE BLD STRIP.AUTO-MCNC: 317 MG/DL (ref 65–100)
GLUCOSE BLD STRIP.AUTO-MCNC: 65 MG/DL (ref 65–100)
GLUCOSE BLD STRIP.AUTO-MCNC: 68 MG/DL (ref 65–100)
GLUCOSE BLD STRIP.AUTO-MCNC: 76 MG/DL (ref 65–100)
GLUCOSE BLD STRIP.AUTO-MCNC: 80 MG/DL (ref 65–100)
GLUCOSE BLD STRIP.AUTO-MCNC: 80 MG/DL (ref 65–100)
GLUCOSE SERPL-MCNC: 153 MG/DL (ref 65–100)
HCT VFR BLD AUTO: 24.3 % (ref 35–47)
HGB BLD-MCNC: 7.8 G/DL (ref 11.5–16)
MAGNESIUM SERPL-MCNC: 1.5 MG/DL (ref 1.6–2.4)
MCH RBC QN AUTO: 28.3 PG (ref 26–34)
MCHC RBC AUTO-ENTMCNC: 32.1 G/DL (ref 30–36.5)
MCV RBC AUTO: 88 FL (ref 80–99)
NRBC # BLD: 0 K/UL (ref 0–0.01)
NRBC BLD-RTO: 0 PER 100 WBC
PHOSPHATE SERPL-MCNC: 4.2 MG/DL (ref 2.6–4.7)
PLATELET # BLD AUTO: 371 K/UL (ref 150–400)
PMV BLD AUTO: 10.4 FL (ref 8.9–12.9)
POTASSIUM SERPL-SCNC: 4.7 MMOL/L (ref 3.5–5.1)
RBC # BLD AUTO: 2.76 M/UL (ref 3.8–5.2)
SERVICE CMNT-IMP: ABNORMAL
SERVICE CMNT-IMP: ABNORMAL
SERVICE CMNT-IMP: NORMAL
SODIUM SERPL-SCNC: 138 MMOL/L (ref 136–145)
WBC # BLD AUTO: 14.9 K/UL (ref 3.6–11)

## 2018-09-05 PROCEDURE — 83735 ASSAY OF MAGNESIUM: CPT | Performed by: INTERNAL MEDICINE

## 2018-09-05 PROCEDURE — 84100 ASSAY OF PHOSPHORUS: CPT | Performed by: INTERNAL MEDICINE

## 2018-09-05 PROCEDURE — 85027 COMPLETE CBC AUTOMATED: CPT | Performed by: INTERNAL MEDICINE

## 2018-09-05 PROCEDURE — G8979 MOBILITY GOAL STATUS: HCPCS | Performed by: PHYSICAL THERAPIST

## 2018-09-05 PROCEDURE — 74011636637 HC RX REV CODE- 636/637: Performed by: INTERNAL MEDICINE

## 2018-09-05 PROCEDURE — 97116 GAIT TRAINING THERAPY: CPT | Performed by: PHYSICAL THERAPIST

## 2018-09-05 PROCEDURE — 65660000000 HC RM CCU STEPDOWN

## 2018-09-05 PROCEDURE — G8978 MOBILITY CURRENT STATUS: HCPCS | Performed by: PHYSICAL THERAPIST

## 2018-09-05 PROCEDURE — G8980 MOBILITY D/C STATUS: HCPCS | Performed by: PHYSICAL THERAPIST

## 2018-09-05 PROCEDURE — 36415 COLL VENOUS BLD VENIPUNCTURE: CPT | Performed by: INTERNAL MEDICINE

## 2018-09-05 PROCEDURE — 80048 BASIC METABOLIC PNL TOTAL CA: CPT | Performed by: INTERNAL MEDICINE

## 2018-09-05 PROCEDURE — 74011250637 HC RX REV CODE- 250/637: Performed by: INTERNAL MEDICINE

## 2018-09-05 PROCEDURE — 82962 GLUCOSE BLOOD TEST: CPT

## 2018-09-05 RX ORDER — INSULIN GLARGINE 100 [IU]/ML
24 INJECTION, SOLUTION SUBCUTANEOUS DAILY
Status: DISCONTINUED | OUTPATIENT
Start: 2018-09-06 | End: 2018-09-06 | Stop reason: HOSPADM

## 2018-09-05 RX ADMIN — Medication 10 ML: at 17:28

## 2018-09-05 RX ADMIN — Medication 10 ML: at 05:05

## 2018-09-05 RX ADMIN — INSULIN GLARGINE 28 UNITS: 100 INJECTION, SOLUTION SUBCUTANEOUS at 09:30

## 2018-09-05 RX ADMIN — INSULIN LISPRO 5 UNITS: 100 INJECTION, SOLUTION INTRAVENOUS; SUBCUTANEOUS at 09:29

## 2018-09-05 RX ADMIN — Medication 10 ML: at 21:12

## 2018-09-05 RX ADMIN — INSULIN LISPRO 5 UNITS: 100 INJECTION, SOLUTION INTRAVENOUS; SUBCUTANEOUS at 17:44

## 2018-09-05 RX ADMIN — FAMOTIDINE 20 MG: 20 TABLET ORAL at 09:29

## 2018-09-05 RX ADMIN — INSULIN LISPRO 7 UNITS: 100 INJECTION, SOLUTION INTRAVENOUS; SUBCUTANEOUS at 09:29

## 2018-09-05 RX ADMIN — INSULIN LISPRO 2 UNITS: 100 INJECTION, SOLUTION INTRAVENOUS; SUBCUTANEOUS at 17:44

## 2018-09-05 NOTE — PROGRESS NOTES
NAME: Joni Tian        :  1965        MRN:  [id number]        Assessment :    Plan:  --NEERAJ    S/p DKA  DM    Anemia  Thrombocytopenia    Leukocytosis  UTI on admission    proteniuria  hematuria Note plans for dc tomorrow, OK with that  Creatinine improving  Have made her an outpatient with me for   at 1:30 [place] [phone]    Urine originally with 1.6 grams protein    Likely ATN. Also had been on NSAIDs prior to admission. negative serologic (JUN, ANCA, myeloma). LDH/haptoglobin not c/w hemolysis. Urine eos neg. Subjective:     Chief Complaint:   \"I'm feel fine. \"  No n/V. No dyspnea. No pain. Good appetite. Objective:     VITALS:   Last 24hrs VS reviewed since prior progress note. Most recent are:  Visit Vitals    /60 (BP 1 Location: Left arm, BP Patient Position: Sitting)    Pulse 99    Temp 98.7 °F (37.1 °C)    Resp 16    Ht 5' 1\" (1.549 m)    Wt 45.7 kg (100 lb 12 oz)    SpO2 99%    BMI 19.04 kg/m2       Intake/Output Summary (Last 24 hours) at 18 1018  Last data filed at 18 0931   Gross per 24 hour   Intake             1500 ml   Output                0 ml   Net             1500 ml      Telemetry Reviewed:     PHYSICAL EXAM:  General: NAD  CTA  abd soft  No edema       ________________________________________________________________________  Gaye Morales MD     Procedures: see electronic medical records for all procedures/Xrays and details which  were not copied into this note but were reviewed prior to creation of Plan.       LABS:  Recent Labs      18   0408   WBC  14.9*  17.6*   HGB  7.8*  7.2*   HCT  24.3*  22.7*   PLT  371  355     Recent Labs      18   0035  18   0408  18   0511   NA  138  140  143   K  4.7  4.6  4.5   CL  100  102  106   CO2  29  31  30   BUN  47*  47*  50*   CREA  2.52*  2.56*  3.00*   GLU  153*  49* 73   CA  8.8  8.8  8.6   MG  1.5*   --    --    PHOS  4.2   --    --      No results for input(s): SGOT, GPT, AP, TBIL, TP, ALB, GLOB, GGT, AML, LPSE in the last 72 hours. No lab exists for component: AMYP, HLPSE  No results for input(s): INR, PTP, APTT in the last 72 hours. No lab exists for component: INREXT, INREXT   No results for input(s): FE, TIBC, PSAT, FERR in the last 72 hours. Lab Results   Component Value Date/Time    Folate 19.8 08/29/2018 03:14 AM      No results for input(s): PH, PCO2, PO2 in the last 72 hours. No results for input(s): CPK, CKMB in the last 72 hours.     No lab exists for component: TROPONINI  No components found for: [place]  Lab Results   Component Value Date/Time    Color YELLOW/STRAW 08/28/2018 09:26 AM    Appearance CLOUDY (A) 08/28/2018 09:26 AM    Specific gravity 1.010 08/28/2018 09:26 AM    pH (UA) 5.0 08/28/2018 09:26 AM    Protein TRACE (A) 08/28/2018 09:26 AM    Glucose NEGATIVE  08/28/2018 09:26 AM    Ketone NEGATIVE  08/28/2018 09:26 AM    Bilirubin NEGATIVE  08/28/2018 09:26 AM    Urobilinogen 1.0 08/28/2018 09:26 AM    Nitrites NEGATIVE  08/28/2018 09:26 AM    Leukocyte Esterase LARGE (A) 08/28/2018 09:26 AM    Epithelial cells MODERATE (A) 08/28/2018 09:26 AM    Bacteria NEGATIVE  08/28/2018 09:26 AM    WBC 20-50 08/28/2018 09:26 AM    RBC 5-10 08/28/2018 09:26 AM       MEDICATIONS:

## 2018-09-05 NOTE — DIABETES MGMT
DTC Progress Note    Recommendations/ Comments: Pt with hypoglycemia this am after correction of 7 units given. Please consider:  1. Changing correction to high sensitivity     Current hospital DM medication:   Lispro correction - normal sensitivity, Lantus 28  Units, lispro with meals 5 units      Chart reviewed and initial evaluation complete on Anayelijyothi Troy. Patient is a 45 y.o. female with known diabetes on Basaglar (glargine) 34 units daily, and Glimepiride  1 mg acb at home. A1c:   Lab Results   Component Value Date/Time    Hemoglobin A1c 8.8 (H) 08/25/2018 10:36 AM       Recent Glucose Results:   Lab Results   Component Value Date/Time     (H) 09/05/2018 12:35 AM    GLUCPOC 80 09/05/2018 01:11 PM    GLUCPOC 76 09/05/2018 01:10 PM    GLUCPOC 68 09/05/2018 12:47 PM        Lab Results   Component Value Date/Time    Creatinine 2.52 (H) 09/05/2018 12:35 AM     Estimated Creatinine Clearance: 18.4 mL/min (based on Cr of 2.52). Active Orders   Diet    DIET DIABETIC CONSISTENT CARB Regular        PO intake:   Patient Vitals for the past 72 hrs:   % Diet Eaten   09/05/18 0931 80 %   09/02/18 1856 75 %       Will continue to follow as needed. Thank you.   Michiel Hodgkins, [address]

## 2018-09-05 NOTE — PROGRESS NOTES
HYPOGLYCEMIC EPISODE DOCUMENTATION    Patient with hypoglycemic episode(s) at 1310  (time) on 09/05(date). BG value(s) pre-treatment 72   Was patient symptomatic?  [] yes, [x] no  Patient was treated with the following rescue medications/treatments: [] D50                [] Glucose tablets                [] Glucagon                [x] 4oz juice                [] 6oz reg soda                [] 8oz low fat milk  BG value post-treatment: 80  Once BG treated and value greater than 80mg/dl, pt was provided with the following:  [x] snack  [x] meal  Name of MD notified:ZERATE  The following orders were received: no

## 2018-09-05 NOTE — PROGRESS NOTES
Hospitalist Progress Note    NAME: Bonny Werner   :  1965   MRN:  [id number]       Assessment / Plan:  SIRS unclear etiology, present on admission:  persistent leukocytosis with low grade fevers. Pt continues to deny any infectious sx (no rash, dental pain, diarrhea, respiratory or urinary sx). - CT chest  with no evidence of acute process in the chest  - CT A/P  with no significant gastric distention. No bowel obstruction. No acute finding is noted in the abdomen or pelvis within limits of unenhanced technique. - Lawrence catheter removed    HAYDER negative   - competed rocephin for E Coli UTI, has cleared on repeat urine cutlure  - blood cultures no growth  Acute anemia, unclear etiology:  Liekly NEERAJ on CKD anemia likely multifactorial including acute illness and renal failure, hematuria. Hg overall stable. - transfused 2u pRBCs this admission  - haptoglobin slightly elevated, but not felt to be c/w hemolysis  - iron, J81 and folic acid levels WNL.  Ferritin elevated, ?acute phase reactant  - does have hematuria but do not feel this is enough blood loss alone to explain anemia  DKA (resolved) in setting of uncontrolled insulin-dependent DM2 with nephropathy: HgA1c 8.8.  Now off insulin gtt. - lantus dose decreased by half as she will be NPO for HAYDER in AM  - con't holding amaryl for now, not sure if this will be restarted on discharge as she is already on insulin  - lispro scheduled with meals and per sliding scale  NEERAJ and acute encephalopathy with severe hyperkalemia in setting of dehydration/DKA and sepsis/UTI, present on admission: Cr continues to improve. HD stopped  due to rapid response for hypotension on HD.    She gets admitted here for Hyperglycemia this is her Third admissin. She works in a [employer] , unreliable Poor Po liquid and hyperglycemia contributing liekly to chronic dehydration with slow Uremia.   Which responds to  [facility]  - renal US  normal renal US examination  - urine cx >802189 pansensitive E Coli, completed rocephin. Not currently on Abx.  - appreciate nephrology assistance; additional labs sent: JUN negative, ANCA pending, normal kappa/lambda ratio, iPTH very elevated  - removed Lawrence 8/31  Pseudohyponatremia, resolved  Abdominal pain, unclear etiology: CT A/P without contrast 8/24 negative      Code Status: full  Surrogate Decision Maker: Brandon PARRISH   DVT Prophylaxis: SCDs with anemia     Subjective:  Patient is remarkeably aware of her condition     Chief Complaint / Reason for Physician Visit    \"I'm fine\". Denies any sx. Discussed with RN events overnight. Review of Systems:  Symptom Y/N Comments  Symptom Y/N Comments   Fever/Chills n   Chest Pain n    Poor Appetite n   Edema n    Cough n   Abdominal Pain n    Sputum n   Joint Pain     SOB/GARLAND n   Pruritis/Rash     Nausea/vomit    Tolerating PT/OT     Diarrhea    Tolerating Diet     Constipation    Other       Could NOT obtain due to:      Objective:     VITALS:   Last 24hrs VS reviewed since prior progress note. Most recent are:  Patient Vitals for the past 24 hrs:   Temp Pulse Resp BP SpO2   09/05/18 0844 98.7 °F (37.1 °C) 99 16 106/60 99 %   09/04/18 2319 98.2 °F (36.8 °C) (!) 107 14 106/58 97 %       Intake/Output Summary (Last 24 hours) at 09/05/18 1650  Last data filed at 09/05/18 0931   Gross per 24 hour   Intake             1500 ml   Output                0 ml   Net             1500 ml        PHYSICAL EXAM:  General: WD, WN. Alert, cooperative, no acute distress    EENT:  EOMI. Anicteric sclerae. MMM  Resp:  CTA bilaterally, no wheezing or rales. No accessory muscle use  CV:  Regular rhythm, +ADALGISA. No edema  GI:  Soft, Non distended, Non tender.  +Bowel sounds  Neurologic:  Alert and oriented X 3, normal speech,   Psych:   Fair insight. Not anxious nor agitated  Skin:  No rashes.   No jaundice    Reviewed most current lab test results and cultures  YES  Reviewed most current radiology test results   YES  Review and summation of old records today    NO  Reviewed patient's current orders and MAR    YES  PMH/SH reviewed - no change compared to H&P  ________________________________________________________________________  Care Plan discussed with:    Comments   Patient x    Family  x    RN x    Care Manager     Consultant                        Multidiciplinary team rounds were held today with , nursing, pharmacist and clinical coordinator. Patient's plan of care was discussed; medications were reviewed and discharge planning was addressed. ________________________________________________________________________  Total NON critical care TIME:  30 Minutes    Total CRITICAL CARE TIME Spent:   Minutes non procedure based      Comments   >50% of visit spent in counseling and coordination of care x    ________________________________________________________________________  Josephine Sloan MD     Procedures: see electronic medical records for all procedures/Xrays and details which were not copied into this note but were reviewed prior to creation of Plan. LABS:  I reviewed today's most current labs and imaging studies.   Pertinent labs include:  Recent Labs      09/05/18 0035 09/04/18   0408   WBC  14.9*  17.6*   HGB  7.8*  7.2*   HCT  24.3*  22.7*   PLT  371  355     Recent Labs      09/05/18 0035  09/04/18   0408  09/03/18   0511   NA  138  140  143   K  4.7  4.6  4.5   CL  100  102  106   CO2  29  31  30   GLU  153*  49*  73   BUN  47*  47*  50*   CREA  2.52*  2.56*  3.00*   CA  8.8  8.8  8.6   MG  1.5*   --    --    PHOS  4.2   --    --        Signed: Josephine Sloan MD

## 2018-09-05 NOTE — PROGRESS NOTES
D/C plans discussed with MD who anticipates d/c tomorrow to home without services.   I've updated [facility]

## 2018-09-05 NOTE — PROGRESS NOTES
Physical Therapy Goals  Revised 9/4/2018  1. Patient will move from supine to sit and sit to supine , scoot up and down and roll side to side in bed with independence within 7 day(s). 2.  Patient will transfer from bed to chair and chair to bed with independence using the least restrictive device within 7 day(s). 3.  Patient will perform sit to stand with independence within 7 day(s). 4.  Patient will ambulate with independence for 400 feet with the least restrictive device within 7 day(s). 5.  Patient will ascend/descend 4 stairs with 1 handrail(s) with independence within 7 day(s). Physical Therapy Goals  Initiated 8/26/2018  1. Patient will move from supine to sit and sit to supine , scoot up and down and roll side to side in bed with independence within 7 day(s). 2.  Patient will transfer from bed to chair and chair to bed with supervision using the least restrictive device within 7 day(s). 3.  Patient will perform sit to stand with supervision/set-up within 7 day(s). 4.  Patient will ambulate with supervision/set-up for 656 feet with the least restrictive device within 7 day(s). 5.  Patient will ascend/descend 4 stairs with dual handrail(s) with supervision/set-up within 7 day(s). physical Therapy TREATMENT/DISCHARGE  Patient: Jane Mackey (12 y.o. female)  Date: 9/5/2018  Diagnosis: DKA (diabetic ketoacidoses) ([facility] <principal problem not specified>       Precautions: Bed Alarm, Fall  Chart, physical therapy assessment, plan of care and goals were reviewed. ASSESSMENT: Patient demonstrating excellent progress in therapy overall. Patient demonstrating independence with all mobility. Gait is steady and balance is intact. Patient able to ambulate 600 feet in halls without difficulty. No further therapy services indicated at this time. Will sign off.     Progression toward goals:  [x]      Improving appropriately and progressing toward goals  []      Improving slowly and progressing toward goals  []      Not making progress toward goals and plan of care will be adjusted     PLAN:  Patient will be discharged from physical therapy at this time. Rationale for discharge:  [x] Goals Achieved  [] [address]  [] Patient not participating in therapy  [] Other:  Discharge Recommendations:  None  Further Equipment Recommendations for Discharge:  none     SUBJECTIVE:   Patient stated Thanks for all your help. I'm doing much better.     OBJECTIVE DATA SUMMARY:   Critical Behavior:  Neurologic State: Alert  Orientation Level: Oriented X4  Cognition: Follows commands  Safety/Judgement: Awareness of environment, Insight into deficits, Good awareness of safety precautions  Functional Mobility Training:  Bed Mobility:  Rolling: Independent  Supine to Sit: Independent     Scooting: Independent        Transfers:  Sit to Stand: Independent  Stand to Sit: Independent        Bed to Chair: Independent                    Balance:  Sitting: Intact  Standing: Intact  Ambulation/Gait Training:  Distance (ft): 600 Feet (ft)  Assistive Device: Gait belt  Ambulation - Level of Assistance: Independent        Gait Abnormalities:  (no significant gait deficits noted today)        Base of Support: Narrowed     Speed/Connie:  (WFL)  Step Length:  (equal)      Gait is steady with no deviations noted today          Functional Measure:  .Tinetti test:    Sitting Balance: 1  Arises: 2  Attempts to Rise: 2  Immediate Standing Balance: 2  Standing Balance: 2  Nudged: 2  Eyes Closed: 1  Turn 360 Degrees - Continuous/Discontinuous: 1  Turn 360 Degrees - Steady/Unsteady: 1  Sitting Down: 1  Balance Score: 15  Indication of Gait: 1  R Step Length/Height: 1  L Step Length/Height: 1  R Foot Clearance: 1  L Foot Clearance: 1  Step Symmetry: 1  Step Continuity: 1  Path: 2  Trunk: 2  Walking Time: 1  Gait Score: 12  Total Score: 27       Tinetti Test and G-code impairment scale:  Percentage of Impairment CH    0%   CI    1-19% CJ    20-39% CK    40-59% CL    60-79% CM    80-99% CN     100%   Tinetti  Score 0-28 28 23-27 17-22 12-16 6-11 1-5 0       Tinetti Tool Score Risk of Falls  <19 = High Fall Risk  19-24 = Moderate Fall Risk  25-28 = Low Fall Risk  Tinetti ME. Performance-Oriented Assessment of Mobility Problems in Elderly Patients. [facility]; [id number]. (Scoring Description: PT Bulletin Feb. 10, 1993)    Older adults: Morrisadore Zamora et al, 2009; n = 1000 [facility] elderly evaluated with ABC, ALEJANDRA, ADL, and IADL)  · Mean ALEJANDRA score for males aged 69-68 years = 26.21(3.40)  · Mean ALEJANDRA score for females age 69-68 years = 25.16(4.30)  · Mean ALEJANDRA score for males over 80 years = 23.29(6.02)  · Mean ALEJANDRA score for females over 80 years = 17.20(8.32)       G codes: In compliance with CMSs Claims Based Outcome Reporting, the following G-code set was chosen for this patient based on their primary functional limitation being treated: The outcome measure chosen to determine the severity of the functional limitation was the Tinetti with a score of 27/28 which was correlated with the impairment scale.     ? Mobility - Walking and Moving Around:     - CURRENT STATUS: CI - 1%-19% impaired, limited or restricted    - GOAL STATUS: CI - 1%-19% impaired, limited or restricted    - D/C STATUS:  CI - 1%-19% impaired, limited or restricted       Pain:      Patient without c/o pain       After treatment:   [x] Patient left in no apparent distress sitting up in chair  [] Patient left in no apparent distress in bed  [x] Call bell left within reach  [x] Nursing notified  [] Caregiver present  [] Bed alarm activated    COMMUNICATION/COLLABORATION:   The patients plan of care was discussed with: Registered Nurse    Lexie Bethea, PT

## 2018-09-06 VITALS
WEIGHT: 98.1 LBS | RESPIRATION RATE: 14 BRPM | HEIGHT: 61 IN | TEMPERATURE: 98 F | OXYGEN SATURATION: 100 % | BODY MASS INDEX: 18.52 KG/M2 | HEART RATE: 88 BPM | SYSTOLIC BLOOD PRESSURE: 111 MMHG | DIASTOLIC BLOOD PRESSURE: 70 MMHG

## 2018-09-06 LAB
ALBUMIN SERPL-MCNC: 2.4 G/DL (ref 3.5–5)
ALBUMIN/GLOB SERPL: 0.5 {RATIO} (ref 1.1–2.2)
ALP SERPL-CCNC: 333 U/L (ref 45–117)
ALT SERPL-CCNC: 28 U/L (ref 12–78)
ANION GAP SERPL CALC-SCNC: 8 MMOL/L (ref 5–15)
AST SERPL-CCNC: 25 U/L (ref 15–37)
BASOPHILS # BLD: 0 K/UL (ref 0–0.1)
BASOPHILS NFR BLD: 0 % (ref 0–1)
BILIRUB SERPL-MCNC: 0.5 MG/DL (ref 0.2–1)
BUN SERPL-MCNC: 43 MG/DL (ref 6–20)
BUN/CREAT SERPL: 19 (ref 12–20)
CALCIUM SERPL-MCNC: 8.1 MG/DL (ref 8.5–10.1)
CHLORIDE SERPL-SCNC: 107 MMOL/L (ref 97–108)
CO2 SERPL-SCNC: 24 MMOL/L (ref 21–32)
CREAT SERPL-MCNC: 2.3 MG/DL (ref 0.55–1.02)
DIFFERENTIAL METHOD BLD: ABNORMAL
EOSINOPHIL # BLD: 0 K/UL (ref 0–0.4)
EOSINOPHIL NFR BLD: 0 % (ref 0–7)
ERYTHROCYTE [DISTWIDTH] IN BLOOD BY AUTOMATED COUNT: 19.9 % (ref 11.5–14.5)
GLOBULIN SER CALC-MCNC: 4.5 G/DL (ref 2–4)
GLUCOSE BLD STRIP.AUTO-MCNC: 113 MG/DL (ref 65–100)
GLUCOSE BLD STRIP.AUTO-MCNC: 124 MG/DL (ref 65–100)
GLUCOSE SERPL-MCNC: 134 MG/DL (ref 65–100)
HCT VFR BLD AUTO: 22.8 % (ref 35–47)
HGB BLD-MCNC: 7.2 G/DL (ref 11.5–16)
IMM GRANULOCYTES # BLD: 0.1 K/UL (ref 0–0.04)
IMM GRANULOCYTES NFR BLD AUTO: 1 % (ref 0–0.5)
LYMPHOCYTES # BLD: 1 K/UL (ref 0.8–3.5)
LYMPHOCYTES NFR BLD: 8 % (ref 12–49)
MCH RBC QN AUTO: 28.1 PG (ref 26–34)
MCHC RBC AUTO-ENTMCNC: 31.6 G/DL (ref 30–36.5)
MCV RBC AUTO: 89.1 FL (ref 80–99)
MONOCYTES # BLD: 0.7 K/UL (ref 0–1)
MONOCYTES NFR BLD: 5 % (ref 5–13)
NEUTS SEG # BLD: 11.6 K/UL (ref 1.8–8)
NEUTS SEG NFR BLD: 86 % (ref 32–75)
NRBC # BLD: 0 K/UL (ref 0–0.01)
NRBC BLD-RTO: 0 PER 100 WBC
PLATELET # BLD AUTO: 352 K/UL (ref 150–400)
PMV BLD AUTO: 10.7 FL (ref 8.9–12.9)
POTASSIUM SERPL-SCNC: 4.7 MMOL/L (ref 3.5–5.1)
PROT SERPL-MCNC: 6.9 G/DL (ref 6.4–8.2)
RBC # BLD AUTO: 2.56 M/UL (ref 3.8–5.2)
SERVICE CMNT-IMP: ABNORMAL
SERVICE CMNT-IMP: ABNORMAL
SODIUM SERPL-SCNC: 139 MMOL/L (ref 136–145)
WBC # BLD AUTO: 13.6 K/UL (ref 3.6–11)

## 2018-09-06 PROCEDURE — 74011250636 HC RX REV CODE- 250/636: Performed by: INTERNAL MEDICINE

## 2018-09-06 PROCEDURE — 82962 GLUCOSE BLOOD TEST: CPT

## 2018-09-06 PROCEDURE — 85025 COMPLETE CBC W/AUTO DIFF WBC: CPT | Performed by: INTERNAL MEDICINE

## 2018-09-06 PROCEDURE — 36415 COLL VENOUS BLD VENIPUNCTURE: CPT | Performed by: INTERNAL MEDICINE

## 2018-09-06 PROCEDURE — 74011636637 HC RX REV CODE- 636/637: Performed by: INTERNAL MEDICINE

## 2018-09-06 PROCEDURE — 80053 COMPREHEN METABOLIC PANEL: CPT | Performed by: INTERNAL MEDICINE

## 2018-09-06 PROCEDURE — 74011250637 HC RX REV CODE- 250/637: Performed by: INTERNAL MEDICINE

## 2018-09-06 RX ORDER — INSULIN GLARGINE 100 [IU]/ML
12 INJECTION, SOLUTION SUBCUTANEOUS DAILY
Status: DISCONTINUED | OUTPATIENT
Start: 2018-09-06 | End: 2018-09-06 | Stop reason: HOSPADM

## 2018-09-06 RX ADMIN — FAMOTIDINE 20 MG: 20 TABLET ORAL at 09:42

## 2018-09-06 RX ADMIN — INSULIN GLARGINE 12 UNITS: 100 INJECTION, SOLUTION SUBCUTANEOUS at 13:47

## 2018-09-06 RX ADMIN — Medication 10 ML: at 05:41

## 2018-09-06 RX ADMIN — SODIUM CHLORIDE 100 ML/HR: 900 INJECTION, SOLUTION INTRAVENOUS at 05:36

## 2018-09-06 RX ADMIN — INSULIN LISPRO 5 UNITS: 100 INJECTION, SOLUTION INTRAVENOUS; SUBCUTANEOUS at 09:41

## 2018-09-06 NOTE — PROGRESS NOTES
Bedside shift change report given to Edmundo Goodwin RN (oncoming nurse) by Aki Hightower RN (offgoing nurse). Report included the following information SBAR, Kardex, Recent Results and Med Rec Status.

## 2018-09-06 NOTE — PROGRESS NOTES
Patient discharge home with family member. Discharge instruction reviewed with patient. IV removed prior to discharge.

## 2018-09-06 NOTE — PROGRESS NOTES
2018      Re Deedee Montiel      1965        To Whom this May concern    This is to let you know that Ms Dragan Buckley was admitted to [facility]  from 2018 to 2018 under the Hospitalist Service. She is scheduled to be seen by her PCP next week and by her Endocrinologist  And Nephrologist  On 2018. Her possible anticipated    Return to work would be 2018 but that will be determined by her Out of Hospital Physicians based on her follow up Visits      Thank you for your kind consideration. She is also going to be submitting her official [place] paper work.           Sincerely,        Mally Hinojosa MD  Discharging Hospitalist Attending  ( electronically signed)

## 2018-09-06 NOTE — DISCHARGE INSTRUCTIONS
HOSPITALIST DISCHARGE INSTRUCTIONS    NAME: Joni Tian   :  1965   MRN:  [id number]     Date/Time:  2018 11:42 AM    ADMIT DATE: 2018   DISCHARGE DATE: 2018         Diagnosis    Acute Renal Failure S/P Transient Hemodialysis due to Hyperkalemia    2nd to Dehydration, ATN, ? Role of Nsaids          Admitting , Cr 9.64, K was 7.4           Discharge BUN 43    Cr 2.3   K is  4.7  Diabetes, SHANTEL, pancreatic Atrophy           Admitting Blood sugar is 1338           A1c is 8.4  E COli UTI, POA  Completed treatment in hospital  Severe Leukocytosis likely a reflection of Hemoconcentration ? Dehydration, also now resolved with Hydration  Anemia of Chronic Disease 2nd to CKD  Patient treated in hospital   to 2018      · It is important that you take the medication exactly as they are prescribed. · Keep your medication in the bottles provided by the pharmacist and keep a list of the medication names, dosages, and times to be taken in your wallet. · Do not take other medications without consulting your doctor. What to do at Home    Recommended diet:  Diabetic Diet  No concentrated Sweets    Recommended activity: Activity as tolerated      If you have questions regarding the hospital related prescriptions or hospital related issues please call [facility] at [phone].  You can always direct your questions to your primary care doctor if you are unable to reach your hospital physician; your PCP works as an extension of your hospital doctor just like your hospital doctor is an extension of your PCP for your time at the hospital [facility])    If you experience any of the following symptoms then please call your primary care physician or return to the emergency room if you cannot get hold of your doctor:    Fever, chills, nausea, vomiting, or persistent diarrhea  Worsening weakness or new problems with your speech or balance  Dark stools or visible blood in your stools  New Leg swelling or shortness of breath as these could be signs of a clot    Additional Instructions:    PLease Start Insulin at 20  units a day then increase by 4 units every 3 days or ONLY if your Blood sugars are between 100-200 until  to your usual 34 Units a day   STOP AMARYL until you see Dr. Jerzy Mcgee any Over the counter Pain Meds:  No Alleve, Naproxen, Motrin, Ibuprofen  DRINK  At least 3 Pitcher fulls of your Hospital water Pitcher  At least 1.5 Liters to 2 Liters of WATER on a hot day  Please make sure you Followup with the Nephrologist          Bring these papers with you to your follow up appointments. The papers will help your doctors be sure to continue the care plan from the hospital.              Information obtained by :  I understand that if any problems occur once I am at home I am to contact my physician. I understand and acknowledge receipt of the instructions indicated above.                                                                                                                                            Physician's or R.N.'s Signature                                                                  Date/Time                                                                                                                                              Patient or Representative Signature

## 2018-09-06 NOTE — ADT AUTH CERT NOTES
Renal Failure, Acute - Care Day 13 (9/5/2018) by Nunu Patel RN        Review Status Review Entered       Completed 9/6/2018       Details              Care Day: 13 Care Date: 9/5/2018 Level of Care: Telemetry       Guideline Day 4        Clinical Status       (X) * Hemodynamic stability       9/6/2018 4:06 PM EDT by Mima Schilder         VS: 99.2, 91, 16, 107/58, 100%              (X) * Mental status at baseline       9/6/2018 4:06 PM EDT by Mima Schilder         Neurologic:                Alert and oriented X 3, normal speech,              (X) * Tachypnea absent       9/6/2018 4:06 PM EDT by Mima Schilder         VS: 99.2, 91, 16, 107/58, 100%              (X) * Hypoxemia absent       9/6/2018 4:06 PM EDT by Mima Schilder         VS: 99.2, 91, 16, 107/58, 100%              ( ) * Etiology requiring continued inpatient care absent       ( ) * Electrolyte abnormalities absent or acceptable for next level of care       ( ) * Acid-base abnormalities absent       ( ) * Volume status at baseline or acceptable for next level of care       (X) * Diet tolerated       (X) * Dialysis not needed, or access and plan established       ( ) * Discharge plans and education understood              Activity       (X) * Ambulatory[I]       9/6/2018 4:06 PM EDT by Mima Schilder         amb in [facility]                     Routes       (X) * Oral hydration, medications, and diet       9/6/2018 4:06 PM EDT by Mima Schilder         pepcid 20mg PO QD,              (X) Renal diet as tolerated       9/6/2018 4:06 PM EDT by Mima Schilder         diabetic diet                     Interventions       (X) Monitor electrolytes, renal function tests, acid-base, and volume status       9/6/2018 4:06 PM EDT by Mima Schilder         daily CMP                     9/6/2018 4:06 PM EDT by Mima Schilder       Subject: Additional Clinical Information       9/5/18VS: 99.2, 91, 16, 107/58, 100%Abnl labs: WBC 14.9, RBC 2.76, H/H 7.8/24.3, Gluc 153, BUN 47, Creat 2.52, Mag 1.5, Meds: NS 100ml/hr IV cont, lantus 28u SC QD, humalog SC SSI QID, Plan:   IVF, SCDs, daily weight, glucose monitoring, I/O,                                   * Milestone              Additional Notes       9/5/18       Neph: --NEERAJ               S/p DKA       DM               Anemia       Thrombocytopenia               Leukocytosis       UTI on admission               proteniuria       hematuria Note plans for dc tomorrow, OK with that       Creatinine improving       Have made her an outpatient with me for  Tuesday 9/18 at 1:30 [place] [phone]               Urine originally with 1.6 grams protein               Likely ATN. Also had been on NSAIDs prior to admission. negative serologic (JUN, ANCA, myeloma). LDH/haptoglobin not c/w hemolysis. Urine eos neg.              IM:  SIRS unclear etiology, present on admission:  persistent leukocytosis with low grade fevers. Pt continues to deny any infectious sx (no rash, dental pain, diarrhea, respiratory or urinary sx).         - CT chest 8/30 with no evidence of acute process in the chest       - CT A/P 8/24 with no significant gastric distention. No bowel obstruction. No acute finding is noted in the abdomen or pelvis within limits of unenhanced technique.       - Lawrence catheter removed 8/31        [facility] negative        - competed rocephin for E Coli UTI, has cleared on repeat urine cutlure       - blood cultures no growth       Acute anemia, unclear etiology:       Liekly NEERAJ on CKD anemia likely multifactorial including acute illness and renal failure, hematuria.  Hg overall stable.       - transfused 2u pRBCs this admission       - haptoglobin slightly elevated, but not felt to be c/w hemolysis       - iron, I68 and folic acid levels WNL.  Ferritin elevated, ?acute phase reactant       - does have hematuria but do not feel this is enough blood loss alone to explain anemia       DKA (resolved) in setting of uncontrolled insulin-dependent DM2 with nephropathy: HgA1c 8.8.  Now off insulin gtt.       - lantus dose decreased by half as she will be NPO for [facility] in AM       - con't holding amaryl for now, not sure if this will be restarted on discharge as she is already on insulin       - lispro scheduled with meals and per sliding scale       NEERAJ and acute encephalopathy with severe hyperkalemia in setting of dehydration/DKA and sepsis/UTI, present on admission: Cr continues to improve. HD stopped 8/24 due to rapid response for hypotension on HD.         She gets admitted here for Hyperglycemia this is her Third admissin.   She works in a [employer] , unreliable Poor Po liquid and hyperglycemia contributing liekly to chronic dehydration with slow Uremia.  Which responds to  IVHydration       - renal US 8/24 normal renal US examination       - urine cx >[id number] pansensitive E Coli, completed rocephin. Not currently on Abx.        - appreciate nephrology assistance; additional labs sent: JUN negative, ANCA pending, normal kappa/lambda ratio, iPTH very elevated       - removed Lawrence 8/31       Pseudohyponatremia, resolved       Abdominal pain, unclear etiology: CT A/P without contrast 8/24 negative           Renal Failure, Acute - Care Day 12 (9/4/2018) by Emely Eden RN        Review Status Review Entered       Completed 9/6/2018       Details              Care Day: 12 Care Date: 9/4/2018 Level of Care: Telemetry       Guideline Day 4        Clinical Status       (X) * Hemodynamic stability       9/6/2018 3:52 PM EDT by Deja Flojermaine         VS: 99.1, 84, 18, 109/59, 98%              (X) * Mental status at baseline       9/6/2018 3:52 PM EDT by Deja Orozco         Neurologic:                Alert and oriented X 3, normal speech              ( ) * Tachypnea absent       (X) * Hypoxemia absent       9/6/2018 3:52 PM EDT by Deja Flojermaine         VS: 99.1, 84, 18, 109/59, 98%              ( ) * Etiology requiring continued inpatient care absent       (X) * Electrolyte abnormalities absent or acceptable for next level of care       9/6/2018 3:52 PM EDT by Simón Mcgraw         none noted              (X) * Acid-base abnormalities absent       9/6/2018 3:52 PM EDT by Simón Mcgraw         none noted              ( ) * Volume status at baseline or acceptable for next level of care       (X) * Diet tolerated       9/6/2018 3:52 PM EDT by Star Laredo         diabetic diet              (X) * Dialysis not needed, or access and plan established       9/6/2018 3:52 PM EDT by Andrew Melendez continues to improve. HD stopped 8/24 due to rapid response for hypotension on HD.              ( ) * Discharge plans and education understood              Activity       (X) * Ambulatory[I]       9/6/2018 3:52 PM EDT by Simón Mcgraw         amb with assist                     Routes       (X) * Oral hydration, medications, and diet       9/6/2018 3:52 PM EDT by Simón Mcgraw         pepcid 20mg PO QD,              (X) Renal diet as tolerated       9/6/2018 3:52 PM EDT by Star Laredo         diabetic diet                     9/6/2018 3:52 PM EDT by Simón Mcgraw       Subject: Additional Clinical Information       9/4/18VS: 99.1, 84, 18, 109/59, 98%Abnl labs: WBC 17.6, RBC 2.76, H/H 7.2/22.7, neutro 90, lypmh 5, Gluc 49, BUN 47, Creat 2.56, HAY[facility] - Mitral valve: There was mild regurgitation. Meds: NS 75ml/hr IV cont, D50W 25g IV x 1, fentanyl 25mcg IV x 1, lantus 17u SC QD, humalog SC SSI QID, Plan:   IVF, IV pain meds, daily weight, SCDs, glucose monitoring, I/O,                                    * Milestone              Additional Notes       9/4/18       Neph: Renal-out of room at [facility]. NEERAJ improving. WIll fu in am       Tonya Dye MD               IM: SIRS unclear etiology, present on admission:  persistent leukocytosis with low grade fevers. Pt continues to deny any infectious sx (no rash, dental pain, diarrhea, respiratory or urinary sx).          - CT chest 8/30 with no evidence of acute process in the chest       - CT A/P 8/24 with no significant gastric distention. No bowel obstruction. No acute finding is noted in the abdomen or pelvis within limits of unenhanced technique.       - Lawrence catheter removed 8/31        HAYDER negative        - competed rocephin for E Coli UTI, has cleared on repeat urine cutlure       - blood cultures no growth       Acute anemia, unclear etiology:       Liekly NEERAJ on CKD anemia likely multifactorial including acute illness and renal failure, hematuria. Hg overall stable.       - transfused 2u pRBCs this admission       - haptoglobin slightly elevated, but not felt to be c/w hemolysis       - iron, G17 and folic acid levels WNL.  Ferritin elevated, ?acute phase reactant       - does have hematuria but do not feel this is enough blood loss alone to explain anemia       DKA (resolved) in setting of uncontrolled insulin-dependent DM2 with nephropathy: HgA1c 8.8.  Now off insulin gtt.       - lantus dose decreased by half as she will be NPO for HAYDER in AM       - con't holding amaryl for now, not sure if this will be restarted on discharge as she is already on insulin       - lispro scheduled with meals and per sliding scale       NEERAJ and acute encephalopathy with severe hyperkalemia in setting of dehydration/DKA and sepsis/UTI, present on admission: Cr continues to improve. HD stopped 8/24 due to rapid response for hypotension on HD.         She gets admitted here for Hyperglycemia this is her Third admissin.   She works in a [employer] , unreliable Poor Po liquid and hyperglycemia contributing liekly to chronic dehydration with slow Uremia.  Which responds to  IVHydration       - renal US 8/24 normal renal US examination       - urine cx >[id number] pansensitive E Coli, completed rocephin. Not currently on Abx.        - appreciate nephrology assistance; additional labs sent: JUN negative, ANCA pending, normal kappa/lambda ratio, iPTH very elevated       - removed Lawrence 8/31       Pseudohyponatremia, resolved       Abdominal pain, unclear etiology: CT A/P without contrast 8/24 negative           Renal Failure, Acute - Care Day 11 (9/3/2018) by Neli Liu RN        Review Status Review Entered       Completed 9/6/2018       Details              Care Day: 11 Care Date: 9/3/2018 Level of Care: Telemetry       Guideline Day 3        Level Of Care       (X) Floor       9/6/2018 3:38 PM EDT by Bessie Ferro         tele                     Clinical Status       (X) * Mental status at baseline or improved       9/6/2018 3:38 PM EDT by Bessie Ferro         Neurologic:                Alert and oriented X 3, normal speech,              (X) * Respiratory status at baseline or improved       9/6/2018 3:38 PM EDT by Bessie Ferro         Resp:                                   CTA bilaterally, no wheezing or rales.  No accessory muscle use                     Activity       (X) Activity as tolerated       9/6/2018 3:38 PM EDT by Bessie Ferro         amb with assist                     Routes       (X) * Oral hydration, medications, and diet       9/6/2018 3:38 PM EDT by Bessie Ferro         pepcid 20mg PO QD,                     Interventions       (X) Monitor electrolytes, renal function tests, acid-base, and volume status       9/6/2018 3:38 PM EDT by Bessie Ferro         daily CMP                     9/6/2018 3:38 PM EDT by Bessie Ferro       Subject: Additional Clinical Information       9/3/18Vs: 98.0, 99, 20, 114/60, 100%ABnl labs: BUN 50, creat 3.00, Meds: lantus 34u SC QD, humalog SC SSI QID, Plan: daily weight, glucose monitoring, I/O,                                    * Milestone              Additional Notes       9/3/18       Neph: --NEERAJ               S/p DKA       DM               Anemia       Thrombocytopenia               Leukocytosis       UTI on admission               proteniuria       hematuria --Creatinine is slowly improving . Good UO. Creatinine was 1.2 on 7/23/18               Removed lawrence (attempted HD on 8/24, but BP dropped and HD was stopped after 20 minutes; no other HD needed/attempted).                 Urine originally with 1.6 grams protein               Likely ATN. Also had been on NSAIDs prior to admission. negative serologic (JUN, ANCA, myeloma). LDH/haptoglobin not c/w hemolysis. Urine eos neg.              IM: SIRS unclear etiology, present on admission:  persistent leukocytosis with low grade fevers. Pt continues to deny any infectious sx (no rash, dental pain, diarrhea, respiratory or urinary sx).         - CT chest 8/30 with no evidence of acute process in the chest       - CT A/P 8/24 with no significant gastric distention. No bowel obstruction. No acute finding is noted in the abdomen or pelvis within limits of unenhanced technique.       - Lawrence catheter removed 8/31       - will get HAYDER tomorrow, have consulted cardiology       - competed rocephin for E Coli UTI, has cleared on repeat urine cutlure       - blood cultures no growth       Acute anemia, unclear etiology: likely multifactorial including acute illness and renal failure, hematuria.  Hg overall stable.       - transfused 2u pRBCs this admission       - haptoglobin slightly elevated, but not felt to be c/w hemolysis       - iron, K82 and folic acid levels WNL.  Ferritin elevated, ?acute phase reactant       - does have hematuria but do not feel this is enough blood loss alone to explain anemia       DKA (resolved) in setting of uncontrolled insulin-dependent DM2 with nephropathy: HgA1c 8.8.  Now off insulin gtt.       - lantus dose decreased by half as she will be NPO for [facility] in AM       - con't holding amaryl for now, not sure if this will be restarted on discharge as she is already on insulin       - lispro scheduled with meals and per sliding scale       NEERAJ and acute encephalopathy with severe hyperkalemia in setting of dehydration/DKA and sepsis/UTI, present on admission: Cr continues to improve. HD stopped 8/24 due to rapid response for hypotension on HD.         - renal US 8/24 normal renal US examination       - urine cx >[id number] pansensitive E Coli, completed rocephin. Not currently on Abx.        - appreciate nephrology assistance; additional labs sent: JUN negative, ANCA pending, normal kappa/lambda ratio, iPTH very elevated       - removed Lawrence 8/31       Pseudohyponatremia, resolved       Abdominal pain, unclear etiology: CT A/P without contrast 8/24 negative                Code Status: full       Surrogate Decision Maker: Dorothy Bran CDSBR        DVT Prophylaxis: SCDs with anemia

## 2018-09-06 NOTE — DISCHARGE SUMMARY
Hospitalist Discharge Summary     Patient ID:  Jefe Osei  [id number]  37 y.o.  1965    PCP on record: Patrick Vieira MD    Admit date: 8/24/2018  Discharge date and time: 9/6/2018      DISCHARGE DIAGNOSIS:    SIRS unclear etiology, present on admission:  Dehydration with Hemoconcetration   Acute anemia, unclear etiology:   NEERAJ on CKD anemia, Transient HD due to Loura Fisherman  DKA (resolved) in setting of uncontrolled insulin-dependent DM2 with nephropathy:   NEERAJ and acute encephalopathy with severe hyperkalemia in setting of dehydration/DKA and sepsis/UTI,  Pseudohyponatremia, resolved  Abdominal pain, unclear etiology: CT A/P without contrast 8/24 negative        CONSULTATIONS:  IP CONSULT TO NEPHROLOGY  IP CONSULT TO CARDIOLOGY    Excerpted HPI from H&P of Nikos Martinez MD:  Hans Garvin is a 45 y.o.  female  with PMHx significant for DM, pancreatic atrophy, presents to the ED with cc of low blood sugar x 1 week. She reports HA, nausea, and mild abd pain x 5 days. Pt reports mild chest discomfort on exertion. Pt's friend reports worsening fatigue, noting pt is not normally this lethargic. Pt states she has been checking her sugars twice a day, once in the morning and once at night. She notes that her numbers have been in the 50s both times of day over the past week. Pt endorses taking her insulin as directed, 40 units BID; last dose was last night. She affirms eating normally. Pt reports that she saw her doctor 4 months ago at which point everything is fine. She states she has a regular follow up scheduled for November. Pt denies associated syncope. She denies black or bloody stool. At this time patient is lying in bed c/o abdominal pain, feeling weak and tired not eating nor drinking at home, constipated for 1 week having Nausea and vomiting, denies chest pain, no SOB, no fever, no diarrhea, no other associated symptoms.   We were asked to admit for work up and evaluation of the above problems. ______________________________________________________________________  DISCHARGE SUMMARY/HOSPITAL COURSE:  for full details see H&P, daily progress notes, labs, consult notes. SIRS unclear etiology, present on admission:  persistent leukocytosis with low grade fevers. Pt continues to deny any infectious sx (no rash, dental pain, diarrhea, respiratory or urinary sx).    - CT chest 8/30 with no evidence of acute process in the chest  - CT A/P 8/24 with no significant gastric distention. No bowel obstruction. No acute finding is noted in the abdomen or pelvis within limits of unenhanced technique. - Lawrence catheter removed 8/31   HAYDER negative   - competed rocephin for E Coli UTI, has cleared on repeat urine cutlure  - blood cultures no growth  Acute anemia, unclear etiology:  Liekly NEERAJ on CKD anemia likely multifactorial including acute illness and renal failure, hematuria. Hg overall stable. - transfused 2u pRBCs this admission  - haptoglobin slightly elevated, but not felt to be c/w hemolysis  - iron, R15 and folic acid levels WNL.  Ferritin elevated, ?acute phase reactant  - does have hematuria but do not feel this is enough blood loss alone to explain anemia  DKA (resolved) in setting of uncontrolled insulin-dependent DM2 with nephropathy: HgA1c 8.8.  Now off insulin gtt. - lantus dose decreased by half as she will be NPO for [facility] in AM  - con't holding amaryl for now, not sure if this will be restarted on discharge as she is already on insulin  - lispro scheduled with meals and per sliding scale  NEERAJ and acute encephalopathy with severe hyperkalemia in setting of dehydration/DKA and sepsis/UTI, present on admission: Cr continues to improve. HD stopped 8/24 due to rapid response for hypotension on HD.    She gets admitted here for Hyperglycemia this is her Third admissin.    She works in a [employer] , unreliable Poor Po liquid and hyperglycemia contributing [employer] to chronic dehydration with slow Uremia. Which responds to  IVHydration  - renal US 8/24 normal renal US examination  - urine cx >[id number] pansensitive E Coli, completed rocephin. Not currently on Abx.  - appreciate nephrology assistance; additional labs sent: JUN negative, ANCA pending, normal kappa/lambda ratio, iPTH very elevated  - removed Lawrence 8/31  Pseudohyponatremia, resolved  Abdominal pain, unclear etiology: CT A/P without contrast 8/24 negative      Code Status: full  Surrogate Decision Maker: Mayuri Kurt HGBEP [phone]  DVT Prophylaxis: SCDs with anemia        _______________________________________________________________________  Patient seen and examined by me on discharge day. Pertinent Findings:  Gen:    Not in distress  Chest: Clear lungs  CVS:   Regular rhythm. No edema  Abd:  Soft, not distended, not tender  Neuro:  Alert, Ox3  _______________________________________________________________________  DISCHARGE MEDICATIONS:   Current Discharge Medication List      CONTINUE these medications which have NOT CHANGED    Details   BASAGLAR KWIKPEN U-100 INSULIN 100 unit/mL (3 mL) inpn TAKE 34 UNITS ONCE DAILY SUBCUTANEOUSLY  Qty: 15 Pen, Refills: 3      albuterol (PROVENTIL HFA, VENTOLIN HFA, PROAIR HFA) 90 mcg/actuation inhaler Take 2 Puffs by inhalation every four (4) hours as needed for Wheezing. Qty: 1 Inhaler, Refills: 0      albuterol (PROVENTIL VENTOLIN) 2.5 mg /3 mL (0.083 %) nebulizer solution 2.5 mg by Nebulization route every four (4) hours as needed for Shortness of Breath.   Refills: 0         STOP taking these medications       naproxen sodium (ALEVE) 220 mg tablet Comments:   Reason for Stopping:         glimepiride (AMARYL) 1 mg tablet Comments:   Reason for Stopping:               My Recommended Diet, Activity, Wound Care, and follow-up labs are listed in the patient's Discharge Insturctions which I have personally completed and reviewed.     ______________________________________________________________________    Risk of deterioration: Low    Condition at Discharge:  Stable  ______________________________________________________________________    Disposition  Home with family, no needs  ______________________________________________________________________    Care Plan discussed with:   Patient, Family, RN    ______________________________________________________________________    Code Status: Full Code  ______________________________________________________________________      Follow up with:   PCP : Erendira Maza MD  Follow-up Information     Follow up With Details Comments [address] MD Shalini Schedule an appointment as soon as possible for a visit in 1 week [facility] Visit [facility] [address]   [address]      Sheryl Mancini MD On 9/18/2018 1: 30 pm.  Please come 30 min to 1 hour  because you are a New patient [address]  [address]  [address] [facility]      Modesto Fox MD Schedule an appointment as soon as possible for a visit 7-10 days [address]  [phone]                Total time in minutes spent coordinating this discharge (includes going over instructions, follow-up, prescriptions, and preparing report for sign off to her PCP) :  30 minutes    Signed:  Yasmine Go MD

## 2018-09-07 LAB
BACTERIA SPEC CULT: NORMAL
SERVICE CMNT-IMP: NORMAL

## 2018-09-10 LAB
BACTERIA SPEC CULT: NORMAL
SERVICE CMNT-IMP: NORMAL

## 2018-09-11 ENCOUNTER — TELEPHONE (OUTPATIENT)
Dept: ENDOCRINOLOGY | Age: 53
End: 2018-09-11

## 2018-09-11 NOTE — TELEPHONE ENCOUNTER
I returned Ms. Troy's call. She said she was discharged from the hospital a couple of days ago. They gave her 20 units when she was discharged. She took her blood sugar this morning and it was 377 so she took 30 units of insulin. She says she is drinking plenty of water and trying to do the right thing. She took her blood sugar around 5 PM and it was still 377 so she wanted to know what she should do. I spoke with Dr. Salvador Yanez who said for her to stop the Basaglar and start taking her 70/30 insulin. She should take 15 units now and do 18 units at [facility] [employer]. I called her back and relayed this information and she said she would do this. I told her to give me a call tomorrow with her numbers and we would see if we needed to make any further changes.   Cele Bonilla

## 2018-09-11 NOTE — TELEPHONE ENCOUNTER
----- Message from [employer] sent at 9/11/2018  9:20 AM EDT -----  Regarding: Dr. Joel Wu  Pt requested a call back to discuss elevated BS ([phone]). Pt stated she was d/c'd from [facility]  two days ago for this reason and expressed concern. Best contact [phone].

## 2018-09-12 ENCOUNTER — OFFICE VISIT (OUTPATIENT)
Dept: FAMILY MEDICINE CLINIC | Age: 53
End: 2018-09-12

## 2018-09-12 ENCOUNTER — TELEPHONE (OUTPATIENT)
Dept: ENDOCRINOLOGY | Age: 53
End: 2018-09-12

## 2018-09-12 VITALS
DIASTOLIC BLOOD PRESSURE: 69 MMHG | TEMPERATURE: 98.3 F | WEIGHT: 99 LBS | SYSTOLIC BLOOD PRESSURE: 111 MMHG | BODY MASS INDEX: 18.69 KG/M2 | HEIGHT: 61 IN | OXYGEN SATURATION: 96 % | RESPIRATION RATE: 18 BRPM | HEART RATE: 95 BPM

## 2018-09-12 DIAGNOSIS — A41.9 SEPSIS, DUE TO UNSPECIFIED ORGANISM: ICD-10-CM

## 2018-09-12 DIAGNOSIS — Z09 HOSPITAL DISCHARGE FOLLOW-UP: Primary | ICD-10-CM

## 2018-09-12 DIAGNOSIS — Z91.199 NONCOMPLIANCE: ICD-10-CM

## 2018-09-12 DIAGNOSIS — Z11.59 NEED FOR HEPATITIS C SCREENING TEST: ICD-10-CM

## 2018-09-12 DIAGNOSIS — Z23 ENCOUNTER FOR IMMUNIZATION: ICD-10-CM

## 2018-09-12 DIAGNOSIS — D63.1 ANEMIA IN CHRONIC KIDNEY DISEASE, UNSPECIFIED CKD STAGE: ICD-10-CM

## 2018-09-12 DIAGNOSIS — E11.00 UNCONTROLLED TYPE 2 DM WITH HYPEROSMOLAR NONKETOTIC HYPERGLYCEMIA (HCC): ICD-10-CM

## 2018-09-12 DIAGNOSIS — E11.10 DIABETIC KETOACIDOSIS WITHOUT COMA ASSOCIATED WITH TYPE 2 DIABETES MELLITUS (HCC): ICD-10-CM

## 2018-09-12 DIAGNOSIS — N17.9 ACUTE RENAL FAILURE, UNSPECIFIED ACUTE RENAL FAILURE TYPE (HCC): ICD-10-CM

## 2018-09-12 DIAGNOSIS — N18.9 ANEMIA IN CHRONIC KIDNEY DISEASE, UNSPECIFIED CKD STAGE: ICD-10-CM

## 2018-09-12 NOTE — TELEPHONE ENCOUNTER
I returned Ms. Troy's call. She said she took the new dose of the 70/30 insulin last night and this morning and her blood sugar was 273 this morning and 283 around 1:45 PM. I told her I would pass this on to Dr. Janelle Clark and get back to her.   Cony Atwood

## 2018-09-12 NOTE — PATIENT INSTRUCTIONS
Vaccine Information Statement    Influenza (Flu) Vaccine (Inactivated or Recombinant): What you need to know    Many Vaccine Information Statements are available in Korean and other languages. See www.immunize.org/vis  Hojas de Información Sobre Vacunas están disponibles en Español y en muchos otros idiomas. Visite www.immunize.org/vis    1. Why get vaccinated? Influenza (flu) is a contagious disease that spreads around the [place] every year, usually between October and May. Flu is caused by influenza viruses, and is spread mainly by coughing, sneezing, and close contact. Anyone can get flu. Flu strikes suddenly and can last several days. Symptoms vary by age, but can include:   fever/chills   sore throat   muscle aches   fatigue   cough   headache    runny or stuffy nose    Flu can also lead to pneumonia and blood infections, and cause diarrhea and seizures in children. If you have a medical condition, such as heart or lung disease, flu can make it worse. Flu is more dangerous for some people. Infants and young children, people 72years of age and older, pregnant women, and people with certain health conditions or a weakened immune system are at greatest risk. Each year thousands of people in the [facility] die from flu, and many more are hospitalized. Flu vaccine can:   keep you from getting flu,   make flu less severe if you do get it, and   keep you from spreading flu to your family and other people. 2. Inactivated and recombinant flu vaccines    A dose of flu vaccine is recommended every flu season. Children 6 months through 6years of age may need two doses during the same flu season. Everyone else needs only one dose each flu season.        Some inactivated flu vaccines contain a very small amount of a mercury-based preservative called thimerosal. Studies have not shown thimerosal in vaccines to be harmful, but flu vaccines that do not contain thimerosal are available. There is no live flu virus in flu shots. They cannot cause the flu. There are many flu viruses, and they are always changing. Each year a new flu vaccine is made to protect against three or four viruses that are likely to cause disease in the upcoming flu season. But even when the vaccine doesnt exactly match these viruses, it may still provide some protection    Flu vaccine cannot prevent:   flu that is caused by a virus not covered by the vaccine, or   illnesses that look like flu but are not. It takes about 2 weeks for protection to develop after vaccination, and protection lasts through the flu season. 3. Some people should not get this vaccine    Tell the person who is giving you the vaccine:     If you have any severe, life-threatening allergies. If you ever had a life-threatening allergic reaction after a dose of flu vaccine, or have a severe allergy to any part of this vaccine, you may be advised not to get vaccinated. Most, but not all, types of flu vaccine contain a small amount of egg protein.  If you ever had Guillain-Barré Syndrome (also called GBS). Some people with a history of GBS should not get this vaccine. This should be discussed with your doctor.  If you are not feeling well. It is usually okay to get flu vaccine when you have a mild illness, but you might be asked to come back when you feel better. 4. Risks of a vaccine reaction    With any medicine, including vaccines, there is a chance of reactions. These are usually mild and go away on their own, but serious reactions are also possible. Most people who get a flu shot do not have any problems with it.      Minor problems following a flu shot include:    soreness, redness, or swelling where the shot was given     hoarseness   sore, red or itchy eyes   cough   fever   aches   headache   itching   fatigue  If these problems occur, they usually begin soon after the shot and last 1 or 2 days. More serious problems following a flu shot can include the following:     There may be a small increased risk of Guillain-Barré Syndrome (GBS) after inactivated flu vaccine. This risk has been estimated at 1 or 2 additional cases per million people vaccinated. This is much lower than the risk of severe complications from flu, which can be prevented by flu vaccine.  Young children who get the flu shot along with pneumococcal vaccine (PCV13) and/or DTaP vaccine at the same time might be slightly more likely to have a seizure caused by fever. Ask your doctor for more information. Tell your doctor if a child who is getting flu vaccine has ever had a seizure. Problems that could happen after any injected vaccine:      People sometimes faint after a medical procedure, including vaccination. Sitting or lying down for about 15 minutes can help prevent fainting, and injuries caused by a fall. Tell your doctor if you feel dizzy, or have vision changes or ringing in the ears.  Some people get severe pain in the shoulder and have difficulty moving the arm where a shot was given. This happens very rarely.  Any medication can cause a severe allergic reaction. Such reactions from a vaccine are very rare, estimated at about 1 in a million doses, and would happen within a few minutes to a few hours after the vaccination. As with any medicine, there is a very remote chance of a vaccine causing a serious injury or death. The safety of vaccines is always being monitored. For more information, visit: www.cdc.gov/vaccinesafety/    5. What if there is a serious reaction? What should I look for?  Look for anything that concerns you, such as signs of a severe allergic reaction, very high fever, or unusual behavior.     Signs of a severe allergic reaction can include hives, swelling of the face and throat, difficulty breathing, a fast heartbeat, dizziness, and weakness - usually within a few minutes to a few hours after the vaccination. What should I do?  If you think it is a severe allergic reaction or other emergency that cant wait, call 9-1-1 and get the person to the nearest hospital. Otherwise, call your doctor.  Reactions should be reported to the Vaccine Adverse Event Reporting System (VAERS). Your doctor should file this report, or you can do it yourself through  the VAERS web site at www.vaers. [facility].gov, or by calling 9-819.722.2612. VAERS does not give medical advice. 6. The National Vaccine Injury Compensation Program    The [facility] Vaccine Injury Compensation Program (VICP) is a federal program that was created to compensate people who may have been injured by certain vaccines. Persons who believe they may have been injured by a vaccine can learn about the program and about filing a claim by calling 4-449.898.9213 or visiting the [employer] website at www.[facility].gov/vaccinecompensation. There is a time limit to file a claim for compensation. 7. How can I learn more?  Ask your healthcare provider. He or she can give you the vaccine package insert or suggest other sources of information.  Call your local or state health department.  Contact the Centers for Disease Control and Prevention (CDC):  - Call 7-189.800.2158 (1-800-CDC-INFO) or  - Visit CDCs website at www.cdc.gov/flu    Vaccine Information Statement   Inactivated Influenza Vaccine   8/7/2015  42 PAVITHRA Villarreal [id number]    Department of Health and Human Services  Centers for Disease Control and Prevention    Office Use Only

## 2018-09-12 NOTE — LETTER
10/22/2018 3:14 PM 
 
Ms. Zurdo Camarillo [address] [place] [phone] Dear Zurdo Camarillo: 
 
Please find your most recent results below. Resulted Orders HGB & HCT Result Value Ref Range HGB 8.7 (L) 11.1 - 15.9 g/dL HCT 27.4 (L) 34.0 - 46.6 % Narrative Performed at:  [address]  [id number] : Junaid Vazquez MD, Phone:  [phone] METABOLIC PANEL, COMPREHENSIVE Result Value Ref Range Glucose 239 (H) 65 - 99 mg/dL BUN 31 (H) 6 - 24 mg/dL Creatinine 1.96 (H) 0.57 - 1.00 mg/dL GFR est non-AA 28 (L) >59 mL/min/1.73 GFR est AA 33 (L) >59 mL/min/1.73  
 BUN/Creatinine ratio 16 9 - 23 Sodium 140 134 - 144 mmol/L Potassium 4.7 3.5 - 5.2 mmol/L Chloride 97 96 - 106 mmol/L  
 CO2 24 20 - 29 mmol/L Calcium 9.0 8.7 - 10.2 mg/dL Protein, total 8.2 6.0 - 8.5 g/dL Albumin 4.0 3.5 - 5.5 g/dL GLOBULIN, TOTAL 4.2 1.5 - 4.5 g/dL A-G Ratio 1.0 (L) 1.2 - 2.2 Bilirubin, total 0.4 0.0 - 1.2 mg/dL Alk. phosphatase 359 (H) 39 - 117 IU/L  
 AST (SGOT) 37 0 - 40 IU/L  
 ALT (SGPT) 32 0 - 32 IU/L Narrative Performed at:  [address]  [id number] : Junaid Vazquez MD, Phone:  [phone] HEPATITIS C AB Result Value Ref Range Hep C Virus Ab 0.8 0.0 - 0.9 s/co ratio Comment:  
                                     Negative:     < 0.8 Indeterminate: 0.8 - 0.9 Positive:     > 0.9 The CDC recommends that a positive HCV antibody result 
 be followed up with a HCV Nucleic Acid Amplification 
 test ([id number]). Narrative Performed at:  [facility] [address] [place] [address]  [id number] : Junaid Vazquez MD, Phone:  [phone] OCCULT BLOOD IMMUNOASSAY,DIAGNOSTIC Result Value Ref Range Occult blood fecal, by IA Negative Negative Narrative Performed at:  [address]  [id number] : Sabina Bull MD, Phone:  [phone] Please call me if you have any questions: [phone] Sincerely, Constantin Bonilla MD

## 2018-09-12 NOTE — TELEPHONE ENCOUNTER
I called and spoke with patient. Plan:   70/30 insulin, off basaglar,  10 units now  Adjust dose to 25 units with breakfast, 20 units with dinner  She will continue the prior 1:30>150 correction scale    Advised to let me know if sugars don't get under better control,     Rosana Bolaños.  [facility]  [address] [place] [address]

## 2018-09-12 NOTE — PROGRESS NOTES
Chief Complaint   Patient presents with   [facility] Follow Up         Order placed for Flulaval, per Verbal Order from Dr. Abdullahi Lawson on 9/12/2018 due to need for immunization. Abigail Barlow is a 45 y.o. female who presents for routine immunizations. She denies any symptoms , reactions or allergies that would exclude them from being immunized today. Risks and adverse reactions were discussed and the VIS was given to them. All questions were addressed. She was observed for 15 min post injection. There were no reactions observed.     Rosanna Mittal LPN

## 2018-09-12 NOTE — PROGRESS NOTES
Cadence Alfaro is a 45 y.o. female, she's here with her cousin who's also a care taker. Noncompliance. I last saw this patient in 10/2016. Never f/u as directed and noncompliance with meds. has a past medical history of Asthma; Diabetes ([facility] [address] Elevated transaminase level (6/29/2016); Insulin dependent diabetes mellitus ([facility] [address] (6/20/2016); and Pancreatic atrophy (6/20/2016). Hospital discharge follow up  DKA  DM2, Dr. Harsh Devi have called and changed her basaglar to 70/30 mix 18u BID. Just started yesterday. Anemia  Multifactorial  Denies hematuria, hematochezia. Denies dizziness, light headed. NEERAJ on CKD: have appointment with Nephrology 09/18/201    Sepsis: since discharge denies fever, chills, pelvic pain, dysuria. Discussed noncompliance. Reviewed: active problem list, medication list, allergies, notes from last encounter, lab results    A comprehensive review of systems was negative except for that written in the HPI.     -----------------------------------------------------------------------  Admit date: 8/24/2018  Discharge date and time: 9/6/2018        DISCHARGE DIAGNOSIS:     SIRS unclear etiology, present on admission:  Dehydration with Hemoconcetration   Acute anemia, unclear etiology:   NEERAJ on CKD anemia, Transient HD due to [place]  DKA (resolved) in setting of uncontrolled insulin-dependent DM2 with nephropathy:   NEERAJ and acute encephalopathy with severe hyperkalemia in setting of dehydration/DKA and sepsis/UTI,  Pseudohyponatremia, resolved  Abdominal pain, unclear etiology: CT A/P without contrast 8/24 negative         CONSULTATIONS:  IP CONSULT TO NEPHROLOGY  IP CONSULT TO CARDIOLOGY     Excerpted HPI from H&P of Lala Churchill MD:  Laurel Vega a 45 y. o.   female  with PMHx significant for DM, pancreatic atrophy, presents to the ED with cc of low blood sugar x 1 week. She reports HA, nausea, and mild abd pain x 5 days.  Pt reports mild chest discomfort on exertion. Pt's friend reports worsening fatigue, noting pt is not normally this lethargic. Pt states she has been checking her sugars twice a day, once in the morning and once at night. She notes that her numbers have been in the 50s both times of day over the past week. Pt endorses taking her insulin as directed, 40 units BID; last dose was last night. She affirms eating normally. Pt reports that she saw her doctor 4 months ago at which point everything is fine. She states she has a regular follow up scheduled for November. Pt denies associated syncope. She denies black or bloody stool. At this time patient is lying in bed c/o abdominal pain, feeling weak and tired not eating nor drinking at home, constipated for 1 week having Nausea and vomiting, denies chest pain, no SOB, no fever, no diarrhea, no other associated symptoms. We were asked to admit for work up and evaluation of the above problems.     ______________________________________________________________________  DISCHARGE SUMMARY/HOSPITAL COURSE:  for full details see H&P, daily progress notes, labs, consult notes.      SIRS unclear etiology, present on admission:  persistent leukocytosis with low grade fevers. Pt continues to deny any infectious sx (no rash, dental pain, diarrhea, respiratory or urinary sx).    - CT chest 8/30 with no evidence of acute process in the chest  - CT A/P 8/24 with no significant gastric distention. No bowel obstruction. No acute finding is noted in the abdomen or pelvis within limits of unenhanced technique. - Lawrence catheter removed 8/31   HAYDER negative   - competed rocephin for E Coli UTI, has cleared on repeat urine cutlure  - blood cultures no growth  Acute anemia, unclear etiology:  [place] NEERAJ on CKD anemia likely multifactorial including acute illness and renal failure, hematuria. Hg overall stable.   - transfused 2u pRBCs this admission  - haptoglobin slightly elevated, but not felt to be c/w hemolysis  - iron, Z44 and folic acid levels WNL.  Ferritin elevated, ?acute phase reactant  - does have hematuria but do not feel this is enough blood loss alone to explain anemia  DKA (resolved) in setting of uncontrolled insulin-dependent DM2 with nephropathy: HgA1c 8.8.  Now off insulin gtt. - lantus dose decreased by half as she will be NPO for HAYDER in AM  - con't holding amaryl for now, not sure if this will be restarted on discharge as she is already on insulin  - lispro scheduled with meals and per sliding scale  NEERAJ and acute encephalopathy with severe hyperkalemia in setting of dehydration/DKA and sepsis/UTI, present on admission: Cr continues to improve. HD stopped 8/24 due to rapid response for hypotension on HD.    She gets admitted here for Hyperglycemia this is her Third admissin.   She works in a [employer] , unreliable Poor Po liquid and hyperglycemia contributing liekly to chronic dehydration with slow Uremia.  Which responds to  IVHydration  - renal US 8/24 normal renal US examination  - urine cx >[id number] pansensitive E Coli, completed rocephin. Not currently on Abx.  - appreciate nephrology assistance; additional labs sent: JUN negative, ANCA pending, normal kappa/lambda ratio, iPTH very elevated  - removed Lawrence 8/31  Pseudohyponatremia, resolved  Abdominal pain, unclear etiology: CT A/P without contrast 8/24 negative      Code Status: full  Surrogate Decision Maker: Edu Cross [facility]   DVT Prophylaxis: SCDs with anemia     _______________________________________________________________________  DISCHARGE MEDICATIONS:   CONTINUE these medications which have NOT CHANGED     Details   BASAGLAR KWIKPEN U-100 INSULIN 100 unit/mL (3 mL) inpn TAKE 34 UNITS ONCE DAILY SUBCUTANEOUSLY  Qty: 15 Pen, Refills: 3       albuterol (PROVENTIL HFA, VENTOLIN HFA, PROAIR HFA) 90 mcg/actuation inhaler Take 2 Puffs by inhalation every four (4) hours as needed for Wheezing.   Qty: 1 Inhaler, Refills: 0     albuterol (PROVENTIL VENTOLIN) 2.5 mg /3 mL (0.083 %) nebulizer solution 2.5 mg by Nebulization route every four (4) hours as needed for Shortness of Breath. Refills: 0       STOP taking these medications         naproxen sodium (ALEVE) 220 mg tablet Comments:   Reason for Stopping:            glimepiride (AMARYL) 1 mg tablet Comments:   Reason for Stopping:       ------------------------------------------------------------------------------------------    Allergies   Allergen Reactions    Contrast Agent [Iodine] Itching    Hydrocodone Rash    Percocet [Oxycodone-Acetaminophen] Rash    Codeine Nausea and Vomiting     Current Outpatient Prescriptions on File Prior to Visit   Medication Sig Dispense Refill    albuterol (PROVENTIL HFA, VENTOLIN HFA, PROAIR HFA) 90 mcg/actuation inhaler Take 2 Puffs by inhalation every four (4) hours as needed for Wheezing. 1 Inhaler 0     No current facility-administered medications on file prior to visit. Patient Active Problem List   Diagnosis Code    Hyperosmolality syndrome E87.0    Insulin dependent diabetes mellitus ([facility] [address] E11.9, Z79.4    Pancreatic atrophy K86.89    Elevated transaminase level R74.0    Uncontrolled type 2 DM with hyperosmolar nonketotic hyperglycemia ([facility]) E11.00    Type 2 diabetes with nephropathy ([facility]) E11.21    DKA (diabetic ketoacidoses) ([facility]) E13.10    Sepsis ([facility]) A41.9    Hyponatremia E87.1    ARF (acute renal failure) ([facility]) N17.9    Hyperkalemia E87.5       Visit Vitals    /69 (BP 1 Location: Left arm, BP Patient Position: Sitting)    Pulse 95    Temp 98.3 °F (36.8 °C) (Oral)    Resp 18    Ht 5' 1\" (1.549 m)    Wt 99 lb (44.9 kg)    SpO2 96%    BMI 18.71 kg/m2     General appearance: alert, cooperative, no distress, appears stated age  Neurologic: Alert and oriented X 3, normal strength and tone, symmetric. Normal without focal findings. Cranial nerves 2-12 intact. Normal coordination and gait.   Mental status: Alert, oriented, thought content appropriate, affect: stable, mood-congruent. Head: Normocephalic, without obvious abnormality, atraumatic  Eyes: conjunctivae/corneas clear. PERRL, EOM's intact. Neck: supple, symmetrical, trachea midline, no JVD  Lungs: clear to auscultation bilaterally  Heart: regular rate and rhythm, S1, S2 normal, no murmur, click, rub or gallop  Abdomen: soft, non-tender. Extremities: extremities normal, atraumatic, no cyanosis or edema      Assessment/Plans:    Diagnoses and all orders for this visit:    1. Hospital discharge follow-up  -     HGB & HCT  -     METABOLIC PANEL, COMPREHENSIVE  -     HEPATITIS C AB    2. Noncompliance    3. Need for hepatitis C screening test  -     HEPATITIS C AB    4. Uncontrolled type 2 DM with hyperosmolar nonketotic hyperglycemia (HCC)  -     METABOLIC PANEL, COMPREHENSIVE    5. Diabetic ketoacidosis without coma associated with type 2 diabetes mellitus ([facility] [address]    6. Sepsis, due to unspecified organism ([facility] [address]  -     HEPATITIS C AB    7. Acute renal failure, unspecified acute renal failure type ([address]  -     METABOLIC PANEL, COMPREHENSIVE  -     HEPATITIS C AB    8. Anemia in chronic kidney disease, unspecified CKD stage  -     HGB & HCT  -     METABOLIC PANEL, COMPREHENSIVE  -     HEPATITIS C AB  -     OCCULT BLOOD IMMUNOASSAY,DIAGNOSTIC    9. Encounter for immunization  -     Influenza virus vaccine (QUADRIVALENT PRES FREE SYRINGE) IM (39938)  -     MI IMMUNIZ ADMIN,1 SINGLE/COMB VAC/TOXOID    Other orders  -     CKD REPORT  -     DIABETES PATIENT EDUCATION      Discussed plans, risk/benefits of treatments/observations. Through the use of shared decision making, above plans were agreed upon. Medication compliance advised. Patient verbalized understanding.      Follow-up Disposition:  Return in about 4 weeks (around 10/10/2018) for annual exam.      Yves Kinney MD  9/17/2018

## 2018-09-12 NOTE — MR AVS SNAPSHOT
Clarice Quiroz [address] [facility] 
[phone] Patient: Ira Mccauley MRN: [id number] ALEJANDRO:[id number] Visit Information Date & Time Provider Department Dept. Phone Encounter #  
 9/12/2018 12:00 PM Michael Cohen MD [facility] at [address] [id number] Follow-up Instructions Return in about 4 weeks (around 10/10/2018) for annual exam.  
  
Your Appointments 10/17/2018  3:30 PM  
Follow Up with MD [facility] [address] [place] [address]) [facility] [address] [id number] 145 [facility] [address] [place] [address]  
  
    
 11/14/2018  2:50 PM  
Follow Up with MD [facility] [address] [place] [address]) Appt Note: f/u          dm            4 month  
 [address] [facility] [address] [phone] [phone] Upcoming Health Maintenance Date Due Hepatitis C Screening 1965 EYE EXAM RETINAL OR DILATED Q1 9/21/1973 Pneumococcal 19-64 Highest Risk (1 of 3 - PCV13) 9/21/1982 DTaP/Tdap/Td series (1 - Tdap) 9/21/1984 PAP AKA CERVICAL CYTOLOGY 9/21/1984 FOBT Q 1 YEAR AGE 50-75 10/19/2017 BREAST CANCER SCRN MAMMOGRAM 7/26/2018 Influenza Age 5 to Adult 8/1/2018 FOOT EXAM Q1 1/19/2019 HEMOGLOBIN A1C Q6M 2/25/2019 MICROALBUMIN Q1 3/6/2019 LIPID PANEL Q1 3/6/2019 Allergies as of 9/12/2018  Review Complete On: 9/12/2018 By: Cole Al LPN Severity Noted Reaction Type Reactions Contrast Agent [Iodine] Medium 06/29/2016    Itching Hydrocodone Medium 10/19/2016    Rash Percocet [Oxycodone-acetaminophen] Medium 10/19/2016    Rash Codeine  12/01/2014   Intolerance Nausea and Vomiting Current Immunizations  Never Reviewed Name Date Influenza Vaccine 9/1/2014 Not reviewed this visit You Were Diagnosed With   
  
 Codes Comments Noncompliance    -  Primary ICD-10-CM: Z91.19 ICD-9-CM: V15.81 Need for hepatitis C screening test     ICD-10-CM: Z11.59 
ICD-9-CM: V73.89 Uncontrolled type 2 DM with hyperosmolar nonketotic hyperglycemia (HCC)     ICD-10-CM: E11.00 ICD-9-CM: 250.22 Diabetic ketoacidosis without coma associated with type 2 diabetes mellitus ([facility]     ICD-10-CM: E11.10 ICD-9-CM: 250.12 Sepsis, due to unspecified organism [facility])     ICD-10-CM: A41.9 ICD-9-CM: 038.9, 995.91 Acute renal failure, unspecified acute renal failure type ([facility].)     ICD-10-CM: N17.9 ICD-9-CM: 478. 9 Anemia in chronic kidney disease, unspecified CKD stage     ICD-10-CM: N18.9, D63.1 ICD-9-CM: 285.21 Hospital discharge follow-up     ICD-10-CM: 593 [facility] ICD-9-CM: V67.59 Vitals BP Pulse Temp Resp Height(growth percentile) Weight(growth percentile) 111/69 (BP 1 Location: Left arm, BP Patient Position: Sitting) 95 98.3 °F (36.8 °C) (Oral) 18 5' 1\" (1.549 m) 99 lb (44.9 kg) SpO2 BMI OB Status Smoking Status 96% 18.71 kg/m2 Menopause Never Smoker BMI and BSA Data Body Mass Index Body Surface Area 18.71 kg/m 2 1.39 m 2 Preferred Pharmacy Pharmacy [id number] [place], [address] [facility] 1 [phone] Your Updated Medication List  
  
   
This list is accurate as of 9/12/18 12:35 PM.  Always use your most recent med list.  
  
  
  
  
 albuterol 90 mcg/actuation inhaler Commonly known as:  PROVENTIL HFA, VENTOLIN HFA, PROAIR HFA Take 2 Puffs by inhalation every four (4) hours as needed for Wheezing. NovoLIN 70/30 U-100 Insulin 100 unit/mL (70-30) injection Generic drug:  insulin NPH/insulin regular 18 Units by SubCUTAneous route Before breakfast and dinner. We Performed the Following HEPATITIS C AB [07376 CPT(R)] HGB & HCT [94325 CPT(R)] METABOLIC PANEL, COMPREHENSIVE [83282 CPT(R)] Follow-up Instructions Return in about 4 weeks (around 10/10/2018) for annual exam.  
  
  
 Please provide this summary of care documentation to your next provider. If you have any questions after today's visit, please call [phone].

## 2018-09-13 ENCOUNTER — TELEPHONE (OUTPATIENT)
Dept: ENDOCRINOLOGY | Age: 53
End: 2018-09-13

## 2018-09-13 LAB
ALBUMIN SERPL-MCNC: 4 G/DL (ref 3.5–5.5)
ALBUMIN/GLOB SERPL: 1 {RATIO} (ref 1.2–2.2)
ALP SERPL-CCNC: 359 IU/L (ref 39–117)
ALT SERPL-CCNC: 32 IU/L (ref 0–32)
AST SERPL-CCNC: 37 IU/L (ref 0–40)
BILIRUB SERPL-MCNC: 0.4 MG/DL (ref 0–1.2)
BUN SERPL-MCNC: 31 MG/DL (ref 6–24)
BUN/CREAT SERPL: 16 (ref 9–23)
CALCIUM SERPL-MCNC: 9 MG/DL (ref 8.7–10.2)
CHLORIDE SERPL-SCNC: 97 MMOL/L (ref 96–106)
CO2 SERPL-SCNC: 24 MMOL/L (ref 20–29)
CREAT SERPL-MCNC: 1.96 MG/DL (ref 0.57–1)
GLOBULIN SER CALC-MCNC: 4.2 G/DL (ref 1.5–4.5)
GLUCOSE SERPL-MCNC: 239 MG/DL (ref 65–99)
HCT VFR BLD AUTO: 27.4 % (ref 34–46.6)
HCV AB S/CO SERPL IA: 0.8 S/CO RATIO (ref 0–0.9)
HGB BLD-MCNC: 8.7 G/DL (ref 11.1–15.9)
INTERPRETATION: NORMAL
Lab: NORMAL
POTASSIUM SERPL-SCNC: 4.7 MMOL/L (ref 3.5–5.2)
PROT SERPL-MCNC: 8.2 G/DL (ref 6–8.5)
SODIUM SERPL-SCNC: 140 MMOL/L (ref 134–144)

## 2018-09-13 NOTE — TELEPHONE ENCOUNTER
I returned Ms. Troy's call. She wanted to let DR. Chelo Rizvi know that her blood sugar was 144 this AM and 133 at Lunch.    Honey Fisher

## 2018-09-14 ENCOUNTER — DOCUMENTATION ONLY (OUTPATIENT)
Dept: FAMILY MEDICINE CLINIC | Age: 53
End: 2018-09-14

## 2018-09-14 NOTE — PROGRESS NOTES
Patient called and complaining of having itching all over and not feeling good after taking flu vaccine on 9/12/18. She was advised to try Tylenol and Benadryl but states her caregiver had already recommended that on Wednesday and she has been only getting worse.  She was advised to go to [facility] but states she is closer to [facility].

## 2018-09-17 ENCOUNTER — TELEPHONE (OUTPATIENT)
Dept: FAMILY MEDICINE CLINIC | Age: 53
End: 2018-09-17

## 2018-09-17 ENCOUNTER — TELEPHONE (OUTPATIENT)
Dept: ENDOCRINOLOGY | Age: 53
End: 2018-09-17

## 2018-09-17 NOTE — TELEPHONE ENCOUNTER
----- Message from Fam Gardiner sent at 9/17/2018  8:00 AM EDT -----  Regarding: Dr. Angela Roland  Pt stated she had a flu shot on 09/12/18, but had an allergic reaction(itching all over and went to [facility] and was given a Rx (Hydroxyzine Sara\" 25 mg) but she only took it once, because it elevated her sugar levels. Pt requested a call from the nurse this morning to advise what she should do. Best contact number  [phone].

## 2018-09-17 NOTE — TELEPHONE ENCOUNTER
Spoke to pt. \"My diabetes is under controlled, let's get it clear\". A1C 8.8. \"i've gotten flu shot before but your give me allergies\". \"hydroxyzine made my sugar goes up to 300s so i'm not taking anything\". \"don't want it to mess up my kidneys\". Despite my reassurances, she's adamant of above statement. She's doing calamine lotion and that's helping. Denies SOB, throat or tongue swelling.      Jackie Valencia MD  9/17/2018

## 2018-09-17 NOTE — TELEPHONE ENCOUNTER
Still itching. Doing better since doing oatmeal bath. Afraid to take meds d/t going to kidney specialist. Wants to see what else she can do.

## 2018-09-18 ENCOUNTER — TELEPHONE (OUTPATIENT)
Dept: ENDOCRINOLOGY | Age: 53
End: 2018-09-18

## 2018-09-18 NOTE — TELEPHONE ENCOUNTER
----- Message from Wilber Ion sent at 9/18/2018 12:05 PM EDT -----  Regarding: Dr. David Bryson  Pt returned a call to [facility], and provided a date of 10/22/18 for short term leave (f) [phone] attn: Francis Cedeno. Pt best contact [phone].

## 2018-09-18 NOTE — TELEPHONE ENCOUNTER
I returned Ms. Troy's call. She wanted us to know \"what was going on\". She received a flu shot on 9/12 and she started to be very itchy all over her body. Per her DR's instructions, she went to [email or web address] and they gave her a script for Hydroxyzine Sara 25 mg, take 1 cap every 12 hours X's 4 days. She too this for 2 days and it made her blood sugars rise up to the 300's. She stopped taking the medication and her numbers are back in the normal range now. It was 130 this AM. She is using Aveno Oatmeal baths and Calamine Lotion and that keeps the itching at [facility]94. She was saying that she would need something saying she could go back to work but she doesn't see us until  10/17 and has a appointment with her PCP on 10/10. I asked her to contact her Human Resource dept and ask them if there was a specific form they needed filled out and if so could they fax this to us and we would see what they needed. She was going to call them and find out.   Mason Glover

## 2018-09-18 NOTE — TELEPHONE ENCOUNTER
I spoke with Ms. Troy and she says she needs us to send a note to her work saying she is still under a doctors care, she has a appointment here on 10/17 and based on that appointment, she should be able to return to work on 10/22. This needs to be faxed to [facility]. I told her I would pass this to Dr. Danyel Esteves and I would let her know when this was done.   Prince Merrill

## 2018-09-18 NOTE — TELEPHONE ENCOUNTER
Patient called to talk to you about a letter she had requested. She can be reached at:  [phone].

## 2018-09-19 NOTE — TELEPHONE ENCOUNTER
I called Ms. Troy and let her know that the letter had been faxed to the number she provided.   Eliz Ramirez

## 2018-09-19 NOTE — TELEPHONE ENCOUNTER
I gave the message to Dr. Garret Cortes yesterday that Ms. Troy needed this letter. I also spoke with him this morning about this letting him know she needs it done today. He will work on this and as soon as it is done I will fax it. I will let Ms. Troy know when it is done.   Ryan Thorne

## 2018-09-19 NOTE — TELEPHONE ENCOUNTER
----- Message from Abdirahman Mckeon sent at 9/19/2018  8:09 AM EDT -----  Regarding: Dr. Rene Pollock  Pt request for a letter to be sent to her employer in regards to her Short term disability. She states that her emploer needs the letter today in order to continue her benefits. Employer fax number is [phone]. Best contact number is [phone].

## 2018-09-20 ENCOUNTER — TELEPHONE (OUTPATIENT)
Dept: ENDOCRINOLOGY | Age: 53
End: 2018-09-20

## 2018-09-20 LAB
HEMOCCULT STL QL IA: NEGATIVE
INTERPRETATION: NORMAL
Lab: NORMAL

## 2018-09-20 NOTE — TELEPHONE ENCOUNTER
Patient stated that she would like a call back as soon as possible. She can be reached at [phone].

## 2018-09-20 NOTE — TELEPHONE ENCOUNTER
I returned MsLd Troy's call and she wanted me to fax her a copy of the letter Dr. Sebastien Alex had done yesterday.  This was faxed to [phone] per her request.  Eliz Ramirez

## 2018-09-24 ENCOUNTER — TELEPHONE (OUTPATIENT)
Dept: ENDOCRINOLOGY | Age: 53
End: 2018-09-24

## 2018-09-24 ENCOUNTER — APPOINTMENT (OUTPATIENT)
Dept: GENERAL RADIOLOGY | Age: 53
End: 2018-09-24
Attending: EMERGENCY MEDICINE
Payer: COMMERCIAL

## 2018-09-24 ENCOUNTER — HOSPITAL ENCOUNTER (EMERGENCY)
Age: 53
Discharge: HOME | End: 2018-09-24
Attending: EMERGENCY MEDICINE
Payer: COMMERCIAL

## 2018-09-24 VITALS
TEMPERATURE: 98.3 F | SYSTOLIC BLOOD PRESSURE: 108 MMHG | HEART RATE: 119 BPM | BODY MASS INDEX: 18.7 KG/M2 | RESPIRATION RATE: 18 BRPM | WEIGHT: 98.99 LBS | DIASTOLIC BLOOD PRESSURE: 64 MMHG | OXYGEN SATURATION: 100 %

## 2018-09-24 DIAGNOSIS — N39.0 URINARY TRACT INFECTION WITHOUT HEMATURIA, SITE UNSPECIFIED: Primary | ICD-10-CM

## 2018-09-24 LAB
ALBUMIN SERPL-MCNC: 3.5 G/DL (ref 3.5–5)
ALBUMIN/GLOB SERPL: 0.6 {RATIO} (ref 1.1–2.2)
ALP SERPL-CCNC: 421 U/L (ref 45–117)
ALT SERPL-CCNC: 70 U/L (ref 12–78)
ANION GAP SERPL CALC-SCNC: 7 MMOL/L (ref 5–15)
APPEARANCE UR: ABNORMAL
AST SERPL-CCNC: 78 U/L (ref 15–37)
BACTERIA URNS QL MICRO: ABNORMAL /HPF
BASOPHILS # BLD: 0 K/UL (ref 0–0.1)
BASOPHILS NFR BLD: 0 % (ref 0–1)
BILIRUB SERPL-MCNC: 0.4 MG/DL (ref 0.2–1)
BILIRUB UR QL: NEGATIVE
BUN SERPL-MCNC: 20 MG/DL (ref 6–20)
BUN/CREAT SERPL: 13 (ref 12–20)
CALCIUM SERPL-MCNC: 9.5 MG/DL (ref 8.5–10.1)
CHLORIDE SERPL-SCNC: 98 MMOL/L (ref 97–108)
CO2 SERPL-SCNC: 31 MMOL/L (ref 21–32)
COLOR UR: ABNORMAL
CREAT SERPL-MCNC: 1.59 MG/DL (ref 0.55–1.02)
DIFFERENTIAL METHOD BLD: ABNORMAL
EOSINOPHIL # BLD: 0 K/UL (ref 0–0.4)
EOSINOPHIL NFR BLD: 0 % (ref 0–7)
EPITH CASTS URNS QL MICRO: ABNORMAL /LPF
ERYTHROCYTE [DISTWIDTH] IN BLOOD BY AUTOMATED COUNT: 18.5 % (ref 11.5–14.5)
FLUAV AG NPH QL IA: NEGATIVE
FLUBV AG NOSE QL IA: NEGATIVE
GLOBULIN SER CALC-MCNC: 5.6 G/DL (ref 2–4)
GLUCOSE SERPL-MCNC: 112 MG/DL (ref 65–100)
GLUCOSE UR STRIP.AUTO-MCNC: NEGATIVE MG/DL
HCT VFR BLD AUTO: 31.3 % (ref 35–47)
HGB BLD-MCNC: 9.9 G/DL (ref 11.5–16)
HGB UR QL STRIP: ABNORMAL
HYALINE CASTS URNS QL MICRO: ABNORMAL /LPF (ref 0–5)
IMM GRANULOCYTES # BLD: 0.2 K/UL (ref 0–0.04)
IMM GRANULOCYTES NFR BLD AUTO: 1 % (ref 0–0.5)
KETONES UR QL STRIP.AUTO: NEGATIVE MG/DL
LACTATE SERPL-SCNC: 0.8 MMOL/L (ref 0.4–2)
LEUKOCYTE ESTERASE UR QL STRIP.AUTO: ABNORMAL
LYMPHOCYTES # BLD: 1.4 K/UL (ref 0.8–3.5)
LYMPHOCYTES NFR BLD: 11 % (ref 12–49)
MCH RBC QN AUTO: 29.3 PG (ref 26–34)
MCHC RBC AUTO-ENTMCNC: 31.6 G/DL (ref 30–36.5)
MCV RBC AUTO: 92.6 FL (ref 80–99)
MONOCYTES # BLD: 0.7 K/UL (ref 0–1)
MONOCYTES NFR BLD: 6 % (ref 5–13)
NEUTS SEG # BLD: 10.6 K/UL (ref 1.8–8)
NEUTS SEG NFR BLD: 82 % (ref 32–75)
NITRITE UR QL STRIP.AUTO: POSITIVE
NRBC # BLD: 0 K/UL (ref 0–0.01)
NRBC BLD-RTO: 0 PER 100 WBC
PH UR STRIP: 6 [PH] (ref 5–8)
PLATELET # BLD AUTO: 433 K/UL (ref 150–400)
PMV BLD AUTO: 9.7 FL (ref 8.9–12.9)
POTASSIUM SERPL-SCNC: 3.5 MMOL/L (ref 3.5–5.1)
PROT SERPL-MCNC: 9.1 G/DL (ref 6.4–8.2)
PROT UR STRIP-MCNC: 30 MG/DL
RBC # BLD AUTO: 3.38 M/UL (ref 3.8–5.2)
RBC #/AREA URNS HPF: ABNORMAL /HPF (ref 0–5)
SODIUM SERPL-SCNC: 136 MMOL/L (ref 136–145)
SP GR UR REFRACTOMETRY: 1.01 (ref 1–1.03)
UA: UC IF INDICATED,UAUC: ABNORMAL
UROBILINOGEN UR QL STRIP.AUTO: 1 EU/DL (ref 0.2–1)
WBC # BLD AUTO: 13 K/UL (ref 3.6–11)
WBC URNS QL MICRO: >100 /HPF (ref 0–4)

## 2018-09-24 PROCEDURE — 87186 SC STD MICRODIL/AGAR DIL: CPT | Performed by: EMERGENCY MEDICINE

## 2018-09-24 PROCEDURE — 36415 COLL VENOUS BLD VENIPUNCTURE: CPT | Performed by: EMERGENCY MEDICINE

## 2018-09-24 PROCEDURE — 74011250637 HC RX REV CODE- 250/637: Performed by: EMERGENCY MEDICINE

## 2018-09-24 PROCEDURE — 80053 COMPREHEN METABOLIC PANEL: CPT | Performed by: EMERGENCY MEDICINE

## 2018-09-24 PROCEDURE — 83605 ASSAY OF LACTIC ACID: CPT | Performed by: EMERGENCY MEDICINE

## 2018-09-24 PROCEDURE — 87086 URINE CULTURE/COLONY COUNT: CPT | Performed by: EMERGENCY MEDICINE

## 2018-09-24 PROCEDURE — 74011250636 HC RX REV CODE- 250/636: Performed by: EMERGENCY MEDICINE

## 2018-09-24 PROCEDURE — 87804 INFLUENZA ASSAY W/OPTIC: CPT | Performed by: EMERGENCY MEDICINE

## 2018-09-24 PROCEDURE — 81001 URINALYSIS AUTO W/SCOPE: CPT | Performed by: EMERGENCY MEDICINE

## 2018-09-24 PROCEDURE — 71046 X-RAY EXAM CHEST 2 VIEWS: CPT

## 2018-09-24 PROCEDURE — 87077 CULTURE AEROBIC IDENTIFY: CPT | Performed by: EMERGENCY MEDICINE

## 2018-09-24 PROCEDURE — 99284 EMERGENCY DEPT VISIT MOD MDM: CPT

## 2018-09-24 PROCEDURE — 85025 COMPLETE CBC W/AUTO DIFF WBC: CPT | Performed by: EMERGENCY MEDICINE

## 2018-09-24 PROCEDURE — 96372 THER/PROPH/DIAG INJ SC/IM: CPT

## 2018-09-24 RX ORDER — CEFDINIR 300 MG/1
300 CAPSULE ORAL 2 TIMES DAILY
Qty: 14 CAP | Refills: 0 | Status: SHIPPED | OUTPATIENT
Start: 2018-09-24 | End: 2018-10-13

## 2018-09-24 RX ORDER — ACETAMINOPHEN 500 MG
1000 TABLET ORAL
Status: COMPLETED | OUTPATIENT
Start: 2018-09-24 | End: 2018-09-24

## 2018-09-24 RX ADMIN — LIDOCAINE HYDROCHLORIDE 1 G: 10 INJECTION, SOLUTION EPIDURAL; INFILTRATION; INTRACAUDAL; PERINEURAL at 17:48

## 2018-09-24 RX ADMIN — ACETAMINOPHEN 1000 MG: 500 TABLET ORAL at 17:32

## 2018-09-24 NOTE — ED NOTES
Dr Manisha Reddy reviewed discharge instructions with the patient. The patient verbalized understanding. All questions and concerns were addressed. The patient declined a wheelchair and is discharged ambulatory in the care of family members with instructions and prescriptions in hand. Pt is alert and oriented x 4. Respirations are clear and unlabored.

## 2018-09-24 NOTE — TELEPHONE ENCOUNTER
I have reordered as Humulin 70/30 insulin. This is a  PEN, not a vial/syringe set. Patient sounds like she may be getting sick which is raising her sugars. She can increase her dose to 30 with breakfast, 25 with dinner, and further as needed as long as not getting hypoglycemic. Dose may need to come back down as she recovers. Thanks,     Rosana Bolaños.  [address] Endocrinology  [address] [place] [address]

## 2018-09-24 NOTE — TELEPHONE ENCOUNTER
I called Ms. Woodrow and relayed the message from Dr. Chelo Rizvi. She understood the information and said she would do as instructed.   Honey Fisher

## 2018-09-24 NOTE — ED PROVIDER NOTES
EMERGENCY DEPARTMENT HISTORY AND PHYSICAL EXAM      Date: 9/24/2018  Patient Name: Omar Hussein    History of Presenting Illness     Chief Complaint   Patient presents with    Fever     sent by urgent care for chills, fever, and non productive cough x two days       History Provided By: Patient    HPI: Omar Hussein, 52 y.o. female with PMHx significant for IDDM, asthma, presents ambulatory to the ED with cc of constant non-productive dry cough with associated fever and chills for two days. Pt reports she recently received influenza vaccine 09/12 and mentions pruritic sensation to generalized body occurring following. She mentions she is concerned current symptoms is secondary to receiving influenza vaccine. Per chart review, pt was admitted 08/24 and discharged 09/06 due to DKA following low blood glucose levels. Pt denies any exacerbating and alleviating factors. Pt specifically denies any signs of SOB, HA, weakness, diaphoresis, abdominal pain, neck pain, back pain, N/V/D, and any other associated symptoms. There are no other complaints, changes, or physical findings at this time. PCP: None    Current Outpatient Prescriptions   Medication Sig Dispense Refill    insulin nph-regular human rec (HUMULIN 70/30 U-100 KWIKPEN) 100 unit/mL (70-30) inpn 25 units with breakfast and 20 units with dinner 15 Pen 3    cefdinir (OMNICEF) 300 mg capsule Take 1 Cap by mouth two (2) times a day. 14 Cap 0    albuterol (PROVENTIL HFA, VENTOLIN HFA, PROAIR HFA) 90 mcg/actuation inhaler Take 2 Puffs by inhalation every four (4) hours as needed for Wheezing.  1 Inhaler 0       Past History     Past Medical History:  Past Medical History:   Diagnosis Date    Asthma     Diabetes ([address]     Elevated transaminase level 6/29/2016    Insulin dependent diabetes mellitus ([address] 6/20/2016    Pancreatic atrophy 6/20/2016       Past Surgical History:  Past Surgical History:   Procedure Laterality Date    HX HEENT         Family History:  Family History   Problem Relation Age of Onset    Cancer Father      prostate and colon    Diabetes Mother     Diabetes Maternal Grandmother     Cancer Maternal Aunt      breast       Social History:  Social History   Substance Use Topics    Smoking status: Never Smoker    Smokeless tobacco: Never Used    Alcohol use Yes      Comment: occasionally       Allergies: Allergies   Allergen Reactions    Contrast Agent [Iodine] Itching    Hydrocodone Rash    Percocet [Oxycodone-Acetaminophen] Rash    Codeine Nausea and Vomiting         Review of Systems   Review of Systems   Constitutional: Positive for chills and fever. HENT: Negative for congestion and sore throat. Eyes: Negative for visual disturbance. Respiratory: Positive for cough. Negative for shortness of breath. Cardiovascular: Negative for chest pain and leg swelling. Gastrointestinal: Negative for abdominal pain, blood in stool, diarrhea and nausea. Endocrine: Negative for polyuria. Genitourinary: Negative for dysuria, flank pain, vaginal bleeding and vaginal discharge. Musculoskeletal: Negative for myalgias. Skin: Negative for rash. Allergic/Immunologic: Negative for immunocompromised state. Neurological: Negative for weakness and headaches. Psychiatric/Behavioral: Negative for confusion. Physical Exam   Physical Exam   Constitutional: She is oriented to person, place, and time. She appears well-developed and well-nourished. HENT:   Head: Normocephalic and atraumatic. Moist mucous membranes   Eyes: Conjunctivae are normal. Pupils are equal, round, and reactive to light. Right eye exhibits no discharge. Left eye exhibits no discharge. Neck: Normal range of motion. Neck supple. No tracheal deviation present. Cardiovascular: Normal rate, regular rhythm and normal heart sounds. No murmur heard. Pulmonary/Chest: Effort normal and breath sounds normal. No respiratory distress. She has no wheezes.  She has no rales. Abdominal: Soft. Bowel sounds are normal. There is no tenderness. There is no rebound and no guarding. Musculoskeletal: Normal range of motion. She exhibits no edema, tenderness or deformity. Neurological: She is alert and oriented to person, place, and time. Skin: Skin is warm and dry. No rash noted. No erythema. Psychiatric: Her behavior is normal.   Nursing note and vitals reviewed. Diagnostic Study Results     Labs -     Recent Results (from the past 12 hour(s))   URINALYSIS W/ REFLEX CULTURE    Collection Time: 09/24/18  2:43 PM   Result Value Ref Range    Color YELLOW/STRAW      Appearance CLOUDY (A) CLEAR      Specific gravity 1.010 1.003 - 1.030      pH (UA) 6.0 5.0 - 8.0      Protein 30 (A) NEG mg/dL    Glucose NEGATIVE  NEG mg/dL    Ketone NEGATIVE  NEG mg/dL    Bilirubin NEGATIVE  NEG      Blood TRACE (A) NEG      Urobilinogen 1.0 0.2 - 1.0 EU/dL    Nitrites POSITIVE (A) NEG      Leukocyte Esterase LARGE (A) NEG      WBC >100 (H) 0 - 4 /hpf    RBC 5-10 0 - 5 /hpf    Epithelial cells FEW FEW /lpf    Bacteria 4+ (A) NEG /hpf    UA:UC IF INDICATED URINE CULTURE ORDERED (A) CNI      Hyaline cast 2-5 0 - 5 /lpf   METABOLIC PANEL, COMPREHENSIVE    Collection Time: 09/24/18  2:47 PM   Result Value Ref Range    Sodium 136 136 - 145 mmol/L    Potassium 3.5 3.5 - 5.1 mmol/L    Chloride 98 97 - 108 mmol/L    CO2 31 21 - 32 mmol/L    Anion gap 7 5 - 15 mmol/L    Glucose 112 (H) 65 - 100 mg/dL    BUN 20 6 - 20 MG/DL    Creatinine 1.59 (H) 0.55 - 1.02 MG/DL    BUN/Creatinine ratio 13 12 - 20      GFR est AA 41 (L) >60 ml/min/1.73m2    GFR est non-AA 34 (L) >60 ml/min/1.73m2    Calcium 9.5 8.5 - 10.1 MG/DL    Bilirubin, total 0.4 0.2 - 1.0 MG/DL    ALT (SGPT) 70 12 - 78 U/L    AST (SGOT) 78 (H) 15 - 37 U/L    Alk.  phosphatase 421 (H) 45 - 117 U/L    Protein, total 9.1 (H) 6.4 - 8.2 g/dL    Albumin 3.5 3.5 - 5.0 g/dL    Globulin 5.6 (H) 2.0 - 4.0 g/dL    A-G Ratio 0.6 (L) 1.1 - 2.2     CBC WITH AUTOMATED DIFF    Collection Time: 09/24/18  2:47 PM   Result Value Ref Range    WBC 13.0 (H) 3.6 - 11.0 K/uL    RBC 3.38 (L) 3.80 - 5.20 M/uL    HGB 9.9 (L) 11.5 - 16.0 g/dL    HCT 31.3 (L) 35.0 - 47.0 %    MCV 92.6 80.0 - 99.0 FL    MCH 29.3 26.0 - 34.0 PG    MCHC 31.6 30.0 - 36.5 g/dL    RDW 18.5 (H) 11.5 - 14.5 %    PLATELET 061 (H) 439 - 400 K/uL    MPV 9.7 8.9 - 12.9 FL    NRBC 0.0 0  WBC    ABSOLUTE NRBC 0.00 0.00 - 0.01 K/uL    NEUTROPHILS 82 (H) 32 - 75 %    LYMPHOCYTES 11 (L) 12 - 49 %    MONOCYTES 6 5 - 13 %    EOSINOPHILS 0 0 - 7 %    BASOPHILS 0 0 - 1 %    IMMATURE GRANULOCYTES 1 (H) 0.0 - 0.5 %    ABS. NEUTROPHILS 10.6 (H) 1.8 - 8.0 K/UL    ABS. LYMPHOCYTES 1.4 0.8 - 3.5 K/UL    ABS. MONOCYTES 0.7 0.0 - 1.0 K/UL    ABS. EOSINOPHILS 0.0 0.0 - 0.4 K/UL    ABS. BASOPHILS 0.0 0.0 - 0.1 K/UL    ABS. IMM. GRANS. 0.2 (H) 0.00 - 0.04 K/UL    DF AUTOMATED     LACTIC ACID    Collection Time: 09/24/18  2:47 PM   Result Value Ref Range    Lactic acid 0.8 0.4 - 2.0 MMOL/L   INFLUENZA A & B AG (RAPID TEST)    Collection Time: 09/24/18  5:35 PM   Result Value Ref Range    Influenza A Antigen NEGATIVE  NEG      Influenza B Antigen NEGATIVE  NEG         Radiologic Studies -     CXR Results  (Last 48 hours)               09/24/18 1459  XR CHEST PA LAT Final result    Impression:  IMPRESSION: Normal two view chest x-ray. Narrative:  INDICATION: cough       EXAM: CXR 2 View       FINDINGS: Frontal and lateral views of the chest show clear lungs. Heart size is   normal. There is no pulmonary edema. There is no evident pneumothorax,   adenopathy or effusion. Medical Decision Making   I am the first provider for this patient. I reviewed the vital signs, available nursing notes, past medical history, past surgical history, family history and social history. Vital Signs-Reviewed the patient's vital signs.   Patient Vitals for the past 12 hrs:   Temp Pulse Resp BP SpO2   09/24/18 1800 - - - 108/64 100 %   09/24/18 1747 - - - - 100 %   09/24/18 1745 - - - 131/76 -   09/24/18 1435 98.3 °F (36.8 °C) (!) 119 18 (!) 126/92 98 %       Pulse Oximetry Analysis - 98% on RA    Records Reviewed: Nursing Notes, Old Medical Records, Previous Radiology Studies and Previous Laboratory Studies    Provider Notes (Medical Decision Making):   DDx: consistent with UTI. Non-toxic appearing and well appearing. Safe to discharge following first abx. ED Course:   Initial assessment performed. The patients presenting problems have been discussed, and they are in agreement with the care plan formulated and outlined with them. I have encouraged them to ask questions as they arise throughout their visit. Critical Care Time:   0 minutes    Disposition:  Discharge Note:  6:04 PM  The pt is ready for discharge. The pt's signs, symptoms, diagnosis, and discharge instructions have been discussed and pt has conveyed their understanding. The pt is to follow up as recommended or return to ER should their symptoms worsen. Plan has been discussed and pt is in agreement. PLAN:  1. Discharge Medication List as of 9/24/2018  5:58 PM      START taking these medications    Details   cefdinir (OMNICEF) 300 mg capsule Take 1 Cap by mouth two (2) times a day., Normal, Disp-14 Cap, R-0         CONTINUE these medications which have NOT CHANGED    Details   insulin nph-regular human rec (HUMULIN 70/30 U-100 KWIKPEN) 100 unit/mL (70-30) inpn 25 units with breakfast and 20 units with dinner, Normal, Disp-15 Pen, R-3      albuterol (PROVENTIL HFA, VENTOLIN HFA, PROAIR HFA) 90 mcg/actuation inhaler Take 2 Puffs by inhalation every four (4) hours as needed for Wheezing., Normal, Disp-1 Inhaler, R-0           2.    Follow-up Information     Follow up With Details Comments Contact Info    Your Primary Care Doctor Schedule an appointment as soon as possible for a visit      [facility] EMERGENCY DEPT  If symptoms worsen [address] [facility]  [phone]        Return to ED if worse     Diagnosis     Clinical Impression:   1. Urinary tract infection without hematuria, site unspecified        Attestations: This note is prepared by Berenice Pisano, acting as Scribe for Meka Woo DO. Meka Woo DO: The scribe's documentation has been prepared under my direction and personally reviewed by me in its entirety.  I confirm that the note above accurately reflects all work, treatment, procedures, and medical decision making performed by me

## 2018-09-24 NOTE — TELEPHONE ENCOUNTER
Patient called to talk to you about her blood sugars. They've been going up recently. She can be reached at:  [phone].

## 2018-09-24 NOTE — DISCHARGE INSTRUCTIONS
Urinary Tract Infection in Women: Care Instructions  Your Care Instructions    A urinary tract infection, or UTI, is a general term for an infection anywhere between the kidneys and the urethra (where urine comes out). Most UTIs are bladder infections. They often cause pain or burning when you urinate. UTIs are caused by bacteria and can be cured with antibiotics. Be sure to complete your treatment so that the infection goes away. Follow-up care is a key part of your treatment and safety. Be sure to make and go to all appointments, and call your doctor if you are having problems. It's also a good idea to know your test results and keep a list of the medicines you take. How can you care for yourself at home? · Take your antibiotics as directed. Do not stop taking them just because you feel better. You need to take the full course of antibiotics. · Drink extra water and other fluids for the next day or two. This may help wash out the bacteria that are causing the infection. (If you have kidney, heart, or liver disease and have to limit fluids, talk with your doctor before you increase your fluid intake.)  · Avoid drinks that are carbonated or have caffeine. They can irritate the bladder. · Urinate often. Try to empty your bladder each time. · To relieve pain, take a hot bath or lay a heating pad set on low over your lower belly or genital area. Never go to sleep with a heating pad in place. To prevent UTIs  · Drink plenty of water each day. This helps you urinate often, which clears bacteria from your system. (If you have kidney, heart, or liver disease and have to limit fluids, talk with your doctor before you increase your fluid intake.)  · Urinate when you need to. · Urinate right after you have sex. · Change sanitary pads often. · Avoid douches, bubble baths, feminine hygiene sprays, and other feminine hygiene products that have deodorants.   · After going to the bathroom, wipe from front to back.  When should you call for help? Call your doctor now or seek immediate medical care if:    · Symptoms such as fever, chills, nausea, or vomiting get worse or appear for the first time.     · You have new pain in your back just below your rib cage. This is called flank pain.     · There is new blood or pus in your urine.     · You have any problems with your antibiotic medicine.    Watch closely for changes in your health, and be sure to contact your doctor if:    · You are not getting better after taking an antibiotic for 2 days.     · Your symptoms go away but then come back. Where can you learn more? Go to http://jurgen-mloly.info/. Enter F073 in the search box to learn more about \"Urinary Tract Infection in Women: Care Instructions. \"  Current as of: May 12, 2017  Content Version: 11.7  © 6862-4128 [employer]. Care instructions adapted under license by [employer] (which disclaims liability or warranty for this information). If you have questions about a medical condition or this instruction, always ask your healthcare professional. [address] any warranty or liability for your use of this information.

## 2018-09-24 NOTE — TELEPHONE ENCOUNTER
I returned Ms. Troy's call. She said that her pharmacy told her that her insurance no longer covers her Novolin 70/30 insulin. I call the pharmacy and was told that her insurance now wants Humulin or Humalog insulin. Ms. Helder Hoyos also said she is not feeling well right now. She has some body aches, fever, coughing and congested. She has increased her insulin base dose of 25 Units in eht AM and 20 at [facility] by a couple of units as her blood sugars have been running in the 200-300's for the past couple of days. I told her I would pass this on to Dr. Garret Cortes to see which insulin he would order now and get back to her with any changes on the amount as well.   Ryan Thorne

## 2018-09-26 LAB
BACTERIA SPEC CULT: ABNORMAL
CC UR VC: ABNORMAL
SERVICE CMNT-IMP: ABNORMAL

## 2018-10-01 ENCOUNTER — TELEPHONE (OUTPATIENT)
Dept: ENDOCRINOLOGY | Age: 53
End: 2018-10-01

## 2018-10-01 NOTE — TELEPHONE ENCOUNTER
I returned Ms. Troy's call. She had spoken with her insurance company and they told her that she could wait till her appointment on 10/17 to get letter done.   Jamal Quiroz

## 2018-10-01 NOTE — TELEPHONE ENCOUNTER
----- Message from [facility] sent at 10/1/2018 10:31 AM EDT -----  Regarding: Dr. Pricila Orlando telephone  The pt is requesting a callback to make sure a letter was received from her insurance, and she also needs to have something added if it wasn't sent back yet.       Best contact number is [phone]

## 2018-10-08 ENCOUNTER — TELEPHONE (OUTPATIENT)
Dept: ENDOCRINOLOGY | Age: 53
End: 2018-10-08

## 2018-10-08 NOTE — TELEPHONE ENCOUNTER
Patient called to say that her blood sugar was 325 this morning. She's been having high blood sugars ranging from 200 - 300 every day. She can be reached at:  [phone].

## 2018-10-08 NOTE — TELEPHONE ENCOUNTER
I attempted to call Ms. Troy and reached her voice mail. I left a message asking her to give me a call back.   Ruchi Penningtoners

## 2018-10-08 NOTE — TELEPHONE ENCOUNTER
Previously we had her on mixed insulin twice daily,   30 units with breakfast and 25 units with dinner. Plan:  1. If her sugar remains >250 now, she should take 10 units and recheck sugars again by dinner time before her next dose. 2. Increase doses to the followin units with breakfast and 30 units with dinner    3. She should add a correction scale as needed for high blood sugars to be added onto her regular dose based on her blood sugars. IF GLUCOSE IS:                 THEN TAKE:      0   Extra Unit  151-175   1   Extra Unit  176-200   2   Extra Units  201-225   3   Extra Units  226-250   4   Extra Units  251-275   5   Extra Units  276-300   6   Extra Units  301-325   7   Extra Units    4. She should consider if she might have any infections that might be causing to her to be insulin resistant with sugars rising. If she suspects this she may need evaluation for antibiotics if so. Thanks,    Griselda Ly.  [address] [facility]  [facility] Roxana Peter

## 2018-10-08 NOTE — TELEPHONE ENCOUNTER
I returned Ms. Troy's call. She says that her blood sugars have been running high again. She is taking her meds as prescribed and drinking lots of water but thery are still high. She says she is not sick and has no new medications. Her blood sugar was 325 this morning so she took 30 Units. She is concerned because \" I don't want to go back in the hospital\". I told her I would pass this on to Dr. Alice Johnson and get back to her.   Karen Padilla

## 2018-10-09 NOTE — TELEPHONE ENCOUNTER
I called Ms. Troy and relayed the message from Dr. Adolfo Chew. She wrote this down and all questions were answered.   Mone Michelle

## 2018-10-09 NOTE — TELEPHONE ENCOUNTER
----- Message from [facility] sent at 10/9/2018  8:03 AM EDT -----  Regarding: Dr. Keyshawn Cohen  Contact: [phone]  [facility]

## 2018-10-13 ENCOUNTER — HOSPITAL ENCOUNTER (EMERGENCY)
Age: 53
Discharge: HOME | End: 2018-10-13
Attending: EMERGENCY MEDICINE
Payer: COMMERCIAL

## 2018-10-13 ENCOUNTER — APPOINTMENT (OUTPATIENT)
Dept: GENERAL RADIOLOGY | Age: 53
End: 2018-10-13
Attending: EMERGENCY MEDICINE
Payer: COMMERCIAL

## 2018-10-13 VITALS
DIASTOLIC BLOOD PRESSURE: 65 MMHG | WEIGHT: 97 LBS | HEART RATE: 125 BPM | OXYGEN SATURATION: 99 % | BODY MASS INDEX: 18.33 KG/M2 | RESPIRATION RATE: 12 BRPM | TEMPERATURE: 100.9 F | SYSTOLIC BLOOD PRESSURE: 115 MMHG

## 2018-10-13 DIAGNOSIS — N30.00 ACUTE CYSTITIS WITHOUT HEMATURIA: ICD-10-CM

## 2018-10-13 DIAGNOSIS — R73.9 HYPERGLYCEMIA: Primary | ICD-10-CM

## 2018-10-13 LAB
ALBUMIN SERPL-MCNC: 3.8 G/DL (ref 3.5–5)
ALBUMIN/GLOB SERPL: 0.7 {RATIO} (ref 1.1–2.2)
ALP SERPL-CCNC: 617 U/L (ref 45–117)
ALT SERPL-CCNC: 170 U/L (ref 12–78)
ANION GAP SERPL CALC-SCNC: 5 MMOL/L (ref 5–15)
APPEARANCE UR: ABNORMAL
AST SERPL-CCNC: 203 U/L (ref 15–37)
BACTERIA URNS QL MICRO: ABNORMAL /HPF
BASOPHILS # BLD: 0 K/UL (ref 0–0.1)
BASOPHILS NFR BLD: 0 % (ref 0–1)
BILIRUB SERPL-MCNC: 0.5 MG/DL (ref 0.2–1)
BILIRUB UR QL: NEGATIVE
BUN SERPL-MCNC: 32 MG/DL (ref 6–20)
BUN/CREAT SERPL: 19 (ref 12–20)
CALCIUM SERPL-MCNC: 9.9 MG/DL (ref 8.5–10.1)
CHLORIDE SERPL-SCNC: 100 MMOL/L (ref 97–108)
CO2 SERPL-SCNC: 28 MMOL/L (ref 21–32)
COLOR UR: ABNORMAL
CREAT SERPL-MCNC: 1.7 MG/DL (ref 0.55–1.02)
DIFFERENTIAL METHOD BLD: ABNORMAL
EOSINOPHIL # BLD: 0 K/UL (ref 0–0.4)
EOSINOPHIL NFR BLD: 0 % (ref 0–7)
EPITH CASTS URNS QL MICRO: ABNORMAL /LPF
ERYTHROCYTE [DISTWIDTH] IN BLOOD BY AUTOMATED COUNT: 16.9 % (ref 11.5–14.5)
GLOBULIN SER CALC-MCNC: 5.5 G/DL (ref 2–4)
GLUCOSE BLD STRIP.AUTO-MCNC: 255 MG/DL (ref 65–100)
GLUCOSE SERPL-MCNC: 212 MG/DL (ref 65–100)
GLUCOSE UR STRIP.AUTO-MCNC: NEGATIVE MG/DL
HCT VFR BLD AUTO: 31.6 % (ref 35–47)
HGB BLD-MCNC: 10.6 G/DL (ref 11.5–16)
HGB UR QL STRIP: ABNORMAL
HYALINE CASTS URNS QL MICRO: ABNORMAL /LPF (ref 0–5)
IMM GRANULOCYTES # BLD: 0.1 K/UL (ref 0–0.04)
IMM GRANULOCYTES NFR BLD AUTO: 1 % (ref 0–0.5)
KETONES UR QL STRIP.AUTO: NEGATIVE MG/DL
LACTATE SERPL-SCNC: 1 MMOL/L (ref 0.4–2)
LEUKOCYTE ESTERASE UR QL STRIP.AUTO: ABNORMAL
LYMPHOCYTES # BLD: 1.2 K/UL (ref 0.8–3.5)
LYMPHOCYTES NFR BLD: 7 % (ref 12–49)
MCH RBC QN AUTO: 29.4 PG (ref 26–34)
MCHC RBC AUTO-ENTMCNC: 33.5 G/DL (ref 30–36.5)
MCV RBC AUTO: 87.8 FL (ref 80–99)
MONOCYTES # BLD: 0.7 K/UL (ref 0–1)
MONOCYTES NFR BLD: 4 % (ref 5–13)
NEUTS SEG # BLD: 14.1 K/UL (ref 1.8–8)
NEUTS SEG NFR BLD: 88 % (ref 32–75)
NITRITE UR QL STRIP.AUTO: NEGATIVE
NRBC # BLD: 0 K/UL (ref 0–0.01)
NRBC BLD-RTO: 0 PER 100 WBC
PH UR STRIP: 5.5 [PH] (ref 5–8)
PLATELET # BLD AUTO: 267 K/UL (ref 150–400)
PMV BLD AUTO: 11.3 FL (ref 8.9–12.9)
POTASSIUM SERPL-SCNC: 4.5 MMOL/L (ref 3.5–5.1)
PROT SERPL-MCNC: 9.3 G/DL (ref 6.4–8.2)
PROT UR STRIP-MCNC: 100 MG/DL
RBC # BLD AUTO: 3.6 M/UL (ref 3.8–5.2)
RBC #/AREA URNS HPF: ABNORMAL /HPF (ref 0–5)
SERVICE CMNT-IMP: ABNORMAL
SODIUM SERPL-SCNC: 133 MMOL/L (ref 136–145)
SP GR UR REFRACTOMETRY: 1.01 (ref 1–1.03)
UROBILINOGEN UR QL STRIP.AUTO: 1 EU/DL (ref 0.2–1)
WBC # BLD AUTO: 16.1 K/UL (ref 3.6–11)
WBC URNS QL MICRO: >100 /HPF (ref 0–4)

## 2018-10-13 PROCEDURE — 74011250637 HC RX REV CODE- 250/637: Performed by: EMERGENCY MEDICINE

## 2018-10-13 PROCEDURE — 93005 ELECTROCARDIOGRAM TRACING: CPT

## 2018-10-13 PROCEDURE — 81001 URINALYSIS AUTO W/SCOPE: CPT | Performed by: EMERGENCY MEDICINE

## 2018-10-13 PROCEDURE — 87186 SC STD MICRODIL/AGAR DIL: CPT | Performed by: EMERGENCY MEDICINE

## 2018-10-13 PROCEDURE — 87086 URINE CULTURE/COLONY COUNT: CPT | Performed by: EMERGENCY MEDICINE

## 2018-10-13 PROCEDURE — 71046 X-RAY EXAM CHEST 2 VIEWS: CPT

## 2018-10-13 PROCEDURE — 82962 GLUCOSE BLOOD TEST: CPT

## 2018-10-13 PROCEDURE — 80053 COMPREHEN METABOLIC PANEL: CPT | Performed by: EMERGENCY MEDICINE

## 2018-10-13 PROCEDURE — 99284 EMERGENCY DEPT VISIT MOD MDM: CPT

## 2018-10-13 PROCEDURE — 94762 N-INVAS EAR/PLS OXIMTRY CONT: CPT

## 2018-10-13 PROCEDURE — 87040 BLOOD CULTURE FOR BACTERIA: CPT | Performed by: EMERGENCY MEDICINE

## 2018-10-13 PROCEDURE — 83605 ASSAY OF LACTIC ACID: CPT | Performed by: EMERGENCY MEDICINE

## 2018-10-13 PROCEDURE — 85025 COMPLETE CBC W/AUTO DIFF WBC: CPT | Performed by: EMERGENCY MEDICINE

## 2018-10-13 PROCEDURE — 87077 CULTURE AEROBIC IDENTIFY: CPT | Performed by: EMERGENCY MEDICINE

## 2018-10-13 PROCEDURE — 36415 COLL VENOUS BLD VENIPUNCTURE: CPT | Performed by: EMERGENCY MEDICINE

## 2018-10-13 RX ORDER — ACETAMINOPHEN 325 MG/1
650 TABLET ORAL
Status: COMPLETED | OUTPATIENT
Start: 2018-10-13 | End: 2018-10-13

## 2018-10-13 RX ORDER — CEPHALEXIN 250 MG/1
500 CAPSULE ORAL
Status: COMPLETED | OUTPATIENT
Start: 2018-10-13 | End: 2018-10-13

## 2018-10-13 RX ORDER — CEPHALEXIN 500 MG/1
500 CAPSULE ORAL 4 TIMES DAILY
Qty: 28 CAP | Refills: 0 | Status: SHIPPED | OUTPATIENT
Start: 2018-10-13 | End: 2018-10-20

## 2018-10-13 RX ORDER — SODIUM CHLORIDE 0.9 % (FLUSH) 0.9 %
5-10 SYRINGE (ML) INJECTION AS NEEDED
Status: DISCONTINUED | OUTPATIENT
Start: 2018-10-13 | End: 2018-10-13 | Stop reason: HOSPADM

## 2018-10-13 RX ADMIN — CEPHALEXIN 500 MG: 250 CAPSULE ORAL at 20:22

## 2018-10-13 RX ADMIN — ACETAMINOPHEN 650 MG: 325 TABLET ORAL at 17:23

## 2018-10-13 NOTE — ED PROVIDER NOTES
EMERGENCY DEPARTMENT HISTORY AND PHYSICAL EXAM      Date: 10/13/2018  Patient Name: Will Ernst    History of Presenting Illness     Chief Complaint   Patient presents with    High Blood Sugar     BG's running in 300-400's, takes Novolin. History Provided By: Patient    HPI: Will Ernst, 52 y.o. female with PMHx significant for asthma, DM, presents ambulatory to the ED with cc of gradual onset high blood glucose levels, with associated sxs of chills, onset ~2 days pta. Pt reports taking nml medication, including insulin NPH/insulin regular (Novolin 70/30 U-100 Insulin) 100 unit/mL (70-30) injection for DM. Pt reports upon arrival, pt had a fever. Pt reports PMHx of kidney failure, and reports being admitted to the hospital from 08/31/2018 to 09/06/2018. She specifically denies any, nausea, vomiting, chest pain, shortness of breath, headache, rash, diarrhea, sweating or weight loss. There are no other complaints, changes, or physical findings at this time. PCP: None   Nephrologist: Cristopher East. Thea Parra MD    Current Facility-Administered Medications   Medication Dose Route Frequency Provider Last Rate Last Dose    sodium chloride (NS) flush 5-10 mL  5-10 mL IntraVENous PRN Ethan North MD         Current Outpatient Prescriptions   Medication Sig Dispense Refill    insulin NPH/insulin regular (NOVOLIN 70/30 U-100 INSULIN) 100 unit/mL (70-30) injection by SubCUTAneous route.  cephALEXin (KEFLEX) 500 mg capsule Take 1 Cap by mouth four (4) times daily for 7 days. 28 Cap 0    glucose blood VI test strips (PRODIGY NO CODING) strip Use to test blood sugars 4 times per day. DX Code : E10.9 400 Strip 3    albuterol (PROVENTIL HFA, VENTOLIN HFA, PROAIR HFA) 90 mcg/actuation inhaler Take 2 Puffs by inhalation every four (4) hours as needed for Wheezing.  1 Inhaler 0       Past History     Past Medical History:  Past Medical History:   Diagnosis Date    Asthma     Diabetes ([address]     Elevated transaminase level 6/29/2016    Insulin dependent diabetes mellitus ([address] 6/20/2016    Pancreatic atrophy 6/20/2016       Past Surgical History:  Past Surgical History:   Procedure Laterality Date    HX HEENT         Family History:  Family History   Problem Relation Age of Onset    Cancer Father      prostate and colon    Diabetes Mother     Diabetes Maternal Grandmother     Cancer Maternal Aunt      breast       Social History:  Social History   Substance Use Topics    Smoking status: Never Smoker    Smokeless tobacco: Never Used    Alcohol use Yes      Comment: occasionally       Allergies: Allergies   Allergen Reactions    Contrast Agent [Iodine] Itching    Hydrocodone Rash    Percocet [Oxycodone-Acetaminophen] Rash    Codeine Nausea and Vomiting         Review of Systems   Review of Systems   Constitutional: Positive for chills and fever. Negative for activity change, appetite change, fatigue and unexpected weight change. HENT: Negative. Negative for congestion, hearing loss, rhinorrhea, sneezing and voice change. Eyes: Negative. Negative for pain and visual disturbance. Respiratory: Negative. Negative for apnea, cough, choking, chest tightness and shortness of breath. Cardiovascular: Negative. Negative for chest pain and palpitations. Gastrointestinal: Negative. Negative for abdominal distention, abdominal pain, blood in stool, diarrhea, nausea and vomiting. Genitourinary: Negative. Negative for difficulty urinating, flank pain, frequency and urgency. No discharge   Musculoskeletal: Negative. Negative for arthralgias, back pain, myalgias and neck stiffness. Skin: Negative. Negative for color change and rash. Neurological: Negative. Negative for dizziness, seizures, syncope, speech difficulty, weakness, numbness and headaches. Hematological: Negative for adenopathy. Psychiatric/Behavioral: Negative.   Negative for agitation, behavioral problems, dysphoric mood and suicidal ideas. The patient is not nervous/anxious. Physical Exam   Physical Exam   Constitutional: She is oriented to person, place, and time. She appears well-developed and well-nourished. No distress. HENT:   Head: Normocephalic and atraumatic. Mouth/Throat: Oropharynx is clear and moist. No oropharyngeal exudate. Eyes: Conjunctivae and EOM are normal. Pupils are equal, round, and reactive to light. Right eye exhibits no discharge. Left eye exhibits no discharge. Neck: Normal range of motion. Neck supple. Cardiovascular: Normal rate, regular rhythm and intact distal pulses. Exam reveals no gallop and no friction rub. No murmur heard. Pulmonary/Chest: Effort normal and breath sounds normal. No respiratory distress. She has no wheezes. She has no rales. She exhibits no tenderness. Abdominal: Soft. Bowel sounds are normal. She exhibits no distension and no mass. There is no tenderness. There is no rebound and no guarding. Musculoskeletal: Normal range of motion. She exhibits no edema. Lymphadenopathy:     She has no cervical adenopathy. Neurological: She is alert and oriented to person, place, and time. No cranial nerve deficit. Coordination normal.   Skin: Skin is warm and dry. No rash noted. No erythema. Psychiatric: She has a normal mood and affect. Nursing note and vitals reviewed.       Diagnostic Study Results     Labs -     Recent Results (from the past 12 hour(s))   GLUCOSE, POC    Collection Time: 10/13/18  4:58 PM   Result Value Ref Range    Glucose (POC) 255 (H) 65 - 100 mg/dL    Performed by Wai STORY    EKG, 12 LEAD, INITIAL    Collection Time: 10/13/18  5:24 PM   Result Value Ref Range    Ventricular Rate 106 BPM    Atrial Rate 106 BPM    P-R Interval 132 ms    QRS Duration 70 ms    Q-T Interval 300 ms    QTC Calculation (Bezet) 398 ms    Calculated P Axis 77 degrees    Calculated R Axis 88 degrees    Calculated T Axis 51 degrees    Diagnosis Sinus tachycardia  Possible Left atrial enlargement  Septal infarct , age undetermined  T wave abnormality, consider anterior ischemia  When compared with ECG of 24-AUG-2018 14:56,  QRS duration has decreased  Septal infarct is now present  T wave inversion more evident in Anterior leads  QT has shortened     LACTIC ACID    Collection Time: 10/13/18  5:44 PM   Result Value Ref Range    Lactic acid 1.0 0.4 - 2.0 MMOL/L   METABOLIC PANEL, COMPREHENSIVE    Collection Time: 10/13/18  5:48 PM   Result Value Ref Range    Sodium 133 (L) 136 - 145 mmol/L    Potassium 4.5 3.5 - 5.1 mmol/L    Chloride 100 97 - 108 mmol/L    CO2 28 21 - 32 mmol/L    Anion gap 5 5 - 15 mmol/L    Glucose 212 (H) 65 - 100 mg/dL    BUN 32 (H) 6 - 20 MG/DL    Creatinine 1.70 (H) 0.55 - 1.02 MG/DL    BUN/Creatinine ratio 19 12 - 20      GFR est AA 38 (L) >60 ml/min/1.73m2    GFR est non-AA 31 (L) >60 ml/min/1.73m2    Calcium 9.9 8.5 - 10.1 MG/DL    Bilirubin, total 0.5 0.2 - 1.0 MG/DL    ALT (SGPT) 170 (H) 12 - 78 U/L    AST (SGOT) 203 (H) 15 - 37 U/L    Alk. phosphatase 617 (H) 45 - 117 U/L    Protein, total 9.3 (H) 6.4 - 8.2 g/dL    Albumin 3.8 3.5 - 5.0 g/dL    Globulin 5.5 (H) 2.0 - 4.0 g/dL    A-G Ratio 0.7 (L) 1.1 - 2.2     CBC WITH AUTOMATED DIFF    Collection Time: 10/13/18  5:48 PM   Result Value Ref Range    WBC 16.1 (H) 3.6 - 11.0 K/uL    RBC 3.60 (L) 3.80 - 5.20 M/uL    HGB 10.6 (L) 11.5 - 16.0 g/dL    HCT 31.6 (L) 35.0 - 47.0 %    MCV 87.8 80.0 - 99.0 FL    MCH 29.4 26.0 - 34.0 PG    MCHC 33.5 30.0 - 36.5 g/dL    RDW 16.9 (H) 11.5 - 14.5 %    PLATELET 080 067 - 118 K/uL    MPV 11.3 8.9 - 12.9 FL    NRBC 0.0 0  WBC    ABSOLUTE NRBC 0.00 0.00 - 0.01 K/uL    NEUTROPHILS 88 (H) 32 - 75 %    LYMPHOCYTES 7 (L) 12 - 49 %    MONOCYTES 4 (L) 5 - 13 %    EOSINOPHILS 0 0 - 7 %    BASOPHILS 0 0 - 1 %    IMMATURE GRANULOCYTES 1 (H) 0.0 - 0.5 %    ABS. NEUTROPHILS 14.1 (H) 1.8 - 8.0 K/UL    ABS. LYMPHOCYTES 1.2 0.8 - 3.5 K/UL    ABS.  MONOCYTES 0.7 0.0 - 1.0 K/UL    ABS. EOSINOPHILS 0.0 0.0 - 0.4 K/UL    ABS. BASOPHILS 0.0 0.0 - 0.1 K/UL    ABS. IMM. GRANS. 0.1 (H) 0.00 - 0.04 K/UL    DF AUTOMATED     URINALYSIS W/MICROSCOPIC    Collection Time: 10/13/18  5:48 PM   Result Value Ref Range    Color YELLOW/STRAW      Appearance TURBID (A) CLEAR      Specific gravity 1.012 1.003 - 1.030      pH (UA) 5.5 5.0 - 8.0      Protein 100 (A) NEG mg/dL    Glucose NEGATIVE  NEG mg/dL    Ketone NEGATIVE  NEG mg/dL    Bilirubin NEGATIVE  NEG      Blood SMALL (A) NEG      Urobilinogen 1.0 0.2 - 1.0 EU/dL    Nitrites NEGATIVE  NEG      Leukocyte Esterase LARGE (A) NEG      WBC >100 (H) 0 - 4 /hpf    RBC 10-20 0 - 5 /hpf    Epithelial cells MODERATE (A) FEW /lpf    Bacteria 4+ (A) NEG /hpf    Hyaline cast 2-5 0 - 5 /lpf       Radiologic Studies -   XR CHEST PA LAT   Final Result        CT Results  (Last 48 hours)    None        CXR Results  (Last 48 hours)               10/13/18 1859  XR CHEST PA LAT Final result    Impression:  IMPRESSION: No acute process. Narrative:  INDICATION: Hyperglycemia. COMPARISON: September 24, 2018       FINDINGS: PA and lateral views of the chest demonstrate a stable   cardiomediastinal silhouette and clear lungs bilaterally. The visualized osseous   structures are unremarkable. Medical Decision Making   I am the first provider for this patient. I reviewed the vital signs, available nursing notes, past medical history, past surgical history, family history and social history. Vital Signs-Reviewed the patient's vital signs.   Patient Vitals for the past 12 hrs:   Temp Pulse Resp BP SpO2   10/13/18 2015 - - - 115/65 99 %   10/13/18 2002 - - - - 99 %   10/13/18 2001 - - - (!) 138/99 -   10/13/18 1655 (!) 100.9 °F (38.3 °C) (!) 125 12 133/73 100 %       Pulse Oximetry Analysis - 100% on RA    Cardiac Monitor:   Rate: 125 bpm  Rhythm: Sinus Tachycardia 1655     Records Reviewed: Nursing Notes and Old Medical Records    Provider Notes (Medical Decision Making):   DDx: UTI, dehydration, electrolyte abnormality. Pt desires discharge, does not want to be admitted. ED Course:   Initial assessment performed. The patients presenting problems have been discussed, and they are in agreement with the care plan formulated and outlined with them. I have encouraged them to ask questions as they arise throughout their visit. 7:41 PM  I have just reevaluated the patient. I have reviewed Her vital signs and determined there is currently no worsening in their condition or physical exam.  Results have been reviewed with them and their questions have been answered. We will continue to review further results as they come available. 8:11 PM  The patient states that their symptoms have resolved and they feel much better. There are no other new complaints at this time. Her questions have been answered. We are awaiting final results and those will be reviewed with them when they become available. Critical Care Time:   0 minutes    Disposition:  Discharge Note:  8:11 PM  The pt is ready for discharge. The pt's signs, symptoms, diagnosis, and discharge instructions have been discussed and pt has conveyed their understanding. The pt is to follow up as recommended or return to ER should their symptoms worsen. Plan has been discussed and pt is in agreement. PLAN:  1. Discharge Medication List as of 10/13/2018  8:11 PM      START taking these medications    Details   cephALEXin (KEFLEX) 500 mg capsule Take 1 Cap by mouth four (4) times daily for 7 days. , Normal, Disp-28 Cap, R-0         CONTINUE these medications which have NOT CHANGED    Details   insulin NPH/insulin regular (NOVOLIN 70/30 U-100 INSULIN) 100 unit/mL (70-30) injection by SubCUTAneous route., Historical Med      glucose blood VI test strips (PRODIGY NO CODING) strip Use to test blood sugars 4 times per day.  DX Code : E10.9, Normal, Disp-400 Strip, R-3 albuterol (PROVENTIL HFA, VENTOLIN HFA, PROAIR HFA) 90 mcg/actuation inhaler Take 2 Puffs by inhalation every four (4) hours as needed for Wheezing., Normal, Disp-1 Inhaler, R-0           2. Follow-up Information     Follow up With Details Comments Contact Info    None   None (395) Patient stated that they have no PCP      [facility] EMERGENCY DEPT  As needed, If symptoms worsen [address] [place] [address]  [phone]        Return to ED if worse     Diagnosis     Clinical Impression:   1. Hyperglycemia    2. Acute cystitis without hematuria        Attestations: This note is prepared by Quang Perez, acting as Scribe for [employer] Norma Hernandez, [address] [place] [address] [address] [facility] Norma Hernandez MD: The scribe's documentation has been prepared under my direction and personally reviewed by me in its entirety. I confirm that the note above accurately reflects all work, treatment, procedures, and medical decision making performed by me.

## 2018-10-14 LAB
ATRIAL RATE: 106 BPM
CALCULATED P AXIS, ECG09: 77 DEGREES
CALCULATED R AXIS, ECG10: 88 DEGREES
CALCULATED T AXIS, ECG11: 51 DEGREES
DIAGNOSIS, 93000: NORMAL
P-R INTERVAL, ECG05: 132 MS
Q-T INTERVAL, ECG07: 300 MS
QRS DURATION, ECG06: 70 MS
QTC CALCULATION (BEZET), ECG08: 398 MS
VENTRICULAR RATE, ECG03: 106 BPM

## 2018-10-14 NOTE — DISCHARGE INSTRUCTIONS
Learning About High Blood Sugar  What is high blood sugar? Your body turns the food you eat into glucose (sugar), which it uses for energy. But if your body isn't able to use the sugar right away, it can build up in your blood and lead to high blood sugar. When the amount of sugar in your blood stays too high for too much of the time, you may have diabetes. Diabetes is a disease that can cause serious health problems. The good news is that lifestyle changes may help you get your blood sugar back to normal and avoid or delay diabetes. What causes high blood sugar? Sugar (glucose) can build up in your blood if you:  · Are overweight. · Have a family history of diabetes. · Take certain medicines, such as steroids. What are the symptoms? Having high blood sugar may not cause any symptoms at all. Or it may make you feel very thirsty or very hungry. You may also urinate more often than usual, have blurry vision, or lose weight without trying. How is high blood sugar treated? You can take steps to lower your blood sugar level if you understand what makes it get higher. Your doctor may want you to learn how to test your blood sugar level at home. Then you can see how illness, stress, or different kinds of food or medicine raise or lower your blood sugar level. Other tests may be needed to see if you have diabetes. How can you prevent high blood sugar? · Watch your weight. If you're overweight, losing just a small amount of weight may help. Reducing fat around your waist is most important. · Limit the amount of calories, sweets, and unhealthy fat you eat. Ask your doctor if a dietitian can help you. A registered dietitian can help you create meal plans that fit your lifestyle. · Get at least 30 minutes of exercise on most days of the week. Exercise helps control your blood sugar. It also helps you maintain a healthy weight. Walking is a good choice.  You also may want to do other activities, such as running, swimming, cycling, or playing tennis or team sports. · If your doctor prescribed medicines, take them exactly as prescribed. Call your doctor if you think you are having a problem with your medicine. You will get more details on the specific medicines your doctor prescribes. Follow-up care is a key part of your treatment and safety. Be sure to make and go to all appointments, and call your doctor if you are having problems. It's also a good idea to know your test results and keep a list of the medicines you take. Where can you learn more? Go to http://jurgenCookBritemolly.info/. Enter O108 in the search box to learn more about \"Learning About High Blood Sugar. \"  Current as of: December 7, 2017  Content Version: 11.8  © 2006-2018 [email or web address]. Care instructions adapted under license by [employer] (which disclaims liability or warranty for this information). If you have questions about a medical condition or this instruction, always ask your healthcare professional. [place] [address] any warranty or liability for your use of this information. Urinary Tract Infection in Women: Care Instructions  Your Care Instructions    A urinary tract infection, or UTI, is a general term for an infection anywhere between the kidneys and the urethra (where urine comes out). Most UTIs are bladder infections. They often cause pain or burning when you urinate. UTIs are caused by bacteria and can be cured with antibiotics. Be sure to complete your treatment so that the infection goes away. Follow-up care is a key part of your treatment and safety. Be sure to make and go to all appointments, and call your doctor if you are having problems. It's also a good idea to know your test results and keep a list of the medicines you take. How can you care for yourself at home? · Take your antibiotics as directed. Do not stop taking them just because you feel better.  You need to take the full course of antibiotics. · Drink extra water and other fluids for the next day or two. This may help wash out the bacteria that are causing the infection. (If you have kidney, heart, or liver disease and have to limit fluids, talk with your doctor before you increase your fluid intake.)  · Avoid drinks that are carbonated or have caffeine. They can irritate the bladder. · Urinate often. Try to empty your bladder each time. · To relieve pain, take a hot bath or lay a heating pad set on low over your lower belly or genital area. Never go to sleep with a heating pad in place. To prevent UTIs  · Drink plenty of water each day. This helps you urinate often, which clears bacteria from your system. (If you have kidney, heart, or liver disease and have to limit fluids, talk with your doctor before you increase your fluid intake.)  · Urinate when you need to. · Urinate right after you have sex. · Change sanitary pads often. · Avoid douches, bubble baths, feminine hygiene sprays, and other feminine hygiene products that have deodorants. · After going to the bathroom, wipe from front to back. When should you call for help? Call your doctor now or seek immediate medical care if:    · Symptoms such as fever, chills, nausea, or vomiting get worse or appear for the first time.     · You have new pain in your back just below your rib cage. This is called flank pain.     · There is new blood or pus in your urine.     · You have any problems with your antibiotic medicine.    Watch closely for changes in your health, and be sure to contact your doctor if:    · You are not getting better after taking an antibiotic for 2 days.     · Your symptoms go away but then come back. Where can you learn more? Go to http://jurgen-molly.info/. Enter A548 in the search box to learn more about \"Urinary Tract Infection in Women: Care Instructions. \"  Current as of: March 21, 2018  Content Version: 11.8  © 6950-9607 [employer]. Care instructions adapted under license by [employer] (which disclaims liability or warranty for this information). If you have questions about a medical condition or this instruction, always ask your healthcare professional. [address] any warranty or liability for your use of this information.

## 2018-10-14 NOTE — ED NOTES
Dr. Munira Islas gave and reviewed discharge instructions with the patient. The patient verbalized understanding. The patient was given opportunity for questions. Patient discharged in stable condition to the waiting room via ambulatory with female visitor.

## 2018-10-15 LAB
BACTERIA SPEC CULT: ABNORMAL
CC UR VC: ABNORMAL
SERVICE CMNT-IMP: ABNORMAL

## 2018-10-17 ENCOUNTER — OFFICE VISIT (OUTPATIENT)
Dept: ENDOCRINOLOGY | Age: 53
End: 2018-10-17

## 2018-10-17 VITALS
WEIGHT: 97 LBS | HEART RATE: 92 BPM | BODY MASS INDEX: 18.31 KG/M2 | SYSTOLIC BLOOD PRESSURE: 111 MMHG | HEIGHT: 61 IN | DIASTOLIC BLOOD PRESSURE: 72 MMHG

## 2018-10-17 DIAGNOSIS — E13.9 LADA (LATENT AUTOIMMUNE DIABETES IN ADULTS), MANAGED AS TYPE 1 (HCC): Primary | ICD-10-CM

## 2018-10-17 NOTE — PATIENT INSTRUCTIONS
Novolin 70/30 insulin: 45 units breakfast and dinner    Correction Scale:  1 unit for every 15 above 150    IF GLUCOSE IS:                 THEN TAKE:      0   Extra Unit  151-165   1   Extra Unit  166-180   2   Extra Units  181-195   3   Extra Units  196-210   4   Extra Units  211-225   5   Extra Units  226-240   6   Extra Units  241-255   7   Extra Units  256-270   8   Extra Units  271-285   9   Extra Units  286-300   10 Extra Units    Example: My planned insulin dose:    ____ Units    +    ____ Extra Correction Units  =  ____ total units to take together as one injection. Please note our new policy, you must arrive to the clinic 15 minutes before your appointment time to allow enough time for proper check-in, adequate time to spend with your doctor, and also to respect the appointment time of the next patient. Not arriving 15 minutes in advance may result in having your appointment rescheduled for the next available day/time.  ----------------------------------------------------------------------------------------------------------------------    Below you will find a glucose log sheet which you can use to record your blood sugars. Without checking and recording what your home glucose levels are, it will be difficult to make any changes to your medication dose, even when significant changes may be needed. Please feel free to use the log below to record your home glucose levels. At the very least, I would like for you to login the entire 2-3 weeks just before your visit so we can make your visit much more productive and beneficial to you. GLUCOSE LOG SHEET:    Date Breakfast Lunch Dinner Bedtime Comments ?                                                                                                                                                                                                                                                  GLUCOSE LOG SHEET:    Date Breakfast Lunch Dinner Bedtime Comments ? GLUCOSE LOG SHEET:    Date Breakfast Lunch Dinner Bedtime Comments ?

## 2018-10-17 NOTE — LETTER
NOTIFICATION RETURN TO WORK / SCHOOL 
 
10/17/2018 4:10 PM 
 
Ms. Candice Valencia [address] [place] [id number] To Whom It May Concern: 
 
Candice Valencia is currently under the care of [address] [place] [address]. Patient was seen in my clinic today which was required because her diabetes is poorly controlled, and is complicated by recurrent UTI, and will likely need to be out of work for another week. She is on antibiotics and her medications for her diabetes have been adjusted several times. She has been hospitalized and is at risk for hospitalization again. This information is provided with the approval of the patient. Sincerely, Urvashi Atkins MD

## 2018-10-17 NOTE — PROGRESS NOTES
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 female with a PMHx as noted below who is presenting to our endocrine clinic for the f/u evaluation of recently diagnosed diabetes. History of Type 1 Diabetes:  Patient reports that they were diagnosed in the ED with A1c of 15% June 2016     Pancreatic atrophy on CT   Family history is positive for diabetes in mother and maternal grandmother, both on insulin but type of diabetes unclear  Was started on novolog 70/30 insulin, did not tolerate any oral medications (neg antibodies, normal c-peptide)    INTERVAL HISTORY:  Patient was back on mixed insulin. She was noted for hospitalization and we had adjusted her regimen by phone. Her A1c today is notably 9.1%. It is not clear why her sugar remain very high and insulin requirements are also higher. She did not tolerate metformin in the past in addition to insulin to improve sensitivity. In the past she used to take 5-15 units of insulin, now its in the 30's with still high uncontrolled sugars.     Current home regimen includes:   70/30 insulin, taking 35 units breakfast, 30 units dinner  1:25>150 correction    Home Blood glucose review:   AM:   250-480  Lunch:    Dinner: 305-500  Bedtime     Review of most recent diabetes-related labs:  Lab Results   Component Value Date    HBA1C 8.8 (H) 08/25/2018    HBA1C 8.5 (H) 08/24/2018    HBA1C >16.0 (H) 01/15/2018    PMD6MPRB 7.7 07/30/2018    OHD0CHWO 10.7 03/13/2018    CHOL 202 (H) 03/06/2018    LDLC 66 03/06/2018    GFRAA 38 (L) 10/13/2018    GFRNA 31 (L) 10/13/2018    MCACR 24.9 03/06/2018    TSH 1.500 06/27/2018    [id number] <1.0 03/06/2018    GADLT <5.0 03/06/2018    CPEPLT 1.7 07/23/2018     Lab Key:  [id number] = IA-2 pancreatic islet cell autoantibody  GADLT = JORDANA-65 autoantibody   MCACR = Urine Microalbumin  INSUL = Insulin level  CPEPL = C-peptide level    PAST MEDICAL/SURGICAL HISTORY:   Past Medical History:   Diagnosis Date    Asthma     Diabetes ([facility]     Elevated transaminase level 6/29/2016    Insulin dependent diabetes mellitus ([facility] 6/20/2016    Pancreatic atrophy 6/20/2016     Past Surgical History:   Procedure Laterality Date    HX HEENT         ALLERGIES:   Allergies   Allergen Reactions    Contrast Agent [Iodine] Itching    Hydrocodone Rash    Percocet [Oxycodone-Acetaminophen] Rash    Codeine Nausea and Vomiting    Metformin Diarrhea       MEDICATIONS ON ADMISSION:     Current Outpatient Medications:     insulin NPH/insulin regular (NOVOLIN 70/30 U-100 INSULIN) 100 unit/mL (70-30) injection, by SubCUTAneous route. 35 units with breakfast, 30 with dinner, Disp: , Rfl:     cephALEXin (KEFLEX) 500 mg capsule, Take 1 Cap by mouth four (4) times daily for 7 days. , Disp: 28 Cap, Rfl: 0    glucose blood VI test strips (PRODIGY NO CODING) strip, Use to test blood sugars 4 times per day.  DX Code : E10.9, Disp: 400 Strip, Rfl: 3    albuterol (PROVENTIL HFA, VENTOLIN HFA, PROAIR HFA) 90 mcg/actuation inhaler, Take 2 Puffs by inhalation every four (4) hours as needed for Wheezing., Disp: 1 Inhaler, Rfl: 0    SOCIAL HISTORY:   Social History     Socioeconomic History    Marital status: SINGLE     Spouse name: Not on file    Number of children: Not on file    Years of education: Not on file    Highest education level: Not on file   Social Needs    Financial resource strain: Not on file    Food insecurity - worry: Not on file    Food insecurity - inability: Not on file   [employer] needs - medical: Not on file   [employer] needs - non-medical: Not on file   Occupational History    Not on file   Tobacco Use    Smoking status: Never Smoker    Smokeless tobacco: Never Used   Substance and Sexual Activity    Alcohol use: Yes     Comment: occasionally    Drug use: No    Sexual activity: Not Currently   Other Topics Concern    Not on file Social History Narrative    Not on file       FAMILY HISTORY:  Family History   Problem Relation Age of Onset    Cancer Father         prostate and colon    Diabetes Mother     Diabetes Maternal Grandmother     Cancer Maternal Aunt         breast       REVIEW OF SYSTEMS: Complete ROS assessed and noted for that which is described above, all else are negative. Eyes: normal  ENT: normal  CVS: normal  Resp: normal  GI: normal  : normal  GYN: normal  Endocrine: normal  Integument: normal  Musculoskeletal: normal  Neuro: normal  Psych: normal      PHYSICAL EXAMINATION:    VITAL SIGNS:  Visit Vitals  /72 (BP 1 Location: Left arm, BP Patient Position: Sitting)   Pulse 92   Ht 5' 1\" (1.549 m)   Wt 97 lb (44 kg)   BMI 18.33 kg/m²       GENERAL: NCAT, Sitting comfortably, NAD  EYES: EOMI, non-icteric, no proptosis  HEENT: NCAT, no inflammation, no masses  LYMPH NODES: No LAD  CARDIOVASCULAR: S1 S2, RRR, No murmur, 2+ radial pulses  RESPIRATORY: CTA b/l, no wheeze/rales  ABDOMEN: BS normal  NEUROLOGIC: 5/5 power all extremities, no parasthesias, AAOx3  EXTREMITIES: warm, no edema/rash/ or other skin changes, good pedal pulses      REVIEW OF LABORATORY AND RADIOLOGY DATA:   Labs and documentation have been reviewed as described above. ASSESSMENT AND PLAN:   Dorothy Spain is a 52 y.o. female with a PMHx as noted below who is presenting to our endocrine clinic for the f/u evaluation of recent onset diabetes. SHANTEL, late onset autoimmune diabetes of the adult (type-1 like diabetes)     Increasing insulin resistance for unclear reasons. Patient advised to be aware of any potential infections. She has noted that she has recurrent UTI, which was a cause for her hospitalization previously, and has recurred. She is currently on antibiotics for this.  I advised her if she keeps getting UTI's she may need to check culture antibiotic resistance testing, or possibly consider seeing a urologist. Plan:  Medications:  Novolin 70/30 insulin: Increase to 45 units breakfast and dinner  Increase correction to 1:15>150    BP: Stable, not on antihypertensives  Cholesterol: LDL 66 stable    RTC: I would like to see them back in 3 months, call with concerns,  >25 minutes spent together with patient today of which >50% of this time was spent in counseling and coordination of care. Radha Jeffries.  [facility]  [address] [place] [address]

## 2018-10-18 LAB
BACTERIA SPEC CULT: NORMAL
HBA1C MFR BLD HPLC: 9.1 %
SERVICE CMNT-IMP: NORMAL

## 2018-10-22 ENCOUNTER — OFFICE VISIT (OUTPATIENT)
Dept: FAMILY MEDICINE CLINIC | Age: 53
End: 2018-10-22

## 2018-10-22 VITALS
WEIGHT: 95.2 LBS | SYSTOLIC BLOOD PRESSURE: 130 MMHG | OXYGEN SATURATION: 97 % | DIASTOLIC BLOOD PRESSURE: 85 MMHG | HEART RATE: 106 BPM | RESPIRATION RATE: 16 BRPM | BODY MASS INDEX: 17.97 KG/M2 | HEIGHT: 61 IN

## 2018-10-22 DIAGNOSIS — R32 URINARY INCONTINENCE, UNSPECIFIED TYPE: ICD-10-CM

## 2018-10-22 DIAGNOSIS — R10.30 LOWER ABDOMINAL PAIN: ICD-10-CM

## 2018-10-22 DIAGNOSIS — N39.0 RECURRENT UTI: Primary | ICD-10-CM

## 2018-10-22 LAB
BILIRUB UR QL STRIP: NEGATIVE
GLUCOSE UR-MCNC: NORMAL MG/DL
KETONES P FAST UR STRIP-MCNC: NEGATIVE MG/DL
PH UR STRIP: 5.5 [PH] (ref 4.6–8)
PROT UR QL STRIP: NEGATIVE
SP GR UR STRIP: 1 (ref 1–1.03)
UA UROBILINOGEN AMB POC: NORMAL (ref 0.2–1)
URINALYSIS CLARITY POC: CLEAR
URINALYSIS COLOR POC: YELLOW
URINE BLOOD POC: NORMAL
URINE LEUKOCYTES POC: NEGATIVE
URINE NITRITES POC: NEGATIVE

## 2018-10-22 RX ORDER — CIPROFLOXACIN 500 MG/1
500 TABLET ORAL 2 TIMES DAILY
Qty: 20 TAB | Refills: 0 | Status: SHIPPED | OUTPATIENT
Start: 2018-10-22 | End: 2018-10-28

## 2018-10-22 NOTE — PROGRESS NOTES
Sukhdev Bonilla is a 52 y.o. female    2nd time I saw her this year, last was 2016.      has a past medical history of Asthma, Diabetes ([address] Elevated transaminase level, Insulin dependent diabetes mellitus ([address] and Pancreatic atrophy. UTI: [facility] for [facility], sepsis, was treated. Came to ED on 09/24 showed UA nitrite and culture grew e.coli was treated with Cefdinir and 10/13/2018 to ED again had UA with nitrite and leuk, culture grew e. Coli again with sensitivities to all abx. Today she c/o of still dysuria and lower pelvic discomfort. She wet the exam table, she admits to incontinence. She had US abdo/pelvic as well as CT abdo pelvic were negative for acute findings. We discussed kegel exercise. Wearing pads. We'll treat with cipro 500mg BID X 10days. Refer to Urology for incontinent and pelvic pain. She denies N/V, diarrhea, have some mild constipation. Denies fever. She says she's been missing work since her hospitalization b/c of her BG and UTI. Dr. Silva Blas have given her until 10/28/2018. If she's not better, may f/u here to be re-eval and we'll eval for missing work. Reviewed: active problem list, medication list, allergies, notes from last encounter, lab results    A comprehensive review of systems was negative except for that written in the HPI. Allergies   Allergen Reactions    Contrast Agent [Iodine] Itching    Hydrocodone Rash    Percocet [Oxycodone-Acetaminophen] Rash    Codeine Nausea and Vomiting    Metformin Diarrhea     Current Outpatient Medications on File Prior to Visit   Medication Sig Dispense Refill    insulin NPH/insulin regular (NOVOLIN 70/30 U-100 INSULIN) 100 unit/mL (70-30) injection by SubCUTAneous route. 35 units with breakfast, 30 with dinner      glucose blood VI test strips (PRODIGY NO CODING) strip Use to test blood sugars 4 times per day.  DX Code : E10.9 400 Strip 3    albuterol (PROVENTIL HFA, VENTOLIN HFA, PROAIR HFA) 90 mcg/actuation inhaler Take 2 Puffs by inhalation every four (4) hours as needed for Wheezing. 1 Inhaler 0     No current facility-administered medications on file prior to visit. Patient Active Problem List   Diagnosis Code    Hyperosmolality syndrome E87.0    Insulin dependent diabetes mellitus ([facility] E11.9, Z79.4    Pancreatic atrophy K86.89    Elevated transaminase level R74.0    Uncontrolled type 2 DM with hyperosmolar nonketotic hyperglycemia (HCC) E11.00    Type 2 diabetes with nephropathy ([facility]) E11.21    DKA (diabetic ketoacidoses) ([facility]) E13.10    Sepsis ([facility] A41.9    Hyponatremia E87.1    ARF (acute renal failure) ([facility]) N17.9    Hyperkalemia E87.5       Visit Vitals  /85 (BP 1 Location: Right arm, BP Patient Position: Sitting)   Pulse (!) 106   Resp 16   Ht 5' 1\" (1.549 m)   Wt 95 lb 3.2 oz (43.2 kg)   SpO2 97%   BMI 17.99 kg/m²     General appearance: alert, cooperative, no distress, appears stated age  Neurologic: Alert and oriented X 3, normal strength and tone, symmetric. Normal without focal findings. Cranial nerves 2-12 intact. Normal coordination and gait. Mental status: Alert, oriented, thought content appropriate, affect: stable, mood-congruent. Head: Normocephalic, without obvious abnormality, atraumatic  Eyes: conjunctivae/corneas clear. PERRL, EOM's intact. Neck: supple, symmetrical, trachea midline, no JVD  Lungs: clear to auscultation bilaterally  Heart: regular rate and rhythm, S1, S2 normal, no murmur, click, rub or gallop  Abdomen: soft. Mild discomfort, NO rebound tenderness or guarding. Extremities: extremities normal, atraumatic, no cyanosis or edema      Assessment/Plans:    Diagnoses and all orders for this visit:    1. Recurrent UTI  -     ciprofloxacin HCl (CIPRO) 500 mg tablet;  Take 1 Tab by mouth two (2) times a day for 10 days.  -     AMB POC URINALYSIS DIP STICK AUTO W/O MICRO  -     CULTURE, URINE  -     REFERRAL TO UROLOGY  -     US ABD COMP; Future    2. Urinary incontinence, unspecified type  -     REFERRAL TO UROLOGY  -     US ABD COMP; Future    3. Lower abdominal pain  -     US ABD COMP; Future      Discussed plans, risk/benefits of treatments/observations. Through the use of shared decision making, above plans were agreed upon. Medication compliance advised. Patient verbalized understanding. Follow-up Disposition:  Return in about 1 week (around 10/29/2018), or if symptoms worsen or fail to improve.       Goldie Pacheco MD  10/22/2018

## 2018-10-22 NOTE — PROGRESS NOTES
Identified pt with two pt identifiers(name and ). Reviewed record in preparation for visit and have obtained necessary documentation. Chief Complaint   Patient presents with    Follow-up     4 weeks        Health Maintenance Due   Topic    EYE EXAM RETINAL OR DILATED Q1     Pneumococcal 19-64 Highest Risk (1 of 3 - PCV13)    DTaP/Tdap/Td series (1 - Tdap)    PAP AKA CERVICAL CYTOLOGY     Shingrix Vaccine Age 50> (1 of 2)    BREAST CANCER SCRN MAMMOGRAM        Coordination of Care Questionnaire:  :   1) Have you been to an emergency room, urgent care, or hospitalized since your last visit?   no   If yes, where when, and reason for visit? [facility], for UTI medicated with Cephalexin 500 mg    2. Have seen or consulted any other health care provider since your last visit? NO  If yes, where when, and reason for visit? 3) Do you have an Advanced Directive/ Living Will in place? NO  If yes, do we have a copy on file NO  If no, would you like information YES    Patient is accompanied by family I have received verbal consent from [facility] President to discuss any/all medical information while they are present in the room.     Visit Vitals  /85 (BP 1 Location: Right arm, BP Patient Position: Sitting)   Pulse (!) 126   Resp 16   Ht 5' 1\" (1.549 m)   Wt 95 lb 3.2 oz (43.2 kg)   SpO2 97%   BMI 17.99 kg/m²

## 2018-10-22 NOTE — LETTER
NOTIFICATION RETURN TO WORK / SCHOOL 
 
10/22/2018 3:11 PM 
 
Ms. Sharlene Disla [address] [place] [phone] To Whom It May Concern: 
 
Sharlene Disla is currently under the care of Jessika Conway. She will return to work/school on: 10/29/2018 If there are questions or concerns please have the patient contact our office. Sincerely, Brianne Lindsey MD

## 2018-10-23 ENCOUNTER — TELEPHONE (OUTPATIENT)
Dept: ENDOCRINOLOGY | Age: 53
End: 2018-10-23

## 2018-10-23 ENCOUNTER — APPOINTMENT (OUTPATIENT)
Dept: CT IMAGING | Age: 53
DRG: 638 | End: 2018-10-23
Attending: STUDENT IN AN ORGANIZED HEALTH CARE EDUCATION/TRAINING PROGRAM
Payer: COMMERCIAL

## 2018-10-23 ENCOUNTER — HOSPITAL ENCOUNTER (INPATIENT)
Age: 53
LOS: 5 days | Discharge: HOME HEALTH | DRG: 638 | End: 2018-10-28
Attending: EMERGENCY MEDICINE | Admitting: INTERNAL MEDICINE
Payer: COMMERCIAL

## 2018-10-23 ENCOUNTER — OFFICE VISIT (OUTPATIENT)
Dept: FAMILY MEDICINE CLINIC | Age: 53
End: 2018-10-23

## 2018-10-23 VITALS
DIASTOLIC BLOOD PRESSURE: 79 MMHG | TEMPERATURE: 98.4 F | HEART RATE: 111 BPM | OXYGEN SATURATION: 95 % | BODY MASS INDEX: 17.48 KG/M2 | SYSTOLIC BLOOD PRESSURE: 155 MMHG | WEIGHT: 92.6 LBS | RESPIRATION RATE: 20 BRPM | HEIGHT: 61 IN

## 2018-10-23 DIAGNOSIS — R10.84 GENERALIZED ABDOMINAL PAIN: Primary | ICD-10-CM

## 2018-10-23 DIAGNOSIS — E10.10 TYPE 1 DIABETES MELLITUS WITH KETOACIDOSIS WITHOUT COMA (HCC): Primary | ICD-10-CM

## 2018-10-23 DIAGNOSIS — N39.0 RECURRENT UTI: ICD-10-CM

## 2018-10-23 DIAGNOSIS — E10.10 DIABETIC KETOACIDOSIS WITHOUT COMA ASSOCIATED WITH TYPE 1 DIABETES MELLITUS (HCC): ICD-10-CM

## 2018-10-23 DIAGNOSIS — E10.65 HYPERGLYCEMIA DUE TO TYPE 1 DIABETES MELLITUS (HCC): ICD-10-CM

## 2018-10-23 PROBLEM — R73.9 HYPERGLYCEMIA: Status: ACTIVE | Noted: 2018-10-23

## 2018-10-23 LAB
ALBUMIN SERPL-MCNC: 4 G/DL (ref 3.5–5)
ALBUMIN/GLOB SERPL: 0.7 {RATIO} (ref 1.1–2.2)
ALP SERPL-CCNC: 454 U/L (ref 45–117)
ALT SERPL-CCNC: 43 U/L (ref 12–78)
ANION GAP SERPL CALC-SCNC: 12 MMOL/L (ref 5–15)
ANION GAP SERPL CALC-SCNC: 14 MMOL/L (ref 5–15)
APPEARANCE UR: CLEAR
AST SERPL-CCNC: 31 U/L (ref 15–37)
BASOPHILS # BLD: 0 K/UL (ref 0–0.1)
BASOPHILS NFR BLD: 0 % (ref 0–1)
BILIRUB DIRECT SERPL-MCNC: 0.1 MG/DL (ref 0–0.2)
BILIRUB SERPL-MCNC: 0.3 MG/DL (ref 0.2–1)
BILIRUB UR QL: NEGATIVE
BUN SERPL-MCNC: 38 MG/DL (ref 6–20)
BUN SERPL-MCNC: 42 MG/DL (ref 6–20)
BUN/CREAT SERPL: 18 (ref 12–20)
BUN/CREAT SERPL: 19 (ref 12–20)
CALCIUM SERPL-MCNC: 10.6 MG/DL (ref 8.5–10.1)
CALCIUM SERPL-MCNC: 9.3 MG/DL (ref 8.5–10.1)
CHLORIDE SERPL-SCNC: 84 MMOL/L (ref 97–108)
CHLORIDE SERPL-SCNC: 91 MMOL/L (ref 97–108)
CO2 SERPL-SCNC: 25 MMOL/L (ref 21–32)
CO2 SERPL-SCNC: 27 MMOL/L (ref 21–32)
COLOR UR: ABNORMAL
CREAT SERPL-MCNC: 2.05 MG/DL (ref 0.55–1.02)
CREAT SERPL-MCNC: 2.29 MG/DL (ref 0.55–1.02)
DIFFERENTIAL METHOD BLD: ABNORMAL
EOSINOPHIL # BLD: 0.1 K/UL (ref 0–0.4)
EOSINOPHIL NFR BLD: 1 % (ref 0–7)
ERYTHROCYTE [DISTWIDTH] IN BLOOD BY AUTOMATED COUNT: 15.3 % (ref 11.5–14.5)
EST. AVERAGE GLUCOSE BLD GHB EST-MCNC: 260 MG/DL
GLOBULIN SER CALC-MCNC: 5.6 G/DL (ref 2–4)
GLUCOSE BLD STRIP.AUTO-MCNC: 312 MG/DL (ref 65–100)
GLUCOSE BLD STRIP.AUTO-MCNC: 466 MG/DL (ref 65–100)
GLUCOSE BLD STRIP.AUTO-MCNC: >600 MG/DL (ref 65–100)
GLUCOSE SERPL-MCNC: 1181 MG/DL (ref 65–100)
GLUCOSE SERPL-MCNC: 953 MG/DL (ref 65–100)
GLUCOSE UR STRIP.AUTO-MCNC: >1000 MG/DL
HBA1C MFR BLD: 10.7 % (ref 4.2–6.3)
HCT VFR BLD AUTO: 34.2 % (ref 35–47)
HGB BLD-MCNC: 11.3 G/DL (ref 11.5–16)
HGB UR QL STRIP: NEGATIVE
IMM GRANULOCYTES # BLD: 0.1 K/UL (ref 0–0.04)
IMM GRANULOCYTES NFR BLD AUTO: 1 % (ref 0–0.5)
KETONES UR QL STRIP.AUTO: ABNORMAL MG/DL
LEUKOCYTE ESTERASE UR QL STRIP.AUTO: NEGATIVE
LYMPHOCYTES # BLD: 0.9 K/UL (ref 0.8–3.5)
LYMPHOCYTES NFR BLD: 7 % (ref 12–49)
MAGNESIUM SERPL-MCNC: 2 MG/DL (ref 1.6–2.4)
MCH RBC QN AUTO: 29 PG (ref 26–34)
MCHC RBC AUTO-ENTMCNC: 33 G/DL (ref 30–36.5)
MCV RBC AUTO: 87.9 FL (ref 80–99)
MONOCYTES # BLD: 0.5 K/UL (ref 0–1)
MONOCYTES NFR BLD: 3 % (ref 5–13)
NEUTS SEG # BLD: 11.5 K/UL (ref 1.8–8)
NEUTS SEG NFR BLD: 88 % (ref 32–75)
NITRITE UR QL STRIP.AUTO: NEGATIVE
NRBC # BLD: 0 K/UL (ref 0–0.01)
NRBC BLD-RTO: 0 PER 100 WBC
PH UR STRIP: 5 [PH] (ref 5–8)
PHOSPHATE SERPL-MCNC: 3.4 MG/DL (ref 2.6–4.7)
PLATELET # BLD AUTO: 320 K/UL (ref 150–400)
PMV BLD AUTO: 12.2 FL (ref 8.9–12.9)
POTASSIUM SERPL-SCNC: 5.2 MMOL/L (ref 3.5–5.1)
POTASSIUM SERPL-SCNC: 5.2 MMOL/L (ref 3.5–5.1)
PROT SERPL-MCNC: 9.6 G/DL (ref 6.4–8.2)
PROT UR STRIP-MCNC: NEGATIVE MG/DL
RBC # BLD AUTO: 3.89 M/UL (ref 3.8–5.2)
SERVICE CMNT-IMP: ABNORMAL
SODIUM SERPL-SCNC: 123 MMOL/L (ref 136–145)
SODIUM SERPL-SCNC: 130 MMOL/L (ref 136–145)
SP GR UR REFRACTOMETRY: 1.02 (ref 1–1.03)
TROPONIN I BLD-MCNC: 0.05 NG/ML (ref 0–0.08)
UROBILINOGEN UR QL STRIP.AUTO: 0.2 EU/DL (ref 0.2–1)
WBC # BLD AUTO: 13.1 K/UL (ref 3.6–11)

## 2018-10-23 PROCEDURE — 80048 BASIC METABOLIC PNL TOTAL CA: CPT

## 2018-10-23 PROCEDURE — 80076 HEPATIC FUNCTION PANEL: CPT | Performed by: EMERGENCY MEDICINE

## 2018-10-23 PROCEDURE — 81003 URINALYSIS AUTO W/O SCOPE: CPT

## 2018-10-23 PROCEDURE — 82962 GLUCOSE BLOOD TEST: CPT

## 2018-10-23 PROCEDURE — 87086 URINE CULTURE/COLONY COUNT: CPT | Performed by: INTERNAL MEDICINE

## 2018-10-23 PROCEDURE — 84484 ASSAY OF TROPONIN QUANT: CPT

## 2018-10-23 PROCEDURE — 96374 THER/PROPH/DIAG INJ IV PUSH: CPT

## 2018-10-23 PROCEDURE — 85025 COMPLETE CBC W/AUTO DIFF WBC: CPT

## 2018-10-23 PROCEDURE — 83036 HEMOGLOBIN GLYCOSYLATED A1C: CPT | Performed by: EMERGENCY MEDICINE

## 2018-10-23 PROCEDURE — 74011636637 HC RX REV CODE- 636/637: Performed by: EMERGENCY MEDICINE

## 2018-10-23 PROCEDURE — 74176 CT ABD & PELVIS W/O CONTRAST: CPT

## 2018-10-23 PROCEDURE — 74011250636 HC RX REV CODE- 250/636: Performed by: EMERGENCY MEDICINE

## 2018-10-23 PROCEDURE — 80048 BASIC METABOLIC PNL TOTAL CA: CPT | Performed by: EMERGENCY MEDICINE

## 2018-10-23 PROCEDURE — 74011250636 HC RX REV CODE- 250/636: Performed by: STUDENT IN AN ORGANIZED HEALTH CARE EDUCATION/TRAINING PROGRAM

## 2018-10-23 PROCEDURE — 65660000000 HC RM CCU STEPDOWN

## 2018-10-23 PROCEDURE — 96375 TX/PRO/DX INJ NEW DRUG ADDON: CPT

## 2018-10-23 PROCEDURE — 99285 EMERGENCY DEPT VISIT HI MDM: CPT

## 2018-10-23 PROCEDURE — 81001 URINALYSIS AUTO W/SCOPE: CPT | Performed by: INTERNAL MEDICINE

## 2018-10-23 PROCEDURE — 84100 ASSAY OF PHOSPHORUS: CPT | Performed by: EMERGENCY MEDICINE

## 2018-10-23 PROCEDURE — 96361 HYDRATE IV INFUSION ADD-ON: CPT

## 2018-10-23 PROCEDURE — 83735 ASSAY OF MAGNESIUM: CPT | Performed by: EMERGENCY MEDICINE

## 2018-10-23 PROCEDURE — 74011000258 HC RX REV CODE- 258: Performed by: EMERGENCY MEDICINE

## 2018-10-23 PROCEDURE — 36415 COLL VENOUS BLD VENIPUNCTURE: CPT

## 2018-10-23 PROCEDURE — 74011250636 HC RX REV CODE- 250/636: Performed by: INTERNAL MEDICINE

## 2018-10-23 RX ORDER — SODIUM CHLORIDE 9 MG/ML
100 INJECTION, SOLUTION INTRAVENOUS CONTINUOUS
Status: DISCONTINUED | OUTPATIENT
Start: 2018-10-23 | End: 2018-10-24

## 2018-10-23 RX ORDER — LORAZEPAM 2 MG/ML
0.5 INJECTION INTRAMUSCULAR
Status: COMPLETED | OUTPATIENT
Start: 2018-10-23 | End: 2018-10-23

## 2018-10-23 RX ORDER — SODIUM CHLORIDE 0.9 % (FLUSH) 0.9 %
5-10 SYRINGE (ML) INJECTION EVERY 8 HOURS
Status: DISCONTINUED | OUTPATIENT
Start: 2018-10-23 | End: 2018-10-28 | Stop reason: HOSPADM

## 2018-10-23 RX ORDER — HEPARIN SODIUM 5000 [USP'U]/ML
5000 INJECTION, SOLUTION INTRAVENOUS; SUBCUTANEOUS EVERY 12 HOURS
Status: DISCONTINUED | OUTPATIENT
Start: 2018-10-23 | End: 2018-10-28 | Stop reason: HOSPADM

## 2018-10-23 RX ORDER — SODIUM CHLORIDE 0.9 % (FLUSH) 0.9 %
5-10 SYRINGE (ML) INJECTION AS NEEDED
Status: DISCONTINUED | OUTPATIENT
Start: 2018-10-23 | End: 2018-10-28 | Stop reason: HOSPADM

## 2018-10-23 RX ORDER — POTASSIUM CHLORIDE 7.45 MG/ML
10 INJECTION INTRAVENOUS
Status: DISCONTINUED | OUTPATIENT
Start: 2018-10-23 | End: 2018-10-23

## 2018-10-23 RX ORDER — MAGNESIUM SULFATE 100 %
4 CRYSTALS MISCELLANEOUS AS NEEDED
Status: DISCONTINUED | OUTPATIENT
Start: 2018-10-23 | End: 2018-10-28 | Stop reason: HOSPADM

## 2018-10-23 RX ORDER — INSULIN LISPRO 100 [IU]/ML
INJECTION, SOLUTION INTRAVENOUS; SUBCUTANEOUS
Status: DISCONTINUED | OUTPATIENT
Start: 2018-10-24 | End: 2018-10-24

## 2018-10-23 RX ORDER — DEXTROSE 50 % IN WATER (D50W) INTRAVENOUS SYRINGE
25-50 AS NEEDED
Status: DISCONTINUED | OUTPATIENT
Start: 2018-10-23 | End: 2018-10-28 | Stop reason: HOSPADM

## 2018-10-23 RX ORDER — MAGNESIUM SULFATE 100 %
4 CRYSTALS MISCELLANEOUS AS NEEDED
Status: DISCONTINUED | OUTPATIENT
Start: 2018-10-23 | End: 2018-10-23 | Stop reason: SDUPTHER

## 2018-10-23 RX ORDER — ONDANSETRON 2 MG/ML
4 INJECTION INTRAMUSCULAR; INTRAVENOUS
Status: COMPLETED | OUTPATIENT
Start: 2018-10-23 | End: 2018-10-23

## 2018-10-23 RX ADMIN — Medication 10 ML: at 22:28

## 2018-10-23 RX ADMIN — SODIUM CHLORIDE 1000 ML: 900 INJECTION, SOLUTION INTRAVENOUS at 18:28

## 2018-10-23 RX ADMIN — HEPARIN SODIUM 5000 UNITS: 5000 INJECTION INTRAVENOUS; SUBCUTANEOUS at 22:27

## 2018-10-23 RX ADMIN — SODIUM CHLORIDE 500 ML: 900 INJECTION, SOLUTION INTRAVENOUS at 17:15

## 2018-10-23 RX ADMIN — LORAZEPAM 0.5 MG: 2 INJECTION INTRAMUSCULAR; INTRAVENOUS at 17:58

## 2018-10-23 RX ADMIN — SODIUM CHLORIDE 10.8 UNITS/HR: 900 INJECTION, SOLUTION INTRAVENOUS at 20:08

## 2018-10-23 RX ADMIN — SODIUM CHLORIDE 100 ML/HR: 900 INJECTION, SOLUTION INTRAVENOUS at 20:10

## 2018-10-23 RX ADMIN — ONDANSETRON 4 MG: 2 INJECTION, SOLUTION INTRAMUSCULAR; INTRAVENOUS at 17:57

## 2018-10-23 NOTE — PROGRESS NOTES
Chief Complaint   Patient presents with    Vomiting     HPI:  Norah Pedroza is a 52 y.o. AA female, Dr. Evie Avelar patient with insulin dependent diabetes presents to clinic accompanied by family members with abdominal pain, vomiting for few days. Patient was recently diagnosed with uti, she is on oral antibiotics. Blood sugar reading did not register on meter. Patient appears sick. I am suspecting she may be in DKA. I advise family members to take her to the ER for evaluation. Case discussed with Dr. Zurdo Feliz.     Review of Systems  As per hpi    Past Medical History:   Diagnosis Date    Asthma     Diabetes ([facility]     Elevated transaminase level 6/29/2016    Insulin dependent diabetes mellitus ([facility] 6/20/2016    Pancreatic atrophy 6/20/2016     Past Surgical History:   Procedure Laterality Date    HX HEENT       Social History     Socioeconomic History    Marital status: SINGLE     Spouse name: Not on file    Number of children: Not on file    Years of education: Not on file    Highest education level: Not on file   Social Needs    Financial resource strain: Not on file    Food insecurity - worry: Not on file    Food insecurity - inability: Not on file   [email or web address] needs - medical: Not on file   "[employer] needs - non-medical: Not on file   Occupational History    Not on file   Tobacco Use    Smoking status: Never Smoker    Smokeless tobacco: Never Used   Substance and Sexual Activity    Alcohol use: Yes     Comment: occasionally    Drug use: No    Sexual activity: Not Currently   Other Topics Concern    Not on file   Social History Narrative    Not on file     Family History   Problem Relation Age of Onset    Cancer Father         prostate and colon    Diabetes Mother     Diabetes Maternal Grandmother     Cancer Maternal Aunt         breast     Current Outpatient Medications   Medication Sig Dispense Refill    ciprofloxacin HCl (CIPRO) 500 mg tablet Take 1 Tab by mouth two (2) times a day for 10 days. 20 Tab 0    insulin NPH/insulin regular (NOVOLIN 70/30 U-100 INSULIN) 100 unit/mL (70-30) injection by SubCUTAneous route. 35 units with breakfast, 30 with dinner      glucose blood VI test strips (PRODIGY NO CODING) strip Use to test blood sugars 4 times per day. DX Code : E10.9 400 Strip 3    albuterol (PROVENTIL HFA, VENTOLIN HFA, PROAIR HFA) 90 mcg/actuation inhaler Take 2 Puffs by inhalation every four (4) hours as needed for Wheezing.  1 Inhaler 0     Allergies   Allergen Reactions    Contrast Agent [Iodine] Itching    Hydrocodone Rash    Percocet [Oxycodone-Acetaminophen] Rash    Codeine Nausea and Vomiting    Metformin Diarrhea     Objective:  Visit Vitals  /79   Pulse (!) 111   Temp 98.4 °F (36.9 °C) (Oral)   Resp 20   Ht 5' 1\" (1.549 m)   Wt 92 lb 9.6 oz (42 kg)   SpO2 95%   BMI 17.50 kg/m²     Physical Exam:   General appearance - alert, sick appearing in moderate distress  EYE-PERRL, EOMI  Neck - supple, no significant adenopathy   Chest - clear to auscultation, no wheezes, rales or rhonchi   Heart - tachycadic, regular rhythm  Abdomen - tender, nondistended  Ext-peripheral pulses normal, no pedal edema    Results for orders placed or performed in visit on 10/22/18   AMB POC URINALYSIS DIP STICK AUTO W/O MICRO   Result Value Ref Range    Color (UA POC) Yellow     Clarity (UA POC) Clear     Glucose (UA POC) 2+ Negative    Bilirubin (UA POC) Negative Negative    Ketones (UA POC) Negative Negative    Specific gravity (UA POC) 1.005 1.001 - 1.035    Blood (UA POC) Trace Negative    pH (UA POC) 5.5 4.6 - 8.0    Protein (UA POC) Negative Negative    Urobilinogen (UA POC) 0.2 mg/dL 0.2 - 1    Nitrites (UA POC) Negative Negative    Leukocyte esterase (UA POC) Negative Negative     Assessment/Plan:  Diagnoses and all orders for this visit:    Generalized abdominal pain    Hyperglycemia due to type 1 diabetes mellitus ([facility]    Diabetic ketoacidosis without coma associated with type 1 diabetes mellitus [facility])      Patient Instructions     Advised to go to ER for further evaluation treatment    Follow-up Disposition:  Return if symptoms worsen or fail to improve, for f/u with pcp.

## 2018-10-23 NOTE — ED NOTES
Bedside and Verbal shift change report given to Dari Bond RN (oncoming nurse) by Pamella Figueroa (offgoing nurse). Report included the following information SBAR, ED Summary, MAR and Recent Results.

## 2018-10-23 NOTE — TELEPHONE ENCOUNTER
----- Message from Ivet Benson sent at 10/22/2018  5:08 PM EDT -----  Regarding: Dr. Sue Bradshaw telephone   Pt and Cintia Diaz from [facility] is requesting call back and  exam notes form 10/17/18 appt. Fax [phone].  Best contact [phone]

## 2018-10-23 NOTE — TELEPHONE ENCOUNTER
I returned Ms. Troy's call. She said that her insurance company needed more documentation regarding her being out of work. She said they had faxed a for that needed to be filled out. I told her that we did receive the fax and it was on Dr. Helen Truong desk for him to fill out and as soon as it was done I would fax it back to them. She said she is still not feeling well and has a appointment with Urology coming up regarding her UTI's.   Jacinto Lopez

## 2018-10-23 NOTE — LETTER
NOTIFICATION RETURN TO WORK / SCHOOL 
 
10/28/2018 10:46 AM 
 
Ms. Gavin Moulton [address] [place] [phone] To Whom It May Concern: 
 
Gavin Moulton is currently under the care of [facility], from 10/23 until 10/28. She will return to work/school on: on on Nov 5th if doing well at follow with PCP If there are questions or concerns please have the patient contact our office. Sincerely, Nidhi Butcher MD

## 2018-10-23 NOTE — ED PROVIDER NOTES
HPI Niru Burton is a 51 yo F here in the ED after being reffered by her PCP 2/2 concern for DKA in the setting of UTI. Pt was switched from keflex to cipro yesterday. Today she has had 4-5 bouts of emesis. Abd pain is sub acute in onset, 8/10, diffuse, nonperitopneal. Pt also had chills, fatigue, and an unexpected weight change. Denies CP or SOB. No change in resp status. Pt has hx of DKA/HHS, has been insulin dependant for 2 years on 45 U 70/30 at home with poorly controlled sugars. Past Medical History:   Diagnosis Date    Asthma     [facility] ([facility]     Elevated transaminase level 6/29/2016    Insulin dependent diabetes mellitus ([facility] 6/20/2016    Pancreatic atrophy 6/20/2016       Past Surgical History:   Procedure Laterality Date    HX HEENT           Family History:   Problem Relation Age of Onset    Cancer Father         prostate and colon    Diabetes Mother     Diabetes Maternal Grandmother     Cancer Maternal Aunt         breast       Social History     Socioeconomic History    Marital status: SINGLE     Spouse name: Not on file    Number of children: Not on file    Years of education: Not on file    Highest education level: Not on file   Social Needs    Financial resource strain: Not on file    Food insecurity - worry: Not on file    Food insecurity - inability: Not on file   [employer] needs - medical: Not on file   [employer] needs - non-medical: Not on file   Occupational History    Not on file   Tobacco Use    Smoking status: Never Smoker    Smokeless tobacco: Never Used   Substance and Sexual Activity    Alcohol use: Yes     Comment: occasionally    Drug use: No    Sexual activity: Not Currently   Other Topics Concern    Not on file   Social History Narrative    Not on file         ALLERGIES: Contrast agent [iodine]; Hydrocodone; Percocet [oxycodone-acetaminophen];  Codeine; and Metformin    Review of Systems   Constitutional: Positive for chills, fatigue and unexpected weight change. Negative for fever. HENT: Negative for congestion, dental problem and facial swelling. Eyes: Negative for discharge. Respiratory: Negative for apnea, chest tightness, shortness of breath and stridor. Cardiovascular: Negative for chest pain and leg swelling. Gastrointestinal: Positive for abdominal pain. Negative for abdominal distention, anal bleeding and blood in stool. Vitals:    10/23/18 1601   BP: 128/75   Pulse: (!) 106   Resp: 16   Temp: 98 °F (36.7 °C)   SpO2: 99%   Weight: 41.9 kg (92 lb 6 oz)   Height: 5' 1\" (1.549 m)            Physical Exam   Constitutional: She is oriented to person, place, and time. Thin, dry   HENT:   Head: Normocephalic and atraumatic. Eyes: Pupils are equal, round, and reactive to light. Right eye exhibits no discharge. Left eye exhibits no discharge. Neck: No tracheal deviation present. Cardiovascular: Normal heart sounds and intact distal pulses. Pulmonary/Chest: Effort normal and breath sounds normal.   Abdominal: Soft. She exhibits no distension and no mass. There is tenderness. There is no rebound. Musculoskeletal: She exhibits tenderness. She exhibits no edema. Neurological: She is alert and oriented to person, place, and time. No cranial nerve deficit. Skin: Skin is warm and dry. Capillary refill takes 2 to 3 seconds. MDM     53 yo F w/ NV and high BG reffered for DKA/HHS. No ketones in urine, BG 1200. Tx with IVF and IV insulin. Will admit for management as well as continued abx therapy. As pt has abd pain as well as previously had abnormal LFTs and lipase will obtain a non con CT abd pelvis to eval for intra abdominal pathology. Admitted to [facility] for further managment     3:30 PM  I have personally seen and examined the patient, reviewed the Resident's note and agree with findings and plan.   Sarah Parker MD     The patient presents with:  Elevated BS,abdominal pain    My exam shows: tachycardia,cahectic mild suprapubic pain,BS 1200    Impression/Plan:  DKA,poor compliance,UTI admit on insulin drip    I have reviewed and agree with the Resident's findings, including all diagnostic interpretations, and plans as written. I was present during the key portions of separately billed procedures. Caitie Quiroga MD      Procedures    Critical Care:  CRITICAL CARE NOTE :    9:19 PM    IMPENDING DETERIORATION -Metabolic  ASSOCIATED RISK FACTORS - Metabolic changes  MANAGEMENT- Bedside Assessment  INTERPRETATION -  Blood Pressure  INTERVENTIONS - Metobolic interventions  CASE REVIEW - Hospitalist  TREATMENT RESPONSE -Improved  PERFORMED BY - Self    NOTES   :    I have spent 35 minutes of critical care time involved in lab review, consultations with specialist, family decision- making, bedside attention and documentation. During this entire length of time I was immediately available to the patient . Caitie Quiroga MD       PLAN:  1. Admit    ADMIT NOTE:  7:58 PM  Patient is being admitted to the hospital by Dr. Reece Cabrera. The results of their tests and reasons for their admission have been discussed with them and/or available family. They convey agreement and understanding for the need to be admitted and for their admission diagnosis. Consultation has been made with the inpatient physician specialist for hospitalization. This note will not be viewable in [address]

## 2018-10-24 LAB
ANION GAP SERPL CALC-SCNC: 6 MMOL/L (ref 5–15)
ANION GAP SERPL CALC-SCNC: 6 MMOL/L (ref 5–15)
ANION GAP SERPL CALC-SCNC: 9 MMOL/L (ref 5–15)
APPEARANCE UR: ABNORMAL
BACTERIA UR CULT: ABNORMAL
BACTERIA URNS QL MICRO: NEGATIVE /HPF
BASOPHILS # BLD: 0 K/UL (ref 0–0.1)
BASOPHILS NFR BLD: 0 % (ref 0–1)
BILIRUB UR QL: NEGATIVE
BUN SERPL-MCNC: 25 MG/DL (ref 6–20)
BUN SERPL-MCNC: 33 MG/DL (ref 6–20)
BUN SERPL-MCNC: 35 MG/DL (ref 6–20)
BUN/CREAT SERPL: 16 (ref 12–20)
BUN/CREAT SERPL: 17 (ref 12–20)
BUN/CREAT SERPL: 22 (ref 12–20)
CALCIUM SERPL-MCNC: 10.1 MG/DL (ref 8.5–10.1)
CALCIUM SERPL-MCNC: 8.4 MG/DL (ref 8.5–10.1)
CALCIUM SERPL-MCNC: 9.8 MG/DL (ref 8.5–10.1)
CHLORIDE SERPL-SCNC: 102 MMOL/L (ref 97–108)
CHLORIDE SERPL-SCNC: 104 MMOL/L (ref 97–108)
CHLORIDE SERPL-SCNC: 99 MMOL/L (ref 97–108)
CO2 SERPL-SCNC: 27 MMOL/L (ref 21–32)
CO2 SERPL-SCNC: 30 MMOL/L (ref 21–32)
CO2 SERPL-SCNC: 30 MMOL/L (ref 21–32)
COLOR UR: ABNORMAL
CREAT SERPL-MCNC: 1.55 MG/DL (ref 0.55–1.02)
CREAT SERPL-MCNC: 1.56 MG/DL (ref 0.55–1.02)
CREAT SERPL-MCNC: 1.95 MG/DL (ref 0.55–1.02)
DIFFERENTIAL METHOD BLD: ABNORMAL
EOSINOPHIL # BLD: 0.5 K/UL (ref 0–0.4)
EOSINOPHIL NFR BLD: 4 % (ref 0–7)
EPITH CASTS URNS QL MICRO: ABNORMAL /LPF
ERYTHROCYTE [DISTWIDTH] IN BLOOD BY AUTOMATED COUNT: 15.1 % (ref 11.5–14.5)
ERYTHROCYTE [DISTWIDTH] IN BLOOD BY AUTOMATED COUNT: 15.4 % (ref 11.5–14.5)
GLUCOSE BLD STRIP.AUTO-MCNC: 104 MG/DL (ref 65–100)
GLUCOSE BLD STRIP.AUTO-MCNC: 105 MG/DL (ref 65–100)
GLUCOSE BLD STRIP.AUTO-MCNC: 132 MG/DL (ref 65–100)
GLUCOSE BLD STRIP.AUTO-MCNC: 167 MG/DL (ref 65–100)
GLUCOSE BLD STRIP.AUTO-MCNC: 173 MG/DL (ref 65–100)
GLUCOSE BLD STRIP.AUTO-MCNC: 190 MG/DL (ref 65–100)
GLUCOSE BLD STRIP.AUTO-MCNC: 208 MG/DL (ref 65–100)
GLUCOSE BLD STRIP.AUTO-MCNC: 242 MG/DL (ref 65–100)
GLUCOSE BLD STRIP.AUTO-MCNC: 244 MG/DL (ref 65–100)
GLUCOSE BLD STRIP.AUTO-MCNC: 273 MG/DL (ref 65–100)
GLUCOSE BLD STRIP.AUTO-MCNC: 285 MG/DL (ref 65–100)
GLUCOSE BLD STRIP.AUTO-MCNC: 64 MG/DL (ref 65–100)
GLUCOSE BLD STRIP.AUTO-MCNC: 64 MG/DL (ref 65–100)
GLUCOSE BLD STRIP.AUTO-MCNC: 69 MG/DL (ref 65–100)
GLUCOSE BLD STRIP.AUTO-MCNC: 86 MG/DL (ref 65–100)
GLUCOSE BLD STRIP.AUTO-MCNC: 95 MG/DL (ref 65–100)
GLUCOSE SERPL-MCNC: 152 MG/DL (ref 65–100)
GLUCOSE SERPL-MCNC: 350 MG/DL (ref 65–100)
GLUCOSE SERPL-MCNC: 61 MG/DL (ref 65–100)
GLUCOSE UR STRIP.AUTO-MCNC: >1000 MG/DL
HCT VFR BLD AUTO: 27.7 % (ref 35–47)
HCT VFR BLD AUTO: 29 % (ref 35–47)
HGB BLD-MCNC: 9.1 G/DL (ref 11.5–16)
HGB BLD-MCNC: 9.7 G/DL (ref 11.5–16)
HGB UR QL STRIP: ABNORMAL
HYALINE CASTS URNS QL MICRO: ABNORMAL /LPF (ref 0–5)
IMM GRANULOCYTES # BLD: 0.1 K/UL (ref 0–0.04)
IMM GRANULOCYTES NFR BLD AUTO: 1 % (ref 0–0.5)
KETONES UR QL STRIP.AUTO: NEGATIVE MG/DL
LEUKOCYTE ESTERASE UR QL STRIP.AUTO: ABNORMAL
LYMPHOCYTES # BLD: 1.8 K/UL (ref 0.8–3.5)
LYMPHOCYTES NFR BLD: 14 % (ref 12–49)
MAGNESIUM SERPL-MCNC: 1.2 MG/DL (ref 1.6–2.4)
MAGNESIUM SERPL-MCNC: 1.8 MG/DL (ref 1.6–2.4)
MAGNESIUM SERPL-MCNC: 1.9 MG/DL (ref 1.6–2.4)
MCH RBC QN AUTO: 29.4 PG (ref 26–34)
MCH RBC QN AUTO: 29.5 PG (ref 26–34)
MCHC RBC AUTO-ENTMCNC: 32.9 G/DL (ref 30–36.5)
MCHC RBC AUTO-ENTMCNC: 33.4 G/DL (ref 30–36.5)
MCV RBC AUTO: 87.9 FL (ref 80–99)
MCV RBC AUTO: 89.9 FL (ref 80–99)
MONOCYTES # BLD: 0.6 K/UL (ref 0–1)
MONOCYTES NFR BLD: 5 % (ref 5–13)
NEUTS SEG # BLD: 9.7 K/UL (ref 1.8–8)
NEUTS SEG NFR BLD: 76 % (ref 32–75)
NITRITE UR QL STRIP.AUTO: NEGATIVE
NRBC # BLD: 0 K/UL (ref 0–0.01)
NRBC # BLD: 0 K/UL (ref 0–0.01)
NRBC BLD-RTO: 0 PER 100 WBC
NRBC BLD-RTO: 0 PER 100 WBC
PH UR STRIP: 5.5 [PH] (ref 5–8)
PLATELET # BLD AUTO: 267 K/UL (ref 150–400)
PLATELET # BLD AUTO: 340 K/UL (ref 150–400)
PMV BLD AUTO: 11.4 FL (ref 8.9–12.9)
PMV BLD AUTO: 11.5 FL (ref 8.9–12.9)
POTASSIUM SERPL-SCNC: 3.5 MMOL/L (ref 3.5–5.1)
POTASSIUM SERPL-SCNC: 3.8 MMOL/L (ref 3.5–5.1)
POTASSIUM SERPL-SCNC: 4 MMOL/L (ref 3.5–5.1)
PROT UR STRIP-MCNC: 30 MG/DL
RBC # BLD AUTO: 3.08 M/UL (ref 3.8–5.2)
RBC # BLD AUTO: 3.3 M/UL (ref 3.8–5.2)
RBC #/AREA URNS HPF: ABNORMAL /HPF (ref 0–5)
SERVICE CMNT-IMP: ABNORMAL
SERVICE CMNT-IMP: NORMAL
SODIUM SERPL-SCNC: 135 MMOL/L (ref 136–145)
SODIUM SERPL-SCNC: 138 MMOL/L (ref 136–145)
SODIUM SERPL-SCNC: 140 MMOL/L (ref 136–145)
SP GR UR REFRACTOMETRY: 1.02 (ref 1–1.03)
UA: UC IF INDICATED,UAUC: ABNORMAL
UROBILINOGEN UR QL STRIP.AUTO: 0.2 EU/DL (ref 0.2–1)
WBC # BLD AUTO: 12.7 K/UL (ref 3.6–11)
WBC # BLD AUTO: 16.3 K/UL (ref 3.6–11)
WBC URNS QL MICRO: ABNORMAL /HPF (ref 0–4)

## 2018-10-24 PROCEDURE — 74011000258 HC RX REV CODE- 258: Performed by: EMERGENCY MEDICINE

## 2018-10-24 PROCEDURE — 36415 COLL VENOUS BLD VENIPUNCTURE: CPT | Performed by: HOSPITALIST

## 2018-10-24 PROCEDURE — 83735 ASSAY OF MAGNESIUM: CPT | Performed by: HOSPITALIST

## 2018-10-24 PROCEDURE — 80048 BASIC METABOLIC PNL TOTAL CA: CPT | Performed by: INTERNAL MEDICINE

## 2018-10-24 PROCEDURE — 74011250636 HC RX REV CODE- 250/636: Performed by: EMERGENCY MEDICINE

## 2018-10-24 PROCEDURE — 80048 BASIC METABOLIC PNL TOTAL CA: CPT | Performed by: HOSPITALIST

## 2018-10-24 PROCEDURE — 74011250636 HC RX REV CODE- 250/636: Performed by: INTERNAL MEDICINE

## 2018-10-24 PROCEDURE — 74011250637 HC RX REV CODE- 250/637: Performed by: HOSPITALIST

## 2018-10-24 PROCEDURE — 74011636637 HC RX REV CODE- 636/637: Performed by: EMERGENCY MEDICINE

## 2018-10-24 PROCEDURE — 85025 COMPLETE CBC W/AUTO DIFF WBC: CPT | Performed by: HOSPITALIST

## 2018-10-24 PROCEDURE — 74011000250 HC RX REV CODE- 250: Performed by: EMERGENCY MEDICINE

## 2018-10-24 PROCEDURE — 83735 ASSAY OF MAGNESIUM: CPT | Performed by: INTERNAL MEDICINE

## 2018-10-24 PROCEDURE — 36600 WITHDRAWAL OF ARTERIAL BLOOD: CPT

## 2018-10-24 PROCEDURE — 74011636637 HC RX REV CODE- 636/637: Performed by: INTERNAL MEDICINE

## 2018-10-24 PROCEDURE — 82962 GLUCOSE BLOOD TEST: CPT

## 2018-10-24 PROCEDURE — 65270000029 HC RM PRIVATE

## 2018-10-24 PROCEDURE — 85027 COMPLETE CBC AUTOMATED: CPT | Performed by: INTERNAL MEDICINE

## 2018-10-24 RX ORDER — INSULIN GLARGINE 100 [IU]/ML
40 INJECTION, SOLUTION SUBCUTANEOUS DAILY
Status: DISCONTINUED | OUTPATIENT
Start: 2018-10-24 | End: 2018-10-28 | Stop reason: HOSPADM

## 2018-10-24 RX ORDER — MUPIROCIN 20 MG/G
OINTMENT TOPICAL EVERY 12 HOURS
Status: DISCONTINUED | OUTPATIENT
Start: 2018-10-24 | End: 2018-10-28 | Stop reason: HOSPADM

## 2018-10-24 RX ORDER — SODIUM CHLORIDE 9 MG/ML
50 INJECTION, SOLUTION INTRAVENOUS CONTINUOUS
Status: DISCONTINUED | OUTPATIENT
Start: 2018-10-24 | End: 2018-10-26

## 2018-10-24 RX ORDER — INSULIN LISPRO 100 [IU]/ML
1-4 INJECTION, SOLUTION INTRAVENOUS; SUBCUTANEOUS
Status: DISCONTINUED | OUTPATIENT
Start: 2018-10-24 | End: 2018-10-28 | Stop reason: HOSPADM

## 2018-10-24 RX ORDER — DEXTROSE MONOHYDRATE AND SODIUM CHLORIDE 5; .9 G/100ML; G/100ML
100 INJECTION, SOLUTION INTRAVENOUS CONTINUOUS
Status: DISCONTINUED | OUTPATIENT
Start: 2018-10-24 | End: 2018-10-24

## 2018-10-24 RX ADMIN — MUPIROCIN: 20 OINTMENT TOPICAL at 10:26

## 2018-10-24 RX ADMIN — SODIUM CHLORIDE 7.5 UNITS/HR: 900 INJECTION, SOLUTION INTRAVENOUS at 06:38

## 2018-10-24 RX ADMIN — MUPIROCIN: 20 OINTMENT TOPICAL at 03:09

## 2018-10-24 RX ADMIN — HEPARIN SODIUM 5000 UNITS: 5000 INJECTION INTRAVENOUS; SUBCUTANEOUS at 10:26

## 2018-10-24 RX ADMIN — CEFTRIAXONE 1 G: 1 INJECTION, POWDER, FOR SOLUTION INTRAMUSCULAR; INTRAVENOUS at 22:27

## 2018-10-24 RX ADMIN — MUPIROCIN: 20 OINTMENT TOPICAL at 22:29

## 2018-10-24 RX ADMIN — HEPARIN SODIUM 5000 UNITS: 5000 INJECTION INTRAVENOUS; SUBCUTANEOUS at 22:28

## 2018-10-24 RX ADMIN — SODIUM CHLORIDE 50 ML/HR: 900 INJECTION, SOLUTION INTRAVENOUS at 13:18

## 2018-10-24 RX ADMIN — INSULIN GLARGINE 40 UNITS: 100 INJECTION, SOLUTION SUBCUTANEOUS at 10:25

## 2018-10-24 RX ADMIN — Medication 10 ML: at 13:18

## 2018-10-24 RX ADMIN — DEXTROSE MONOHYDRATE AND SODIUM CHLORIDE 100 ML/HR: 5; .9 INJECTION, SOLUTION INTRAVENOUS at 04:30

## 2018-10-24 RX ADMIN — DEXTROSE MONOHYDRATE 12.5 G: 25 INJECTION, SOLUTION INTRAVENOUS at 04:10

## 2018-10-24 RX ADMIN — INSULIN LISPRO 2 UNITS: 100 INJECTION, SOLUTION INTRAVENOUS; SUBCUTANEOUS at 17:45

## 2018-10-24 RX ADMIN — Medication 10 ML: at 22:28

## 2018-10-24 NOTE — PROGRESS NOTES
Initial Nutrition Assessment:    INTERVENTIONS/RECOMMENDATIONS:   · Continue consistent carb diet  · Will provide consistent carb diet refresher prior to d/c    ASSESSMENT:   Chart reviewed and pt discussed on CCU rounds. Medically noted for hyperglycemia on admission and PMH shown below. Pt reports good appetite and PO intake PTA however has experienced 6 lbs (6%) weight loss in the past month. This is likely due to uncontrolled DM. Pt is under weight but reports being thin for quite some time, weight history reflects this. Pt meets ASPEN criteria for acute moderate malnutrition,see below. Pt states she has had DM education in the past and thought she was doing a good job following it. Her HA1c of 10.7 tells another story.     Meets Criteria for Acute Malnutrition   [] Severe Malnutrition, as evidenced by:   [] Moderate muscle wasting, loss of subcutaneous fat   [] Nutritional intake of <50% of recommended intake for >5 days   [x] Weight loss of >1-2% in 1 week, >5% in 1 month, >7.5% in 3 months, or >10% in 6 months   [] Moderate-severe edema   [x]Moderate Malnutrition, as evidenced by:   [x] Mild muscle wasting, loss of subcutaneous fat   [] Nutritional intake <75% of recommended intake for >1 week   [] Weight loss of 1-2% in 1 week, 5% in 1 month, 7.5% in 3 months, or 10% in 6 months   [] Mild edema      Past Medical History:   Diagnosis Date    Asthma     Diabetes ([facility] [address]     Elevated transaminase level 6/29/2016    Insulin dependent diabetes mellitus ([facility] 6/20/2016    Pancreatic atrophy 6/20/2016       Diet Order: Consistent carb  % Eaten:    Patient Vitals for the past 72 hrs:   % Diet Eaten   10/23/18 0040 75 %     Pertinent Medications: [x]Reviewed: D50, [email or web address], lantus, humalog, novolin,   Pertinent Labs: [x]Reviewed: HA1C 10.7  Food Allergies: [x]NKFA  []Other   Last BM: 10/23  Edema:        []RUE   []LUE   []RLE   []LLE      Pressure Injury:      [] Stage I   [] Stage II   [] Stage III   [] Stage IV      Wt Readings from Last 30 Encounters:   10/23/18 41.9 kg (92 lb 6 oz)   10/23/18 42 kg (92 lb 9.6 oz)   10/22/18 43.2 kg (95 lb 3.2 oz)   10/17/18 44 kg (97 lb)   10/13/18 44 kg (97 lb 0 oz)   09/24/18 44.9 kg (98 lb 15.8 oz)   09/12/18 44.9 kg (99 lb)   09/06/18 44.5 kg (98 lb 1.6 oz)   07/30/18 48.1 kg (106 lb)   06/22/18 46.7 kg (103 lb)   05/23/18 44.9 kg (99 lb)   05/21/18 46.3 kg (102 lb)   05/18/18 46.3 kg (102 lb)   04/27/18 45.5 kg (100 lb 6.4 oz)   03/13/18 46.4 kg (102 lb 6.4 oz)   01/19/18 45 kg (99 lb 4.8 oz)   01/14/18 43.9 kg (96 lb 12.5 oz)   01/10/18 45.9 kg (101 lb 3.1 oz)   03/30/17 46.9 kg (103 lb 8 oz)   12/30/16 47 kg (103 lb 9.6 oz)   10/19/16 48.7 kg (107 lb 6.4 oz)   09/15/16 48.2 kg (106 lb 3.2 oz)   08/18/16 48 kg (105 lb 12.8 oz)   07/15/16 49.1 kg (108 lb 3.2 oz)   06/29/16 48.5 kg (107 lb)   06/20/16 44.9 kg (99 lb)   06/10/16 44.1 kg (97 lb 3.6 oz)   12/01/14 48.4 kg (106 lb 12.8 oz)       Anthropometrics:   Height: 5' 1\" (154.9 cm) Weight: 41.9 kg (92 lb 6 oz)   IBW (%IBW):   ( ) UBW (%UBW):   (  %)   Last Weight Metrics:  Weight Loss Metrics 10/23/2018 10/23/2018 10/22/2018 10/17/2018 10/13/2018 9/24/2018 9/12/2018   Today's Wt 92 lb 6 oz 92 lb 9.6 oz 95 lb 3.2 oz 97 lb 97 lb 98 lb 15.8 oz 99 lb   BMI 17.45 kg/m2 17.5 kg/m2 17.99 kg/m2 18.33 kg/m2 18.33 kg/m2 18.7 kg/m2 18.71 kg/m2       BMI: Body mass index is 17.45 kg/m². This BMI is indicative of:   [x]Underweight    []Normal    []Overweight    [] Obesity   [] Extreme Obesity (BMI>40)     Estimated Nutrition Needs (Based on):   1500 Kcals/day(BMR: 950 x 1.3 + 250) , 75 g(20% of estiamted kcal) Protein  Carbohydrate:  At Least 130 g/day  Fluids: 1500 mL/day (1ml/kcal) or per primary team    NUTRITION DIAGNOSES:   Problem:  Altered nutrition-related lab values      Etiology: related to DM     Signs/Symptoms: as evidenced by BG>600 on admission       NUTRITION INTERVENTIONS:  Meals/Snacks: General/healthful diet GOAL:   consume >75% of meals and stabilize BG  mg/dL in 3-5 days    LEARNING NEEDS (Diet, Food/Nutrient-Drug Interaction):    [x] None Identified   [] Identified and Education Provided/Documented   [] Identified and Pt declined/was not appropriate     Cultureal, Church, OR Ethnic Dietary Needs:    [x] None Identified   [] Identified and Addressed     [x] Interdisciplinary Care Plan Reviewed/Documented    [x] Discharge Planning:  Consistent carb diet     MONITORING /EVALUATION:   Behavioral-Environmental Outcomes: Food/nutrition knowledge  Food/Nutrient Intake Outcomes:  Total energy intake  Physical Signs/Symptoms Outcomes: Weight/weight change, Electrolyte and renal profile, Glucose profile, GI    NUTRITION RISK:    [] High              [x] Moderate           []  Low  []  Minimal/Uncompromised    PT SEEN FOR:    []  MD Consult: []Calorie Count      []Diabetic Diet Education        []Diet Education     []Electrolyte Management     []General Nutrition Management and Supplements     []Management of Tube Feeding     []TPN Recommendations    []  RN Referral:  []MST score >=2     []Enteral/Parenteral Nutrition PTA     []Pregnant: Gestational DM or Multigestation     []Pressure Ulcer/Wound Care needs        [x]  Low BMI  []  LOS Referral       Doris Mario RDN  Pager [phone]  Weekend Pager [phone]

## 2018-10-24 NOTE — CONSULTS
[facility] INTENSIVIST Consult Service Note  Pulmonary, Critical Care, and Sleep Medicine    Name: Norah Pedroza MRN: [id number]   : 1965 Hospital: [address] [facility]   Date: 10/24/2018   Hospital Day: 2       Subjective/Interval History:   Seen earlier today on rounds. Pt is acutely ill. Patient was re-evaluated multiple times with repeated discussions with CCU team throughout the day. Now eating. Mild nausea. Family at side      IMPRESSION:   1. DKA- no more gap x 2  2. Recurrent UTI- recent treatment with keflex and then cipro- at risk for resistance; E Coli in the past  3. NEERAJ- prerenal  4. Leukocytosis  5. Elevated alk phos- no RUQ pain  6. Body mass index is 17.45 kg/m². 7. Additional workup outlined below      RECOMMENDATIONS/PLAN:   1. May transfer to floor  2. IV abx  3. Follow cultures-may need to adjust  4. Transition off insulin drip  5. Advance diet  6. Adjust OVF  7. Glucose monitoring and SSI  8. Labs to follow electrolytes, renal function and and blood counts  9. Pt needs IV fluids with additives and Drug therapy requiring intensive monitoring for toxicity  10. Prescription drug management with home med reconciliation reviewed  11. DVT, SUP prophylaxis  12. Will be available to assist in medical management while in the CCU pending disposition         Subjective/Initial History:   I have reviewed the flowsheet and previous days notes. Seen earlier today on rounds. I was asked by Ajay Martínez MD to see Norah Pedroza a 52 y.o.  female  in consultation for a chief complaint of       The patient is unable to give any meaningful history or review of systems due to patient factors.      Excerpts from admission and consult notes reviewed as follows:     Louie Mendez is a 52 y.o. AA female, Dr. Evie Avelar patient with insulin dependent diabetes presents to clinic accompanied by family members with abdominal pain, vomiting for few days.  Patient was recently diagnosed with uti, she is on oral antibiotics. Blood sugar reading did not register on meter. Patient appears sick. I am suspecting she may be in DKA. I advise family members to take her to the ER for evaluation. Case discussed with Dr. Kinsey East.     HPI Tim Holcomb is a 53 yo F here in the ED after being reffered by her PCP 2/2 concern for DKA in the setting of UTI. Pt was switched from keflex to cipro yesterday. Today she has had 4-5 bouts of emesis. Abd pain is sub acute in onset, 8/10, diffuse, nonperitopneal. Pt also had chills, fatigue, and an unexpected weight change. Denies CP or SOB. No change in resp status. Pt has hx of DKA/HHS, has been insulin dependant for 2 years on 45 U 70/30 at home with poorly controlled sugars\"      Patient PCP: Milind Kam MD  PMH:  has a past medical history of Asthma, Diabetes ([address] Elevated transaminase level, Insulin dependent diabetes mellitus ([address] and Pancreatic atrophy. PSH:   has a past surgical history that includes hx heent. FHX: family history includes Cancer in her father and maternal aunt; Diabetes in her maternal grandmother and mother. SHX:  reports that  has never smoked. she has never used smokeless tobacco. She reports that she drinks alcohol. She reports that she does not use drugs. ROS:A comprehensive review of systems was negative except for that written in the HPI.     Allergies   Allergen Reactions    Contrast Agent [Iodine] Itching    Hydrocodone Rash    Percocet [Oxycodone-Acetaminophen] Rash    Codeine Nausea and Vomiting    Metformin Diarrhea      MEDS:   Current Facility-Administered Medications   Medication    mupirocin (BACTROBAN) 2 % ointment    dextrose 5% and 0.9% NaCl infusion    dextrose (D50W) injection syrg 12.5-25 g    insulin regular (NOVOLIN R, HUMULIN R) 100 Units in 0.9% sodium chloride 100 mL infusion    insulin lispro (HUMALOG) injection    glucose chewable tablet 16 g    glucagon (GLUCAGEN) injection 1 mg    sodium chloride (NS) flush 5-10 mL    sodium chloride (NS) flush 5-10 mL    heparin (porcine) injection 5,000 Units        Current Facility-Administered Medications:     mupirocin (BACTROBAN) 2 % ointment, , Both Nostrils, Q12H, Cirilo Erickson MD    dextrose 5% and 0.9% NaCl infusion, 100 mL/hr, IntraVENous, CONTINUOUS, Jessa Frances MD, Last Rate: 100 mL/hr at 10/24/18 0430, 100 mL/hr at 10/24/18 0430    dextrose (D50W) injection syrg 12.5-25 g, 25-50 mL, IntraVENous, PRN, Siddharth OLMOS MD, 12.5 g at 10/24/18 0410    insulin regular (NOVOLIN R, HUMULIN R) 100 Units in 0.9% sodium chloride 100 mL infusion, 0-50 Units/hr, IntraVENous, TITRATE, Siddharth OLMOS MD, Last Rate: 7.5 mL/hr at 10/24/18 0638, 7.5 Units/hr at 10/24/18 9560    insulin lispro (HUMALOG) injection, , SubCUTAneous, TIDAC, Srinath Bobo MD    glucose chewable tablet 16 g, 4 Tab, Oral, PRN, Melissa Sparks MD    glucagon (GLUCAGEN) injection 1 mg, 1 mg, IntraMUSCular, PRN, Melissa Sparks MD    sodium chloride (NS) flush 5-10 mL, 5-10 mL, IntraVENous, Q8H, Jordy Sargent MD, Stopped at 10/24/18 0600    sodium chloride (NS) flush 5-10 mL, 5-10 mL, IntraVENous, PRN, Jordy Sargent MD    heparin (porcine) injection 5,000 Units, 5,000 Units, SubCUTAneous, Q12H, Sil Sargent MD, 5,000 Units at 10/23/18 2227      Objective:     Vital Signs: Telemetry:    normal sinus rhythm Intake/Output:   Visit Vitals  /60   Pulse 68   Temp 98.4 °F (36.9 °C)   Resp 15   Ht 5' 1\" (1.549 m)   Wt 41.9 kg (92 lb 6 oz)   SpO2 100%   Breastfeeding? No   BMI 17.45 kg/m²       Temp (24hrs), Av.3 °F (36.8 °C), Min:98 °F (36.7 °C), Max:98.4 °F (36.9 °C)        O2 Device: Room air           Body mass index is 17.45 kg/m².     Wt Readings from Last 4 Encounters:   10/23/18 41.9 kg (92 lb 6 oz)   10/23/18 42 kg (92 lb 9.6 oz)   10/22/18 43.2 kg (95 lb 3.2 oz) 10/17/18 44 kg (97 lb)          Intake/Output Summary (Last 24 hours) at 10/24/2018 0742  Last data filed at 10/24/2018 0524  Gross per 24 hour   Intake 992.44 ml   Output 975 ml   Net 17.44 ml       Last shift:      No intake/output data recorded. Last 3 shifts: 10/22 1901 - 10/24 0700  In: 1392.4 [P.O.:400; I.V.:992.4]  Out: 975 [Urine:975]       Physical Exam:     General:  female; in no apparent distress;    HEAD: Normocephalic, without obvious abnormality, atraumatic   EYES: conjunctivae clear. PERRL,  AN Icteric sclerae   NOSE: nares normal, no drainage, no nasal flaring,    THROAT: mucous membranes dry; Lips, mucosa dry; No Thrush;     Neck: Supple, symmetrical, trachea midline,  No accessory mm use; No Stridor/ cuff leak, No goiter or thyroid tenderness   LYMPH: No abnormally enlarged lymph nodes. in neck or groin   Chest: normal   Lungs: normal air entry   Heart: Regular rate and rhythm; NO edema   Abdomen: soft, non-tender, without masses or organomegaly   : No Loza; Extremity: negative, cyanosis, clubbing; no joint swelling or erythema   Neuro: alert; quiet; withdraws to pain; unable to check gait and station; following simple commands   Psych: Alert and Oriented x 1 ; Unable to assess; No agitation;    Skin: Skin unremarkable;    Pulses:Bilateral, Radial, 2+   Capillary refill: normal; well perfused,      Data:         All lab results for the last 24 hours reviewed.           Labs:    Recent Labs     10/24/18  0353 10/23/18  1709   WBC 16.3* 13.1*   HGB 9.7* 11.3*    320     Recent Labs     10/24/18  0353 10/23/18  2339 10/23/18  1907 10/23/18  1709    135* 130* 123*   K 3.5 3.8 5.2* 5.2*    99 91* 84*   CO2 30 30 27 25   GLU 61* 350* 953* 1,181*   BUN 35* 33* 38* 42*   CREA 1.56* 1.95* 2.05* 2.29*   CA 9.8 10.1 9.3 10.6*   MG 1.8 1.9 2.0  --    PHOS  --   --  3.4  --    ALB  --   --   --  4.0   SGOT  --   --   --  31   ALT  --   --   --  43     No results for input(s): PH, PCO2, PO2, HCO3, FIO2 in the last 72 hours. No results for input(s): CPK, CKNDX, TROIQ in the last 72 hours. No lab exists for component: CPKMB  No results found for: BNPP, BNP   Lab Results   Component Value Date/Time    Culture result: NO GROWTH 5 DAYS 10/13/2018 07:59 PM    Culture result: ESCHERICHIA COLI (A) 10/13/2018 05:48 PM    Culture result: ESCHERICHIA COLI (A) 09/24/2018 02:43 PM     Lab Results   Component Value Date/Time    CK 65 01/14/2018 01:51 PM         Imaging:  I have personally reviewed the patients radiographs and have reviewed the reports:        Results from Hospital Encounter encounter on 10/13/18   XR CHEST PA LAT    Narrative INDICATION: Hyperglycemia. COMPARISON: September 24, 2018    FINDINGS: PA and lateral views of the chest demonstrate a stable  cardiomediastinal silhouette and clear lungs bilaterally. The visualized osseous  structures are unremarkable. Impression IMPRESSION: No acute process. Results from [place] encounter on 10/23/18   CT ABD PELV WO CONT    Narrative INDICATION: Abdominal pain     EXAM: CT Abdomen and Pelvis without contrast.  CT dose reduction was achieved through use of a standardized protocol tailored  for this examination and automatic exposure control for dose modulation. FINDINGS:   No urinary tract stones are seen. There is no hydroureteronephrosis. The kidneys  are normal in size. There is no perirenal fluid or ascites. Liver shows no apparent significant finding without contrast. Pancreas, adrenal  glands, spleen and aorta show no significant enlargement. No inflammation is  seen. There is no pneumoperitoneum or significant adenopathy. The bladder is not distended. The distal ureters are not dilated. There is no  apparent pelvic mass. The appendix is normal.      Impression IMPRESSION: No Acute Disease.           This care involved high complexity medical decision making to treat acute and unstable vital organ system failure, and to prevent deterioration of the patients condition. I personally:  · Reviewed the flowsheet and previous days notes  · Reviewed and summarized records or history from previous days note or discussions with staff, family  · Parenteral controlled substances - Reviewed/ Adjusted / Karlis Sham / Started  · High Risk Drug therapy requiring intensive monitoring for toxicity: eg steroids, pressors, antibiotics  · Reviewed and/or ordered Clinical lab tests  · Reviewed and/or ordered Radiology tests  · Reviewed and/or ordered of Medicine tests  · Independently visualized radiologic Images  · Reviewed the patients ECG / Telemetry  · discussed my assessment/management with : Consultants, Nursing, Pharmacy, Case Management, PT, OT, Respiratory Therapy, Family for coordination of care           Thank you for allowing us to participate in the care of this patient. We will follow along with you until they no longer require CCU services.     Delio Butler MD

## 2018-10-24 NOTE — CONSULTS
[address] [place]  MR#: [id number]  : 1965  ACCOUNT #: [de-identified]   DATE OF SERVICE: 10/24/2018    REQUESTING PHYSICIAN:  Brian Mata MD    REASON FOR CONSULTATION:  Management of acute renal failure. The patient was seen and examined. History obtained from the patient and chart review. HISTORY OF PRESENT ILLNESS:  The patient is a 52-year-old [facility] female with past medical history significant for longstanding diabetes, asthma, a recently treated UTI, pancreatic atrophy, who presented to ED at the request of her PCP because of elevated sugar. She did not have any fever. She was feeling weak and had decreased appetite. She was found to have evidence of severe hyperglycemia with a glucose of 1181, pseudohyponatremia, mild hyperkalemia and renal failure with a creatinine of 2.29. Patient has stage III CKD with baseline creatinine around 1.5-1.7. She was treated with IV insulin drip, IV fluids and her glucose, creatinine and sodium all have improved. She did not have any significant acidosis on the admission and anion gap was also okay. It does not look like she had overt DKA. She is feeling better. In fact, she is hungry and wants to eat food. Her A1c was elevated at 10.7. She is followed by Dr. Hailey Hickey from our group as outpatient. She denies any recent vomiting or diarrhea. No blood in the stool. Denies taking any NSAIDs. PAST MEDICAL HISTORY:  As noted above. SOCIAL HISTORY:  She does not smoke. Occasional alcohol use. FAMILY HISTORY:  One of her uncles has history of ESRD, who is on dialysis. ALLERGIES:  SHE IS ALLERGIC TO IODINE CONTRAST DYE, HYDROCODONE, PERCOCET, CODEINE AND METFORMIN. HOME MEDICATIONS:  Included ciprofloxacin 500 mg b.i.d., Novolin insulin and albuterol inhaler. REVIEW OF SYSTEMS:  As noted in HPI. Remainder of review of systems obtained and negative.     PHYSICAL EXAMINATION:  GENERAL:  Middle-aged female, thin built, in no acute distress. VITAL SIGNS:  She is afebrile. Pulse is 69-76, -230 systolic, 95-24 diastolic. Respiration of 16, saturating 100% on room air. Weight is 41.9 kg from yesterday. HEENT:  Head is atraumatic, normocephalic. Conjunctivae are pink. No scleral icterus. Mucous membranes are moist.  NECK:  No JVD. LUNGS:  Clear. HEART:  Regular rate and rhythm, normal S1, S2.  ABDOMEN:  Soft, nontender. EXTREMITIES:  Without edema. SKIN:  With no rash. CENTRAL NERVOUS SYSTEM:  Alert, oriented x3. LABORATORY DATA:  BUN is 35, creatinine down to 1.56. Potassium is down to 3.5; it was slightly high before. Sodium was 123 on admit, which was partly pseudohyponatremia. Sodium is now up to 140. A1c of 10.7. Urinalysis had trace ketones on admit. Greater than 1000 glucose. No protein or blood on initial UA. Pyuria with urine WBC of 5200. ASSESSMENT AND PLAN:  1. Acute kidney injury on chronic kidney disease. 2.  Longstanding diabetes, which is uncontrolled with presumably diabetic nephropathy. 3.  Hyperkalemia, which has resolved. 4.  Hyponatremia, which was pseudohyponatremia. 5.  Hyperosmolar nonketotic state associated with diabetes. Beta hydroxybutyrate was not done and she did not have acidosis on initial BMP and hence, does not look like she had overt diabetic ketoacidosis. 6.  Renal function is back to baseline. 7.  Hemodynamics are stable. She is now on insulin drip and IV fluid also has been started. RECOMMENDATIONS:  1. Continue D5 normal saline and wean her off as her oral intake improves. 2.  Follow up urine cultures. 3.  Avoid nephrotoxins. 4.  Wean off insulin drip as per protocol. 5.  Needs better control of diabetes for long-term preservation of kidney function. Thanks for renal consultation. Will follow the patient with you.       Jyoti Leiva MD       BP / TN  D: 10/24/2018 11:45     T: 10/24/2018 12:19  JOB #: [id number]

## 2018-10-24 NOTE — ACP (ADVANCE CARE PLANNING)
Responded to In [employer] request for Advance Medical Directive (AMD) consult. No visitors present. Explained document to patient. Her closest relative is a cousin and she is has been in conversation with the cousin about being a health care agent. The cousin plans to visit later today. Left document for them to discuss.   Advised patient to request a  if she chooses to fill it out during her admission to the hospital.  MISTI Beth, [facility], [address] [place] [address]     Paging Service  [phone]

## 2018-10-24 NOTE — PROGRESS NOTES
TRANSFER - IN REPORT:    Verbal report received from Elizabeth(name) on [facility]  being received from ED(unit) for routine progression of care      Report consisted of patients Situation, Background, Assessment and   Recommendations(SBAR). Information from the following report(s) SBAR, Kardex, ED Summary, Procedure Summary, Intake/Output, MAR, Accordion, Recent Results, Med Rec Status, Cardiac Rhythm NSR, ST, Alarm Parameters , Pre Procedure Checklist, Procedure Verification and Quality Measures was reviewed with the receiving nurse. Opportunity for questions and clarification was provided. Assessment completed upon patients arrival to unit and care assumed. 0100 admitted to ICU as pcu overflow - insulin gtt infusing. VSS, RA           HYPOGLYCEMIC EPISODE DOCUMENTATION    Patient with hypoglycemic episode(s) at 0405(time) on 10/24/18(date). BG value(s) pre-treatment 59    Was patient symptomatic? [] yes, [x] no  Patient was treated with the following rescue medications/treatments: [x] D50                [] Glucose tablets                [] Glucagon                [] 4oz juice                [] 6oz reg soda                [] 8oz low fat milk  BG value post-treatment: 208  Once BG treated and value greater than 80mg/dl, pt was provided with the following:  [] snack  [] meal  Name of MD notified: Huey Abrams    The following orders were received: D5 NS @ 100ml/hr         Bedside and Verbal shift change report given to Yue (oncoming nurse) by Melba Vincent (offgoing nurse). Report included the following information SBAR, Kardex, ED Summary, OR Summary, Procedure Summary, Intake/Output, MAR, Accordion, Recent Results, Med Rec Status, Cardiac Rhythm NSR, Alarm Parameters , Pre Procedure Checklist, Procedure Verification and Quality Measures.

## 2018-10-24 NOTE — PROGRESS NOTES
Hospitalist Progress Note    NAME: Renny Hernandez   :  1965   MRN:  [id number]       Assessment / Plan:  Hyperosmolar hyperglycemic state  Dehydration Acute on CKD 3  Transient hyperK and hypona, resolved  Uncontrolled DM  Suspected partially treated UTI, most recent cx with pan sensitive e.coli  Recurrent UTIs  -transitioned off insulin drip to lantus and humalog  -appreciate DM management team assistance  -follow and adjust  -advanced diet, no further N/V  -a1c 10.7  -CT abd/pelvis- negative  -creat improved with hydration  -appreciate renal eval  -op urology f/u scheduled on Monday  -check PVRs, suspect may have neurogenic bladder  -ua with mixed results, but she refused in/out cath, suspect recent UTI only partially treated, last dose Tuesday  -f/u urine cx top assure no resistant organism, but will treat empirically with ceftriaxone for now given suprapubic tenderness and leukocytosis 16K    chronic anemia- suspect chronic dz, no overt bleeding     Code Status: full  Surrogate Decision Maker:     DVT Prophylaxis: heparin sq  GI Prophylaxis: not indicated     Baseline: lives at home alone      less than 18.5 Underweight / Body mass index is 17.45 kg/m². Recommended Disposition: Home w/Family     Subjective:     Chief Complaint / Reason for Physician Visit  Elevated sugars, fever and chills    Patient reports she is feeling better. She still has some lower abdominal pain but denies any urinary symptoms. She has had multiple urinary tract infections recently but never has symptoms until she gets fever chills and nausea. She reports that these are the same symptoms that brought her at this time. Her nausea has resolved and she is eating currently her dinner. She has been transitioned off her insulin drip and transferred out of the intensive care unit. She reports she has an appointment scheduled with urology next Monday.   She was given a course of 10 days of antibiotics by her primary care physician on Monday but developed nausea vomiting was unable to complete the full course. Patient was evaluated at 5:45 PM      Discussed with RN events overnight. Review of Systems:  Symptom Y/N Comments  Symptom Y/N Comments   Fever/Chills    Chest Pain n    Poor Appetite    Edema     Cough n   Abdominal Pain y    Sputum    Joint Pain     SOB/GARLAND n   Pruritis/Rash     Nausea/vomit n   Tolerating PT/OT     Diarrhea n   Tolerating Diet y    Constipation n   Other       Could NOT obtain due to:      Objective:     VITALS:   Last 24hrs VS reviewed since prior progress note.  Most recent are:  Patient Vitals for the past 24 hrs:   Temp Pulse Resp BP SpO2   10/24/18 1600 98 °F (36.7 °C) 85 18 121/69 100 %   10/24/18 1500 -- 83 20 120/53 --   10/24/18 1400 -- 93 16 108/57 100 %   10/24/18 1300 -- 78 13 114/66 100 %   10/24/18 1200 98 °F (36.7 °C) 81 18 128/55 98 %   10/24/18 1100 -- 69 18 108/60 100 %   10/24/18 1000 -- 75 14 124/62 100 %   10/24/18 0900 -- 76 17 110/62 100 %   10/24/18 0800 98.2 °F (36.8 °C) 69 12 112/65 100 %   10/24/18 0700 -- 68 15 107/60 100 %   10/24/18 0600 -- 67 16 104/62 100 %   10/24/18 0500 -- 75 18 99/53 100 %   10/24/18 0400 98.4 °F (36.9 °C) 84 16 107/54 100 %   10/24/18 0300 -- 94 18 109/55 100 %   10/24/18 0200 -- 89 16 101/57 100 %   10/24/18 0100 -- 82 13 99/80 100 %   10/24/18 0047 98.4 °F (36.9 °C) 100 17 118/60 100 %   10/24/18 0030 -- (!) 105 20 98/54 100 %   10/24/18 0022 -- (!) 109 19 107/53 100 %   10/24/18 0000 -- (!) 112 19 91/53 100 %   10/23/18 2340 -- 99 16 -- 100 %   10/23/18 2330 -- 92 15 105/63 100 %   10/23/18 2300 -- (!) 111 18 100/60 100 %   10/23/18 2230 -- 97 15 113/68 100 %   10/23/18 2200 -- 77 14 122/70 96 %   10/23/18 2145 -- 81 14 -- 100 %   10/23/18 2130 -- 95 14 116/63 100 %   10/23/18 2100 -- -- -- 110/60 100 %   10/23/18 2045 -- -- -- -- 100 %   10/23/18 2030 -- -- -- 118/63 100 %   10/23/18 2000 -- -- -- 121/60 100 %   10/23/18 1930 -- -- -- 112/62 100 %       Intake/Output Summary (Last 24 hours) at 10/24/2018 1904  Last data filed at 10/24/2018 1600  Gross per 24 hour   Intake 1677.21 ml   Output 1575 ml   Net 102.21 ml        PHYSICAL EXAM:  Patient is awake and alert nontoxic-appearing oriented x3 on room air eating her dinner. Conjunctiva are pink oropharynx mucous membranes are tacky. Cardiovascular regular rate no murmurs rubs or gallops. Lungs are clear without wheezes rhonchi or crackles. Abdomen bowel sounds present soft she does have suprapubic tenderness no CVA tenderness to palpation. Extremities no clubbing cyanosis or edema. Reviewed most current lab test results and cultures  YES  Reviewed most current radiology test results   YES  Review and summation of old records today    NO  Reviewed patient's current orders and MAR    YES  PMH/ reviewed - no change compared to H&P  ________________________________________________________________________  Care Plan discussed with:    Comments   Patient y    Family  y    RN y    Care Manager     Consultant                        Multidiciplinary team rounds were held today with , nursing, pharmacist and clinical coordinator. Patient's plan of care was discussed; medications were reviewed and discharge planning was addressed. ________________________________________________________________________  Total NON critical care TIME:     Minutes    Total CRITICAL CARE TIME Spent:   Minutes non procedure based      Comments   >50% of visit spent in counseling and coordination of care     ________________________________________________________________________  Willi Mahajan MD     Procedures: see electronic medical records for all procedures/Xrays and details which were not copied into this note but were reviewed prior to creation of Plan. LABS:  I reviewed today's most current labs and imaging studies.   Pertinent labs include:  Recent Labs     10/24/18  0353 10/23/18  0249   WBC 16.3* 13.1*   HGB 9.7* 11.3*   HCT 29.0* 34.2*    320     Recent Labs     10/24/18  0353 10/23/18  2339 10/23/18  1907 10/23/18  1709    135* 130* 123*   K 3.5 3.8 5.2* 5.2*    99 91* 84*   CO2 30 30 27 25   GLU 61* 350* 953* 1,181*   BUN 35* 33* 38* 42*   CREA 1.56* 1.95* 2.05* 2.29*   CA 9.8 10.1 9.3 10.6*   MG 1.8 1.9 2.0  --    PHOS  --   --  3.4  --    ALB  --   --   --  4.0   TBILI  --   --   --  0.3   SGOT  --   --   --  31   ALT  --   --   --  43       Signed: Santiago Dickinson MD

## 2018-10-24 NOTE — PROGRESS NOTES
Care Management:    Reason for Admission:   DKA and UTI. She lives alone and she works and drives. She is active and no DME. She has Dr Yannick Otoole and her PCP is Dr Mary Banks. She states she has a difficult time working with Dr Mary Banks and would like to switch to another PCP in that office if possible. I have sent the request to our specialist and ask if they could change PCP and schedule a follow up appointment. She has her [employer]. RRAT Score:    17              Do you (patient/family) have any concerns for transition/discharge? Not at this time. She is just concerned that she has a good rapport with her PCP . Plan for utilizing home health:     Undetermined at this time. Likelihood of readmission?   moderate            Transition of Care Plan:      Anticipate home with family assist and follow up with her PCP and Dr Yannick Otoole. Care Management Interventions  PCP Verified by CM: Yes  Mode of Transport at Discharge:  Other (see comment)(family)  Current Support Network: Lives Alone(Lives alone and independent with ADL's including working and driving. )  Confirm Follow Up Transport: Family(Family or self)  Plan discussed with Pt/Family/Caregiver: Yes  Discharge Location  Discharge Placement: Home with family assistance(Anticipate home with family and follow up with PCP. )     Nancy Osorio [facility] 8527

## 2018-10-24 NOTE — PROGRESS NOTES
Interdisciplinary team rounds were held 10/24/2018 with the following team members:Care Management, Diabetes Treatment Specialist, Nursing, Nutrition, Pharmacy, Physical Therapy, Physician and Respiratory Therapy. Plan of care discussed. Goal: See MD orders and progress notes for further  interventions and desired outcomes.

## 2018-10-24 NOTE — CONSULTS
[id number]- consult dictated    A:  NEERAJ on CKD-3: NEERAJ seems to have resolved. Appears to be related to uncontrolled DM with severe hyperglycemia causing osmotic diuresis/dehydration  CKD-3: due to DM  DM-2, uncontrolled with hyperosmolar state- no acidosis on initial BMP. AG also OK at 15. B-OH butyrate not send. No overt evidence of DKA  Hyponatremia- pseudohyponatremia from severe hyperglycemia  Hyperkalemia- resolved  Recent UTI- UA with pyuria    P:  IVF  Insulin drip- wean off as mary  Renal fxn back to basln  Needs better control of DM as outpt.  Would benefit from Endocrinology eval as outpt  F/u urine Cx    Yasmin Jay MD  [address] [facility]

## 2018-10-24 NOTE — PROGRESS NOTES
Assisted patient with the execution of Advance Medical Directive. Placed the copy into patient's \"like paper chart. \"    Charolett Gain will continue to follow and provide pastoral care on an as needed/requested basis.     [address] [facility], [place], [address] [facility] Assistance Page [phone]

## 2018-10-24 NOTE — PROGRESS NOTES
Pharmacy - Heparin Monitoring    Indication: DVT prophylaxis     Dose: 5000 units subcutaneously every 8 hr    Initial dosing Weight: 41.9 kg    Labs:  Recent Labs     10/23/18  1709   HGB 11.3*          Creatinine clearance:  20 ml/min    Impression/Plan:    Change to heparin 5000 units subcutaneously every 12 hr per protocol for low weight patients <60 kg     Thanks,  Higinio Lesches, [facility]

## 2018-10-24 NOTE — CDMP QUERY
Please clarify if this patient is (was) being treated/managed for:  
 
=> Moderate protein-calorie malnutrition in the setting of vomiting, abd pain & uncontrolled DM, 6 lbs (6%) weight loss in the past month, BG>600 on admission, HA1c of 10.7 requiring nutrition consult   
=> Other explanation of clinical findings 
=> Clinically Undetermined (no explanation for clinical findings) The medical record reflects the following clinical findings, treatment, and risk factors. Risk Factors:  DM uncontrolled Clinical Indicators:  c/o vomiting, BG 1181, 10/24 PN dietitian: 
Pt meets ASPEN criteria for acute moderate malnutrition,see below Severe Malnutrition, as evidenced by: 
             Weight loss of >1-2% in 1 week, >5% in 1 month, >7.5% in 3 months, or >10% in 6 months Moderate Malnutrition, as evidenced by: 
             Mild muscle wasting, loss of subcutaneous fat 
 
6 lbs (6%) weight loss in the past month. This is likely due to uncontrolled DM. Pt is under weight but reports being thin for quite some time, weight history reflects this. 10/23/18 41.9 kg (92 lb 6 oz) 09/12/18 44.9 kg (99 lb) 
07/30/18 48.1 kg (106 lb) Treatment: dietitian consult, BG monitoring, iv zofran Please clarify and document your clinical opinion in the progress notes and discharge summary including the definitive and/or presumptive diagnosis, (suspected or probable), related to the above clinical findings. Please include clinical findings supporting your diagnosis. Thank You Flaquita Tran, [facility] 
   [phone]

## 2018-10-24 NOTE — ED NOTES
TRANSFER - OUT REPORT:    Verbal report given to Melba Vincent RN(name) on Indira Garcia  being transferred to CCU(unit) for routine progression of care       Report consisted of patients Situation, Background, Assessment and   Recommendations(SBAR). Information from the following report(s) SBAR, Kardex, ED Summary, Procedure Summary, Intake/Output, MAR and Recent Results was reviewed with the receiving nurse. Lines:   Peripheral IV 10/24/18 Left Arm (Active)   Site Assessment Clean, dry, & intact 10/24/2018 12:12 AM   Phlebitis Assessment 0 10/24/2018 12:12 AM   Infiltration Assessment 0 10/24/2018 12:12 AM   Hub Color/Line Status Pink 10/24/2018 12:12 AM   Action Taken Dressing changed 10/24/2018 12:12 AM        Opportunity for questions and clarification was provided.

## 2018-10-24 NOTE — PROGRESS NOTES
Spiritual Care Assessment/Progress Note  [facility]      NAME: Seda Osorio      MRN: [id number]  AGE: 52 y.o.  SEX: female  Adventism Affiliation: No preference   Language: English     10/24/2018     Total Time (in minutes): 22     Spiritual Assessment begun in [facility] 2 CRITICAL CARE 2 through conversation with:         [x]Patient        [] Family    [] Friend(s)        Reason for Consult: Advance medical directive consult     Spiritual beliefs: (Please include comment if needed)     [x] Identifies with a joshua tradition:         [x] Supported by a joshua community:    [facility] [address]        [] Claims no spiritual orientation:           [] Seeking spiritual identity:                [] Adheres to an individual form of spirituality:           [] Not able to assess:                           Identified resources for coping:      [x] Prayer                               [] Music                  [] Guided Imagery     [x] Family/friends                 [] Pet visits     [] Devotional reading                         [] Unknown     [] Other:                                              Interventions offered during this visit: (See comments for more details)    Patient Interventions: Advance medical directive consult, Affirmation of joshua, Affirmation of emotions/emotional suffering, Catharsis/review of pertinent events in supportive environment, Iconic (affirming the presence of God/Higher Power), Initial/Spiritual assessment, Critical care, Prayer (assurance of), Adventism beliefs/image of God discussed           Plan of Care:     [x] Support spiritual and/or cultural needs    [x] Support AMD and/or advance care planning process      [] Support grieving process   [] Coordinate Rites and/or Rituals    [] Coordination with community clergy   [] No spiritual needs identified at this time   [] Detailed Plan of Care below (See Comments)  [] Make referral to Music Therapy  [] Make referral to Pet Therapy     [] Make referral to Addiction services  [] Make referral to [facility]  [] Make referral to Spiritual Care Partner  [] No future visits requested        [x] Follow up visits as needed     Comments:   Responded to In Basket request for Advance Medical Directive (AMD) consult. No visitors present. Explained document to patient. Her closest relative is a cousin and she is has been in conversation with the cousin about being a health care agent. The cousin plans to visit later today. Left document for them to discuss. Advised patient to request a  if she chooses to fill it out during her admission to the hospital.  Patient is a member of [address] She has good support from her Christianity family. Offered assurance of prayer.   MISTI Beth, [facility], [address]    [address] Paging Service  [phone])

## 2018-10-24 NOTE — ED NOTES
Spoke with MD about potassium ordered. Last potassium reading at 5.2  MD advised to hold potassium at this time. 2145: Potassium order discontinued    2300: Pt.  Provided with box lunch at this time

## 2018-10-24 NOTE — H&P
Hospitalist Admission Note    NAME: Lynne Cruz   :  1965   MRN:  [id number]     Date/Time:  10/23/2018 8:00 PM    Patient PCP: Renetta Dumont MD  ________________________________________________________________________    My assessment of this patient's clinical condition and my plan of care is as follows. Assessment / Plan:  1) DKA: Continue Insulin drip, titrate per protocol, IV Fluids, Am labs    2) Hyponatremia: Continue Saline, Am labs    3) Midly elevated K, will continue to observe since it will decreased with the insulin drip    4) Hx UTI recently treated, UA no signs of infection, WCC 13, will repeat UA and  Do UCX    5) NEERAJ: continue hydration, will consult nephrology for further eval and recs    5) Elevated ALK phos, f/u labs        Code Status: full  Surrogate Decision Maker:    DVT Prophylaxis: yes  GI Prophylaxis: not indicated    Baseline: lives at home alone        Subjective:   CHIEF COMPLAINT: Elevated sugar PCP sent her to ED    HISTORY OF PRESENT ILLNESS:     Giovanny Padron is a 52 y.o. Yo Female PMH DM, recently treated UTI, coming to the ED because she went to see her PCP today and BS was elevated. No fevers , she mentioned feeling weak and not eating much    We were asked to admit for work up and evaluation of the above problems.      Past Medical History:   Diagnosis Date    Asthma     Diabetes ([address]     Elevated transaminase level 2016    Insulin dependent diabetes mellitus ([address] 2016    Pancreatic atrophy 2016        Past Surgical History:   Procedure Laterality Date    HX HEENT         Social History     Tobacco Use    Smoking status: Never Smoker    Smokeless tobacco: Never Used   Substance Use Topics    Alcohol use: Yes     Comment: occasionally        Family History   Problem Relation Age of Onset    Cancer Father         prostate and colon    Diabetes Mother     Diabetes Maternal Grandmother     Cancer Maternal Aunt         breast Allergies   Allergen Reactions    Contrast Agent [Iodine] Itching    Hydrocodone Rash    Percocet [Oxycodone-Acetaminophen] Rash    Codeine Nausea and Vomiting    Metformin Diarrhea        Prior to Admission medications    Medication Sig Start Date End Date Taking? Authorizing Provider   ciprofloxacin HCl (CIPRO) 500 mg tablet Take 1 Tab by mouth two (2) times a day for 10 days. 10/22/18 11/1/18  Zuly Maldonado MD   insulin NPH/insulin regular (NOVOLIN 70/30 U-100 INSULIN) 100 unit/mL (70-30) injection by SubCUTAneous route. 35 units with breakfast, 30 with dinner    Other, MD Ede   glucose blood VI test strips (PRODIGY NO CODING) strip Use to test blood sugars 4 times per day. DX Code : E10.9 10/9/18   Darryn Mosley MD   albuterol (PROVENTIL HFA, VENTOLIN HFA, PROAIR HFA) 90 mcg/actuation inhaler Take 2 Puffs by inhalation every four (4) hours as needed for Wheezing. 5/18/18   Sarah Vo MD       REVIEW OF SYSTEMS:     I am not able to complete the review of systems because:    The patient is intubated and sedated    The patient has altered mental status due to his acute medical problems    The patient has baseline aphasia from prior stroke(s)    The patient has baseline dementia and is not reliable historian    The patient is in acute medical distress and unable to provide information           Total of 12 systems reviewed as follows:       POSITIVE= underlined text  Negative = text not underlined  General:  fever, chills, sweats, generalized weakness, weight loss/gain,      loss of appetite   Eyes:    blurred vision, eye pain, loss of vision, double vision  ENT:    rhinorrhea, pharyngitis   Respiratory:   cough, sputum production, SOB, GARLAND, wheezing, pleuritic pain   Cardiology:   chest pain, palpitations, orthopnea, PND, edema, syncope   Gastrointestinal:  abdominal pain , N/V, diarrhea, dysphagia, constipation, bleeding   Genitourinary:  frequency, urgency, dysuria, hematuria, incontinence   Muskuloskeletal :  arthralgia, myalgia, back pain  Hematology:  easy bruising, nose or gum bleeding, lymphadenopathy   Dermatological: rash, ulceration, pruritis, color change / jaundice  Endocrine:   hot flashes or polydipsia   Neurological:  headache, dizziness, confusion, focal weakness, paresthesia,     Speech difficulties, memory loss, gait difficulty  Psychological: Feelings of anxiety, depression, agitation    Objective:   VITALS:    Visit Vitals  /61   Pulse (!) 106   Temp 98 °F (36.7 °C)   Resp 16   Ht 5' 1\" (1.549 m)   Wt 41.9 kg (92 lb 6 oz)   SpO2 100%   BMI 17.45 kg/m²       PHYSICAL EXAM:    General:    Alert, cooperative, no distress, appears stated age. HEENT: Atraumatic, anicteric sclerae, pink conjunctivae     No oral ulcers, mucosa moist, throat clear, dentition fair  Neck:  Supple, symmetrical,  thyroid: non tender  Lungs:   Clear to auscultation bilaterally. No Wheezing or Rhonchi. No rales. Chest wall:  No tenderness  No Accessory muscle use. Heart:   Tachycardic, Regular  rhythm,  No  murmur   No edema  Abdomen:   Soft, non-tender. Not distended. Bowel sounds normal  Extremities: No cyanosis. No clubbing,      Skin turgor normal, Capillary refill normal, Radial dial pulse 2+  Skin:     Not pale. Not Jaundiced  No rashes   Psych:  Good insight. Not depressed. Not anxious or agitated. Neurologic: EOMs intact. No facial asymmetry. No aphasia or slurred speech. Symmetrical strength, Sensation grossly intact.  Alert and oriented X 4.     _______________________________________________________________________  Care Plan discussed with:    Comments   Patient x    Family  x    RN     Care Manager                    Consultant:      _______________________________________________________________________  Expected  Disposition:   Home with Family x   HH/PT/OT/RN    SNF/LTC    JABARI    ________________________________________________________________________  TOTAL TIME: 920 VoxFeed Provided     Minutes non procedure based      Comments     Reviewed previous records   >50% of visit spent in counseling and coordination of care  Discussion with patient and/or family and questions answered       ________________________________________________________________________  Signed: Rhiannon Dye MD    Procedures: see electronic medical records for all procedures/Xrays and details which were not copied into this note but were reviewed prior to creation of Plan.     LAB DATA REVIEWED:    Recent Results (from the past 24 hour(s))   GLUCOSE, POC    Collection Time: 10/23/18  4:03 PM   Result Value Ref Range    Glucose (POC) >600 (HH) 65 - 100 mg/dL    Performed by [address] [place] [address], POC    Collection Time: 10/23/18  4:04 PM   Result Value Ref Range    Glucose (POC) >600 (HH) 65 - 100 mg/dL    Performed by Eduardo HARLEY/ [facility]    Collection Time: 10/23/18  4:50 PM   Result Value Ref Range    Color YELLOW/STRAW      Appearance CLEAR CLEAR      Specific gravity 1.025 1.003 - 1.030      pH (UA) 5.0 5.0 - 8.0      Protein NEGATIVE  NEG mg/dL    Glucose >1,000 (A) NEG mg/dL    Ketone TRACE (A) NEG mg/dL    Bilirubin NEGATIVE  NEG      Blood NEGATIVE  NEG      Urobilinogen 0.2 0.2 - 1.0 EU/dL    Nitrites NEGATIVE  NEG      Leukocyte Esterase NEGATIVE  NEG     METABOLIC PANEL, BASIC    Collection Time: 10/23/18  5:09 PM   Result Value Ref Range    Sodium 123 (L) 136 - 145 mmol/L    Potassium 5.2 (H) 3.5 - 5.1 mmol/L    Chloride 84 (L) 97 - 108 mmol/L    CO2 25 21 - 32 mmol/L    Anion gap 14 5 - 15 mmol/L    Glucose 1,181 (HH) 65 - 100 mg/dL    BUN 42 (H) 6 - 20 MG/DL    Creatinine 2.29 (H) 0.55 - 1.02 MG/DL    BUN/Creatinine ratio 18 12 - 20      GFR est AA 27 (L) >60 ml/min/1.73m2    GFR est non-AA 22 (L) >60 ml/min/1.73m2    Calcium 10.6 (H) 8.5 - 10.1 MG/DL   CBC WITH AUTOMATED DIFF    Collection Time: 10/23/18  5:09 PM   Result Value Ref Range    WBC 13.1 (H) 3.6 - 11.0 K/uL    RBC 3.89 3.80 - 5.20 M/uL    HGB 11.3 (L) 11.5 - 16.0 g/dL    HCT 34.2 (L) 35.0 - 47.0 %    MCV 87.9 80.0 - 99.0 FL    MCH 29.0 26.0 - 34.0 PG    MCHC 33.0 30.0 - 36.5 g/dL    RDW 15.3 (H) 11.5 - 14.5 %    PLATELET 103 747 - 569 K/uL    MPV 12.2 8.9 - 12.9 FL    NRBC 0.0 0  WBC    ABSOLUTE NRBC 0.00 0.00 - 0.01 K/uL    NEUTROPHILS 88 (H) 32 - 75 %    LYMPHOCYTES 7 (L) 12 - 49 %    MONOCYTES 3 (L) 5 - 13 %    EOSINOPHILS 1 0 - 7 %    BASOPHILS 0 0 - 1 %    IMMATURE GRANULOCYTES 1 (H) 0.0 - 0.5 %    ABS. NEUTROPHILS 11.5 (H) 1.8 - 8.0 K/UL    ABS. LYMPHOCYTES 0.9 0.8 - 3.5 K/UL    ABS. MONOCYTES 0.5 0.0 - 1.0 K/UL    ABS. EOSINOPHILS 0.1 0.0 - 0.4 K/UL    ABS. BASOPHILS 0.0 0.0 - 0.1 K/UL    ABS. IMM. GRANS. 0.1 (H) 0.00 - 0.04 K/UL    DF AUTOMATED     POC TROPONIN-I    Collection Time: 10/23/18  5:09 PM   Result Value Ref Range    Troponin-I (POC) 0.05 0.00 - 0.08 ng/mL   HEPATIC FUNCTION PANEL    Collection Time: 10/23/18  5:09 PM   Result Value Ref Range    Protein, total 9.6 (H) 6.4 - 8.2 g/dL    Albumin 4.0 3.5 - 5.0 g/dL    Globulin 5.6 (H) 2.0 - 4.0 g/dL    A-G Ratio 0.7 (L) 1.1 - 2.2      Bilirubin, total 0.3 0.2 - 1.0 MG/DL    Bilirubin, direct 0.1 0.0 - 0.2 MG/DL    Alk.  phosphatase 454 (H) 45 - 117 U/L    AST (SGOT) 31 15 - 37 U/L    ALT (SGPT) 43 12 - 78 U/L   GLUCOSE, POC    Collection Time: 10/23/18  5:20 PM   Result Value Ref Range    Glucose (POC) >600 (HH) 65 - 100 mg/dL    Performed by Purnima Menendez

## 2018-10-24 NOTE — PROGRESS NOTES
Bedside shift change report given to Jane Love (oncoming nurse) by Victoria Pressley RN (offgoing nurse). Report included the following information SBAR.     0800: AM assessment complete. See flow sheet for details. 1200: Reassessment complete. No changes at this time. 1213: Insulin drip stopped. POC BS-105.     1600: Reassessment complete. No changes at this time. 1630: Report called to [id number], RN on med-tele. 1700: Pt transported to [facility].

## 2018-10-24 NOTE — DIABETES MGMT
DTC Consult Note    Recommendations/ Comments: Pt discussed during rounds this am. Anion gap is closed. Noted target rate of 140 - 180 and for DKA target rate is 150 - 250 mg/dL. Discussed this may have contributed to low BG earlier this am. Pt with BG < 200 mg/dL since 0748 hrs this am. Plan to transition off of insulin gtt with Lantus 1x/daily and discontinue insulin gtt 2 hours after basal insulin given. Given pt with reported SHANTEL dx, she will likely require meal-time insulin once tolerating PO - diet was resumed today. [facility] Current hospital DM medication: insulin gtt    Consult received for:  []             Assessment of home management                []      Medication Recommendations                []             Meter/monitoring     []             Insulin instruction     []             New diagnosis     []             Outpatient education     []             Insulin pump patient     [x]             Insulin infusion     []             DKA/HHS    Chart reviewed and initial evaluation complete on Ynes Alex. Patient is a 52 y.o. female with known hx of DM. Pt was reported to have SHANTEL dx in past.       Discussed with patient and/or family need for follow up appointment for diabetes management after discharge. A1c:   Lab Results   Component Value Date/Time    Hemoglobin A1c 10.7 (H) 10/23/2018 05:09 PM       Recent Glucose Results:   Lab Results   Component Value Date/Time    GLU 61 (L) 10/24/2018 03:53 AM     (H) 10/23/2018 11:39 PM     (HH) 10/23/2018 07:07 PM    GLUCPOC 190 (H) 10/24/2018 11:07 AM    GLUCPOC 104 (H) 10/24/2018 09:58 AM    GLUCPOC 132 (H) 10/24/2018 08:54 AM        Lab Results   Component Value Date/Time    Creatinine 1.56 (H) 10/24/2018 03:53 AM     Estimated Creatinine Clearance: 27 mL/min (A) (based on SCr of 1.56 mg/dL (H)).     Active Orders   Diet    DIET DIABETIC WITH OPTIONS Consistent Carb 1500-1600kcal; Regular        PO intake:   Patient Vitals for the past 72 hrs:   % Diet Eaten   10/23/18 0040 75 %       Will continue to follow as needed. Thank you.          Piper Howard, [id number]    Office: [phone]

## 2018-10-25 ENCOUNTER — TELEPHONE (OUTPATIENT)
Dept: ENDOCRINOLOGY | Age: 53
End: 2018-10-25

## 2018-10-25 LAB
BACTERIA SPEC CULT: NORMAL
CC UR VC: NORMAL
GLUCOSE BLD STRIP.AUTO-MCNC: 174 MG/DL (ref 65–100)
GLUCOSE BLD STRIP.AUTO-MCNC: 286 MG/DL (ref 65–100)
GLUCOSE BLD STRIP.AUTO-MCNC: 397 MG/DL (ref 65–100)
GLUCOSE BLD STRIP.AUTO-MCNC: 86 MG/DL (ref 65–100)
SERVICE CMNT-IMP: ABNORMAL
SERVICE CMNT-IMP: NORMAL
SERVICE CMNT-IMP: NORMAL

## 2018-10-25 PROCEDURE — 74011636637 HC RX REV CODE- 636/637: Performed by: INTERNAL MEDICINE

## 2018-10-25 PROCEDURE — 82962 GLUCOSE BLOOD TEST: CPT

## 2018-10-25 PROCEDURE — 51798 US URINE CAPACITY MEASURE: CPT

## 2018-10-25 PROCEDURE — 65270000029 HC RM PRIVATE

## 2018-10-25 PROCEDURE — 74011000258 HC RX REV CODE- 258: Performed by: EMERGENCY MEDICINE

## 2018-10-25 PROCEDURE — 74011250637 HC RX REV CODE- 250/637: Performed by: EMERGENCY MEDICINE

## 2018-10-25 PROCEDURE — 74011636637 HC RX REV CODE- 636/637: Performed by: EMERGENCY MEDICINE

## 2018-10-25 PROCEDURE — 74011250637 HC RX REV CODE- 250/637

## 2018-10-25 PROCEDURE — 77030038269 HC DRN EXT URIN PURWCK BARD -A

## 2018-10-25 PROCEDURE — 74011250636 HC RX REV CODE- 250/636: Performed by: INTERNAL MEDICINE

## 2018-10-25 PROCEDURE — 74011250636 HC RX REV CODE- 250/636: Performed by: EMERGENCY MEDICINE

## 2018-10-25 PROCEDURE — 74011250637 HC RX REV CODE- 250/637: Performed by: HOSPITALIST

## 2018-10-25 RX ORDER — ACETAMINOPHEN 325 MG/1
TABLET ORAL
Status: COMPLETED
Start: 2018-10-25 | End: 2018-10-25

## 2018-10-25 RX ORDER — INSULIN LISPRO 100 [IU]/ML
5 INJECTION, SOLUTION INTRAVENOUS; SUBCUTANEOUS
Status: DISCONTINUED | OUTPATIENT
Start: 2018-10-25 | End: 2018-10-28 | Stop reason: HOSPADM

## 2018-10-25 RX ORDER — MAGNESIUM SULFATE 1 G/100ML
1 INJECTION INTRAVENOUS ONCE
Status: COMPLETED | OUTPATIENT
Start: 2018-10-25 | End: 2018-10-25

## 2018-10-25 RX ORDER — ONDANSETRON 2 MG/ML
4 INJECTION INTRAMUSCULAR; INTRAVENOUS
Status: DISCONTINUED | OUTPATIENT
Start: 2018-10-25 | End: 2018-10-28 | Stop reason: HOSPADM

## 2018-10-25 RX ORDER — LANOLIN ALCOHOL/MO/W.PET/CERES
400 CREAM (GRAM) TOPICAL 2 TIMES DAILY
Status: DISCONTINUED | OUTPATIENT
Start: 2018-10-25 | End: 2018-10-28 | Stop reason: HOSPADM

## 2018-10-25 RX ORDER — ACETAMINOPHEN 325 MG/1
650 TABLET ORAL
Status: DISCONTINUED | OUTPATIENT
Start: 2018-10-25 | End: 2018-10-28 | Stop reason: HOSPADM

## 2018-10-25 RX ORDER — MAGNESIUM SULFATE HEPTAHYDRATE 40 MG/ML
2 INJECTION, SOLUTION INTRAVENOUS ONCE
Status: COMPLETED | OUTPATIENT
Start: 2018-10-25 | End: 2018-10-25

## 2018-10-25 RX ORDER — PROMETHAZINE HYDROCHLORIDE 25 MG/1
25 TABLET ORAL
Status: DISCONTINUED | OUTPATIENT
Start: 2018-10-25 | End: 2018-10-25

## 2018-10-25 RX ADMIN — ONDANSETRON 4 MG: 2 INJECTION, SOLUTION INTRAMUSCULAR; INTRAVENOUS at 20:20

## 2018-10-25 RX ADMIN — MUPIROCIN: 20 OINTMENT TOPICAL at 21:11

## 2018-10-25 RX ADMIN — INSULIN LISPRO 5 UNITS: 100 INJECTION, SOLUTION INTRAVENOUS; SUBCUTANEOUS at 12:54

## 2018-10-25 RX ADMIN — INSULIN LISPRO 5 UNITS: 100 INJECTION, SOLUTION INTRAVENOUS; SUBCUTANEOUS at 18:11

## 2018-10-25 RX ADMIN — MUPIROCIN: 20 OINTMENT TOPICAL at 09:15

## 2018-10-25 RX ADMIN — MAGNESIUM SULFATE HEPTAHYDRATE 2 G: 40 INJECTION, SOLUTION INTRAVENOUS at 14:10

## 2018-10-25 RX ADMIN — Medication 10 ML: at 06:49

## 2018-10-25 RX ADMIN — Medication 400 MG: at 18:13

## 2018-10-25 RX ADMIN — Medication 10 ML: at 21:07

## 2018-10-25 RX ADMIN — INSULIN GLARGINE 40 UNITS: 100 INJECTION, SOLUTION SUBCUTANEOUS at 09:05

## 2018-10-25 RX ADMIN — INSULIN LISPRO 3 UNITS: 100 INJECTION, SOLUTION INTRAVENOUS; SUBCUTANEOUS at 09:14

## 2018-10-25 RX ADMIN — MAGNESIUM SULFATE HEPTAHYDRATE 1 G: 1 INJECTION, SOLUTION INTRAVENOUS at 16:19

## 2018-10-25 RX ADMIN — CEFTRIAXONE 1 G: 1 INJECTION, POWDER, FOR SOLUTION INTRAMUSCULAR; INTRAVENOUS at 20:42

## 2018-10-25 RX ADMIN — PROMETHAZINE HYDROCHLORIDE 25 MG: 25 TABLET ORAL at 14:20

## 2018-10-25 RX ADMIN — PROMETHAZINE HYDROCHLORIDE 25 MG: 25 TABLET ORAL at 02:57

## 2018-10-25 RX ADMIN — ACETAMINOPHEN 650 MG: 325 TABLET ORAL at 20:20

## 2018-10-25 RX ADMIN — INSULIN LISPRO 5 UNITS: 100 INJECTION, SOLUTION INTRAVENOUS; SUBCUTANEOUS at 12:55

## 2018-10-25 RX ADMIN — ACETAMINOPHEN 650 MG: 325 TABLET ORAL at 03:23

## 2018-10-25 RX ADMIN — HEPARIN SODIUM 5000 UNITS: 5000 INJECTION INTRAVENOUS; SUBCUTANEOUS at 09:05

## 2018-10-25 RX ADMIN — HEPARIN SODIUM 5000 UNITS: 5000 INJECTION INTRAVENOUS; SUBCUTANEOUS at 21:06

## 2018-10-25 NOTE — PROGRESS NOTES
Hospitalist Progress Note    NAME: Serena Sheppard   :  1965   MRN:  [id number]       Assessment / Plan:  Hyperosmolar hyperglycemic state  Dehydration Acute on CKD 3  Transient hyperK and hypona, resolved  Uncontrolled DM  Suspected partially treated UTI, most recent cx with pan sensitive e.coli  Recurrent UTIs  -transitioned off insulin drip to lantus and humalog on 10/24  -appreciate DM management team assistance  -follow and adjust  -advanced diet, improving N/V, will check LFs and lipase in am  -a1c 10.7  -CT abd/pelvis- negative  -creat improved with hydration  -appreciate renal eval  -op urology f/u scheduled on Monday  -check PVRs, suspect may have neurogenic bladder, had 163 today before void, but zero after, continue to follow  -ua with mixed cx c/w contamination, but she refused in/out cath yesterday to reeval  - suspect recent UTI only partially treated, last dose abx on Tuesday  -will treat empirically with ceftriaxone for now, given suprapubic tenderness and leukocytosis 16K  -leukocytosis better today to 12K, no fever, less suprapubic pain  -cont IV abx, bc has not tolerated po in the past.  -BG still high, add scheduled humalog at meals and await further recs from DM management, was on 70/30 at home. chronic anemia- suspect chronic dz, no overt bleeding    hypomag- 1.2 supp IV, recheck in am     Code Status: full  Surrogate Decision Maker:     DVT Prophylaxis: heparin sq  GI Prophylaxis: not indicated     Baseline: lives at home alone      less than 18.5 Underweight / Body mass index is 17.45 kg/m². Recommended Disposition: Home w/Family consider home health     Subjective:     Chief Complaint / Reason for Physician Visit  Elevated sugars, fever and chills    10/24 Patient reports she is feeling better. She still has some lower abdominal pain but denies any urinary symptoms.   She has had multiple urinary tract infections recently but never has symptoms until she gets fever chills and nausea. She reports that these are the same symptoms that brought her at this time. Her nausea has resolved and she is eating currently her dinner. She has been transitioned off her insulin drip and transferred out of the intensive care unit. She reports she has an appointment scheduled with urology next Monday. She was given a course of 10 days of antibiotics by her primary care physician on Monday but developed nausea vomiting was unable to complete the full course. 10/25 pt c/os of continued N, vomited after dinner last night, none today. Still some lower abd pain. No sob/cp. Ate breakfast ok this am.    Patient was evaluated at 12:15pm    Discussed with RN events overnight. Review of Systems:  Symptom Y/N Comments  Symptom Y/N Comments   Fever/Chills    Chest Pain n    Poor Appetite    Edema     Cough n   Abdominal Pain y    Sputum    Joint Pain     SOB/GARLAND n   Pruritis/Rash     Nausea/vomit y   Tolerating PT/OT     Diarrhea n   Tolerating Diet y    Constipation n   Other       Could NOT obtain due to:      Objective:     VITALS:   Last 24hrs VS reviewed since prior progress note. Most recent are:  Patient Vitals for the past 24 hrs:   Temp Pulse Resp BP SpO2   10/25/18 0741 97.8 °F (36.6 °C) 69 18 107/54 97 %   10/24/18 2321 98 °F (36.7 °C) 82 16 133/79 97 %       Intake/Output Summary (Last 24 hours) at 10/25/2018 1623  Last data filed at 10/25/2018 1230  Gross per 24 hour   Intake 621.67 ml   Output 1400 ml   Net -778.33 ml        PHYSICAL EXAM:  Patient is awake and alert nontoxic, but chronically ill appearing oriented x3 on room airConjunctiva are pink oropharynx mucous membranes are moist.  Cardiovascular regular rate no murmurs rubs or gallops. Lungs are clear without wheezes rhonchi or crackles. Abdomen bowel sounds present soft she does have suprapubic tenderness but seems less tender than yesterday's exam.  no CVA tenderness to palpation.   Extremities no clubbing cyanosis or edema. Reviewed most current lab test results and cultures  YES  Reviewed most current radiology test results   YES  Review and summation of old records today    NO  Reviewed patient's current orders and MAR    YES  PMH/SH reviewed - no change compared to H&P  ________________________________________________________________________  Care Plan discussed with:    Comments   Patient y    Family  y    RN y    Care Manager     Consultant                        Multidiciplinary team rounds were held today with , nursing, pharmacist and clinical coordinator. Patient's plan of care was discussed; medications were reviewed and discharge planning was addressed. ________________________________________________________________________  Total NON critical care TIME:     Minutes    Total CRITICAL CARE TIME Spent:   Minutes non procedure based      Comments   >50% of visit spent in counseling and coordination of care     ________________________________________________________________________  Munira Candelario MD     Procedures: see electronic medical records for all procedures/Xrays and details which were not copied into this note but were reviewed prior to creation of Plan. LABS:  I reviewed today's most current labs and imaging studies.   Pertinent labs include:  Recent Labs     10/24/18  2233 10/24/18  0353 10/23/18  1709   WBC 12.7* 16.3* 13.1*   HGB 9.1* 9.7* 11.3*   HCT 27.7* 29.0* 34.2*    340 320     Recent Labs     10/24/18  2233 10/24/18  0353 10/23/18  2339 10/23/18  1907  10/23/18  1709    140 135* 130*  --  123*   K 4.0 3.5 3.8 5.2*  --  5.2*    104 99 91*  --  84*   CO2 27 30 30 27  --  25   * 61* 350* 953*  --  1,181*   BUN 25* 35* 33* 38*  --  42*   CREA 1.55* 1.56* 1.95* 2.05*  --  2.29*   CA 8.4* 9.8 10.1 9.3  --  10.6*   MG 1.2* 1.8 1.9 2.0   < >  --    PHOS  --   --   --  3.4  --   --    ALB  --   --   --   --   --  4.0   TBILI  --   --   --   --   --  0.3 SGOT  --   --   --   --   --  31   ALT  --   --   --   --   --  43    < > = values in this interval not displayed.        Signed: Josesito Dowd MD

## 2018-10-25 NOTE — PROGRESS NOTES
Name: Will Ernst   MRN: [id number]  : 1965      Assessment:  NEERAJ on CKD-3: NEERAJ seems to have resolved. Appears to be related to uncontrolled DM with severe hyperglycemia causing osmotic diuresis/dehydration  CKD-3: due to DM  DM-2, uncontrolled with hyperosmolar state- no acidosis on initial BMP. AG also OK at 15. B-OH butyrate not send. No overt evidence of DKA  Hyponatremia- pseudohyponatremia from severe hyperglycemia  Hyperkalemia- resolved  Recent UTI- UA with pyuria; ?incomplete Rx. UCx initially with E. Coli and then rpt one with MGF. Plan/Recommendations:  Ct IVF at 50 ml/hr- d/c by tomm  No labs today. Check in AM. Cr back to basln  Needs better control of DM as outpt. Would benefit from Endocrinology eval as outpt  Ct IV Ceftriaxone  Home soon      Subjective:  Feels OK. Vague abd pain. But no n/v  Taking po    ROS:   As above    Exam:  Visit Vitals  /54   Pulse 69   Temp 97.8 °F (36.6 °C)   Resp 18   Ht 5' 1\" (1.549 m)   Wt 41.9 kg (92 lb 6 oz)   SpO2 97%   Breastfeeding?  No   BMI 17.45 kg/m²       NAD  Clear  RRR  Soft, NT  No edema  AOx3    Current Facility-Administered Medications   Medication Dose Route Frequency Last Dose    promethazine (PHENERGAN) tablet 25 mg  25 mg Oral Q6H PRN 25 mg at 10/25/18 0257    acetaminophen (TYLENOL) tablet 650 mg  650 mg Oral Q6H  mg at 10/25/18 0323    mupirocin (BACTROBAN) 2 % ointment   Both Nostrils Q12H      insulin glargine (LANTUS) injection 40 Units  40 Units SubCUTAneous DAILY 40 Units at 10/25/18 0905    insulin lispro (HUMALOG) injection 1-4 Units  1-4 Units SubCUTAneous AC&HS 3 Units at 10/25/18 0914    0.9% sodium chloride infusion  50 mL/hr IntraVENous CONTINUOUS 50 mL/hr at 10/24/18 1318    cefTRIAXone (ROCEPHIN) 1 g in 0.9% sodium chloride (MBP/ADV) 50 mL  1 g IntraVENous Q24H 1 g at 10/24/18 2227    dextrose (D50W) injection syrg 12.5-25 g  25-50 mL IntraVENous PRN 12.5 g at 10/24/18 0410    glucose chewable tablet 16 g  4 Tab Oral PRN      glucagon (GLUCAGEN) injection 1 mg  1 mg IntraMUSCular PRN      sodium chloride (NS) flush 5-10 mL  5-10 mL IntraVENous Q8H 10 mL at 10/25/18 0649    sodium chloride (NS) flush 5-10 mL  5-10 mL IntraVENous PRN      heparin (porcine) injection 5,000 Units  5,000 Units SubCUTAneous Q12H 5,000 Units at 10/25/18 9864       Labs/Data:    Lab Results   Component Value Date/Time    WBC 12.7 (H) 10/24/2018 10:33 PM    HGB 9.1 (L) 10/24/2018 10:33 PM    Hematocrit (POC) 31 (L) 08/24/2018 01:15 PM    HCT 27.7 (L) 10/24/2018 10:33 PM    PLATELET 005 52/79/1166 10:33 PM    MCV 89.9 10/24/2018 10:33 PM       Lab Results   Component Value Date/Time    Sodium 138 10/24/2018 10:33 PM    Potassium 4.0 10/24/2018 10:33 PM    Chloride 102 10/24/2018 10:33 PM    CO2 27 10/24/2018 10:33 PM    Anion gap 9 10/24/2018 10:33 PM    Glucose 152 (H) 10/24/2018 10:33 PM    BUN 25 (H) 10/24/2018 10:33 PM    Creatinine 1.55 (H) 10/24/2018 10:33 PM    BUN/Creatinine ratio 16 10/24/2018 10:33 PM    GFR est AA 42 (L) 10/24/2018 10:33 PM    GFR est non-AA 35 (L) 10/24/2018 10:33 PM    Calcium 8.4 (L) 10/24/2018 10:33 PM       Wt Readings from Last 3 Encounters:   10/23/18 41.9 kg (92 lb 6 oz)   10/23/18 42 kg (92 lb 9.6 oz)   10/22/18 43.2 kg (95 lb 3.2 oz)         Intake/Output Summary (Last 24 hours) at 10/25/2018 1152  Last data filed at 10/25/2018 0444  Gross per 24 hour   Intake 1031.66 ml   Output 1400 ml   Net -368.34 ml       Patient seen and examined. Chart reviewed. Labs, data and other pertinent notes reviewed in last 24 hrs.     PMH/SH/FH reviewed and unchanged compared to H&P    Discussed with pt/sister      Amelie Santiago MD

## 2018-10-25 NOTE — PROGRESS NOTES
Problem: Falls - Risk of  Goal: *Absence of Falls  Document Stephen Fall Risk and appropriate interventions in the flowsheet.   Outcome: Progressing Towards Goal  Fall Risk Interventions:            Medication Interventions: Patient to call before getting OOB    Elimination Interventions: Call light in reach

## 2018-10-25 NOTE — ACP (ADVANCE CARE PLANNING)
Advance Medical Directive document executed and signed on 10/24/18 was not dated. See previous note by Urbano Stephenson. Visited with patient on Med Tele to complete AMD document with date. Confirmed all information gathered yesterday remains the same. REPLACED document from yesterday with document now on patient's chart.       Primary decision maker is Quintin Mcclelland    Telephone: [phone]  Secondary decision Maker is Danyel Jensen  Telephone: [phone] OR [phone]    [facility], [facility], [address] Paging Service  [address] ([id number])

## 2018-10-25 NOTE — PROGRESS NOTES
Bedside and Verbal shift change report given to Adeline Johnson (oncoming nurse) by Jimena Rondon (offgoing nurse). Report included the following information SBAR, Kardex, Intake/Output and Recent Results.

## 2018-10-25 NOTE — PROGRESS NOTES
HYPOGLYCEMIC EPISODE DOCUMENTATION    Patient with hypoglycemic episode(s) at 2055(time) on 10/24/2018(date). BG value(s) pre-treatment 64,69    Was patient symptomatic? [x] yes, [] no  Patient was treated with the following rescue medications/treatments: [] D50                [] Glucose tablets                [] Glucagon                [x] 4oz juice                [] 6oz reg soda                [] 8oz low fat milk  BG value post-treatment: 95  Once BG treated and value greater than 80mg/dl, pt was provided with the following:  [x] snack  [] meal  Name of MD notified:    The following orders were received:

## 2018-10-25 NOTE — PROGRESS NOTES
Med Tele CM completed chart review. Pt transferred from CCU to Med Tele on 10/24/18. CM noted that per CM Eval that Pt was not happy with current PCP and wanted to switch PCP. She has a new PCP appt scheduled with Dr. Romy Chun in February of 2019. Pt wanted CM to see if she could get in with Dr. Trish Fry sooner or possibly Dr. Cal Cisneros with that same practice. CM emailed CM Specialist to see if they could assist with that. Noted that [facility] completed AMD with Pt. Primary decision maker is Adonis Arevalo                Telephone: [phone]  Secondary decision Maker is Caryn Worthy           Telephone: [phone] OR [phone]    CM participated in IDT Rounds and they indicated that Pt was possible DC this weekend. CM will continue to monitor discharge plan.      [place], [employer]   Ext [phone]

## 2018-10-25 NOTE — ACP (ADVANCE CARE PLANNING)
Request by patient  to assist with Advance Medical Directive (AMD) in CCU. Consulted with patients nurse. Explained document that was executed by patient and friend, who were present in the room. Assisted patient in completing the document. Made a copy and placed on patients chart. Returned original document and another copy to the patient.      [facility] Patient  [address] [facility]  Witness - Nora Mckeon, [address] [place] [address] Assistance Page [phone]

## 2018-10-25 NOTE — DIABETES MGMT
DTC Consult Note    Recommendations/ Comments: Blood sugar fluctuating, but has improved with addition of pre-meal insulin. Patient may benefit from endocrinology. Current hospital DM medication: Lantus 40 units, Lispro pre-meal 5 units tid ac and for correction, high sensitivity    Consult received for:  [x]             Assessment of home management                [x]      Medication Recommendations    Chart reviewed and initial evaluation complete on Tim Holcomb. Patient is a 52 y.o. female with a history of diabetes on insulin injections: NPH/Reg 70/30: 45 units BID at home. BG monitoring at home 3-4 times per day. Patient reports BG levels at home trend: ranges 200's up to 500 mg/dL. She states that her endocrinologist adjusted her insulin about a week ago. Discussed that UTI is likely increasing blood sugars. She is careful with carbohydrates and avoids sweet drinks. She attended outpatient individual appointment last year, but states she will talk with her MD regarding a follow up individual appointment. Assessed and instructed patient on the following:   ·  interpretation of lab results, blood sugar goals, complications of diabetes mellitus, insulin adjustments and nutrition    Provided patient with the following: [x]             Survival skills education materials               [x]             Outpatient DTC contact number      Discussed with patient and/or family need for follow up appointment for diabetes management after discharge.       A1c:   Lab Results   Component Value Date/Time    Hemoglobin A1c 10.7 (H) 10/23/2018 05:09 PM       Recent Glucose Results:   Lab Results   Component Value Date/Time     (H) 10/24/2018 10:33 PM    GLUCPOC 174 (H) 10/25/2018 04:17 PM    GLUCPOC 397 (H) 10/25/2018 11:39 AM    GLUCPOC 286 (H) 10/25/2018 07:43 AM        Lab Results   Component Value Date/Time    Creatinine 1.55 (H) 10/24/2018 10:33 PM     Estimated Creatinine Clearance: 27.1 mL/min (A) (based on SCr of 1.55 mg/dL (H)). Active Orders   Diet    DIET DIABETIC WITH OPTIONS Consistent Carb 1500-1600kcal; Regular        PO intake:   Patient Vitals for the past 72 hrs:   % Diet Eaten   10/23/18 0040 75 %       Will continue to follow as needed. Thank you.   Lorenzo Tian, MS, RN, CDE

## 2018-10-25 NOTE — PROGRESS NOTES
Advance Medical Directive document executed and signed on 10/24/18 was not dated. See previous note by Meaghan De La Paz. Visited with patient on [employer] Tele to complete AMD document with date. Confirmed all information gathered yesterday remains the same. REPLACED document from yesterday with document now on patient's chart.       Primary decision maker is Esteban Sterling    Telephone: [phone]  Secondary decision Maker is Felicia Cranker  Telephone: [phone] OR [phone]    MISTI Ashraf, [facility], [address] [place] [address] Paging Service  163[facility] ([phone])

## 2018-10-26 LAB
ALBUMIN SERPL-MCNC: 2.6 G/DL (ref 3.5–5)
ALBUMIN/GLOB SERPL: 0.6 {RATIO} (ref 1.1–2.2)
ALP SERPL-CCNC: 265 U/L (ref 45–117)
ALT SERPL-CCNC: 56 U/L (ref 12–78)
AMYLASE SERPL-CCNC: 125 U/L (ref 25–115)
ANION GAP SERPL CALC-SCNC: 6 MMOL/L (ref 5–15)
AST SERPL-CCNC: 73 U/L (ref 15–37)
BASOPHILS # BLD: 0 K/UL (ref 0–0.1)
BASOPHILS NFR BLD: 0 % (ref 0–1)
BILIRUB DIRECT SERPL-MCNC: 0.2 MG/DL (ref 0–0.2)
BILIRUB SERPL-MCNC: 0.8 MG/DL (ref 0.2–1)
BUN SERPL-MCNC: 24 MG/DL (ref 6–20)
BUN/CREAT SERPL: 21 (ref 12–20)
CALCIUM SERPL-MCNC: 8.6 MG/DL (ref 8.5–10.1)
CHLORIDE SERPL-SCNC: 108 MMOL/L (ref 97–108)
CO2 SERPL-SCNC: 27 MMOL/L (ref 21–32)
CREAT SERPL-MCNC: 1.16 MG/DL (ref 0.55–1.02)
DIFFERENTIAL METHOD BLD: ABNORMAL
EOSINOPHIL # BLD: 0.3 K/UL (ref 0–0.4)
EOSINOPHIL NFR BLD: 3 % (ref 0–7)
ERYTHROCYTE [DISTWIDTH] IN BLOOD BY AUTOMATED COUNT: 15.9 % (ref 11.5–14.5)
GLOBULIN SER CALC-MCNC: 4.1 G/DL (ref 2–4)
GLUCOSE BLD STRIP.AUTO-MCNC: 137 MG/DL (ref 65–100)
GLUCOSE BLD STRIP.AUTO-MCNC: 147 MG/DL (ref 65–100)
GLUCOSE BLD STRIP.AUTO-MCNC: 173 MG/DL (ref 65–100)
GLUCOSE BLD STRIP.AUTO-MCNC: 254 MG/DL (ref 65–100)
GLUCOSE SERPL-MCNC: 96 MG/DL (ref 65–100)
HCT VFR BLD AUTO: 28.9 % (ref 35–47)
HGB BLD-MCNC: 9.3 G/DL (ref 11.5–16)
IMM GRANULOCYTES # BLD: 0.1 K/UL (ref 0–0.04)
IMM GRANULOCYTES NFR BLD AUTO: 1 % (ref 0–0.5)
LIPASE SERPL-CCNC: 162 U/L (ref 73–393)
LYMPHOCYTES # BLD: 1.1 K/UL (ref 0.8–3.5)
LYMPHOCYTES NFR BLD: 13 % (ref 12–49)
MAGNESIUM SERPL-MCNC: 2.4 MG/DL (ref 1.6–2.4)
MCH RBC QN AUTO: 29 PG (ref 26–34)
MCHC RBC AUTO-ENTMCNC: 32.2 G/DL (ref 30–36.5)
MCV RBC AUTO: 90 FL (ref 80–99)
MONOCYTES # BLD: 0.4 K/UL (ref 0–1)
MONOCYTES NFR BLD: 5 % (ref 5–13)
NEUTS SEG # BLD: 6.5 K/UL (ref 1.8–8)
NEUTS SEG NFR BLD: 77 % (ref 32–75)
NRBC # BLD: 0 K/UL (ref 0–0.01)
NRBC BLD-RTO: 0 PER 100 WBC
PLATELET # BLD AUTO: 239 K/UL (ref 150–400)
PMV BLD AUTO: 11.5 FL (ref 8.9–12.9)
POTASSIUM SERPL-SCNC: 4 MMOL/L (ref 3.5–5.1)
PROT SERPL-MCNC: 6.7 G/DL (ref 6.4–8.2)
RBC # BLD AUTO: 3.21 M/UL (ref 3.8–5.2)
SERVICE CMNT-IMP: ABNORMAL
SODIUM SERPL-SCNC: 141 MMOL/L (ref 136–145)
WBC # BLD AUTO: 8.4 K/UL (ref 3.6–11)

## 2018-10-26 PROCEDURE — 74011250636 HC RX REV CODE- 250/636: Performed by: INTERNAL MEDICINE

## 2018-10-26 PROCEDURE — 80048 BASIC METABOLIC PNL TOTAL CA: CPT

## 2018-10-26 PROCEDURE — 74011636637 HC RX REV CODE- 636/637: Performed by: INTERNAL MEDICINE

## 2018-10-26 PROCEDURE — 83690 ASSAY OF LIPASE: CPT

## 2018-10-26 PROCEDURE — 65270000029 HC RM PRIVATE

## 2018-10-26 PROCEDURE — 74011000258 HC RX REV CODE- 258: Performed by: EMERGENCY MEDICINE

## 2018-10-26 PROCEDURE — 80076 HEPATIC FUNCTION PANEL: CPT

## 2018-10-26 PROCEDURE — 74011250637 HC RX REV CODE- 250/637: Performed by: EMERGENCY MEDICINE

## 2018-10-26 PROCEDURE — 74011250637 HC RX REV CODE- 250/637: Performed by: HOSPITALIST

## 2018-10-26 PROCEDURE — 85025 COMPLETE CBC W/AUTO DIFF WBC: CPT

## 2018-10-26 PROCEDURE — 82150 ASSAY OF AMYLASE: CPT

## 2018-10-26 PROCEDURE — 77030038269 HC DRN EXT URIN PURWCK BARD -A

## 2018-10-26 PROCEDURE — 74011250636 HC RX REV CODE- 250/636: Performed by: EMERGENCY MEDICINE

## 2018-10-26 PROCEDURE — 83735 ASSAY OF MAGNESIUM: CPT

## 2018-10-26 PROCEDURE — 74011636637 HC RX REV CODE- 636/637: Performed by: EMERGENCY MEDICINE

## 2018-10-26 PROCEDURE — 36415 COLL VENOUS BLD VENIPUNCTURE: CPT

## 2018-10-26 PROCEDURE — 82962 GLUCOSE BLOOD TEST: CPT

## 2018-10-26 RX ORDER — INSULIN GLARGINE 100 [IU]/ML
30 INJECTION, SOLUTION SUBCUTANEOUS DAILY
Qty: 10 PEN | Refills: 3 | Status: SHIPPED | OUTPATIENT
Start: 2018-10-26 | End: 2018-10-28 | Stop reason: SDUPTHER

## 2018-10-26 RX ORDER — INSULIN LISPRO 100 [IU]/ML
8 INJECTION, SOLUTION INTRAVENOUS; SUBCUTANEOUS
Qty: 10 PEN | Refills: 3 | Status: SHIPPED | OUTPATIENT
Start: 2018-10-26 | End: 2019-02-20

## 2018-10-26 RX ADMIN — HEPARIN SODIUM 5000 UNITS: 5000 INJECTION INTRAVENOUS; SUBCUTANEOUS at 08:15

## 2018-10-26 RX ADMIN — Medication 400 MG: at 16:51

## 2018-10-26 RX ADMIN — ACETAMINOPHEN 650 MG: 325 TABLET ORAL at 07:46

## 2018-10-26 RX ADMIN — INSULIN LISPRO 3 UNITS: 100 INJECTION, SOLUTION INTRAVENOUS; SUBCUTANEOUS at 11:00

## 2018-10-26 RX ADMIN — MUPIROCIN: 20 OINTMENT TOPICAL at 08:16

## 2018-10-26 RX ADMIN — INSULIN LISPRO 5 UNITS: 100 INJECTION, SOLUTION INTRAVENOUS; SUBCUTANEOUS at 11:00

## 2018-10-26 RX ADMIN — INSULIN LISPRO 5 UNITS: 100 INJECTION, SOLUTION INTRAVENOUS; SUBCUTANEOUS at 08:13

## 2018-10-26 RX ADMIN — INSULIN GLARGINE 40 UNITS: 100 INJECTION, SOLUTION SUBCUTANEOUS at 08:13

## 2018-10-26 RX ADMIN — SODIUM CHLORIDE 50 ML/HR: 900 INJECTION, SOLUTION INTRAVENOUS at 07:22

## 2018-10-26 RX ADMIN — ACETAMINOPHEN 650 MG: 325 TABLET ORAL at 22:38

## 2018-10-26 RX ADMIN — Medication 10 ML: at 22:00

## 2018-10-26 RX ADMIN — INSULIN LISPRO 5 UNITS: 100 INJECTION, SOLUTION INTRAVENOUS; SUBCUTANEOUS at 16:51

## 2018-10-26 RX ADMIN — ONDANSETRON 4 MG: 2 INJECTION, SOLUTION INTRAMUSCULAR; INTRAVENOUS at 20:34

## 2018-10-26 RX ADMIN — Medication 400 MG: at 08:16

## 2018-10-26 RX ADMIN — Medication 10 ML: at 05:11

## 2018-10-26 RX ADMIN — CEFTRIAXONE 1 G: 1 INJECTION, POWDER, FOR SOLUTION INTRAMUSCULAR; INTRAVENOUS at 20:34

## 2018-10-26 NOTE — PROGRESS NOTES
PCP MODESTO appt scheduled with  on 10/29/2018 at 3:40pm. Appt added to AVS. A ALPHONSO Bonner Specialist     Previously Scheduled

## 2018-10-26 NOTE — PROGRESS NOTES
PCP MODESTO appt scheduled with Dr. Hannah Goncalves on 11/1/2018 at 12:15pm. Appt added to AVS. A ALPHONSO Bonner Specialist       I was requested to help patient with a new provider    Patient was seen by Dr Jones Gunderson before.

## 2018-10-26 NOTE — PROGRESS NOTES
Bedside shift change report given to Sonal Cavazos (oncoming nurse) by Nito Caballero RN   (offgoing nurse). Report included the following information SBAR, Kardex and Recent Results.

## 2018-10-26 NOTE — PROGRESS NOTES
Bedside shift change report given to Shea Bennett (oncoming nurse) by Priya Kwong (offgoing nurse). Report included the following information SBAR, Kardex, Intake/Output and MAR.

## 2018-10-26 NOTE — CONSULTS
CONSULTATION REQUESTED BY: Breana Gomez MD    REASON FOR CONSULT: poorly controlled DM in setting of infection    CHIEF COMPLAINT: hyperglycemia    HISTORY OF PRESENT ILLNESS:   Jorden White is a 52 y.o. female with a PMHx as noted below who was admitted to the hospital on 10/23/2018  4:04 PM with a diagnosis of Hyperglycemia. Endocrinology was consulted for the evaluation of diabetes control. Admission noted. I had previously called patient during her hospitalization and advised her to call me after discharge to update me on her sugars. I have been in close communication with her and had seen her recently in the clinic. Her diabetes is just responding to recurrent UTI's, and she notably has an upcoming urology appointment. Notably her sugars are improving s/p insulin infusion which was able to help break the resistance, along with her empiric antibiotic therapy. She is stable on current basal bolus regimen of lantus 40 units and humalog 5 units with meals + correction. Her reason for being on 70/30 insulin was partly cost, and partly because she seemed to do very well with this insulin after her diagnosis previously, with very stable blood sugars. Patient had normal c-peptide levels and negative pancreatic autoantibodies on my evaluation suggesting less likely a pure classic type 1 diabetic, which is why she does not develop ketoacidosis very often, however she does have a good degree of insulin insufficiency at times of stress which is why she is kept on insulin. This may be related to her \"shrunken\" pancreas on prior CT which could be due to any hx of pancreatitis etc., not clear. She was tried on oral meds + insulin in the past as a combination however did not tolerate any of the orals due to side effects so we stayed purely on insulin. In any case we will continue to treat her as an insulin dependent diabetic.  I do not mind a retrial of basal bolus insulin if she is willing to go back to basal bolus insulin with 4 injections daily.        PAST MEDICAL/SURGICAL HISTORY:   Past Medical History:   Diagnosis Date    Asthma     Diabetes ([facility]     Elevated transaminase level 6/29/2016    Insulin dependent diabetes mellitus ([facility] 6/20/2016    Pancreatic atrophy 6/20/2016     Past Surgical History:   Procedure Laterality Date    HX HEENT         ALLERGIES:   Allergies   Allergen Reactions    Contrast Agent [Iodine] Itching    Hydrocodone Rash    Percocet [Oxycodone-Acetaminophen] Rash    Codeine Nausea and Vomiting    Metformin Diarrhea       MEDICATIONS ON ADMISSION:     Current Facility-Administered Medications:     acetaminophen (TYLENOL) tablet 650 mg, 650 mg, Oral, Q6H PRN, Amelie Zavala MD, 650 mg at 10/26/18 0746    magnesium oxide (MAG-OX) tablet 400 mg, 400 mg, Oral, BID, Rachel Greene MD, 400 mg at 10/26/18 1651    insulin lispro (HUMALOG) injection 5 Units, 5 Units, SubCUTAneous, TIDAC, Rachel Moreno MD, 5 Units at 10/26/18 1651    ondansetron (ZOFRAN) injection 4 mg, 4 mg, IntraVENous, Q6H PRN, Malissa Garza MD, 4 mg at 10/25/18 2020    mupirocin (BACTROBAN) 2 % ointment, , Both Nostrils, Q12H, Chris Davison MD    insulin glargine (LANTUS) injection 40 Units, 40 Units, SubCUTAneous, DAILY, Neto ROBBINS MD, 40 Units at 10/26/18 0813    insulin lispro (HUMALOG) injection 1-4 Units, 1-4 Units, SubCUTAneous, AC&HS, Bella Slater MD, Stopped at 10/26/18 1630    cefTRIAXone (ROCEPHIN) 1 g in 0.9% sodium chloride (MBP/ADV) 50 mL, 1 g, IntraVENous, Q24H, Rachel Moreno MD, Last Rate: 100 mL/hr at 10/25/18 2042, 1 g at 10/25/18 2042    dextrose (D50W) injection syrg 12.5-25 g, 25-50 mL, IntraVENous, PRN, Genevieve OLMOS MD, 12.5 g at 10/24/18 0410    glucose chewable tablet 16 g, 4 Tab, Oral, PRN, Jeffy Cedillo MD    glucagon (GLUCAGEN) injection 1 mg, 1 mg, IntraMUSCular, PRN, Jeffy Cedillo MD    sodium chloride (NS) flush 5-10 mL, 5-10 mL, IntraVENous, Q8H, Sil Sargent MD, 10 mL at 10/26/18 0511    sodium chloride (NS) flush 5-10 mL, 5-10 mL, IntraVENous, PRN, Jordy Sargent MD    heparin (porcine) injection 5,000 Units, 5,000 Units, SubCUTAneous, Q12H, Franklin Sethi MD, 5,000 Units at 10/26/18 0815    SOCIAL HISTORY:   Social History     Socioeconomic History    Marital status: SINGLE     Spouse name: Not on file    Number of children: Not on file    Years of education: Not on file    Highest education level: Not on file   Social Needs    Financial resource strain: Not on file    Food insecurity - worry: Not on file    Food insecurity - inability: Not on file   [employer] needs - medical: Not on file   [employer] needs - non-medical: Not on file   Occupational History    Not on file   Tobacco Use    Smoking status: Never Smoker    Smokeless tobacco: Never Used   Substance and Sexual Activity    Alcohol use: Yes     Comment: occasionally    Drug use: No    Sexual activity: Not Currently   Other Topics Concern    Not on file   Social History Narrative    Not on file       FAMILY HISTORY:  Family History   Problem Relation Age of Onset    Cancer Father         prostate and colon    Diabetes Mother     Diabetes Maternal Grandmother     Cancer Maternal Aunt         breast       REVIEW OF SYSTEMS: Complete ROS assessed and noted for that which is described above, all else are negative. Eyes: normal  ENT: normal  CVS: normal  Resp: normal  GI: normal  : normal  GYN: normal  Endocrine: normal  Integument: normal  Musculoskeletal: normal  Neuro: normal  Psych: normal      PHYSICAL EXAMINATION:    VITAL SIGNS:  Visit Vitals  /63 (BP 1 Location: Right arm, BP Patient Position: At rest)   Pulse 81   Temp 97.9 °F (36.6 °C)   Resp 19   Ht 5' 1\" (1.549 m)   Wt 92 lb 6 oz (41.9 kg)   SpO2 99%   Breastfeeding?  No   BMI 17.45 kg/m²       GENERAL: NCAT, laying comfortably, NAD  EYES: EOMI, non-icteric, no proptosis  Ear/Nose/Throat: NCAT, no inflammation, no masses  LYMPH NODES: No LAD  CARDIOVASCULAR: S1 S2, RRR, No murmur, 2+ radial pulses  RESPIRATORY: CTA b/l, no wheeze/rales  GASTROINTESTINAL: soft, NT, ND,  MUSCULOSKELETAL: Normal ROM, no atrophy  SKIN: warm, no edema/rash/ or other skin changes  NEUROLOGIC: 5/5 power all extremities, no tremors, AAOx3  PSYCHIATRIC: Normal affect, Normal insight and judgement      REVIEW OF LABORATORY AND RADIOLOGY DATA:   Labs and documentation have been reviewed as described above. ASSESSMENT AND PLAN:   Tim Holcomb is a 52 y.o. female with a PMHx as noted below who was admitted to the hospital on 10/23/2018  4:04 PM with a diagnosis of Hyperglycemia. Endocrinology was consulted for the evaluation of poorly controlled diabetes. Diagnoses:  Poorly controlled diabetes, setting of infection      PLAN SUMMARY    I spoke with patient and she is willing to go back to basal-bolus insulin at home. I HAVE ALREADY SENT THE PRESCRIPTIONS TO THE PHARMACY, THEY CAN JUST BE CONTINUED AT DISCHARGE WHEN COMPLETING THE USUAL MED-REC PROCEDURES. Please copy the instructions below to the patients discharge plan for her records:     ([employer] may change depending on insurance preference)  Basaglar: 30 units once daily  Humalo units TID with meals  Correction Scale:  1 unit for every 25 above 150    IF GLUCOSE IS:                 THEN TAKE:      0   Extra Unit  151-175   1   Extra Unit  176-200   2   Extra Units  201-225   3   Extra Units  226-250   4   Extra Units  251-275   5   Extra Units  276-300   6   Extra Units  301-325   7   Extra Units    Update me in 1 week with home blood sugars following discharge, sooner if sugars become poorly controlled. Suspect recurring infection if sugars rise quickly again, along with insulin dose requirement. Nicky Henderson.  [address] Endocrinology  [address] [place] [address]

## 2018-10-26 NOTE — PROGRESS NOTES
Name: Mookie Shaw   MRN: [id number]  : 1965      Assessment:  NEERAJ on CKD-3: NEERAJ have resolved. Appears to be related to uncontrolled DM with severe hyperglycemia causing osmotic diuresis/dehydration  CKD-3: due to DM  DM-2, uncontrolled with hyperosmolar state- no acidosis on initial BMP. AG also OK at 15. B-OH butyrate not send. No overt evidence of DKA  Hyponatremia- pseudohyponatremia from severe hyperglycemia  Hyperkalemia- resolved  Recent UTI- UA with pyuria; ?incomplete Rx. UCx initially with E. Coli and then rpt one with MGF. Plan/Recommendations:  D/C IVF  [id number] Glenna Sterling for d/c today or tomm from renal standpoint  Needs better control of DM as outpt. Would benefit from Endocrinology eval as outpt  Ct IV Ceftriaxone    Subjective:  Feels good. No acute c/o    ROS:   As above    Exam:  Visit Vitals  /68   Pulse 71   Temp 97.9 °F (36.6 °C)   Resp 18   Ht 5' 1\" (1.549 m)   Wt 41.9 kg (92 lb 6 oz)   SpO2 100%   Breastfeeding?  No   BMI 17.45 kg/m²       NAD  No edema  AOx3    Current Facility-Administered Medications   Medication Dose Route Frequency Last Dose    acetaminophen (TYLENOL) tablet 650 mg  650 mg Oral Q6H  mg at 10/26/18 0746    magnesium oxide (MAG-OX) tablet 400 mg  400 mg Oral  mg at 10/26/18 0816    insulin lispro (HUMALOG) injection 5 Units  5 Units SubCUTAneous TIDAC 5 Units at 10/26/18 1100    ondansetron (ZOFRAN) injection 4 mg  4 mg IntraVENous Q6H PRN 4 mg at 10/25/18 2020    mupirocin (BACTROBAN) 2 % ointment   Both Nostrils Q12H      insulin glargine (LANTUS) injection 40 Units  40 Units SubCUTAneous DAILY 40 Units at 10/26/18 0813    insulin lispro (HUMALOG) injection 1-4 Units  1-4 Units SubCUTAneous AC&HS 3 Units at 10/26/18 1100    0.9% sodium chloride infusion  50 mL/hr IntraVENous CONTINUOUS 50 mL/hr at 10/26/18 2206    cefTRIAXone (ROCEPHIN) 1 g in 0.9% sodium chloride (MBP/ADV) 50 mL  1 g IntraVENous Q24H 1 g at 10/25/18 2042    dextrose (D50W) injection syrg 12.5-25 g  25-50 mL IntraVENous PRN 12.5 g at 10/24/18 0410    glucose chewable tablet 16 g  4 Tab Oral PRN      glucagon (GLUCAGEN) injection 1 mg  1 mg IntraMUSCular PRN      sodium chloride (NS) flush 5-10 mL  5-10 mL IntraVENous Q8H 10 mL at 10/26/18 0511    sodium chloride (NS) flush 5-10 mL  5-10 mL IntraVENous PRN      heparin (porcine) injection 5,000 Units  5,000 Units SubCUTAneous Q12H 5,000 Units at 10/26/18 0815       Labs/Data:    Lab Results   Component Value Date/Time    WBC 8.4 10/26/2018 05:10 AM    HGB 9.3 (L) 10/26/2018 05:10 AM    Hematocrit (POC) 31 (L) 08/24/2018 01:15 PM    HCT 28.9 (L) 10/26/2018 05:10 AM    PLATELET 717 48/78/7276 05:10 AM    MCV 90.0 10/26/2018 05:10 AM       Lab Results   Component Value Date/Time    Sodium 141 10/26/2018 05:10 AM    Potassium 4.0 10/26/2018 05:10 AM    Chloride 108 10/26/2018 05:10 AM    CO2 27 10/26/2018 05:10 AM    Anion gap 6 10/26/2018 05:10 AM    Glucose 96 10/26/2018 05:10 AM    BUN 24 (H) 10/26/2018 05:10 AM    Creatinine 1.16 (H) 10/26/2018 05:10 AM    BUN/Creatinine ratio 21 (H) 10/26/2018 05:10 AM    GFR est AA 59 (L) 10/26/2018 05:10 AM    GFR est non-AA 49 (L) 10/26/2018 05:10 AM    Calcium 8.6 10/26/2018 05:10 AM       Wt Readings from Last 3 Encounters:   10/23/18 41.9 kg (92 lb 6 oz)   10/23/18 42 kg (92 lb 9.6 oz)   10/22/18 43.2 kg (95 lb 3.2 oz)         Intake/Output Summary (Last 24 hours) at 10/26/2018 1235  Last data filed at 10/25/2018 2359  Gross per 24 hour   Intake 863.33 ml   Output 975 ml   Net -111.67 ml       Patient seen and examined. Chart reviewed. Labs, data and other pertinent notes reviewed in last 24 hrs.     PMH/SH/FH reviewed and unchanged compared to H&P    Discussed with pt      Kary Arellano MD

## 2018-10-26 NOTE — ROUTINE PROCESS
Med Tele CM completed chart review. Noted that anticipated DC dated is 10/27/18. CM made DM follow up appt with Dr. Tamela Mathews and placed on AVS.      CM would recommend a BS H follow up appointment withj BS HH and see if Pt is agreeable to [facility] visit. Family will be able to transport her home in car. CM Specialist is working on setting up new PCP appt for her. 11:07 AM   Pt declined offer for [facility] visit. Pt is in the process of moving and did not think that would be a good idea. 3:40 PM   CM noted consult for [place] and assist with Humalog. CM offered HH and Pt declined [place] d/t moving. During rounds IDT discuss cost of medications with Pt and I do not have any coupons for DM meds. DM Treatment team was consulted to trouble shoot meds and long term plan. CM will continue to monitor discharge plan. Prudencio Evans CM   Ext [phone]      Care Management Interventions  PCP Verified by CM: Yes  Palliative Care Criteria Met (RRAT>21 & CHF Dx)?: No  Mode of Transport at Discharge:  Other (see comment)  Transition of Care Consult (CM Consult): Discharge Planning  MyChart Signup: No  Discharge Durable Medical Equipment: No  Physical Therapy Consult: No  Occupational Therapy Consult: No  Speech Therapy Consult: No  Current Support Network: Lives Alone, Family Lives Nearby  Confirm Follow Up Transport: Family  Plan discussed with Pt/Family/Caregiver: Yes  Freedom of Choice Offered: Yes  Discharge Location  Discharge Placement: Home with family assistance

## 2018-10-26 NOTE — PROGRESS NOTES
Hospitalist Progress Note    NAME: Rose Gallardo   :  1965   MRN:  [id number]       Assessment / Plan:  Hyperosmolar hyperglycemic state  Dehydration Acute on CKD 3  Transient hyperK and hypona, resolved  Uncontrolled DM  Suspected partially treated UTI, most recent cx with pan sensitive e.coli  Recurrent UTIs  -transitioned off insulin drip to lantus and humalog on 10/24  -appreciate DM management team assistance  -follow and adjust  -advanced diet, improving N/V, minimal LFT elevation and normal lipase  -a1c 10.7  -CT abd/pelvis- negative  -creat improved with hydration, even better this am, creat 1.16 dc IVFs  -appreciate renal eval  -op urology f/u scheduled on Monday  -check PVRs, suspect may have neurogenic bladder, had 163 today before void, but zero after, continue to follow, no retention overnight  -ua with mixed cx c/w contamination, but she refused in/out cath yesterday to reeval  - suspect recent UTI only partially treated, last dose abx on Tuesday  -will treat empirically with ceftriaxone for now, given suprapubic tenderness and leukocytosis 16K  -leukocytosis resolved today to 8K, no fever, much less suprapubic pain  -cont IV abx, bc has not tolerated po in the past.  -BG still high, cont scheduled humalog at meals and await further recs from DM management, was on 70/30 at home. She can't afford humalog. I left a message for  her endocrinologist to discuss med changes and possible consult before dc.    chronic anemia- suspect chronic dz, no overt bleeding    hypomag- 1.2 supp IV, now normal, recheck in am     Code Status: full  Surrogate Decision Maker:     DVT Prophylaxis: heparin sq  GI Prophylaxis: not indicated     Baseline: lives at home alone      less than 18.5 Underweight / Body mass index is 17.45 kg/m².     Recommended Disposition: Home w/Family consider home health, possible dc in am, pt doesn't feel quite ready today and has a late dose of abx IV tonight     Subjective: Chief Complaint / Reason for Physician Visit  Elevated sugars, fever and chills    10/24 Patient reports she is feeling better. She still has some lower abdominal pain but denies any urinary symptoms. She has had multiple urinary tract infections recently but never has symptoms until she gets fever chills and nausea. She reports that these are the same symptoms that brought her at this time. Her nausea has resolved and she is eating currently her dinner. She has been transitioned off her insulin drip and transferred out of the intensive care unit. She reports she has an appointment scheduled with urology next Monday. She was given a course of 10 days of antibiotics by her primary care physician on Monday but developed nausea vomiting was unable to complete the full course. 10/25 pt c/os of continued N, vomited after dinner last night, none today. Still some lower abd pain. No sob/cp. Ate breakfast ok this am.    10/26 pt feels much better, decreased lower abd pain, no further n/v. Eating ok. Doesn't want to go home today. PVR normal. Says she can't afford humalog and was changed to 70/30 by dr Elisa Lynch. Patient was evaluated at 12:45pm    Discussed with RN events overnight. Review of Systems:  Symptom Y/N Comments  Symptom Y/N Comments   Fever/Chills    Chest Pain n    Poor Appetite    Edema     Cough n   Abdominal Pain y better   Sputum    Joint Pain     SOB/GARLAND n   Pruritis/Rash     Nausea/vomit y better  Tolerating PT/OT     Diarrhea n   Tolerating Diet y    Constipation n   Other       Could NOT obtain due to:      Objective:     VITALS:   Last 24hrs VS reviewed since prior progress note.  Most recent are:  Patient Vitals for the past 24 hrs:   Temp Pulse Resp BP SpO2   10/26/18 1116 97.9 °F (36.6 °C) 71 18 113/68 100 %   10/26/18 0747 98 °F (36.7 °C) 79 12 111/61 99 %   10/25/18 2356 97.6 °F (36.4 °C) 90 16 103/55 96 %   10/25/18 1638 97.8 °F (36.6 °C) 94 18 110/69 98 % Intake/Output Summary (Last 24 hours) at 10/26/2018 1446  Last data filed at 10/25/2018 2359  Gross per 24 hour   Intake 863.33 ml   Output 975 ml   Net -111.67 ml        PHYSICAL EXAM:  Patient is awake and alert nontoxic, looks much more comfortable today, but chronically ill appearing oriented x3 on room air Conjunctiva are pink oropharynx mucous membranes are moist.  Cardiovascular regular rate no murmurs rubs or gallops. Lungs are clear without wheezes rhonchi or crackles. Abdomen bowel sounds present soft minimal suprapubic tenderness today. no CVA tenderness to palpation. Extremities no clubbing cyanosis or edema. Reviewed most current lab test results and cultures  YES  Reviewed most current radiology test results   YES  Review and summation of old records today    NO  Reviewed patient's current orders and MAR    YES  PMH/ reviewed - no change compared to H&P  ________________________________________________________________________  Care Plan discussed with:    Comments   Patient y    Family      RN y    Care Manager     Consultant  y Left message endo                     Multidiciplinary team rounds were held today with , nursing, pharmacist and clinical coordinator. Patient's plan of care was discussed; medications were reviewed and discharge planning was addressed. ________________________________________________________________________  Total NON critical care TIME:     Minutes    Total CRITICAL CARE TIME Spent:   Minutes non procedure based      Comments   >50% of visit spent in counseling and coordination of care     ________________________________________________________________________  Zara Costa MD     Procedures: see electronic medical records for all procedures/Xrays and details which were not copied into this note but were reviewed prior to creation of Plan. LABS:  I reviewed today's most current labs and imaging studies.   Pertinent labs include:  Recent Labs     10/26/18  0510 10/24/18  2233 10/24/18  0353   WBC 8.4 12.7* 16.3*   HGB 9.3* 9.1* 9.7*   HCT 28.9* 27.7* 29.0*    267 340     Recent Labs     10/26/18  0510 10/24/18  2233 10/24/18  0353  10/23/18  1907 10/23/18  1709    138 140   < > 130* 123*   K 4.0 4.0 3.5   < > 5.2* 5.2*    102 104   < > 91* 84*   CO2 27 27 30   < > 27 25   GLU 96 152* 61*   < > 953* 1,181*   BUN 24* 25* 35*   < > 38* 42*   CREA 1.16* 1.55* 1.56*   < > 2.05* 2.29*   CA 8.6 8.4* 9.8   < > 9.3 10.6*   MG 2.4 1.2* 1.8   < > 2.0  --    PHOS  --   --   --   --  3.4  --    ALB 2.6*  --   --   --   --  4.0   TBILI 0.8  --   --   --   --  0.3   SGOT 73*  --   --   --   --  31   ALT 56  --   --   --   --  43    < > = values in this interval not displayed.        Signed: Luke Purcell MD

## 2018-10-26 NOTE — DIABETES MGMT
DTC note:  Second consult received. Pt was seen for consult yesterday. Please refer to note from Ishaan Moise, RN, CDE. Called by nurse concerning pt being unable to afford long acting insulin. Possible that prescription written was not on formulary for pt's insurance. Please allow for formulary substitution when writing new prescription.     Thank you,  MIGUEL Pitts, RN, [address]

## 2018-10-27 LAB
ANION GAP SERPL CALC-SCNC: 7 MMOL/L (ref 5–15)
BASOPHILS # BLD: 0 K/UL (ref 0–0.1)
BASOPHILS NFR BLD: 0 % (ref 0–1)
BUN SERPL-MCNC: 29 MG/DL (ref 6–20)
BUN/CREAT SERPL: 23 (ref 12–20)
CALCIUM SERPL-MCNC: 8.9 MG/DL (ref 8.5–10.1)
CHLORIDE SERPL-SCNC: 102 MMOL/L (ref 97–108)
CO2 SERPL-SCNC: 28 MMOL/L (ref 21–32)
CREAT SERPL-MCNC: 1.24 MG/DL (ref 0.55–1.02)
DIFFERENTIAL METHOD BLD: ABNORMAL
EOSINOPHIL # BLD: 0.3 K/UL (ref 0–0.4)
EOSINOPHIL NFR BLD: 3 % (ref 0–7)
ERYTHROCYTE [DISTWIDTH] IN BLOOD BY AUTOMATED COUNT: 16.4 % (ref 11.5–14.5)
GLUCOSE BLD STRIP.AUTO-MCNC: 109 MG/DL (ref 65–100)
GLUCOSE BLD STRIP.AUTO-MCNC: 174 MG/DL (ref 65–100)
GLUCOSE BLD STRIP.AUTO-MCNC: 183 MG/DL (ref 65–100)
GLUCOSE BLD STRIP.AUTO-MCNC: 222 MG/DL (ref 65–100)
GLUCOSE BLD STRIP.AUTO-MCNC: 226 MG/DL (ref 65–100)
GLUCOSE SERPL-MCNC: 62 MG/DL (ref 65–100)
HCT VFR BLD AUTO: 29 % (ref 35–47)
HGB BLD-MCNC: 9.2 G/DL (ref 11.5–16)
IMM GRANULOCYTES # BLD: 0.2 K/UL (ref 0–0.04)
IMM GRANULOCYTES NFR BLD AUTO: 2 % (ref 0–0.5)
LYMPHOCYTES # BLD: 1.5 K/UL (ref 0.8–3.5)
LYMPHOCYTES NFR BLD: 15 % (ref 12–49)
MAGNESIUM SERPL-MCNC: 1.6 MG/DL (ref 1.6–2.4)
MCH RBC QN AUTO: 28.8 PG (ref 26–34)
MCHC RBC AUTO-ENTMCNC: 31.7 G/DL (ref 30–36.5)
MCV RBC AUTO: 90.9 FL (ref 80–99)
MONOCYTES # BLD: 0.6 K/UL (ref 0–1)
MONOCYTES NFR BLD: 6 % (ref 5–13)
NEUTS SEG # BLD: 6.9 K/UL (ref 1.8–8)
NEUTS SEG NFR BLD: 73 % (ref 32–75)
NRBC # BLD: 0 K/UL (ref 0–0.01)
NRBC BLD-RTO: 0 PER 100 WBC
PLATELET # BLD AUTO: 250 K/UL (ref 150–400)
PMV BLD AUTO: 11.5 FL (ref 8.9–12.9)
POTASSIUM SERPL-SCNC: 4.2 MMOL/L (ref 3.5–5.1)
RBC # BLD AUTO: 3.19 M/UL (ref 3.8–5.2)
SERVICE CMNT-IMP: ABNORMAL
SODIUM SERPL-SCNC: 137 MMOL/L (ref 136–145)
WBC # BLD AUTO: 9.5 K/UL (ref 3.6–11)

## 2018-10-27 PROCEDURE — 85025 COMPLETE CBC W/AUTO DIFF WBC: CPT

## 2018-10-27 PROCEDURE — 83735 ASSAY OF MAGNESIUM: CPT

## 2018-10-27 PROCEDURE — 82962 GLUCOSE BLOOD TEST: CPT

## 2018-10-27 PROCEDURE — 74011636637 HC RX REV CODE- 636/637: Performed by: INTERNAL MEDICINE

## 2018-10-27 PROCEDURE — 74011250636 HC RX REV CODE- 250/636: Performed by: INTERNAL MEDICINE

## 2018-10-27 PROCEDURE — 80048 BASIC METABOLIC PNL TOTAL CA: CPT

## 2018-10-27 PROCEDURE — 74011250637 HC RX REV CODE- 250/637: Performed by: EMERGENCY MEDICINE

## 2018-10-27 PROCEDURE — 36415 COLL VENOUS BLD VENIPUNCTURE: CPT

## 2018-10-27 PROCEDURE — 74011250636 HC RX REV CODE- 250/636: Performed by: EMERGENCY MEDICINE

## 2018-10-27 PROCEDURE — 77030034696 HC CATH URETH FOL 2W BARD -A

## 2018-10-27 PROCEDURE — 65270000029 HC RM PRIVATE

## 2018-10-27 PROCEDURE — 74011636637 HC RX REV CODE- 636/637: Performed by: EMERGENCY MEDICINE

## 2018-10-27 RX ORDER — LORAZEPAM 2 MG/ML
0.5 INJECTION INTRAMUSCULAR ONCE
Status: COMPLETED | OUTPATIENT
Start: 2018-10-27 | End: 2018-10-27

## 2018-10-27 RX ORDER — MAGNESIUM SULFATE HEPTAHYDRATE 40 MG/ML
2 INJECTION, SOLUTION INTRAVENOUS ONCE
Status: COMPLETED | OUTPATIENT
Start: 2018-10-27 | End: 2018-10-27

## 2018-10-27 RX ORDER — METOCLOPRAMIDE HYDROCHLORIDE 5 MG/5ML
5 SOLUTION ORAL
Status: DISCONTINUED | OUTPATIENT
Start: 2018-10-27 | End: 2018-10-28 | Stop reason: HOSPADM

## 2018-10-27 RX ADMIN — MAGNESIUM SULFATE HEPTAHYDRATE 2 G: 40 INJECTION, SOLUTION INTRAVENOUS at 10:53

## 2018-10-27 RX ADMIN — LORAZEPAM 0.5 MG: 2 INJECTION INTRAMUSCULAR; INTRAVENOUS at 12:21

## 2018-10-27 RX ADMIN — Medication 10 ML: at 06:00

## 2018-10-27 RX ADMIN — Medication 400 MG: at 17:04

## 2018-10-27 RX ADMIN — Medication 400 MG: at 08:23

## 2018-10-27 RX ADMIN — INSULIN GLARGINE 40 UNITS: 100 INJECTION, SOLUTION SUBCUTANEOUS at 08:22

## 2018-10-27 RX ADMIN — METOCLOPRAMIDE HYDROCHLORIDE 5 MG: 5 SOLUTION ORAL at 16:42

## 2018-10-27 RX ADMIN — INSULIN LISPRO 5 UNITS: 100 INJECTION, SOLUTION INTRAVENOUS; SUBCUTANEOUS at 17:05

## 2018-10-27 RX ADMIN — INSULIN LISPRO 5 UNITS: 100 INJECTION, SOLUTION INTRAVENOUS; SUBCUTANEOUS at 12:02

## 2018-10-27 RX ADMIN — ONDANSETRON 4 MG: 2 INJECTION, SOLUTION INTRAMUSCULAR; INTRAVENOUS at 07:35

## 2018-10-27 RX ADMIN — Medication 10 ML: at 21:03

## 2018-10-27 RX ADMIN — INSULIN LISPRO 5 UNITS: 100 INJECTION, SOLUTION INTRAVENOUS; SUBCUTANEOUS at 08:22

## 2018-10-27 RX ADMIN — INSULIN LISPRO 2 UNITS: 100 INJECTION, SOLUTION INTRAVENOUS; SUBCUTANEOUS at 12:02

## 2018-10-27 RX ADMIN — INSULIN LISPRO 2 UNITS: 100 INJECTION, SOLUTION INTRAVENOUS; SUBCUTANEOUS at 08:21

## 2018-10-27 NOTE — PROGRESS NOTES
Attempted to straight cath patient for urine culture sample. 0.5 mg Ativan IV given to patient prior to attempt. When cath came to her, she screamed, clenched her legs and turned over in bed. I was unable to get any sample. Dr. Kassie Pickens notified.

## 2018-10-27 NOTE — PROGRESS NOTES
Hospitalist Progress Note    NAME: Savannah Humphrey   :  1965   MRN:  [id number]       Assessment / Plan:  Hyperosmolar hyperglycemic state  Dehydration Acute on CKD 3  Transient hyperK and hypona, resolved  Uncontrolled DM  Suspected partially treated UTI, most recent cx with pan sensitive e.coli  Recurrent UTIs  Recurrent N, ? Component of gastroparesis  -transitioned off insulin drip to lantus and humalog on 10/24  -appreciate DM management team assistance  -follow and adjust  -advanced diet, improving N/V, minimal LFT elevation and normal lipase  -a1c 10.7  -CT abd/pelvis- negative  -creat improved with hydration  -appreciate renal eval  -op urology f/u scheduled on Monday  -normal PVRs, suspect may have neurogenic bladder  -ua with mixed cx c/w contamination, but she refused in/out cath  to reeval  - suspect recent UTI only partially treated, last dose abx on Tuesday before admission  -will treat empirically with ceftriaxone for now, given suprapubic tenderness and leukocytosis 16K  -leukocytosis resolved with abx, no fever, much less suprapubic pain  -completed IV abx last night, bc has not tolerated po in the past.  -BG better, cont scheduled humalog at meals and appreciate endo eval and recs. He wants to cont basal bolus insulin rather than 70/30 and sent rx to pharm in anticipation of dc  -pt does not want dc today, had agreed to in/out cath with ativan (to r/o ongoing infection), but ultimately did not tolerate it.   -s/p 3 days IV ceftriaxone, will not cont abx  -needs urology eval for consideration of prophylactic abx, urodynamic testing or other diagnostics to explain her recurrent UTIs  -reglan available if need for N.   Will monitor over night and hopefully dc in am    chronic anemia- suspect chronic dz, no overt bleeding    hypomag- 1.2 supp IV, now normal, cont oral mag supp     Code Status: full  Surrogate Decision Maker:     DVT Prophylaxis: heparin sq  GI Prophylaxis: not indicated     Baseline: lives at home alone      less than 18.5 Underweight / Body mass index is 17.45 kg/m². Recommended Disposition: Home w/Family consider home health, possible dc in am, pt doesn't feel ready today      Subjective:     Chief Complaint / Reason for Physician Visit  Elevated sugars, fever and chills    10/24 Patient reports she is feeling better. She still has some lower abdominal pain but denies any urinary symptoms. She has had multiple urinary tract infections recently but never has symptoms until she gets fever chills and nausea. She reports that these are the same symptoms that brought her at this time. Her nausea has resolved and she is eating currently her dinner. She has been transitioned off her insulin drip and transferred out of the intensive care unit. She reports she has an appointment scheduled with urology next Monday. She was given a course of 10 days of antibiotics by her primary care physician on Monday but developed nausea vomiting was unable to complete the full course. 10/25 pt c/os of continued N, vomited after dinner last night, none today. Still some lower abd pain. No sob/cp. Ate breakfast ok this am.    10/26 pt feels much better, decreased lower abd pain, no further n/v. Eating ok. Doesn't want to go home today. PVR normal. Says she can't afford humalog and was changed to 70/30 by dr Natalia Christian. 10/27 pt reports N/v again last night with abx dose iv. She is very upset this am.  Says she has the same abd pain and N and dose not want to go home. She says the abx are too strong and make her sick. She ate all her breakfast despite the N w/o vomiting. She does admit that her abd pain has improved since admission. PVRs all normal. We discussed need for clean urine to assure no further infection contributing to her abd pain. She refused again but then agreed to try once her sister arrives, but only if given something to sedate her.      Patient was evaluated at 9:45am    Discussed with RN events overnight. Review of Systems:  Symptom Y/N Comments  Symptom Y/N Comments   Fever/Chills    Chest Pain n    Poor Appetite    Edema     Cough n   Abdominal Pain y    Sputum    Joint Pain     SOB/GARLAND n   Pruritis/Rash     Nausea/vomit y   Tolerating PT/OT     Diarrhea n   Tolerating Diet y    Constipation n   Other       Could NOT obtain due to:      Objective:     VITALS:   Last 24hrs VS reviewed since prior progress note. Most recent are:  Patient Vitals for the past 24 hrs:   Temp Pulse Resp BP SpO2   10/27/18 1511 98.2 °F (36.8 °C) 90 17 97/59 99 %   10/27/18 0802 -- -- -- 106/47 --   10/27/18 0801 98.3 °F (36.8 °C) 92 18 94/61 100 %   10/26/18 2316 98.4 °F (36.9 °C) 82 18 101/61 94 %   10/26/18 1918 98.7 °F (37.1 °C) 94 19 110/67 97 %   10/26/18 1640 97.9 °F (36.6 °C) 81 19 115/63 99 %       Intake/Output Summary (Last 24 hours) at 10/27/2018 1602  Last data filed at 10/27/2018 0954  Gross per 24 hour   Intake 360 ml   Output 950 ml   Net -590 ml        PHYSICAL EXAM:  Patient is awake and alert nontoxic, ambulating in the room on her own, just voided. chronically ill appearing oriented x3 on room air Conjunctiva are pink oropharynx mucous membranes are moist.  Cardiovascular regular rate no murmurs rubs or gallops. Lungs are clear without wheezes rhonchi or crackles. Abdomen bowel sounds present soft minimal suprapubic tenderness again today. no CVA tenderness to palpation. Extremities no clubbing cyanosis or edema.       Reviewed most current lab test results and cultures  YES  Reviewed most current radiology test results   YES  Review and summation of old records today    NO  Reviewed patient's current orders and MAR    YES  PMH/SH reviewed - no change compared to H&P  ________________________________________________________________________  Care Plan discussed with:    Comments   Patient y    Family  y sister   RN y    Care Manager     Consultant Multidiciplinary team rounds were held today with , nursing, pharmacist and clinical coordinator. Patient's plan of care was discussed; medications were reviewed and discharge planning was addressed. ________________________________________________________________________  Total NON critical care TIME:     Minutes    Total CRITICAL CARE TIME Spent:   Minutes non procedure based      Comments   >50% of visit spent in counseling and coordination of care     ________________________________________________________________________  Marcy Vides MD     Procedures: see electronic medical records for all procedures/Xrays and details which were not copied into this note but were reviewed prior to creation of Plan. LABS:  I reviewed today's most current labs and imaging studies.   Pertinent labs include:  Recent Labs     10/27/18  0212 10/26/18  0510 10/24/18  2233   WBC 9.5 8.4 12.7*   HGB 9.2* 9.3* 9.1*   HCT 29.0* 28.9* 27.7*    239 267     Recent Labs     10/27/18  0212 10/26/18  0510 10/24/18  2233    141 138   K 4.2 4.0 4.0    108 102   CO2 28 27 27   GLU 62* 96 152*   BUN 29* 24* 25*   CREA 1.24* 1.16* 1.55*   CA 8.9 8.6 8.4*   MG 1.6 2.4 1.2*   ALB  --  2.6*  --    TBILI  --  0.8  --    SGOT  --  73*  --    ALT  --  56  --        Signed: Marcy Vides MD

## 2018-10-27 NOTE — PROGRESS NOTES
Name: Niru Burton   MRN: [id number]  : 1965      Assessment:  NEERAJ on CKD-3: Cr stable now 1.1-1.2mg/dl/. Appears to be related to uncontrolled DM with severe hyperglycemia causing osmotic diuresis/dehydration  CKD-3: due to DM  DM-2, uncontrolled with hyperosmolar state- no acidosis on initial BMP. AG also OK at 15. B-OH butyrate not send. No overt evidence of DKA  Hyponatremia- pseudohyponatremia from severe hyperglycemia  Hyperkalemia- resolved  Recent UTI- UA with pyuria; ?incomplete Rx. UCx initially with E. Coli and then rpt one with MGF. Hypomagnesemia      Plan/Recommendations:  Ok for d/c today from renal standpoint  IV Mag sulfate 2gm x1 dose  Needs better control of DM as outpt. Would benefit from ongoing Endocrinology eval as outpt  Outpt Urology evaluation   IV Ceftriaxone-> oral abx per primary team    Subjective:  Reports some N/V overnight. \" I think this Antibiotic is too strong for me\". ROS:   As above    Exam:  Visit Vitals  /47 (BP 1 Location: Right arm, BP Patient Position: At rest)   Pulse 92   Temp 98.3 °F (36.8 °C)   Resp 18   Ht 5' 1\" (1.549 m)   Wt 41.9 kg (92 lb 6 oz)   SpO2 100%   Breastfeeding?  No   BMI 17.45 kg/m²       NAD  No edema  AOx3    Current Facility-Administered Medications   Medication Dose Route Frequency Last Dose    acetaminophen (TYLENOL) tablet 650 mg  650 mg Oral Q6H  mg at 10/26/18 2238    magnesium oxide (MAG-OX) tablet 400 mg  400 mg Oral  mg at 10/27/18 0823    insulin lispro (HUMALOG) injection 5 Units  5 Units SubCUTAneous TIDAC 5 Units at 10/27/18 0822    ondansetron (ZOFRAN) injection 4 mg  4 mg IntraVENous Q6H PRN 4 mg at 10/27/18 0735    mupirocin (BACTROBAN) 2 % ointment   Both Nostrils Q12H      insulin glargine (LANTUS) injection 40 Units  40 Units SubCUTAneous DAILY 40 Units at 10/27/18 0822    insulin lispro (HUMALOG) injection 1-4 Units  1-4 Units SubCUTAneous AC&HS 2 Units at 10/27/18 0821    dextrose (D50W) injection syrg 12.5-25 g  25-50 mL IntraVENous PRN 12.5 g at 10/24/18 0410    glucose chewable tablet 16 g  4 Tab Oral PRN      glucagon (GLUCAGEN) injection 1 mg  1 mg IntraMUSCular PRN      sodium chloride (NS) flush 5-10 mL  5-10 mL IntraVENous Q8H 10 mL at 10/27/18 0600    sodium chloride (NS) flush 5-10 mL  5-10 mL IntraVENous PRN      heparin (porcine) injection 5,000 Units  5,000 Units SubCUTAneous Q12H 5,000 Units at 10/26/18 0815       Labs/Data:    Lab Results   Component Value Date/Time    WBC 9.5 10/27/2018 02:12 AM    HGB 9.2 (L) 10/27/2018 02:12 AM    Hematocrit (POC) 31 (L) 08/24/2018 01:15 PM    HCT 29.0 (L) 10/27/2018 02:12 AM    PLATELET 983 48/62/8017 02:12 AM    MCV 90.9 10/27/2018 02:12 AM       Lab Results   Component Value Date/Time    Sodium 137 10/27/2018 02:12 AM    Potassium 4.2 10/27/2018 02:12 AM    Chloride 102 10/27/2018 02:12 AM    CO2 28 10/27/2018 02:12 AM    Anion gap 7 10/27/2018 02:12 AM    Glucose 62 (L) 10/27/2018 02:12 AM    BUN 29 (H) 10/27/2018 02:12 AM    Creatinine 1.24 (H) 10/27/2018 02:12 AM    BUN/Creatinine ratio 23 (H) 10/27/2018 02:12 AM    GFR est AA 54 (L) 10/27/2018 02:12 AM    GFR est non-AA 45 (L) 10/27/2018 02:12 AM    Calcium 8.9 10/27/2018 02:12 AM       Wt Readings from Last 3 Encounters:   10/23/18 41.9 kg (92 lb 6 oz)   10/23/18 42 kg (92 lb 9.6 oz)   10/22/18 43.2 kg (95 lb 3.2 oz)         Intake/Output Summary (Last 24 hours) at 10/27/2018 0953  Last data filed at 10/26/2018 2030  Gross per 24 hour   Intake --   Output 950 ml   Net -950 ml       Patient seen and examined. Chart reviewed. Labs, data and other pertinent notes reviewed in last 24 hrs.     PMH/SH/FH reviewed and unchanged compared to H&P    Discussed with pt and RN      Edilia Duarte MD

## 2018-10-27 NOTE — PROGRESS NOTES
Bedside shift change report given to Karen (oncoming nurse) by Neena Glez RN   (offgoing nurse). Report included the following information SBAR, Kardex and Recent Results.

## 2018-10-28 VITALS
OXYGEN SATURATION: 99 % | BODY MASS INDEX: 17.44 KG/M2 | DIASTOLIC BLOOD PRESSURE: 65 MMHG | TEMPERATURE: 98.1 F | RESPIRATION RATE: 18 BRPM | HEART RATE: 92 BPM | SYSTOLIC BLOOD PRESSURE: 111 MMHG | WEIGHT: 92.37 LBS | HEIGHT: 61 IN

## 2018-10-28 LAB
GLUCOSE BLD STRIP.AUTO-MCNC: 252 MG/DL (ref 65–100)
GLUCOSE BLD STRIP.AUTO-MCNC: 71 MG/DL (ref 65–100)
SERVICE CMNT-IMP: ABNORMAL
SERVICE CMNT-IMP: NORMAL

## 2018-10-28 PROCEDURE — 74011250637 HC RX REV CODE- 250/637: Performed by: EMERGENCY MEDICINE

## 2018-10-28 PROCEDURE — 74011636637 HC RX REV CODE- 636/637: Performed by: EMERGENCY MEDICINE

## 2018-10-28 PROCEDURE — 82962 GLUCOSE BLOOD TEST: CPT

## 2018-10-28 PROCEDURE — 74011636637 HC RX REV CODE- 636/637: Performed by: INTERNAL MEDICINE

## 2018-10-28 RX ORDER — LANOLIN ALCOHOL/MO/W.PET/CERES
400 CREAM (GRAM) TOPICAL 2 TIMES DAILY
Qty: 28 TAB | Refills: 0 | Status: SHIPPED | OUTPATIENT
Start: 2018-10-28 | End: 2018-11-11

## 2018-10-28 RX ORDER — INSULIN GLARGINE 100 [IU]/ML
30 INJECTION, SOLUTION SUBCUTANEOUS DAILY
Qty: 10 PEN | Refills: 3 | Status: SHIPPED
Start: 2018-10-28 | End: 2018-11-01 | Stop reason: SDUPTHER

## 2018-10-28 RX ORDER — METOCLOPRAMIDE HYDROCHLORIDE 5 MG/5ML
5 SOLUTION ORAL
Qty: 120 ML | Refills: 0 | Status: SHIPPED | OUTPATIENT
Start: 2018-10-28 | End: 2019-02-19

## 2018-10-28 RX ADMIN — INSULIN LISPRO 5 UNITS: 100 INJECTION, SOLUTION INTRAVENOUS; SUBCUTANEOUS at 08:10

## 2018-10-28 RX ADMIN — INSULIN LISPRO 3 UNITS: 100 INJECTION, SOLUTION INTRAVENOUS; SUBCUTANEOUS at 08:09

## 2018-10-28 RX ADMIN — Medication 400 MG: at 08:10

## 2018-10-28 RX ADMIN — INSULIN GLARGINE 40 UNITS: 100 INJECTION, SOLUTION SUBCUTANEOUS at 08:10

## 2018-10-28 NOTE — DISCHARGE INSTRUCTIONS
HOSPITALIST DISCHARGE INSTRUCTIONS    NAME: Karo Daigle   :  1965   MRN:  [id number]     Date/Time:  10/28/2018 9:09 AM    ADMIT DATE: 10/23/2018   DISCHARGE DATE: 10/28/2018         · It is important that you take the medication exactly as they are prescribed. · Keep your medication in the bottles provided by the pharmacist and keep a list of the medication names, dosages, and times to be taken in your wallet. · Do not take other medications without consulting your doctor. What to do at Home    Recommended diet:  Diabetic Diet    Recommended activity: Activity as tolerated      If you have questions regarding the hospital related prescriptions or hospital related issues please call [facility] at [phone]. You can always direct your questions to your primary care doctor if you are unable to reach your hospital physician; your PCP works as an extension of your hospital doctor just like your hospital doctor is an extension of your PCP for your time at the hospital [facility], [facility])    If you experience any of the following symptoms then please call your primary care physician or return to the emergency room if you cannot get hold of your doctor:    Fever, chills, nausea, vomiting, or persistent diarrhea  Worsening weakness or new problems with your speech or balance  Dark stools or visible blood in your stools  New Leg swelling or shortness of breath as these could be signs of a clot    Additional Instructions:      Bring these papers with you to your follow up appointments. The papers will help your doctors be sure to continue the care plan from the hospital.      F/u with urology tomorrow as scheduled  F/u with pcp in 1-2 weeks and endo in 1-2 weeks  Check your sugar at 2 am for the next 2 nights to assure not getting too low. Call Dr Sheela Hernandez with sugars for further insulin adjustments.         Information obtained by :  I understand that if any problems occur once I am at home I am to contact my physician. I understand and acknowledge receipt of the instructions indicated above.                                                                                                                                            Physician's or R.N.'s Signature                                                                  Date/Time                                                                                                                                              Patient or Representative Signature

## 2018-10-28 NOTE — PROGRESS NOTES
Name: Karo Daigle   MRN: [id number]  : 1965      Assessment:  NEERAJ on CKD-3: Cr stable now 1.1-1.2mg/dl/. Appears to be related to uncontrolled DM with severe hyperglycemia causing osmotic diuresis/dehydration  CKD-3: due to DM  DM-2, uncontrolled with hyperosmolar state- no acidosis on initial BMP. AG also OK at 15. B-OH butyrate not sent. No overt evidence of DKA  Hyponatremia- pseudohyponatremia from severe hyperglycemia  Hyperkalemia- resolved  Recent UTI- UA with pyuria; ?incomplete Rx. UCx initially with E. Coli and then rpt one with MGF. Hypomagnesemia      Plan/Recommendations:  No new labs today. Jennifer Banegas for d/c today from renal standpoint  Oral Mag Oxide  Needs better control of DM as outpt. Would benefit from ongoing Endocrinology eval as outpt  Outpt Urology evaluation  F/u with Dr. Florinda Gonzalez for outpt CKD management in 1-2months    Subjective:  \" Im waiting for my ride\". No n/v. No complaints. ROS:   As above    Exam:  Visit Vitals  /65 Comment: after walking in hallway   Pulse 92   Temp 98.1 °F (36.7 °C)   Resp 18   Ht 5' 1\" (1.549 m)   Wt 41.9 kg (92 lb 6 oz)   SpO2 99%   Breastfeeding?  No   BMI 17.45 kg/m²       NAD  No edema  AOx3    Current Facility-Administered Medications   Medication Dose Route Frequency Last Dose    metoclopramide (REGLAN) 5 mg/5 mL oral syrup 5 mg  5 mg Oral TID PRN 5 mg at 10/27/18 1642    acetaminophen (TYLENOL) tablet 650 mg  650 mg Oral Q6H  mg at 10/26/18 2238    magnesium oxide (MAG-OX) tablet 400 mg  400 mg Oral  mg at 10/28/18 0810    insulin lispro (HUMALOG) injection 5 Units  5 Units SubCUTAneous TIDAC 5 Units at 10/28/18 0810    ondansetron (ZOFRAN) injection 4 mg  4 mg IntraVENous Q6H PRN 4 mg at 10/27/18 0735    mupirocin (BACTROBAN) 2 % ointment   Both Nostrils Q12H Stopped at 10/27/18 2100    insulin glargine (LANTUS) injection 40 Units  40 Units SubCUTAneous DAILY 40 Units at 10/28/18 0810    insulin lispro (HUMALOG) injection 1-4 Units  1-4 Units SubCUTAneous AC&HS 3 Units at 10/28/18 0809    dextrose (D50W) injection syrg 12.5-25 g  25-50 mL IntraVENous PRN 12.5 g at 10/24/18 0410    glucose chewable tablet 16 g  4 Tab Oral PRN      glucagon (GLUCAGEN) injection 1 mg  1 mg IntraMUSCular PRN      sodium chloride (NS) flush 5-10 mL  5-10 mL IntraVENous Q8H 10 mL at 10/27/18 2103    sodium chloride (NS) flush 5-10 mL  5-10 mL IntraVENous PRN      heparin (porcine) injection 5,000 Units  5,000 Units SubCUTAneous Q12H 5,000 Units at 10/26/18 0815       Labs/Data:    Lab Results   Component Value Date/Time    WBC 9.5 10/27/2018 02:12 AM    HGB 9.2 (L) 10/27/2018 02:12 AM    Hematocrit (POC) 31 (L) 08/24/2018 01:15 PM    HCT 29.0 (L) 10/27/2018 02:12 AM    PLATELET 108 90/08/8956 02:12 AM    MCV 90.9 10/27/2018 02:12 AM       Lab Results   Component Value Date/Time    Sodium 137 10/27/2018 02:12 AM    Potassium 4.2 10/27/2018 02:12 AM    Chloride 102 10/27/2018 02:12 AM    CO2 28 10/27/2018 02:12 AM    Anion gap 7 10/27/2018 02:12 AM    Glucose 62 (L) 10/27/2018 02:12 AM    BUN 29 (H) 10/27/2018 02:12 AM    Creatinine 1.24 (H) 10/27/2018 02:12 AM    BUN/Creatinine ratio 23 (H) 10/27/2018 02:12 AM    GFR est AA 54 (L) 10/27/2018 02:12 AM    GFR est non-AA 45 (L) 10/27/2018 02:12 AM    Calcium 8.9 10/27/2018 02:12 AM       Wt Readings from Last 3 Encounters:   10/23/18 41.9 kg (92 lb 6 oz)   10/23/18 42 kg (92 lb 9.6 oz)   10/22/18 43.2 kg (95 lb 3.2 oz)         Intake/Output Summary (Last 24 hours) at 10/28/2018 1116  Last data filed at 10/28/2018 0942  Gross per 24 hour   Intake 840 ml   Output --   Net 840 ml       Patient seen and examined. Chart reviewed. Labs, data and other pertinent notes reviewed in last 24 hrs.     PMH/SH/FH reviewed and unchanged compared to H&P    Discussed with pt       Chas Gamez MD

## 2018-10-28 NOTE — PROGRESS NOTES
1900--bedside report received from [facility], rn. Pt resting in bed oriented x 4, denies pain, nausea, call bell in reach assessment as noted. 0215--given pt recent hx.  Blood sugar in 60's, checking now, at 71, pt snacking on juice and crackers    0400--must recent blood pressures trending hypotensive, currently 92/54 asymptomatic.    0700--bedside report given to [address], rn who is assuming care of pt

## 2018-10-28 NOTE — PROGRESS NOTES
*CM acknowledged consult*    CM contacted pt, via telephone while she was in her room. [facility]. Pt strongly denied recommendations for services. CM informed pt that when she returns home, and she believes that services are needed, pt can contact her PCP for referral to the following services. CM has checked d/c summary and verified that appointments has been scheduled.     FREDY Culver CM  [phone]

## 2018-10-28 NOTE — DISCHARGE SUMMARY
Hospitalist Discharge Summary     Patient ID:  Eliza Martinez  [id number]  52 y.o.  1965    PCP on record: Jemima Burch MD    Admit date: 10/23/2018  Discharge date and time: 10/28/2018      DISCHARGE DIAGNOSIS:    Hyperosmolar hyperglycemic state, present on admission  Severe dehydration secondary to hyperosmolar state  Acute on chronic kidney disease stage III  Transient hyperkalemia and hyponatremia, resolved  Uncontrolled diabetes  Partially treated urinary tract infection with suprapubic tenderness and leukocytosis, now resolved  History of recurrent urinary tract infections leading to repeated hospitalizations  Questionable gastroparesis with some chronic nausea  Chronic anemia chronic disease  Hypomagnesemia  Underweight status with a BMI of 17          CONSULTATIONS:  IP CONSULT TO HOSPITALIST  IP CONSULT TO NEPHROLOGY  IP CONSULT TO ENDOCRINOLOGY    Excerpted HPI from H&P of Rosa Madrid MD:  CHIEF COMPLAINT: Elevated sugar PCP sent her to ED     HISTORY OF PRESENT ILLNESS:     Josh Machado is a 52 y.o. Yo Female PMH DM, recently treated UTI, coming to the ED because she went to see her PCP today and BS was elevated. No fevers , she mentioned feeling weak and not eating much     We were asked to admit for work up and evaluation of the above problems.            ______________________________________________________________________  DISCHARGE SUMMARY/HOSPITAL COURSE:  for full details see H&P, daily progress notes, labs, consult notes. Hyperosmolar hyperglycemic state  Dehydration Acute on CKD 3  Transient hyperK and hypona, resolved  Uncontrolled DM  Suspected partially treated UTI, most recent cx with pan sensitive e.coli  Recurrent UTIs  Recurrent N, ?  Component of gastroparesis  -transitioned off insulin drip to lantus and humalog on 10/24  -appreciate DM management team assistance  -follow and adjust  -advanced diet, improving N/V, minimal LFT elevation and normal lipase  -a1c 10.7  -CT abd/pelvis- negative  -creat improved with hydration  -appreciate renal eval  -op urology f/u scheduled on Monday  -normal PVRs, suspect may have neurogenic bladder  -ua with mixed cx c/w contamination, but she refused in/out cath  to reeval  - suspect recent UTI only partially treated, last dose abx on Tuesday before admission  -will treat empirically with ceftriaxone for now, given suprapubic tenderness and leukocytosis 16K  -leukocytosis resolved with abx, no fever, much less suprapubic pain  -completed IV abx last night, bc has not tolerated po in the past.  -BG better, cont scheduled humalog at meals and appreciate endo eval and recs. He wants to cont basal bolus insulin rather than 70/30 and sent rx to pharm in anticipation of dc  -s/p 3 days IV ceftriaxone, will not cont abx, refused in/out cath to revel urine  -needs urology eval for consideration of prophylactic abx, urodynamic testing or other diagnostics to explain her recurrent UTIs  -reglan available if need for N and consider outpt eval for gastroparesis     chronic anemia- suspect chronic dz, no overt bleeding     hypomag- 1.2 supp IV, now normal, cont oral mag supp at dc      Underweight / Body mass index is 17.45 kg/m².     Recommended Disposition: Home w/Family, she declines home health but will have CM offer again. Patient will be discharged home today with home health for diabetic monitoring she is agreeable. She will go home with her sister for the next few nights to make sure she is doing okay. Have asked her to check her blood sugars at 2 in the morning for the next couple of nights to make sure she does not get low but her long-acting insulin will be on a lower dose when she goes home as directed by her endocrinologist.    She is to follow-up with her new primary care physician as scheduled in the next 1-2 weeks.   She is to follow-up with endocrinology in the next 1-2 weeks and will be in contact with him for any questions about her sugar control and diabetic regimen. She has an appointment scheduled with urology tomorrow to further evaluate for her recurrent urinary tract infections and possible treatment and management of future. She will need to f/u with Dr Refugio Velasquez in 1-2 months    _______________________________________________________________________  Patient seen and examined by me on discharge day. Pertinent Findings:    Patient reports she feels better this morning. She denies any further nausea or vomiting and her lower abdominal pain has resolved. She said she did try to take the Reglan yesterday and it seemed to help and we discussed getting prescription and possible further workup for gastroparesis in the future. She feels ready to go home and declines home health but her sister would like for her to have it. She will go home to stay with her sister for the next few nights to make sure she is doing okay. Patient is awake and alert nontoxic-appearing oriented x3 no distress. Mucous memories are moist.  Cardiovascular regular rate lungs are clear abdomen is benign with no suprapubic tenderness extremities no clubbing cyanosis or edema.    _______________________________________________________________________  DISCHARGE MEDICATIONS:   Current Discharge Medication List      START taking these medications    Details   insulin glargine (BASAGLAR KWIKPEN U-100 INSULIN) 100 unit/mL (3 mL) inpn 30 Units by SubCUTAneous route daily. Qty: 10 Pen, Refills: 3      magnesium oxide (MAG-OX) 400 mg tablet Take 1 Tab by mouth two (2) times a day for 14 days. Qty: 28 Tab, Refills: 0      metoclopramide (REGLAN) 5 mg/5 mL syrup Take 5 mL by mouth three (3) times daily as needed. Qty: 120 mL, Refills: 0         CONTINUE these medications which have NOT CHANGED    Details   glucose blood VI test strips (PRODIGY NO CODING) strip Use to test blood sugars 4 times per day.  DX Code : E10.9  Qty: 400 Strip, Refills: 3 insulin lispro (HUMALOG KWIKPEN INSULIN) 100 unit/mL kwikpen 8 Units by SubCUTAneous route Before breakfast, lunch, and dinner. + correction scale as needed  Qty: 10 Pen, Refills: 3    Comments: May switch to Novolog if preferred by insurance over Humalog, thank you, GTG.      albuterol (PROVENTIL HFA, VENTOLIN HFA, PROAIR HFA) 90 mcg/actuation inhaler Take 2 Puffs by inhalation every four (4) hours as needed for Wheezing. Qty: 1 Inhaler, Refills: 0         STOP taking these medications       ciprofloxacin HCl (CIPRO) 500 mg tablet Comments:   Reason for Stopping:         insulin NPH/insulin regular (NOVOLIN 70/30 U-100 INSULIN) 100 unit/mL (70-30) injection Comments:   Reason for Stopping:               My Recommended Diet, Activity, Wound Care, and follow-up labs are listed in the patient's Discharge Insturctions which I have personally completed and reviewed. ______________________________________________________________________    Risk of deterioration: High    Condition at Discharge:  Stable  ______________________________________________________________________    Disposition  Home with family and home health services  ______________________________________________________________________    Care Plan discussed with:   Patient, Family, RN, Care Manager, Consultant    ______________________________________________________________________    Code Status: Full Code  ______________________________________________________________________      Follow up with:   PCP : Miko Olmos MD  Follow-up Information     Follow up With Specialties Details Why Contact Info    Almas Chapman MD Endocrinology Go on 11/14/2018 at 2:50 PM for endocrinology follow up appointment after hospitalization.    [address] [place] [address]  [phone]      Miko Olmos MD Internal Medicine Go on 11/1/2018 [facility] 12:15pm ( If you have questions or need to reschedule please call [address] [facility] [address]  [address] [facility]  [phone]                Total time in minutes spent coordinating this discharge (includes going over instructions, follow-up, prescriptions, and preparing report for sign off to her PCP) :  40 minutes    Signed:  Bettina Cooks, MD

## 2018-10-28 NOTE — PROGRESS NOTES
Pt given discharge instructions, follow up information (abd US on October 30, Appointment with Dr. Hannah Goncalves on November 1 and Dr. Susan Dodd on November 14), new medication education (start: insulin glargine, insulin lispro, magnesium oxide and reglan; stop: cipro and novilin 70/30). Time for questions made. IV taken out. Family to transport.

## 2018-11-01 ENCOUNTER — OFFICE VISIT (OUTPATIENT)
Dept: FAMILY MEDICINE CLINIC | Age: 53
End: 2018-11-01

## 2018-11-01 ENCOUNTER — HOSPITAL ENCOUNTER (OUTPATIENT)
Dept: ULTRASOUND IMAGING | Age: 53
Discharge: HOME | End: 2018-11-01
Attending: FAMILY MEDICINE
Payer: COMMERCIAL

## 2018-11-01 VITALS
RESPIRATION RATE: 20 BRPM | SYSTOLIC BLOOD PRESSURE: 138 MMHG | BODY MASS INDEX: 18.12 KG/M2 | HEIGHT: 61 IN | DIASTOLIC BLOOD PRESSURE: 74 MMHG | HEART RATE: 100 BPM | TEMPERATURE: 99 F | OXYGEN SATURATION: 98 % | WEIGHT: 96 LBS

## 2018-11-01 DIAGNOSIS — E11.00 UNCONTROLLED TYPE 2 DM WITH HYPEROSMOLAR NONKETOTIC HYPERGLYCEMIA (HCC): Primary | ICD-10-CM

## 2018-11-01 DIAGNOSIS — R32 URINARY INCONTINENCE, UNSPECIFIED TYPE: ICD-10-CM

## 2018-11-01 DIAGNOSIS — N39.0 RECURRENT UTI: ICD-10-CM

## 2018-11-01 DIAGNOSIS — R10.30 LOWER ABDOMINAL PAIN: ICD-10-CM

## 2018-11-01 LAB
GLUCOSE POC: NORMAL MG/DL
HBA1C MFR BLD HPLC: 10.6 %

## 2018-11-01 PROCEDURE — 76700 US EXAM ABDOM COMPLETE: CPT

## 2018-11-01 RX ORDER — GLIMEPIRIDE 1 MG/1
1 TABLET ORAL
Qty: 90 TAB | Refills: 3 | Status: SHIPPED | OUTPATIENT
Start: 2018-11-01 | End: 2019-02-19

## 2018-11-01 RX ORDER — INSULIN GLARGINE 100 [IU]/ML
40 INJECTION, SOLUTION SUBCUTANEOUS DAILY
Qty: 10 PEN | Refills: 3 | Status: SHIPPED | OUTPATIENT
Start: 2018-11-01 | End: 2019-02-20

## 2018-11-01 NOTE — PROGRESS NOTES
Chief Complaint   Patient presents with    Transitions Of Care     HPI:  Sharlene President is a 52 y.o. AA female presents to transition care following admission 10/23/18-10/28/2018 for hyperosmolar hyperglycemic state and dehydration Acute on CKD 3. Today she says she is doing better. Blood sugar reading in clinic is still [facility], A1C=10.6% on BASAGLAR 30 units daily, regular 8 unit 3 times day with meals and glimepiride 1 mg daily. Patient has appointment with Endo 11/14/18. She also follow urologist for difficult to treat uti. I have reviewed admission record. I will adjust long acting insulin to 40 units a day and see back in a week. Plan has been discussed with patient and care giver and they expressed understanding  Also, patient was assigned to a new PCP on discharge. First appointment is not until march.     Review of Systems  As per hpi    Past Medical History:   Diagnosis Date    Asthma     Diabetes ([facility] [address]     Elevated transaminase level 6/29/2016    Insulin dependent diabetes mellitus ([facility] [address] 6/20/2016    Pancreatic atrophy 6/20/2016     Past Surgical History:   Procedure Laterality Date    HX HEENT       Social History     Socioeconomic History    Marital status: SINGLE     Spouse name: Not on file    Number of children: Not on file    Years of education: Not on file    Highest education level: Not on file   Social Needs    Financial resource strain: Not on file    Food insecurity - worry: Not on file    Food insecurity - inability: Not on file   [employer] needs - medical: Not on file   [employer] needs - non-medical: Not on file   Occupational History    Not on file   Tobacco Use    Smoking status: Never Smoker    Smokeless tobacco: Never Used   Substance and Sexual Activity    Alcohol use: Yes     Comment: occasionally    Drug use: No    Sexual activity: Not Currently   Other Topics Concern    Not on file   Social History Narrative    Not on file     Family History   Problem Relation Age of Onset    Cancer Father         prostate and colon    Diabetes Mother     Diabetes Maternal Grandmother     Cancer Maternal Aunt         breast     Current Outpatient Medications   Medication Sig Dispense Refill    insulin glargine (BASAGLAR KWIKPEN U-100 INSULIN) 100 unit/mL (3 mL) inpn 30 Units by SubCUTAneous route daily. 10 Pen 3    magnesium oxide (MAG-OX) 400 mg tablet Take 1 Tab by mouth two (2) times a day for 14 days. 28 Tab 0    metoclopramide (REGLAN) 5 mg/5 mL syrup Take 5 mL by mouth three (3) times daily as needed. 120 mL 0    insulin lispro (HUMALOG KWIKPEN INSULIN) 100 unit/mL kwikpen 8 Units by SubCUTAneous route Before breakfast, lunch, and dinner. + correction scale as needed 10 Pen 3    glucose blood VI test strips (PRODIGY NO CODING) strip Use to test blood sugars 4 times per day. DX Code : E10.9 400 Strip 3    albuterol (PROVENTIL HFA, VENTOLIN HFA, PROAIR HFA) 90 mcg/actuation inhaler Take 2 Puffs by inhalation every four (4) hours as needed for Wheezing.  1 Inhaler 0     Allergies   Allergen Reactions    Contrast Agent [Iodine] Itching    Hydrocodone Rash    Percocet [Oxycodone-Acetaminophen] Rash    Codeine Nausea and Vomiting    Metformin Diarrhea     Objective:  Visit Vitals  /74   Pulse 100   Temp 99 °F (37.2 °C) (Oral)   Resp 20   Ht 5' 1\" (1.549 m)   Wt 96 lb (43.5 kg)   SpO2 98%   BMI 18.14 kg/m²     Physical Exam:   General appearance - alert, well appearing in no distress  Neck - supple, no significant adenopathy   Chest - clear to auscultation, no wheezes, rales or rhonchi  Heart - normal rate, regular rhythm, normal blood pressure  Abdomen - soft, nontender, nondistended, no organomegaly  Ext-peripheral pulses normal, no pedal edema  Neuro -alert, oriented, normal speech, no focal findings  Back-full range of motion, no tenderness, palpable spasm or pain on motion     Results for orders placed or performed in visit on 11/01/18   AMB POC GLUCOSE BLOOD, BY GLUCOSE MONITORING DEVICE   Result Value Ref Range    Glucose POC HHH mg/dL   AMB POC HEMOGLOBIN A1C   Result Value Ref Range    Hemoglobin A1c (POC) 10.6 %     Assessment/Plan:  Diagnoses and all orders for this visit:    Uncontrolled type 2 DM with hyperosmolar nonketotic hyperglycemia (HCC)  -     AMB POC GLUCOSE BLOOD, BY GLUCOSE MONITORING DEVICE  -     AMB POC HEMOGLOBIN A1C  -     insulin glargine (BASAGLAR KWIKPEN U-100 INSULIN) 100 unit/mL (3 mL) inpn; 40 Units by SubCUTAneous route daily. , Normal, Disp-10 Pen, R-3  -     REFERRAL TO PHARMACIST  -     glimepiride (AMARYL) 1 mg tablet; Take 1 Tab by mouth Daily (before breakfast). , Normal, Disp-90 Tab, R-3      Patient Instructions        Learning About Meal Planning for Diabetes  Why plan your meals? Meal planning can be a key part of managing diabetes. Planning meals and snacks with the right balance of carbohydrate, protein, and fat can help you keep your blood sugar at the target level you set with your doctor. You don't have to eat special foods. You can eat what your family eats, including sweets once in a while. But you do have to pay attention to how often you eat and how much you eat of certain foods. You may want to work with a dietitian or a certified diabetes educator. He or she can give you tips and meal ideas and can answer your questions about meal planning. This health professional can also help you reach a healthy weight if that is one of your goals. What plan is right for you? Your dietitian or diabetes educator may suggest that you start with the plate format or carbohydrate counting. The plate format  The plate format is a simple way to help you manage how you eat. You plan meals by learning how much space each food should take on a plate. Using the plate format helps you spread carbohydrate throughout the day. It can make it easier to keep your blood sugar level within your target range.  It also helps you see if you're eating healthy portion sizes. To use the plate format, you put non-starchy vegetables on half your plate. Add meat or meat substitutes on one-quarter of the plate. Put a grain or starchy vegetable (such as brown rice or a potato) on the final quarter of the plate. You can add a small piece of fruit and some low-fat or fat-free milk or yogurt, depending on your carbohydrate goal for each meal.  Here are some tips for using the plate format:  · Make sure that you are not using an oversized plate. A 9-inch plate is best. Many restaurants use larger plates. · Get used to using the plate format at home. Then you can use it when you eat out. · Write down your questions about using the plate format. Talk to your doctor, a dietitian, or a diabetes educator about your concerns. Carbohydrate counting  With carbohydrate counting, you plan meals based on the amount of carbohydrate in each food. Carbohydrate raises blood sugar higher and more quickly than any other nutrient. It is found in desserts, breads and cereals, and fruit. It's also found in starchy vegetables such as potatoes and corn, grains such as rice and pasta, and milk and yogurt. Spreading carbohydrate throughout the day helps keep your blood sugar levels within your target range. Your daily amount depends on several things, including your weight, how active you are, which diabetes medicines you take, and what your goals are for your blood sugar levels. A registered dietitian or diabetes educator can help you plan how much carbohydrate to include in each meal and snack. A guideline for your daily amount of carbohydrate is:  · 45 to 60 grams at each meal. That's about the same as 3 to 4 carbohydrate servings. · 15 to 20 grams at each snack. That's about the same as 1 carbohydrate serving. The Nutrition Facts label on packaged foods tells you how much carbohydrate is in a serving of the food. First, look at the serving size on the food label.  Is that the amount you eat in a serving? All of the nutrition information on a food label is based on that serving size. So if you eat more or less than that, you'll need to adjust the other numbers. Total carbohydrate is the next thing you need to look for on the label. If you count carbohydrate servings, one serving of carbohydrate is 15 grams. For foods that don't come with labels, such as fresh fruits and vegetables, you'll need a guide that lists carbohydrate in these foods. Ask your doctor, dietitian, or diabetes educator about books or other nutrition guides you can use. If you take insulin, you need to know how many grams of carbohydrate are in a meal. This lets you know how much rapid-acting insulin to take before you eat. If you use an insulin pump, you get a constant rate of insulin during the day. So the pump must be programmed at meals to give you extra insulin to cover the rise in blood sugar after meals. When you know how much carbohydrate you will eat, you can take the right amount of insulin. Or, if you always use the same amount of insulin, you need to make sure that you eat the same amount of carbohydrate at meals. If you need more help to understand carbohydrate counting and food labels, ask your doctor, dietitian, or diabetes educator. How do you get started with meal planning? Here are some tips to get started:  · Plan your meals a week at a time. Don't forget to include snacks too. · Use cookbooks or online recipes to plan several main meals. Plan some quick meals for busy nights. You also can double some recipes that freeze well. Then you can save half for other busy nights when you don't have time to cook. · Make sure you have the ingredients you need for your recipes. If you're running low on basic items, put these items on your shopping list too. · List foods that you use to make breakfasts, lunches, and snacks. List plenty of fruits and vegetables. · Post this list on the refrigerator.  Add to it as you think of more things you need. · Take the list to the store to do your weekly shopping. Follow-up care is a key part of your treatment and safety. Be sure to make and go to all appointments, and call your doctor if you are having problems. It's also a good idea to know your test results and keep a list of the medicines you take. Where can you learn more? Go to http://jurgen-molly.info/. Josette Lam in the search box to learn more about \"Learning About Meal Planning for Diabetes. \"  Current as of: December 7, 2017  Content Version: 11.8  © 2456-4861 [employer]. Care instructions adapted under license by [employer] (which disclaims liability or warranty for this information). If you have questions about a medical condition or this instruction, always ask your healthcare professional. [facility] any warranty or liability for your use of this information. Follow-up Disposition:  Return in about 1 week (around 11/8/2018), or if symptoms worsen or fail to improve, for f/u with pcp.

## 2018-11-01 NOTE — PATIENT INSTRUCTIONS

## 2018-11-01 NOTE — LETTER
NOTIFICATION RETURN TO WORK  
 
11/1/2018 1:32 PM 
 
Ms. Sukhdev Bonilla [address] [place] [id number] To Whom It May Concern: 
 
Sukhdev Bonilla is currently under the care of Jessika Conway. Date of return to work will be decided when patient sees the endocrinologist 11/14/2018. If there are questions or concerns please feel free to contact our office. Sincerely, Luz Marina Ward MD

## 2018-11-02 ENCOUNTER — TELEPHONE (OUTPATIENT)
Dept: ENDOCRINOLOGY | Age: 53
End: 2018-11-02

## 2018-11-02 NOTE — TELEPHONE ENCOUNTER
I attempted to return this call and reached a voice mail. I left her a message asking her to give me a call.   Madelyn Ayala

## 2018-11-02 NOTE — TELEPHONE ENCOUNTER
Patient is requesting a call back. She did not state the nature of the call but can be reached at [phone].

## 2018-11-02 NOTE — TELEPHONE ENCOUNTER
Ms. Arash Ford returned my call and is very frustrated because her sugars are so high. She still has a UTI and says no one is helping her. She is just wanting someone to fix her. I told her I would pass this on to Dr. Lovely Nino

## 2018-11-02 NOTE — TELEPHONE ENCOUNTER
Called patient,     Her discharge insulin regimen that I recommended was based on her glucose control in the hospital. Since her discharge from there, her sugars have gone higher possibly due to infection, as we have seen she is having recurrent UTI's. This is not due to the diabetes but rather the diabetes is responding as expected to the infection. Patient reports taking the following:   Basaglar: 40 units  Humalo units TID with meals  Correction: 1:25>150    Sugars currently:   AM: 300-400  Bedtime: 200's  Nothing below 200's    Basaglar: increase to 50 units once daily  Humalog: increase to 15 units with each meal  Correction Scale:  1 unit for every 15 above 150    IF GLUCOSE IS:                 THEN TAKE:      0   Extra Unit  151-165   1   Extra Unit  166-180   2   Extra Units  181-195   3   Extra Units  196-210   4   Extra Units  211-225   5   Extra Units  226-240   6   Extra Units  241-255   7   Extra Units  256-270   8   Extra Units  271-285   9   Extra Units  286-300   10 Extra Units    Patient advised to monitor for hypoglycemia. Her doses are again rising far above her baseline suggestive of possibly recurrent infection. Jackelin Bradley.  [facility]  [address] [place] [address]

## 2018-11-05 ENCOUNTER — TELEPHONE (OUTPATIENT)
Dept: FAMILY MEDICINE CLINIC | Age: 53
End: 2018-11-05

## 2018-11-05 NOTE — TELEPHONE ENCOUNTER
----- Message from Vannesa Fernandez sent at 11/5/2018  9:51 AM EST -----  Regarding: Dr. Natacha Urbina  Pt returned call to the office . Best contact number [phone].

## 2018-11-08 ENCOUNTER — OFFICE VISIT (OUTPATIENT)
Dept: FAMILY MEDICINE CLINIC | Age: 53
End: 2018-11-08

## 2018-11-08 VITALS
SYSTOLIC BLOOD PRESSURE: 124 MMHG | WEIGHT: 98.4 LBS | RESPIRATION RATE: 16 BRPM | HEART RATE: 78 BPM | TEMPERATURE: 97.8 F | HEIGHT: 61 IN | BODY MASS INDEX: 18.58 KG/M2 | OXYGEN SATURATION: 98 % | DIASTOLIC BLOOD PRESSURE: 62 MMHG

## 2018-11-08 DIAGNOSIS — E10.65 HYPERGLYCEMIA DUE TO TYPE 1 DIABETES MELLITUS (HCC): Primary | ICD-10-CM

## 2018-11-08 LAB — GLUCOSE POC: NORMAL MG/DL

## 2018-11-08 NOTE — PROGRESS NOTES
Chief Complaint   Patient presents with    Other     follow up one week     HPI:  Lennox Thompson is a 52 y.o. AA female presents for one week follow up on diabetes type 1. Patient is alert oriented x 3. Blood glucose reading at home this morning was 168 mg/dl, reading is [address] on clinic meter and 476 mg/dl her meter on 50 units Basarglar. Patient is advised to keep appointment with Dr. Spencer Nip her endocrnologist.    Review of Systems  As per hpi    Past Medical History:   Diagnosis Date    Asthma     [facility] ([facility] [address]     Elevated transaminase level 6/29/2016    Insulin dependent diabetes mellitus ([facility] [address] 6/20/2016    Pancreatic atrophy 6/20/2016     Past Surgical History:   Procedure Laterality Date    HX HEENT       Social History     Socioeconomic History    Marital status: SINGLE     Spouse name: Not on file    Number of children: Not on file    Years of education: Not on file    Highest education level: Not on file   Social Needs    Financial resource strain: Not on file    Food insecurity - worry: Not on file    Food insecurity - inability: Not on file   [employer] needs - medical: Not on file   [employer] needs - non-medical: Not on file   Occupational History    Not on file   Tobacco Use    Smoking status: Never Smoker    Smokeless tobacco: Never Used   Substance and Sexual Activity    Alcohol use: Yes     Comment: occasionally    Drug use: No    Sexual activity: Not Currently   Other Topics Concern    Not on file   Social History Narrative    Not on file     Family History   Problem Relation Age of Onset    Cancer Father         prostate and colon    Diabetes Mother     Diabetes Maternal Grandmother     Cancer Maternal Aunt         breast     Current Outpatient Medications   Medication Sig Dispense Refill    insulin glargine (BASAGLAR KWIKPEN U-100 INSULIN) 100 unit/mL (3 mL) inpn 40 Units by SubCUTAneous route daily.  10 Pen 3    glimepiride (AMARYL) 1 mg tablet Take 1 Tab by mouth Daily (before breakfast). 90 Tab 3    magnesium oxide (MAG-OX) 400 mg tablet Take 1 Tab by mouth two (2) times a day for 14 days. 28 Tab 0    metoclopramide (REGLAN) 5 mg/5 mL syrup Take 5 mL by mouth three (3) times daily as needed. 120 mL 0    insulin lispro (HUMALOG KWIKPEN INSULIN) 100 unit/mL kwikpen 8 Units by SubCUTAneous route Before breakfast, lunch, and dinner. + correction scale as needed 10 Pen 3    glucose blood VI test strips (PRODIGY NO CODING) strip Use to test blood sugars 4 times per day. DX Code : E10.9 400 Strip 3    albuterol (PROVENTIL HFA, VENTOLIN HFA, PROAIR HFA) 90 mcg/actuation inhaler Take 2 Puffs by inhalation every four (4) hours as needed for Wheezing.  1 Inhaler 0     Allergies   Allergen Reactions    Contrast Agent [Iodine] Itching    Hydrocodone Rash    Percocet [Oxycodone-Acetaminophen] Rash    Codeine Nausea and Vomiting    Metformin Diarrhea     Objective:  Visit Vitals  /62 (BP 1 Location: Right arm, BP Patient Position: Sitting)   Pulse 78   Temp 97.8 °F (36.6 °C)   Resp 16   Ht 5' 1\" (1.549 m)   Wt 98 lb 6.4 oz (44.6 kg)   SpO2 98%   BMI 18.59 kg/m²     Physical Exam:   General appearance - alert, well appearing in no distress  EYE-PERRL, EOMI  Chest - clear to auscultation, no wheezes, rales or rhonchi  Heart - normal rate, regular rhythm, normal    Abdomen - soft, nontender, nondistended, no organomegaly  Ext-peripheral pulses normal, no pedal edema  Neuro -alert, oriented, normal speech, no focal findings    Results for orders placed or performed in visit on 11/01/18   AMB POC GLUCOSE BLOOD, BY GLUCOSE MONITORING DEVICE   Result Value Ref Range    Glucose POC HHH mg/dL   AMB POC HEMOGLOBIN A1C   Result Value Ref Range    Hemoglobin A1c (POC) 10.6 %     Assessment/Plan:  Diagnoses and all orders for this visit:    Hyperglycemia due to type 1 diabetes mellitus (HCC)  -     AMB POC GLUCOSE BLOOD, BY GLUCOSE MONITORING DEVICE      Patient Instructions Counting Carbohydrates: Care Instructions  Your Care Instructions    You don't have to eat special foods when you have diabetes. You just have to be careful to eat healthy foods. Carbohydrates (carbs) raise blood sugar higher and quicker than any other nutrient. Carbs are found in desserts, breads and cereals, and fruit. They're also in starchy vegetables. These include potatoes, corn, and grains such as rice and pasta. Carbs are also in milk and yogurt. The more carbs you eat at one time, the higher your blood sugar will rise. Spreading carbs all through the day helps keep your blood sugar levels within your target range. Counting carbs is one of the best ways to keep your blood sugar under control. If you use insulin, counting carbs helps you match the right amount of insulin to the number of grams of carbs in a meal. Then you can change your diet and insulin dose as needed. Testing your blood sugar several times a day can help you learn how carbs affect your blood sugar. A registered dietitian or certified diabetes educator can help you plan meals and snacks. Follow-up care is a key part of your treatment and safety. Be sure to make and go to all appointments, and call your doctor if you are having problems. It's also a good idea to know your test results and keep a list of the medicines you take. How can you care for yourself at home? Know your daily amount of carbohydrates  Your daily amount depends on several things, such as your weight, how active you are, which diabetes medicines you take, and what your goals are for your blood sugar levels. A registered dietitian or certified diabetes educator can help you plan how many carbs to include in each meal and snack. For most adults, a guideline for the daily amount of carbs is:  · 45 to 60 grams at each meal. That's about the same as 3 to 4 carbohydrate servings. · 15 to 20 grams at each snack.  That's about the same as 1 carbohydrate serving. Count carbs  Counting carbs lets you know how much rapid-acting insulin to take before you eat. If you use an insulin pump, you get a constant rate of insulin during the day. So the pump must be programmed at meals. This gives you extra insulin to cover the rise in blood sugar after meals. If you take insulin:  · Learn your own insulin-to-carb ratio. You and your diabetes health professional will figure out the ratio. You can do this by testing your blood sugar after meals. For example, you may need a certain amount of insulin for every 15 grams of carbs. · Add up the carb grams in a meal. Then you can figure out how many units of insulin to take based on your insulin-to-carb ratio. · Exercise lowers blood sugar. You can use less insulin than you would if you were not doing exercise. Keep in mind that timing matters. If you exercise within 1 hour after a meal, your body may need less insulin for that meal than it would if you exercised 3 hours after the meal. Test your blood sugar to find out how exercise affects your need for insulin. If you do or don't take insulin:  · Look at labels on packaged foods. This can tell you how many carbs are in a serving. You can also use guides from the American Diabetes Association. · Be aware of portions, or serving sizes. If a package has two servings and you eat the whole package, you need to double the number of grams of carbohydrate listed for one serving. · Protein, fat, and fiber do not raise blood sugar as much as carbs do. If you eat a lot of these nutrients in a meal, your blood sugar will rise more slowly than it would otherwise. Eat from all food groups  · Eat at least three meals a day. · Plan meals to include food from all the food groups. The food groups include grains, fruits, dairy, proteins, and vegetables. · Talk to your dietitian or diabetes educator about ways to add limited amounts of sweets into your meal plan.   · If you drink alcohol, talk to your doctor. It may not be recommended when you are taking certain diabetes medicines. Where can you learn more? Go to http://jurgen-molly.info/. Enter F427 in the search box to learn more about \"Counting Carbohydrates: Care Instructions. \"  Current as of: December 7, 2017  Content Version: 11.8  © 3635-8326 [employer]. Care instructions adapted under license by [employer] (which disclaims liability or warranty for this information). If you have questions about a medical condition or this instruction, always ask your healthcare professional. [address] any warranty or liability for your use of this information. Follow-up Disposition:  Return if symptoms worsen or fail to improve.

## 2018-11-08 NOTE — PROGRESS NOTES
Identified pt with two pt identifiers(name and ). Reviewed record in preparation for visit and have obtained necessary documentation. Chief Complaint   Patient presents with    Other     follow up one week        Health Maintenance Due   Topic    EYE EXAM RETINAL OR DILATED Q1     Pneumococcal 19-64 Highest Risk (1 of 3 - PCV13)    DTaP/Tdap/Td series (1 - Tdap)    PAP AKA CERVICAL CYTOLOGY     Shingrix Vaccine Age 50> (1 of 2)    BREAST CANCER SCRN MAMMOGRAM        Coordination of Care Questionnaire:  :   1) Have you been to an emergency room, urgent care, or hospitalized since your last visit?   no   If yes, where when, and reason for visit? 2. Have seen or consulted any other health care provider since your last visit? NO  If yes, where when, and reason for visit? 3) Do you have an Advanced Directive/ Living Will in place? YES  If yes, do we have a copy on file YES  If no, would you like information NO    Patient is accompanied by sister I have received verbal consent from Ar Tejada to discuss any/all medical information while they are present in the room.     Visit Vitals  /62 (BP 1 Location: Right arm, BP Patient Position: Sitting)   Pulse 78   Temp 97.8 °F (36.6 °C)   Resp 16   Ht 5' 1\" (1.549 m)   Wt 98 lb 6.4 oz (44.6 kg)   SpO2 98%   BMI 18.59 kg/m²

## 2018-11-08 NOTE — PATIENT INSTRUCTIONS
Counting Carbohydrates: Care Instructions  Your Care Instructions    You don't have to eat special foods when you have diabetes. You just have to be careful to eat healthy foods. Carbohydrates (carbs) raise blood sugar higher and quicker than any other nutrient. Carbs are found in desserts, breads and cereals, and fruit. They're also in starchy vegetables. These include potatoes, corn, and grains such as rice and pasta. Carbs are also in milk and yogurt. The more carbs you eat at one time, the higher your blood sugar will rise. Spreading carbs all through the day helps keep your blood sugar levels within your target range. Counting carbs is one of the best ways to keep your blood sugar under control. If you use insulin, counting carbs helps you match the right amount of insulin to the number of grams of carbs in a meal. Then you can change your diet and insulin dose as needed. Testing your blood sugar several times a day can help you learn how carbs affect your blood sugar. A registered dietitian or certified diabetes educator can help you plan meals and snacks. Follow-up care is a key part of your treatment and safety. Be sure to make and go to all appointments, and call your doctor if you are having problems. It's also a good idea to know your test results and keep a list of the medicines you take. How can you care for yourself at home? Know your daily amount of carbohydrates  Your daily amount depends on several things, such as your weight, how active you are, which diabetes medicines you take, and what your goals are for your blood sugar levels. A registered dietitian or certified diabetes educator can help you plan how many carbs to include in each meal and snack. For most adults, a guideline for the daily amount of carbs is:  · 45 to 60 grams at each meal. That's about the same as 3 to 4 carbohydrate servings. · 15 to 20 grams at each snack. That's about the same as 1 carbohydrate serving.   Count carbs  Counting carbs lets you know how much rapid-acting insulin to take before you eat. If you use an insulin pump, you get a constant rate of insulin during the day. So the pump must be programmed at meals. This gives you extra insulin to cover the rise in blood sugar after meals. If you take insulin:  · Learn your own insulin-to-carb ratio. You and your diabetes health professional will figure out the ratio. You can do this by testing your blood sugar after meals. For example, you may need a certain amount of insulin for every 15 grams of carbs. · Add up the carb grams in a meal. Then you can figure out how many units of insulin to take based on your insulin-to-carb ratio. · Exercise lowers blood sugar. You can use less insulin than you would if you were not doing exercise. Keep in mind that timing matters. If you exercise within 1 hour after a meal, your body may need less insulin for that meal than it would if you exercised 3 hours after the meal. Test your blood sugar to find out how exercise affects your need for insulin. If you do or don't take insulin:  · Look at labels on packaged foods. This can tell you how many carbs are in a serving. You can also use guides from the American Diabetes Association. · Be aware of portions, or serving sizes. If a package has two servings and you eat the whole package, you need to double the number of grams of carbohydrate listed for one serving. · Protein, fat, and fiber do not raise blood sugar as much as carbs do. If you eat a lot of these nutrients in a meal, your blood sugar will rise more slowly than it would otherwise. Eat from all food groups  · Eat at least three meals a day. · Plan meals to include food from all the food groups. The food groups include grains, fruits, dairy, proteins, and vegetables. · Talk to your dietitian or diabetes educator about ways to add limited amounts of sweets into your meal plan. · If you drink alcohol, talk to your doctor. It may not be recommended when you are taking certain diabetes medicines. Where can you learn more? Go to http://jurgen-molly.info/. Enter U423 in the search box to learn more about \"Counting Carbohydrates: Care Instructions. \"  Current as of: December 7, 2017  Content Version: 11.8  © 9402-4584 [employer]. Care instructions adapted under license by [employer] (which disclaims liability or warranty for this information). If you have questions about a medical condition or this instruction, always ask your healthcare professional. [address] any warranty or liability for your use of this information.

## 2018-11-13 ENCOUNTER — TELEPHONE (OUTPATIENT)
Dept: ENDOCRINOLOGY | Age: 53
End: 2018-11-13

## 2018-11-13 RX ORDER — METFORMIN HYDROCHLORIDE 500 MG/1
500 TABLET, EXTENDED RELEASE ORAL
Qty: 180 TAB | Refills: 3 | Status: SHIPPED | OUTPATIENT
Start: 2018-11-13 | End: 2019-02-19 | Stop reason: SDUPTHER

## 2018-11-13 NOTE — TELEPHONE ENCOUNTER
At 2:20 pm patient called stating that she was carrying some packages into her aunt's house. He legs felft wobbly. The squad came. Her blood sugar was 385 and HR was 105. For breakfast, she ate eggs, 1 slice wheat toast, pizano and water. For lunch at 2 pm, she ate a chicken cesar and spinach with water. She states that fasting her sugar reading was 146. She states that her blood sugars are high every other day. Patient just called back at 2:30 and her sugar was 376. Patient is concerned. She can be reached at [phone].

## 2018-11-13 NOTE — TELEPHONE ENCOUNTER
Pt called states she fell about an hour ago. [facility] rescue squad came to check her out, nothing broken, but her blood sugar went up to 385, before the fall her blood sugar was 146 and her heart rate is 105. She said she vomited a little bit. She said she needs to talk to you urgently. She can be reach @ [phone].

## 2018-11-13 NOTE — TELEPHONE ENCOUNTER
Called patient to discuss: She went to her aunts house as she left her pens there,  No hypoglycemia,  Fluctuating numbers,   Mostly highs,     Plan:   Basaglar: Continue with 50 units once daily (good fastings sugars)  Humalog: Increase to 24 units with each meal  Correction Scale:  1 unit for every 15 above 150  Start retrial (per her idea) of metformin, 500mg tab once daily, ordered     IF GLUCOSE IS:                 THEN TAKE:                                       0   Extra Unit  151-165                                   1   Extra Unit  166-180                                   2   Extra Units  181-195                                   3   Extra Units  196-210                                   4   Extra Units  211-225                                   5   Extra Units  226-240                                   6   Extra Units  241-255                                   7   Extra Units  256-270                                   8   Extra Units  271-285                                   9   Extra Units  286-300                                   10 Extra Units     She has seen her urologist and reports they did not find any issues. Still no certainty as to why she remains increasingly insulin resistant. Not receiving steroids. She notes she has an appt tomorrow.

## 2018-11-14 ENCOUNTER — OFFICE VISIT (OUTPATIENT)
Dept: ENDOCRINOLOGY | Age: 53
End: 2018-11-14

## 2018-11-14 VITALS
BODY MASS INDEX: 18.39 KG/M2 | HEIGHT: 61 IN | DIASTOLIC BLOOD PRESSURE: 69 MMHG | HEART RATE: 93 BPM | WEIGHT: 97.4 LBS | SYSTOLIC BLOOD PRESSURE: 116 MMHG

## 2018-11-14 DIAGNOSIS — E10.65 HYPERGLYCEMIA DUE TO TYPE 1 DIABETES MELLITUS (HCC): ICD-10-CM

## 2018-11-14 DIAGNOSIS — E13.9 LADA (LATENT AUTOIMMUNE DIABETES IN ADULTS), MANAGED AS TYPE 1 (HCC): Primary | ICD-10-CM

## 2018-11-14 NOTE — PROGRESS NOTES
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 female with a PMHx as noted below who is presenting to our endocrine clinic for the f/u evaluation of recently diagnosed diabetes. History of Type 1 Diabetes:  Patient reports that they were diagnosed in the ED with A1c of 15% 2016     Pancreatic atrophy on CT   Family history is positive for diabetes in mother and maternal grandmother, both on insulin but type of diabetes unclear  Was started on novolog 70/30 insulin, did not tolerate any oral medications (neg antibodies, normal c-peptide)    INTERVAL HISTORY:  Patient continued to have high blood sugar. Yesterday we made changes to her regimen by phone. We also started metformin. Current home regimen includes:   Basaglar: Continue with 50 units once daily (good fastings sugars)  Humalo units with each meal  Correction Scale:  1 unit for every 15 above 150  Started retrial (per her idea) of metformin, 500mg tab once daily, tolerated so far    Home Blood glucose review:   AM:   Nettie Hong still 200's, still too early  Lunch:    Dinner:   Bedtime: 200-300's  Has not had enough time on the new regimen to see the changes.      Review of most recent diabetes-related labs:  Lab Results   Component Value Date    HBA1C 10.7 (H) 10/23/2018    HBA1C 8.8 (H) 2018    HBA1C 8.5 (H) 2018    JPW0DQTJ 10.6 2018    CID6KBEO 9.1 10/17/2018    TDI8VOTT 7.7 2018    CHOL 202 (H) 2018    LDLC 66 2018    GFRAA 54 (L) 10/27/2018    GFRNA 45 (L) 10/27/2018    MCACR 24.9 2018    TSH 1.500 2018    940809 <1.0 2018    GADLT <5.0 2018    CPEPLT 1.7 2018     Lab Key:  [id number] = IA-2 pancreatic islet cell autoantibody  GADLT = JORDANA-65 autoantibody   MCACR = Urine Microalbumin  INSUL = Insulin level  CPEPL = C-peptide level    PAST MEDICAL/SURGICAL HISTORY:   Past Medical History:   Diagnosis Date    Asthma     Diabetes ([facility]     Elevated transaminase level 6/29/2016    Insulin dependent diabetes mellitus ([address] [facility] 6/20/2016    Pancreatic atrophy 6/20/2016     Past Surgical History:   Procedure Laterality Date    HX HEENT         ALLERGIES:   Allergies   Allergen Reactions    Contrast Agent [Iodine] Itching    Hydrocodone Rash    Percocet [Oxycodone-Acetaminophen] Rash    Codeine Nausea and Vomiting    Metformin Diarrhea       MEDICATIONS ON ADMISSION:     Current Outpatient Medications:     metFORMIN ER (GLUCOPHAGE XR) 500 mg tablet, Take 1 Tab by mouth Before breakfast and dinner. (Patient taking differently: Take 500 mg by mouth Daily (before breakfast). ), Disp: 180 Tab, Rfl: 3    insulin glargine (BASAGLAR KWIKPEN U-100 INSULIN) 100 unit/mL (3 mL) inpn, 40 Units by SubCUTAneous route daily. (Patient taking differently: 50 Units by SubCUTAneous route daily.), Disp: 10 Pen, Rfl: 3    glimepiride (AMARYL) 1 mg tablet, Take 1 Tab by mouth Daily (before breakfast). , Disp: 90 Tab, Rfl: 3    insulin lispro (HUMALOG KWIKPEN INSULIN) 100 unit/mL kwikpen, 8 Units by SubCUTAneous route Before breakfast, lunch, and dinner. + correction scale as needed (Patient taking differently: 25 Units by SubCUTAneous route Before breakfast, lunch, and dinner. + correction scale as needed), Disp: 10 Pen, Rfl: 3    glucose blood VI test strips (PRODIGY NO CODING) strip, Use to test blood sugars 4 times per day. DX Code : E10.9, Disp: 400 Strip, Rfl: 3    metoclopramide (REGLAN) 5 mg/5 mL syrup, Take 5 mL by mouth three (3) times daily as needed. , Disp: 120 mL, Rfl: 0    albuterol (PROVENTIL HFA, VENTOLIN HFA, PROAIR HFA) 90 mcg/actuation inhaler, Take 2 Puffs by inhalation every four (4) hours as needed for Wheezing., Disp: 1 Inhaler, Rfl: 0    SOCIAL HISTORY:   Social History     Socioeconomic History    Marital status: SINGLE     Spouse name: Not on file    Number of children: Not on file    Years of education: Not on file    Highest education level: Not on file   Social Needs    Financial resource strain: Not on file    Food insecurity - worry: Not on file    Food insecurity - inability: Not on file    Transportation needs - medical: Not on file   hovelstay needs - non-medical: Not on file   Occupational History    Not on file   Tobacco Use    Smoking status: Never Smoker    Smokeless tobacco: Never Used   Substance and Sexual Activity    Alcohol use: Yes     Comment: occasionally    Drug use: No    Sexual activity: Not Currently   Other Topics Concern    Not on file   Social History Narrative    Not on file       FAMILY HISTORY:  Family History   Problem Relation Age of Onset    Cancer Father         prostate and colon    Diabetes Mother     Diabetes Maternal Grandmother     Cancer Maternal Aunt         breast       REVIEW OF SYSTEMS: Complete ROS assessed and noted for that which is described above, all else are negative. Eyes: normal  ENT: normal  CVS: normal  Resp: normal  GI: normal  : normal  GYN: normal  Endocrine: normal  Integument: normal  Musculoskeletal: normal  Neuro: normal  Psych: normal    PHYSICAL EXAMINATION:    VITAL SIGNS:  Visit Vitals  /69 (BP 1 Location: Left arm, BP Patient Position: Sitting)   Pulse 93   Ht 5' 1\" (1.549 m)   Wt 97 lb 6.4 oz (44.2 kg)   BMI 18.40 kg/m²       GENERAL: NCAT, Sitting comfortably, NAD  EYES: EOMI, non-icteric, no proptosis  HEENT: NCAT, no inflammation, no masses  LYMPH NODES: No LAD  CARDIOVASCULAR: S1 S2, RRR, No murmur, 2+ radial pulses  RESPIRATORY: CTA b/l, no wheeze/rales  ABDOMEN: BS normal  NEUROLOGIC: 5/5 power all extremities, no parasthesias, AAOx3  EXTREMITIES: warm, no edema/rash/ or other skin changes      REVIEW OF LABORATORY AND RADIOLOGY DATA:   Labs and documentation have been reviewed as described above.        ASSESSMENT AND PLAN:   Candido Abreu is a 52 y.o. female with a PMHx as noted below who is presenting to our endocrine clinic for the f/u evaluation of recent onset diabetes. SHANTEL, late onset autoimmune diabetes of the adult (type-1 like diabetes)     Patient's sugars have been very high and she has been getting dizzy spells with weakness. We have been working with her quickly adjusting her insulin with frequent phone calls and dose adjustments. She does not feel ready to return to work and I agree with her considering her multiple hospitalizations and infections. Her urology appt resulted in no specific findings that could have caused her recurrent UTI's, however her poorly controlled diabetes is not the cause of her infections but a consequence of it. However notably high sugar in turn can make it worse of course. Plan:  Medications:  Basaglar: Continue with 50 units once daily (good fastings sugars)  Humalog: Increase to 28 units with each meal  Correction Scale:  1 unit for every 15 above 150  metformin, Trial 500mg tab TWICE daily, tolerated so far    BP: Stable, not on antihypertensives  Cholesterol: LDL 66 stable    RTC: I would like to see them back in 3 months, call with concerns,  >25 minutes spent together with patient today of which >50% of this time was spent in counseling and coordination of care. Gregorio Flores.  [facility]  [facility]

## 2018-11-14 NOTE — PATIENT INSTRUCTIONS
Metformin, Trial 500mg tab TWICE daily    Basaglar: Continue with 50 units once daily     Humalog: Increase to 28 units with each meal    Correction Scale:  1 unit for every 15 above 150    IF GLUCOSE IS:                 THEN TAKE:      0   Extra Unit  151-165   1   Extra Unit  166-180   2   Extra Units  181-195   3   Extra Units  196-210   4   Extra Units  211-225   5   Extra Units  226-240   6   Extra Units  241-255   7   Extra Units  256-270   8   Extra Units  271-285   9   Extra Units  286-300   10 Extra Units    Example: My planned insulin dose:    ____ Units    +    ____ Extra Correction Units  =  ____ total units to take together as one injection. Please note our new policy, you must arrive to the clinic 15 minutes before your appointment time to allow enough time for proper check-in, adequate time to spend with your doctor, and also to respect the appointment time of the next patient. Not arriving 15 minutes in advance may result in having your appointment rescheduled for the next available day/time.  ----------------------------------------------------------------------------------------------------------------------    Below you will find a glucose log sheet which you can use to record your blood sugars. Without checking and recording what your home glucose levels are, it will be difficult to make any changes to your medication dose, even when significant changes may be needed. Please feel free to use the log below to record your home glucose levels. At the very least, I would like for you to login the entire 2-3 weeks just before your visit so we can make your visit much more productive and beneficial to you. GLUCOSE LOG SHEET:    Date Breakfast Lunch Dinner Bedtime Comments ? GLUCOSE LOG SHEET:    Date Breakfast Lunch Dinner Bedtime Comments ? GLUCOSE LOG SHEET:    Date Breakfast Lunch Dinner Bedtime Comments ?

## 2018-11-14 NOTE — LETTER
NOTIFICATION RETURN TO WORK / SCHOOL 
 
11/14/2018 3:41 PM 
 
Ms. Sukhdev Bonilla 
[address] [place] [address] To Whom It May Concern: 
 
Sukhdev Bonilla is currently under the care of [address] [place] [address]. She will return to work/school on the 26th as we are treating her for uncontrolled diabetes complicated by infections requiring multiple hospitalizations. I believe it would be reasonable for her to return to work at that time. Sincerely, Sushil Feldman MD

## 2018-11-19 ENCOUNTER — TELEPHONE (OUTPATIENT)
Dept: ENDOCRINOLOGY | Age: 53
End: 2018-11-19

## 2018-11-19 NOTE — TELEPHONE ENCOUNTER
----- Message from Gali Negro sent at 11/19/2018  8:36 AM EST -----  Regarding: /Telephone  Pt called requesting a call back in regards to if short term disability paperwork is filled out if not pt stated she has a copy and if possible if she can bring them in to get the paperwork done. Pt best contact number is [phone] .

## 2018-11-20 ENCOUNTER — TELEPHONE (OUTPATIENT)
Dept: ENDOCRINOLOGY | Age: 53
End: 2018-11-20

## 2018-11-20 NOTE — TELEPHONE ENCOUNTER
----- Message from Ellyn Tellez sent at 11/20/2018  8:30 AM EST -----  Regarding: DR. Huizar/telephone  Pt would like to know if the forms that were dropped off on yesterday ( 11/19/18/) are completed, signed, and ready for ? Pt would like the nurse to give her a call back. Callback: [phone]

## 2018-11-28 ENCOUNTER — DOCUMENTATION ONLY (OUTPATIENT)
Dept: FAMILY MEDICINE CLINIC | Age: 53
End: 2018-11-28

## 2019-01-21 ENCOUNTER — OFFICE VISIT (OUTPATIENT)
Dept: FAMILY MEDICINE CLINIC | Age: 54
End: 2019-01-21

## 2019-01-21 VITALS
BODY MASS INDEX: 18.35 KG/M2 | RESPIRATION RATE: 20 BRPM | SYSTOLIC BLOOD PRESSURE: 137 MMHG | WEIGHT: 97.2 LBS | TEMPERATURE: 97 F | DIASTOLIC BLOOD PRESSURE: 86 MMHG | HEIGHT: 61 IN | HEART RATE: 99 BPM | OXYGEN SATURATION: 98 %

## 2019-01-21 DIAGNOSIS — M54.31 SCIATICA OF RIGHT SIDE: Primary | ICD-10-CM

## 2019-01-21 RX ORDER — NAPROXEN 500 MG/1
500 TABLET ORAL 2 TIMES DAILY WITH MEALS
Qty: 60 TAB | Refills: 1 | Status: SHIPPED | OUTPATIENT
Start: 2019-01-21 | End: 2019-02-19

## 2019-01-21 RX ORDER — KETOROLAC TROMETHAMINE 30 MG/ML
30 INJECTION, SOLUTION INTRAMUSCULAR; INTRAVENOUS ONCE
Qty: 1 VIAL | Refills: 0
Start: 2019-01-21 | End: 2019-01-21

## 2019-01-21 RX ORDER — TRIAMCINOLONE ACETONIDE 40 MG/ML
40 INJECTION, SUSPENSION INTRA-ARTICULAR; INTRAMUSCULAR ONCE
Qty: 1 ML | Refills: 0
Start: 2019-01-21 | End: 2019-01-21

## 2019-01-21 RX ORDER — BACLOFEN 10 MG/1
10 TABLET ORAL 3 TIMES DAILY
Qty: 30 TAB | Refills: 1 | Status: SHIPPED | OUTPATIENT
Start: 2019-01-21 | End: 2019-02-19

## 2019-01-21 NOTE — PROGRESS NOTES
Chief Complaint   Patient presents with    Leg Pain     right leg pain x 1 week      HPI:  Devon Champion is a 52 y.o. AA female with diabetes managed by Dr. Ion You presents with c/o right leg pain ongoing for a week. Pain starts in right mid buttock and radiates down right LE. Pain is rated 6-7/10. Patient denies injury/fall. Review of Systems  As per hpi    Past Medical History:   Diagnosis Date    Asthma     Diabetes ([facility]     Elevated transaminase level 6/29/2016    Insulin dependent diabetes mellitus ([facility] 6/20/2016    Pancreatic atrophy 6/20/2016     Past Surgical History:   Procedure Laterality Date    HX HEENT       Social History     Socioeconomic History    Marital status: SINGLE     Spouse name: Not on file    Number of children: Not on file    Years of education: Not on file    Highest education level: Not on file   Tobacco Use    Smoking status: Never Smoker    Smokeless tobacco: Never Used   Substance and Sexual Activity    Alcohol use: Yes     Comment: occasionally    Drug use: No    Sexual activity: Not Currently     Family History   Problem Relation Age of Onset    Cancer Father         prostate and colon    Diabetes Mother     Diabetes Maternal Grandmother     Cancer Maternal Aunt         breast     Current Outpatient Medications   Medication Sig Dispense Refill    metFORMIN ER (GLUCOPHAGE XR) 500 mg tablet Take 1 Tab by mouth Before breakfast and dinner. (Patient taking differently: Take 500 mg by mouth Daily (before breakfast). ) 180 Tab 3    insulin glargine (BASAGLAR KWIKPEN U-100 INSULIN) 100 unit/mL (3 mL) inpn 40 Units by SubCUTAneous route daily. (Patient taking differently: 50 Units by SubCUTAneous route daily. ) 10 Pen 3    glimepiride (AMARYL) 1 mg tablet Take 1 Tab by mouth Daily (before breakfast). 90 Tab 3    insulin lispro (HUMALOG KWIKPEN INSULIN) 100 unit/mL kwikpen 8 Units by SubCUTAneous route Before breakfast, lunch, and dinner.  + correction scale as needed (Patient taking differently: 25 Units by SubCUTAneous route Before breakfast, lunch, and dinner. + correction scale as needed) 10 Pen 3    glucose blood VI test strips (PRODIGY NO CODING) strip Use to test blood sugars 4 times per day. DX Code : E10.9 400 Strip 3    albuterol (PROVENTIL HFA, VENTOLIN HFA, PROAIR HFA) 90 mcg/actuation inhaler Take 2 Puffs by inhalation every four (4) hours as needed for Wheezing. 1 Inhaler 0    metoclopramide (REGLAN) 5 mg/5 mL syrup Take 5 mL by mouth three (3) times daily as needed. 120 mL 0     Allergies   Allergen Reactions    Contrast Agent [Iodine] Itching    Hydrocodone Rash    Percocet [Oxycodone-Acetaminophen] Rash    Codeine Nausea and Vomiting    Metformin Diarrhea     Objective:  Visit Vitals  /86   Pulse 99   Temp 97 °F (36.1 °C) (Oral)   Resp 20   Ht 5' 1\" (1.549 m)   Wt 97 lb 3.2 oz (44.1 kg)   SpO2 98%   BMI 18.37 kg/m²     Physical Exam:   General appearance - alert, well appearing in moderate distress  Mental status - alert, oriented to person, place, and time  Chest - clear to auscultation, no wheezes, rales or rhonchi  Heart - normal rate, regular rhythm, normal blood pressure  Abdomen - soft, nontender, nondistended, no organomegaly  Ext-peripheral pulses normal, no pedal edema  Neuro -alert, oriented, normal speech, no focal findings   Back-full range of motion, no tenderness, palpable spasm or pain on motion     Assessment/Plan:   Diagnoses and all orders for this visit:    Sciatica of right side  -     REFERRAL TO PHYSICAL THERAPY  -     naproxen (NAPROSYN) 500 mg tablet; Take 1 Tab by mouth two (2) times daily (with meals). , Normal, Disp-60 Tab, R-1  -     baclofen (LIORESAL) 10 mg tablet; Take 1 Tab by mouth three (3) times daily. , Normal, Disp-30 Tab, R-1  -     TRIAMCINOLONE ACETONIDE INJ  -     triamcinolone acetonide (KENALOG) 40 mg/mL injection; 1 mL by IntraMUSCular route once for 1 dose., No Print, Disp-1 mL, R-0  -     ketorolac (TORADOL) 30 mg/mL (1 mL) injection; 1 mL by IntraMUSCular route once for 1 dose., No Print, Disp-1 Vial, R-0  -     KETOROLAC TROMETHAMINE INJ  -     SC THER/PROPH/DIAG INJECTION, SUBCUT/IM      Patient Instructions        Sciatica: Exercises  Your Care Instructions  Here are some examples of typical rehabilitation exercises for your condition. Start each exercise slowly. Ease off the exercise if you start to have pain. Your doctor or physical therapist will tell you when you can start these exercises and which ones will work best for you. When you are not being active, find a comfortable position for rest. Some people are comfortable on the floor or a medium-firm bed with a small pillow under their head and another under their knees. Some people prefer to lie on their side with a pillow between their knees. Don't stay in one position for too long. Take short walks (10 to 20 minutes) every 2 to 3 hours. Avoid slopes, hills, and stairs until you feel better. Walk only distances you can manage without pain, especially leg pain. How to do the exercises  Back stretches    1. Get down on your hands and knees on the floor. 2. Relax your head and allow it to droop. Round your back up toward the ceiling until you feel a nice stretch in your upper, middle, and lower back. Hold this stretch for as long as it feels comfortable, or about 15 to 30 seconds. 3. Return to the starting position with a flat back while you are on your hands and knees. 4. Let your back sway by pressing your stomach toward the floor. Lift your buttocks toward the ceiling. 5. Hold this position for 15 to 30 seconds. 6. Repeat 2 to 4 times. Follow-up care is a key part of your treatment and safety. Be sure to make and go to all appointments, and call your doctor if you are having problems. It's also a good idea to know your test results and keep a list of the medicines you take. Where can you learn more?   Go to http://jurgen-molly.info/. Enter C657 in the search box to learn more about \"Sciatica: Exercises. \"  Current as of: September 20, 2018  Content Version: 11.9  © 3224-2149 [employer]. Care instructions adapted under license by [employer] (which disclaims liability or warranty for this information). If you have questions about a medical condition or this instruction, always ask your healthcare professional. [place] [address] any warranty or liability for your use of this information. Follow-up Disposition:  Return 4-6 weeks, for f/u treatment.

## 2019-01-21 NOTE — PATIENT INSTRUCTIONS
Sciatica: Exercises  Your Care Instructions  Here are some examples of typical rehabilitation exercises for your condition. Start each exercise slowly. Ease off the exercise if you start to have pain. Your doctor or physical therapist will tell you when you can start these exercises and which ones will work best for you. When you are not being active, find a comfortable position for rest. Some people are comfortable on the floor or a medium-firm bed with a small pillow under their head and another under their knees. Some people prefer to lie on their side with a pillow between their knees. Don't stay in one position for too long. Take short walks (10 to 20 minutes) every 2 to 3 hours. Avoid slopes, hills, and stairs until you feel better. Walk only distances you can manage without pain, especially leg pain. How to do the exercises  Back stretches    1. Get down on your hands and knees on the floor. 2. Relax your head and allow it to droop. Round your back up toward the ceiling until you feel a nice stretch in your upper, middle, and lower back. Hold this stretch for as long as it feels comfortable, or about 15 to 30 seconds. 3. Return to the starting position with a flat back while you are on your hands and knees. 4. Let your back sway by pressing your stomach toward the floor. Lift your buttocks toward the ceiling. 5. Hold this position for 15 to 30 seconds. 6. Repeat 2 to 4 times. Follow-up care is a key part of your treatment and safety. Be sure to make and go to all appointments, and call your doctor if you are having problems. It's also a good idea to know your test results and keep a list of the medicines you take. Where can you learn more? Go to http://jurgen-molly.info/. Enter P274 in the search box to learn more about \"Sciatica: Exercises. \"  Current as of: September 20, 2018  Content Version: 11.9  © 2422-7234 [employer].  Care instructions adapted under license by [address] (which disclaims liability or warranty for this information). If you have questions about a medical condition or this instruction, always ask your healthcare professional. [address] any warranty or liability for your use of this information.

## 2019-01-22 NOTE — PROGRESS NOTES
Chief Complaint   Patient presents with    Leg Pain     right leg pain x 1 week      Order placed for 2 injections in right and left gluteus, per Verbal Order from per  on 1/21/2019. Patient was observed for 15 min in the exam room for signs and symptoms.

## 2019-01-24 ENCOUNTER — TELEPHONE (OUTPATIENT)
Dept: ENDOCRINOLOGY | Age: 54
End: 2019-01-24

## 2019-01-24 NOTE — TELEPHONE ENCOUNTER
Received outside glucose log from patient,     As of last visit, regimen as follows:   Basaglar 50 units once daily  Humalog 28 units TID AC  Correction 1:15>150  Trial of metformin 500mg BID    Glucose log from 1st week of January shows significant improvement, some variability but otherwise stable. I attempted to reach patient at all numbers on file, no answer, left general message. Aliya Rojas.  [facility]  [address] [place] [address]

## 2019-01-25 ENCOUNTER — TELEPHONE (OUTPATIENT)
Dept: ENDOCRINOLOGY | Age: 54
End: 2019-01-25

## 2019-01-25 NOTE — TELEPHONE ENCOUNTER
Attempted to reach by phone again, no answer, left general message. I recommend patient sign up for mychart for ease of communication with me. Riya Alvarado.  [address]

## 2019-01-25 NOTE — TELEPHONE ENCOUNTER
Patient stated that she is returning your call and can be reached at [phone]. She stated to call around 12pm because that is her next break.

## 2019-02-18 ENCOUNTER — OFFICE VISIT (OUTPATIENT)
Dept: ENDOCRINOLOGY | Age: 54
End: 2019-02-18

## 2019-02-18 VITALS
HEART RATE: 139 BPM | DIASTOLIC BLOOD PRESSURE: 72 MMHG | BODY MASS INDEX: 17.18 KG/M2 | WEIGHT: 91 LBS | HEIGHT: 61 IN | SYSTOLIC BLOOD PRESSURE: 107 MMHG

## 2019-02-18 DIAGNOSIS — E13.9 LADA (LATENT AUTOIMMUNE DIABETES IN ADULTS), MANAGED AS TYPE 1 (HCC): Primary | ICD-10-CM

## 2019-02-18 LAB — HBA1C MFR BLD HPLC: 14.2 %

## 2019-02-18 NOTE — LETTER
NOTIFICATION RETURN TO WORK / SCHOOL 
 
2/18/2019 4:19 PM 
 
Ms. Mason Backbone 
[address] [place] [address] Please note that this patient was required to see me in my clinic today for an important visit relating to their health. This may have caused them to be absent from work/school, and so I ask of you to please excuse them during this time that I had required them to be present here. This letter and the contents within are being provided with the approval of the patient. Sincerely, Aleyda Rooney MD

## 2019-02-18 NOTE — PATIENT INSTRUCTIONS
Metformin, 500mg tab TWICE daily, tolerating    Basaglar: Increase to 55 units each morning,    Humalog:                   Breakfast: 28 units                   Lunch: 30 units                  Dinner: 30 units     Correction Scale:  1 unit for every 15 above 150    IF GLUCOSE IS:                 THEN TAKE:      0   Extra Unit  151-165   1   Extra Unit  166-180   2   Extra Units  181-195   3   Extra Units  196-210   4   Extra Units  211-225   5   Extra Units  226-240   6   Extra Units  241-255   7   Extra Units  256-270   8   Extra Units  271-285   9   Extra Units  286-300   10 Extra Units    Example: My planned insulin dose:    ____ Units    +    ____ Extra Correction Units  =  ____ total units to take together as one injection. Please note our new policy, you must arrive to the clinic 15 minutes before your appointment time to allow enough time for proper check-in, adequate time to spend with your doctor, and also to respect the appointment time of the next patient. Not arriving 15 minutes in advance may result in having your appointment rescheduled for the next available day/time.  ----------------------------------------------------------------------------------------------------------------------    Below you will find a glucose log sheet which you can use to record your blood sugars. Without checking and recording what your home glucose levels are, it will be difficult to make any changes to your medication dose, even when significant changes may be needed. Please feel free to use the log below to record your home glucose levels. At the very least, I would like for you to login the entire 2-3 weeks just before your visit so we can make your visit much more productive and beneficial to you. GLUCOSE LOG SHEET:    Date Breakfast Lunch Dinner Bedtime Comments ? GLUCOSE LOG SHEET:    Date Breakfast Lunch Dinner Bedtime Comments ? GLUCOSE LOG SHEET:    Date Breakfast Lunch Dinner Bedtime Comments ?

## 2019-02-18 NOTE — PROGRESS NOTES
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 female with a PMHx as noted below who is presenting to our endocrine clinic for the f/u evaluation of recently diagnosed diabetes. History of Type 1 Diabetes:  Patient reports that they were diagnosed in the ED with A1c of 15% 2016     Pancreatic atrophy on CT   Family history is positive for diabetes in mother and maternal grandmother, both on insulin but type of diabetes unclear  Was started on novolog 70/30 insulin, did not tolerate any oral medications (neg antibodies, normal c-peptide)    INTERVAL HISTORY:  Started metformin previously and she is currently tolerating it, whereas she had not in the past.   Her A1c POC today is 14.2% but this seems very inaccurate or there must have been some really bad weeks not recorded.      Current home regimen includes:   Metformin ER 500mg twice per day, tolerating  Basaglar: Continue with 50 units once daily each AM  Humalo units with each meal, (she has been taking 25 units instead)  Correction Scale:  1 unit for every 15 above 150    Home Blood glucose review:   AM:   205-287  Lunch:  175-250  Dinner:  180-250, occasional 300  Bedtime: not checked    Review of most recent diabetes-related labs:  Lab Results   Component Value Date    HBA1C 10.7 (H) 10/23/2018    HBA1C 8.8 (H) 2018    HBA1C 8.5 (H) 2018    MKD2CJOU 10.6 2018    YVD9OXSF 9.1 10/17/2018    BYI2YABW 7.7 2018    CHOL 202 (H) 2018    LDLC 66 2018    GFRAA 54 (L) 10/27/2018    GFRNA 45 (L) 10/27/2018    MCACR 24.9 2018    TSH 1.500 2018    635144 <1.0 2018    GADLT <5.0 2018    CPEPLT 1.7 2018     Lab Key:  [id number] = IA-2 pancreatic islet cell autoantibody  GADLT = JORDANA-65 autoantibody   MCACR = Urine Microalbumin  INSUL = Insulin level  CPEPL = C-peptide level    PAST MEDICAL/SURGICAL HISTORY:   Past Medical History:   Diagnosis Date    Asthma     [facility] [address] [address]     Elevated transaminase level 6/29/2016    Insulin dependent diabetes mellitus ([facility] [address] 6/20/2016    Pancreatic atrophy 6/20/2016     Past Surgical History:   Procedure Laterality Date    HX HEENT         ALLERGIES:   Allergies   Allergen Reactions    Contrast Agent [Iodine] Itching    Hydrocodone Rash    Percocet [Oxycodone-Acetaminophen] Rash    Codeine Nausea and Vomiting    Metformin Diarrhea       MEDICATIONS ON ADMISSION:     Current Outpatient Medications:     metFORMIN ER (GLUCOPHAGE XR) 500 mg tablet, Take 1 Tab by mouth Before breakfast and dinner. (Patient taking differently: Take 500 mg by mouth two (2) times a day.), Disp: 180 Tab, Rfl: 3    insulin glargine (BASAGLAR KWIKPEN U-100 INSULIN) 100 unit/mL (3 mL) inpn, 40 Units by SubCUTAneous route daily. (Patient taking differently: 50 Units by SubCUTAneous route daily.), Disp: 10 Pen, Rfl: 3    insulin lispro (HUMALOG KWIKPEN INSULIN) 100 unit/mL kwikpen, 8 Units by SubCUTAneous route Before breakfast, lunch, and dinner. + correction scale as needed (Patient taking differently: 25 Units by SubCUTAneous route Before breakfast, lunch, and dinner. + correction scale as needed), Disp: 10 Pen, Rfl: 3    glucose blood VI test strips (PRODIGY NO CODING) strip, Use to test blood sugars 4 times per day. DX Code : E10.9, Disp: 400 Strip, Rfl: 3    naproxen (NAPROSYN) 500 mg tablet, Take 1 Tab by mouth two (2) times daily (with meals). , Disp: 60 Tab, Rfl: 1    baclofen (LIORESAL) 10 mg tablet, Take 1 Tab by mouth three (3) times daily. , Disp: 30 Tab, Rfl: 1    glimepiride (AMARYL) 1 mg tablet, Take 1 Tab by mouth Daily (before breakfast). , Disp: 90 Tab, Rfl: 3    metoclopramide (REGLAN) 5 mg/5 mL syrup, Take 5 mL by mouth three (3) times daily as needed. , Disp: 120 mL, Rfl: 0    albuterol (PROVENTIL HFA, VENTOLIN HFA, PROAIR HFA) 90 mcg/actuation inhaler, Take 2 Puffs by inhalation every four (4) hours as needed for Wheezing., Disp: 1 Inhaler, Rfl: 0    SOCIAL HISTORY:   Social History     Socioeconomic History    Marital status: SINGLE     Spouse name: Not on file    Number of children: Not on file    Years of education: Not on file    Highest education level: Not on file   Social Needs    Financial resource strain: Not on file    Food insecurity - worry: Not on file    Food insecurity - inability: Not on file   [employer] needs - medical: Not on file   [employer] needs - non-medical: Not on file   Occupational History    Not on file   Tobacco Use    Smoking status: Never Smoker    Smokeless tobacco: Never Used   Substance and Sexual Activity    Alcohol use: Yes     Comment: occasionally    Drug use: No    Sexual activity: Not Currently   Other Topics Concern    Not on file   Social History Narrative    Not on file       FAMILY HISTORY:  Family History   Problem Relation Age of Onset    Cancer Father         prostate and colon    Diabetes Mother     Diabetes Maternal Grandmother     Cancer Maternal Aunt         breast       REVIEW OF SYSTEMS: Complete ROS assessed and noted for that which is described above, all else are negative.   Eyes: normal  ENT: normal  CVS: normal  Resp: normal  GI: normal  : normal  GYN: normal  Endocrine: normal  Integument: normal  Musculoskeletal: normal  Neuro: normal  Psych: normal    PHYSICAL EXAMINATION:    VITAL SIGNS:  Visit Vitals  /72 (BP 1 Location: Left arm, BP Patient Position: Sitting)   Pulse (!) 139   Ht 5' 1\" (1.549 m)   Wt 91 lb (41.3 kg)   BMI 17.19 kg/m²       GENERAL: NCAT, Sitting comfortably, NAD  EYES: EOMI, non-icteric, no proptosis  HEENT: NCAT, no inflammation, no masses  LYMPH NODES: No LAD  CARDIOVASCULAR: S1 S2, RRR, No murmur, 2+ radial pulses  RESPIRATORY: CTA b/l, no wheeze/rales  ABDOMEN: BS normal  NEUROLOGIC: 5/5 power all extremities, no parasthesias, AAOx3  EXTREMITIES: warm, no edema/rash/ or other skin changes    Diabetic foot exam:     Left Foot:   Visual Exam: callous - at ball of foot   Pulse DP: 2+ (normal)   Filament test: normal sensation    Vibratory sensation: Vibratory sensation: normal       Right Foot:   Visual Exam: normal    Pulse DP: 2+ (normal)   Filament test: normal sensation    Vibratory sensation: Vibratory sensation: normal      REVIEW OF LABORATORY AND RADIOLOGY DATA:   Labs and documentation have been reviewed as described above. ASSESSMENT AND PLAN:   Chance Arellano is a 52 y.o. female with a PMHx as noted below who is presenting to our endocrine clinic for the f/u evaluation of recent onset diabetes. SHANTEL, late onset autoimmune diabetes of the adult (type-1 like diabetes)     We never really figured out why her insulin dose requirement kept going up, other than the recurring UTI's she was having, although she is starting to tolerate metformin which will help with the insulin resistance. We discussed taking another somewhat aggressive approach since her sugars are still mostly in the 200's. Instructions as below. 2019 DM prelabs ordered, and foot exam completed today. Plan:  Medications:  Metformin, 500mg tab TWICE daily, tolerating  Basaglar: Increase to 55 units each morning,  Humalog:                   Breakfast: 28 units                   Lunch: 30 units                  Dinner: 30 units   Correction Scale:  1 unit for every 15 above 150    BP: Stable, not on antihypertensives  Cholesterol: LDL 66 previously    DM labs ordered for next visit  DM foot exam completed today    RTC: I would like to see them back in 3 months, call with concerns,    Reggie Santa.  [address] Endocrinology  [address] [place] [address]

## 2019-02-19 ENCOUNTER — OFFICE VISIT (OUTPATIENT)
Dept: INTERNAL MEDICINE CLINIC | Age: 54
End: 2019-02-19

## 2019-02-19 VITALS
TEMPERATURE: 97.3 F | SYSTOLIC BLOOD PRESSURE: 115 MMHG | BODY MASS INDEX: 17.18 KG/M2 | WEIGHT: 91 LBS | DIASTOLIC BLOOD PRESSURE: 73 MMHG | RESPIRATION RATE: 16 BRPM | HEART RATE: 102 BPM | OXYGEN SATURATION: 99 % | HEIGHT: 61 IN

## 2019-02-19 DIAGNOSIS — Z00.00 PHYSICAL EXAM: ICD-10-CM

## 2019-02-19 DIAGNOSIS — Z23 ENCOUNTER FOR IMMUNIZATION: ICD-10-CM

## 2019-02-19 DIAGNOSIS — N39.0 RECURRENT UTI: ICD-10-CM

## 2019-02-19 RX ORDER — METFORMIN HYDROCHLORIDE 500 MG/1
500 TABLET, EXTENDED RELEASE ORAL
Qty: 180 TAB | Refills: 0 | Status: SHIPPED | OUTPATIENT
Start: 2019-02-19 | End: 2019-08-21

## 2019-02-19 NOTE — LETTER
NOTIFICATION RETURN TO WORK / SCHOOL 
 
2/19/2019 9:41 AM 
 
Ms. Yuliana Villa 
[address] [place] [address] To Whom It May Concern: 
 
Yuliana Villa is currently under the care of [facility]. The patient was seen today by Dr. Nazia Mitchell on 2/19/19. She will return to work later today. If there are questions or concerns please have the patient contact our office.  
 
 
 
Sincerely, 
 
 
Rocío Barraza MD

## 2019-02-19 NOTE — PROGRESS NOTES
HISTORY OF PRESENT ILLNESS  Alexus Tavares is a 52 y.o. female. HPI  Pt is here to establish care. Pt presents with her cousin her today who provides some of the hx    BP is 115/73    BS at home around 100s-200s  Denies any lows <70  Pt takes basaglar 50U and humalog 25U   Pt is also on metformin 500mg 2 per day    Wt today is 91 lbs  This is in the underweight range    Will get labs today    Pt follows with Dr Robin Shah (endo) for type I diabetes  Reviewed notes 2/18/19: Alexus Tavares is a 52 y.o. female with a PMHx as noted below who is presenting to our endocrine clinic for the f/u evaluation of recent onset diabetes. SHANTEL, late onset autoimmune diabetes of the adult (type-1 like diabetes)   We never really figured out why her insulin dose requirement kept going up, other than the recurring UTI's she was having, although she is starting to tolerate metformin which will help with the insulin resistance. We discussed taking another somewhat aggressive approach since her sugars are still mostly in the 200's. Instructions as below. 2019 DM prelabs ordered, and foot exam completed today.    Plan:  Medications:  Metformin, 500mg tab TWICE daily, tolerating  Basaglar: Increase to 55 units each morning,  Humalog:                   Breakfast: 28 units                              Lunch: 30 units                  Dinner: 30 units   Correction Scale:  1 unit for every 15 above 150  BP: Stable, not on antihypertensives  Cholesterol: LDL 66 previously  DM labs ordered for next visit  DM foot exam completed today  RTC: I would like to see them back in 3 months, call with concerns,    Pt follows with Dr Neema Vincent (nephro)  Last visit was 1/19  Per pt, no changes were made at that time  Reviewed notes 9/18/18: high potassium and cr of 1.9 at hospital discharge, /70, keep diabetes well controlled, f/u in 4 months    Pt also is now seeing Dr Gustavo Harkins (uro) and [place] [facility] for recurrent UTIs  Reviewed notes 10/18: repeat UA negative, gave cranberry tablets, get pelvic exam and perhaps uro gyn for vaginal spotting  Pt may have seen Dr Darren Garcia - provided information in case she has not    Pt completed PT for her hip    Pt has albuterol to use prn    Pt states that she was vomiting all day yesterday  She thinks this was from a stomach bug going around    Reviewed CT abd 10/18: negative  Reviewed US abd 11/18: no aneurysm, nl  Reviewed CT head 8/18: no acute abnormality    Reviewed mammo 7/16: nl  Reviewed COLO report 10/16: repeat in 10 years, hemorrhoids    PMHx: Elevated transaminase level  Insulin dependent diabetes   Pancreatic atrophy  Asthma    FMHx: Father - prostate and colon cancer  Mother - diabetes  Grandmother - diabetes  Cousin - breast cancer    PSHx: tonsils removed    SHx: Never smoker  No alcohol  Lives alone but gets helped from her cousin  Not , no children  Pt works in a [employer]    PREVENTIVE:  Colonoscopy: 10/13/16, Dr Hilda Alonzo, repeat in 8 years, however fmhx - father so repeat in 5 years  Pap: not recently, provided information  Mammogram: 7/16, negative, due, ordered  Dexa: not yet needed  Tdap: ~2013  Pneumovax: 02/19/19   Shingrix: not yet needed  Flu shot: 9/18  Foot exam: 2/18/19 with endo  A1C: 2/19 POC 14.2  Eye exam: not recently, provided referral  Lipids: 3/18 LDL 66      Patient Active Problem List    Diagnosis Date Noted    Hyperglycemia 10/23/2018    Urinary incontinence 10/22/2018    Recurrent UTI 10/22/2018    DKA (diabetic ketoacidoses) ([address] 08/24/2018    Sepsis ([address] 08/24/2018    Hyponatremia 08/24/2018    ARF (acute renal failure) ([address] 08/24/2018    Hyperkalemia 08/24/2018    Type 2 diabetes with nephropathy ([address] 05/23/2018    Uncontrolled type 2 DM with hyperosmolar nonketotic hyperglycemia ([address] 01/14/2018    Elevated transaminase level 06/29/2016    Insulin dependent diabetes mellitus ([address] 06/20/2016    Pancreatic atrophy 06/20/2016    Hyperosmolality syndrome 06/10/2016     Current Outpatient Medications   Medication Sig Dispense Refill    metFORMIN ER (GLUCOPHAGE XR) 500 mg tablet Take 1 Tab by mouth Before breakfast and dinner. (Patient taking differently: Take 500 mg by mouth two (2) times a day.) 180 Tab 3    insulin glargine (BASAGLAR KWIKPEN U-100 INSULIN) 100 unit/mL (3 mL) inpn 40 Units by SubCUTAneous route daily. (Patient taking differently: 50 Units by SubCUTAneous route daily. ) 10 Pen 3    insulin lispro (HUMALOG KWIKPEN INSULIN) 100 unit/mL kwikpen 8 Units by SubCUTAneous route Before breakfast, lunch, and dinner. + correction scale as needed (Patient taking differently: 25 Units by SubCUTAneous route Before breakfast, lunch, and dinner. + correction scale as needed) 10 Pen 3    glucose blood VI test strips (PRODIGY NO CODING) strip Use to test blood sugars 4 times per day. DX Code : E10.9 400 Strip 3    albuterol (PROVENTIL HFA, VENTOLIN HFA, PROAIR HFA) 90 mcg/actuation inhaler Take 2 Puffs by inhalation every four (4) hours as needed for Wheezing. 1 Inhaler 0    naproxen (NAPROSYN) 500 mg tablet Take 1 Tab by mouth two (2) times daily (with meals). 60 Tab 1    baclofen (LIORESAL) 10 mg tablet Take 1 Tab by mouth three (3) times daily. 30 Tab 1    glimepiride (AMARYL) 1 mg tablet Take 1 Tab by mouth Daily (before breakfast). 90 Tab 3    metoclopramide (REGLAN) 5 mg/5 mL syrup Take 5 mL by mouth three (3) times daily as needed.  120 mL 0     Past Surgical History:   Procedure Laterality Date    HX HEENT        Lab Results   Component Value Date/Time    WBC 9.5 10/27/2018 02:12 AM    HGB 9.2 (L) 10/27/2018 02:12 AM    HCT 29.0 (L) 10/27/2018 02:12 AM    Hematocrit (POC) 31 (L) 08/24/2018 01:15 PM    PLATELET 148 17/06/7827 02:12 AM    MCV 90.9 10/27/2018 02:12 AM     Lab Results   Component Value Date/Time    Cholesterol, total 202 (H) 03/06/2018 07:53 AM    HDL Cholesterol 125 03/06/2018 07:53 AM    LDL, calculated 66 03/06/2018 07:53 AM Triglyceride 53 03/06/2018 07:53 AM     Lab Results   Component Value Date/Time    GFR est non-AA 45 (L) 10/27/2018 02:12 AM    GFRNA, POC 4 (L) 08/24/2018 01:15 PM    GFR est AA 54 (L) 10/27/2018 02:12 AM    GFRAA, POC 5 (L) 08/24/2018 01:15 PM    Creatinine 1.24 (H) 10/27/2018 02:12 AM    Creatinine (POC) 9.5 (H) 08/24/2018 01:15 PM    BUN 29 (H) 10/27/2018 02:12 AM    BUN (POC) >140 (H) 08/24/2018 01:15 PM    Sodium 137 10/27/2018 02:12 AM    Sodium (POC) 124 (L) 08/24/2018 01:15 PM    Potassium 4.2 10/27/2018 02:12 AM    Potassium (POC) 7.7 (HH) 08/24/2018 01:15 PM    Chloride 102 10/27/2018 02:12 AM    Chloride (POC) 91 (L) 08/24/2018 01:15 PM    CO2 28 10/27/2018 02:12 AM    Magnesium 1.6 10/27/2018 02:12 AM    Phosphorus 3.4 10/23/2018 07:07 PM    PTH, Intact 360.7 (H) 08/25/2018 10:36 AM        Review of Systems   Constitutional: Negative for chills and fever. HENT: Negative for hearing loss and tinnitus. Eyes: Negative for blurred vision and double vision. Respiratory: Negative for shortness of breath and wheezing. Cardiovascular: Negative for chest pain and palpitations. Gastrointestinal: Positive for vomiting. Negative for nausea. Genitourinary: Negative for dysuria and frequency. Musculoskeletal: Negative for back pain and falls. Skin: Negative for itching and rash. Neurological: Negative for dizziness, loss of consciousness and headaches. Psychiatric/Behavioral: Negative for depression. The patient is not nervous/anxious. Physical Exam   Constitutional: She is oriented to person, place, and time. She appears well-developed and well-nourished. No distress. HENT:   Head: Normocephalic and atraumatic. Right Ear: External ear normal.   Left Ear: External ear normal.   Mouth/Throat: Oropharynx is clear and moist. No oropharyngeal exudate. Cerumen to L ear   Eyes: Conjunctivae and EOM are normal. Right eye exhibits no discharge. Left eye exhibits no discharge.    Neck: Normal range of motion. Neck supple. No carotid bruits    Cardiovascular: Normal rate, regular rhythm, normal heart sounds and intact distal pulses. Exam reveals no gallop and no friction rub. No murmur heard. Pulmonary/Chest: Effort normal and breath sounds normal. No respiratory distress. She has no wheezes. She has no rales. She exhibits no tenderness. Abdominal: Soft. She exhibits no distension and no mass. There is no tenderness. There is no rebound and no guarding. Musculoskeletal: Normal range of motion. She exhibits no edema, tenderness or deformity. Lymphadenopathy:     She has no cervical adenopathy. Neurological: She is alert and oriented to person, place, and time. Coordination normal.   Skin: Skin is warm and dry. No rash noted. She is not diaphoretic. No erythema. No pallor. Psychiatric: She has a normal mood and affect. Her behavior is normal.       ASSESSMENT and PLAN    ICD-10-CM ICD-9-CM    1.  Insulin dependent diabetes mellitus (HCC)    Very poorly controlled type I diabetic, has labile glucose, due for eye exam, ordered, labs due, ordered, sees Dr Joselyn Triplett, will forward copy of lab results to him   E11.9 250.00 metFORMIN ER (GLUCOPHAGE XR) 500 mg tablet    Z79.4 V58.67 LIPID PANEL      METABOLIC PANEL, COMPREHENSIVE      MICROALBUMIN, UR, RAND W/ MICROALB/CREAT RATIO      CBC W/O DIFF      TSH 3RD GENERATION      HEMOGLOBIN A1C WITH EAG      REFERRAL TO OPHTHALMOLOGY      [facility] MAMMO BI SCREENING INCL CAD      REFERRAL TO OBSTETRICS AND GYNECOLOGY   2. Physical exam    Wt okay, runs on lower side because pt is a brittle type I diabetic, needs to focus on not losing additional wt, pneumovax today, eye exam due, pelvic due, mammo due, COLO UTD, labs ordered, flu shot UTD   Z00.00 V70.9 metFORMIN ER (GLUCOPHAGE XR) 500 mg tablet      LIPID PANEL      METABOLIC PANEL, COMPREHENSIVE      MICROALBUMIN, UR, RAND W/ MICROALB/CREAT RATIO      CBC W/O DIFF      TSH 3RD GENERATION      HEMOGLOBIN A1C WITH EAG      REFERRAL TO OPHTHALMOLOGY      [facility] MAMMO BI SCREENING INCL CAD      REFERRAL TO OBSTETRICS AND GYNECOLOGY   3. Recurrent UTI    Follows with Dr Ford Goldmann, may have seen Dr Jamel Elam as well, will get notes for review, now on cranberry tablets   N39.0 599.0 REFERRAL TO [address] [place] [address]    dep screen neg    Scribed by Adela Resendiz of [address] [place] [address] [address], as dictated by Dr. Lexus Larios. Current diagnosis and concerns discussed with pt at length. Pt understands risks and benefits or current treatment plan and medications, and accepts the treatment and medication with any possible risks. Pt asks appropriate questions, which were answered. Pt was instructed to call with any concerns or problems. I have reviewed the note documented by the scribe. The services provided are my own.   The documentation is accurate

## 2019-02-20 ENCOUNTER — APPOINTMENT (OUTPATIENT)
Dept: ULTRASOUND IMAGING | Age: 54
DRG: 638 | End: 2019-02-20
Attending: FAMILY MEDICINE
Payer: COMMERCIAL

## 2019-02-20 ENCOUNTER — TELEPHONE (OUTPATIENT)
Dept: INTERNAL MEDICINE CLINIC | Age: 54
End: 2019-02-20

## 2019-02-20 ENCOUNTER — TELEPHONE (OUTPATIENT)
Dept: ENDOCRINOLOGY | Age: 54
End: 2019-02-20

## 2019-02-20 ENCOUNTER — APPOINTMENT (OUTPATIENT)
Dept: GENERAL RADIOLOGY | Age: 54
DRG: 638 | End: 2019-02-20
Attending: STUDENT IN AN ORGANIZED HEALTH CARE EDUCATION/TRAINING PROGRAM
Payer: COMMERCIAL

## 2019-02-20 ENCOUNTER — HOSPITAL ENCOUNTER (INPATIENT)
Age: 54
LOS: 2 days | Discharge: HOME | DRG: 638 | End: 2019-02-22
Attending: STUDENT IN AN ORGANIZED HEALTH CARE EDUCATION/TRAINING PROGRAM | Admitting: FAMILY MEDICINE
Payer: COMMERCIAL

## 2019-02-20 DIAGNOSIS — E87.0 TYPE 1 DIABETES MELLITUS WITH HYPEROSMOLARITY WITHOUT NONKETOTIC HYPERGLYCEMIC HYPEROSMOLAR COMA (HCC): Primary | ICD-10-CM

## 2019-02-20 DIAGNOSIS — E10.65 TYPE 1 DIABETES MELLITUS WITH HYPEROSMOLARITY WITHOUT NONKETOTIC HYPERGLYCEMIC HYPEROSMOLAR COMA (HCC): Primary | ICD-10-CM

## 2019-02-20 DIAGNOSIS — E10.69 TYPE 1 DIABETES MELLITUS WITH HYPEROSMOLARITY WITHOUT NONKETOTIC HYPERGLYCEMIC HYPEROSMOLAR COMA (HCC): Primary | ICD-10-CM

## 2019-02-20 PROBLEM — E11.01 HYPEROSMOLAR (NONKETOTIC) COMA (HCC): Status: ACTIVE | Noted: 2019-02-20

## 2019-02-20 LAB
ADMINISTERED INITIALS, ADMINIT: NORMAL
ALBUMIN SERPL-MCNC: 3.9 G/DL (ref 3.5–5)
ALBUMIN SERPL-MCNC: 4.4 G/DL (ref 3.5–5.5)
ALBUMIN/CREAT UR: 192.5 MG/G CREAT (ref 0–30)
ALBUMIN/GLOB SERPL: 0.9 {RATIO} (ref 1.1–2.2)
ALBUMIN/GLOB SERPL: 1.9 {RATIO} (ref 1.2–2.2)
ALP SERPL-CCNC: 444 IU/L (ref 39–117)
ALP SERPL-CCNC: 482 U/L (ref 45–117)
ALT SERPL-CCNC: 246 U/L (ref 12–78)
ALT SERPL-CCNC: 332 IU/L (ref 0–32)
ANION GAP SERPL CALC-SCNC: 11 MMOL/L (ref 5–15)
ANION GAP SERPL CALC-SCNC: 8 MMOL/L (ref 5–15)
APAP SERPL-MCNC: <2 UG/ML (ref 10–30)
APPEARANCE UR: CLEAR
AST SERPL-CCNC: 191 IU/L (ref 0–40)
AST SERPL-CCNC: 64 U/L (ref 15–37)
BACTERIA URNS QL MICRO: NEGATIVE /HPF
BASOPHILS # BLD: 0 K/UL (ref 0–0.1)
BASOPHILS NFR BLD: 0 % (ref 0–1)
BILIRUB SERPL-MCNC: 0.4 MG/DL (ref 0.2–1)
BILIRUB SERPL-MCNC: 0.6 MG/DL (ref 0–1.2)
BILIRUB UR QL: NEGATIVE
BUN SERPL-MCNC: 29 MG/DL (ref 6–20)
BUN SERPL-MCNC: 37 MG/DL (ref 6–20)
BUN SERPL-MCNC: 42 MG/DL (ref 6–24)
BUN/CREAT SERPL: 18 (ref 12–20)
BUN/CREAT SERPL: 19 (ref 12–20)
BUN/CREAT SERPL: 23 (ref 9–23)
CALCIUM SERPL-MCNC: 8.4 MG/DL (ref 8.5–10.1)
CALCIUM SERPL-MCNC: 9.5 MG/DL (ref 8.5–10.1)
CALCIUM SERPL-MCNC: 9.9 MG/DL (ref 8.7–10.2)
CHLORIDE SERPL-SCNC: 104 MMOL/L (ref 97–108)
CHLORIDE SERPL-SCNC: 86 MMOL/L (ref 97–108)
CHLORIDE SERPL-SCNC: 91 MMOL/L (ref 96–106)
CHOLEST SERPL-MCNC: 191 MG/DL (ref 100–199)
CO2 SERPL-SCNC: 23 MMOL/L (ref 20–29)
CO2 SERPL-SCNC: 27 MMOL/L (ref 21–32)
CO2 SERPL-SCNC: 27 MMOL/L (ref 21–32)
COLOR UR: ABNORMAL
COMMENT, HOLDF: NORMAL
CREAT SERPL-MCNC: 1.56 MG/DL (ref 0.55–1.02)
CREAT SERPL-MCNC: 1.84 MG/DL (ref 0.57–1)
CREAT SERPL-MCNC: 2.06 MG/DL (ref 0.55–1.02)
CREAT UR-MCNC: 44.2 MG/DL
D50 ADMINISTERED, D50ADM: 0 ML
D50 ORDER, D50ORD: 0 ML
DIFFERENTIAL METHOD BLD: NORMAL
EOSINOPHIL # BLD: 0 K/UL (ref 0–0.4)
EOSINOPHIL NFR BLD: 0 % (ref 0–7)
EPITH CASTS URNS QL MICRO: ABNORMAL /LPF
ERYTHROCYTE [DISTWIDTH] IN BLOOD BY AUTOMATED COUNT: 12.6 % (ref 11.5–14.5)
ERYTHROCYTE [DISTWIDTH] IN BLOOD BY AUTOMATED COUNT: 13.5 % (ref 12.3–15.4)
EST. AVERAGE GLUCOSE BLD GHB EST-MCNC: 367 MG/DL
GLOBULIN SER CALC-MCNC: 2.3 G/DL (ref 1.5–4.5)
GLOBULIN SER CALC-MCNC: 4.2 G/DL (ref 2–4)
GLSCOM COMMENTS: NORMAL
GLUCOSE BLD STRIP.AUTO-MCNC: 100 MG/DL (ref 65–100)
GLUCOSE BLD STRIP.AUTO-MCNC: 206 MG/DL (ref 65–100)
GLUCOSE BLD STRIP.AUTO-MCNC: 353 MG/DL (ref 65–100)
GLUCOSE BLD STRIP.AUTO-MCNC: >600 MG/DL (ref 65–100)
GLUCOSE BLD STRIP.AUTO-MCNC: >600 MG/DL (ref 65–100)
GLUCOSE SERPL-MCNC: 175 MG/DL (ref 65–100)
GLUCOSE SERPL-MCNC: 714 MG/DL (ref 65–99)
GLUCOSE SERPL-MCNC: 778 MG/DL (ref 65–100)
GLUCOSE UR STRIP.AUTO-MCNC: >1000 MG/DL
GLUCOSE, GLC: 100 MG/DL
GLUCOSE, GLC: 206 MG/DL
GLUCOSE, GLC: 353 MG/DL
GLUCOSE, GLC: 601 MG/DL
HBA1C MFR BLD: 14.4 % (ref 4.8–5.6)
HCT VFR BLD AUTO: 35.9 % (ref 35–47)
HCT VFR BLD AUTO: 37.1 % (ref 34–46.6)
HDLC SERPL-MCNC: 87 MG/DL
HGB BLD-MCNC: 11.4 G/DL (ref 11.1–15.9)
HGB BLD-MCNC: 11.6 G/DL (ref 11.5–16)
HGB UR QL STRIP: ABNORMAL
HIGH TARGET, HITG: 300 MG/DL
IMM GRANULOCYTES # BLD AUTO: 0 K/UL (ref 0–0.04)
IMM GRANULOCYTES NFR BLD AUTO: 0 % (ref 0–0.5)
INR PPP: 1 (ref 0.9–1.1)
INSULIN ADMINSTERED, INSADM: 0.4 UNITS/HOUR
INSULIN ADMINSTERED, INSADM: 10.8 UNITS/HOUR
INSULIN ADMINSTERED, INSADM: 2.9 UNITS/HOUR
INSULIN ADMINSTERED, INSADM: 5.9 UNITS/HOUR
INSULIN ORDER, INSORD: 0.4 UNITS/HOUR
INSULIN ORDER, INSORD: 10.8 UNITS/HOUR
INSULIN ORDER, INSORD: 2.9 UNITS/HOUR
INSULIN ORDER, INSORD: 5.9 UNITS/HOUR
KETONES UR QL STRIP.AUTO: ABNORMAL MG/DL
LDLC SERPL CALC-MCNC: 57 MG/DL (ref 0–99)
LEUKOCYTE ESTERASE UR QL STRIP.AUTO: NEGATIVE
LOW TARGET, LOT: 200 MG/DL
LYMPHOCYTES # BLD: 1.1 K/UL (ref 0.8–3.5)
LYMPHOCYTES NFR BLD: 19 % (ref 12–49)
MAGNESIUM SERPL-MCNC: 1.4 MG/DL (ref 1.6–2.4)
MCH RBC QN AUTO: 29.8 PG (ref 26.6–33)
MCH RBC QN AUTO: 30.1 PG (ref 26–34)
MCHC RBC AUTO-ENTMCNC: 30.7 G/DL (ref 31.5–35.7)
MCHC RBC AUTO-ENTMCNC: 32.3 G/DL (ref 30–36.5)
MCV RBC AUTO: 93 FL (ref 80–99)
MCV RBC AUTO: 97 FL (ref 79–97)
MICROALBUMIN UR-MCNC: 85.1 UG/ML
MINUTES UNTIL NEXT BG, NBG: 60 MIN
MONOCYTES # BLD: 0.4 K/UL (ref 0–1)
MONOCYTES NFR BLD: 6 % (ref 5–13)
MULTIPLIER, MUL: 0.01
MULTIPLIER, MUL: 0.02
NEUTS SEG # BLD: 4.3 K/UL (ref 1.8–8)
NEUTS SEG NFR BLD: 74 % (ref 32–75)
NITRITE UR QL STRIP.AUTO: NEGATIVE
NRBC # BLD: 0 K/UL (ref 0–0.01)
NRBC BLD-RTO: 0 PER 100 WBC
ORDER INITIALS, ORDINIT: NORMAL
PH UR STRIP: 5 [PH] (ref 5–8)
PHOSPHATE SERPL-MCNC: 1.3 MG/DL (ref 2.6–4.7)
PHOSPHATE SERPL-MCNC: 2.6 MG/DL (ref 2.6–4.7)
PLATELET # BLD AUTO: 180 K/UL (ref 150–400)
PLATELET # BLD AUTO: 236 X10E3/UL (ref 150–379)
PMV BLD AUTO: 11.9 FL (ref 8.9–12.9)
POTASSIUM SERPL-SCNC: 3.1 MMOL/L (ref 3.5–5.1)
POTASSIUM SERPL-SCNC: 4 MMOL/L (ref 3.5–5.1)
POTASSIUM SERPL-SCNC: 5.2 MMOL/L (ref 3.5–5.2)
PROT SERPL-MCNC: 6.7 G/DL (ref 6–8.5)
PROT SERPL-MCNC: 8.1 G/DL (ref 6.4–8.2)
PROT UR STRIP-MCNC: NEGATIVE MG/DL
PROTHROMBIN TIME: 10.3 SEC (ref 9–11.1)
RBC # BLD AUTO: 3.83 X10E6/UL (ref 3.77–5.28)
RBC # BLD AUTO: 3.86 M/UL (ref 3.8–5.2)
RBC #/AREA URNS HPF: ABNORMAL /HPF (ref 0–5)
SAMPLES BEING HELD,HOLD: NORMAL
SERVICE CMNT-IMP: ABNORMAL
SERVICE CMNT-IMP: NORMAL
SODIUM SERPL-SCNC: 124 MMOL/L (ref 136–145)
SODIUM SERPL-SCNC: 133 MMOL/L (ref 134–144)
SODIUM SERPL-SCNC: 139 MMOL/L (ref 136–145)
SP GR UR REFRACTOMETRY: 1.02 (ref 1–1.03)
TRIGL SERPL-MCNC: 237 MG/DL (ref 0–149)
TROPONIN I SERPL-MCNC: <0.05 NG/ML
TSH SERPL DL<=0.005 MIU/L-ACNC: 0.33 UIU/ML (ref 0.45–4.5)
UR CULT HOLD, URHOLD: NORMAL
UROBILINOGEN UR QL STRIP.AUTO: 0.2 EU/DL (ref 0.2–1)
VLDLC SERPL CALC-MCNC: 47 MG/DL (ref 5–40)
WBC # BLD AUTO: 5.9 K/UL (ref 3.6–11)
WBC # BLD AUTO: 6.3 X10E3/UL (ref 3.4–10.8)
WBC URNS QL MICRO: ABNORMAL /HPF (ref 0–4)

## 2019-02-20 PROCEDURE — 93005 ELECTROCARDIOGRAM TRACING: CPT

## 2019-02-20 PROCEDURE — 96361 HYDRATE IV INFUSION ADD-ON: CPT

## 2019-02-20 PROCEDURE — 74011250636 HC RX REV CODE- 250/636: Performed by: STUDENT IN AN ORGANIZED HEALTH CARE EDUCATION/TRAINING PROGRAM

## 2019-02-20 PROCEDURE — 80053 COMPREHEN METABOLIC PANEL: CPT

## 2019-02-20 PROCEDURE — 81001 URINALYSIS AUTO W/SCOPE: CPT

## 2019-02-20 PROCEDURE — 83735 ASSAY OF MAGNESIUM: CPT

## 2019-02-20 PROCEDURE — 99285 EMERGENCY DEPT VISIT HI MDM: CPT

## 2019-02-20 PROCEDURE — 82962 GLUCOSE BLOOD TEST: CPT

## 2019-02-20 PROCEDURE — 71045 X-RAY EXAM CHEST 1 VIEW: CPT

## 2019-02-20 PROCEDURE — 85025 COMPLETE CBC W/AUTO DIFF WBC: CPT

## 2019-02-20 PROCEDURE — 96365 THER/PROPH/DIAG IV INF INIT: CPT

## 2019-02-20 PROCEDURE — 84100 ASSAY OF PHOSPHORUS: CPT

## 2019-02-20 PROCEDURE — 74011636637 HC RX REV CODE- 636/637: Performed by: STUDENT IN AN ORGANIZED HEALTH CARE EDUCATION/TRAINING PROGRAM

## 2019-02-20 PROCEDURE — 74011000258 HC RX REV CODE- 258: Performed by: INTERNAL MEDICINE

## 2019-02-20 PROCEDURE — 74011000258 HC RX REV CODE- 258: Performed by: STUDENT IN AN ORGANIZED HEALTH CARE EDUCATION/TRAINING PROGRAM

## 2019-02-20 PROCEDURE — 74011250636 HC RX REV CODE- 250/636: Performed by: FAMILY MEDICINE

## 2019-02-20 PROCEDURE — 80307 DRUG TEST PRSMV CHEM ANLYZR: CPT

## 2019-02-20 PROCEDURE — 83036 HEMOGLOBIN GLYCOSYLATED A1C: CPT

## 2019-02-20 PROCEDURE — 85610 PROTHROMBIN TIME: CPT

## 2019-02-20 PROCEDURE — 76705 ECHO EXAM OF ABDOMEN: CPT

## 2019-02-20 PROCEDURE — 65660000001 HC RM ICU INTERMED STEPDOWN

## 2019-02-20 PROCEDURE — 36415 COLL VENOUS BLD VENIPUNCTURE: CPT

## 2019-02-20 PROCEDURE — 65660000000 HC RM CCU STEPDOWN

## 2019-02-20 PROCEDURE — 84484 ASSAY OF TROPONIN QUANT: CPT

## 2019-02-20 RX ORDER — SODIUM CHLORIDE 0.9 % (FLUSH) 0.9 %
5-40 SYRINGE (ML) INJECTION AS NEEDED
Status: DISCONTINUED | OUTPATIENT
Start: 2019-02-20 | End: 2019-02-22 | Stop reason: HOSPADM

## 2019-02-20 RX ORDER — INSULIN LISPRO 100 [IU]/ML
25 INJECTION, SOLUTION INTRAVENOUS; SUBCUTANEOUS
COMMUNITY
End: 2019-08-21

## 2019-02-20 RX ORDER — MAGNESIUM SULFATE 100 %
4 CRYSTALS MISCELLANEOUS AS NEEDED
Status: DISCONTINUED | OUTPATIENT
Start: 2019-02-20 | End: 2019-02-21

## 2019-02-20 RX ORDER — DEXTROSE 50 % IN WATER (D50W) INTRAVENOUS SYRINGE
25-50 AS NEEDED
Status: DISCONTINUED | OUTPATIENT
Start: 2019-02-20 | End: 2019-02-20 | Stop reason: SDUPTHER

## 2019-02-20 RX ORDER — INSULIN LISPRO 100 [IU]/ML
INJECTION, SOLUTION INTRAVENOUS; SUBCUTANEOUS
Status: DISCONTINUED | OUTPATIENT
Start: 2019-02-21 | End: 2019-02-21

## 2019-02-20 RX ORDER — DEXTROSE 50 % IN WATER (D50W) INTRAVENOUS SYRINGE
25-50 AS NEEDED
Status: DISCONTINUED | OUTPATIENT
Start: 2019-02-20 | End: 2019-02-21

## 2019-02-20 RX ORDER — MAGNESIUM SULFATE 100 %
4 CRYSTALS MISCELLANEOUS AS NEEDED
Status: DISCONTINUED | OUTPATIENT
Start: 2019-02-20 | End: 2019-02-20 | Stop reason: SDUPTHER

## 2019-02-20 RX ORDER — ACETAMINOPHEN 325 MG/1
650 TABLET ORAL
Status: COMPLETED | OUTPATIENT
Start: 2019-02-20 | End: 2019-02-21

## 2019-02-20 RX ORDER — SODIUM CHLORIDE 9 MG/ML
125 INJECTION, SOLUTION INTRAVENOUS CONTINUOUS
Status: DISCONTINUED | OUTPATIENT
Start: 2019-02-20 | End: 2019-02-22 | Stop reason: HOSPADM

## 2019-02-20 RX ORDER — INSULIN GLARGINE 100 [IU]/ML
50 INJECTION, SOLUTION SUBCUTANEOUS DAILY
COMMUNITY
End: 2019-08-21

## 2019-02-20 RX ORDER — SODIUM CHLORIDE 0.9 % (FLUSH) 0.9 %
5-40 SYRINGE (ML) INJECTION EVERY 8 HOURS
Status: DISCONTINUED | OUTPATIENT
Start: 2019-02-20 | End: 2019-02-22 | Stop reason: HOSPADM

## 2019-02-20 RX ORDER — DEXTROSE MONOHYDRATE AND SODIUM CHLORIDE 5; .9 G/100ML; G/100ML
125 INJECTION, SOLUTION INTRAVENOUS CONTINUOUS
Status: DISCONTINUED | OUTPATIENT
Start: 2019-02-20 | End: 2019-02-21

## 2019-02-20 RX ORDER — MAGNESIUM SULFATE HEPTAHYDRATE 40 MG/ML
2 INJECTION, SOLUTION INTRAVENOUS ONCE
Status: COMPLETED | OUTPATIENT
Start: 2019-02-20 | End: 2019-02-21

## 2019-02-20 RX ADMIN — SODIUM CHLORIDE 1000 ML: 900 INJECTION, SOLUTION INTRAVENOUS at 19:13

## 2019-02-20 RX ADMIN — DEXTROSE MONOHYDRATE AND SODIUM CHLORIDE 125 ML/HR: 5; .9 INJECTION, SOLUTION INTRAVENOUS at 23:55

## 2019-02-20 RX ADMIN — HUMAN INSULIN 8 UNITS: 100 INJECTION, SOLUTION SUBCUTANEOUS at 19:13

## 2019-02-20 RX ADMIN — SODIUM CHLORIDE 10.8 UNITS/HR: 900 INJECTION, SOLUTION INTRAVENOUS at 19:37

## 2019-02-20 RX ADMIN — SODIUM CHLORIDE 125 ML/HR: 900 INJECTION, SOLUTION INTRAVENOUS at 22:23

## 2019-02-20 RX ADMIN — Medication 10 ML: at 23:55

## 2019-02-20 RX ADMIN — SODIUM CHLORIDE 1000 ML: 900 INJECTION, SOLUTION INTRAVENOUS at 18:43

## 2019-02-20 NOTE — PROGRESS NOTES
Admission Medication Reconciliation:    Information obtained from: Patient    Significant PMH/Disease States:   Past Medical History:   Diagnosis Date    Asthma     [facility]     Elevated transaminase level 2016    Insulin dependent diabetes mellitus ([facility] 2016    Pancreatic atrophy 2016       Chief Complaint for this Admission:  Dehydration    Allergies:  Contrast agent [iodine]; Hydrocodone; Percocet [oxycodone-acetaminophen]; Codeine; and Metformin    Prior to Admission Medications:   Prior to Admission Medications   Prescriptions Last Dose Informant Patient Reported? Taking? albuterol (PROVENTIL HFA, VENTOLIN HFA, PROAIR HFA) 90 mcg/actuation inhaler 2019 at Unknown time Self No Yes   Sig: Take 2 Puffs by inhalation every four (4) hours as needed for Wheezing. insulin glargine (LANTUS,BASAGLAR) 100 unit/mL (3 mL) inpn 2019 at Unknown time  Yes Yes   Si Units by SubCUTAneous route daily. insulin lispro (HUMALOG KWIKPEN INSULIN) 100 unit/mL kwikpen 2019 at Unknown time  Yes Yes   Si Units by SubCUTAneous route Before breakfast, lunch, and dinner. metFORMIN ER (GLUCOPHAGE XR) 500 mg tablet 2019 at Unknown time  No Yes   Sig: Take 1 Tab by mouth Before breakfast and dinner. Facility-Administered Medications: None         Comments/Recommendations: Patient provided information. Revised:  1. Glargine and lispro insulin-states that she uses exactly as directed. Thank you for allowing me to participate in the care of your patient.     Ken Robles PharmD, RN #[id number]

## 2019-02-20 NOTE — ED TRIAGE NOTES
Triage Note: Patient is being sent in by PCP for fluids for elevated blood sugar and kidney functions along with LFT's. Patient denies any complaints at this time. Patient states was vomiting on Monday but nothing since then. Blood work was drawn at PCP office yesterday and received phone call today to come to the ER.

## 2019-02-20 NOTE — LETTER
NOTIFICATION RETURN TO WORK / SCHOOL 
 
2/22/2019 4:40 PM 
 
Ms. Emeka Dover 
[facility] [id number] To Whom It May Concern: 
 
Emeka Dover is currently under the care of [facility] [employer] [employer]. hospitalized from 02/20/2019 to 02/22/2019 She will return to work 02/25/2019 If there are questions or concerns please have the patient contact our office. Sincerely, 
Chris Mukherjee MD 
 
No name on file.

## 2019-02-20 NOTE — TELEPHONE ENCOUNTER
I spoke with Ms. Troy and relayed the message from Dr. Christiane Holly. She seemed to understand the information and said she would go have this done.   Yulia Brown

## 2019-02-20 NOTE — TELEPHONE ENCOUNTER
Patient was told yesterday by her PCP, to go to the ER, due to her blood sugars. She was here in the office on Monday. Patient can be reached at:  [phone].

## 2019-02-20 NOTE — TELEPHONE ENCOUNTER
I attempted to call Ms. Woodrow and reached a voice mail. I left a message asking her to give me a call back.   Samir Zavala

## 2019-02-20 NOTE — PROGRESS NOTES
Called, spoke to pt. Two pt identifiers confirmed. Pt informed HIGHLY per Dr. Elen Bailey to go to the ED for possible DKA. Pt informed per Dr. Allyssa Noguera quite elevated, glucose extremely high. Pt informed per Dr. Elen Bailey increase anion gap. Pt URGED and ADVISED to go to ED. Pt states that she \"cannot keep missing work\" and is on her way to [facility] then therapy. Pt informed of consequences of not having tx. Pt verbalized understanding of information discussed w/ no further questions at this time.

## 2019-02-20 NOTE — TELEPHONE ENCOUNTER
Pt was told to go to the ER but states that she was just placed on a higher dose of humalog and bascalar by her diabetes Dr Dr Chacho Meier, and would like for these medications to start working before going to the ER. Pt is also requesting something for anxiety.  Pt uses [facility] (information is on file) Pts contact [phone]       Message received & copied from [facility]

## 2019-02-20 NOTE — TELEPHONE ENCOUNTER
Called, spoke to pt. Two identifiers confirmed. Notified pt again of Dr. Joseluis Kumari recommendation of going to the ED. Pt stated 'I feel fine!'  Pt also stated she cannot afford another bill and cannot miss anymore time from work. Urged pt again to go to the ED. Pt is declining. Pt verbalized understanding of information discussed w/ no further questions at this time.

## 2019-02-20 NOTE — ED PROVIDER NOTES
52 y.o. female with past medical history significant for Diabetes who presents from Home with chief complaint of Evaluation for Abnormal Lab Results. Patient was seen by her Endocrinologist, Dr. Wilder Diaz on 2/18/19 (two days ago) for a routine visit, during which time patient completed blood work and her insulin was increased. Patient was seen earlier today to establish care with PCP Dr. Ez Hemphill, during which time Dr. Ez Hemphill reviewed patient's recent blood work which showed elevated lfts and elevated glucose levels in the 700s. Patient was then referred to ED for further evaluation and possible admission for DKA. Patient denies any current pain or discomfort. Pt notes one episode of vomiting \"two days ago\". However, pt denies any additional episodes. Patient was first diagnosed with Type I Diabetes in 2016. Patient is currently taking Metformin ER 500mg twice per day, Basaglar 50 units once daily each AM and Humalog. Pt states she has been compliant with all medications. Patient reports that her blood sugar is normally in the 200s. Patient notes that she usually checks her blood sugar 3 times  day and that her blood sugar was 172 in the morning and 215 after lunch. Patient has a previous history of DKA which has required past admissions. There are no other acute medical concerns at this time. PCP: Leanna Cain MD    Note written by Gurpreet Arellano, as dictated by Kelley Evans MD 6:26 PM      The history is provided by the patient. No  was used.         Past Medical History:   Diagnosis Date    Asthma     Diabetes ([address]     Elevated transaminase level 6/29/2016    Insulin dependent diabetes mellitus ([address] 6/20/2016    Pancreatic atrophy 6/20/2016       Past Surgical History:   Procedure Laterality Date    HX HEENT           Family History:   Problem Relation Age of Onset    Cancer Father         prostate and colon    Diabetes Mother     Diabetes Maternal Grandmother  Cancer Maternal Aunt         breast       Social History     Socioeconomic History    Marital status: SINGLE     Spouse name: Not on file    Number of children: Not on file    Years of education: Not on file    Highest education level: Not on file   Social Needs    Financial resource strain: Not on file    Food insecurity - worry: Not on file    Food insecurity - inability: Not on file    Transportation needs - medical: Not on file   Artsy needs - non-medical: Not on file   Occupational History    Not on file   Tobacco Use    Smoking status: Never Smoker    Smokeless tobacco: Never Used   Substance and Sexual Activity    Alcohol use: No     Frequency: Never     Comment: occasionally    Drug use: No    Sexual activity: Not Currently   Other Topics Concern    Not on file   Social History Narrative    Not on file         ALLERGIES: Contrast agent [iodine]; Hydrocodone; Percocet [oxycodone-acetaminophen]; Codeine; and Metformin    Review of Systems   Constitutional: Negative for chills and fever. HENT: Negative for congestion and sore throat. Respiratory: Negative for cough and shortness of breath. Cardiovascular: Negative for chest pain. Gastrointestinal: Positive for vomiting (Resolved). Negative for abdominal pain and diarrhea. Genitourinary: Negative for difficulty urinating and dysuria. Musculoskeletal: Negative for back pain. Skin: Negative for rash. Neurological: Negative for syncope and headaches. Psychiatric/Behavioral: Negative for confusion. All other systems reviewed and are negative. Vitals:    02/20/19 1726 02/20/19 1739 02/20/19 2000 02/20/19 2100   BP:  140/75 129/71 111/68   Pulse: 75 84 77 67   Resp:  20 14 21   Temp:  97.9 °F (36.6 °C) 98.4 °F (36.9 °C)    SpO2: 98% 98% 100% 100%   Weight:  41.4 kg (91 lb 5 oz)     Height:  5' 3\" (1.6 m)              Physical Exam   Constitutional: She is oriented to person, place, and time.  She appears well-developed. No distress. Thin appearing. HENT:   Head: Normocephalic and atraumatic. Eyes: Conjunctivae and EOM are normal.   Neck: Normal range of motion. Neck supple. Cardiovascular: Normal rate, regular rhythm and normal heart sounds. Pulmonary/Chest: Effort normal and breath sounds normal. No respiratory distress. Abdominal: Soft. There is no tenderness. There is no guarding. Musculoskeletal: Normal range of motion. She exhibits no edema. Neurological: She is alert and oriented to person, place, and time. She exhibits normal muscle tone. Skin: Skin is warm and dry. Nursing note and vitals reviewed. Note written by Kelley Espinoza, as dictated by Sadie Sewell MD 6:26 PM    MDM  Number of Diagnoses or Management Options  Type 1 diabetes mellitus with hyperosmolarity without nonketotic hyperglycemic hyperosmolar coma ([address]): new and requires workup  Risk of Complications, Morbidity, and/or Mortality  Presenting problems: moderate  Diagnostic procedures: moderate  Management options: moderate    Critical Care  Total time providing critical care: 30-74 minutes (Total critical care time spend exclusive of procedures:  31 minutes.  )         Procedures    Hospitalist Paula for Admission  8:45 PM    ED Room Number: ER23/23  Patient Name and age:  Ari Islas 52 y.o.  female  Working Diagnosis:   1. Type 1 diabetes mellitus with hyperosmolarity without nonketotic hyperglycemic hyperosmolar coma ([address]      Readmission: no  Isolation Requirements:  no  Recommended Level of Care:  Telemetry vs. ICU  Code Status:  DNR   Other: On insulin gtt         ED EKG interpretation:  Rhythm: normal sinus rhythm; and regular . Rate (approx.): 81 bpm; No STEMI; Non specific T-wave abnormality; Normal axis; Normal intervals.   Note written by Kelley Espinoza, as dictated by Sadie Sewell MD 8:44 PM    9:25 PM  Patient is being admitted to the hospital.  The results of their tests and reasons for their admission have been discussed with them and/or available family. They convey agreement and understanding for the need to be admitted and for their admission diagnosis. Consultation will be made now with the inpatient physician for hospitalization.

## 2019-02-20 NOTE — TELEPHONE ENCOUNTER
MD Doni Palomino LPN 4 minutes ago (51:55 AM)     No medication for anxiety     Needs to go to ed (Routing comment)        Called, left vm for pt to return call to office.

## 2019-02-20 NOTE — TELEPHONE ENCOUNTER
I was browsing through the chart to review labs and notes, and did come across patients serum glucose level and the associated notes and telephone calls. I attempted to call the patient, no answer. Will try again. Notably her serum bicarb is stable but historically she has compensated well noting admissions for very high sugars without acidosis in the past. Labs suggest acute renal insufficiency. Her serum glucose is not consistent with her glucose log presented in clinic. Cuauhtemoc Dorsey can you try reaching out to her again. We will need to have her check her sugar ASAP while on the phone. She may need IV fluids in the ED to help with her kidney function, not just an issue with her sugars. She may not need admission if she shows signs of improvement at the ED. Thanks,    Aliya Rojas.  [address] Endocrinology  [address] [place] [address]

## 2019-02-20 NOTE — PROGRESS NOTES
Have her go to the ED for possible DKA    lft quite elevated, glucose extremely high    Increase anion gap

## 2019-02-21 ENCOUNTER — TELEPHONE (OUTPATIENT)
Dept: INTERNAL MEDICINE CLINIC | Age: 54
End: 2019-02-21

## 2019-02-21 LAB
ADMINISTERED INITIALS, ADMINIT: NORMAL
ALBUMIN SERPL-MCNC: 2.5 G/DL (ref 3.5–5)
ALBUMIN/GLOB SERPL: 0.8 {RATIO} (ref 1.1–2.2)
ALP SERPL-CCNC: 275 U/L (ref 45–117)
ALT SERPL-CCNC: 142 U/L (ref 12–78)
ANION GAP SERPL CALC-SCNC: 7 MMOL/L (ref 5–15)
ANION GAP SERPL CALC-SCNC: 7 MMOL/L (ref 5–15)
ANION GAP SERPL CALC-SCNC: 9 MMOL/L (ref 5–15)
AST SERPL-CCNC: 51 U/L (ref 15–37)
ATRIAL RATE: 81 BPM
BASOPHILS # BLD: 0 K/UL (ref 0–0.1)
BASOPHILS NFR BLD: 0 % (ref 0–1)
BILIRUB DIRECT SERPL-MCNC: <0.1 MG/DL (ref 0–0.2)
BILIRUB SERPL-MCNC: 0.4 MG/DL (ref 0.2–1)
BUN SERPL-MCNC: 19 MG/DL (ref 6–20)
BUN SERPL-MCNC: 23 MG/DL (ref 6–20)
BUN SERPL-MCNC: 25 MG/DL (ref 6–20)
BUN/CREAT SERPL: 16 (ref 12–20)
BUN/CREAT SERPL: 17 (ref 12–20)
BUN/CREAT SERPL: 20 (ref 12–20)
CALCIUM SERPL-MCNC: 7.5 MG/DL (ref 8.5–10.1)
CALCIUM SERPL-MCNC: 8 MG/DL (ref 8.5–10.1)
CALCIUM SERPL-MCNC: 8.4 MG/DL (ref 8.5–10.1)
CALCULATED P AXIS, ECG09: 82 DEGREES
CALCULATED R AXIS, ECG10: 53 DEGREES
CALCULATED T AXIS, ECG11: 54 DEGREES
CHLORIDE SERPL-SCNC: 104 MMOL/L (ref 97–108)
CHLORIDE SERPL-SCNC: 106 MMOL/L (ref 97–108)
CHLORIDE SERPL-SCNC: 107 MMOL/L (ref 97–108)
CO2 SERPL-SCNC: 25 MMOL/L (ref 21–32)
CO2 SERPL-SCNC: 26 MMOL/L (ref 21–32)
CO2 SERPL-SCNC: 26 MMOL/L (ref 21–32)
CREAT SERPL-MCNC: 1.21 MG/DL (ref 0.55–1.02)
CREAT SERPL-MCNC: 1.23 MG/DL (ref 0.55–1.02)
CREAT SERPL-MCNC: 1.34 MG/DL (ref 0.55–1.02)
D50 ADMINISTERED, D50ADM: 0 ML
D50 ORDER, D50ORD: 0 ML
DIAGNOSIS, 93000: NORMAL
DIFFERENTIAL METHOD BLD: ABNORMAL
EOSINOPHIL # BLD: 0 K/UL (ref 0–0.4)
EOSINOPHIL NFR BLD: 1 % (ref 0–7)
ERYTHROCYTE [DISTWIDTH] IN BLOOD BY AUTOMATED COUNT: 12.7 % (ref 11.5–14.5)
EST. AVERAGE GLUCOSE BLD GHB EST-MCNC: 355 MG/DL
GLOBULIN SER CALC-MCNC: 3 G/DL (ref 2–4)
GLSCOM COMMENTS: NORMAL
GLUCOSE BLD STRIP.AUTO-MCNC: 101 MG/DL (ref 65–100)
GLUCOSE BLD STRIP.AUTO-MCNC: 114 MG/DL (ref 65–100)
GLUCOSE BLD STRIP.AUTO-MCNC: 172 MG/DL (ref 65–100)
GLUCOSE BLD STRIP.AUTO-MCNC: 199 MG/DL (ref 65–100)
GLUCOSE BLD STRIP.AUTO-MCNC: 264 MG/DL (ref 65–100)
GLUCOSE BLD STRIP.AUTO-MCNC: 285 MG/DL (ref 65–100)
GLUCOSE BLD STRIP.AUTO-MCNC: 322 MG/DL (ref 65–100)
GLUCOSE BLD STRIP.AUTO-MCNC: 359 MG/DL (ref 65–100)
GLUCOSE BLD STRIP.AUTO-MCNC: 360 MG/DL (ref 65–100)
GLUCOSE BLD STRIP.AUTO-MCNC: 386 MG/DL (ref 65–100)
GLUCOSE BLD STRIP.AUTO-MCNC: 60 MG/DL (ref 65–100)
GLUCOSE BLD STRIP.AUTO-MCNC: 74 MG/DL (ref 65–100)
GLUCOSE BLD STRIP.AUTO-MCNC: 81 MG/DL (ref 65–100)
GLUCOSE BLD STRIP.AUTO-MCNC: 94 MG/DL (ref 65–100)
GLUCOSE SERPL-MCNC: 184 MG/DL (ref 65–100)
GLUCOSE SERPL-MCNC: 275 MG/DL (ref 65–100)
GLUCOSE SERPL-MCNC: 407 MG/DL (ref 65–100)
GLUCOSE, GLC: 101 MG/DL
GLUCOSE, GLC: 114 MG/DL
GLUCOSE, GLC: 199 MG/DL
GLUCOSE, GLC: 264 MG/DL
GLUCOSE, GLC: 285 MG/DL
GLUCOSE, GLC: 322 MG/DL
GLUCOSE, GLC: 359 MG/DL
GLUCOSE, GLC: 94 MG/DL
HBA1C MFR BLD: 14 % (ref 4.2–6.3)
HCT VFR BLD AUTO: 25.3 % (ref 35–47)
HGB BLD-MCNC: 8 G/DL (ref 11.5–16)
HIGH TARGET, HITG: 300 MG/DL
IMM GRANULOCYTES # BLD AUTO: 0 K/UL (ref 0–0.04)
IMM GRANULOCYTES NFR BLD AUTO: 0 % (ref 0–0.5)
INSULIN ADMINSTERED, INSADM: 0 UNITS/HOUR
INSULIN ADMINSTERED, INSADM: 0.1 UNITS/HOUR
INSULIN ADMINSTERED, INSADM: 1.4 UNITS/HOUR
INSULIN ADMINSTERED, INSADM: 2.6 UNITS/HOUR
INSULIN ADMINSTERED, INSADM: 4.5 UNITS/HOUR
INSULIN ADMINSTERED, INSADM: 6 UNITS/HOUR
INSULIN ORDER, INSORD: 0 UNITS/HOUR
INSULIN ORDER, INSORD: 0.1 UNITS/HOUR
INSULIN ORDER, INSORD: 1.4 UNITS/HOUR
INSULIN ORDER, INSORD: 2.6 UNITS/HOUR
INSULIN ORDER, INSORD: 4.5 UNITS/HOUR
INSULIN ORDER, INSORD: 6 UNITS/HOUR
LOW TARGET, LOT: 200 MG/DL
LYMPHOCYTES # BLD: 1.2 K/UL (ref 0.8–3.5)
LYMPHOCYTES NFR BLD: 23 % (ref 12–49)
MAGNESIUM SERPL-MCNC: 1.7 MG/DL (ref 1.6–2.4)
MAGNESIUM SERPL-MCNC: 2.1 MG/DL (ref 1.6–2.4)
MAGNESIUM SERPL-MCNC: 3.9 MG/DL (ref 1.6–2.4)
MCH RBC QN AUTO: 29.6 PG (ref 26–34)
MCHC RBC AUTO-ENTMCNC: 31.6 G/DL (ref 30–36.5)
MCV RBC AUTO: 93.7 FL (ref 80–99)
MINUTES UNTIL NEXT BG, NBG: 60 MIN
MONOCYTES # BLD: 0.4 K/UL (ref 0–1)
MONOCYTES NFR BLD: 8 % (ref 5–13)
MULTIPLIER, MUL: 0
MULTIPLIER, MUL: 0.01
MULTIPLIER, MUL: 0.01
MULTIPLIER, MUL: 0.02
MULTIPLIER, MUL: 0.02
NEUTS SEG # BLD: 3.5 K/UL (ref 1.8–8)
NEUTS SEG NFR BLD: 68 % (ref 32–75)
NRBC # BLD: 0 K/UL (ref 0–0.01)
NRBC BLD-RTO: 0 PER 100 WBC
ORDER INITIALS, ORDINIT: NORMAL
P-R INTERVAL, ECG05: 146 MS
PLATELET # BLD AUTO: 144 K/UL (ref 150–400)
PMV BLD AUTO: 12 FL (ref 8.9–12.9)
POTASSIUM SERPL-SCNC: 4 MMOL/L (ref 3.5–5.1)
POTASSIUM SERPL-SCNC: 4.2 MMOL/L (ref 3.5–5.1)
POTASSIUM SERPL-SCNC: 5 MMOL/L (ref 3.5–5.1)
PROT SERPL-MCNC: 5.5 G/DL (ref 6.4–8.2)
Q-T INTERVAL, ECG07: 374 MS
QRS DURATION, ECG06: 74 MS
QTC CALCULATION (BEZET), ECG08: 434 MS
RBC # BLD AUTO: 2.7 M/UL (ref 3.8–5.2)
SERVICE CMNT-IMP: ABNORMAL
SERVICE CMNT-IMP: NORMAL
SODIUM SERPL-SCNC: 137 MMOL/L (ref 136–145)
SODIUM SERPL-SCNC: 139 MMOL/L (ref 136–145)
SODIUM SERPL-SCNC: 141 MMOL/L (ref 136–145)
VENTRICULAR RATE, ECG03: 81 BPM
WBC # BLD AUTO: 5.2 K/UL (ref 3.6–11)

## 2019-02-21 PROCEDURE — 65660000000 HC RM CCU STEPDOWN

## 2019-02-21 PROCEDURE — 74011636637 HC RX REV CODE- 636/637: Performed by: HOSPITALIST

## 2019-02-21 PROCEDURE — 74011000250 HC RX REV CODE- 250: Performed by: INTERNAL MEDICINE

## 2019-02-21 PROCEDURE — 74011250637 HC RX REV CODE- 250/637: Performed by: INTERNAL MEDICINE

## 2019-02-21 PROCEDURE — 74011250636 HC RX REV CODE- 250/636: Performed by: INTERNAL MEDICINE

## 2019-02-21 PROCEDURE — 85025 COMPLETE CBC W/AUTO DIFF WBC: CPT

## 2019-02-21 PROCEDURE — 83735 ASSAY OF MAGNESIUM: CPT

## 2019-02-21 PROCEDURE — 74011000258 HC RX REV CODE- 258: Performed by: INTERNAL MEDICINE

## 2019-02-21 PROCEDURE — 80048 BASIC METABOLIC PNL TOTAL CA: CPT

## 2019-02-21 PROCEDURE — 80076 HEPATIC FUNCTION PANEL: CPT

## 2019-02-21 PROCEDURE — 74011250636 HC RX REV CODE- 250/636: Performed by: FAMILY MEDICINE

## 2019-02-21 PROCEDURE — 82962 GLUCOSE BLOOD TEST: CPT

## 2019-02-21 PROCEDURE — 36415 COLL VENOUS BLD VENIPUNCTURE: CPT

## 2019-02-21 PROCEDURE — 94760 N-INVAS EAR/PLS OXIMETRY 1: CPT

## 2019-02-21 RX ORDER — DEXTROSE 50 % IN WATER (D50W) INTRAVENOUS SYRINGE
12.5-25 AS NEEDED
Status: DISCONTINUED | OUTPATIENT
Start: 2019-02-21 | End: 2019-02-22 | Stop reason: HOSPADM

## 2019-02-21 RX ORDER — MAGNESIUM SULFATE 100 %
4 CRYSTALS MISCELLANEOUS AS NEEDED
Status: DISCONTINUED | OUTPATIENT
Start: 2019-02-21 | End: 2019-02-22 | Stop reason: HOSPADM

## 2019-02-21 RX ORDER — INSULIN GLARGINE 100 [IU]/ML
50 INJECTION, SOLUTION SUBCUTANEOUS DAILY
Status: DISCONTINUED | OUTPATIENT
Start: 2019-02-21 | End: 2019-02-22 | Stop reason: HOSPADM

## 2019-02-21 RX ORDER — INSULIN LISPRO 100 [IU]/ML
INJECTION, SOLUTION INTRAVENOUS; SUBCUTANEOUS
Status: DISCONTINUED | OUTPATIENT
Start: 2019-02-21 | End: 2019-02-22 | Stop reason: HOSPADM

## 2019-02-21 RX ORDER — INSULIN GLARGINE 100 [IU]/ML
30 INJECTION, SOLUTION SUBCUTANEOUS DAILY
Status: DISCONTINUED | OUTPATIENT
Start: 2019-02-21 | End: 2019-02-21

## 2019-02-21 RX ORDER — INSULIN GLARGINE 100 [IU]/ML
40 INJECTION, SOLUTION SUBCUTANEOUS DAILY
Status: DISCONTINUED | OUTPATIENT
Start: 2019-02-21 | End: 2019-02-21

## 2019-02-21 RX ORDER — INSULIN LISPRO 100 [IU]/ML
25 INJECTION, SOLUTION INTRAVENOUS; SUBCUTANEOUS
Status: DISCONTINUED | OUTPATIENT
Start: 2019-02-21 | End: 2019-02-22 | Stop reason: HOSPADM

## 2019-02-21 RX ORDER — METFORMIN HYDROCHLORIDE 500 MG/1
500 TABLET, EXTENDED RELEASE ORAL
Status: DISCONTINUED | OUTPATIENT
Start: 2019-02-21 | End: 2019-02-22 | Stop reason: HOSPADM

## 2019-02-21 RX ORDER — INSULIN LISPRO 100 [IU]/ML
25 INJECTION, SOLUTION INTRAVENOUS; SUBCUTANEOUS
Status: DISCONTINUED | OUTPATIENT
Start: 2019-02-21 | End: 2019-02-21

## 2019-02-21 RX ORDER — INSULIN LISPRO 100 [IU]/ML
20 INJECTION, SOLUTION INTRAVENOUS; SUBCUTANEOUS
Status: DISCONTINUED | OUTPATIENT
Start: 2019-02-21 | End: 2019-02-21

## 2019-02-21 RX ADMIN — Medication 10 ML: at 22:51

## 2019-02-21 RX ADMIN — SODIUM CHLORIDE 125 ML/HR: 900 INJECTION, SOLUTION INTRAVENOUS at 11:32

## 2019-02-21 RX ADMIN — ACETAMINOPHEN 650 MG: 325 TABLET ORAL at 00:08

## 2019-02-21 RX ADMIN — POTASSIUM PHOSPHATE, MONOBASIC AND POTASSIUM PHOSPHATE, DIBASIC: 224; 236 INJECTION, SOLUTION INTRAVENOUS at 01:19

## 2019-02-21 RX ADMIN — INSULIN LISPRO 2 UNITS: 100 INJECTION, SOLUTION INTRAVENOUS; SUBCUTANEOUS at 17:29

## 2019-02-21 RX ADMIN — INSULIN LISPRO 7 UNITS: 100 INJECTION, SOLUTION INTRAVENOUS; SUBCUTANEOUS at 14:08

## 2019-02-21 RX ADMIN — Medication 10 ML: at 14:00

## 2019-02-21 RX ADMIN — MAGNESIUM SULFATE HEPTAHYDRATE 2 G: 40 INJECTION, SOLUTION INTRAVENOUS at 00:00

## 2019-02-21 RX ADMIN — Medication 10 ML: at 14:21

## 2019-02-21 RX ADMIN — SODIUM CHLORIDE 125 ML/HR: 900 INJECTION, SOLUTION INTRAVENOUS at 21:54

## 2019-02-21 RX ADMIN — INSULIN LISPRO 25 UNITS: 100 INJECTION, SOLUTION INTRAVENOUS; SUBCUTANEOUS at 14:18

## 2019-02-21 RX ADMIN — INSULIN GLARGINE 50 UNITS: 100 INJECTION, SOLUTION SUBCUTANEOUS at 13:19

## 2019-02-21 RX ADMIN — DEXTROSE MONOHYDRATE AND SODIUM CHLORIDE 125 ML/HR: 5; .9 INJECTION, SOLUTION INTRAVENOUS at 08:43

## 2019-02-21 NOTE — ED NOTES
Bedside and Verbal shift change report given to Jonel Mendosa (oncoming nurse) by Farheen Colvin (offgoing nurse). Report included the following information SBAR, Kardex, ED Summary, [facility] and Recent Results.

## 2019-02-21 NOTE — DIABETES MGMT
DTC Progress Note    Recommendations/ Comments: If appropriate, will continue to follow and assess pt for additional education tomorrow. Consider the followin) increasing Lantus to 55 units daily. 2) if pt is tolerating >50% of po intakes, consider starting Humalog 10 units tid (35% of lowest prandial home dose)    Current hospital DM medication: Lantus 50 units daily, Lispro correctional insulin ac and hs. Chart reviewed on Fermin Sweeney - pt was on insulin gtt and has been transitioned. Pt is well known to [address] [place] [address] staff and was last seen 10/25/18    Patient is a 52 y.o. female with known DM - SHANTEL Type 1 Diabetes on Metformin  mg bid, Basaglar 50 units daily and Humalog 25 units tid along with correctional insulin dose of 1 unit:15 mg/dl > 150 mg/dl at home. Pt visited with Tomasz Connors on 19 - at the end of this visit plan per his documentation was for pt to increase her Lantus to 55 units am, increase her Humalog with breakfast to 28 units, 30 units with lunch and dinner and continue with 1 unit:15 mg/dl > 150 mg/dl. A1c:   Lab Results   Component Value Date/Time    Hemoglobin A1c 14.0 (H) 2019 05:39 PM    Hemoglobin A1c 14.4 (H) 2019 09:51 AM       Recent Glucose Results:   Lab Results   Component Value Date/Time     (H) 2019 11:07 AM     (H) 2019 05:49 AM     (H) 2019 01:54 AM    GLUCPOC 386 (H) 2019 01:59 PM    GLUCPOC 360 (H) 2019 11:13 AM    GLUCPOC 101 (H) 2019 07:56 AM        Lab Results   Component Value Date/Time    Creatinine 1.21 (H) 2019 11:07 AM     Estimated Creatinine Clearance: 34.3 mL/min (A) (based on SCr of 1.21 mg/dL (H)). Active Orders   Diet    DIET DIABETIC CONSISTENT CARB Regular        PO intake: No data found. Will continue to follow as needed.     Thank you  [address]    Time spent: 15 minutes

## 2019-02-21 NOTE — ED NOTES
1202: Verbal shift change report given to Bon Garsia, RN (oncoming nurse) by Yaima Romano RN (offgoing nurse). Report included the following information SBAR, Kardex, ED Summary, MAR, Recent Results and Cardiac Rhythm Sinus tach. 1426: Patient is alert and oriented x4. MARIA. 100% on RA, NSR on the cardiac monitor. VSS. Denies any pain at this time. NS infusing. Family at the bedside. Call bell within reach, bed-wheels locked, side rails up x1.

## 2019-02-21 NOTE — ED NOTES
1643: Verbal shift change report given to Dwight Mahmood RN (oncoming nurse) by Deandre Gordon RN (offgoing nurse). Report included the following information SBAR, Kardex, ED Summary, [facility] and Recent Results.  \

## 2019-02-21 NOTE — TELEPHONE ENCOUNTER
Patient has been admitted to [address] number is [phone].        Message received & copied from [facility]

## 2019-02-21 NOTE — PROGRESS NOTES
Attempted to call report HPI:     Chief Complaint     Derm Problem        HPI     Derm Problem    Additional comments: LOV 11/20/17 pt was seen for full body check here today for follow up pt was given Doxycyline and given Nizoral shampoo for her hair loss pt states that stopped u MYRBETRIQ 25 MG Oral Tablet 24 Hr TK 1 T PO  ONCE D Disp:  Rfl: 2   Nitrofurantoin Monohyd Macro 100 MG Oral Cap TK 1 C PO BID FOR 7 DAYS Disp:  Rfl: 0   Lansoprazole (PREVACID) 15 MG Oral Capsule Delayed Release Take 15 mg by mouth daily.  Disp:  Rfl: thighs      ASSESSMENT/PLAN:   Neoplasm of uncertain behavior of skin  (primary encounter diagnosis) probable benign seborrheic keratosis.   Due to patient's personal and family history and her level anxiety, shape biopsies performed–Shave biopsy is perform

## 2019-02-21 NOTE — PROGRESS NOTES
Hospitalist Progress Note      Hospital summary: 52 y.o lady w/ DM who presented with hyperglycemia. Assessment/Plan:  Type 2 DM w/ hyperosmolar nonketotic state:  -transition from insulin drip to Lantus 50 U (recently increased home dose)  -mealtime lispro 25 U ACTId (on 25 U at home) will utpitrate as indicated  -SSI   -resume metformin since renal function improved    NEERAJ:  -improved    Elevated LFTs:  -US unremarkable  -monitor  -f/u as an outpatient    Code status: full  Disposition: home likely tomorrow  ----------------------------------------------    CC: hperglycemia    S: no acute complaints, no abd pain, no n/v, tolerating diet     Review of Systems:  Pertinent items are noted in HPI.     O:  Visit Vitals  /64   Pulse 74   Temp 98.6 °F (37 °C)   Resp 18   Ht 5' 3\" (1.6 m)   Wt 41.4 kg (91 lb 5 oz)   SpO2 100%   BMI 16.18 kg/m²       PHYSICAL EXAM:  Gen: NAD, non-toxic  HEENT: anicteric sclerae, normal conjunctiva, oropharynx clear, MM moist  Neck: supple, trachea midline, no adenopathy  Heart: RRR, no MRG, no JVD, no peripheral edema  Lungs: CTA b/l, non-labored respirations  Abd: soft, NT, ND, BS+, no organomegaly  Extr: warm  Skin: dry, no rash  Neuro: CN II-XII grossly intact, normal speech, moves all extremities  Psych: normal mood, appropriate affect      Intake/Output Summary (Last 24 hours) at 2/21/2019 1201  Last data filed at 2/21/2019 0400  Gross per 24 hour   Intake 1227.79 ml   Output 1 ml   Net 1226.79 ml        Recent labs & imaging reviewed:  Recent Results (from the past 24 hour(s))   GLUCOSE, POC    Collection Time: 02/20/19  5:34 PM   Result Value Ref Range    Glucose (POC) >600 (HH) 65 - 100 mg/dL    Performed by Dorcas Romo    CBC WITH AUTOMATED DIFF    Collection Time: 02/20/19  5:39 PM   Result Value Ref Range    WBC 5.9 3.6 - 11.0 K/uL    RBC 3.86 3.80 - 5.20 M/uL    HGB 11.6 11.5 - 16.0 g/dL    HCT 35.9 35.0 - 47.0 %    MCV 93.0 80.0 - 99.0 FL    MCH 30.1 26.0 - 34.0 PG    MCHC 32.3 30.0 - 36.5 g/dL    RDW 12.6 11.5 - 14.5 %    PLATELET 791 207 - 657 K/uL    MPV 11.9 8.9 - 12.9 FL    NRBC 0.0 0  WBC    ABSOLUTE NRBC 0.00 0.00 - 0.01 K/uL    NEUTROPHILS 74 32 - 75 %    LYMPHOCYTES 19 12 - 49 %    MONOCYTES 6 5 - 13 %    EOSINOPHILS 0 0 - 7 %    BASOPHILS 0 0 - 1 %    IMMATURE GRANULOCYTES 0 0.0 - 0.5 %    ABS. NEUTROPHILS 4.3 1.8 - 8.0 K/UL    ABS. LYMPHOCYTES 1.1 0.8 - 3.5 K/UL    ABS. MONOCYTES 0.4 0.0 - 1.0 K/UL    ABS. EOSINOPHILS 0.0 0.0 - 0.4 K/UL    ABS. BASOPHILS 0.0 0.0 - 0.1 K/UL    ABS. IMM. GRANS. 0.0 0.00 - 0.04 K/UL    DF AUTOMATED     METABOLIC PANEL, COMPREHENSIVE    Collection Time: 02/20/19  5:39 PM   Result Value Ref Range    Sodium 124 (L) 136 - 145 mmol/L    Potassium 4.0 3.5 - 5.1 mmol/L    Chloride 86 (L) 97 - 108 mmol/L    CO2 27 21 - 32 mmol/L    Anion gap 11 5 - 15 mmol/L    Glucose 778 (HH) 65 - 100 mg/dL    BUN 37 (H) 6 - 20 MG/DL    Creatinine 2.06 (H) 0.55 - 1.02 MG/DL    BUN/Creatinine ratio 18 12 - 20      GFR est AA 30 (L) >60 ml/min/1.73m2    GFR est non-AA 25 (L) >60 ml/min/1.73m2    Calcium 9.5 8.5 - 10.1 MG/DL    Bilirubin, total 0.4 0.2 - 1.0 MG/DL    ALT (SGPT) 246 (H) 12 - 78 U/L    AST (SGOT) 64 (H) 15 - 37 U/L    Alk. phosphatase 482 (H) 45 - 117 U/L    Protein, total 8.1 6.4 - 8.2 g/dL    Albumin 3.9 3.5 - 5.0 g/dL    Globulin 4.2 (H) 2.0 - 4.0 g/dL    A-G Ratio 0.9 (L) 1.1 - 2.2     SAMPLES BEING HELD    Collection Time: 02/20/19  5:39 PM   Result Value Ref Range    SAMPLES BEING HELD [id number]     COMMENT        Add-on orders for these samples will be processed based on acceptable specimen integrity and analyte stability, which may vary by analyte.    TROPONIN I    Collection Time: 02/20/19  5:39 PM   Result Value Ref Range    Troponin-I, Qt. <0.05 <0.05 ng/mL   PHOSPHORUS    Collection Time: 02/20/19  5:39 PM   Result Value Ref Range    Phosphorus 2.6 2.6 - 4.7 MG/DL   ACETAMINOPHEN    Collection Time: 02/20/19  5:39 PM   Result Value Ref Range    Acetaminophen level <2 (L) 10 - 30 ug/mL   HEMOGLOBIN A1C WITH EAG    Collection Time: 02/20/19  5:39 PM   Result Value Ref Range    Hemoglobin A1c 14.0 (H) 4.2 - 6.3 %    Est. average glucose 355 mg/dL   EKG, 12 LEAD, INITIAL    Collection Time: 02/20/19  7:08 PM   Result Value Ref Range    Ventricular Rate 81 BPM    Atrial Rate 81 BPM    P-R Interval 146 ms    QRS Duration 74 ms    Q-T Interval 374 ms    QTC Calculation (Bezet) 434 ms    Calculated P Axis 82 degrees    Calculated R Axis 53 degrees    Calculated T Axis 54 degrees    Diagnosis       Normal sinus rhythm  Nonspecific T wave abnormality  When compared with ECG of 13-OCT-2018 17:24,  Criteria for Septal infarct are no longer present  T wave amplitude has increased in Lateral leads  Confirmed by Maggie Royal ([id number]) on 2/21/2019 5:56:10 AM     URINALYSIS W/MICROSCOPIC    Collection Time: 02/20/19  7:14 PM   Result Value Ref Range    Color YELLOW/STRAW      Appearance CLEAR CLEAR      Specific gravity 1.025 1.003 - 1.030      pH (UA) 5.0 5.0 - 8.0      Protein NEGATIVE  NEG mg/dL    Glucose >1,000 (A) NEG mg/dL    Ketone TRACE (A) NEG mg/dL    Bilirubin NEGATIVE  NEG      Blood TRACE (A) NEG      Urobilinogen 0.2 0.2 - 1.0 EU/dL    Nitrites NEGATIVE  NEG      Leukocyte Esterase NEGATIVE  NEG      WBC 0-4 0 - 4 /hpf    RBC 0-5 0 - 5 /hpf    Epithelial cells FEW FEW /lpf    Bacteria NEGATIVE  NEG /hpf   URINE CULTURE HOLD SAMPLE    Collection Time: 02/20/19  7:14 PM   Result Value Ref Range    Urine culture hold        URINE ON HOLD IN MICROBIOLOGY DEPT FOR 3 DAYS. IF UNPRESERVED URINE IS SUBMITTED, IT CANNOT BE USED FOR ADDITIONAL TESTING AFTER 24 HRS, RECOLLECTION WILL BE REQUIRED.    GLUCOSE, POC    Collection Time: 02/20/19  7:24 PM   Result Value Ref Range    Glucose (POC) >600 (HH) 65 - 100 mg/dL    Performed by Lovely Garvin    Collection Time: 02/20/19  7:34 PM   Result Value Ref Range Glucose 601 mg/dL    Insulin order 10.8 units/hour    Insulin adminstered 10.8 units/hour    Multiplier 0.020     Low target 200 mg/dL    High target 300 mg/dL    D50 order 0.0 ml    D50 administered 0.00 ml    Minutes until next BG 60 min    Order initials KAH     Administered initials [facility] Comments     GLUCOSE, POC    Collection Time: 02/20/19  8:40 PM   Result Value Ref Range    Glucose (POC) 353 (H) 65 - 100 mg/dL    Performed by Renetta Leung    Collection Time: 02/20/19  8:40 PM   Result Value Ref Range    Glucose 353 mg/dL    Insulin order 5.9 units/hour    Insulin adminstered 5.9 units/hour    Multiplier 0.020     Low target 200 mg/dL    High target 300 mg/dL    D50 order 0.0 ml    D50 administered 0.00 ml    Minutes until next BG 60 min    Order initials LEIGH     Administered initials LEIGH     [facility] Comments     GLUCOSE, POC    Collection Time: 02/20/19  9:46 PM   Result Value Ref Range    Glucose (POC) 206 (H) 65 - 100 mg/dL    Performed by Carbon Voyage    Collection Time: 02/20/19  9:59 PM   Result Value Ref Range    Glucose 206 mg/dL    Insulin order 2.9 units/hour    Insulin adminstered 2.9 units/hour    Multiplier 0.020     Low target 200 mg/dL    High target 300 mg/dL    D50 order 0.0 ml    D50 administered 0.00 ml    Minutes until next BG 60 min    Order initials MR     Administered initials MR     [facility] Comments     MAGNESIUM    Collection Time: 02/20/19 10:00 PM   Result Value Ref Range    Magnesium 1.4 (L) 1.6 - 2.4 mg/dL   METABOLIC PANEL, BASIC    Collection Time: 02/20/19 10:00 PM   Result Value Ref Range    Sodium 139 136 - 145 mmol/L    Potassium 3.1 (L) 3.5 - 5.1 mmol/L    Chloride 104 97 - 108 mmol/L    CO2 27 21 - 32 mmol/L    Anion gap 8 5 - 15 mmol/L    Glucose 175 (H) 65 - 100 mg/dL    BUN 29 (H) 6 - 20 MG/DL    Creatinine 1.56 (H) 0.55 - 1.02 MG/DL    BUN/Creatinine ratio 19 12 - 20      GFR est AA 42 (L) >60 ml/min/1.73m2    GFR est non-AA 34 (L) >60 ml/min/1.73m2    Calcium 8.4 (L) 8.5 - 10.1 MG/DL   PROTHROMBIN TIME + INR    Collection Time: 02/20/19 10:00 PM   Result Value Ref Range    INR 1.0 0.9 - 1.1      Prothrombin time 10.3 9.0 - 11.1 sec   PHOSPHORUS    Collection Time: 02/20/19 10:00 PM   Result Value Ref Range    Phosphorus 1.3 (L) 2.6 - 4.7 MG/DL   GLUCOSE, POC    Collection Time: 02/20/19 11:02 PM   Result Value Ref Range    Glucose (POC) 100 65 - 100 mg/dL    Performed by Jessica Sharma    Collection Time: 02/20/19 11:03 PM   Result Value Ref Range    Glucose 100 mg/dL    Insulin order 0.4 units/hour    Insulin adminstered 0.4 units/hour    Multiplier 0.010     Low target 200 mg/dL    High target 300 mg/dL    D50 order 0.0 ml    D50 administered 0.00 ml    Minutes until next BG 60 min    Order initials MR     Administered initials MR     [facility] Comments     GLUCOSE, POC    Collection Time: 02/21/19 12:06 AM   Result Value Ref Range    Glucose (POC) 94 65 - 100 mg/dL    Performed by Jessica Sharma    Collection Time: 02/21/19 12:06 AM   Result Value Ref Range    Glucose 94 mg/dL    Insulin order 0.0 units/hour    Insulin adminstered 0.0 units/hour    Multiplier 0.000     Low target 200 mg/dL    High target 300 mg/dL    D50 order 0.0 ml    D50 administered 0.00 ml    Minutes until next BG 60 min    Order initials Kyaw     Administered initials KYAW     [facility] Comments     GLUCOSE, POC    Collection Time: 02/21/19  1:13 AM   Result Value Ref Range    Glucose (POC) 264 (H) 65 - 100 mg/dL    Performed by Teagan Vidal    Collection Time: 02/21/19  1:15 AM   Result Value Ref Range    Glucose 264 mg/dL    Insulin order 0.1 units/hour    Insulin adminstered 0.1 units/hour    Multiplier 0.000     Low target 200 mg/dL    High target 300 mg/dL    D50 order 0.0 ml    D50 administered 0.00 ml    Minutes until next BG 60 min    Order initials MND     Administered initials MND     [facility] Comments     MAGNESIUM    Collection Time: 02/21/19  1:54 AM   Result Value Ref Range    Magnesium 3.9 (H) 1.6 - 2.4 mg/dL   METABOLIC PANEL, BASIC    Collection Time: 02/21/19  1:54 AM   Result Value Ref Range    Sodium 137 136 - 145 mmol/L    Potassium 5.0 3.5 - 5.1 mmol/L    Chloride 104 97 - 108 mmol/L    CO2 26 21 - 32 mmol/L    Anion gap 7 5 - 15 mmol/L    Glucose 275 (H) 65 - 100 mg/dL    BUN 25 (H) 6 - 20 MG/DL    Creatinine 1.23 (H) 0.55 - 1.02 MG/DL    BUN/Creatinine ratio 20 12 - 20      GFR est AA 55 (L) >60 ml/min/1.73m2    GFR est non-AA 45 (L) >60 ml/min/1.73m2    Calcium 7.5 (L) 8.5 - 10.1 MG/DL   CBC WITH AUTOMATED DIFF    Collection Time: 02/21/19  1:54 AM   Result Value Ref Range    WBC 5.2 3.6 - 11.0 K/uL    RBC 2.70 (L) 3.80 - 5.20 M/uL    HGB 8.0 (L) 11.5 - 16.0 g/dL    HCT 25.3 (L) 35.0 - 47.0 %    MCV 93.7 80.0 - 99.0 FL    MCH 29.6 26.0 - 34.0 PG    MCHC 31.6 30.0 - 36.5 g/dL    RDW 12.7 11.5 - 14.5 %    PLATELET 966 (L) 740 - 400 K/uL    MPV 12.0 8.9 - 12.9 FL    NRBC 0.0 0  WBC    ABSOLUTE NRBC 0.00 0.00 - 0.01 K/uL    NEUTROPHILS 68 32 - 75 %    LYMPHOCYTES 23 12 - 49 %    MONOCYTES 8 5 - 13 %    EOSINOPHILS 1 0 - 7 %    BASOPHILS 0 0 - 1 %    IMMATURE GRANULOCYTES 0 0.0 - 0.5 %    ABS. NEUTROPHILS 3.5 1.8 - 8.0 K/UL    ABS. LYMPHOCYTES 1.2 0.8 - 3.5 K/UL    ABS. MONOCYTES 0.4 0.0 - 1.0 K/UL    ABS. EOSINOPHILS 0.0 0.0 - 0.4 K/UL    ABS. BASOPHILS 0.0 0.0 - 0.1 K/UL    ABS. IMM. GRANS. 0.0 0.00 - 0.04 K/UL    DF AUTOMATED     HEPATIC FUNCTION PANEL    Collection Time: 02/21/19  1:54 AM   Result Value Ref Range    Protein, total 5.5 (L) 6.4 - 8.2 g/dL    Albumin 2.5 (L) 3.5 - 5.0 g/dL    Globulin 3.0 2.0 - 4.0 g/dL    A-G Ratio 0.8 (L) 1.1 - 2.2      Bilirubin, total 0.4 0.2 - 1.0 MG/DL    Bilirubin, direct <0.1 0.0 - 0.2 MG/DL    Alk.  phosphatase 275 (H) 45 - 117 U/L    AST (SGOT) 51 (H) 15 - 37 U/L    ALT (SGPT) 142 (H) 12 - 78 U/L   GLUCOSE, POC    Collection Time: 02/21/19  2:24 AM   Result Value Ref Range    Glucose (POC) 322 (H) 65 - 100 mg/dL    Performed by Larene Orf    Collection Time: 02/21/19  2:25 AM   Result Value Ref Range    Glucose 322 mg/dL    Insulin order 2.6 units/hour    Insulin adminstered 2.6 units/hour    Multiplier 0.010     Low target 200 mg/dL    High target 300 mg/dL    D50 order 0.0 ml    D50 administered 0.00 ml    Minutes until next BG 60 min    Order initials MR     Administered initials MR [facility] Comments     GLUCOSE, POC    Collection Time: 02/21/19  3:28 AM   Result Value Ref Range    Glucose (POC) 359 (H) 65 - 100 mg/dL    Performed by [employer] Orf    Collection Time: 02/21/19  3:29 AM   Result Value Ref Range    Glucose 359 mg/dL    Insulin order 6.0 units/hour    Insulin adminstered 6.0 units/hour    Multiplier 0.020     Low target 200 mg/dL    High target 300 mg/dL    D50 order 0.0 ml    D50 administered 0.00 ml    Minutes until next BG 60 min    Order initials LEIGH     Administered initials LEIGH [facility] Comments     GLUCOSE, POC    Collection Time: 02/21/19  4:37 AM   Result Value Ref Range    Glucose (POC) 285 (H) 65 - 100 mg/dL    Performed by [employer]    Collection Time: 02/21/19  4:39 AM   Result Value Ref Range    Glucose 285 mg/dL    Insulin order 4.5 units/hour    Insulin adminstered 4.5 units/hour    Multiplier 0.020     Low target 200 mg/dL    High target 300 mg/dL    D50 order 0.0 ml    D50 administered 0.00 ml    Minutes until next BG 60 min    Order initials MR     Administered initials MR [facility] Comments     GLUCOSE, POC    Collection Time: 02/21/19  5:44 AM   Result Value Ref Range    Glucose (POC) 199 (H) 65 - 100 mg/dL    Performed by Larene Orf    Collection Time: 02/21/19  5:45 AM   Result Value Ref Range    Glucose 199 mg/dL    Insulin order 1.4 units/hour    Insulin adminstered 1.4 units/hour    Multiplier 0.010     Low target 200 mg/dL    High target 300 mg/dL    D50 order 0.0 ml    D50 administered 0.00 ml    Minutes until next BG 60 min    Order initials MR     Administered initials MR     [facility] Comments     MAGNESIUM    Collection Time: 02/21/19  5:49 AM   Result Value Ref Range    Magnesium 2.1 1.6 - 2.4 mg/dL   METABOLIC PANEL, BASIC    Collection Time: 02/21/19  5:49 AM   Result Value Ref Range    Sodium 141 136 - 145 mmol/L    Potassium 4.0 3.5 - 5.1 mmol/L    Chloride 107 97 - 108 mmol/L    CO2 25 21 - 32 mmol/L    Anion gap 9 5 - 15 mmol/L    Glucose 184 (H) 65 - 100 mg/dL    BUN 23 (H) 6 - 20 MG/DL    Creatinine 1.34 (H) 0.55 - 1.02 MG/DL    BUN/Creatinine ratio 17 12 - 20      GFR est AA 50 (L) >60 ml/min/1.73m2    GFR est non-AA 41 (L) >60 ml/min/1.73m2    Calcium 8.4 (L) 8.5 - 10.1 MG/DL   GLUCOSE, POC    Collection Time: 02/21/19  6:47 AM   Result Value Ref Range    Glucose (POC) 114 (H) 65 - 100 mg/dL    Performed by Kishan Gooden    Collection Time: 02/21/19  6:48 AM   Result Value Ref Range    Glucose 114 mg/dL    Insulin order 0.0 units/hour    Insulin adminstered 0.0 units/hour    Multiplier 0.000     Low target 200 mg/dL    High target 300 mg/dL    D50 order 0.0 ml    D50 administered 0.00 ml    Minutes until next BG 60 min    Order initials MR     Administered initials MR     [facility] Comments     GLUCOSE, POC    Collection Time: 02/21/19  7:56 AM   Result Value Ref Range    Glucose (POC) 101 (H) 65 - 100 mg/dL    Performed by Jane Leiva    Collection Time: 02/21/19  7:58 AM   Result Value Ref Range    Glucose 101 mg/dL    Insulin order 0.0 units/hour    Insulin adminstered 0.0 units/hour    Multiplier 0.000     Low target 200 mg/dL    High target 300 mg/dL    D50 order 0.0 ml    D50 administered 0.00 ml    Minutes until next BG 60 min    Order initials ser     Administered initials ser     [facility] Comments     METABOLIC PANEL, BASIC    Collection Time: 02/21/19 11:07 AM   Result Value Ref Range    Sodium 139 136 - 145 mmol/L    Potassium 4.2 3.5 - 5.1 mmol/L    Chloride 106 97 - 108 mmol/L    CO2 26 21 - 32 mmol/L    Anion gap 7 5 - 15 mmol/L    Glucose 407 (H) 65 - 100 mg/dL    BUN 19 6 - 20 MG/DL    Creatinine 1.21 (H) 0.55 - 1.02 MG/DL    BUN/Creatinine ratio 16 12 - 20      GFR est AA 56 (L) >60 ml/min/1.73m2    GFR est non-AA 46 (L) >60 ml/min/1.73m2    Calcium 8.0 (L) 8.5 - 10.1 MG/DL   MAGNESIUM    Collection Time: 02/21/19 11:07 AM   Result Value Ref Range    Magnesium 1.7 1.6 - 2.4 mg/dL   GLUCOSE, POC    Collection Time: 02/21/19 11:13 AM   Result Value Ref Range    Glucose (POC) 360 (H) 65 - 100 mg/dL    Performed by Ethan Bamberger      Recent Labs     02/21/19 0154 02/20/19  1739   WBC 5.2 5.9   HGB 8.0* 11.6   HCT 25.3* 35.9   * 180     Recent Labs     02/21/19  1107 02/21/19  0549 02/21/19  0154 02/20/19  2200  02/20/19  1739    141 137 139  --  124*   K 4.2 4.0 5.0 3.1*  --  4.0    107 104 104  --  86*   CO2 26 25 26 27  --  27   BUN 19 23* 25* 29*  --  37*   CREA 1.21* 1.34* 1.23* 1.56*  --  2.06*   * 184* 275* 175*  --  778*   CA 8.0* 8.4* 7.5* 8.4*  --  9.5   MG 1.7 2.1 3.9* 1.4*   < >  --    PHOS  --   --   --  1.3*  --  2.6    < > = values in this interval not displayed. Recent Labs     02/21/19  0154 02/20/19  1739 02/19/19  0951   SGOT 51* 64* 191*   * 246* 332*   * 482* 444*   TBILI 0.4 0.4 0.6   TP 5.5* 8.1 6.7   ALB 2.5* 3.9 4.4   GLOB 3.0 4.2*  --      Recent Labs     02/20/19  2200   INR 1.0   PTP 10.3      No results for input(s): FE, TIBC, PSAT, FERR in the last 72 hours. Lab Results   Component Value Date/Time    Folate 19.8 08/29/2018 03:14 AM      No results for input(s): PH, PCO2, PO2 in the last 72 hours.   Recent Labs     02/20/19  1739   TROIQ <0.05     Lab Results   Component Value Date/Time    Cholesterol, total 191 02/19/2019 09:51 AM    HDL Cholesterol 87 02/19/2019 09:51 AM    LDL, calculated 57 02/19/2019 09:51 AM    Triglyceride 237 (H) 02/19/2019 09:51 AM     Lab Results   Component Value Date/Time    Glucose (POC) 360 (H) 02/21/2019 11:13 AM    Glucose (POC) 101 (H) 02/21/2019 07:56 AM    Glucose (POC) 114 (H) 02/21/2019 06:47 AM    Glucose (POC) 199 (H) 02/21/2019 05:44 AM    Glucose (POC) 285 (H) 02/21/2019 04:37 AM     Lab Results   Component Value Date/Time    Color YELLOW/STRAW 02/20/2019 07:14 PM    Appearance CLEAR 02/20/2019 07:14 PM    Specific gravity 1.025 02/20/2019 07:14 PM    pH (UA) 5.0 02/20/2019 07:14 PM    Protein NEGATIVE  02/20/2019 07:14 PM    Glucose >1,000 (A) 02/20/2019 07:14 PM    Ketone TRACE (A) 02/20/2019 07:14 PM    Bilirubin NEGATIVE  02/20/2019 07:14 PM    Urobilinogen 0.2 02/20/2019 07:14 PM    Nitrites NEGATIVE  02/20/2019 07:14 PM    Leukocyte Esterase NEGATIVE  02/20/2019 07:14 PM    Epithelial cells FEW 02/20/2019 07:14 PM    Bacteria NEGATIVE  02/20/2019 07:14 PM    WBC 0-4 02/20/2019 07:14 PM    RBC 0-5 02/20/2019 07:14 PM       Med list reviewed  Current Facility-Administered Medications   Medication Dose Route Frequency    insulin lispro (HUMALOG) injection   SubCUTAneous AC&HS    glucose chewable tablet 16 g  4 Tab Oral PRN    dextrose (D50W) injection syrg 12.5-25 g  12.5-25 g IntraVENous PRN    glucagon (GLUCAGEN) injection 1 mg  1 mg IntraMUSCular PRN    insulin lispro (HUMALOG) injection 20 Units  20 Units SubCUTAneous TIDAC    insulin glargine (LANTUS) injection 50 Units  50 Units SubCUTAneous DAILY    0.9% sodium chloride infusion  125 mL/hr IntraVENous CONTINUOUS    sodium chloride (NS) flush 5-40 mL  5-40 mL IntraVENous Q8H    sodium chloride (NS) flush 5-40 mL  5-40 mL IntraVENous PRN     Current Outpatient Medications   Medication Sig    insulin glargine (LANTUS,BASAGLAR) 100 unit/mL (3 mL) inpn 50 Units by SubCUTAneous route daily.     insulin lispro (HUMALOG KWIKPEN INSULIN) 100 unit/mL kwikpen 25 Units by SubCUTAneous route Before breakfast, lunch, and dinner.  metFORMIN ER (GLUCOPHAGE XR) 500 mg tablet Take 1 Tab by mouth Before breakfast and dinner.  albuterol (PROVENTIL HFA, VENTOLIN HFA, PROAIR HFA) 90 mcg/actuation inhaler Take 2 Puffs by inhalation every four (4) hours as needed for Wheezing.        Care Plan discussed with:  Patient/Family and Nurse    Bi Juarez MD  Internal Medicine  Date of Service: 2/21/2019

## 2019-02-21 NOTE — H&P
[address] HISTORY AND PHYSICAL Name:  Gregg Perez 
MR#:  [id number] :  1965 ACCOUNT #:  [de-identified] ADMIT DATE:  2019 CHIEF COMPLAINT:  Abnormal labs. HISTORY OF PRESENT ILLNESS:  The patient is a 78-year-old female with a past medical 
history of diabetes, anemia, asthma, who presents to the hospital with the 
above-mentioned symptoms. The patient reports that she has history of diabetes, went 
to an endocrinologist on 2019, and was told that her blood glucose were not 
very well controlled. Her insulin dosage was changed and she was told to follow up 
with her PCP. The patient went to her PCP today and was found to have elevated blood 
glucose, which was in 700s. The patient was also found to be dehydrated and the PCP 
got concerned and decided to send the patient to the hospital.  The patient reports 
that there was something also wrong with her liver test.  The patient reports that 
she had one episode of vomiting two days ago, but denies any other complaints or 
problems. The patient reports she has a history of diabetes mellitus type 1, takes 
metformin  mg daily b.i.d, Basaglar 50 units once daily, and Humalog per 
sliding scale. The patient reports usually her blood glucose are in 200s. The 
patient usually checks her blood glucose on a regular basis. The patient has a 
history of DKA in the past.  The patient denies any headache, blurry vision, sore 
throat, trouble swallowing, trouble with speech, chest pain, shortness of breath, 
cough, fever, chills, abdominal pain, constipation, diarrhea, urinary symptoms, focal 
or generalized neurological weakness, recent travel, sick contacts, falls, injuries, 
hematemesis, melena, hemoptysis, hematuria, or any other concerns or problems. PAST MEDICAL HISTORY:  See above. HOME MEDICATIONS:  Currently, the patient is on; 1. Albuterol p.r.n. 
2.  Metformin  mg daily. 3.  Insulin 25 units subcutaneous before breakfast, lunch, and dinner. 4.  Lantus 50 units daily. SOCIAL HISTORY:  Never smoker. No alcohol. No IV drug abuse. FAMILY HISTORY:  Father with a history of prostate cancer. Mother with a history of 
diabetes. Maternal grandmother with a history of diabetes. Paternal aunt with a 
history of breast cancer. ALLERGIES:  CONTRAST AGENT, HYDROCODONE, PERCOCET, CODEINE, METFORMIN. REVIEW OF SYSTEMS:  All systems were reviewed and found to be essentially negative 
except for the symptoms as mentioned above. PHYSICAL EXAMINATION: 
GENERAL:  Alert x3, awake, in no acute distress, resting in bed, pleasant female, 
appears to be stated age. VITAL SIGNS:  Temperature 98.4, pulse 67, respiratory rate 21, blood pressure 111/68, 
pulse ox 100% on room air. HEENT:  Pupils are equal and reactive to light. Dry mucous membranes. Tympanic 
membranes are clear. NECK:  Supple. CHEST:  Clear to auscultation bilaterally. CORONARY:  S1 and S2 are heard. ABDOMEN:  Soft, nontender, and nondistended. Bowel sounds are physiological. 
EXTREMITIES:  No clubbing, no cyanosis, no edema. NEURO/PSYCH:  Pleasant mood and affect. Cranial nerves II through XII grossly 
intact. Sensory grossly within normal limits. DTRs 2+/4. Strength 5/5. SKIN:  Warm. LABORATORY DATA:  White count 5.9, hemoglobin 11.6, hematocrit 35.9, and platelets 
632. Urine shows greater than 100 wbc's, trace ketones, trace blood. Sodium 124, 
potassium 4, chloride 86, bicarb 27, anion gap 11, glucose 778, BUN 37, creatinine 2.06, calcium 9.5, phosphorus 2.6, bili total 0.4, , AST 64, alk phos 82. Troponin less than 0.05. X-ray of the chest is pending. EKG shows normal sinus rhythm. No acute ST 
elevation. ASSESSMENT AND PLAN: 
1. Hyperosmolar nonketotic state and diabetes mellitus type 1 with hyperglycemia. The patient will be admitted on a telemetry bed.   Start the patient on aggressive 
intravenous hydration, insulin drip, n.p.o., Accu-Cheks per protocol. We will 
optimize blood glucose control. Once blood glucose gets better, I will switch the 
patient to scheduled insulin. Optimized regimen and further interventions per 
hospital course. Continue to closely monitor. Reassess as needed. No obvious signs 
of infection. 2.  Pseudohyponatremia. Should correct with intravenous fluid resuscitation. Continue to closely monitor. I will repeat labs in the morning. Neuro checks have 
been ordered. 3.  Acute kidney injury, most likely prerenal secondary to above. We will start 
intravenous hydration, avoid nephrotoxic medications, renally dose all other 
medications and continue to closely monitor. Further interventions per hospital 
course. 4.  Elevated LFTs, unclear etiology, right upper quadrant ultrasound has been 
ordered. The patient denies any symptoms associated with the same. I will trend and 
may consider getting a gastroenterology consult in the morning. I will get 
acetaminophen level and further interventions per hospital course. 5.  Gastrointestinal and deep venous thrombosis prophylaxis. The patient will be on 
sequential compression devices. Josselyn Sarmiento MD 
 
 
[id number] 
D:  02/20/2019 21:21 
T:  02/21/2019 5:27 JOB #:  [id number]

## 2019-02-21 NOTE — ED NOTES
Bedside shift change report given to Davey Caba RN (oncoming nurse) by Kar Harris RN (offgoing nurse). Report included the following information SBAR, ED Summary, Intake/Output, MAR and Recent Results.

## 2019-02-21 NOTE — PROGRESS NOTES
TRANSFER - IN REPORT:    Verbal report received from Darcy(edinson) on Brandon Santiago  being received from ED(unit) for routine progression of care      Report consisted of patients Situation, Background, Assessment and   Recommendations(SBAR). Information from the following report(s) SBAR and MAR was reviewed with the receiving nurse. Opportunity for questions and clarification was provided. Assessment completed upon patients arrival to unit and care assumed.

## 2019-02-21 NOTE — ED NOTES
2000 Bedside shift change report given to Chuy Sanchez RN (oncoming nurse) by [place] RN (offgoing nurse). Report included the following information SBAR and ED Summary.      2030 pt voided x1 on [facility] with no assistance and no complaints at this time    2055 Dr. Carina Abdalla @ bedside    [phone] pt voided x1 on [facility] with no assistance    2215 spoke with Dr. Carina Abdalla, stop insulin gtt when blood glucose <200 and continue to recheck blood sugar q 1 hr and restart if BS goes >200; do not hang D5 with NS; per Dr. Carina Abdalla pt may be downgraded to [facility]    2325 contacted Dr. Rolf Boyce per protocol for insulin gtt regarding lab results; per Dr. Rolf Boyce do not stop the insulin gtt, continue insulin gtt per glucostabilizer, hang D5 with NS at the same rate, and MD will put in orders to replace electrolytes; also discontinue NPO and place pt on diabetic diet    0015 pt provided with dinner tray and snack, given tylenol for headache, will continue to monitor

## 2019-02-22 VITALS
OXYGEN SATURATION: 100 % | TEMPERATURE: 97.5 F | DIASTOLIC BLOOD PRESSURE: 72 MMHG | HEART RATE: 74 BPM | HEIGHT: 63 IN | RESPIRATION RATE: 18 BRPM | WEIGHT: 94.6 LBS | SYSTOLIC BLOOD PRESSURE: 129 MMHG | BODY MASS INDEX: 16.76 KG/M2

## 2019-02-22 LAB
ANION GAP SERPL CALC-SCNC: 9 MMOL/L (ref 5–15)
BUN SERPL-MCNC: 18 MG/DL (ref 6–20)
BUN/CREAT SERPL: 16 (ref 12–20)
CALCIUM SERPL-MCNC: 8.4 MG/DL (ref 8.5–10.1)
CHLORIDE SERPL-SCNC: 108 MMOL/L (ref 97–108)
CO2 SERPL-SCNC: 24 MMOL/L (ref 21–32)
CREAT SERPL-MCNC: 1.13 MG/DL (ref 0.55–1.02)
GLUCOSE BLD STRIP.AUTO-MCNC: 114 MG/DL (ref 65–100)
GLUCOSE BLD STRIP.AUTO-MCNC: 123 MG/DL (ref 65–100)
GLUCOSE BLD STRIP.AUTO-MCNC: 145 MG/DL (ref 65–100)
GLUCOSE BLD STRIP.AUTO-MCNC: 76 MG/DL (ref 65–100)
GLUCOSE SERPL-MCNC: 68 MG/DL (ref 65–100)
MAGNESIUM SERPL-MCNC: 1.4 MG/DL (ref 1.6–2.4)
MAGNESIUM SERPL-MCNC: 1.5 MG/DL (ref 1.6–2.4)
POTASSIUM SERPL-SCNC: 3.9 MMOL/L (ref 3.5–5.1)
SERVICE CMNT-IMP: ABNORMAL
SERVICE CMNT-IMP: NORMAL
SODIUM SERPL-SCNC: 141 MMOL/L (ref 136–145)

## 2019-02-22 PROCEDURE — 36415 COLL VENOUS BLD VENIPUNCTURE: CPT

## 2019-02-22 PROCEDURE — 83735 ASSAY OF MAGNESIUM: CPT

## 2019-02-22 PROCEDURE — 74011250637 HC RX REV CODE- 250/637: Performed by: HOSPITALIST

## 2019-02-22 PROCEDURE — 74011636637 HC RX REV CODE- 636/637: Performed by: HOSPITALIST

## 2019-02-22 PROCEDURE — 74011250637 HC RX REV CODE- 250/637: Performed by: FAMILY MEDICINE

## 2019-02-22 PROCEDURE — 80048 BASIC METABOLIC PNL TOTAL CA: CPT

## 2019-02-22 PROCEDURE — 82962 GLUCOSE BLOOD TEST: CPT

## 2019-02-22 PROCEDURE — 74011250636 HC RX REV CODE- 250/636: Performed by: FAMILY MEDICINE

## 2019-02-22 RX ORDER — CALCIUM CARBONATE 300MG(750)
1 TABLET,CHEWABLE ORAL DAILY
Qty: 3 TAB | Refills: 0 | Status: SHIPPED | OUTPATIENT
Start: 2019-02-22 | End: 2019-02-25

## 2019-02-22 RX ORDER — MAGNESIUM SULFATE HEPTAHYDRATE 40 MG/ML
2 INJECTION, SOLUTION INTRAVENOUS ONCE
Status: DISCONTINUED | OUTPATIENT
Start: 2019-02-22 | End: 2019-02-22

## 2019-02-22 RX ORDER — LANOLIN ALCOHOL/MO/W.PET/CERES
400 CREAM (GRAM) TOPICAL ONCE
Status: COMPLETED | OUTPATIENT
Start: 2019-02-22 | End: 2019-02-22

## 2019-02-22 RX ADMIN — INSULIN GLARGINE 50 UNITS: 100 INJECTION, SOLUTION SUBCUTANEOUS at 09:38

## 2019-02-22 RX ADMIN — MAGNESIUM OXIDE TAB 400 MG (241.3 MG ELEMENTAL MG) 400 MG: 400 (241.3 MG) TAB at 14:44

## 2019-02-22 RX ADMIN — INSULIN LISPRO 25 UNITS: 100 INJECTION, SOLUTION INTRAVENOUS; SUBCUTANEOUS at 11:51

## 2019-02-22 RX ADMIN — INSULIN LISPRO 2 UNITS: 100 INJECTION, SOLUTION INTRAVENOUS; SUBCUTANEOUS at 11:51

## 2019-02-22 RX ADMIN — METFORMIN HYDROCHLORIDE 500 MG: 500 TABLET, EXTENDED RELEASE ORAL at 08:24

## 2019-02-22 NOTE — PROGRESS NOTES
Pt discharged in stable condition, ambulatory, pt received avs, prescriptions, and work note. Pt verbalized understanding of all discharge instructions.

## 2019-02-22 NOTE — DIABETES MGMT
Attempted to see patient for A1C of 14%, but patient stated, \"I want to get out of here and I don't want to talk to anybody else. I know all about that (diabetes).   Lianne Calloway, MS, RN, CDE

## 2019-02-22 NOTE — PROGRESS NOTES
/22/2019 4:40 PM    Ms. Yuliana Villa  [address] [address]      To Whom It May Concern:    Yuliana Villa is currently under the care of [facility] TELEMET[facility]. hospitalized from 02/20/2019 to 02/22/2019    She will return to work 02/25/2019    If there are questions or concerns please have the patient contact our office.         Sincerely,  Jasper Meade MD

## 2019-02-22 NOTE — PROGRESS NOTES
BG 74  OJ and peanut butter and crackers given    2210 BG recheck went down now 60 patient eating crackers and peanut butter first cup OJ she spilt on her bed talking on the phone. NOw OJ is drank and she is resting comfortable in bed waiting on BG recheck.     2227 BG 81 on recheck reported off to nurse Joel Son, RN

## 2019-02-22 NOTE — PROGRESS NOTES
Spiritual Care Partner Volunteer visited patient in room 361/01 on 2.22.19. Documented by: : Rev. Huy Herndon.  Monika Saavedra; [facility], to contact [phone] [address] call: [phone]

## 2019-02-22 NOTE — DISCHARGE INSTRUCTIONS
DIET: Diabetic Diet    ACTIVITY: Activity as tolerated      NOTIFY YOUR PHYSICIAN FOR ANY OF THE FOLLOWING:   Fever over 101 degrees for 24 hours. Chest pain, shortness of breath, fever, chills, nausea, vomiting, diarrhea, change in mentation, falling, weakness, bleeding. Severe pain or pain not relieved by medications. Or, any other Patient Education        Learning About High Blood Sugar  What is high blood sugar? Your body turns the food you eat into glucose (sugar), which it uses for energy. But if your body isn't able to use the sugar right away, it can build up in your blood and lead to high blood sugar. When the amount of sugar in your blood stays too high for too much of the time, you may have diabetes. Diabetes is a disease that can cause serious health problems. The good news is that lifestyle changes may help you get your blood sugar back to normal and avoid or delay diabetes. What causes high blood sugar? Sugar (glucose) can build up in your blood if you:  · Are overweight. · Have a family history of diabetes. · Take certain medicines, such as steroids. What are the symptoms? Having high blood sugar may not cause any symptoms at all. Or it may make you feel very thirsty or very hungry. You may also urinate more often than usual, have blurry vision, or lose weight without trying. How is high blood sugar treated? You can take steps to lower your blood sugar level if you understand what makes it get higher. Your doctor may want you to learn how to test your blood sugar level at home. Then you can see how illness, stress, or different kinds of food or medicine raise or lower your blood sugar level. Other tests may be needed to see if you have diabetes. How can you prevent high blood sugar? · Watch your weight. If you're overweight, losing just a small amount of weight may help. Reducing fat around your waist is most important.   · Limit the amount of calories, sweets, and unhealthy fat you eat. Ask your doctor if a dietitian can help you. A registered dietitian can help you create meal plans that fit your lifestyle. · Get at least 30 minutes of exercise on most days of the week. Exercise helps control your blood sugar. It also helps you maintain a healthy weight. Walking is a good choice. You also may want to do other activities, such as running, swimming, cycling, or playing tennis or team sports. · If your doctor prescribed medicines, take them exactly as prescribed. Call your doctor if you think you are having a problem with your medicine. You will get more details on the specific medicines your doctor prescribes. Follow-up care is a key part of your treatment and safety. Be sure to make and go to all appointments, and call your doctor if you are having problems. It's also a good idea to know your test results and keep a list of the medicines you take. Where can you learn more? Go to http://jurgen-molly.info/. Enter O108 in the search box to learn more about \"Learning About High Blood Sugar. \"  Current as of: July 25, 2018  Content Version: 11.9  © 8402-8156 [employer]. Care instructions adapted under license by [employer] (which disclaims liability or warranty for this information). If you have questions about a medical condition or this instruction, always ask your healthcare professional. [address] any warranty or liability for your use of this information. signs or symptoms that you may have questions about.

## 2019-02-22 NOTE — DISCHARGE SUMMARY
Discharge Summary       PATIENT ID: Chance Arellano  MRN: [id number]   YOB: 1965    DATE OF ADMISSION: 2/20/2019  6:13 PM    DATE OF DISCHARGE: 2/22/2019  PRIMARY CARE PROVIDER: Hetty Koyanagi, MD     ATTENDING PHYSICIAN:   DISCHARGING PROVIDER: Davon Mack MD    To contact this individual call [phone] and ask the  to page. If unavailable ask to be transferred the Adult Hospitalist Department. CONSULTATIONS: IP CONSULT TO HOSPITALIST    PROCEDURES/SURGERIES: * No surgery found *    ADMITTING DIAGNOSES & HOSPITAL COURSE:    52 y.o lady w/ PMH of DM who presented with hyperglycemia and dehydration ,referred by PCP   f/u endocrinologist 02/18/19     Course per problem list:    Hyperosmolar nonketotic state and diabetes mellitus type 1 with hyperglycemia. Resolved with aggressive IVf hydrationinsulin drip   transition from insulin drip to Lantus 50 U (recently increased home dose)  -mealtime lispro 25 U ACTId (on 25 U at home)  -SSI   -resume metformin since renal function improved  -BG improved     Acute kidney injury,Resolved  most likely prerenal secondary to above     Pseudohyponatremia. correctrf with intravenous fluid resuscitation. Hypomagnesemia. pt declined iv replacement  -given po mag  -f/u repeat labs with pcp                     FOLLOW UP APPOINTMENTS:    Follow-up Information     Follow up With Specialties Details Why Contact Info    Hetty Koyanagi, MD Internal Medicine Schedule an appointment as soon as possible for a visit in 3 days  [address] [facility]  [phone]               DIET: Diabetic Diet    ACTIVITY: Activity as tolerated          DISCHARGE MEDICATIONS:  Current Discharge Medication List      START taking these medications    Details   magnesium oxide 400 mg magnesium tab Take 1 Tab by mouth daily for 3 days.   Qty: 3 Tab, Refills: 0         CONTINUE these medications which have NOT CHANGED    Details insulin glargine (LANTUS,BASAGLAR) 100 unit/mL (3 mL) inpn 50 Units by SubCUTAneous route daily. insulin lispro (HUMALOG KWIKPEN INSULIN) 100 unit/mL kwikpen 25 Units by SubCUTAneous route Before breakfast, lunch, and dinner. metFORMIN ER (GLUCOPHAGE XR) 500 mg tablet Take 1 Tab by mouth Before breakfast and dinner. Qty: 180 Tab, Refills: 0    Associated Diagnoses: Insulin dependent diabetes mellitus ([address] Physical exam      albuterol (PROVENTIL HFA, VENTOLIN HFA, PROAIR HFA) 90 mcg/actuation inhaler Take 2 Puffs by inhalation every four (4) hours as needed for Wheezing. Qty: 1 Inhaler, Refills: 0               NOTIFY YOUR PHYSICIAN FOR ANY OF THE FOLLOWING:   Fever over 101 degrees for 24 hours. Chest pain, shortness of breath, fever, chills, nausea, vomiting, diarrhea, change in mentation, falling, weakness, bleeding. Severe pain or pain not relieved by medications. Or, any other signs or symptoms that you may have questions about.     DISPOSITION:    Home With:family self care    OT  PT  HH  RN       Long term SNF/Inpatient Rehab    Independent/assisted living    Hospice    Other:       PATIENT CONDITION AT DISCHARGE:     Functional status    Poor     Deconditioned    x Independent      Cognition    x Lucid     Forgetful     Dementia      Catheters/lines (plus indication)    Loza     PICC     PEG    x None      Code status    x Full code     DNR    /72 (BP 1 Location: Left arm, BP Patient Position: At rest;Sitting)   Pulse 74   Temp 97.5 °F (36.4 °C)   Resp 18   Ht 5' 3\" (1.6 m)   Wt 42.9 kg (94 lb 9.6 oz)   SpO2 100%   BMI 16.76 kg/m²   PHYSICAL EXAM:  Gen: NAD, non-toxic  HEENT: anicteric sclerae, normal conjunctiva, oropharynx clear, MM moist  Neck: supple, trachea midline, no adenopathy  Heart: RRR, no MRG, no JVD, no peripheral edema  Lungs: CTA b/l, non-labored respirations  Abd: soft, NT, ND, BS+, no organomegaly  Extr: warm  Skin: dry, no rash  Neuro: CN II-XII grossly intact, normal speech, moves all extremities  Psych: normal mood, appropriate affect             CHRONIC MEDICAL DIAGNOSES:  Problem List as of 2/22/2019 Date Reviewed: 2/22/2019          Codes Class Noted - Resolved    Hyperosmolar (nonketotic) coma ([facility] ICD-10-CM: E11.01  ICD-9-CM: 250.20  2/20/2019 - Present        * (Principal) Hyperglycemia ICD-10-CM: R73.9  ICD-9-CM: 790.29  10/23/2018 - Present        Urinary incontinence ICD-10-CM: R32  ICD-9-CM: 788.30  10/22/2018 - Present        Recurrent UTI ICD-10-CM: N39.0  ICD-9-CM: 599.0  10/22/2018 - Present        DKA (diabetic ketoacidoses) ([facility] ICD-10-CM: E13.10  ICD-9-CM: 250.10  8/24/2018 - Present        Sepsis ([place] [address].) ICD-10-CM: A41.9  ICD-9-CM: 038.9, 995.91  8/24/2018 - Present        Hyponatremia ICD-10-CM: E87.1  ICD-9-CM: 276.1  8/24/2018 - Present        ARF (acute renal failure) (HCC) ICD-10-CM: N17.9  ICD-9-CM: 584.9  8/24/2018 - Present        Hyperkalemia ICD-10-CM: E87.5  ICD-9-CM: 276.7  8/24/2018 - Present        Type 2 diabetes with nephropathy ([facility] ICD-10-CM: E11.21  ICD-9-CM: 250.40, 583.81  5/23/2018 - Present        Uncontrolled type 2 DM with hyperosmolar nonketotic hyperglycemia (HCC) ICD-10-CM: E11.00  ICD-9-CM: 250.22  1/14/2018 - Present        Elevated transaminase level ICD-10-CM: R74.0  ICD-9-CM: 790.4  6/29/2016 - Present        Insulin dependent diabetes mellitus ([facility] ICD-10-CM: E11.9, Z79.4  ICD-9-CM: 250.00, V58.67  6/20/2016 - Present        Pancreatic atrophy ICD-10-CM: K86.89  ICD-9-CM: 577.8  6/20/2016 - Present        Hyperosmolality syndrome ICD-10-CM: E87.0  ICD-9-CM: 276.0  6/10/2016 - Present              Greater than 35 minutes were spent with the patient on counseling and coordination of care    Signed:   Holley Yan MD  2/22/2019  3:29 PM

## 2019-03-05 ENCOUNTER — HOSPITAL ENCOUNTER (OUTPATIENT)
Dept: MAMMOGRAPHY | Age: 54
Discharge: HOME | End: 2019-03-05
Attending: INTERNAL MEDICINE
Payer: COMMERCIAL

## 2019-03-05 DIAGNOSIS — Z12.31 SCREENING MAMMOGRAM, ENCOUNTER FOR: ICD-10-CM

## 2019-03-05 PROCEDURE — 77067 SCR MAMMO BI INCL CAD: CPT

## 2019-03-18 ENCOUNTER — TELEPHONE (OUTPATIENT)
Dept: ENDOCRINOLOGY | Age: 54
End: 2019-03-18

## 2019-03-18 NOTE — TELEPHONE ENCOUNTER
Attempted to call patient to check up on her, no answer, left general message. Had received a prior call from her about having lower than usual blood sugars,  She had lowered her dose slightly but had not seen any update on that. Yancy Paz.  [address] [facility] Endocrinology  [address] [place] [address]

## 2019-03-19 ENCOUNTER — TELEPHONE (OUTPATIENT)
Dept: INTERNAL MEDICINE CLINIC | Age: 54
End: 2019-03-19

## 2019-03-19 NOTE — TELEPHONE ENCOUNTER
Spoke to MELO Jensen (HIPAA). Two pt identifiers confirmed. Perla given contact info to Dr. Hong Powell for pt. Perla verbalized understanding of information discussed w/ no further questions at this time.

## 2019-03-19 NOTE — TELEPHONE ENCOUNTER
Pt returning a miss call from \"Paul\" with in the office. Pt requesting for \"Paul\" to call her sister Gloria Reno and relay the information to her. Pt is at work and unable to use her phone while at work. Mrs. Dodie Jeff is on pt's [address] [employer] best contact number [phone].    Pt's best contact number [phone]       Message received & copied from [facility]

## 2019-03-19 NOTE — TELEPHONE ENCOUNTER
Pt would like to advise the urologist she went to, Dr. Adrianna Ragsdale, stated she would need to see another specialist due to pain is not in pelvic area but her hip.  Pt would like a call back with recommendations for a joint pain specialist. Juanito Solares contact number [phone] or [phone].      Message received & copied from [facility]

## 2019-05-21 ENCOUNTER — TELEPHONE (OUTPATIENT)
Dept: ENDOCRINOLOGY | Age: 54
End: 2019-05-21

## 2019-05-21 NOTE — TELEPHONE ENCOUNTER
Ms. Kathye Kawasaki called me back and said that she saw her Kidney Doctor yesterday and they did blood work and urine. They called her and said her blood sugar was 31. She says it's been running good. It was 149 this AM and 150 at noon. I asked her to have them fax us over a copy of those labs so Dr. Bertin Benitez could also see what is going on. She will call them and have them do this.   Claudia Gomez

## 2019-05-21 NOTE — TELEPHONE ENCOUNTER
I attempted to return this call and reached a voice mail. I left a message asking her to give me a call back.   Mason Glover

## 2019-05-21 NOTE — TELEPHONE ENCOUNTER
----- Message from [facility] sent at 5/21/2019 12:21 PM EDT -----  Regarding: Dr. Russell Gentle: [phone]  Pt stated her test on 5/20 came out to be 31 for her Blood Sugar Level so she checked her level this morning and it is at 150. Pt would like to speak to the nurse or doctor about this.

## 2019-05-31 ENCOUNTER — OFFICE VISIT (OUTPATIENT)
Dept: ENDOCRINOLOGY | Age: 54
End: 2019-05-31

## 2019-05-31 VITALS
BODY MASS INDEX: 17.33 KG/M2 | HEIGHT: 63 IN | HEART RATE: 74 BPM | WEIGHT: 97.8 LBS | SYSTOLIC BLOOD PRESSURE: 135 MMHG | DIASTOLIC BLOOD PRESSURE: 68 MMHG

## 2019-05-31 DIAGNOSIS — E13.9 LADA (LATENT AUTOIMMUNE DIABETES IN ADULTS), MANAGED AS TYPE 1 (HCC): Primary | ICD-10-CM

## 2019-05-31 LAB — HBA1C MFR BLD HPLC: 8.3 %

## 2019-05-31 RX ORDER — DICLOFENAC SODIUM 10 MG/G
GEL TOPICAL
Refills: 11 | COMMUNITY
Start: 2019-03-26 | End: 2020-02-19

## 2019-05-31 NOTE — PATIENT INSTRUCTIONS
Metformin, 500mg tab TWICE daily, tolerating  Basaglar:  55 units each morning,  Humalog:                   Breakfast: 28 units               Lunch: 34 units                       Dinner: 32 units     IF GLUCOSE IS:            THEN TAKE:     0   Extra Unit  151-165   1   Extra Unit  166-180   2   Extra Units  181-195   3   Extra Units  196-210   4   Extra Units  211-225   5   Extra Units  226-240   6   Extra Units  241-255   7   Extra Units  256-270   8   Extra Units  271-285   9   Extra Units  286-300   10 Extra Units    Example: My planned insulin dose:    ____ Units    +    ____ Extra Correction Units  =  ____ total units to take together as one injection. Please note our new policy, you must arrive to the clinic 15 minutes before your appointment time to allow enough time for proper check-in, adequate time to spend with your doctor, and also to respect the appointment time of the next patient. Not arriving 15 minutes in advance may result in having your appointment rescheduled for the next available day/time.  ----------------------------------------------------------------------------------------------------------------------    Below you will find a glucose log sheet which you can use to record your blood sugars. Without checking and recording what your home glucose levels are, it will be difficult to make any changes to your medication dose, even when significant changes may be needed. Please feel free to use the log below to record your home glucose levels. At the very least, I would like for you to login the entire 2-3 weeks just before your visit so we can make your visit much more productive and beneficial to you. GLUCOSE LOG SHEET:    Date Breakfast Lunch Dinner Bedtime Comments ? GLUCOSE LOG SHEET:    Date Breakfast Lunch Dinner Bedtime Comments ? GLUCOSE LOG SHEET:    Date Breakfast Lunch Dinner Bedtime Comments ?

## 2019-05-31 NOTE — PROGRESS NOTES
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 female with a PMHx as noted below who is presenting to our endocrine clinic for the f/u evaluation of recently diagnosed diabetes. History of Type 1 Diabetes:  Patient reports that they were diagnosed in the ED with A1c of 15% June 2016     Pancreatic atrophy on CT   Family history is positive for diabetes in mother and maternal grandmother, both on insulin but type of diabetes unclear  Was started on novolog 70/30 insulin, did not tolerate any oral medications (neg antibodies, normal c-peptide)    INTERVAL HISTORY:  A1c today is 8.3%.      Current home regimen includes:   Metformin, 500mg tab TWICE daily, tolerating  Basaglar: Increase to 55 units each morning,  Humalog:                   Breakfast: 28 units             Lunch: 30 units                       Dinner: 30 units   Correction Scale:  1 unit for every 15 above 150    Home Blood glucose review:   AM:  129-250 (200's however were lumped together a few days in a row mostly)  Lunch:  125-170 mostly, 30,01,915  Dinner:  117-155 mostly, no lows  Bedtime: not checked often, 156, 145, 122    Review of most recent diabetes-related labs:  Lab Results   Component Value Date    HBA1C 14.0 (H) 02/20/2019    HBA1C 14.4 (H) 02/19/2019    HBA1C 10.7 (H) 10/23/2018    EVL6SBEO 8.3 05/31/2019    ZNV4XRAP 14.2 02/18/2019    KXF9QYWY 10.6 11/01/2018    CHOL 191 02/19/2019    LDLC 57 02/19/2019    GFRAA >60 02/22/2019    GFRNA 50 (L) 02/22/2019    MCACR 192.5 (H) 02/19/2019    TSH 0.327 (L) 02/19/2019    154239 <1.0 03/06/2018    GADLT <5.0 03/06/2018    CPEPLT 1.7 07/23/2018     Lab Key:  [id number] = IA-2 pancreatic islet cell autoantibody  GADLT = JORDANA-65 autoantibody   MCACR = Urine Microalbumin  INSUL = Insulin level  CPEPL = C-peptide level    PAST MEDICAL/SURGICAL HISTORY:   Past Medical History:   Diagnosis Date    Asthma     Diabetes [facility]     Elevated transaminase level 6/29/2016    Insulin dependent diabetes mellitus ([facility] 6/20/2016    Pancreatic atrophy 6/20/2016     Past Surgical History:   Procedure Laterality Date    HX HEENT         ALLERGIES:   Allergies   Allergen Reactions    Contrast Agent [Iodine] Itching    Hydrocodone Rash    Percocet [Oxycodone-Acetaminophen] Rash     Pt states she is not allergic to Tylenol.  Codeine Nausea and Vomiting    Metformin Diarrhea       MEDICATIONS ON ADMISSION:     Current Outpatient Medications:     insulin glargine (LANTUS,BASAGLAR) 100 unit/mL (3 mL) inpn, 50 Units by SubCUTAneous route daily. , Disp: , Rfl:     insulin lispro (HUMALOG KWIKPEN INSULIN) 100 unit/mL kwikpen, 25 Units by SubCUTAneous route Before breakfast, lunch, and dinner., Disp: , Rfl:     metFORMIN ER (GLUCOPHAGE XR) 500 mg tablet, Take 1 Tab by mouth Before breakfast and dinner., Disp: 180 Tab, Rfl: 0    diclofenac (VOLTAREN) 1 % gel, APPLY 2G TO AFFECTED AREAS 4 TIMES A DAY, Disp: , Rfl: 11    albuterol (PROVENTIL HFA, VENTOLIN HFA, PROAIR HFA) 90 mcg/actuation inhaler, Take 2 Puffs by inhalation every four (4) hours as needed for Wheezing., Disp: 1 Inhaler, Rfl: 0    SOCIAL HISTORY:   Social History     Socioeconomic History    Marital status: SINGLE     Spouse name: Not on file    Number of children: Not on file    Years of education: Not on file    Highest education level: Not on file   Occupational History    Not on file   Social Needs    Financial resource strain: Not on file    Food insecurity:     Worry: Not on file     Inability: Not on file    Transportation needs:     Medical: Not on file     Non-medical: Not on file   Tobacco Use    Smoking status: Never Smoker    Smokeless tobacco: Never Used   Substance and Sexual Activity    Alcohol use: No     Frequency: Never     Comment: occasionally    Drug use: No    Sexual activity: Not Currently   Lifestyle    Physical activity:     Days per week: Not on file     Minutes per session: Not on file    Stress: Not on file   Relationships    Social connections:     Talks on phone: Not on file     Gets together: Not on file     Attends Taoism service: Not on file     Active member of club or organization: Not on file     Attends meetings of clubs or organizations: Not on file     Relationship status: Not on file    Intimate partner violence:     Fear of current or ex partner: Not on file     Emotionally abused: Not on file     Physically abused: Not on file     Forced sexual activity: Not on file   Other Topics Concern    Not on file   Social History Narrative    Not on file       FAMILY HISTORY:  Family History   Problem Relation Age of Onset    Cancer Father         prostate and colon    Diabetes Mother     Diabetes Maternal Grandmother     Cancer Maternal Aunt         breast       REVIEW OF SYSTEMS: Complete ROS assessed and noted for that which is described above, all else are negative. Eyes: normal  ENT: normal  CVS: normal  Resp: normal  GI: normal  : normal  GYN: normal  Endocrine: normal  Integument: normal  Musculoskeletal: normal  Neuro: normal  Psych: normal    PHYSICAL EXAMINATION:    VITAL SIGNS:  Visit Vitals  /68 (BP 1 Location: Left arm, BP Patient Position: Sitting)   Pulse 74   Ht 5' 3\" (1.6 m)   Wt 97 lb 12.8 oz (44.4 kg)   BMI 17.32 kg/m²       GENERAL: NCAT, Sitting comfortably, NAD  EYES: EOMI, non-icteric, no proptosis  HEENT: NCAT, no inflammation, no masses  LYMPH NODES: No LAD  CARDIOVASCULAR: S1 S2, RRR, No murmur, 2+ radial pulses  RESPIRATORY: CTA b/l, no wheeze/rales  ABDOMEN: BS normal  NEUROLOGIC: 5/5 power all extremities, no parasthesias, AAOx3  EXTREMITIES: warm, no edema/rash/ or other skin changes    REVIEW OF LABORATORY AND RADIOLOGY DATA:   Labs and documentation have been reviewed as described above.        ASSESSMENT AND PLAN:   Zurdo Camarillo is a 52 y.o. female with a PMHx as noted below who is presenting to our endocrine clinic for the f/u evaluation of recent onset diabetes. SHANTEL, late onset autoimmune diabetes of the adult (type-1 like diabetes)     Patients numbers have continued to improve quite a bit since last visit with some of the changes made. I suspect that the metformin continues to help with her insulin resistance as well. We noted that she did have a low in the 30's one day around lunch time, and this seems to have been due to taking insulin with correction without having the meal. We noted that she should not wait for the sugar to come down but rather have her breakfast when using the correction scale with her usual dose. Also, we will increase her dose of humalog with lunch and dinner slightly to help with some of the post post prandial hyperglycemia. She is not checking bedtime sugars though we noted it would be ideal to at least check them the week before her visit so that we can assess the action of her basal overnight, and this will better inform us of how to continue optimizing her regimen. Plan:  Medications:  Metformin, 500mg tab TWICE daily, tolerating  Basaglar:  55 units each morning,  Humalog:                   Breakfast: 28 units             Lunch: 34 units                       Dinner: 32 units   Correction Scale:  1 unit for every 15 above 150    BP: Stable, not on antihypertensives  Cholesterol: reviewed, stable    RTC: I would like to see her back in 3 months, call with concerns,    Shaheed Garcia.  [address] Endocrinology  [address] [place] [address]

## 2019-06-11 LAB — CREATININE, EXTERNAL: NORMAL

## 2019-06-13 ENCOUNTER — DOCUMENTATION ONLY (OUTPATIENT)
Dept: ENDOCRINOLOGY | Age: 54
End: 2019-06-13

## 2019-06-17 ENCOUNTER — TELEPHONE (OUTPATIENT)
Dept: ENDOCRINOLOGY | Age: 54
End: 2019-06-17

## 2019-06-17 NOTE — TELEPHONE ENCOUNTER
----- Message from [facility] sent at 6/17/2019  2:59 PM EDT -----  Regarding: Dr. Rivera Asa: [phone]  Pt stated she fell out on 6/15 because her level had dropped to 22 and would like to speak with [facility] or the doctor.

## 2019-06-18 NOTE — TELEPHONE ENCOUNTER
I attempted to return this call and reached a voice mail. I left a message asking her to give me a call back.   [facility] ([facility])

## 2019-06-20 ENCOUNTER — TELEPHONE (OUTPATIENT)
Dept: ENDOCRINOLOGY | Age: 54
End: 2019-06-20

## 2019-06-20 NOTE — TELEPHONE ENCOUNTER
Attempted to call and reached voice mail left message asking her to call me back.   Kareem Cunningham

## 2019-06-20 NOTE — TELEPHONE ENCOUNTER
I attempted to return this call again and again reached a voice mail. I left another message asking her to return my call.   Dunia Rodriguez

## 2019-06-20 NOTE — TELEPHONE ENCOUNTER
Patient called back, she said she will try to call back tomorrow around 9:30am or Jermaine Benito could call again today around 5:00pm.

## 2019-06-20 NOTE — TELEPHONE ENCOUNTER
Flaco Almeida 4 hours ago (9:38 AM)         Patient is returning your phone call. She can be reached at:  [phone].          Documentation

## 2019-06-20 NOTE — TELEPHONE ENCOUNTER
I attempted to call Ms. Troy again and again reached her voice mail. I left another message asking her to call me back.   Mary Carroll

## 2019-06-21 ENCOUNTER — TELEPHONE (OUTPATIENT)
Dept: ENDOCRINOLOGY | Age: 54
End: 2019-06-21

## 2019-06-21 DIAGNOSIS — E13.9 LADA (LATENT AUTOIMMUNE DIABETES IN ADULTS), MANAGED AS TYPE 1 (HCC): Primary | ICD-10-CM

## 2019-06-21 NOTE — TELEPHONE ENCOUNTER
Need her glucose log. She can send it to me through [employer] and I can respond to her log there. Thanks   Shakeel Montejo.  [address] Endocrinology  [address] [place] [address]

## 2019-06-21 NOTE — TELEPHONE ENCOUNTER
I spoke with Ms. Troy and she wanted Dr. Angelita Perdomo to know that last Saturday the 15th, she was on a bus trip and even after eating breakfast, she \"blanked\" out. The  called the state police and a ambulance and her blood sugar was 22. They gave her \"a shot\", she wasn't sure what it was, and then she came around and felt better. She was not transported to the hospital. No other issues. On Tuesday the 18th she took her sugar before lunch at work and it was 54. She didn't feel any different when it was that low. She ate lunch and it was fine after. Those are the only two times that it has dropped. She doesn't know if there needs to be a med change or just stay as is. I told her I would pass this on to Dr. Angelita Perdomo and see what he says.   Carlos A Carmichael

## 2019-06-21 NOTE — TELEPHONE ENCOUNTER
I called Ms. Troy and relayed the message from Dr. Corrinne Beery. She said she would do this.   Sue Riosing

## 2019-08-21 ENCOUNTER — OFFICE VISIT (OUTPATIENT)
Dept: INTERNAL MEDICINE CLINIC | Age: 54
End: 2019-08-21

## 2019-08-21 ENCOUNTER — HOSPITAL ENCOUNTER (OUTPATIENT)
Dept: GENERAL RADIOLOGY | Age: 54
Discharge: HOME | End: 2019-08-21
Payer: COMMERCIAL

## 2019-08-21 VITALS
HEART RATE: 77 BPM | TEMPERATURE: 97.9 F | WEIGHT: 103 LBS | BODY MASS INDEX: 18.25 KG/M2 | DIASTOLIC BLOOD PRESSURE: 71 MMHG | HEIGHT: 63 IN | SYSTOLIC BLOOD PRESSURE: 136 MMHG | OXYGEN SATURATION: 97 % | RESPIRATION RATE: 16 BRPM

## 2019-08-21 DIAGNOSIS — E13.9 LADA (LATENT AUTOIMMUNE DIABETES IN ADULTS), MANAGED AS TYPE 1 (HCC): ICD-10-CM

## 2019-08-21 DIAGNOSIS — J45.40 MODERATE PERSISTENT ASTHMA WITHOUT COMPLICATION: ICD-10-CM

## 2019-08-21 DIAGNOSIS — R01.1 CARDIAC MURMUR: ICD-10-CM

## 2019-08-21 DIAGNOSIS — R63.6 LOW WEIGHT: ICD-10-CM

## 2019-08-21 DIAGNOSIS — M25.562 ACUTE PAIN OF LEFT KNEE: ICD-10-CM

## 2019-08-21 DIAGNOSIS — E05.90 SUBCLINICAL HYPERTHYROIDISM: ICD-10-CM

## 2019-08-21 DIAGNOSIS — R74.01 ELEVATED TRANSAMINASE LEVEL: Primary | ICD-10-CM

## 2019-08-21 DIAGNOSIS — N18.30 STAGE 3 CHRONIC KIDNEY DISEASE (HCC): ICD-10-CM

## 2019-08-21 PROBLEM — R73.9 HYPERGLYCEMIA: Status: RESOLVED | Noted: 2018-10-23 | Resolved: 2019-08-21

## 2019-08-21 PROBLEM — A41.9 SEPSIS (HCC): Status: RESOLVED | Noted: 2018-08-24 | Resolved: 2019-08-21

## 2019-08-21 PROBLEM — E87.1 HYPONATREMIA: Status: RESOLVED | Noted: 2018-08-24 | Resolved: 2019-08-21

## 2019-08-21 PROBLEM — N39.0 RECURRENT UTI: Status: RESOLVED | Noted: 2018-10-22 | Resolved: 2019-08-21

## 2019-08-21 PROBLEM — E11.21 TYPE 2 DIABETES WITH NEPHROPATHY (HCC): Status: RESOLVED | Noted: 2018-05-23 | Resolved: 2019-08-21

## 2019-08-21 PROBLEM — E11.00 UNCONTROLLED TYPE 2 DM WITH HYPEROSMOLAR NONKETOTIC HYPERGLYCEMIA (HCC): Status: RESOLVED | Noted: 2018-01-14 | Resolved: 2019-08-21

## 2019-08-21 PROBLEM — N17.9 ARF (ACUTE RENAL FAILURE) (HCC): Status: RESOLVED | Noted: 2018-08-24 | Resolved: 2019-08-21

## 2019-08-21 PROBLEM — E11.01 HYPEROSMOLAR (NONKETOTIC) COMA (HCC): Status: RESOLVED | Noted: 2019-02-20 | Resolved: 2019-08-21

## 2019-08-21 PROBLEM — E11.10 DKA (DIABETIC KETOACIDOSES): Status: RESOLVED | Noted: 2018-08-24 | Resolved: 2019-08-21

## 2019-08-21 PROBLEM — E87.5 HYPERKALEMIA: Status: RESOLVED | Noted: 2018-08-24 | Resolved: 2019-08-21

## 2019-08-21 PROCEDURE — 73562 X-RAY EXAM OF KNEE 3: CPT

## 2019-08-21 RX ORDER — ALBUTEROL SULFATE 90 UG/1
2 AEROSOL, METERED RESPIRATORY (INHALATION)
Qty: 1 INHALER | Refills: 0 | Status: SHIPPED | OUTPATIENT
Start: 2019-08-21 | End: 2019-08-21 | Stop reason: SDUPTHER

## 2019-08-21 RX ORDER — INSULIN GLARGINE 100 [IU]/ML
INJECTION, SOLUTION SUBCUTANEOUS
COMMUNITY
Start: 2019-03-15 | End: 2019-11-11 | Stop reason: SDUPTHER

## 2019-08-21 RX ORDER — NEBULIZER AND COMPRESSOR
EACH MISCELLANEOUS
Qty: 1 EACH | Refills: 0 | Status: SHIPPED | OUTPATIENT
Start: 2019-08-21 | End: 2020-02-19

## 2019-08-21 RX ORDER — ALBUTEROL SULFATE 90 UG/1
2 AEROSOL, METERED RESPIRATORY (INHALATION)
Qty: 1 INHALER | Refills: 0 | Status: SHIPPED | OUTPATIENT
Start: 2019-08-21 | End: 2020-02-19

## 2019-08-21 NOTE — PATIENT INSTRUCTIONS
Office Policies    Phone calls/patient messages:            Please allow up to 24 hours for someone in the office to contact you about your call or message. Be mindful your provider may be out of the office or your message may require further review. We encourage you to use [employer] for your messages as this is a faster, more efficient way to communicate with our office                         Medication Refills:            Prescription medications require 48-72 business hours to process. We encourage you to use [employer] for your refills. For controlled medications: Please allow 72 business hours to process. Certain medications may require you to  a written prescription at our office. NO narcotic/controlled medications will be prescribed after 4pm Monday through Friday or on weekends              Form/Paperwork Completion:            Please note a $25 fee may incur for all paperwork for completed by our providers. We ask that you allow 7-10 business days. Pre-payment is due prior to picking up/faxing the completed form. You may also download your forms to [employer] to have your doctor print off.

## 2019-08-21 NOTE — LETTER
NOTIFICATION RETURN TO WORK / SCHOOL 
 
8/21/2019 11:06 AM 
 
Ms. Emily Barraza 
[facility] 
[address] [id number] To Whom It May Concern: 
 
Emily Barraza is currently under the care of [facility]. Patient was seen in clinic 8/21/19 She will return to work/school on: 8/22/19 If there are questions or concerns please have the patient contact our office.  
 
 
 
Sincerely, 
 
 
Salvador Craft MD

## 2019-08-21 NOTE — PROGRESS NOTES
HISTORY OF PRESENT ILLNESS  Celina Meng is a 52 y.o. female. HPI   Pt was last here 2/19/19        BP is 136/71     Uncontrolled type 1 diabetic  Labile glucose  BS at home around 100s, sometimes 200s, mostly around 170s   Sometimes goes down to 72   Pt takes basaglar 40U   Has been weaning herself off  humalog 25U --not taking any  Pt is no longer on metformin --stopped d/t SE     Wt today is 103 lbs-- up 9 lbs x lov   This is in the underweight range    Reviewed labs 2/19  Will get labs today   Pt went to the ER 2/19 after abnormal labs   Was admitted to the hospital 2/20/19- 2/22/19  Reviewed discharge summary 2/22/19:  45 y.o lady w/ PMH of DM who presented with hyperglycemia and dehydration ,referred by PCP   f/u endocrinologist 02/18/19   Course per problem list:  Hyperosmolar nonketotic state and diabetes mellitus type 1 with hyperglycemia. Resolved with aggressive IVf hydrationinsulin drip   transition from insulin drip to Lantus 50 U (recently increased home dose)  -mealtime lispro 25 U ACTId (on 25 U at home)  -SSI   -resume metformin since renal function improved  -BG improved   Acute kidney injury,Resolved  most likely prerenal secondary to above   Pseudohyponatremia. correctrf with intravenous fluid resuscitation. Hypomagnesemia. pt declined iv replacement  -given po mag  -f/u repeat labs with pcp    Reviewed US abd  2/20/19: Negative.     Pt follows with Dr Janette Fine (endo) for type I diabetes  Reviewed notes 5/31/19:Anayeli Maciel is a 52 y.o. female with a PMHx as noted below who is presenting to our endocrine clinic for the f/u evaluation of recent onset diabetes. 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 I suspect that the metformin continues to help with her insulin resistance as well.  We noted that she did have a low in the 30's one day around lunch time, and this seems to have been due to taking insulin with correction without having the meal. We noted that she should not wait for the sugar to come down but rather have her breakfast when using the correction scale with her usual dose. Also, we will increase her dose of humalog with lunch and dinner slightly to help with some of the post post prandial hyperglycemia. She is not checking bedtime sugars though we noted it would be ideal to at least check them the week before her visit so that we can assess the action of her basal overnight, and this will better inform us of how to continue optimizing her regimen. Plan:  Medications:  Metformin, 500mg tab TWICE daily, tolerating  Basaglar:  55 units each morning,  Humalog:                   Breakfast: 28 units                        Lunch: 34 units                       Dinner: 32 units   Correction Scale:  1 unit for every 15 above 15   BP: Stable, not on antihypertensives  Cholesterol: reviewed, stable   RTC: I would like to see her back in 3 months, call with concerns,   next visit scheduled for 9/19      Pt follows with Dr Jr Flores (nephro) for ckd, has had multiple arf episodes  Reviewed notes 6/19: Cr was 1.3, liver test elevated      Pt also is now seeing Dr Sammie Mayen (uro) and ROWENA Barr for recurrent UTIs  Reviewed notes 10/18: repeat UA negative, gave cranberry tablets, get pelvic exam and perhaps uro gyn for vaginal spotting  Pt may have seen Dr Felix Louise - provided information in case she has not      Pt fell about a month ago and injured L knee  [facility] and was told she had a hairline fracture in patella   Pt saw Dr Opal Claire for this   Was given medrol dose pack for 6 days for swelling   Reviewed notes 8/15/19: At this time, I reassured her that her fracture appears adequately healed and recommended the patient to increase activities as tolerated All questions were answered to her satisfaction. Follow up as needed.    However pt fell again yesterday and hurt knee, hurts right below the L knee  Will get XR        Reviewed mammo 3/19: negative      Pt has albuterol to use prn for her asthma  Does not have daily sx, occasional rare wheezing   Would like a nebulizer as well--hers broke  Ordered      Reviewed echo 9/18: slight MR, normal EF      PREVENTIVE:  Colonoscopy: 10/13/16, Dr Josefina Tabares, repeat in 10 years, however fmhx - father so repeat in 5 years  Pap: [facility], ~Spring 2019   Mammogram: 3/5/19, negative   Dexa: not yet needed  Tdap: ~2013  Pneumovax: 02/19/19   Shingrix: not yet needed  Flu shot: 10/18, will get it at work   Foot exam: 2/18/19 with endo  Micro: 2/19, some protein in urine   A1C: 2/19 POC 14.0, 3/19 8.3 per Dr Karina Gandhi exam: Dr Werner Worrell, spring 2019   Lipids: 2/19 LDL 57          Patient Active Problem List    Diagnosis Date Noted    Hyperosmolar (nonketotic) coma ([address] 02/20/2019    Hyperglycemia 10/23/2018    Urinary incontinence 10/22/2018    Recurrent UTI 10/22/2018    DKA (diabetic ketoacidoses) ([address] 08/24/2018    Sepsis ([address] 08/24/2018    Hyponatremia 08/24/2018    ARF (acute renal failure) ([address] 08/24/2018    Hyperkalemia 08/24/2018    Type 2 diabetes with nephropathy ([address] 05/23/2018    Uncontrolled type 2 DM with hyperosmolar nonketotic hyperglycemia (HCC) 01/14/2018    Elevated transaminase level 06/29/2016    Insulin dependent diabetes mellitus ([address] 06/20/2016    Pancreatic atrophy 06/20/2016    Hyperosmolality syndrome 06/10/2016     Current Outpatient Medications   Medication Sig Dispense Refill    flash glucose scanning reader (FREESTYLE NICOLÁS 14 DAY READER) [facility] One Freestyle [place] for use with 14 day sensors 1 Each 0    flash glucose sensor (FREESTYLE NICOLÁS 14 DAY SENSOR) kit 2 (14 day) sensors for use with Freestyle Scanner 1 Kit 7    diclofenac (VOLTAREN) 1 % gel APPLY 2G TO AFFECTED AREAS 4 TIMES A DAY  11    insulin glargine (LANTUS,BASAGLAR) 100 unit/mL (3 mL) inpn 50 Units by SubCUTAneous route daily.  insulin lispro (HUMALOG KWIKPEN INSULIN) 100 unit/mL kwikpen 25 Units by SubCUTAneous route Before breakfast, lunch, and dinner.  metFORMIN ER (GLUCOPHAGE XR) 500 mg tablet Take 1 Tab by mouth Before breakfast and dinner. 180 Tab 0    albuterol (PROVENTIL HFA, VENTOLIN HFA, PROAIR HFA) 90 mcg/actuation inhaler Take 2 Puffs by inhalation every four (4) hours as needed for Wheezing. 1 Inhaler 0     Past Surgical History:   Procedure Laterality Date    HX HEENT        Lab Results   Component Value Date/Time    WBC 5.2 02/21/2019 01:54 AM    HGB 8.0 (L) 02/21/2019 01:54 AM    HCT 25.3 (L) 02/21/2019 01:54 AM    Hematocrit (POC) 31 (L) 08/24/2018 01:15 PM    PLATELET 044 (L) 73/42/8327 01:54 AM    MCV 93.7 02/21/2019 01:54 AM     Lab Results   Component Value Date/Time    Cholesterol, total 191 02/19/2019 09:51 AM    HDL Cholesterol 87 02/19/2019 09:51 AM    LDL, calculated 57 02/19/2019 09:51 AM    Triglyceride 237 (H) 02/19/2019 09:51 AM     Lab Results   Component Value Date/Time    GFR est non-AA 50 (L) 02/22/2019 05:03 AM    GFRNA, POC 4 (L) 08/24/2018 01:15 PM    GFR est AA >60 02/22/2019 05:03 AM    GFRAA, POC 5 (L) 08/24/2018 01:15 PM    Creatinine 1.13 (H) 02/22/2019 05:03 AM    Creatinine (POC) 9.5 (H) 08/24/2018 01:15 PM    BUN 18 02/22/2019 05:03 AM    BUN (POC) >140 (H) 08/24/2018 01:15 PM    Sodium 141 02/22/2019 05:03 AM    Sodium (POC) 124 (L) 08/24/2018 01:15 PM    Potassium 3.9 02/22/2019 05:03 AM    Potassium (POC) 7.7 (HH) 08/24/2018 01:15 PM    Chloride 108 02/22/2019 05:03 AM    Chloride (POC) 91 (L) 08/24/2018 01:15 PM    CO2 24 02/22/2019 05:03 AM    Magnesium 1.4 (L) 02/22/2019 11:11 AM    Phosphorus 1.3 (L) 02/20/2019 10:00 PM    PTH, Intact 360.7 (H) 08/25/2018 10:36 AM        Review of Systems   Respiratory: Negative for shortness of breath. Cardiovascular: Negative for chest pain.        Physical Exam   Constitutional: She is oriented to person, place, and time. She appears well-developed and well-nourished. No distress. HENT:   Head: Normocephalic and atraumatic. Mouth/Throat: Oropharynx is clear and moist. No oropharyngeal exudate. Eyes: Conjunctivae and EOM are normal. Right eye exhibits no discharge. Left eye exhibits no discharge. Neck: Normal range of motion. Neck supple. Cardiovascular: Normal rate and regular rhythm. Exam reveals no gallop and no friction rub. Murmur (2/6) heard. Pulmonary/Chest: Effort normal and breath sounds normal. No respiratory distress. She has no wheezes. She has no rales. She exhibits no tenderness. Musculoskeletal: Normal range of motion. She exhibits no edema, tenderness or deformity. Lymphadenopathy:     She has no cervical adenopathy. Neurological: She is alert and oriented to person, place, and time. Coordination normal.   Skin: Skin is warm and dry. No rash noted. She is not diaphoretic. No erythema. No pallor. Psychiatric: She has a normal mood and affect. Her behavior is normal.       ASSESSMENT and PLAN    ICD-10-CM ICD-9-CM    1. Elevated transaminase level              Pt with persistent LFTs unclear etiology still elevated on last labs with nephro US unremarkable will repeat labs today and send to Dr Adi Doyle  C24.0 328.4 METABOLIC PANEL, COMPREHENSIVE      HEMOGLOBIN A1C WITH EAG      TSH 3RD GENERATION      T4, FREE      REFERRAL TO LIVER HEPATOLOGY      CERULOPLASMIN      FERRITIN      IRON PROFILE      HEPATITIS PANEL, ACUTE      ALPHA-1-ANTITRYPSIN, TOTAL   2.  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), managed as type 1 ([facility]            Pt is a type 1 diabetic, follows with endo for this, very poorly controlled  She self adjusted her insulin she has decreased her basgular to 40 U and has stopped her humalog entirely and stopped metformin entirely and states she cannot tolerate this, last a1c was improved but not at goal check a1c today has f//u with dr Waylon Griffin next month E13.9 250.00 flash glucose scanning reader (FREESTYLE NICOLÁS 14 DAY READER) [facility]      METABOLIC PANEL, COMPREHENSIVE      HEMOGLOBIN A1C WITH EAG      TSH 3RD GENERATION      T4, FREE   3. Stage 3 chronic kidney disease ([address]                Has had multiple episodes of ARF due to dehydration cr was 1.3 with Edin Canary will be following up with nephro in 12/19  F93.4 786.4 METABOLIC PANEL, COMPREHENSIVE      HEMOGLOBIN A1C WITH EAG      TSH 3RD GENERATION      T4, FREE   4. Subclinical hyperthyroidism              Check TFTs and treat as needed  D16.45 218.70 METABOLIC PANEL, COMPREHENSIVE      HEMOGLOBIN A1C WITH EAG      TSH 3RD GENERATION      T4, FREE   5. Acute pain of left knee                Had fracture of the knee and was seeing Dr Arlene May for this, had a fall yesterday which exacerbated her pain, repeat plain levon  M25.562 719.46 XR KNEE LT MAX 2 VWS   6. Moderate persistent asthma without complication              Will give albuterol prn and ordered new nebulizer machine, rarely has issues  J45.40 493.90 AMB SUPPLY ORDER   7. Cardiac murmur              Had echo in the past will monitor  R01.1 785.2    8. Low weight              This is related to type 1 diabetes has difficulty keeping wt on needs to work on regular meals  R63.6 783.22       Depression screen reviewed and negative. Scribed by Nelda Granger of [address] [place] [address] [address], as dictated by Dr. Rey Chacko. Current diagnosis and concerns discussed with pt at length. Pt understands risks and benefits or current treatment plan and medications, and accepts the treatment and medication with any possible risks. Pt asks appropriate questions, which were answered. Pt was instructed to call with any concerns or problems. I have reviewed the note documented by the scribe. The services provided are my own.   The documentation is accurate

## 2019-08-22 LAB
A1AT SERPL-MCNC: 129 MG/DL (ref 90–200)
ALBUMIN SERPL-MCNC: 4.6 G/DL (ref 3.5–5.5)
ALBUMIN/GLOB SERPL: 1.8 {RATIO} (ref 1.2–2.2)
ALP SERPL-CCNC: 329 IU/L (ref 39–117)
ALT SERPL-CCNC: 80 IU/L (ref 0–32)
AST SERPL-CCNC: 58 IU/L (ref 0–40)
BILIRUB SERPL-MCNC: 0.5 MG/DL (ref 0–1.2)
BUN SERPL-MCNC: 24 MG/DL (ref 6–24)
BUN/CREAT SERPL: 18 (ref 9–23)
CALCIUM SERPL-MCNC: 9.5 MG/DL (ref 8.7–10.2)
CERULOPLASMIN SERPL-MCNC: 32.5 MG/DL (ref 19–39)
CHLORIDE SERPL-SCNC: 101 MMOL/L (ref 96–106)
CO2 SERPL-SCNC: 24 MMOL/L (ref 20–29)
CREAT SERPL-MCNC: 1.36 MG/DL (ref 0.57–1)
EST. AVERAGE GLUCOSE BLD GHB EST-MCNC: 171 MG/DL
FERRITIN SERPL-MCNC: 384 NG/ML (ref 15–150)
GLOBULIN SER CALC-MCNC: 2.6 G/DL (ref 1.5–4.5)
GLUCOSE SERPL-MCNC: 386 MG/DL (ref 65–99)
HAV IGM SERPL QL IA: NEGATIVE
HBA1C MFR BLD: 7.6 % (ref 4.8–5.6)
HBV CORE IGM SERPL QL IA: NEGATIVE
HBV SURFACE AG SERPL QL IA: NEGATIVE
HCV AB S/CO SERPL IA: 0.2 S/CO RATIO (ref 0–0.9)
IRON SATN MFR SERPL: 31 % (ref 15–55)
IRON SERPL-MCNC: 98 UG/DL (ref 27–159)
POTASSIUM SERPL-SCNC: 4.8 MMOL/L (ref 3.5–5.2)
PROT SERPL-MCNC: 7.2 G/DL (ref 6–8.5)
SODIUM SERPL-SCNC: 141 MMOL/L (ref 134–144)
T4 FREE SERPL-MCNC: 1.23 NG/DL (ref 0.82–1.77)
TIBC SERPL-MCNC: 317 UG/DL (ref 250–450)
TSH SERPL DL<=0.005 MIU/L-ACNC: 0.92 UIU/ML (ref 0.45–4.5)
UIBC SERPL-MCNC: 219 UG/DL (ref 131–425)

## 2019-08-22 NOTE — PROGRESS NOTES
Persistent elevation in lft    See if antimitochondrial ab can be added to blood in lab    Have her see gamal as I discussed with her in clinic    Kidneys stable    Dm stable f/u endo as planned

## 2019-08-23 NOTE — PROGRESS NOTES
Anti-mitochondrial AB request sent to [facility]; awaiting add-on response. Called, left vm for pt to return call to office.

## 2019-08-26 ENCOUNTER — TELEPHONE (OUTPATIENT)
Dept: INTERNAL MEDICINE CLINIC | Age: 54
End: 2019-08-26

## 2019-08-26 LAB
MITOCHONDRIA M2 IGG SER-ACNC: <20 UNITS (ref 0–20)
SPECIMEN STATUS REPORT, ROLRST: NORMAL

## 2019-08-26 NOTE — TELEPHONE ENCOUNTER
----- Message from Jordyn Sidhu sent at 8/23/2019  4:01 PM EDT -----  Regarding: [facility]  Patient return call to Nurse Germaine Clemons     In regard to lab results.       Best contact number(s): [phone]              Message copied/pasted from [employer]

## 2019-08-27 ENCOUNTER — PATIENT MESSAGE (OUTPATIENT)
Dept: INTERNAL MEDICINE CLINIC | Age: 54
End: 2019-08-27

## 2019-08-27 NOTE — TELEPHONE ENCOUNTER
----- Message from Aaron Goldenon sent at 8/27/2019  3:26 PM EDT -----  Regarding: Paul/Telephone  Patient return call    Caller's first and last name and relationship (if not the patient):      Best contact number(s):[phone]      Whose call is being returned: Germaine Clemons      Details to clarify the request: Patient calling back      Aaron Frankson

## 2019-08-27 NOTE — TELEPHONE ENCOUNTER
Caller's first and last name and relationship (if not the patient):       Best contact number(s): [phone]       Whose call is being returned:Paul       Details to clarify the request:regarding her lab results , said they have been playing phone tag, if he misses her call again can mail her lab results or leave a VM message       Juanito Khan

## 2019-09-18 ENCOUNTER — HOSPITAL ENCOUNTER (OUTPATIENT)
Dept: PHYSICAL THERAPY | Age: 54
Discharge: HOME | End: 2019-09-18
Payer: COMMERCIAL

## 2019-09-18 PROCEDURE — 97161 PT EVAL LOW COMPLEX 20 MIN: CPT

## 2019-09-18 PROCEDURE — 97110 THERAPEUTIC EXERCISES: CPT

## 2019-09-18 NOTE — PROGRESS NOTES
PT INITIAL EVALUATION NOTE 2-15    Patient Name: Ellen Garcia  Date:2019  : 1965  [x]  Patient  Verified  Payor: Melchor Oneal / Plan: [address] HMO / Product Type: HMO /    In time: 3:02 PM  Out time: 4:05 PM  Total Treatment Time (min): 63 minutes  Visit #: 1     Treatment Area: Left knee pain [M25.562]    SUBJECTIVE  Pain Level (0-10 scale): 7/10  Any medication changes, allergies to medications, adverse drug reactions, diagnosis change, or new procedure performed?: [] No    [x] Yes (see summary sheet for update)  Subjective: The Pt fell on 19 as she was trying to get in car and she thinks her leg gave out and she landed on the knee. She went to work and worked 3 10hr shifts that week, but was experiencing significant pain. That Saturday she went to [place] where she had x-rays taken that revealed a hair-line fracture in the L patella. She was put in a knee extension brace for 4weeks. She was able to ambulate without any crutches using just the brace. 1 month after the fracture she was able to take the brace off and returned back to work. A few days after she returned to work, the L knee felt very unstable and she fell again. She got another x-ray that did not show any acute changes. She reports that she continues to have significant pain the L leg, but it is not around the area of the fracture. The pain is along the anterior thigh, circles the patella (no pain on the patella), and along the anterior shin. She reports that the L leg continues to feel unstable and she has fallen at home ~1 week ago. She denies any numbness or tingling in her LEs. She reports has been using a neoprene sleeve around the L knee and she feels like it helps a little; she doesn't like to wear the original brace because it is so heavy and does not fit well. Aggravating factors include: fully extending the L leg, bending the L knee, walking for long periods of time, and navigating the stairs. Relieving factors include: rest and ice. She is independent with all ADLs. She works for [employer] in the [employer]: she is standing all day, has to lift boxes repetitively (~25lb boxes), and bend frequently. She works 40hrs/wk 5days/wk. She lives in a 2-story home with the bedroom on the 2nd floor; she is able to do a reciprocal gait pattern, but feels more unstable. PMH: diabetes (controlled). She takes OTC NSAID PRN for her pain.     OBJECTIVE/EXAMINATION  Posture:  Unremarkable  Other Observations:  N/A  Gait and Functional Mobility:  Antalgic, decreased L stance time, L compensated Trendelenburg gait    Lumbar ROM:   Flexion: WFL   Extension: WFL   Side Bending: Right: NT   Left: NT   Rotation: Right: WFL   Left: WFL    Balance Assessment: Mild deficits in balance and neuromuscular control in single limb stance on L    Squat Assessment:   Double Leg Deviate to the R, forward trunk lean, genu valgus   Single Leg NT    Neurological: Sensations: Intact to light touch sensation L1-S1 bilaterally    Flexibility: No restrictions in hamstrings, hip internal and external rotators, quadriceps, iliopsoas, and gastrocnemius/soleus complex bilaterally    Right Knee:  AROM WFL   Left  Knee:  AROM WFL    LOWER QUARTER   MUSCLE STRENGTH  KEY       R  L  0 - No Contraction  Hip flex  4+  4+  1 - Trace          er    4  4-  2 - Poor          ir   4  4-  3 - Fair           abd  4  4-  4 - Good          ext  4  4-  5 - Normal   Knee flex  5  4               Ext  5  4+      Ankle DF  5  5                PF  5  5        Gluteal activation: The Pt has improper gluteal activation with hip extension bilaterally     Joint Mobility Assessment: Mild decreased superior/inferior L patellar glides  Palpation: TTP along medial, lateral, superior, and inferior patellar borders    Special Tests:Varus: NT     SLR: NT    Valgus: NT     Slump: NT    Kennedy's: NT    Avtar: NT    Anterior Drawer: NT    Obers: NT    Posterior Drawer: NT    Clonus: NT    Lachman's: NT    24 min Therapeutic Exercise:  [x] See flow sheet :   Rationale: increase ROM, increase strength, improve coordination, improve balance and increase proprioception to improve the patients ability to perform ADLs and work duties with less pain or discomfort.     With   [x] TE   [] TA   [] neuro   [x] other: Patient Education: [x] Review HEP    [] Progressed/Changed HEP based on:   [] positioning   [] body mechanics   [] transfers   [] heat/ice application    [x] other: Pt educated on diagnosis and prognosis with therapy       Other Objective/Functional Measures: FOTO 99/100    Pain Level (0-10 scale) post treatment: 6/10      ASSESSMENT:      [x]  See Plan of Lux Perera, PT 9/18/2019

## 2019-09-18 NOTE — PROGRESS NOTES
[address] [address] ([facility]), [address] [facility] Annette Wyatt  Phone: [phone] Fax: [phone]    Plan of Care/Statement of Necessity for Physical Therapy Services  2-15    Patient name: Chandana Viramontes  : 1965  Provider#: [id number]  Referral source: Pretty Khan MD      Medical/Treatment Diagnosis: Left knee pain [M25.562]     Prior Hospitalization: see medical history     Comorbidities: Asthma, diabetes type I or II  Prior Level of Function: The patient completed 20 minutes of exercise at least 3 times a week. Medications: Verified on Patient Summary List    Start of Care: 19      Onset Date: 19       The Plan of Care and following information is based on the information from the initial evaluation. Assessment/ key information: The Pt is a pleasant and motivated 52year old female who presents to therapy with L LE weakness and instability s/p L patellar fracture on 19. Based on examination, the Pt presents with decreased hip strength and stability, decreased core strength and stability, decreased L eccentric and concentric quadriceps strength and stability, decreased L patellar mobility, gait impairments, decreased balance and neuromuscular control, and decreased activity tolerance and endurance. The patient would benefit from skilled physical therapy to help improve the above listed impairments to allow the patient to safely return to their prior level of function with less overall pain or risk of further injury. The patient has a good prognosis with skilled physical therapy. Evaluation Complexity History LOW Complexity : Zero comorbidities / personal factors that will impact the outcome / POC; Examination MEDIUM Complexity : 3 Standardized tests and measures addressing body structure, function, activity limitation and / or participation in recreation  ;Presentation MEDIUM Complexity : Evolving with changing characteristics  ; Clinical Decision Making LOW Complexity : FOTO score of   Overall Complexity Rating: LOW     Problem List: pain affecting function, decrease ROM, decrease strength, impaired gait/ balance, decrease ADL/ functional abilitiies, decrease activity tolerance, decrease flexibility/ joint mobility and decrease transfer abilities   Treatment Plan may include any combination of the following: Therapeutic exercise, Therapeutic activities, Neuromuscular re-education, Physical agent/modality, Gait/balance training, Manual therapy, Patient education, Self Care training, Functional mobility training, Home safety training and Stair training  Patient / Family readiness to learn indicated by: asking questions and interest  Persons(s) to be included in education: patient (P)  Barriers to Learning/Limitations: None  Patient Goal (s): reduce pain  Patient Self Reported Health Status: fair  Rehabilitation Potential: good    Short Term Goals: To be accomplished in 4 treatments:  1. The Pt will be independent and compliant with their HEP. 2. The Pt will report a 50% reduction in their pain with ADLs. Long Term Goals: To be accomplished in 8 treatments:  1. The Pt will be able to navigate 1 standard flight of stairs with 0-2/10 pain to allow the Pt to be able to navigate the stairs in her home with less difficulty. 2. The Pt will be able work a full 8 hr shift with no more than 0-2/10 pain. Frequency / Duration: Patient to be seen 1 times per week for 8 treatments. Patient/ Caregiver education and instruction: self care, activity modification and exercises    [x]  Plan of care has been reviewed with BRIA Brock, PT 9/18/2019     ________________________________________________________________________    I certify that the above Therapy Services are being furnished while the patient is under my care. I agree with the treatment plan and certify that this therapy is necessary.     Physician's Signature:____________________ Date:____________Time: _________

## 2019-09-25 ENCOUNTER — HOSPITAL ENCOUNTER (OUTPATIENT)
Dept: PHYSICAL THERAPY | Age: 54
Discharge: HOME | End: 2019-09-25
Payer: COMMERCIAL

## 2019-09-25 PROCEDURE — 97110 THERAPEUTIC EXERCISES: CPT

## 2019-09-25 NOTE — PROGRESS NOTES
PT DAILY TREATMENT NOTE 2-15    Patient Name: Myranda Moreno  Date:2019  : 1965  [x]  Patient  Verified  Payor: Brandi Dye / Plan: [address] HMO / Product Type: HMO /    In time: 3:00 PM  Out time: 3:54 PM  Total Treatment Time (min): 54 minutes  Visit #:  2    Treatment Area: Left knee pain [M25.562]    SUBJECTIVE  Pain Level (0-10 scale): 7/10  Any medication changes, allergies to medications, adverse drug reactions, diagnosis change, or new procedure performed?: [x] No    [] Yes (see summary sheet for update)  Subjective functional status/changes:   [] No changes reported  The Pt denied any significant problems following her initial evaluation. She has been able to do her exercises at home without difficulty. She reports that last night she had a flare up of pain throughout her entire body; she denies any specific event that caused the increased discomfort. OBJECTIVE    54 min Therapeutic Exercise:  [x] See flow sheet :   Rationale: increase ROM, increase strength, improve coordination, improve balance and increase proprioception to improve the patients ability to perform ADLs and work duties with less pain or discomfort. With   [x] TE   [] TA   [] neuro   [] other: Patient Education: [x] Review HEP    [] Progressed/Changed HEP based on:   [] positioning   [] body mechanics   [] transfers   [] heat/ice application    [] other:      Other Objective/Functional Measures: None noted     Pain Level (0-10 scale) post treatment: 6/10    ASSESSMENT/Changes in Function:   The Pt tolerated her therapy program well. She required mild verbal cues to correct the form and technique of her HEP and made the corrections easily. She tolerated the additions to her therapy program well without increased pain or discomfort and appropriate fatigue noted.   Patient will continue to benefit from skilled PT services to modify and progress therapeutic interventions, address functional mobility deficits, address ROM deficits, address strength deficits, analyze and address soft tissue restrictions, analyze and cue movement patterns, analyze and modify body mechanics/ergonomics, assess and modify postural abnormalities and instruct in home and community integration to attain remaining goals. []  See Plan of Care  []  See progress note/recertification  []  See Discharge Summary         Progress towards goals / Updated goals:  Short Term Goals: To be accomplished in 4 treatments:  1. The Pt will be independent and compliant with their HEP- progressing  2. The Pt will report a 50% reduction in their pain with ADLs- progressing  Long Term Goals: To be accomplished in 8 treatments:  1. The Pt will be able to navigate 1 standard flight of stairs with 0-2/10 pain to allow the Pt to be able to navigate the stairs in her home with less difficulty- progressing  2.  The Pt will be able work a full 8 hr shift with no more than 0-2/10 pain- progressing    PLAN  [x]  Upgrade activities as tolerated     [x]  Continue plan of care  [x]  Update interventions per flow sheet       []  Discharge due to:_  []  Other:_      Luis Harris, PT 9/25/2019

## 2019-09-26 ENCOUNTER — OFFICE VISIT (OUTPATIENT)
Dept: ENDOCRINOLOGY | Age: 54
End: 2019-09-26

## 2019-09-26 VITALS
HEART RATE: 71 BPM | WEIGHT: 100 LBS | SYSTOLIC BLOOD PRESSURE: 117 MMHG | HEIGHT: 63 IN | BODY MASS INDEX: 17.72 KG/M2 | DIASTOLIC BLOOD PRESSURE: 72 MMHG

## 2019-09-26 DIAGNOSIS — E13.9 LADA (LATENT AUTOIMMUNE DIABETES IN ADULTS), MANAGED AS TYPE 1 (HCC): Primary | ICD-10-CM

## 2019-09-26 DIAGNOSIS — E10.65 HYPERGLYCEMIA DUE TO TYPE 1 DIABETES MELLITUS (HCC): ICD-10-CM

## 2019-09-26 RX ORDER — GLIPIZIDE 5 MG/1
2.5 TABLET ORAL
Qty: 90 TAB | Refills: 3 | Status: SHIPPED | OUTPATIENT
Start: 2019-09-26 | End: 2020-02-19

## 2019-09-26 NOTE — LETTER
NOTIFICATION RETURN TO WORK / SCHOOL 
 
9/26/2019 10:14 AM 
 
Ms. Pinky Sandoval 
[facility] [address] 
[address] [id number] Please note that this patient was required to see me in my clinic today for an important visit relating to their health. This may have caused them to be absent from work/school, and so I ask of you to please excuse them during this time that I had required them to be present here. This letter and the contents within are being provided with the approval of the patient. Sincerely, Krish Carmichael MD

## 2019-09-26 NOTE — PATIENT INSTRUCTIONS
OK to stay off metformin    Start trial of glipizide 2.5mg before breakfast and before dinner    Basaglar:  30 units each morning, can reduce by a few units every few days as needed if AM sugar persistently < 100.      Humalog: ok to hold for now but we may need to restart in future

## 2019-09-26 NOTE — PROGRESS NOTES
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 female with a PMHx as noted below who is presenting to our endocrine clinic for the f/u evaluation of recently diagnosed diabetes. History of Type 1 Diabetes:  Patient reports that they were diagnosed in the ED with A1c of 15% June 2016     Pancreatic atrophy on CT   Family history is positive for diabetes in mother and maternal grandmother, both on insulin but type of diabetes unclear  Was started on novolog 70/30 insulin, did not tolerate any oral medications (neg antibodies, normal c-peptide)    INTERVAL HISTORY:  A1c recently 7.6%, reports her humalog was discontinued since last visit with me. Also taking less basaglar. Also stopped metformin due to her stomach discomfort.      Current home regimen includes:   Metformin, 500mg tab TWICE daily, (NOT TAKING)  Basaglar: 55 units each morning, (ONLY TAKING 40 units)  Humalog:  (NOT TAKING)                  Breakfast: 28 units              Lunch: 34 units                       Dinner: 32 units   Correction Scale:  1 unit for every 15 above 150    Home Blood glucose review:   Freestyle lul sensor data reviewed together,   Patterns suggest hypoglycemia just under 10% of the time,   Patterns suggest persistent hyperglycemia after dinner through the night,  Average sugars are mostly around the 180 range    Review of most recent diabetes-related labs:  Lab Results   Component Value Date    HBA1C 7.6 (H) 08/21/2019    HBA1C 14.0 (H) 02/20/2019    HBA1C 14.4 (H) 02/19/2019    TIW1HOOA 8.3 05/31/2019    WCX5UXGY 14.2 02/18/2019    CCI3DZMR 10.6 11/01/2018    CHOL 191 02/19/2019    LDLC 57 02/19/2019    GFRAA 51 (L) 08/21/2019    GFRNA 44 (L) 08/21/2019    CREATEXT Cr 1.34, GFR NonAA 45/ AA 51 06/11/2019    MCACR 192.5 (H) 02/19/2019    TSH 0.922 08/21/2019    240137 <1.0 03/06/2018    GADLT <5.0 03/06/2018    CPEPLT 1.7 07/23/2018     Lab Key:  [id number] = IA-2 pancreatic islet cell autoantibody  GADLT = JORDANA-65 autoantibody   MCACR = Urine Microalbumin  INSUL = Insulin level  CPEPL = C-peptide level    PAST MEDICAL/SURGICAL HISTORY:   Past Medical History:   Diagnosis Date    Asthma     Diabetes [facility] [address]     Elevated transaminase level 6/29/2016    Insulin dependent diabetes mellitus ([facility] [address] 6/20/2016    Pancreatic atrophy 6/20/2016     Past Surgical History:   Procedure Laterality Date    HX HEENT         ALLERGIES:   Allergies   Allergen Reactions    Contrast Agent [Iodine] Itching    Hydrocodone Rash    Percocet [Oxycodone-Acetaminophen] Rash     Pt states she is not allergic to Tylenol.  Codeine Nausea and Vomiting    Metformin Diarrhea       MEDICATIONS ON ADMISSION:     Current Outpatient Medications:     flash glucose scanning reader (FREESTYLE NICOLÁS 14 DAY READER) [facility], One Freestyle [place] for use with 14 day sensors, Disp: 1 Each, Rfl: 0    insulin glargine (BASAGLAR KWIKPEN U-100 INSULIN) 100 unit/mL (3 mL) inpn, 40 UNITS BY SUBCUTANEOUS ROUTE DAILY. , Disp: , Rfl:     albuterol (PROVENTIL HFA, VENTOLIN HFA, PROAIR HFA) 90 mcg/actuation inhaler, Take 2 Puffs by inhalation every four (4) hours as needed for Wheezing., Disp: 1 Inhaler, Rfl: 0    Nebulizer & Compressor machine, prn, Disp: 1 Each, Rfl: 0    Nebulizer Accessories kit, prn, Disp: 1 Kit, Rfl: 0    flash glucose sensor (FREESTYLE NICOLÁS 14 DAY SENSOR) kit, 2 (14 day) sensors for use with Freestyle Scanner, Disp: 1 Kit, Rfl: 7    diclofenac (VOLTAREN) 1 % gel, APPLY 2G TO AFFECTED AREAS 4 TIMES A DAY, Disp: , Rfl: 11    SOCIAL HISTORY:   Social History     Socioeconomic History    Marital status: SINGLE     Spouse name: Not on file    Number of children: Not on file    Years of education: Not on file    Highest education level: Not on file   Occupational History    Not on file   Social Needs    Financial resource strain: Not on file   Kelsea Zarate Food insecurity:     Worry: Not on file     Inability: Not on file    Transportation needs:     Medical: Not on file     Non-medical: Not on file   Tobacco Use    Smoking status: Never Smoker    Smokeless tobacco: Never Used   Substance and Sexual Activity    Alcohol use: No     Frequency: Never     Comment: occasionally    Drug use: No    Sexual activity: Not Currently   Lifestyle    Physical activity:     Days per week: Not on file     Minutes per session: Not on file    Stress: Not on file   Relationships    Social connections:     Talks on phone: Not on file     Gets together: Not on file     Attends Buddhism service: Not on file     Active member of club or organization: Not on file     Attends meetings of clubs or organizations: Not on file     Relationship status: Not on file    Intimate partner violence:     Fear of current or ex partner: Not on file     Emotionally abused: Not on file     Physically abused: Not on file     Forced sexual activity: Not on file   Other Topics Concern    Not on file   Social History Narrative    Not on file       FAMILY HISTORY:  Family History   Problem Relation Age of Onset    Cancer Father         prostate and colon    Diabetes Mother     Diabetes Maternal Grandmother     Cancer Maternal Aunt         breast       REVIEW OF SYSTEMS: Complete ROS assessed and noted for that which is described above, all else are negative.   Eyes: normal  ENT: normal  CVS: normal  Resp: normal  GI: normal  : normal  GYN: normal  Endocrine: normal  Integument: normal  Musculoskeletal: normal  Neuro: normal  Psych: normal    PHYSICAL EXAMINATION:    VITAL SIGNS:  Visit Vitals  /72 (BP 1 Location: Left arm, BP Patient Position: Sitting)   Pulse 71   Ht 5' 3\" (1.6 m)   Wt 100 lb (45.4 kg)   BMI 17.71 kg/m²       GENERAL: NCAT, Sitting comfortably, NAD  EYES: EOMI, non-icteric, no proptosis  HEENT: NCAT, no inflammation, no masses  LYMPH NODES: No LAD  CARDIOVASCULAR: S1 S2, RRR, No murmur, 2+ radial pulses  RESPIRATORY: CTA b/l, no wheeze/rales  ABDOMEN: BS normal  NEUROLOGIC: 5/5 power all extremities, no parasthesias, AAOx3  EXTREMITIES: warm, no edema/rash/ or other skin changes    REVIEW OF LABORATORY AND RADIOLOGY DATA:   Labs and documentation have been reviewed as described above. ASSESSMENT AND PLAN:   Sara Merino is a 62 y.o. female with a PMHx as noted below who is presenting to our endocrine clinic for the f/u evaluation of recent onset diabetes. SHANTEL, late onset autoimmune diabetes of the adult (type-1 like diabetes)     Much has changed since her last visit with me. She is not taking any of her medications other than a reduced dose of basaglar. This is ok however I think she should still have some source of insulin at least once daily. She does need something to treated her daytime sugars as evidenced by her glucose patterns on the sensor. We discussed a trial of low dose (2.5mg) glipizide with breakfast and dinner to see if it can help with this. We will also preemptively reduce her basaglar to reduce risk of lows. Currently she is overcompensating with long acting insulin to try and get her daytime sugars down but this results in overnight lows and should not be used this way. She agrees with the following plan:    Plan:  Medications:  OK to stay off metformin  Start trial of glipizide 2.5mg before breakfast and before dinner  Basaglar: reduce to 30 units each morning, advised to reduce further by 2-3 units every few days if AM sugars persist <100  Humalog: ok to hold for now but we may need to restart in future    BP: Stable, not on antihypertensives  Cholesterol: reviewed, stable    RTC: I would like to see her back in 3 months, call with concerns,    Sarah Maria.  [address] Endocrinology  [address] [place] [address]

## 2019-10-02 ENCOUNTER — APPOINTMENT (OUTPATIENT)
Dept: PHYSICAL THERAPY | Age: 54
End: 2019-10-02
Payer: COMMERCIAL

## 2019-10-09 ENCOUNTER — HOSPITAL ENCOUNTER (OUTPATIENT)
Dept: PHYSICAL THERAPY | Age: 54
Discharge: HOME | End: 2019-10-09
Payer: COMMERCIAL

## 2019-10-09 PROCEDURE — 97110 THERAPEUTIC EXERCISES: CPT

## 2019-10-09 NOTE — PROGRESS NOTES
PT DAILY TREATMENT NOTE 2-15    Patient Name: Jeff Kilpatrick  Date:10/9/2019  : 1965  [x]  Patient  Verified  Payor: Valrie Kawasaki / Plan: [employer] / Product Type: HMO /    In time: 4:30  Out time: 5:27 PM  Total Treatment Time (min): 57 minutes  Visit #:  3    Treatment Area: Left knee pain [M25.562]    SUBJECTIVE  Pain Level (0-10 scale): 4/10  Any medication changes, allergies to medications, adverse drug reactions, diagnosis change, or new procedure performed?: [x] No    [] Yes (see summary sheet for update)  Subjective functional status/changes:   [] No changes reported  Patient reports her pain is still in the front and back of both thighs, a little more on the left. Her knee is not feeling too bad. OBJECTIVE    57 min Therapeutic Exercise:  [x] See flow sheet :   Rationale: increase ROM, increase strength, improve coordination, improve balance and increase proprioception to improve the patients ability to complete ADLs with less pain or discomfort. With   [x] TE   [] TA   [] neuro   [] other: Patient Education: [x] Review HEP    [] Progressed/Changed HEP based on:   [] positioning   [] body mechanics   [] transfers   [] heat/ice application    [] other:      Other Objective/Functional Measures: None noted     Pain Level (0-10 scale) post treatment: 4/10    ASSESSMENT/Changes in Function:   The patient responded well to her therapy program. Today, we added standing balance exercises to work on patient's hip stability and neuromuscular control. The patient responded well to additional strengthening and balance exercises, needing occasional cueing for proper form and technique and stand by assist for safety during balance and BOSU therapeutic exercises.   Patient will continue to benefit from skilled PT services to modify and progress therapeutic interventions, address functional mobility deficits, address ROM deficits, address strength deficits, analyze and address soft tissue restrictions, analyze and cue movement patterns, analyze and modify body mechanics/ergonomics, assess and modify postural abnormalities, address imbalance/dizziness and instruct in home and community integration to attain remaining goals. []  See Plan of Care  []  See progress note/recertification  []  See Discharge Summary         Progress towards goals / Updated goals:  Short Term Goals: To be accomplished in 4 treatments:  1. The Pt will be independent and compliant with their HEP- progressing  2. The Pt will report a 50% reduction in their pain with ADLs- progressing  Long Term Goals: To be accomplished in 8 treatments:  1. The Pt will be able to navigate 1 standard flight of stairs with 0-2/10 pain to allow the Pt to be able to navigate the stairs in her home with less difficulty- progressing  2.  The Pt will be able work a full 8 hr shift with no more than 0-2/10 pain- progressing    PLAN  [x]  Upgrade activities as tolerated     [x]  Continue plan of care  [x]  Update interventions per flow sheet       []  Discharge due to:_  []  Other:_      Randolph 10/9/2019

## 2019-10-16 ENCOUNTER — HOSPITAL ENCOUNTER (OUTPATIENT)
Dept: PHYSICAL THERAPY | Age: 54
Discharge: HOME | End: 2019-10-16
Payer: COMMERCIAL

## 2019-10-16 PROCEDURE — 97110 THERAPEUTIC EXERCISES: CPT

## 2019-10-16 NOTE — PROGRESS NOTES
PT DAILY TREATMENT NOTE 2-15    Patient Name: Remedios Wilkinson  Date:10/16/2019  : 1965  [x]  Patient  Verified  Payor: Sb Jorgensen / Plan: [address] HMO / Product Type: HMO /    In time: 4:32 PM  Out time: 5:30 PM  Total Treatment Time (min): 58 minutes  Visit #:  4    Treatment Area: Left knee pain [M25.562]    SUBJECTIVE  Pain Level (0-10 scale): 2/10  Any medication changes, allergies to medications, adverse drug reactions, diagnosis change, or new procedure performed?: [x] No    [] Yes (see summary sheet for update)  Subjective functional status/changes:   [] No changes reported  Patient reports she is feeling good, a little better each visit. She wants to continue to build her strength and improve her balance. OBJECTIVE    58 min Therapeutic Exercise:  [x] See flow sheet :   Rationale: increase ROM, increase strength, improve coordination, improve balance and increase proprioception to improve the patients ability to complete ADLs with less pain or discomfort. With   [x] TE   [] TA   [] neuro   [] other: Patient Education: [x] Review HEP    [] Progressed/Changed HEP based on:   [] positioning   [] body mechanics   [] transfers   [] heat/ice application    [] other:      Other Objective/Functional Measures:  None noted     Pain Level (0-10 scale) post treatment:  210    ASSESSMENT/Changes in Function:   Patient responded well to therapy. Her pain levels continue to decrease, and she is able to progress her strengthening therex during therapy. Today we increased LE strength training, focusing on hip strength, and also increased dynamic balance training including marching on foam, rockerboard, and SLS exercises. She responded well to all therex.    Patient will continue to benefit from skilled PT services to modify and progress therapeutic interventions, address functional mobility deficits, address ROM deficits, address strength deficits, analyze and address soft tissue restrictions, analyze and cue movement patterns, analyze and modify body mechanics/ergonomics, assess and modify postural abnormalities, address imbalance/dizziness and instruct in home and community integration to attain remaining goals. []  See Plan of Care  []  See progress note/recertification  []  See Discharge Summary         Progress towards goals / Updated goals:  Short Term Goals: To be accomplished in 4 treatments:  1. The Pt will be independent and compliant with their HEP- progressing  2. The Pt will report a 50% reduction in their pain with ADLs- progressing  Long Term Goals: To be accomplished in 8 treatments:  1. The Pt will be able to navigate 1 standard flight of stairs with 0-2/10 pain to allow the Pt to be able to navigate the stairs in her home with less difficulty- progressing  2.  The Pt will be able work a full 8 hr shift with no more than 0-2/10 pain- progressing    PLAN  [x]  Upgrade activities as tolerated     [x]  Continue plan of care  [x]  Update interventions per flow sheet       []  Discharge due to:_  []  Other:_      Randolph 10/16/2019

## 2019-10-22 ENCOUNTER — APPOINTMENT (OUTPATIENT)
Dept: PHYSICAL THERAPY | Age: 54
End: 2019-10-22
Payer: COMMERCIAL

## 2019-10-23 ENCOUNTER — APPOINTMENT (OUTPATIENT)
Dept: PHYSICAL THERAPY | Age: 54
End: 2019-10-23
Payer: COMMERCIAL

## 2019-10-31 ENCOUNTER — HOSPITAL ENCOUNTER (OUTPATIENT)
Dept: PHYSICAL THERAPY | Age: 54
Discharge: HOME | End: 2019-10-31
Payer: COMMERCIAL

## 2019-10-31 PROCEDURE — 97110 THERAPEUTIC EXERCISES: CPT

## 2019-10-31 NOTE — PROGRESS NOTES
PT DAILY TREATMENT NOTE 2-15    Patient Name: Nakita Cordero  Date:10/31/2019  : 1965  [x]  Patient  Verified  Payor: Elsy Menon / Plan: [employer] / Product Type: HMO /    In time: 2:00 PM  Out time: 3:02 PM  Total Treatment Time (min): 62 minutes  Visit #:  5    Treatment Area: Left knee pain [M25.562]    SUBJECTIVE  Pain Level (0-10 scale): 7/10  Any medication changes, allergies to medications, adverse drug reactions, diagnosis change, or new procedure performed?: [x] No    [] Yes (see summary sheet for update)  Subjective functional status/changes:   [] No changes reported  Patient reports she was doing well until she fell on Tuesday in her bathroom. Patient reports she felt her legs give out and feel, she does not report tripping or slipping. Patient has continued to have increased symptoms in her lateral thighs, L>R. OBJECTIVE    62 min Therapeutic Exercise:  [x] See flow sheet :   Rationale: increase ROM, increase strength, improve coordination, improve balance and increase proprioception to improve the patients ability to complete ADLs with less pain or discomfort. With   [x] TE   [] TA   [] neuro   [x] other: Patient Education: [x] Review HEP    [] Progressed/Changed HEP based on:   [] positioning   [] body mechanics   [] transfers   [] heat/ice application    [x] other: Discussed progression and plan with future physical therapy sessions.       Other Objective/Functional Measures:     LOWER QUARTER   MUSCLE STRENGTH  KEY       R  L  0 - No Contraction  Hip flex  5  5  1 - Trace          er    4+  4  2 - Poor          ir   4+  4  3 - Fair           abd  NT  NT  4 - Good          ext  NT  NT  5 - Normal   Knee flex  5  4               Ext  5  4+      Ankle DF  5  4    FOTO Score: 90/100 (Initial 99/100)    Pain Level (0-10 scale) post treatment: 5/10    ASSESSMENT/Changes in Function:     Patient will continue to benefit from skilled PT services to modify and progress therapeutic interventions, address functional mobility deficits, address ROM deficits, address strength deficits, analyze and address soft tissue restrictions, analyze and cue movement patterns, analyze and modify body mechanics/ergonomics, assess and modify postural abnormalities and instruct in home and community integration to attain remaining goals. []  See Plan of Care  [x]  See progress note/recertification  []  See Discharge Summary         Progress towards goals / Updated goals:  Short Term Goals: To be accomplished in 4 treatments:  1. The Pt will be independent and compliant with their HEP- met  2. The Pt will report a 50% reduction in their pain with ADLs- progressing  Long Term Goals: To be accomplished in 8 treatments:   1. The Pt will be able to navigate 1 standard flight of stairs with 0-2/10 pain to allow the Pt to be able to navigate the stairs in her home with less difficulty- progressing (able to do, but still has pain)  2.  The Pt will be able work a full 8 hr shift with no more than 0-2/10 pain- progressing (able to do, but still has pain)    PLAN  [x]  Upgrade activities as tolerated     [x]  Continue plan of care  [x]  Update interventions per flow sheet       []  Discharge due to:_  []  Other:_      Randolph 10/31/2019

## 2019-10-31 NOTE — PROGRESS NOTES
[facility]  [address] ([place]), [address] [place]  Phone: [phone] Fax: [phone]    Progress Note    Name: Nakita Cordero   : 1965   MD: Alexander Kinney MD       Treatment Diagnosis: Left knee pain [M25.562]  Start of Care: 19    Visits from Start of Care: 5  Missed Visits: 2 Cancelled    Summary of Care: The patient has been seen for 5 physical therapy sessions after a L patellar fracture (19) and with pain in her lateral thighs (L>R). The patient's therapy program has focused on improving L knee range of motion, LE strength and stability, hip strength and stability, balance and neuromuscular control, and activity tolerance and endurance via therapeutic exercise. The patient has progressed well, reporting 90% improvement. However, two days ago (10/29/19) patient fell in her bathroom after feelings of weakness and her LEs giving way. Prior to her fall, patient reports increase in ability to work a full 8 hour shift, and ability to navigate stairs. Recommend continued PT for 4 more sessions to progress patient to prior level of status before falling and to progress HEP, strength, and improve balance to prevent future falls and feelings of weakness, and to progress the remaining impairments to allow the patient to safely return to PLOF with less pain or risk of further injury. Thank you for this referral.     FOTO Score: 90/100 (Initial 99/100)  LOWER QUARTER   MUSCLE STRENGTH  KEY       R  L  0 - No Contraction  Hip flex  5  5  1 - Trace          er    4+  4  2 - Poor          ir   4+  4  3 - Fair           abd  NT  NT  4 - Good          ext  NT  NT  5 - Normal   Knee flex  5  4               Ext  5  4+      Ankle DF  5  4     Progress towards goals / Updated goals:  Short Term Goals: To be accomplished in 4 treatments:  1. The Pt will be independent and compliant with their HEP- met  2.  The Pt will report a 50% reduction in their pain with ADLs- progressing  Long Term Goals: To be accomplished in 8 treatments:   1. The Pt will be able to navigate 1 standard flight of stairs with 0-2/10 pain to allow the Pt to be able to navigate the stairs in her home with less difficulty- progressing (able to do, but still has pain)  2. The Pt will be able work a full 8 hr shift with no more than 0-2/10 pain- progressing (able to do, but still has pain)    Randolph 10/31/2019     ________________________________________________________________________  NOTE TO PHYSICIAN:  Please complete the following and fax to: [facility]: [phone]  . Retain this original for your records. If you are unable to process this request in 24 hours, please contact our office.        ____ I have read the above report and request that my patient continue therapy with the following changes/special instructions:  ____ I have read the above report and request that my patient be discharged from therapy    Physician's Signature:_________________ Date:___________Time:__________

## 2019-11-08 ENCOUNTER — HOSPITAL ENCOUNTER (OUTPATIENT)
Dept: PHYSICAL THERAPY | Age: 54
Discharge: HOME | End: 2019-11-08
Payer: COMMERCIAL

## 2019-11-08 PROCEDURE — 97110 THERAPEUTIC EXERCISES: CPT

## 2019-11-08 NOTE — PROGRESS NOTES
PT DAILY TREATMENT NOTE 2-15    Patient Name: Payam Snow  Date:2019  : 1965  [x]  Patient  Verified  Payor: Houston Smith / Plan: [address] HMO / Product Type: HMO /    In time: 7:04 AM  Out time: 8:02 AM  Total Treatment Time (min): 58 minutes  Visit #:  6    Treatment Area: Left knee pain [M25.562]    SUBJECTIVE  Pain Level (0-10 scale): 0/10  Any medication changes, allergies to medications, adverse drug reactions, diagnosis change, or new procedure performed?: [x] No    [] Yes (see summary sheet for update)  Subjective functional status/changes:   [] No changes reported  Patient reports she is feeling better today, and has felt better the last couple days. OBJECTIVE    58 min Therapeutic Exercise:  [x] See flow sheet :   Rationale: increase ROM, increase strength, improve coordination, improve balance and increase proprioception to improve the patients ability to complete ADLs with less pain or discomfort. With   [x] TE   [] TA   [] neuro   [x] other: Patient Education: [x] Review HEP    [] Progressed/Changed HEP based on:   [] positioning   [] body mechanics   [] transfers   [] heat/ice application    [x] other: Discussed progression and plan with future physical therapy sessions.       Other Objective/Functional Measures: None noted     Pain Level (0-10 scale) post treatment: 0/10    ASSESSMENT/Changes in Function:   Patient responded well to her therapy program. Today we continued to progress hip and core strengthening and address balance and neuromuscular control. Patient responded well and completed all therex with no increase in symptoms. Patient needed occasional cueing for proper form and technique and proper muscle activation.   Patient will continue to benefit from skilled PT services to modify and progress therapeutic interventions, address functional mobility deficits, address ROM deficits, address strength deficits, analyze and address soft tissue restrictions, analyze and cue movement patterns, analyze and modify body mechanics/ergonomics, assess and modify postural abnormalities and instruct in home and community integration to attain remaining goals. []  See Plan of Care  []  See progress note/recertification  []  See Discharge Summary         Progress towards goals / Updated goals:  Short Term Goals: To be accomplished in 4 treatments:  1. The Pt will be independent and compliant with their HEP- met  2. The Pt will report a 50% reduction in their pain with ADLs- progressing  Long Term Goals: To be accomplished in 8 treatments:   1. The Pt will be able to navigate 1 standard flight of stairs with 0-2/10 pain to allow the Pt to be able to navigate the stairs in her home with less difficulty- progressing (able to do, but still has pain)  2.  The Pt will be able work a full 8 hr shift with no more than 0-2/10 pain- progressing (able to do, but still has pain)    PLAN  [x]  Upgrade activities as tolerated     [x]  Continue plan of care  [x]  Update interventions per flow sheet       []  Discharge due to:_  []  Other:_      Randolph 11/8/2019

## 2019-11-11 RX ORDER — INSULIN GLARGINE 100 [IU]/ML
30 INJECTION, SOLUTION SUBCUTANEOUS
Qty: 10 PEN | Refills: 3 | Status: SHIPPED | OUTPATIENT
Start: 2019-11-11 | End: 2019-12-27

## 2019-11-11 NOTE — TELEPHONE ENCOUNTER
From  Efrain Mcmanus To  Rde Nurse Pool Sent  11/11/2019  9:38 AM   [facility] pharmacy said that I have no more refills on my basaglar insulin need to send a prescription to [phone]  [phone]

## 2019-11-15 ENCOUNTER — HOSPITAL ENCOUNTER (OUTPATIENT)
Dept: PHYSICAL THERAPY | Age: 54
Discharge: HOME | End: 2019-11-15
Payer: COMMERCIAL

## 2019-11-15 PROCEDURE — 97110 THERAPEUTIC EXERCISES: CPT

## 2019-11-15 NOTE — PROGRESS NOTES
PT DAILY TREATMENT NOTE 2-15    Patient Name: Felicita Ramirez  Date:11/15/2019  : 1965  [x]  Patient  Verified  Payor: Jose Jerry / Plan: [address] HMO / Product Type: HMO /    In time: 7:01 AM  Out time: 7:58 AM  Total Treatment Time (min): 57 minutes  Visit #:  7    Treatment Area: Left knee pain [M25.562]    SUBJECTIVE  Pain Level (0-10 scale): 1/10  Any medication changes, allergies to medications, adverse drug reactions, diagnosis change, or new procedure performed?: [x] No    [] Yes (see summary sheet for update)  Subjective functional status/changes:   [] No changes reported  Patient reports she is feeling good, has a little pain still in left thigh. OBJECTIVE    57 min Therapeutic Exercise:  [x] See flow sheet :   Rationale: increase ROM, increase strength, improve coordination, improve balance and increase proprioception to improve the patients ability to complete ADLs with less pain or discomfort. With   [x] TE   [] TA   [] neuro   [] other: Patient Education: [x] Review HEP    [] Progressed/Changed HEP based on:   [] positioning   [] body mechanics   [] transfers   [] heat/ice application    [] other:      Other Objective/Functional Measures: None noted     Pain Level (0-10 scale) post treatment: 0/10    ASSESSMENT/Changes in Function:   Patient continues to progress well with her therapy program. Patient is able to complete therex with no increased pain or discomfort, and occasional cues for proper form and technique. Will prepare patient for discharge next visit and finalize her HEP.   Patient will continue to benefit from skilled PT services to modify and progress therapeutic interventions, address functional mobility deficits, address ROM deficits, address strength deficits, analyze and address soft tissue restrictions, analyze and cue movement patterns, analyze and modify body mechanics/ergonomics, assess and modify postural abnormalities and instruct in home and community integration to attain remaining goals. []  See Plan of Care  []  See progress note/recertification  []  See Discharge Summary         Progress towards goals / Updated goals:  Short Term Goals: To be accomplished in 4 treatments:  1. The Pt will be independent and compliant with their HEP- met  2. The Pt will report a 50% reduction in their pain with ADLs- progressing  Long Term Goals: To be accomplished in 8 treatments:   1. The Pt will be able to navigate 1 standard flight of stairs with 0-2/10 pain to allow the Pt to be able to navigate the stairs in her home with less difficulty- progressing (able to do, but still has pain)  2.  The Pt will be able work a full 8 hr shift with no more than 0-2/10 pain- progressing (able to do, but still has pain)       PLAN  [x]  Upgrade activities as tolerated     [x]  Continue plan of care  [x]  Update interventions per flow sheet       []  Discharge due to:_  []  Other:_      Randolph 11/15/2019

## 2019-11-22 ENCOUNTER — APPOINTMENT (OUTPATIENT)
Dept: PHYSICAL THERAPY | Age: 54
End: 2019-11-22
Payer: COMMERCIAL

## 2019-11-22 ENCOUNTER — HOSPITAL ENCOUNTER (OUTPATIENT)
Dept: PHYSICAL THERAPY | Age: 54
Discharge: HOME | End: 2019-11-22
Payer: COMMERCIAL

## 2019-11-22 PROCEDURE — 97110 THERAPEUTIC EXERCISES: CPT

## 2019-11-22 NOTE — PROGRESS NOTES
PT DAILY TREATMENT NOTE 2-15    Patient Name: Theresa Taylor  Date:2019  : 1965  [x]  Patient  Verified  Payor: Laxmi Poon / Plan: [employer] / Product Type: HMO /    In time: 7:00 AM  Out time: 7:34 AM  Total Treatment Time (min): 34 minutes  Visit #:  8    Treatment Area: Left knee pain [M25.562]    SUBJECTIVE  Pain Level (0-10 scale): 1/10  Any medication changes, allergies to medications, adverse drug reactions, diagnosis change, or new procedure performed?: [x] No    [] Yes (see summary sheet for update)  Subjective functional status/changes:   [] No changes reported  Patient reports she is doing well and feeling good today. She reports 70% improvement since beginning therapy and voiced understanding with continuing her HEP independently. OBJECTIVE    34 min Therapeutic Exercise:  [x] See flow sheet :   Rationale: increase ROM, increase strength, improve coordination, improve balance and increase proprioception to improve the patients ability to complete ADLs with less pain or discomfort. With   [x] TE   [] TA   [] neuro   [] other: Patient Education: [x] Review HEP    [] Progressed/Changed HEP based on:   [] positioning   [] body mechanics   [] transfers   [] heat/ice application    [] other:      Other Objective/Functional Measures: FOTO Score 99/100 (Initial 99/100)    Pain Level (0-10 scale) post treatment: 0/10    ASSESSMENT/Changes in Function:     []  See Plan of Care  []  See progress note/recertification  [x]  See Discharge Summary         Progress towards goals / Updated goals:  Short Term Goals: To be accomplished in 4 treatments:  1. The Pt will be independent and compliant with their HEP- met  2. The Pt will report a 50% reduction in their pain with ADLs- met  Long Term Goals: To be accomplished in 8 treatments:   1.  The Pt will be able to navigate 1 standard flight of stairs with 0-2/10 pain to allow the Pt to be able to navigate the stairs in her home with less difficulty- met  2. The Pt will be able work a full 8 hr shift with no more than 0-2/10 pain- met    PLAN  []  Upgrade activities as tolerated     []  Continue plan of care  []  Update interventions per flow sheet       [x]  Discharge due to:_ Patient has reached her goals for physical therapy and will continue to progress independently with her HEP.   []  Other:_      Randolph 11/22/2019

## 2019-11-22 NOTE — ANCILLARY DISCHARGE INSTRUCTIONS
[facility]  [address] ([place]), [address] [facility] [place]  Phone: [phone] Fax: [phone]    Discharge Summary  2-15    Patient name: Jannet Mansfield  : 1965  Provider#: [id number]  Referral source: Mira Jones MD      Medical/Treatment Diagnosis: Left knee pain [M25.562]     Prior Hospitalization: see medical history     Comorbidities: See Plan of Care  Prior Level of Function:See Plan of Care  Medications: Verified on Patient Summary List    Start of Care: 19      Onset Date: 19   Visits from Start of Care: 8     Missed Visits: 2 cancels  Reporting Period : 19 to 19      ASSESSMENT/SUMMARY OF CARE: The patient was seen for 8 outpatient physical therapy session after a right patellar fracture and continued bilateral LE pain. The patient's therapy program focused on improving AROM, hip and core strength and stability, LE strength, balance and neuromuscular control, gait mechanics, and activity tolerance and endurance via therapeutic exercise. The patient progressed well with her therapy program, reporting 70% improvement since beginning therapy and decreased pain levels in her LEs bilaterally. Patient continues to have intermittent mild pain in her LEs, but overall is able to complete her ADLs and work duties pain free with better function, balance, and overall strength and stability. Patient is able to complete a work day without increased pain, is able to navigate stairs, and have no increased pain or discomfort with prolonged standing. At this time it is believed that the patient has reached maximum benefit from skilled PT and will continue to progress well independently. The patient was educated how to safely progress her HEP and voiced understanding. The patient is discharged from skilled PT and will contact her referring provider with any further questions or concerns.  Thank you for this referral.    FOTO Score: 99/100 (Initial 99/100)    Progress towards goals / Updated goals:  Short Term Goals: To be accomplished in 4 treatments:  1. The Pt will be independent and compliant with their HEP- met  2. The Pt will report a 50% reduction in their pain with ADLs- met  Long Term Goals: To be accomplished in 8 treatments:   1. The Pt will be able to navigate 1 standard flight of stairs with 0-2/10 pain to allow the Pt to be able to navigate the stairs in her home with less difficulty- met  2.  The Pt will be able work a full 8 hr shift with no more than 0-2/10 pain- met    RECOMMENDATIONS:  [x]Discontinue therapy: [x]Patient has reached or is progressing toward set goals      []Patient is non-compliant or has abdicated      []Due to lack of appreciable progress towards set goals      []Other    Randolph 11/22/2019

## 2019-12-19 ENCOUNTER — OFFICE VISIT (OUTPATIENT)
Dept: HEMATOLOGY | Age: 54
End: 2019-12-19

## 2019-12-19 VITALS
OXYGEN SATURATION: 99 % | DIASTOLIC BLOOD PRESSURE: 87 MMHG | SYSTOLIC BLOOD PRESSURE: 144 MMHG | BODY MASS INDEX: 18.46 KG/M2 | RESPIRATION RATE: 17 BRPM | TEMPERATURE: 98.4 F | HEART RATE: 76 BPM | HEIGHT: 63 IN | WEIGHT: 104.2 LBS

## 2019-12-19 DIAGNOSIS — R74.8 ELEVATED LIVER ENZYMES: Primary | ICD-10-CM

## 2019-12-19 PROBLEM — R32 URINARY INCONTINENCE: Status: RESOLVED | Noted: 2018-10-22 | Resolved: 2019-12-19

## 2019-12-19 NOTE — Clinical Note
1/1/20 Patient: Rosette Harp YOB: 1965 Date of Visit: 12/19/2019 Parul Mejias MD 
[address] [address] VIA In Basket Dear Parul Mejias MD, Thank you for referring Ms. Eugene Peace to [address] [place] [address],[address] for evaluation. My notes for this consultation are attached. If you have questions, please do not hesitate to call me. I look forward to following your patient along with you. Sincerely, Heladio Chapman MD

## 2019-12-19 NOTE — PROGRESS NOTES
Chief Complaint   Patient presents with    New Patient     elevated liver enzymes     Visit Vitals  /87 (BP 1 Location: Left arm, BP Patient Position: Sitting)   Pulse 76   Temp 98.4 °F (36.9 °C) (Tympanic)   Resp 17   Ht 5' 3\" (1.6 m)   Wt 104 lb 3.2 oz (47.3 kg)   SpO2 99%   BMI 18.46 kg/m²     3 most recent PHQ Screens 8/21/2019   Little interest or pleasure in doing things Not at all   Feeling down, depressed, irritable, or hopeless Not at all   Total Score PHQ 2 0     Abuse Screening Questionnaire 1/21/2019   Do you ever feel afraid of your partner? N   Are you in a relationship with someone who physically or mentally threatens you? N   Is it safe for you to go home?  Y     Learning Assessment 8/21/2019   PRIMARY LEARNER Patient   HIGHEST LEVEL OF EDUCATION - PRIMARY LEARNER  -   BARRIERS PRIMARY LEARNER -   CO-LEARNER CAREGIVER -   PRIMARY LANGUAGE ENGLISH   LEARNER PREFERENCE PRIMARY DEMONSTRATION   ANSWERED BY pt   RELATIONSHIP SELF

## 2019-12-19 NOTE — PROGRESS NOTES
[address] [place] [address], Ira OJEDA Harl Clare, MD Marga Potter, PA-C Merwyn Addison, [facility]     Claudia Hunt, [facility]   Carrillo Rowley [facility]-ITZEL Do, [facility]       Mark Borrego [facility] 136    at [facility]    [address] [facility], [address] [place] [address], [facility] 22.    [phone]    FAX: [address] [place] [address], [address]    [phone]    FAX: [phone]       Patient Care Team:  Nikki Morales MD as PCP - General (Internal Medicine)  Nikki Morales MD as PCP - [facility] Provider  Radha Mayen MD (Endocrinology)  Carlos Hilario MD (Nephrology)  Yosef Suárez MD (Female Pelvic Medicine and Reconstructive Surgery)  Marce Quiles MD (Optometry)      Problem List  Date Reviewed: 12/19/2019          Codes Class Noted    Elevated liver enzymes ICD-10-CM: R74.8  ICD-9-CM: 790.5  12/19/2019        Insulin dependent diabetes mellitus ([facility] ICD-10-CM: E11.9, Z79.4  ICD-9-CM: 250.00, V58.67  6/20/2016        Pancreatic atrophy ICD-10-CM: K86.89  ICD-9-CM: 577.8  6/20/2016              The clinicians listed above have asked me to see Ira Mccauley in consultation regarding elevated liver enzymes and its management. All medical records sent by the referring physicians were reviewed including imaging studies     The patient is a 62 y.o. Black female who was found to have elevated liver transaminases and alkaline phosphatase in 2018. Serologic evaluation for markers of chronic liver disease was negative. Ultrasound of the liver was performed in 2/2019. The results of the imaging demonstrated a normal appearing liver.       An assessment of liver fibrosis with biopsy or elastography has not been performed. The patient has no symptoms which can be attributed to the liver disorder. The patient is not currently experiencing the following symptoms of liver disease:  fatigue, pain in the right side over the liver,     The patient completes all daily activities without any functional limitations. ASSESSMENT AND PLAN:  Elevated liver enzymes  Intermittent elevation in liver transaminases and persistent elevation in alkaline phosphatase of unclear etiology at this time. Have performed laboratory testing to monitor liver function and degree of liver injury. This included BMP, hepatic panel, CBC with platelet count, INR. Liver transaminases are now normal.  ALP is elevated. Total bilirubin is normal.  Serum albumin is depressed. INR is normal.  The platelet count is normal.      Based upon laboratory studies and imaging  the patient does not appear to have advanced liver disease. Serologic testing for causes of chronic liver disease were ordered. All were negative     The most likely causes for the liver chemistry abnormalities were discussed with the patient and include immune liver disorders, fatty liver related to malnutrition and not obesity. Will perform imaging of the liver with MRI and MRCP. The need to perform a liver biopsy to help determine the cause and severity of the liver test abnormalities was discussed. The risks of performing the liver biopsy including pain, puncture of the lung, gallbladder, intestine or kidney and bleeding were discussed. Will defer liver biopsy for now. If there is no diagnosis on MRI and Fibroscan is not noraml I would proceed with liver biopsy. Screening for Hepatocellular Carcinoma  HCC screening is not necessary if the patient has no evidence of cirrhosis.     Treatment of other medical problems in patients with chronic liver disease  There are no contraindications for the patient to take most medications that are necessary for treatment of other medical issues. Counseling for alcohol in patients with chronic liver disease  The patient was counseled regarding alcohol consumption and the effect of alcohol on chronic liver disease. The patient does not consume any significant amount of alcohol. Vaccinations   Vaccination for viral hepatitis B is not needed. The patient has serologic evidence of prior exposure or vaccination with immunity. The need for vaccination against viral hepatitis A will be assessed with serologic and instituted as appropriate. Routine vaccinations against other bacterial and viral agents can be performed as indicated. Annual flu vaccination should be administered if indicated. ALLERGIES  Allergies   Allergen Reactions    Contrast Agent [Iodine] Itching    Hydrocodone Rash    Percocet [Oxycodone-Acetaminophen] Rash     Pt states she is not allergic to Tylenol.  Codeine Nausea and Vomiting    Metformin Diarrhea       MEDICATIONS  Current Outpatient Medications   Medication Sig    insulin glargine (BASAGLAR KWIKPEN U-100 INSULIN) 100 unit/mL (3 mL) inpn 30 Units by SubCUTAneous route Daily (before breakfast). Reduce  By 2-3 units every few days if AM Sugars persist below 100    glipiZIDE (GLUCOTROL) 5 mg tablet Take 0.5 Tabs by mouth Before breakfast and dinner.  flash glucose sensor (FREESTYLE NICOLÁS 14 DAY SENSOR) kit 2 (14 day) sensors for use with Freestyle Scanner    flash glucose scanning reader (FREESTYLE NICOLÁS 14 DAY READER) [facility] Freestyle Jaylyn [place] for use with 14 day sensors    albuterol (PROVENTIL HFA, VENTOLIN HFA, PROAIR HFA) 90 mcg/actuation inhaler Take 2 Puffs by inhalation every four (4) hours as needed for Wheezing.  Nebulizer & Compressor machine prn    Nebulizer Accessories kit prn    diclofenac (VOLTAREN) 1 % gel APPLY 2G TO AFFECTED AREAS 4 TIMES A DAY     No current facility-administered medications for this visit.         SYSTEM REVIEW NOT RELATED TO LIVER DISEASE OR REVIEWED ABOVE:  Constitution systems: Negative for fever, chills, weight gain, weight loss. Eyes: Negative for visual changes. ENT: Negative for sore throat, painful swallowing. Respiratory: Negative for cough, hemoptysis, SOB. Cardiology: Negative for chest pain, palpitations. GI:  Negative for constipation or diarrhea. : Negative for urinary frequency, dysuria, hematuria, nocturia. Skin: Negative for rash. Hematology: Negative for easy bruising, blood clots. Musculo-skelatal: Negative for back pain, muscle pain, weakness. Neurologic: Negative for headaches, dizziness, vertigo, memory problems not related to HE. Psychology: Negative for anxiety, depression. FAMILY HISTORY:  The father  Has/had the following following chronic disease(s): prostate and colon cancer. The mother  of MVA. There is no family history of liver disease. SOCIAL HISTORY:  The patient has never been . The patient has no children. The patient has never used tobacco products. The patient has never consumed significant amounts of alcohol. 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 PHYSICAL EXAMINATION:  Visit Vitals  /87 (BP 1 Location: Left arm, BP Patient Position: Sitting)   Pulse 76   Temp 98.4 °F (36.9 °C) (Tympanic)   Resp 17   Ht 5' 3\" (1.6 m)   Wt 104 lb 3.2 oz (47.3 kg)   SpO2 99%   BMI 18.46 kg/m²     General: No acute distress. Eyes: Sclera anicteric. ENT: No oral lesions. Thyroid normal.  Nodes: No adenopathy. Skin: No spider angiomata. No jaundice. No palmar erythema. Respiratory: Lungs clear to auscultation. Cardiovascular: Regular heart rate. No murmurs. No JVD. Abdomen: Soft non-tender. Liver size normal to percussion/palpation. Spleen not palpable. No obvious ascites. Extremities: No edema. No muscle wasting. No gross arthritic changes. Neurologic: Alert and oriented. Cranial nerves grossly intact.   No asterixis. LABORATORY STUDIES:  L[facility] [place] [facility] Mariella Units 12/19/2019 8/21/2019   WBC 3.4 - 10.8 x10E3/uL 6.2    ANC 1.4 - 7.0 x10E3/uL 4.3    HGB 11.1 - 15.9 g/dL 11.9     - 450 x10E3/uL 242    INR 0.8 - 1.2 1.0    AST 0 - 40 IU/L 34 58 (H)   ALT 0 - 32 IU/L 52 (H) 80 (H)   Alk Phos 39 - 117 IU/L 328 (H) 329 (H)   Bili, Total 0.0 - 1.2 mg/dL 0.4 0.5   Bili, Direct 0.00 - 0.40 mg/dL 0.15    Albumin 3.5 - 5.5 g/dL 4.8 4.6   BUN 6 - 24 mg/dL 27 (H) 24   BUN (iSTAT) 9 - 20 mg/dL     Creat 0.57 - 1.00 mg/dL 1.30 (H) 1.36 (H)   Creat (iSTAT) 0.6 - 1.3 mg/dL     Na 134 - 144 mmol/L 145 (H) 141   K 3.5 - 5.2 mmol/L 4.5 4.8   Cl 96 - 106 mmol/L 103 101   CO2 20 - 29 mmol/L 23 24   Glucose 65 - 99 mg/dL 44 (L) 386 (H)     SEROLOGIES:  8/2018. HBsurface antigen negative, anti-HCV negative, Ferritin 1139, FE saturation 74%, JUN negative, ANCA negative    Serologies Latest Ref Rng & Units 12/19/2019 8/21/2019   Hep B Surface Ag Negative  Negative   Hep B Core Ab, Total Negative Negative    Hep B Surface AB QL  Reactive    Ferritin 15 - 150 ng/mL 445 (H) 384 (H)   Iron % Saturation 15 - 55 % 48 31   JUN, IFA  Negative    M2 Ab 0.0 - 20.0 Units  <20.0   Ceruloplasmin 19.0 - 39.0 mg/dL  32.5   Alpha-1 antitrypsin level 90 - 200 mg/dL  129     LIVER HISTOLOGY:  Not available or performed    ENDOSCOPIC PROCEDURES:  Not available or performed    RADIOLOGY:  2/2019. Ultrasound of liver. Normal appearing liver. No liver mass lesions. OTHER TESTING:  Not available or performed    FOLLOW-UP:  All of the issues listed above in the Assessment and Plan were discussed with the patient. All questions were answered. The patient expressed a clear understanding of the above.     Over 45 minutes was spent with the patient reviewing the medical history, performing the examination, explaining the diagnostic possibilities, the work-up we plan and why, what the results may reveal and treatment options. [address] [place] [address] in 4 weeks for Fibroscan to review all data and determine the treatment plan.       Jony Christianson MD  [facility] [address], [address] [facility], [facility] 22.  [phone]  [address] Re[facility]

## 2019-12-20 LAB
ALBUMIN SERPL-MCNC: 4.8 G/DL (ref 3.5–5.5)
ALP SERPL-CCNC: 328 IU/L (ref 39–117)
ALT SERPL-CCNC: 52 IU/L (ref 0–32)
ANA TITR SER IF: NEGATIVE {TITER}
AST SERPL-CCNC: 34 IU/L (ref 0–40)
BASOPHILS # BLD AUTO: 0 X10E3/UL (ref 0–0.2)
BASOPHILS NFR BLD AUTO: 1 %
BILIRUB DIRECT SERPL-MCNC: 0.15 MG/DL (ref 0–0.4)
BILIRUB SERPL-MCNC: 0.4 MG/DL (ref 0–1.2)
BUN SERPL-MCNC: 27 MG/DL (ref 6–24)
BUN/CREAT SERPL: 21 (ref 9–23)
CALCIUM SERPL-MCNC: 10.3 MG/DL (ref 8.7–10.2)
CHLORIDE SERPL-SCNC: 103 MMOL/L (ref 96–106)
CO2 SERPL-SCNC: 23 MMOL/L (ref 20–29)
CREAT SERPL-MCNC: 1.3 MG/DL (ref 0.57–1)
EOSINOPHIL # BLD AUTO: 0.1 X10E3/UL (ref 0–0.4)
EOSINOPHIL NFR BLD AUTO: 2 %
ERYTHROCYTE [DISTWIDTH] IN BLOOD BY AUTOMATED COUNT: 11.4 % (ref 12.3–15.4)
FERRITIN SERPL-MCNC: 445 NG/ML (ref 15–150)
GLUCOSE SERPL-MCNC: 44 MG/DL (ref 65–99)
HBV CORE AB SERPL QL IA: NEGATIVE
HBV SURFACE AB SER QL: REACTIVE
HCT VFR BLD AUTO: 35.6 % (ref 34–46.6)
HGB BLD-MCNC: 11.9 G/DL (ref 11.1–15.9)
IMM GRANULOCYTES # BLD AUTO: 0 X10E3/UL (ref 0–0.1)
IMM GRANULOCYTES NFR BLD AUTO: 0 %
INR PPP: 1 (ref 0.8–1.2)
IRON SATN MFR SERPL: 48 % (ref 15–55)
IRON SERPL-MCNC: 156 UG/DL (ref 27–159)
LYMPHOCYTES # BLD AUTO: 1.3 X10E3/UL (ref 0.7–3.1)
LYMPHOCYTES NFR BLD AUTO: 21 %
MCH RBC QN AUTO: 31 PG (ref 26.6–33)
MCHC RBC AUTO-ENTMCNC: 33.4 G/DL (ref 31.5–35.7)
MCV RBC AUTO: 93 FL (ref 79–97)
MONOCYTES # BLD AUTO: 0.5 X10E3/UL (ref 0.1–0.9)
MONOCYTES NFR BLD AUTO: 8 %
NEUTROPHILS # BLD AUTO: 4.3 X10E3/UL (ref 1.4–7)
NEUTROPHILS NFR BLD AUTO: 68 %
PLATELET # BLD AUTO: 242 X10E3/UL (ref 150–450)
POTASSIUM SERPL-SCNC: 4.5 MMOL/L (ref 3.5–5.2)
PROT SERPL-MCNC: 7.2 G/DL (ref 6–8.5)
PROTHROMBIN TIME: 11 SEC (ref 9.1–12)
RBC # BLD AUTO: 3.84 X10E6/UL (ref 3.77–5.28)
SODIUM SERPL-SCNC: 145 MMOL/L (ref 134–144)
TIBC SERPL-MCNC: 325 UG/DL (ref 250–450)
UIBC SERPL-MCNC: 169 UG/DL (ref 131–425)
WBC # BLD AUTO: 6.2 X10E3/UL (ref 3.4–10.8)

## 2019-12-26 NOTE — PROGRESS NOTES
Spoke with patient regarding lab results to include low glucose levels. Patient states she is monitored by Dr. Alfredo Faustin. She states they are trying to regulate her glucose levels. \"I have a lot of highs and lows lately.'  Patient states her glucose levels have been okay the past couple of days. Patient labs were faxed to Dr. Lola Fothergill office. A nurse was not available to review results. Labs will be places on Dr. Lola Fothergill desk.

## 2019-12-27 RX ORDER — INSULIN GLARGINE 100 [IU]/ML
26 INJECTION, SOLUTION SUBCUTANEOUS
Qty: 10 PEN | Refills: 3
Start: 2019-12-27 | End: 2021-03-08 | Stop reason: CLARIF

## 2020-01-02 ENCOUNTER — HOSPITAL ENCOUNTER (OUTPATIENT)
Dept: MRI IMAGING | Age: 55
Discharge: HOME | End: 2020-01-02
Attending: INTERNAL MEDICINE

## 2020-01-02 DIAGNOSIS — R74.8 ELEVATED LIVER ENZYMES: ICD-10-CM

## 2020-01-03 ENCOUNTER — HOSPITAL ENCOUNTER (OUTPATIENT)
Dept: PHYSICAL THERAPY | Age: 55
Discharge: HOME | End: 2020-01-03
Payer: COMMERCIAL

## 2020-01-03 PROCEDURE — 97161 PT EVAL LOW COMPLEX 20 MIN: CPT

## 2020-01-03 PROCEDURE — 97140 MANUAL THERAPY 1/> REGIONS: CPT

## 2020-01-03 PROCEDURE — 97110 THERAPEUTIC EXERCISES: CPT

## 2020-01-03 NOTE — PROGRESS NOTES
[address] [address] (MOB IV), [address] [facility]  Brenda Epperson  Phone: [phone] Fax: [phone]    Plan of Care/Statement of Necessity for Physical Therapy Services  2-15    Patient name: Alberto Cruz  : 1965  Provider#: [id number]  Referral source: ROWENA Doran      Medical/Treatment Diagnosis: Lower back pain [M54.5]     Prior Hospitalization: see medical history     Comorbidities: Asthma, diabetes type I or II  Prior Level of Function: The patient completed 20 minutes of exercise seldom or never  Medications: Verified on Patient Summary List    Start of Care: 1/3/20      Onset Date: 2019       The [address] [place] [address] and following information is based on the information from the initial evaluation. Assessment/ key information: The Pt is a pleasant and motivated 62year old female who presents to therapy with R-sided lower back pain with radiculopathy (L5). Based on examination, the Pt presents to therapy with decreased hip strength and stability, decreased core strength and stability, malalignment of her R innominate, decreased balance and neuromuscular control, mild hypomobility of her thoracic and lumbar spines, decreased gluteal activation, and decreased activity tolerance and endurance. The patient would benefit from skilled physical therapy to help improve the above listed impairments to allow the patient to safely return to their prior level of function with less overall pain or risk of further injury. The patient has a good prognosis with skilled physical therapy. Evaluation Complexity History MEDIUM  Complexity : 1-2 comorbidities / personal factors will impact the outcome/ POC ; Examination MEDIUM Complexity : 3 Standardized tests and measures addressing body structure, function, activity limitation and / or participation in recreation  ;Presentation MEDIUM Complexity : Evolving with changing characteristics  ; Clinical Decision Making LOW Complexity : FOTO score of   Overall Complexity Rating: LOW     Problem List: pain affecting function, decrease ROM, decrease strength, impaired gait/ balance, decrease ADL/ functional abilitiies, decrease activity tolerance, decrease flexibility/ joint mobility and decrease transfer abilities   Treatment Plan may include any combination of the following: Therapeutic exercise, Therapeutic activities, Neuromuscular re-education, Physical agent/modality, Gait/balance training, Manual therapy, Patient education, Self Care training, Functional mobility training, Home safety training and Stair training  Patient / Family readiness to learn indicated by: asking questions and interest  Persons(s) to be included in education: patient (P)  Barriers to Learning/Limitations: None  Patient Goal (s): to feel pain free  Patient Self Reported Health Status: good  Rehabilitation Potential: good    Short Term Goals: To be accomplished in 6 treatments:  1. The Pt will be independent and compliant with their HEP. 2. The Pt will report a 50% reduction in their pain with ADLs. Long Term Goals: To be accomplished in 12 treatments:  1. The Pt will score the MCII on her FOTO survey demonstrating improved overall function (94 to 99 points). 2. The Pt will be able to work a full work day with no more than 0-3/10 pain to allow the Pt to be able to return to her PLOF with less pain or discomfort. 3. The Pt will be able to sleep >/= 4 nights/wk without waking secondary to pain and discomfort. Frequency / Duration: Patient to be seen 1-2 times per week for 12 treatments. Patient/ Caregiver education and instruction: self care, activity modification and exercises    [x]  Plan of care has been reviewed with BRIA Gross, PT 1/3/2020     ________________________________________________________________________    I certify that the above Therapy Services are being furnished while the patient is under my care.  I agree with the treatment plan and certify that this therapy is necessary.     [de-identified] Signature:____________________  Date:____________Time: _________

## 2020-01-03 NOTE — PROGRESS NOTES
PT INITIAL EVALUATION NOTE 2-15    Patient Name: Yousif Bocanegra  Date:1/3/2020  : 1965  [x]  Patient  Verified  Payor: Jorge Pereira / Plan: [employer] HMO / Product Type: HMO /    In time: 8:17 AM  Out time: 9:19 AM  Total Treatment Time (min): 62 minutes  Visit #: 1     Treatment Area: Lower back pain [M54.5]    SUBJECTIVE  Pain Level (0-10 scale): 7/10  Any medication changes, allergies to medications, adverse drug reactions, diagnosis change, or new procedure performed?: [] No    [x] Yes (see summary sheet for update)  Subjective: The Pt reports that she is experiencing pain in the R lower back that radiates down the back of the hip and then radiates laterally down the lateral thigh and lower leg and stops at the ankle. She reports that this has been going on since ~2019. She fractured the L patella and was seen in PT for L LE strengthening and that did well, but she thinks that she was putting more stress through the R LE. She denies any numbness, tingling, or weakness in her LEs. Aggravating factors include: unable to determine, says \"it's steady\". Relieving factors include: heat and ice, medication, and lying on the L side. She is independent with all ADLs. 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 She denies any limitations at work. She works 36 hrs/wk. She wakes up on occasion due to pain; she sleeps on her back or her L side. PMH: L patellar fracture. She is taking Meloxicam as prescribed. OBJECTIVE/EXAMINATION  Posture:  Unremarkable  Other Observations:  N/A  Gait and Functional Mobility:  Unremarkable    Lumbar ROM:   Flexion: WFL, p!    Extension: WFL   Side Bending: Right: NT   Left: NT   Rotation: Right: WFL    Left: WFL    Balance Assessment: Mild deficits in balance and neuromuscular control in single limb stance bilaterally    Squat Assessment:   Double Leg Forward trunk lean, deviate to the L, mild quad dominance, increased pain   Single Leg NT    Neurological: Sensations: Pt denies any numbness or tingling    Flexibility: No restrictions in hamstrings, hip internal and external rotators, quadriceps, iliopsoas, and gastrocnemius/soleus complex bilaterally     Right Knee:  AROM WFL   Left  Knee:  AROM WFL     LOWER QUARTER   MUSCLE STRENGTH  KEY       R  L  0 - No Contraction  Hip flex  4+  4+  1 - Trace          er    4  4-  2 - Poor          ir   4  4  3 - Fair           abd  4-  4  4 - Good          ext  4  4  5 - Normal   Knee flex  4  4+               Ext  5  5      Ankle DF  5  5                PF  5  5               Gluteal activation: The Pt has improper gluteal activation with hip extension bilaterally     Joint Mobility Assessment: Mild hypomobility of thoracic and lumbar spines  Palpation: TTP along R PSIS and diffusely down R glut, lateral thigh, and lateral lower leg (no specific)    Special Tests:Varus: NT     SLR: (+) R    Valgus: NT     Slump: NT    Kennedy's: NT    Avtar: NT    Anterior Drawer: NT    Obers: NT    Posterior Drawer: NT    Clonus: NT    Lachman's: NT    FABERS: (-) B      11 min Therapeutic Exercise:  [x] See flow sheet :   Rationale: increase ROM, increase strength, improve coordination, improve balance and increase proprioception to improve the patients ability to perform ADLs and work duties with less pain or discomfort. 8 min Manual Therapy: Shot gun technique and MET for a R anteriorly rotated innominate    Rationale: decrease pain, increase ROM, increase tissue extensibility and increase postural awareness to improve the patients ability to perform ADLs and work duties with less pain or discomfort.           With   [x] TE   [] TA   [] neuro   [x] other: Patient Education: [x] Review HEP    [] Progressed/Changed HEP based on:   [] positioning   [] body mechanics   [] transfers   [] heat/ice application    [x] other: Pt educated on diagnosis and prognosis with therapy        Other Objective/Functional Measures:FOTO 94/100    Pain Level (0-10 scale) post treatment: 5/10      ASSESSMENT:      [x]  See Plan of Lux Perera, PT 1/3/2020

## 2020-01-08 ENCOUNTER — OFFICE VISIT (OUTPATIENT)
Dept: ENDOCRINOLOGY | Age: 55
End: 2020-01-08

## 2020-01-08 VITALS
HEART RATE: 89 BPM | BODY MASS INDEX: 18.96 KG/M2 | DIASTOLIC BLOOD PRESSURE: 74 MMHG | SYSTOLIC BLOOD PRESSURE: 122 MMHG | HEIGHT: 63 IN | WEIGHT: 107 LBS

## 2020-01-08 DIAGNOSIS — E13.9 LADA (LATENT AUTOIMMUNE DIABETES IN ADULTS), MANAGED AS TYPE 1 (HCC): Primary | ICD-10-CM

## 2020-01-08 DIAGNOSIS — E10.65 HYPERGLYCEMIA DUE TO TYPE 1 DIABETES MELLITUS (HCC): ICD-10-CM

## 2020-01-08 RX ORDER — MELOXICAM 15 MG/1
TABLET ORAL
COMMUNITY
Start: 2019-12-19 | End: 2020-02-19

## 2020-01-08 NOTE — PROGRESS NOTES
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 female with a PMHx as noted below who is presenting to our endocrine clinic for the f/u evaluation of recently diagnosed diabetes. History of Type 1 Diabetes:  Patient reports that they were diagnosed in the ED with A1c of 15% June 2016     Pancreatic atrophy on CT   Family history is positive for diabetes in mother and maternal grandmother, both on insulin but type of diabetes unclear  Was started on novolog 70/30 insulin, did not tolerate any oral medications (neg antibodies, normal c-peptide)    INTERVAL HISTORY:  A1c recently 7.6%, reports her humalog was discontinued since last visit with me. Also taking less basaglar. Also stopped metformin due to her stomach discomfort.      Current home regimen includes:   OFF METFORMIN (did not tolerate)  Basaglar: 26 units each morning  Glipizide: 2.5mg before breakfast and dinner  Humalog:  (NOT TAKING)                  Breakfast: 28 units              Lunch: 34 units                       Dinner: 32 units   Correction Scale:  1 unit for every 15 above 150    Home Blood glucose review:   Freestyle lul sensor data reviewed together,   Patterns suggest hypoglycemia just under 10% of the time,   Patterns suggest persistent hyperglycemia after dinner through the night,  Average sugars are mostly around the 180 range    Review of most recent diabetes-related labs:  Lab Results   Component Value Date    HBA1C 7.6 (H) 08/21/2019    HBA1C 14.0 (H) 02/20/2019    HBA1C 14.4 (H) 02/19/2019    GQO7QWVQ 8.3 05/31/2019    GVL9FQFZ 14.2 02/18/2019    ZZY9KZYD 10.6 11/01/2018    CHOL 191 02/19/2019    LDLC 57 02/19/2019    GFRAA 53 (L) 12/19/2019    GFRNA 46 (L) 12/19/2019    CREATEXT Cr 1.34, GFR NonAA 45/ AA 51 06/11/2019    MCACR 192.5 (H) 02/19/2019    TSH 0.922 08/21/2019    [id number] <1.0 03/06/2018    GADLT <5.0 03/06/2018 CPEPLT 1.7 07/23/2018     Lab Key:  [id number] = IA-2 pancreatic islet cell autoantibody  GADLT = JORDANA-65 autoantibody   MCACR = Urine Microalbumin  INSUL = Insulin level  CPEPL = C-peptide level    PAST MEDICAL/SURGICAL HISTORY:   Past Medical History:   Diagnosis Date    Asthma     Diabetes ([facility] [address]     Elevated transaminase level 6/29/2016    Insulin dependent diabetes mellitus ([facility] [address] 6/20/2016    Pancreatic atrophy 6/20/2016     Past Surgical History:   Procedure Laterality Date    HX HEENT         ALLERGIES:   Allergies   Allergen Reactions    Contrast Agent [Iodine] Itching    Hydrocodone Rash    Percocet [Oxycodone-Acetaminophen] Rash     Pt states she is not allergic to Tylenol.  Codeine Nausea and Vomiting    Metformin Diarrhea       MEDICATIONS ON ADMISSION:     Current Outpatient Medications:     meloxicam (MOBIC) 15 mg tablet, , Disp: , Rfl:     insulin glargine (BASAGLAR KWIKPEN U-100 INSULIN) 100 unit/mL (3 mL) inpn, 26 Units by SubCUTAneous route Daily (before breakfast).  Reduce  By 2-3 units every few days if AM Sugars persist below 100--Dose change 12/27/19--updated med list--did not send prescription to the pharmacy, Disp: 10 Pen, Rfl: 3    glipiZIDE (GLUCOTROL) 5 mg tablet, Take 0.5 Tabs by mouth Before breakfast and dinner., Disp: 90 Tab, Rfl: 3    flash glucose sensor (FREESTYLE NICOLÁS 14 DAY SENSOR) kit, 2 (14 day) sensors for use with Freestyle Scanner, Disp: 1 Kit, Rfl: 7    flash glucose scanning reader (FREESTYLE NICOLÁS 14 DAY READER) [facility] [place] Freestyle Jaylyn [place] for use with 14 day sensors, Disp: 1 Each, Rfl: 0    albuterol (PROVENTIL HFA, VENTOLIN HFA, PROAIR HFA) 90 mcg/actuation inhaler, Take 2 Puffs by inhalation every four (4) hours as needed for Wheezing., Disp: 1 Inhaler, Rfl: 0    Nebulizer & Compressor machine, prn, Disp: 1 Each, Rfl: 0    Nebulizer Accessories kit, prn, Disp: 1 Kit, Rfl: 0    diclofenac (VOLTAREN) 1 % gel, APPLY 2G TO AFFECTED AREAS 4 TIMES A DAY, Disp: , Rfl: 11    SOCIAL HISTORY:   Social History     Socioeconomic History    Marital status: SINGLE     Spouse name: Not on file    Number of children: Not on file    Years of education: Not on file    Highest education level: Not on file   Occupational History    Not on file   Social Needs    Financial resource strain: Not on file    Food insecurity:     Worry: Not on file     Inability: Not on file    Transportation needs:     Medical: Not on file     Non-medical: Not on file   Tobacco Use    Smoking status: Never Smoker    Smokeless tobacco: Never Used   Substance and Sexual Activity    Alcohol use: No     Frequency: Never     Comment: occasionally    Drug use: No    Sexual activity: Not Currently   Lifestyle    Physical activity:     Days per week: Not on file     Minutes per session: Not on file    Stress: Not on file   Relationships    Social connections:     Talks on phone: Not on file     Gets together: Not on file     Attends Hoahaoism service: Not on file     Active member of club or organization: Not on file     Attends meetings of clubs or organizations: Not on file     Relationship status: Not on file    Intimate partner violence:     Fear of current or ex partner: Not on file     Emotionally abused: Not on file     Physically abused: Not on file     Forced sexual activity: Not on file   Other Topics Concern    Not on file   Social History Narrative    Not on file       FAMILY HISTORY:  Family History   Problem Relation Age of Onset    Cancer Father         prostate and colon    Diabetes Mother     Diabetes Maternal Grandmother     Cancer Maternal Aunt         breast       REVIEW OF SYSTEMS: Complete ROS assessed and noted for that which is described above, all else are negative.   Eyes: normal  ENT: normal  CVS: normal  Resp: normal  GI: normal  : normal  GYN: normal  Endocrine: normal  Integument: normal  Musculoskeletal: normal  Neuro: normal  Psych: normal    PHYSICAL EXAMINATION:    VITAL SIGNS:  Visit Vitals  /74 (BP 1 Location: Left arm, BP Patient Position: Sitting)   Pulse 89   Ht 5' 3\" (1.6 m)   Wt 107 lb (48.5 kg)   BMI 18.95 kg/m²       GENERAL: NCAT, Sitting comfortably, NAD  EYES: EOMI, non-icteric, no proptosis  HEENT: NCAT, no inflammation, no masses  LYMPH NODES: No LAD  CARDIOVASCULAR: S1 S2, RRR, No murmur, 2+ radial pulses  RESPIRATORY: CTA b/l, no wheeze/rales  ABDOMEN: BS normal  NEUROLOGIC: 5/5 power all extremities, no parasthesias, AAOx3  EXTREMITIES: warm, no edema/rash/ or other skin changes    REVIEW OF LABORATORY AND RADIOLOGY DATA:   Labs and documentation have been reviewed as described above. ASSESSMENT AND PLAN:   Omar Hussein is a 62 y.o. female with a PMHx as noted below who is presenting to our endocrine clinic for the f/u evaluation of recent onset diabetes. SHANTEL, late onset autoimmune diabetes of the adult (type-1 like diabetes)     Liver function improving over time, we reviewed her labs together. She is planning on evaluating this further however. We will assess her labs, and c-peptide to re-evaluate her pancreatic function. In the meantime we will trial trulicity, samples provided, no hx of pancreatic issues or thyroid cancer. If labs look good and she tolerates the trulicity will send a prescription. Still with low fasting sugars, will reduce her basaglar further today. Focus is really on reducing AM lows while addressing daytime hyperglycemia. Plan:  Medications:  Start trial of trulicity 1.67AE weekly injection (every Wednesday)  Basaglar: Reduce to 20 units each morning  STOP Glipizide  Humalog: ok to hold for now but we may need to restart in future    BP: Stable, not on antihypertensives  Cholesterol: reviewed, stable    RTC: I would like to see her back in 3 months, call with concerns,    Chapis Dye.  [address] Endocrinology  [address] [place] [address]

## 2020-01-08 NOTE — LETTER
NOTIFICATION RETURN TO WORK / SCHOOL 
 
1/8/2020 3:23 PM 
 
Ms. Cadence Alfaro [address] 
[phone] [id number] Please note that this patient was required to see me in my clinic today for an important visit relating to their health. This may have caused them to be absent from work/school, and so I ask of you to please excuse them during this time that I had required them to be present here. This letter and the contents within are being provided with the approval of the patient. Sincerely, Fartun Jones MD

## 2020-01-08 NOTE — PATIENT INSTRUCTIONS
Start trial of trulicity 4.20PW weekly injection (every Wednesday)    Basaglar: Reduce to 20 units each morning    STOP Glipizide    Fasting labs at your convenience     Blanco BALDERRAMA [address]    --------    Start Trulicity 2.1KJ weekly injection. Use one pen each week, then dispose of it. Each pen offers only one injection. Inject the same day each week, since its a once weekly injection. 1. Take a Trulicity pen out from the refrigerator   2. Pull off the grey bottom cover at the bottom of the pen   3. Twist the top of the pen to the UNLOCK position   4. Pinch off an area of fat in the belly with your hand for injection   5. Push the bottom of the pen against your skin    6. When holding stable, press down on the button at the top of the pen to activate the injection   7. Wait until you hear the second click sound which suggests the injection is complete   8. Dispose of the pen when completed, plan to repeat the process with a new pen the following week, same day of the week.

## 2020-01-10 ENCOUNTER — HOSPITAL ENCOUNTER (OUTPATIENT)
Dept: PHYSICAL THERAPY | Age: 55
Discharge: HOME | End: 2020-01-10
Payer: COMMERCIAL

## 2020-01-10 PROCEDURE — 97110 THERAPEUTIC EXERCISES: CPT

## 2020-01-10 PROCEDURE — 97140 MANUAL THERAPY 1/> REGIONS: CPT

## 2020-01-10 NOTE — PROGRESS NOTES
PT DAILY TREATMENT NOTE 2-15    Patient Name: Nathaniel Purchase  Date:1/10/2020  : 1965  [x]  Patient  Verified  Payor: Chelle López / Plan: [employer] HMO / Product Type: HMO /    In time: 9:00 AM  Out time: 10:05 AM  Total Treatment Time (min): 65 minutes  Visit #:  2    Treatment Area: Lower back pain [M54.5]    SUBJECTIVE  Pain Level (0-10 scale): 4/10  Any medication changes, allergies to medications, adverse drug reactions, diagnosis change, or new procedure performed?: [x] No    [] Yes (see summary sheet for update)  Subjective functional status/changes:   [] No changes reported  The Pt reports that her back has been feeling better since her last session. She has been able to do her exercises at home without difficulty. OBJECTIVE      55 min Therapeutic Exercise:  [x] See flow sheet :   Rationale: increase ROM, increase strength, improve coordination, improve balance and increase proprioception to improve the patients ability to perform ADLs and work duties with less pain or discomfort. 10 min Manual Therapy:  Shot gun technique and MET for a R anteriorly rotated innominate   Rationale: decrease pain, increase ROM, increase tissue extensibility and increase postural awareness  to improve the patients ability to perform ADLs and work duties with less pain or discomfort. With   [x] TE   [] TA   [] neuro   [] other: Patient Education: [x] Review HEP    [] Progressed/Changed HEP based on:   [] positioning   [] body mechanics   [] transfers   [] heat/ice application    [] other:      Other Objective/Functional Measures: None noted     Pain Level (0-10 scale) post treatment: 3/10    ASSESSMENT/Changes in Function:   The Pt was found to have malalignment in her R innominate that corrected easily with MET. She tolerated her therapy program well, but went very slowly with her reps despite frequent cuing to increase her pace to improve endurance.   Patient will continue to benefit from skilled PT services to modify and progress therapeutic interventions, address functional mobility deficits, address ROM deficits, address strength deficits, analyze and address soft tissue restrictions, analyze and cue movement patterns, analyze and modify body mechanics/ergonomics, assess and modify postural abnormalities and instruct in home and community integration to attain remaining goals. []  See Plan of Care  []  See progress note/recertification  []  See Discharge Summary         Progress towards goals / Updated goals:  Short Term Goals: To be accomplished in 6 treatments:  1. The Pt will be independent and compliant with their HEP- progressing  2. The Pt will report a 50% reduction in their pain with ADLs- progressing  Long Term Goals: To be accomplished in 12 treatments:  1. The Pt will score the MCII on her FOTO survey demonstrating improved overall function (94 to 99 points)- progressing  2. The Pt will be able to work a full work day with no more than 0-3/10 pain to allow the Pt to be able to return to her PLOF with less pain or discomfort- progressing  3.  The Pt will be able to sleep >/= 4 nights/wk without waking secondary to pain and discomfort- progressing    PLAN  [x]  Upgrade activities as tolerated     [x]  Continue plan of care  [x]  Update interventions per flow sheet       []  Discharge due to:_  []  Other:_      Sharon Elliott, PT 1/10/2020

## 2020-01-14 ENCOUNTER — OFFICE VISIT (OUTPATIENT)
Dept: HEMATOLOGY | Age: 55
End: 2020-01-14

## 2020-01-14 VITALS
BODY MASS INDEX: 18.36 KG/M2 | HEART RATE: 72 BPM | OXYGEN SATURATION: 98 % | SYSTOLIC BLOOD PRESSURE: 139 MMHG | HEIGHT: 63 IN | WEIGHT: 103.6 LBS | TEMPERATURE: 98 F | DIASTOLIC BLOOD PRESSURE: 78 MMHG

## 2020-01-14 DIAGNOSIS — R74.8 ELEVATED LIVER ENZYMES: Primary | ICD-10-CM

## 2020-01-14 NOTE — PROGRESS NOTES
Jose C Paul is a 62 y.o. female  Chief Complaint   Patient presents with    Follow-up     fibro scan         Visit Vitals  /78 (BP 1 Location: Left arm, BP Patient Position: Sitting)   Pulse 72   Temp 98 °F (36.7 °C) (Tympanic)   Ht 5' 3\" (1.6 m)   Wt 103 lb 9.6 oz (47 kg)   SpO2 98%   BMI 18.35 kg/m²       3 most recent PHQ Screens 1/14/2020   Little interest or pleasure in doing things Not at all   Feeling down, depressed, irritable, or hopeless Not at all   Total Score PHQ 2 0     Learning Assessment 8/21/2019   PRIMARY LEARNER Patient   HIGHEST LEVEL OF EDUCATION - PRIMARY LEARNER  -   BARRIERS PRIMARY LEARNER -   [address]  CAREGIVER -   PRIMARY LANGUAGE ENGLISH   LEARNER PREFERENCE PRIMARY DEMONSTRATION   ANSWERED BY pt   RELATIONSHIP SELF     Abuse Screening Questionnaire 1/21/2019   Do you ever feel afraid of your partner? N   Are you in a relationship with someone who physically or mentally threatens you? N   Is it safe for you to go home? Y         1. Have you been to the ER, urgent care clinic since your last visit? Hospitalized since your last visit? No    2. Have you seen or consulted any other health care providers outside of the [address] [place] [address] since your last visit? Include any pap smears or colon screening.  No

## 2020-01-14 NOTE — LETTER
NOTIFICATION RETURN TO WORK / SCHOOL 
 
1/14/2020 11:27 AM 
 
Ms. Verenice Feldman 
[address] 
[address] [id number] To Whom It May Concern: 
 
Verenice Feldman is currently under the care of [address]. She will return to work/school on: Tuesday January 14, 2020 If there are questions or concerns please have the patient contact our office. Sincerely, ROWENA Olsen

## 2020-01-14 NOTE — PROGRESS NOTES
[address] [place] [address], MD, Clemon Grumbles, Alwin Saint, MD Jae Caroline, PA-C Jud Alstrom, ACNP-BC     April S Gilbert, AGPCNP-BC   Perri Age, FNP-C    Poornima Hang, PCNP-BC       [facility] 136    at [address], [address] [place] [address], [facility] 22.    [phone]    FAX: [address] [place] [address]    at [address], [address]    [phone]    FAX: [phone]       Patient Care Team:  Hughes Severance, MD as PCP - General (Internal Medicine)  Hughes Severance, MD as PCP - [facility]  Willy Mansfield MD (Endocrinology)  Lucero Schumacher MD (Nephrology)  Jeremiah Brennan MD (Female Pelvic Medicine and Reconstructive Surgery)  Kristine Duque MD (Optometry)      Problem List  Date Reviewed: 1/14/2020          Codes Class Noted    Elevated liver enzymes ICD-10-CM: R74.8  ICD-9-CM: 790.5  12/19/2019        Insulin dependent diabetes mellitus ([facility] [address] ICD-10-CM: E11.9, Z79.4  ICD-9-CM: 250.00, V58.67  6/20/2016        Pancreatic atrophy ICD-10-CM: K86.89  ICD-9-CM: 577.8  6/20/2016            Kathy Evans returns to the [address] for management of elevated liver enzymes. The active problem list, all pertinent past medical history, medications, radiologic findings and laboratory findings related to the liver disorder were reviewed with the patient. The patient is a 62 y.o. Black female who was found to have elevated liver transaminases and alkaline phosphatase in 2018. Serologic evaluation for markers of chronic liver disease were negative. Ultrasound of the liver was performed in 2/2019. The results of the imaging demonstrated a normal appearing liver.   MRI was ordered at the time of last office visit, this has not yet been scheduled due to patient's significant claustrophobia. An assessment of liver fibrosis with biopsy or elastography has not been performed. Elastography is planned for today's visit. The patient has no symptoms which can be attributed to the liver disorder. The patient is not currently experiencing the following symptoms of liver disease:  fatigue, pain in the right side over the liver. The patient completes all daily activities without any functional limitations. She is continuing to work on improved control of her DM.    ASSESSMENT AND PLAN:  Elevated liver enzymes  Intermittent elevation in liver transaminases and persistent elevation in alkaline phosphatase of unclear etiology at this time. Have performed laboratory testing to monitor liver function and degree of liver injury. This included BMP, hepatic panel, CBC with platelet count, INR. Liver transaminases are now normal.  ALP is elevated. Total bilirubin is normal.  Serum albumin is depressed. INR is normal.  The platelet count is normal.      Fibroscan in the office today yields a score of 9.8 EkPa, which depending on underlying disease etiology, is associated with F2-3 changes. There is no suggestion of fatty liver. Based upon laboratory studies and imaging the patient may have advanced liver disease. Serologic testing for causes of chronic liver disease were ordered. All were negative     The most likely causes for the liver chemistry abnormalities were discussed with the patient and include immune liver disorders, fatty liver related to malnutrition and not obesity. I have reviewed her work-up to date and the concerns for possible PSC. MRI had been requested, but the patient is unable to proceed with this study. The need to perform a liver biopsy to help determine the cause and severity of the liver test abnormalities was discussed.   The risks of performing the liver biopsy including pain, puncture of the lung, gallbladder, intestine or kidney and bleeding were discussed. Will proceed with liver biopsy at this time as this study will help to answer both underlying etiology of liver disease as well as extent. She will then have follow-up with Dr Zheng Irene following this procedure to establish plan of care. Screening for Hepatocellular Carcinoma  HCC screening is not necessary if the patient has no evidence of cirrhosis. Treatment of other medical problems in patients with chronic liver disease  There are no contraindications for the patient to take most medications that are necessary for treatment of other medical issues. Counseling for alcohol in patients with chronic liver disease  The patient was counseled regarding alcohol consumption and the effect of alcohol on chronic liver disease. The patient does not consume any significant amount of alcohol. Vaccinations   Vaccination for viral hepatitis B is not needed. The patient has serologic evidence of prior exposure or vaccination with immunity. The need for vaccination against viral hepatitis A will be assessed with serologic and instituted as appropriate. Routine vaccinations against other bacterial and viral agents can be performed as indicated. Annual flu vaccination should be administered if indicated. ALLERGIES  Allergies   Allergen Reactions    Contrast Agent [Iodine] Itching    Hydrocodone Rash    Percocet [Oxycodone-Acetaminophen] Rash     Pt states she is not allergic to Tylenol.  Codeine Nausea and Vomiting    Metformin Diarrhea       MEDICATIONS  Current Outpatient Medications   Medication Sig    meloxicam (MOBIC) 15 mg tablet     dulaglutide (TRULICITY) 5.76 PE/3.4 mL sub-q pen 0.5 mL by SubCUTAneous route every seven (7) days.  insulin glargine (BASAGLAR KWIKPEN U-100 INSULIN) 100 unit/mL (3 mL) inpn 26 Units by SubCUTAneous route Daily (before breakfast).  Reduce  By 2-3 units every few days if AM Sugars persist below 100--Dose change 19--updated med list--did not send prescription to the pharmacy    glipiZIDE (GLUCOTROL) 5 mg tablet Take 0.5 Tabs by mouth Before breakfast and dinner.  flash glucose sensor (FREESTYLE NICOLÁS 14 DAY SENSOR) kit 2 (14 day) sensors for use with Freestyle Scanner    flash glucose scanning reader (FREESTYLE NICOLÁS 14 DAY READER) [facility] One Freestyle [place] for use with 14 day sensors    albuterol (PROVENTIL HFA, VENTOLIN HFA, PROAIR HFA) 90 mcg/actuation inhaler Take 2 Puffs by inhalation every four (4) hours as needed for Wheezing.  Nebulizer & Compressor machine prn    Nebulizer Accessories kit prn    diclofenac (VOLTAREN) 1 % gel APPLY 2G TO AFFECTED AREAS 4 TIMES A DAY     No current facility-administered medications for this visit. SYSTEM REVIEW NOT RELATED TO LIVER DISEASE OR REVIEWED ABOVE:  Constitution systems: Negative for fever, chills, weight gain, weight loss. Eyes: Negative for visual changes. ENT: Negative for sore throat, painful swallowing. Respiratory: Negative for cough, hemoptysis, SOB. Cardiology: Negative for chest pain, palpitations. GI:  Negative for constipation or diarrhea. : Negative for urinary frequency, dysuria, hematuria, nocturia. Skin: Negative for rash. Hematology: Negative for easy bruising, blood clots. Musculo-skeletal: Negative for back pain, muscle pain, weakness. Neurologic: Negative for headaches, dizziness, vertigo, memory problems not related to HE. Psychology: Negative for anxiety, depression. FAMILY HISTORY:  The father has/had the following following chronic disease(s): prostate and colon cancer. The mother  of MVA. There is no family history of liver disease. SOCIAL HISTORY:  The patient has never been . The patient has no children. The patient has never used tobacco products.     The patient has never consumed significant amounts of alcohol. The patient currently works full time: [employer]. PHYSICAL EXAMINATION:  Visit Vitals  /78 (BP 1 Location: Left arm, BP Patient Position: Sitting)   Pulse 72   Temp 98 °F (36.7 °C) (Tympanic)   Ht 5' 3\" (1.6 m)   Wt 103 lb 9.6 oz (47 kg)   SpO2 98%   BMI 18.35 kg/m²     General: No acute distress. Eyes: Sclera anicteric. ENT: No oral lesions. Thyroid normal.  Nodes: No adenopathy. Skin: No spider angiomata. No jaundice. No palmar erythema. Respiratory: Lungs clear to auscultation. Cardiovascular: Regular heart rate. No murmurs. No JVD. Abdomen: Soft non-tender. Liver size normal to percussion/palpation. Spleen not palpable. No obvious ascites. Extremities: No edema. No muscle wasting. No gross arthritic changes. Neurologic: Alert and oriented. Cranial nerves grossly intact. No asterixis. LABORATORY STUDIES:  [facility] [address] 12/19/2019 8/21/2019   WBC 3.4 - 10.8 x10E3/uL 6.2    ANC 1.4 - 7.0 x10E3/uL 4.3    HGB 11.1 - 15.9 g/dL 11.9     - 450 x10E3/uL 242    INR 0.8 - 1.2 1.0    AST 0 - 40 IU/L 34 58 (H)   ALT 0 - 32 IU/L 52 (H) 80 (H)   Alk Phos 39 - 117 IU/L 328 (H) 329 (H)   Bili, Total 0.0 - 1.2 mg/dL 0.4 0.5   Bili, Direct 0.00 - 0.40 mg/dL 0.15    Albumin 3.5 - 5.5 g/dL 4.8 4.6   BUN 6 - 24 mg/dL 27 (H) 24   BUN (iSTAT) 9 - 20 mg/dL     Creat 0.57 - 1.00 mg/dL 1.30 (H) 1.36 (H)   Creat (iSTAT) 0.6 - 1.3 mg/dL     Na 134 - 144 mmol/L 145 (H) 141   K 3.5 - 5.2 mmol/L 4.5 4.8   Cl 96 - 106 mmol/L 103 101   CO2 20 - 29 mmol/L 23 24   Glucose 65 - 99 mg/dL 44 (L) 386 (H)     SEROLOGIES:  8/2018.   HBsurface antigen negative, anti-HCV negative, Ferritin 1139, FE saturation 74%, JUN negative, ANCA negative    Serologies Latest Ref Rng & Units 12/19/2019 8/21/2019   Hep B Surface Ag Negative  Negative   Hep B Core Ab, Total Negative Negative    Hep B Surface AB QL  Reactive    Ferritin 15 - 150 ng/mL 445 (H) 384 (H) Iron % Saturation 15 - 55 % 48 31   JUN, IFA  Negative    M2 Ab 0.0 - 20.0 Units  <20.0   Ceruloplasmin 19.0 - 39.0 mg/dL  32.5   Alpha-1 antitrypsin level 90 - 200 mg/dL  129     LIVER HISTOLOGY:  1/2020. [facility]. EkPa was 9.8. Suggested fibrosis level is F2-3. CAP score is 147. ENDOSCOPIC PROCEDURES:  Not available or performed    RADIOLOGY:  2/2019. Ultrasound of liver. Normal appearing liver. No liver mass lesions. OTHER TESTING:  Not available or performed    FOLLOW-UP:  All of the issues listed above in the Assessment and Plan were discussed with the patient. All questions were answered. The patient expressed a clear understanding of the above. [address] [place] [address] within 2 weeks of liver biopsy to review all data and determine the treatment plan.     Gwendolyn Morse PA-C  Liver [place] [facility] 59, 823 [facility] Angelita Wolff  22.  [phone]  [address] [place] [address]

## 2020-01-17 ENCOUNTER — APPOINTMENT (OUTPATIENT)
Dept: PHYSICAL THERAPY | Age: 55
End: 2020-01-17
Payer: COMMERCIAL

## 2020-01-24 ENCOUNTER — APPOINTMENT (OUTPATIENT)
Dept: PHYSICAL THERAPY | Age: 55
End: 2020-01-24
Payer: COMMERCIAL

## 2020-01-31 ENCOUNTER — APPOINTMENT (OUTPATIENT)
Dept: PHYSICAL THERAPY | Age: 55
End: 2020-01-31
Payer: COMMERCIAL

## 2020-01-31 ENCOUNTER — TELEPHONE (OUTPATIENT)
Dept: ENDOCRINOLOGY | Age: 55
End: 2020-01-31

## 2020-01-31 NOTE — TELEPHONE ENCOUNTER
Ms. Billie Willis came into the office today to show me her upper arms where she has been placing the [employer]. She has dark spots and scabby areas where the sensor was placed. I told her to stop using this and go back to doing finger sticks. She said she needed a new meter so I told her to contact her insurance company and see what brand they preferred and I would order her a new meter. She will do this.   Roger Cobos

## 2020-02-01 LAB
ALBUMIN SERPL-MCNC: 4.4 G/DL (ref 3.8–4.9)
ALBUMIN/CREAT UR: 29 MG/G CREAT (ref 0–29)
ALBUMIN/GLOB SERPL: 1.7 {RATIO} (ref 1.2–2.2)
ALP SERPL-CCNC: 243 IU/L (ref 39–117)
ALT SERPL-CCNC: 56 IU/L (ref 0–32)
AST SERPL-CCNC: 37 IU/L (ref 0–40)
BILIRUB SERPL-MCNC: 0.6 MG/DL (ref 0–1.2)
BUN SERPL-MCNC: 28 MG/DL (ref 6–24)
BUN/CREAT SERPL: 23 (ref 9–23)
C PEPTIDE SERPL-MCNC: 1.3 NG/ML (ref 1.1–4.4)
CALCIUM SERPL-MCNC: 9.3 MG/DL (ref 8.7–10.2)
CHLORIDE SERPL-SCNC: 104 MMOL/L (ref 96–106)
CHOLEST SERPL-MCNC: 191 MG/DL (ref 100–199)
CO2 SERPL-SCNC: 24 MMOL/L (ref 20–29)
CREAT SERPL-MCNC: 1.21 MG/DL (ref 0.57–1)
CREAT UR-MCNC: 65 MG/DL
EST. AVERAGE GLUCOSE BLD GHB EST-MCNC: 174 MG/DL
GLOBULIN SER CALC-MCNC: 2.6 G/DL (ref 1.5–4.5)
GLUCOSE SERPL-MCNC: 171 MG/DL (ref 65–99)
HBA1C MFR BLD: 7.7 % (ref 4.8–5.6)
HDLC SERPL-MCNC: 100 MG/DL
INTERPRETATION, 910389: NORMAL
INTERPRETATION: NORMAL
LDLC SERPL CALC-MCNC: 80 MG/DL (ref 0–99)
Lab: NORMAL
MICROALBUMIN UR-MCNC: 18.8 UG/ML
PDF IMAGE, 910387: NORMAL
POTASSIUM SERPL-SCNC: 4.5 MMOL/L (ref 3.5–5.2)
PROT SERPL-MCNC: 7 G/DL (ref 6–8.5)
SODIUM SERPL-SCNC: 146 MMOL/L (ref 134–144)
TRIGL SERPL-MCNC: 53 MG/DL (ref 0–149)
TSH SERPL DL<=0.005 MIU/L-ACNC: 1.69 UIU/ML (ref 0.45–4.5)
VLDLC SERPL CALC-MCNC: 11 MG/DL (ref 5–40)

## 2020-02-03 ENCOUNTER — PATIENT MESSAGE (OUTPATIENT)
Dept: ENDOCRINOLOGY | Age: 55
End: 2020-02-03

## 2020-02-06 ENCOUNTER — APPOINTMENT (OUTPATIENT)
Dept: ULTRASOUND IMAGING | Age: 55
End: 2020-02-06
Attending: INTERNAL MEDICINE
Payer: COMMERCIAL

## 2020-02-06 ENCOUNTER — HOSPITAL ENCOUNTER (OUTPATIENT)
Age: 55
Setting detail: OUTPATIENT SURGERY
Discharge: HOME | End: 2020-02-06
Attending: INTERNAL MEDICINE | Admitting: INTERNAL MEDICINE
Payer: COMMERCIAL

## 2020-02-06 VITALS
HEIGHT: 63 IN | OXYGEN SATURATION: 100 % | SYSTOLIC BLOOD PRESSURE: 106 MMHG | DIASTOLIC BLOOD PRESSURE: 62 MMHG | TEMPERATURE: 98 F | HEART RATE: 65 BPM | WEIGHT: 103 LBS | BODY MASS INDEX: 18.25 KG/M2 | RESPIRATION RATE: 18 BRPM

## 2020-02-06 DIAGNOSIS — R74.8 ELEVATED LIVER ENZYMES: ICD-10-CM

## 2020-02-06 PROCEDURE — 77030014115: Performed by: INTERNAL MEDICINE

## 2020-02-06 PROCEDURE — 76942 ECHO GUIDE FOR BIOPSY: CPT

## 2020-02-06 PROCEDURE — 77030013826 HC NDL BIOP MAXCOR BARD -B: Performed by: INTERNAL MEDICINE

## 2020-02-06 PROCEDURE — 88307 TISSUE EXAM BY PATHOLOGIST: CPT

## 2020-02-06 PROCEDURE — 76040000019: Performed by: INTERNAL MEDICINE

## 2020-02-06 PROCEDURE — 88313 SPECIAL STAINS GROUP 2: CPT

## 2020-02-06 RX ORDER — HYDROMORPHONE HYDROCHLORIDE 1 MG/ML
1 INJECTION, SOLUTION INTRAMUSCULAR; INTRAVENOUS; SUBCUTANEOUS
Status: CANCELLED | OUTPATIENT
Start: 2020-02-06

## 2020-02-06 RX ORDER — SODIUM CHLORIDE 0.9 % (FLUSH) 0.9 %
SYRINGE (ML) INJECTION
Status: DISCONTINUED
Start: 2020-02-06 | End: 2020-02-06 | Stop reason: HOSPADM

## 2020-02-06 RX ORDER — SODIUM CHLORIDE 0.9 % (FLUSH) 0.9 %
5-40 SYRINGE (ML) INJECTION EVERY 8 HOURS
Status: DISCONTINUED | OUTPATIENT
Start: 2020-02-06 | End: 2020-02-06 | Stop reason: HOSPADM

## 2020-02-06 RX ORDER — SODIUM CHLORIDE 0.9 % (FLUSH) 0.9 %
5-40 SYRINGE (ML) INJECTION AS NEEDED
Status: DISCONTINUED | OUTPATIENT
Start: 2020-02-06 | End: 2020-02-06 | Stop reason: HOSPADM

## 2020-02-06 RX ORDER — LIDOCAINE HYDROCHLORIDE 10 MG/ML
10 INJECTION INFILTRATION; PERINEURAL ONCE
Status: DISCONTINUED | OUTPATIENT
Start: 2020-02-06 | End: 2020-02-06 | Stop reason: HOSPADM

## 2020-02-06 RX ORDER — ONDANSETRON 2 MG/ML
4 INJECTION INTRAMUSCULAR; INTRAVENOUS
Status: CANCELLED | OUTPATIENT
Start: 2020-02-06

## 2020-02-06 NOTE — ROUTINE PROCESS
Verenice Feldman  1965  [id number]    Situation:  Verbal report received from: Amber Medina RN  Procedure: Procedure(s):  LIVER BIOPSY    Background:    Preoperative diagnosis: ELEVATED LIVER ENZYMES  Postoperative diagnosis: * No post-op diagnosis entered *    :  Dr. Sydnee Tracy  Assistant(s): Endoscopy Technician-1: Michelle Lewis  Endoscopy RN-1: Lori Michaud RN    Specimens:   ID Type Source Tests Collected by Time Destination   1 : liver biopsy Preservative Liver specimen  Nathan Hansen MD 2/6/2020 1224 Pathology     H. Pylori  no    Assessment:  Intra-procedure medications       Vital signs stable     Abdominal assessment: round and soft     Recommendation:  Discharge patient per MD order.   Family  Permission to share finding with family or friend yes

## 2020-02-06 NOTE — H&P
3340 [facility], MD, [address], Cite Mary Corley, MD Kareen Bacon, PALYNDA Aguilar, [facility]-BC     Claudia Hunt, [facility]   Aracelis Bryant, TALIAP-ITZEL Williamson, [facility]       Mark Borrego [address] 136    at [facility]    [address] [place] [address] Ave, [address] Angelita Lee  22.    [phone]    FAX: [address] [facility] [address]    [address], [address], [address]    [phone]    FAX: [phone]         PRE-PROCEDURE NOTE - LIVER BIOPSY    H and P from last office visit reviewed. Allergies reviewed. Out-patient medication list reviewed. Patient Active Problem List   Diagnosis Code    Insulin dependent diabetes mellitus ([facility] [address] E11.9, Z79.4    Pancreatic atrophy K86.89    Elevated liver enzymes R74.8       Allergies   Allergen Reactions    Contrast Agent [Iodine] Itching    Hydrocodone Rash    Percocet [Oxycodone-Acetaminophen] Rash     Pt states she is not allergic to Tylenol.  Codeine Nausea and Vomiting    Metformin Diarrhea       No current facility-administered medications on file prior to encounter. Current Outpatient Medications on File Prior to Encounter   Medication Sig Dispense Refill    insulin glargine (BASAGLAR KWIKPEN U-100 INSULIN) 100 unit/mL (3 mL) inpn 26 Units by SubCUTAneous route Daily (before breakfast).  Reduce  By 2-3 units every few days if AM Sugars persist below 100--Dose change 12/27/19--updated med list--did not send prescription to the pharmacy 10 Pen 3    flash glucose sensor (FREESTYLE NICOLÁS 14 DAY SENSOR) kit 2 (14 day) sensors for use with Freestyle Scanner 1 Kit 7    flash glucose scanning reader (FREESTYLE NICOLÁS 14 DAY READER) [facility] One Freestyle [place] for use with 14 day sensors 1 Each 0    meloxicam (MOBIC) 15 mg tablet       dulaglutide (TRULICITY) 1.02 AE/3.7 mL sub-q pen 0.5 mL by SubCUTAneous route every seven (7) days. 2 Pen 0    glipiZIDE (GLUCOTROL) 5 mg tablet Take 0.5 Tabs by mouth Before breakfast and dinner. 90 Tab 3    albuterol (PROVENTIL HFA, VENTOLIN HFA, PROAIR HFA) 90 mcg/actuation inhaler Take 2 Puffs by inhalation every four (4) hours as needed for Wheezing. 1 Inhaler 0    Nebulizer & Compressor machine prn 1 Each 0    Nebulizer Accessories kit prn 1 Kit 0    diclofenac (VOLTAREN) 1 % gel APPLY 2G TO AFFECTED AREAS 4 TIMES A DAY  11       For liver biopsy to assess NALFD. The risks of the procedure were discussed with the patient. This included bleeding, pain, and puncture of other organs. All questions were answered. The patient wishes to proceed with the procedure. PHYSICAL EXAMINATION:  VS: per nursing note  General: No acute distress. Eyes: Sclera anicteric. ENT: No oral lesions. Thyroid normal.  Nodes: No adenopathy. Skin: No spider angiomata. No jaundice. No palmar erythema. Respiratory: Lungs clear to auscultation. Cardiovascular: Regular heart rate. No murmurs. No JVD. Abdomen: Soft non-tender, liver size normal to percussion/palpation. Spleen not palpable. No obvious ascites. Extremities: No edema. No muscle wasting. No gross arthritic changes. Neurologic: Alert and oriented. Cranial nerves grossly intact. No asterixis.       LABS:  Lab Results   Component Value Date/Time    WBC 6.2 12/19/2019 12:00 AM    HGB 11.9 12/19/2019 12:00 AM    Hematocrit (POC) 31 (L) 08/24/2018 01:15 PM    HCT 35.6 12/19/2019 12:00 AM    PLATELET 446 30/85/1766 12:00 AM    MCV 93 12/19/2019 12:00 AM     Lab Results   Component Value Date/Time    INR 1.0 12/19/2019 12:00 AM    INR 1.0 02/20/2019 10:00 PM    Prothrombin time 11.0 12/19/2019 12:00 AM    Prothrombin time 10.3 02/20/2019 10:00 PM       ASSESSMENT AND PLAN:  Liver biopsy under ultrasound guidance.     Jimi Hyman MD  [facility] [address], 2000 [facility], [facility] 22.  [phone]  [address]

## 2020-02-06 NOTE — PROCEDURES
3340 [facility], MD, KIT [facility], Jazz Corley, MD Amrit Childress PA-C Maridee Must, [facility]-BC     Claudia MILLAN Gilbert, [facility]   JUANI James Delroys, [facility]       [facility] 136    at [address], [address] Angelita Lee  22. [phone]    FAX: [address] [place] [address], 300 [facility] [address]    [phone]    FAX: [phone]         LIVER BIOPSY PROCEDURE NOTE    Jefe Osei  1965    INDICATIONS/PRE-OPERATIVE  DIAGNOSIS:  Elevated liver enzymes    : Ralph Sarabia MD    SURGICAL ASSISTANT:  None    PROSTHETIC DEVICES, TISSUE GRAFTS, TRANSPLANTED ORGANS:  Not applicable    SEDATION: 1% Lidocaine injection 10 ml    PROCEDURE:  Informed consent to perform the procedure was obtained from the patient. The patient was positioned on the edge of the stretcher lying flat in the supine position. Ultrasound was utilized to image the liver. The diaphragm and any major mass lesion or vascular structures within the liver were identified. An appropriate site for liver biopsy was identified. The distance from the surface of the skin to the liver capsule was 3 cm. This area was prepped with betadine and draped in sterile fashion. The skin was infiltrated with 1% lidocaine. The deeper subcutanous tissues and liver capsule overlying the biopsy site were then infiltrated with 1% lidocaine until appropriate anesthesia was obtained. A small incision was made in the skin so the biopsy devise could be easily inserted. A total of 2 passes with the 16 gauge Bard biopsy devise was then made into the liver.   Core(s) of liver tissue totaling 4 cm in length were obtained and placed into tissue fixative. A band aid was placed over the biopsy site. The patient was then repositioned on the right side and transported to the recovery area on the stretcher for routine monitoring until discharge. The specimen was sent to pathology for processing via the normal transport mechanism. SPECIMEN REMOVED: Liver    ESTIMATED BLOOD LOSS: Negligible.      POST-OPERATIVE DIAGNOSIS: Same as Pre-operative Diagnosis      Irlanda Munoz MD [facility], 2000 [facility] 22.  [phone]  [address] [place] [address]

## 2020-02-06 NOTE — PERIOP NOTES
Initial RN admission and assessment performed and documented in Endoscopy navigator. All procedural vital signs, airway assessment, and level of consciousness information monitored and recorded by anesthesia staff on the anesthesia record. Bandaid intact with minimal bleeding. Patient reports no pain at this time. Patient transported to recovery area by RN. Endoscope was pre-cleaned at bedside immediately [facility].

## 2020-02-06 NOTE — DISCHARGE INSTRUCTIONS
3340 [facility] [place], Jazz Corley, [address] , HAYDEE [facility]-BC     April S Gilbert, [facility]   Tanya Dominguez DAVID Brown [facility]       Mark [address] 136    at [address], [address] [place] [address], Angelita  22.    [phone]    FAX: [address] [place] [address] [address], [address]    [phone]    FAX: [phone]         LIVER BIOPSY DISCHARGE INSTRUCTIONS      Venancio Salazar  1965  Date: 2/6/2020    DIET:    Efe Wu may resume your previous diet. ACTIVITIES:  Rest quietly the rest of today. You should not lift any objects more than 20 pounds for the next 2 days. If you work sitting down without strenuous activity you may return to work tomorrow. If you exert yourself or do heavy lifting at work you should take tomorrow off. Do not drive or operate hazardous machinery for 12 hours after you are discharged from this procedure. SPECIAL INSTRUCTIONS:  Do not use any aspirin or non-steroidal (Motin, Advil, Naproxen, etc) pain medications for the next 2 days. You may use extra-strength Tylenol (acetaminophen) if you experience pain or discomfort later today. Restarting blood thinners: If you were taking blood thinners prior to the procedure you can restart these in 2 days. Call the [facility] office if you experience any of the following:  Persistent or severe abdominal pain. Persistent or severe abdominal distention. Fever and chills   Nausea and vomiting. New or unusual symptoms. Follow-up care: You should have a follow up appointment with Dr. Noelle Bradley to review the results of the liver biopsy results in 2 weeks.   If you do not have an appointment please call the office at the number listed above to schedule this. Other instructions: If you have any problems or questions call the [facility] office at the phone number listed above. DISCHARGE SUMMARY from Nurse:     The following personal items collected during your admission are returned to you:   Dental Appliance: Dental Appliances: None  Vision: Visual Aid: None  Hearing Aid:    Jewelry:    Clothing:    Other Valuables:    Valuables sent to safe:

## 2020-02-07 ENCOUNTER — TELEPHONE (OUTPATIENT)
Dept: HEMATOLOGY | Age: 55
End: 2020-02-07

## 2020-02-07 NOTE — TELEPHONE ENCOUNTER
Pt states she had a procedure done yesterday ofelia Paz ,     she needs a work note for yesterday and today 2/6 & 2/7  also needs the note to state not to lift any objects more than 20 pounds as she states he told her this

## 2020-02-10 NOTE — ANCILLARY DISCHARGE INSTRUCTIONS
[facility]  [address] (MO[address] IV), [address] [facility] Mark Wyatt [place] [address] [place]  Phone: [phone] Fax: [phone]    Discharge Summary  2-15    Patient name: Shannan Cano  : 1965  Provider#: [id number]  Referral source: Willye Councilman, PA      Medical/Treatment Diagnosis: Lower back pain [M54.5]     Prior Hospitalization: see medical history     Comorbidities: See Plan of Care  Prior Level of Function:See Plan of Care  Medications: Verified on Patient Summary List    Start of Care:  1/3/2020      Onset Date: 2019   Visits from Start of Care: 2     Missed Visits: Cancelled 1  Reporting Period : 1/3/2020 to 1/10/2020      ASSESSMENT/SUMMARY OF CARE: Pt is discharged today, 2/10/2020, as they have stopped attending therapy. Final objective data and outcomes were unable to be obtained.     RECOMMENDATIONS:  [x]Discontinue therapy: []Patient has reached or is progressing toward set goals      [x]Patient is non-compliant or has abdicated      []Due to lack of appreciable progress towards set goals      []Other    Prince Adams, PT 2/10/2020

## 2020-02-10 NOTE — TELEPHONE ENCOUNTER
Patient called indicating she still has discomfort and wanted to obtain a note for return to work as she plans to return tomorrow. She took an additional day today due to discomfort. Patient wants to know if the note can clarify the time frame in which she is not to lift a certain amount of pounds upon retuning to work as she does have to lift items for her job. She needs the letter today so that she can take it to work with her tomorrow.

## 2020-02-19 ENCOUNTER — OFFICE VISIT (OUTPATIENT)
Dept: INTERNAL MEDICINE CLINIC | Age: 55
End: 2020-02-19

## 2020-02-19 VITALS
HEART RATE: 77 BPM | OXYGEN SATURATION: 99 % | RESPIRATION RATE: 16 BRPM | BODY MASS INDEX: 18.96 KG/M2 | TEMPERATURE: 98.1 F | WEIGHT: 107 LBS | HEIGHT: 63 IN | SYSTOLIC BLOOD PRESSURE: 134 MMHG | DIASTOLIC BLOOD PRESSURE: 78 MMHG

## 2020-02-19 DIAGNOSIS — N18.2 STAGE 2 CHRONIC KIDNEY DISEASE: ICD-10-CM

## 2020-02-19 DIAGNOSIS — R74.8 ELEVATED LIVER ENZYMES: ICD-10-CM

## 2020-02-19 DIAGNOSIS — Z00.00 PHYSICAL EXAM, ANNUAL: Primary | ICD-10-CM

## 2020-02-19 PROBLEM — E11.21 TYPE 2 DIABETES WITH NEPHROPATHY (HCC): Status: RESOLVED | Noted: 2020-02-19 | Resolved: 2020-02-19

## 2020-02-19 PROBLEM — E11.21 TYPE 2 DIABETES WITH NEPHROPATHY (HCC): Status: ACTIVE | Noted: 2020-02-19

## 2020-02-19 RX ORDER — NEBULIZER AND COMPRESSOR
EACH MISCELLANEOUS
Qty: 1 EACH | Refills: 0 | Status: SHIPPED | OUTPATIENT
Start: 2020-02-19 | End: 2021-03-08 | Stop reason: CLARIF

## 2020-02-19 NOTE — PROGRESS NOTES
HISTORY OF PRESENT ILLNESS  Artem Alfred is a 62 y.o. female. HPI   Pt was last here 8/21/19. Pt is here for routine care.         BP is 134/78      Uncontrolled type 1 diabetic  Labile glucose  BS at home around 100s, sometimes 200s, 90s, 50 and 60s   Pt takes basaglar 15U   No  Longer using humalog   Pt is no longer on metformin --stopped d/t SE     Wt today is 107 lbs-- up 4 lbs x lov   This is in the underweight range     Reviewed labs 1/20      Pt follows with Dr Lenore El (endo) for type I diabetes  Reviewed notes 1/8/20: Artem Alfred is a 62 y.o. female with a PMHx as noted below who is presenting to our endocrine clinic for the f/u evaluation of recent onset diabetes. 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 She is planning on evaluating this further however. We will assess her labs, and c-peptide to re-evaluate her pancreatic function. In the meantime we will trial trulicity, samples provided, no hx of pancreatic issues or thyroid cancer. If labs look good and she tolerates the trulicity will send a prescription. Still with low fasting sugars, will reduce her basaglar further today. Focus is really on reducing AM lows while addressing daytime hyperglycemia. Plan:  Medications:  Start trial of trulicity 1.54ES weekly injection (every Wednesday)  Basaglar: Reduce to 20 units each morning  STOP Glipizide  Humalog: ok to hold for now but we may need to restart in future  BP: Stable, not on antihypertensives  Cholesterol: reviewed, stable  RTC: I would like to see her back in 3 months, call with concerns,  Will f/u in April         Pt follows with Dr Cecilia Vaughn (hep)   Reviewed notes 1/14/20: Elevated liver enzymes  Intermittent elevation in liver transaminases and persistent elevation in alkaline phosphatase of unclear etiology at this time.     Have performed laboratory testing to monitor liver function and degree of liver injury. This included BMP, hepatic panel, CBC with platelet count, INR. Liver transaminases are now normal.  ALP is elevated. Total bilirubin is normal.  Serum albumin is depressed. INR is normal.  The platelet count is normal.    Fibroscan in the office today yields a score of 9.8 EkPa, which depending on underlying disease etiology, is associated with F2-3 changes. There is no suggestion of fatty liver. Based upon laboratory studies and imaging the patient may have advanced liver disease. Serologic testing for causes of chronic liver disease were ordered. All were negative   The most likely causes for the liver chemistry abnormalities were discussed with the patient and include immune liver disorders, fatty liver related to malnutrition and not obesity. I have reviewed her work-up to date and the concerns for possible PSC. MRI had been requested, but the patient is unable to proceed with this study. The need to perform a liver biopsy to help determine the cause and severity of the liver test abnormalities was discussed. The risks of performing the liver biopsy including pain, puncture of the lung, gallbladder, intestine or kidney and bleeding were discussed. Will proceed with liver biopsy at this time as this study will help to answer both underlying etiology of liver disease as well as extent. She will then have follow-up with Dr Meagan Bryan following this procedure to establish plan of care. Screening for Hepatocellular Carcinoma  HCC screening is not necessary if the patient has no evidence of cirrhosis. Treatment of other medical problems in patients with chronic liver disease  There are no contraindications for the patient to take most medications that are necessary for treatment of other medical issues.   Counseling for alcohol in patients with chronic liver disease  The patient was counseled regarding alcohol consumption and the effect of alcohol on chronic liver disease. The patient does not consume any significant amount of alcohol. Vaccinations   Vaccination for viral hepatitis B is not needed. The patient has serologic evidence of prior exposure or vaccination with immunity. The need for vaccination against viral hepatitis A will be assessed with serologic and instituted as appropriate. Routine vaccinations against other bacterial and viral agents can be performed as indicated. Annual flu vaccination should be administered if indicated.     Had a liver biopsy on 2/6/20, has appt scheduled on 2/21/20 and will discuss results then       Pt follows with Dr Shiraz Alcantara (nephro) for ckd, has had multiple arf episodes  Last visit was 1/31/20, cr was 1.2      Pt also is now seeing Dr Isela Banks (uro) and ROWENA Cronin for recurrent UTIs  Last visit was  10/18       Pt has been following with Dr Roopa Franks (ortho) for knee pain   Reviewed notes 12/19/19 from [address] [place] [address]: At this time, I provided the patient with scripts for Meloxicam, a Prednisone Taiwo and physical therapy. I recommended she follow up with a spine specialist for further evaluation. All questions were answered to her satisfaction. Follow up as needed.     She has also been following with Dr Aysha Soto (ortho) for sciatica   Last visit was 1/19 with ROWENA Rios   Completing PT for this      Pt has albuterol to use prn for her asthma  Does not have daily sx, occasional rare wheezing         PREVENTIVE:  Colonoscopy: 10/13/16, Dr Berenice Viera, repeat in 10 years, however fmhx - father so repeat in 5 years  [facility]   Mammogram: 3/5/19, negative, scheduled   Dexa: not yet needed  Tdap: ~2013  Pneumovax: 02/19/19   Shingrix: not yet needed  Flu shot: fall 2019   Foot exam: 02/19/20   Micro: 1/20  some protein in urine   A1C: 2/19 POC 14.0, 3/19 8.3 per Dr Keenan Valdo 1/20 7.7   Eye exam: Dr Wolff, spring 2019   Lipids: 1/20  LDL 80     Patient Active Problem List    Diagnosis Date Noted    Elevated liver enzymes 12/19/2019    Insulin dependent diabetes mellitus ([facility] 06/20/2016    Pancreatic atrophy 06/20/2016     Current Outpatient Medications   Medication Sig Dispense Refill    meloxicam (MOBIC) 15 mg tablet       dulaglutide (TRULICITY) 4.16 SK/4.4 mL sub-q pen 0.5 mL by SubCUTAneous route every seven (7) days. 2 Pen 0    insulin glargine (BASAGLAR KWIKPEN U-100 INSULIN) 100 unit/mL (3 mL) inpn 26 Units by SubCUTAneous route Daily (before breakfast). Reduce  By 2-3 units every few days if AM Sugars persist below 100--Dose change 12/27/19--updated med list--did not send prescription to the pharmacy 10 Pen 3    glipiZIDE (GLUCOTROL) 5 mg tablet Take 0.5 Tabs by mouth Before breakfast and dinner. 90 Tab 3    flash glucose sensor (FREESTYLE NICOLÁS 14 DAY SENSOR) kit 2 (14 day) sensors for use with Freestyle Scanner 1 Kit 7    flash glucose scanning reader (FREESTYLE NICOLÁS 14 DAY READER) mis One Freestyle Jaylyn Twilight for use with 14 day sensors 1 Each 0    albuterol (PROVENTIL HFA, VENTOLIN HFA, PROAIR HFA) 90 mcg/actuation inhaler Take 2 Puffs by inhalation every four (4) hours as needed for Wheezing.  1 Inhaler 0    Nebulizer & Compressor machine prn 1 Each 0    Nebulizer Accessories kit prn 1 Kit 0    diclofenac (VOLTAREN) 1 % gel APPLY 2G TO AFFECTED AREAS 4 TIMES A DAY  11     Past Surgical History:   Procedure Laterality Date    HX HEENT        Lab Results   Component Value Date/Time    WBC 6.2 12/19/2019 12:00 AM    HGB 11.9 12/19/2019 12:00 AM    HCT 35.6 12/19/2019 12:00 AM    Hematocrit (POC) 31 (L) 08/24/2018 01:15 PM    PLATELET 997 80/40/5699 12:00 AM    MCV 93 12/19/2019 12:00 AM     Lab Results   Component Value Date/Time    Cholesterol, total 191 01/31/2020 08:08 AM    HDL Cholesterol 100 01/31/2020 08:08 AM    LDL, calculated 80 01/31/2020 08:08 AM    Triglyceride 53 01/31/2020 08:08 AM     Lab Results   Component Value Date/Time    GFR est non-AA 50 (L) 01/31/2020 08:08 AM GFRNA, POC 4 (L) 08/24/2018 01:15 PM    GFR est AA 58 (L) 01/31/2020 08:08 AM    GFRAA, POC 5 (L) 08/24/2018 01:15 PM    Creatinine 1.21 (H) 01/31/2020 08:08 AM    Creatinine (POC) 9.5 (H) 08/24/2018 01:15 PM    BUN 28 (H) 01/31/2020 08:08 AM    BUN (POC) >140 (H) 08/24/2018 01:15 PM    Sodium 146 (H) 01/31/2020 08:08 AM    Sodium (POC) 124 (L) 08/24/2018 01:15 PM    Potassium 4.5 01/31/2020 08:08 AM    Potassium (POC) 7.7 (HH) 08/24/2018 01:15 PM    Chloride 104 01/31/2020 08:08 AM    Chloride (POC) 91 (L) 08/24/2018 01:15 PM    CO2 24 01/31/2020 08:08 AM    Magnesium 1.4 (L) 02/22/2019 11:11 AM    Phosphorus 1.3 (L) 02/20/2019 10:00 PM    PTH, Intact 360.7 (H) 08/25/2018 10:36 AM        Review of Systems   Respiratory: Negative for shortness of breath. Cardiovascular: Negative for chest pain. Physical Exam  Constitutional:       General: She is not in acute distress. Appearance: She is well-developed. She is not diaphoretic. HENT:      Head: Normocephalic and atraumatic. Right Ear: Tympanic membrane, ear canal and external ear normal.      Left Ear: Tympanic membrane, ear canal and external ear normal.      Mouth/Throat:      Mouth: Mucous membranes are moist.      Pharynx: Oropharynx is clear. No oropharyngeal exudate or posterior oropharyngeal erythema. Eyes:      General: No scleral icterus. Right eye: No discharge. Left eye: No discharge. Conjunctiva/sclera: Conjunctivae normal.      Pupils: Pupils are equal, round, and reactive to light. Neck:      Musculoskeletal: Normal range of motion and neck supple. Vascular: No carotid bruit. Cardiovascular:      Rate and Rhythm: Normal rate and regular rhythm. Heart sounds: Normal heart sounds. No murmur. No friction rub. No gallop. Pulmonary:      Effort: Pulmonary effort is normal. No respiratory distress. Breath sounds: Normal breath sounds. No wheezing or rales.    Chest:      Chest wall: No tenderness. Abdominal:      General: There is no distension. Palpations: Abdomen is soft. There is no mass. Tenderness: There is no abdominal tenderness. There is no guarding or rebound. Hernia: No hernia is present. Musculoskeletal: Normal range of motion. General: No tenderness or deformity. Right lower leg: No edema. Left lower leg: No edema. Lymphadenopathy:      Cervical: No cervical adenopathy. Skin:     General: Skin is warm and dry. Coloration: Skin is not pale. Findings: No erythema or rash. Neurological:      Mental Status: She is alert and oriented to person, place, and time. Coordination: Coordination normal.      Comments: Monofilament nl BLE, good peripheral pulses, no ulcers      Psychiatric:         Mood and Affect: Mood normal.         Behavior: Behavior normal.         ASSESSMENT and PLAN    ICD-10-CM ICD-9-CM    1. Physical exam, annual                  Nl wt and bp labs just completed and reviewed no additional labs needed today colo utd pelvic and mammo scheduled for march pneumovax tdap flu utd eye exam needs to be scheduled for spring discussed  Z00.00 V70.0    2. Elevated liver enzymes                S/p liver biopsy and lab eval with dr Quintin Campo they did discuss some concern for [facility] has f/u pending to discuss results of biopsy there was some element of fatty liver on biopsy pts wt is on lower side of nl cannot lose wt at this time  R74.8 790.5    3. Stage 2 chronic kidney disease            Cr runs around 1.2-1.3 baseline follows with dr Wilbur Mcelroy  N18.2 585.2    4. Insulin dependent diabetes mellitus (HCC)                Pt is a type 1 diabetic control has improved she is a brittle diabete fluctuates between highs and low quite freq follows closely with dr Ludivina Lomeli  Currently on basaglar 15U no longer on humalog due to hypoglycemia  E11.9 250.00  DIABETES FOOT EXAM    Z79.4 V58.67         Depression screen reviewed and negative. Scribed by [address], as dictated by Dr. Felicia Barillas. Current diagnosis and concerns discussed with pt at length. Pt understands risks and benefits or current treatment plan and medications, and accepts the treatment and medication with any possible risks. Pt asks appropriate questions, which were answered. Pt was instructed to call with any concerns or problems. I have reviewed the note documented by the scribe. The services provided are my own.   The documentation is accurate

## 2020-02-19 NOTE — LETTER
NOTIFICATION RETURN TO WORK / SCHOOL 
 
2/19/2020 11:33 AM 
 
Ms. Marlon Ortiz 
a 14 
[phone] To Whom It May Concern: 
 
Marlon Ortiz is currently under the care of [facility]. She will return to work/school on: February 19, 2020. If there are questions or concerns please have the patient contact our office.  
 
 
 
Sincerely, 
 
 
Nga Artis MD

## 2020-02-21 ENCOUNTER — OFFICE VISIT (OUTPATIENT)
Dept: HEMATOLOGY | Age: 55
End: 2020-02-21

## 2020-02-21 VITALS
DIASTOLIC BLOOD PRESSURE: 75 MMHG | SYSTOLIC BLOOD PRESSURE: 134 MMHG | TEMPERATURE: 97.8 F | HEIGHT: 63 IN | BODY MASS INDEX: 18.96 KG/M2 | WEIGHT: 107 LBS | HEART RATE: 69 BPM | OXYGEN SATURATION: 99 %

## 2020-02-21 DIAGNOSIS — R74.8 ELEVATED LIVER ENZYMES: Primary | ICD-10-CM

## 2020-02-21 NOTE — LETTER
NOTIFICATION RETURN TO WORK / SCHOOL 
 
2/21/2020 9:19 AM 
 
Ms. Urvashi Shultz 
a 14 
[phone] To Whom It May Concern: 
 
Urvashi Shultz is currently under the care of [address]. She will return to work/school on: Friday February 21, 2020. If there are questions or concerns please have the patient contact our office. Sincerely, Calin Marina MD

## 2020-02-21 NOTE — Clinical Note
3/7/20 Patient: Mcgill Lamp YOB: 1965 Date of Visit: 2/21/2020 Kiara Mccauley MD 
[address] [address] VIA In Basket Dear Kiara Mccauley MD, [facility] you [facility]. My notes for this consultation are attached. If you have questions, please do not hesitate to call me. I look forward to following your patient along with you. Sincerely, Anish Luong MD

## 2020-02-21 NOTE — PROGRESS NOTES
[address] [place] [address], MD, Papito Scott MD Garold Connor, PA-C Maridee Must, [facility]     Claudia Hunt, [facility]   Sulaiman Blackburn KALI-ITZEL Foss [facility]       [facility] 136    at [address], [address] [place] [address], [facility] 22.    [phone]    FAX: [address] [place] [address], [address]    [phone]    FAX: [phone]       Patient Care Team:  Allen Cummings MD as PCP - General (Internal Medicine)  Allen Cummings MD as PCP - [facility]  Sharon Ocampo MD (Endocrinology)  Pippa Talbot MD (Nephrology)  Elba Ambriz MD (Female Pelvic Medicine and Reconstructive Surgery)  Jones Greene MD (Optometry)      Problem List  Date Reviewed: 2/19/2020          Codes Class Noted    Elevated liver enzymes ICD-10-CM: R74.8  ICD-9-CM: 790.5  12/19/2019        Insulin dependent diabetes mellitus ([facility] ICD-10-CM: E11.9, Z79.4  ICD-9-CM: 250.00, V58.67  6/20/2016        Pancreatic atrophy ICD-10-CM: K86.89  ICD-9-CM: 577.8  6/20/2016              Jefe Osei returns to the [place] [address] for management of elevated liver enzymes. The active problem list, all pertinent past medical history, medications, radiologic findings and laboratory findings related to the liver disorder were reviewed with the patient. The patient is a 62 y.o. Black female who was found to have elevated liver transaminases and alkaline phosphatase in 2018. Serologic evaluation for markers of chronic liver disease were negative. Ultrasound of the liver was performed in 2/2019. The results of the imaging demonstrated a normal appearing liver.   MRI was ordered at the time of last office visit, this has not yet been scheduled due to patient's significant claustrophobia. The patient underwent a liver biopsy in 2/2020. The procedure was well tolerated. I have personally reviewed the liver biopsy slides. This demonstrates portal fibrosis that is far greater than the degree of portal inflammation suggesting PSC. The patient has no symptoms which can be attributed to the liver disorder. The patient is not currently experiencing the following symptoms of liver disease:  fatigue, pain in the right side over the liver, itching,     The patient completes all daily activities without any functional limitations. ASSESSMENT AND PLAN:  Elevated liver enzymes  Intermittent elevation in liver transaminases and persistent elevation in alkaline phosphatase of unclear etiology at this time. The AST is normal.  ALT is elevated. ALP is elevated. Total bilirubin is normal.  Serum albumin is depressed. INR is normal.  The platelet count is normal.      Assessment of liver fibrosis with Fibroscan was performed In 1/2020. The result was 9.8 kPa which correlates with   Stage 3 bridging fibrosis. Serologic testing for causes of chronic liver disease were negative     A liver biopsy from 2/2020 suggests PSC. There is mild portal inflammation and portal fibrosis with extension outside the portal tract that is greater than would be expected given the degree of inflammation. This is suggestive of PSC. I have recommended that we do an MRI/MRCP. She says she cannot possibly tolerate the MRI. She has tried to do an MRI previously for another medical issue and could not stay in the scanner. We are currently pat of a international clinical trial to develop a medication for PSC. This study requires MRIs performed every 6 months to monitor response to medication.   If she cannot tolerate the MRI  She cannot be enrolled into this clinical trial.    Since there is currently no FDA approved treatment for [facility] we can simply follow her at regular intervals. Screening for Hepatocellular Carcinoma  HCC screening is not necessary if the patient has no evidence of cirrhosis. Treatment of other medical problems in patients with chronic liver disease  There are no contraindications for the patient to take most medications that are necessary for treatment of other medical issues. Counseling for alcohol in patients with chronic liver disease  The patient was counseled regarding alcohol consumption and the effect of alcohol on chronic liver disease. The patient does not consume any significant amount of alcohol. Vaccinations   Vaccination for viral hepatitis B is not needed. The patient has serologic evidence of prior exposure or vaccination with immunity. The need for vaccination against viral hepatitis A will be assessed with serologic and instituted as appropriate. Routine vaccinations against other bacterial and viral agents can be performed as indicated. Annual flu vaccination should be administered if indicated. ALLERGIES  Allergies   Allergen Reactions    Contrast Agent [Iodine] Itching    Hydrocodone Rash    Percocet [Oxycodone-Acetaminophen] Rash     Pt states she is not allergic to Tylenol.  Codeine Nausea and Vomiting    Metformin Diarrhea       MEDICATIONS  Current Outpatient Medications   Medication Sig    Nebulizer Accessories kit prn    Nebulizer & Compressor machine prn    insulin glargine (BASAGLAR KWIKPEN U-100 INSULIN) 100 unit/mL (3 mL) inpn 26 Units by SubCUTAneous route Daily (before breakfast). Reduce  By 2-3 units every few days if AM Sugars persist below 100--Dose change 12/27/19--updated med list--did not send prescription to the pharmacy (Patient taking differently: 15 Units by SubCUTAneous route Daily (before breakfast).  Reduce  By 2-3 units every few days if AM Sugars persist below 100--Dose change 12/27/19--updated med list--did not send prescription to the pharmacy)     No current facility-administered medications for this visit. SYSTEM REVIEW NOT RELATED TO LIVER DISEASE OR REVIEWED ABOVE:  Constitution systems: Negative for fever, chills, weight gain, weight loss. Eyes: Negative for visual changes. ENT: Negative for sore throat, painful swallowing. Respiratory: Negative for cough, hemoptysis, SOB. Cardiology: Negative for chest pain, palpitations. GI:  Negative for constipation or diarrhea. : Negative for urinary frequency, dysuria, hematuria, nocturia. Skin: Negative for rash. Hematology: Negative for easy bruising, blood clots. Musculo-skeletal: Negative for back pain, muscle pain, weakness. Neurologic: Negative for headaches, dizziness, vertigo, memory problems not related to HE. Psychology: Negative for anxiety, depression. FAMILY HISTORY:  The father has/had the following following chronic disease(s): prostate and colon cancer. The mother  of MVA. There is no family history of liver disease. SOCIAL HISTORY:  The patient has never been . The patient has no children. The patient has never used tobacco products. The patient has never consumed significant amounts of alcohol. The patient currently works full time: [employer]. PHYSICAL EXAMINATION:  Visit Vitals  /75 (BP 1 Location: Left arm, BP Patient Position: Sitting)   Pulse 69   Temp 97.8 °F (36.6 °C) (Tympanic)   Ht 5' 3\" (1.6 m)   Wt 107 lb (48.5 kg)   SpO2 99%   BMI 18.95 kg/m²     General: No acute distress. Eyes: Sclera anicteric. ENT: No oral lesions. Thyroid normal.  Nodes: No adenopathy. Skin: No spider angiomata. No jaundice. No palmar erythema. Respiratory: Lungs clear to auscultation. Cardiovascular: Regular heart rate. No murmurs. No JVD. Abdomen: Soft non-tender. Liver size normal to percussion/palpation. Spleen not palpable. No obvious ascites. Extremities: No edema.   No muscle wasting. No gross arthritic changes. Neurologic: Alert and oriented. Cranial nerves grossly intact. No asterixis. LABORATORY STUDIES:  Liver [place] of [address] [place] [address] Units 12/19/2019 8/21/2019   WBC 3.4 - 10.8 x10E3/uL 6.2    ANC 1.4 - 7.0 x10E3/uL 4.3    HGB 11.1 - 15.9 g/dL 11.9     - 450 x10E3/uL 242    INR 0.8 - 1.2 1.0    AST 0 - 40 IU/L 34 58 (H)   ALT 0 - 32 IU/L 52 (H) 80 (H)   Alk Phos 39 - 117 IU/L 328 (H) 329 (H)   Bili, Total 0.0 - 1.2 mg/dL 0.4 0.5   Bili, Direct 0.00 - 0.40 mg/dL 0.15    Albumin 3.5 - 5.5 g/dL 4.8 4.6   BUN 6 - 24 mg/dL 27 (H) 24   BUN (iSTAT) 9 - 20 mg/dL     Creat 0.57 - 1.00 mg/dL 1.30 (H) 1.36 (H)   Creat (iSTAT) 0.6 - 1.3 mg/dL     Na 134 - 144 mmol/L 145 (H) 141   K 3.5 - 5.2 mmol/L 4.5 4.8   Cl 96 - 106 mmol/L 103 101   CO2 20 - 29 mmol/L 23 24   Glucose 65 - 99 mg/dL 44 (L) 386 (H)     SEROLOGIES:  8/2018. HBsurface antigen negative, anti-HCV negative, Ferritin 1139, FE saturation 74%, JUN negative, ANCA negative    Serologies Latest Ref Rng & Units 12/19/2019 8/21/2019   Hep B Surface Ag Negative  Negative   Hep B Core Ab, Total Negative Negative    Hep B Surface AB QL  Reactive    Ferritin 15 - 150 ng/mL 445 (H) 384 (H)   Iron % Saturation 15 - 55 % 48 31   JUN, IFA  Negative    M2 Ab 0.0 - 20.0 Units  <20.0   Ceruloplasmin 19.0 - 39.0 mg/dL  32.5   Alpha-1 antitrypsin level 90 - 200 mg/dL  129     LIVER HISTOLOGY:  1/2020. FibroScan performed at [address]. EkPa was 9.8. Suggested fibrosis level is F2-3. CAP score is 147.  2/2020. Slides reviewed by MLS. Non-specific mild portal inflammation, with No interface hepatitis, with No PMN. No lobular inflammation. No steatosis. Anish stage 3 fibrosis. Metavir stage 2 fibrosis. Cirrhosis. Knodell score (0011). The degree of portal fibrosis is greater than would be expected based upon the degree of inflammation.   This suggests PSC.    ENDOSCOPIC PROCEDURES:  Not available or performed    RADIOLOGY:  2/2019. Ultrasound of liver. Normal appearing liver. No liver mass lesions. OTHER TESTING:  Not available or performed    FOLLOW-UP:  All of the issues listed above in the Assessment and Plan were discussed with the patient. All questions were answered. The patient expressed a clear understanding of the above. [address] [address] in 4 months for monitoring. If she agrees to have propofol anesthesia and be completely asleep for the MRI this can be arranged.         Neema Salcido MD  [address].  [phone]  [address] [place] [address]

## 2020-02-21 NOTE — PROGRESS NOTES
Urvashi Shultz is a 62 y.o. female  Chief Complaint   Patient presents with    Follow-up     LBX follow up         Visit Vitals  /75 (BP 1 Location: Left arm, BP Patient Position: Sitting)   Pulse 69   Temp 97.8 °F (36.6 °C) (Tympanic)   Ht 5' 3\" (1.6 m)   Wt 107 lb (48.5 kg)   SpO2 99%   BMI 18.95 kg/m²     3 most recent PHQ Screens 2/21/2020   Little interest or pleasure in doing things Not at all   Feeling down, depressed, irritable, or hopeless Not at all   Total Score PHQ 2 0     Learning Assessment 2/21/2020   PRIMARY LEARNER Patient   HIGHEST LEVEL OF EDUCATION - PRIMARY LEARNER  -   BARRIERS PRIMARY LEARNER NONE   CO-LEARNER CAREGIVER No   PRIMARY LANGUAGE ENGLISH   LEARNER PREFERENCE PRIMARY DEMONSTRATION   ANSWERED BY patient   RELATIONSHIP SELF     Abuse Screening Questionnaire 2/21/2020   Do you ever feel afraid of your partner? N   Are you in a relationship with someone who physically or mentally threatens you? N   Is it safe for you to go home? Y         1. Have you been to the ER, urgent care clinic since your last visit? Hospitalized since your last visit? No    2. Have you seen or consulted any other health care providers outside of the [address] [place] [address] since your last visit? Include any pap smears or colon screening.  No

## 2020-03-06 NOTE — ED NOTES
Faxed as requested.  RASHEED Dickson RN      [facility]    Phone Message    May a detailed message be left on voicemail: yes     Reason for Call: Other: Fatemeh called from the patients senior living place, she said they need Erica to fax the patient's after visit summary to [phone]. Please make it attention nurse station, thanks.     Action Taken: Other: [facility]    Travel Screening: Not Applicable                                                                         Pt assisted up to bedside commode.

## 2020-03-09 ENCOUNTER — HOSPITAL ENCOUNTER (OUTPATIENT)
Dept: MAMMOGRAPHY | Age: 55
Discharge: HOME | End: 2020-03-09
Attending: INTERNAL MEDICINE
Payer: COMMERCIAL

## 2020-03-09 DIAGNOSIS — Z12.31 VISIT FOR SCREENING MAMMOGRAM: ICD-10-CM

## 2020-03-09 PROCEDURE — 77067 SCR MAMMO BI INCL CAD: CPT

## 2020-03-23 ENCOUNTER — TELEPHONE (OUTPATIENT)
Dept: INTERNAL MEDICINE CLINIC | Age: 55
End: 2020-03-23

## 2020-03-23 RX ORDER — ALBUTEROL SULFATE 0.83 MG/ML
2.5 SOLUTION RESPIRATORY (INHALATION)
Qty: 30 NEBULE | Refills: 0 | Status: SHIPPED | OUTPATIENT
Start: 2020-03-23 | End: 2020-03-30 | Stop reason: SDUPTHER

## 2020-03-23 NOTE — TELEPHONE ENCOUNTER
Caller (if not patient) Pt   Relationship of caller (if not patient):   Best contact number(s): [phone]   Name of medication and dosage if known: Albuterol for Nebulizer   Is patient out of this medication (yes/no):yes   Pharmacy name: [facility]     Pharmacy listed in chart? (yes/no): YES   Pharmacy phone number: N/A   Date of last visit: 2/19/20   Details to clarify the request : Pt states that she has received the Nebulizer but has received the medication , Abruterol.

## 2020-03-23 NOTE — TELEPHONE ENCOUNTER
PCP: Junior Raiza MD    Last appt: 2/19/2020  Future Appointments   Date Time Provider Jessika Bar   4/23/2020 10:50 AM Demond Ward MD [address]   6/22/2020  9:00 AM Vandana Jackson, [facility]   2/22/2021 10:30 AM Junior Raiza MD [address]       Requested Prescriptions     Pending Prescriptions Disp Refills    albuterol (PROVENTIL VENTOLIN) 2.5 mg /3 mL (0.083 %) nebu 30 Nebule 0     Sig: 3 mL by Nebulization route.

## 2020-03-30 DIAGNOSIS — J45.40 MODERATE PERSISTENT ASTHMA WITHOUT COMPLICATION: Primary | ICD-10-CM

## 2020-03-30 RX ORDER — ALBUTEROL SULFATE 0.83 MG/ML
2.5 SOLUTION RESPIRATORY (INHALATION)
Qty: 30 NEBULE | Refills: 0 | Status: SHIPPED | OUTPATIENT
Start: 2020-03-30

## 2020-03-30 NOTE — TELEPHONE ENCOUNTER
Caller's first and last name:  N/A   Reason for call: Pt calling in to check on status of requested prescription submitted two days ago for Albuterol Liquid for Nebulizer breathing machine.    Callback required yes/no and why: Yes   Best contact number(s): [phone]   Details to clarify the request: N/

## 2020-03-30 NOTE — TELEPHONE ENCOUNTER
CP: Hughes Severance, MD    Last appt: 2/19/2020  Future Appointments   Date Time Provider Jessika Bar   4/23/2020 10:50 AM Willy Mansfield MD [facility] [address] [facility]   6[facility]22/2020  [facility]   2/22/2021 10:30 AM Hughes Severance, MD [address]       Requested Prescriptions     Pending Prescriptions Disp Refills    albuterol (PROVENTIL VENTOLIN) 2.5 mg /3 mL (0.083 %) nebu 30 Nebule 0     Sig: 3 mL by Nebulization route every six (6) hours as needed for Wheezing.

## 2020-04-16 RX ORDER — PEN NEEDLE, DIABETIC 31 GX3/16"
NEEDLE, DISPOSABLE MISCELLANEOUS
Qty: 100 PEN NEEDLE | Refills: 3 | Status: SHIPPED | OUTPATIENT
Start: 2020-04-16 | End: 2021-03-08 | Stop reason: CLARIF

## 2020-04-17 ENCOUNTER — VIRTUAL VISIT (OUTPATIENT)
Dept: ENDOCRINOLOGY | Age: 55
End: 2020-04-17

## 2020-04-17 DIAGNOSIS — E10.65 HYPERGLYCEMIA DUE TO TYPE 1 DIABETES MELLITUS (HCC): ICD-10-CM

## 2020-04-17 DIAGNOSIS — E13.9 LADA (LATENT AUTOIMMUNE DIABETES IN ADULTS), MANAGED AS TYPE 1 (HCC): Primary | ICD-10-CM

## 2020-04-17 NOTE — PROGRESS NOTES
**DUE TO PANDEMIC AND HEALTH CONCERNS IN THE COMMUNITY, THIS PATIENT WAS EITHER ILL OR FOUND TO BE HIGH RISK FOR IN-PERSON EVALUATION WITHIN THE CLINIC. THE FOLLOWING IS A VIRTUAL TELEMEDICINE VIDEO ENCOUNTER VIA [facility], TO WHICH THE PATIENT AGREED. THE PURPOSE IS TO LIMIT INTERRUPTIONS IN HEALTHCARE AND TO PROVIDE FOR ONGOING URGENT NEEDS UNDER THE CURRENT CONDITIONS. 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 female with a PMHx as noted below who is presenting to our endocrine clinic for the f/u evaluation of recently diagnosed diabetes. History of Type 1 Diabetes:  Patient reports that they were diagnosed in the ED with A1c of 15% June 2016     Pancreatic atrophy on CT   Family history is positive for diabetes in mother and maternal grandmother, both on insulin but type of diabetes unclear  Was started on novolog 70/30 insulin, did not tolerate any oral medications (neg antibodies, normal c-peptide)    INTERVAL HISTORY:  A1c in Jan 7.7%. She had a biopsy of the liver that suggested a resolving fatty liver. Tried trulicity on prior visit but did make her nauseous. Father passed on April 1st and reports this has affected her sugars.      Current home regimen includes:   OFF METFORMIN (did not tolerate)  OFF Trulicity (did not tolerate due to nausea)  Off glipizide  Basaglar: 30 units each morning  Humalog:  (Holding)    Home Blood glucose review:   Freestyle lul sensor used  Reports mostly in the 200-300's     Review of most recent diabetes-related labs:  Lab Results   Component Value Date    HBA1C 7.7 (H) 01/31/2020    HBA1C 7.6 (H) 08/21/2019    HBA1C 14.0 (H) 02/20/2019    MYB9VAAC 8.3 05/31/2019    DIN4LPUR 14.2 02/18/2019    XZF6IGQI 10.6 11/01/2018    CHOL 191 01/31/2020    LDLC 80 01/31/2020    GFRAA 58 (L) 01/31/2020    GFRNA 50 (L) 01/31/2020    CREATEXT Cr 1.34, GFR NonAA 45/ AA 51 06/11/2019    MCACR 29 01/31/2020    TSH 1.690 01/31/2020    [id number] <1.0 03/06/2018    GADLT <5.0 03/06/2018    CPEPLT 1.3 01/31/2020     Lab Key:  [id number] = IA-2 pancreatic islet cell autoantibody  GADLT = JORDANA-65 autoantibody   MCACR = Urine Microalbumin  INSUL = Insulin level  CPEPL = C-peptide level    PAST MEDICAL/SURGICAL HISTORY:   Past Medical History:   Diagnosis Date    Asthma     [facility] ([facility]     Elevated transaminase level 6/29/2016    Insulin dependent diabetes mellitus ([facility] [address] [facility] 6/20/2016    Pancreatic atrophy 6/20/2016     Past Surgical History:   Procedure Laterality Date    HX HEENT         ALLERGIES:   Allergies   Allergen Reactions    Contrast Agent [Iodine] Itching    Hydrocodone Rash    Percocet [Oxycodone-Acetaminophen] Rash     Pt states she is not allergic to Tylenol.  Codeine Nausea and Vomiting    Metformin Diarrhea       MEDICATIONS ON ADMISSION:     Current Outpatient Medications:     albuterol (PROVENTIL VENTOLIN) 2.5 mg /3 mL (0.083 %) nebu, 3 mL by Nebulization route every six (6) hours as needed for Wheezing., Disp: 30 Nebule, Rfl: 0    insulin glargine (BASAGLAR KWIKPEN U-100 INSULIN) 100 unit/mL (3 mL) inpn, 26 Units by SubCUTAneous route Daily (before breakfast). Reduce  By 2-3 units every few days if AM Sugars persist below 100--Dose change 12/27/19--updated med list--did not send prescription to the pharmacy (Patient taking differently: 30 Units by SubCUTAneous route Daily (before breakfast). Reduce  By 2-3 units every few days if AM Sugars persist below 100--Dose change 12/27/19--updated med list--did not send prescription to the pharmacy), Disp: 10 Pen, Rfl: 3    Insulin Needles, Disposable, (Danae Pen Needle) 32 gauge x 5/32\" ndle, Use with Basaglar pen daily.  DX Code: E13.9, Disp: 100 Pen Needle, Rfl: 3    Nebulizer Accessories kit, prn, Disp: 1 Kit, Rfl: 0    Nebulizer & Compressor machine, prn, Disp: 1 Each, Rfl: 0    SOCIAL HISTORY:   Social History Socioeconomic History    Marital status: SINGLE     Spouse name: Not on file    Number of children: Not on file    Years of education: Not on file    Highest education level: Not on file   Occupational History    Not on file   Social Needs    Financial resource strain: Not on file    Food insecurity     Worry: Not on file     Inability: Not on file    Transportation needs     Medical: Not on file     Non-medical: Not on file   Tobacco Use    Smoking status: Never Smoker    Smokeless tobacco: Never Used   Substance and Sexual Activity    Alcohol use: No     Frequency: Never     Comment: occasionally    Drug use: No    Sexual activity: Not Currently   Lifestyle    Physical activity     Days per week: Not on file     Minutes per session: Not on file    Stress: Not on file   Relationships    Social connections     Talks on phone: Not on file     Gets together: Not on file     Attends Denominational service: Not on file     Active member of club or organization: Not on file     Attends meetings of clubs or organizations: Not on file     Relationship status: Not on file    Intimate partner violence     Fear of current or ex partner: Not on file     Emotionally abused: Not on file     Physically abused: Not on file     Forced sexual activity: Not on file   Other Topics Concern    Not on file   Social History Narrative    Not on file       FAMILY HISTORY:  Family History   Problem Relation Age of Onset    Cancer Father         prostate and colon    Diabetes Mother     Diabetes Maternal Grandmother     Cancer Maternal Aunt         breast       REVIEW OF SYSTEMS: Complete ROS assessed and noted for that which is described above, all else are negative.   Eyes: normal  ENT: normal  CVS: normal  Resp: normal  GI: normal  : normal  GYN: normal  Endocrine: normal  Integument: normal  Musculoskeletal: normal  Neuro: normal  Psych: normal    PHYSICAL EXAMINATION:    VITAL SIGNS:  Telemedicine Visit    GENERAL: NCAT, Appears well nourished  EYES: EOMI, non-icteric, no proptosis  Ear/Nose/Throat: NCAT, no visible inflammation or masses  CARDIOVASCULAR: no cyanosis, no visible JVD  RESPIRATORY: comfortable respirations observed, no cyanosis  MUSCULOSKELETAL: Normal ROM of upper extremities observed  SKIN: No edema, rash, or other significant changes observed  NEUROLOGIC:  AAOx3  PSYCHIATRIC: Normal affect, Normal insight and judgement    REVIEW OF LABORATORY AND RADIOLOGY DATA:   Labs and documentation have been reviewed as described above. ASSESSMENT AND PLAN:   Adelina Steven is a 62 y.o. female with a PMHx as noted below who is presenting to our endocrine clinic for the f/u evaluation of recent onset diabetes. 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 Previously she did not tolerate trial of metformin, and she had tried the trulicity which seemed to work for a short while but reports was advised by nephrology to avoid it. We did note her c-peptide level remains normal and so she still does produce insulin. We have, for safety reasons, treated her historically as a SHANTEL, though insulin dependent type 2 is still on the differential. She asked if she can go back on the glipizide at low dose as before and I advised it would be reasonable. Note that the main risk to her kidney at this time is her blood sugar and not her therapy.  We will go ahead and have her try the following:     Plan:  Medications:  Basaglar: Continue 30 units each morning  Restart Glipizide 2.5mg with breakfast and dinner,   To increase to 5mg with B&D if sugars remain high,  She was advised to message me through [employer] with an update,  Humalog: She does not want to restart this now, though I recommended it    BP: telemedicine visit  Cholesterol: reviewed, stable    RTC: I would like to see her back in 4 months, (Aug 4 at 11:50 AM)    25 minutes spent toward telemedicine visit today of which >50% of this time was spent in counseling and coordination of care. Kathryn Vaughn.  [facility]  [address] [place] [address]

## 2020-06-22 ENCOUNTER — OFFICE VISIT (OUTPATIENT)
Dept: HEMATOLOGY | Age: 55
End: 2020-06-22

## 2020-06-22 VITALS
SYSTOLIC BLOOD PRESSURE: 128 MMHG | RESPIRATION RATE: 17 BRPM | OXYGEN SATURATION: 98 % | BODY MASS INDEX: 17.89 KG/M2 | TEMPERATURE: 97.1 F | DIASTOLIC BLOOD PRESSURE: 67 MMHG | HEART RATE: 75 BPM | WEIGHT: 101 LBS | HEIGHT: 63 IN

## 2020-06-22 DIAGNOSIS — R74.8 ELEVATED LIVER ENZYMES: Primary | ICD-10-CM

## 2020-06-22 LAB
ERYTHROCYTE [DISTWIDTH] IN BLOOD BY AUTOMATED COUNT: 11.4 % (ref 11.7–15.4)
HCT VFR BLD AUTO: 36.9 % (ref 34–46.6)
HGB BLD-MCNC: 11.7 G/DL (ref 11.1–15.9)
MCH RBC QN AUTO: 30.5 PG (ref 26.6–33)
MCHC RBC AUTO-ENTMCNC: 31.7 G/DL (ref 31.5–35.7)
MCV RBC AUTO: 96 FL (ref 79–97)
PLATELET # BLD AUTO: 148 X10E3/UL (ref 150–450)
RBC # BLD AUTO: 3.84 X10E6/UL (ref 3.77–5.28)
WBC # BLD AUTO: 5 X10E3/UL (ref 3.4–10.8)

## 2020-06-22 NOTE — PROGRESS NOTES
Chief Complaint   Patient presents with    Follow-up     Visit Vitals  /67 (BP 1 Location: Left arm, BP Patient Position: Sitting)   Pulse 75   Temp 97.1 °F (36.2 °C) (Tympanic)   Resp 17   Ht 5' 3\" (1.6 m)   Wt 101 lb (45.8 kg)   SpO2 98%   BMI 17.89 kg/m²     3 most recent PHQ Screens 2/21/2020   Little interest or pleasure in doing things Not at all   Feeling down, depressed, irritable, or hopeless Not at all   Total Score PHQ 2 0     Abuse Screening Questionnaire 2/21/2020   Do you ever feel afraid of your partner? N   Are you in a relationship with someone who physically or mentally threatens you? N   Is it safe for you to go home? Y     Learning Assessment 2/21/2020   PRIMARY LEARNER Patient   HIGHEST LEVEL OF EDUCATION - PRIMARY LEARNER  -   BARRIERS PRIMARY LEARNER NONE   CO-LEARNER CAREGIVER No   PRIMARY LANGUAGE ENGLISH   LEARNER PREFERENCE PRIMARY DEMONSTRATION   ANSWERED BY patient   RELATIONSHIP SELF     1. Have you been to the ER, urgent care clinic since your last visit? Hospitalized since your last visit? Yes. [facility]. Ear Infection. 2. Have you seen or consulted any other health care providers outside of the [address] [place] [address] since your last visit? Include any pap smears or colon screening.  No

## 2020-06-22 NOTE — LETTER
NOTIFICATION RETURN TO WORK / SCHOOL 
 
6/22/2020 9:28 AM 
 
Ms. Lexus Reno 
[address] 14 
[phone] To Whom It May Concern: 
 
Lexus Reno is currently under the care of [address] She will return to work/school on: 6.23.2020 If there are questions or concerns please have the patient contact our office. Sincerely, ROWENA Horta

## 2020-06-22 NOTE — PROGRESS NOTES
3340 \[facility] [place] [facility]\"", MD, Lorrayne Closs, Chipper Battle, MD Malen Linker, PA-C Kiki Deforest, [facility]     Claudia MILLAN Gilbert, [facility]   Joseline Ryan [facility]       Mark Borrego [facility] [address]    at [address], [address] Angelita Lee  22.    [phone]    FAX: [address] [place] [address], [address]    [phone]    FAX: [phone]       Patient Care Team:  Terrance Lomas MD as PCP - General (Internal Medicine)  Terrance Lomas MD as PCP - [facility]  Sierra Pickering MD (Endocrinology)  Kumar Avelar MD (Nephrology)  Amber Martin MD (Female Pelvic Medicine and Reconstructive Surgery)  Saida You MD (Optometry)      Problem List  Date Reviewed: 4/17/2020          Codes Class Noted    Elevated liver enzymes ICD-10-CM: R74.8  ICD-9-CM: 790.5  12/19/2019        Insulin dependent diabetes mellitus ICD-10-CM: [id number]  ICD-9-CM: 250.00, V58.67  6/20/2016        Pancreatic atrophy ICD-10-CM: K86.89  ICD-9-CM: 577.8  6/20/2016            Kathy Son returns to the [place] [address] for management of elevated liver enzymes. The active problem list, all pertinent past medical history, medications, radiologic findings and laboratory findings related to the liver disorder were reviewed with the patient. The patient is a 62 y.o. Black female who was found to have elevated liver transaminases and alkaline phosphatase in 2018. Serologic evaluation for markers of chronic liver disease were negative. Ultrasound of the liver was performed in 2/2019. The results of the imaging demonstrated a normal appearing liver.   MRI was ordered at the time of last office visit, this has not yet been scheduled due to patient's significant claustrophobia. The patient underwent a liver biopsy in 2/2020. This demonstrates portal fibrosis that is far greater than the degree of portal inflammation suggesting PSC. The patient has no symptoms which can be attributed to the liver disorder. The patient is not currently experiencing the following symptoms of liver disease:  fatigue, pain in the right side over the liver, itching. The patient completes all daily activities without any functional limitations. She feels entirely well at this time. ASSESSMENT AND PLAN:  PSC  Intermittent elevation in liver transaminases and persistent elevation in alkaline phosphatase. 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 The AST is normal.  ALT is elevated. ALP is elevated. Total bilirubin is normal.  Serum albumin is depressed. INR is normal.  The platelet count is normal.      Serologic testing for causes of chronic liver disease were negative     Assessment of liver fibrosis with Fibroscan was performed In 1/2020. The result was 9.8 kPa which correlates with Stage 3 bridging fibrosis. A liver biopsy from 2/2020 suggests PSC. There is mild portal inflammation and portal fibrosis with extension outside the portal tract that is greater than would be expected given the degree of inflammation. This is suggestive of PSC. This practice has recommended that we do an MRI/MRCP. She says she cannot tolerate the MRI. She has tried to do an MRI previously for another medical issue and could not stay in the scanner. We are currently part of a international clinical trial to develop a medication for PSC. This study requires MRIs performed every 6 months to monitor response to medication. As she cannot tolerate the MRI, this is not an option for her at present.      Since there is currently no FDA approved treatment for [facility] we can simply follow her at regular intervals. I have explained to her the natural history and progression of PSC and signs and symptoms to watch for including increased fatigue, abdominal pain, fever, and itching. We will continue to monitor her on a regular basis and offer additional treatments/trial options as available. Screening for Hepatocellular Carcinoma  HCC screening is not necessary as the patient has no evidence of cirrhosis. I have ordered a baseline  today and this can be followed in the future as well. Treatment of other medical problems in patients with chronic liver disease  There are no contraindications for the patient to take most medications that are necessary for treatment of other medical issues. Counseling for alcohol in patients with chronic liver disease  The patient was counseled regarding alcohol consumption and the effect of alcohol on chronic liver disease. The patient does not consume any significant amount of alcohol. Vaccinations   Vaccination for viral hepatitis B is not needed. The patient has serologic evidence of prior exposure or vaccination with immunity. The need for vaccination against viral hepatitis A will be assessed with serologic and instituted as appropriate. Routine vaccinations against other bacterial and viral agents can be performed as indicated. Annual flu vaccination should be administered if indicated. ALLERGIES  Allergies   Allergen Reactions    Contrast Agent [Iodine] Itching    Hydrocodone Rash    Percocet [Oxycodone-Acetaminophen] Rash     Pt states she is not allergic to Tylenol.  Codeine Nausea and Vomiting    Metformin Diarrhea       MEDICATIONS  Current Outpatient Medications   Medication Sig    Insulin Needles, Disposable, (Danae Pen Needle) 32 gauge x 5/32\" ndle Use with Basaglar pen daily. DX Code: E13.9    albuterol (PROVENTIL VENTOLIN) 2.5 mg /3 mL (0.083 %) nebu 3 mL by Nebulization route every six (6) hours as needed for Wheezing.  Nebulizer Accessories kit prn    Nebulizer & Compressor machine prn    insulin glargine (BASAGLAR KWIKPEN U-100 INSULIN) 100 unit/mL (3 mL) inpn 26 Units by SubCUTAneous route Daily (before breakfast). Reduce  By 2-3 units every few days if AM Sugars persist below 100--Dose change 19--updated med list--did not send prescription to the pharmacy (Patient taking differently: 30 Units by SubCUTAneous route Daily (before breakfast). Reduce  By 2-3 units every few days if AM Sugars persist below 100--Dose change 19--updated med list--did not send prescription to the pharmacy)     No current facility-administered medications for this visit. SYSTEM REVIEW NOT RELATED TO LIVER DISEASE OR REVIEWED ABOVE:  Constitution systems: Negative for fever, chills, weight gain, weight loss. Eyes: Negative for visual changes. ENT: Negative for sore throat, painful swallowing. Respiratory: Negative for cough, hemoptysis, SOB. Cardiology: Negative for chest pain, palpitations. GI:  Negative for constipation or diarrhea. : Negative for urinary frequency, dysuria, hematuria, nocturia. Skin: Negative for rash. Hematology: Negative for easy bruising, blood clots. Musculo-skeletal: Negative for back pain, muscle pain, weakness. Neurologic: Negative for headaches, dizziness, vertigo, memory problems not related to HE. Psychology: Negative for anxiety, depression. FAMILY HISTORY:  The father has/had the following following chronic disease(s): prostate and colon cancer. The mother  of MVA. There is no family history of liver disease. SOCIAL HISTORY:  The patient has never been . The patient has no children. The patient has never used tobacco products. The patient has never consumed significant amounts of alcohol. The patient currently works full time: [employer].       PHYSICAL EXAMINATION:  Visit Vitals  /67 (BP 1 Location: Left arm, BP Patient Position: Sitting)   Pulse 75   Temp 97.1 °F (36.2 °C) (Tympanic)   Resp 17   Ht 5' 3\" (1.6 m)   Wt 101 lb (45.8 kg)   SpO2 98%   BMI 17.89 kg/m²     General: No acute distress. Eyes: Sclera anicteric. ENT: No oral lesions. Thyroid normal.  Nodes: No adenopathy. Skin: No spider angiomata. No jaundice. No palmar erythema. Respiratory: Lungs clear to auscultation. Cardiovascular: Regular heart rate. No murmurs. No JVD. Abdomen: Soft non-tender. Liver size normal to percussion/palpation. Spleen not palpable. No obvious ascites. Extremities: No edema. No muscle wasting. No gross arthritic changes. Neurologic: Alert and oriented. Cranial nerves grossly intact. No asterixis.     LABORATORY STUDIES:  Liver [facility] [place] [id number]  [address] & Units 1/31/2020 12/19/2019   WBC 3.4 - 10.8 x10E3/uL  6.2   ANC 1.4 - 7.0 x10E3/uL  4.3   HGB 11.1 - 15.9 g/dL  11.9    - 450 x10E3/uL  242   INR 0.8 - 1.2  1.0   AST 0 - 40 IU/L 37 34   ALT 0 - 32 IU/L 56 (H) 52 (H)   Alk Phos 39 - 117 IU/L 243 (H) 328 (H)   Bili, Total 0.0 - 1.2 mg/dL 0.6 0.4   Bili, Direct 0.00 - 0.40 mg/dL  0.15   Albumin 3.8 - 4.9 g/dL 4.4 4.8   BUN 6 - 24 mg/dL 28 (H) 27 (H)   BUN (iSTAT) 9 - 20 mg/dL     Creat 0.57 - 1.00 mg/dL 1.21 (H) 1.30 (H)   Creat (iSTAT) 0.6 - 1.3 mg/dL     Na 134 - 144 mmol/L 146 (H) 145 (H)   K 3.5 - 5.2 mmol/L 4.5 4.5   Cl 96 - 106 mmol/L 104 103   CO2 20 - 29 mmol/L 24 23   Glucose 65 - 99 mg/dL 171 (H) 44 (L)   Magnesium 1.6 - 2.4 mg/dL       Liver [facility] Ref Rng & Units 8/21/2019   WBC 3.4 - 10.8 x10E3/uL    ANC 1.4 - 7.0 x10E3/uL    HGB 11.1 - 15.9 g/dL     - 450 x10E3/uL    INR 0.8 - 1.2    AST 0 - 40 IU/L 58 (H)   ALT 0 - 32 IU/L 80 (H)   Alk Phos 39 - 117 IU/L 329 (H)   Bili, Total 0.0 - 1.2 mg/dL 0.5   Bili, Direct 0.00 - 0.40 mg/dL    Albumin 3.8 - 4.9 g/dL 4.6   BUN 6 - 24 mg/dL 24   BUN (iSTAT) 9 - 20 mg/dL    Creat 0.57 - 1.00 mg/dL 1.36 (H)   Creat (iSTAT) 0.6 - 1.3 mg/dL    Na 134 - 144 mmol/L 141   K 3.5 - 5.2 mmol/L 4.8   Cl 96 - 106 mmol/L 101   CO2 20 - 29 mmol/L 24   Glucose 65 - 99 mg/dL 386 (H)   Magnesium 1.6 - 2.4 mg/dL    Additional lab values drawn at today's office visit are pending at the time of documentation. SEROLOGIES:  8/2018. HBsurface antigen negative, anti-HCV negative, Ferritin 1139, FE saturation 74%, JUN negative, ANCA negative    Serologies Latest Ref Rng & Units 12/19/2019 8/21/2019   Hep B Surface Ag Negative  Negative   Hep B Core Ab, Total Negative Negative    Hep B Surface AB QL  Reactive    Ferritin 15 - 150 ng/mL 445 (H) 384 (H)   Iron % Saturation 15 - 55 % 48 31   JUN, IFA  Negative    M2 Ab 0.0 - 20.0 Units  <20.0   Ceruloplasmin 19.0 - 39.0 mg/dL  32.5   Alpha-1 antitrypsin level 90 - 200 mg/dL  129     LIVER HISTOLOGY:  1/2020. FibroScan performed at [address]. EkPa was 9.8. Suggested fibrosis level is F2-3. CAP score is 147.  2/2020. Slides reviewed by MLS. Non-specific mild portal inflammation, with No interface hepatitis, with No PMN. No lobular inflammation. No steatosis. Anish stage 3 fibrosis. Metavir stage 2 fibrosis. Knodell score (0011). The degree of portal fibrosis is greater than would be expected based upon the degree of inflammation. This suggests PSC.    ENDOSCOPIC PROCEDURES:  Not available or performed    RADIOLOGY:  2/2019. Ultrasound of liver. Normal appearing liver. No liver mass lesions. OTHER TESTING:  Not available or performed    FOLLOW-UP:  All of the issues listed above in the Assessment and Plan were discussed with the patient. All questions were answered. The patient expressed a clear understanding of the above. [facility] 87 in 4 months for monitoring.       Nola Drake PA-C  Liver [place] [facility] 59, [address] [facility] 22.  [phone]  [address] [place] [address]

## 2020-06-23 LAB
ALBUMIN SERPL-MCNC: 4.9 G/DL (ref 3.8–4.9)
ALP SERPL-CCNC: 286 IU/L (ref 39–117)
ALT SERPL-CCNC: 40 IU/L (ref 0–32)
AST SERPL-CCNC: 40 IU/L (ref 0–40)
BILIRUB DIRECT SERPL-MCNC: 0.23 MG/DL (ref 0–0.4)
BILIRUB SERPL-MCNC: 0.8 MG/DL (ref 0–1.2)
BUN SERPL-MCNC: 24 MG/DL (ref 6–24)
BUN/CREAT SERPL: 16 (ref 9–23)
CALCIUM SERPL-MCNC: 9.9 MG/DL (ref 8.7–10.2)
CANCER AG19-9 SERPL-ACNC: 14 U/ML (ref 0–35)
CHLORIDE SERPL-SCNC: 100 MMOL/L (ref 96–106)
CO2 SERPL-SCNC: 20 MMOL/L (ref 20–29)
CREAT SERPL-MCNC: 1.49 MG/DL (ref 0.57–1)
GLUCOSE SERPL-MCNC: 387 MG/DL (ref 65–99)
POTASSIUM SERPL-SCNC: 4.9 MMOL/L (ref 3.5–5.2)
PROT SERPL-MCNC: 7.4 G/DL (ref 6–8.5)
SODIUM SERPL-SCNC: 139 MMOL/L (ref 134–144)

## 2020-07-06 NOTE — PROGRESS NOTES
Numerous calls placed to patient re elev in glucose to increase home monitoring and follow-up with PCP. Liver function values are stable. Follow-up in 3 months as scheduled.

## 2020-07-14 NOTE — TELEPHONE ENCOUNTER
Patient stated that she is returning your call and to call her around 3pm because that is when her next break is. No complaints No complaints No complaints

## 2020-09-08 ENCOUNTER — TELEPHONE (OUTPATIENT)
Dept: ENDOCRINOLOGY | Age: 55
End: 2020-09-08

## 2020-09-08 NOTE — TELEPHONE ENCOUNTER
See message below, can we get in touch with patient to obtain details of her step mother who would like an appt. We can add her to a cancellation list for new patient,     Thanks !     Jean Mcneal

## 2020-09-08 NOTE — TELEPHONE ENCOUNTER
----- Message from Yanique Isabell sent at 9/4/2020  2:35 PM EDT -----  Regarding: FW: Non-Urgent Medical Question  Contact: [phone]    ----- Message -----  From: Rey Merrill  Sent: 9/4/2020  12:15 PM EDT  To: [facility] Nurse Pool  Subject: Non-Urgent Medical Question                      Hi doc i have been trying to schedule a appointment for my step mother Chilango Fonseca and i cant get thru so whatever you have open can you just schedule her and i will let her know thanks

## 2020-10-08 ENCOUNTER — VIRTUAL VISIT (OUTPATIENT)
Dept: ENDOCRINOLOGY | Age: 55
End: 2020-10-08
Payer: COMMERCIAL

## 2020-10-08 DIAGNOSIS — E13.9 LADA (LATENT AUTOIMMUNE DIABETES IN ADULTS), MANAGED AS TYPE 1 (HCC): Primary | ICD-10-CM

## 2020-10-08 PROCEDURE — 3051F HG A1C>EQUAL 7.0%<8.0%: CPT | Performed by: INTERNAL MEDICINE

## 2020-10-08 PROCEDURE — 99214 OFFICE O/P EST MOD 30 MIN: CPT | Performed by: INTERNAL MEDICINE

## 2020-10-08 NOTE — PROGRESS NOTES
**DUE TO PANDEMIC AND HEALTH CONCERNS IN THE COMMUNITY, THIS PATIENT WAS EITHER ILL OR FOUND TO BE HIGH RISK FOR IN-PERSON EVALUATION WITHIN THE CLINIC. THE FOLLOWING IS A VIRTUAL TELEMEDICINE VIDEO ENCOUNTER VIA [employer], TO WHICH THE PATIENT AGREED. THE PURPOSE IS TO LIMIT INTERRUPTIONS IN HEALTHCARE AND TO PROVIDE FOR ONGOING URGENT NEEDS UNDER THE CURRENT CONDITIONS. 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 female with a PMHx as noted below who is presenting to our endocrine clinic for the f/u evaluation of recently diagnosed diabetes. History of Type 1 Diabetes:  Patient reports that they were diagnosed in the ED with A1c of 15% June 2016     Pancreatic atrophy on CT   Family history is positive for diabetes in mother and maternal grandmother, both on insulin but type of diabetes unclear  Was started on novolog 70/30 insulin, did not tolerate any oral medications (neg antibodies, normal c-peptide)    INTERVAL HISTORY:  Last seen in April, had been only taking basal insulin, and I encouraged her to get back on the glipizide since she had not desired short acting insulin with meals which I had recommended. Reports outside A1c of 8% with nephrology. Current home regimen includes:   Basaglar: Continue 30 units each morning  Restart Glipizide 2.5mg with breakfast and dinner,   To increase to 5mg with B&D if sugars remain high,  OFF METFORMIN (did not tolerate)  OFF Trulicity (did not tolerate due to nausea)  OFF Humalog (had not taken regularly, c-peptide was normal)    Home Blood glucose review:    The tape for the freestyle Deatra Dania is irritating her skin, back to fingersticks,  Reporting 100's, this AM was 89  Reporting low 100's  Review of most recent diabetes-related labs:  Lab Results   Component Value Date    HBA1C 7.7 (H) 01/31/2020    HBA1C 7.6 (H) 08/21/2019    HBA1C 14.0 (H) 02/20/2019 [id number] 8.3 05/31/2019    AXR4MHQU 14.2 02/18/2019    PVP6IAIL 10.6 11/01/2018    CHOL 191 01/31/2020    LDLC 80 01/31/2020    GFRAA 45 (L) 06/22/2020    GFRNA 39 (L) 06/22/2020    CREATEXT Cr 1.34, GFR NonAA 45/ AA 51 06/11/2019    MCACR 29 01/31/2020    TSH 1.690 01/31/2020    630889 <1.0 03/06/2018    GADLT <5.0 03/06/2018    CPEPLT 1.3 01/31/2020     Lab Key:  [id number] = IA-2 pancreatic islet cell autoantibody  GADLT = JORDANA-65 autoantibody   MCACR = Urine Microalbumin  INSUL = Insulin level  CPEPL = C-peptide level    PAST MEDICAL/SURGICAL HISTORY:   Past Medical History:   Diagnosis Date    Asthma     Diabetes ([facility] [address]     Elevated transaminase level 6/29/2016    Insulin dependent diabetes mellitus 6/20/2016    Pancreatic atrophy 6/20/2016     Past Surgical History:   Procedure Laterality Date    HX HEENT         ALLERGIES:   Allergies   Allergen Reactions    Contrast Agent [Iodine] Itching    Hydrocodone Rash    Percocet [Oxycodone-Acetaminophen] Rash     Pt states she is not allergic to Tylenol.  Codeine Nausea and Vomiting    Metformin Diarrhea       MEDICATIONS ON ADMISSION:     Current Outpatient Medications:     Insulin Needles, Disposable, (Danae Pen Needle) 32 gauge x 5/32\" ndle, Use with Basaglar pen daily. DX Code: E13.9, Disp: 100 Pen Needle, Rfl: 3    albuterol (PROVENTIL VENTOLIN) 2.5 mg /3 mL (0.083 %) nebu, 3 mL by Nebulization route every six (6) hours as needed for Wheezing., Disp: 30 Nebule, Rfl: 0    Nebulizer Accessories kit, prn, Disp: 1 Kit, Rfl: 0    Nebulizer & Compressor machine, prn, Disp: 1 Each, Rfl: 0    insulin glargine (BASAGLAR KWIKPEN U-100 INSULIN) 100 unit/mL (3 mL) inpn, 26 Units by SubCUTAneous route Daily (before breakfast). Reduce  By 2-3 units every few days if AM Sugars persist below 100--Dose change 12/27/19--updated med list--did not send prescription to the pharmacy (Patient taking differently: 25 Units by SubCUTAneous route Daily (before breakfast). ), Disp: 10 Pen, Rfl: 3    SOCIAL HISTORY:   Social History     Socioeconomic History    Marital status: SINGLE     Spouse name: Not on file    Number of children: Not on file    Years of education: Not on file    Highest education level: Not on file   Occupational History    Not on file   Social Needs    Financial resource strain: Not on file    Food insecurity     Worry: Not on file     Inability: Not on file    Transportation needs     Medical: Not on file     Non-medical: Not on file   Tobacco Use    Smoking status: Never Smoker    Smokeless tobacco: Never Used   Substance and Sexual Activity    Alcohol use: No     Frequency: Never     Comment: occasionally    Drug use: No    Sexual activity: Not Currently   Lifestyle    Physical activity     Days per week: Not on file     Minutes per session: Not on file    Stress: Not on file   Relationships    Social connections     Talks on phone: Not on file     Gets together: Not on file     Attends Confucianism service: Not on file     Active member of club or organization: Not on file     Attends meetings of clubs or organizations: Not on file     Relationship status: Not on file    Intimate partner violence     Fear of current or ex partner: Not on file     Emotionally abused: Not on file     Physically abused: Not on file     Forced sexual activity: Not on file   Other Topics Concern    Not on file   Social History Narrative    Not on file       FAMILY HISTORY:  Family History   Problem Relation Age of Onset    Cancer Father         prostate and colon    Diabetes Mother     Diabetes Maternal Grandmother     Cancer Maternal Aunt         breast       REVIEW OF SYSTEMS: Complete ROS assessed and noted for that which is described above, all else are negative.   Eyes: normal  ENT: normal  CVS: normal  Resp: normal  GI: normal  : normal  GYN: normal  Endocrine: normal  Integument: normal  Musculoskeletal: normal  Neuro: normal  Psych: normal    PHYSICAL EXAMINATION:    VITAL SIGNS:  Telemedicine Visit    GENERAL: Anna Rios well nourished  EYES: EOMI, non-icteric, no proptosis  Ear/Nose/Throat: NCAT, no visible inflammation or masses  CARDIOVASCULAR: no cyanosis, no visible JVD  RESPIRATORY: comfortable respirations observed, no cyanosis  MUSCULOSKELETAL: Normal ROM of upper extremities observed  SKIN: No edema, rash, or other significant changes observed  NEUROLOGIC:  AAOx3  PSYCHIATRIC: Normal affect, Normal insight and judgement    REVIEW OF LABORATORY AND RADIOLOGY DATA:   Labs and documentation have been reviewed as described above. ASSESSMENT AND PLAN:   Mirella Cuevas is a 60 y.o. female with a PMHx as noted below who is presenting to our endocrine clinic for the f/u evaluation of recent onset diabetes. 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 To be on the safe side, I have asked her to send me a 1 week sugar log through [employer] to review. I would like to assure her diabetes is controlled to avoid any further progression of her CKD, and avoid other potential complications. Plan as follows:    Plan:  Medications:  Basaglar: 30 units each morning  Restart Glipizide 2.5mg with breakfast and dinner,   To increase to 5mg with B&D if sugars rise during the daytime,  She was advised to message me through [employer] with an update in 1 week,    BP: telemedicine visit  Cholesterol: reviewed, stable this year    Labs: 2021 DM prelabs ordered for next visit    RTC: I would like to see her back in 4 months, (Feb 19 at 10:50 AM)    20 minutes spent toward telemedicine visit today of which >50% of this time was spent in counseling and coordination of care. Danya Yeh.  [address] [facility] Endocrinology  [address] [place] [address]

## 2020-11-11 ENCOUNTER — OFFICE VISIT (OUTPATIENT)
Dept: HEMATOLOGY | Age: 55
End: 2020-11-11
Payer: COMMERCIAL

## 2020-11-11 VITALS
WEIGHT: 101 LBS | DIASTOLIC BLOOD PRESSURE: 64 MMHG | RESPIRATION RATE: 16 BRPM | HEIGHT: 63 IN | TEMPERATURE: 99 F | HEART RATE: 77 BPM | OXYGEN SATURATION: 100 % | SYSTOLIC BLOOD PRESSURE: 118 MMHG | BODY MASS INDEX: 17.89 KG/M2

## 2020-11-11 DIAGNOSIS — R74.8 ELEVATED LIVER ENZYMES: Primary | ICD-10-CM

## 2020-11-11 PROCEDURE — 99214 OFFICE O/P EST MOD 30 MIN: CPT | Performed by: PHYSICIAN ASSISTANT

## 2020-11-11 NOTE — PROGRESS NOTES
[address] [place] [address], Hank OJEDA Gerard Market, MD Samual Malay, PA-C Tor Guiles, [facility]     Claudia MILLAN Gilbert, [facility]   MINO Lopez-ITZEL Allred, [facility]       [facility] 136    at [address] [place], [address] Angelita Lee  22.    [phone]    FAX: [address] [place] [address], [address]    [phone]    FAX: [phone]       Patient Care Team:  Argentina Richmond MD as PCP - General (Internal Medicine)  Argentina Richmond MD as PCP - [facility]  Apolonia Islas MD (Endocrinology)  Octavio Lucas MD (Nephrology)  Luis Johnson MD (Female Pelvic Medicine and Reconstructive Surgery)  Zee Bran MD (Optometry)      Problem List  Date Reviewed: 10/8/2020          Codes Class Noted    Elevated liver enzymes ICD-10-CM: R74.8  ICD-9-CM: 790.5  12/19/2019        Insulin dependent diabetes mellitus ICD-10-CM: VZK2491  ICD-9-CM: 250.00, V58.67  6/20/2016        Pancreatic atrophy ICD-10-CM: K86.89  ICD-9-CM: 577.8  6/20/2016            Ping Daniels returns to the [address] for management of elevated liver enzymes. The active problem list, all pertinent past medical history, medications, radiologic findings and laboratory findings related to the liver disorder were reviewed with the patient. The patient is a 60 y.o. Black female who was found to have elevated liver transaminases and alkaline phosphatase in 2018. Serologic evaluation for markers of chronic liver disease were negative. Ultrasound of the liver was performed in 2/2019. The results of the imaging demonstrated a normal appearing liver.   MRI was ordered at the time of last office visit, this has not yet been scheduled due to patient's significant claustrophobia. She is unable to tolerate MRI scanner. The patient underwent a liver biopsy in 2/2020. This demonstrates portal fibrosis that is far greater than the degree of portal inflammation suggesting PSC. The patient has no symptoms which can be attributed to the liver disorder. The patient is not currently experiencing the following symptoms of liver disease:  fatigue, pain in the right side over the liver, itching. The patient completes all daily activities without any functional limitations. She feels entirely well at this time. She has been working with Dr Ronny Jay on improved control of her DM and has had recent evaluation with him. ASSESSMENT AND PLAN:  PSC  Intermittent elevation in liver transaminases and persistent elevation in alkaline phosphatase. 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 The AST is normal.  ALT is elevated. ALP is elevated. Total bilirubin is normal.  Serum albumin is depressed. INR is normal.  The platelet count is normal.      Serologic testing for causes of chronic liver disease were negative. Assessment of liver fibrosis with Fibroscan was performed In 1/2020. The result was 9.8 kPa which correlates with Stage 3 bridging fibrosis. A liver biopsy from 2/2020 suggests PSC. There is mild portal inflammation and portal fibrosis with extension outside the portal tract that is greater than would be expected given the degree of inflammation. This is suggestive of PSC. This practice has recommended that we do an MRI/MRCP. She says she cannot tolerate the MRI. She has tried to do an MRI previously for another medical issue and could not stay in the scanner. We are currently part of a international clinical trial to develop a medication for PSC. This study requires MRIs performed every 6 months to monitor response to medication.   As she cannot tolerate the MRI, this is not an option for her at present. Since there is currently no FDA approved treatment for [facility] we can simply follow her at regular intervals. I have suggested return office visit in 6 months and have instructed her when to call with any additional symptoms in between visits including increased fatigue, abdominal pain, fever, and itching. We will continue to monitor her on a regular basis and offer additional treatments/trial options as available. Screening for Hepatocellular Carcinoma  HCC screening is not necessary as the patient has no evidence of cirrhosis. Baseline  was not elevated. Treatment of other medical problems in patients with chronic liver disease  There are no contraindications for the patient to take most medications that are necessary for treatment of other medical issues. Counseling for alcohol in patients with chronic liver disease  The patient was counseled regarding alcohol consumption and the effect of alcohol on chronic liver disease. The patient does not consume any significant amount of alcohol. Vaccinations   Vaccination for viral hepatitis B is not needed. The patient has serologic evidence of prior exposure or vaccination with immunity. The need for vaccination against viral hepatitis A will be assessed with serologic and instituted as appropriate. Routine vaccinations against other bacterial and viral agents can be performed as indicated. Annual flu vaccination should be administered if indicated. ALLERGIES  Allergies   Allergen Reactions    Contrast Agent [Iodine] Itching    Hydrocodone Rash    Percocet [Oxycodone-Acetaminophen] Rash     Pt states she is not allergic to Tylenol.  Codeine Nausea and Vomiting    Metformin Diarrhea       MEDICATIONS  Current Outpatient Medications   Medication Sig    Insulin Needles, Disposable, (Danae Pen Needle) 32 gauge x 5/32\" ndle Use with Basaglar pen daily.  DX Code: E13.9    albuterol (PROVENTIL VENTOLIN) 2.5 mg /3 mL (0.083 %) nebu 3 mL by Nebulization route every six (6) hours as needed for Wheezing.  Nebulizer & Compressor machine prn    insulin glargine (BASAGLAR KWIKPEN U-100 INSULIN) 100 unit/mL (3 mL) inpn 26 Units by SubCUTAneous route Daily (before breakfast). Reduce  By 2-3 units every few days if AM Sugars persist below 100--Dose change 19--updated med list--did not send prescription to the pharmacy (Patient taking differently: 20 Units by SubCUTAneous route Daily (before breakfast). )    Nebulizer Accessories kit prn     No current facility-administered medications for this visit. SYSTEM REVIEW NOT RELATED TO LIVER DISEASE OR REVIEWED ABOVE:  Constitution systems: Negative for fever, chills, weight gain, weight loss. Eyes: Negative for visual changes. ENT: Negative for sore throat, painful swallowing. Respiratory: Negative for cough, hemoptysis, SOB. Cardiology: Negative for chest pain, palpitations. GI:  Negative for constipation or diarrhea. : Negative for urinary frequency, dysuria, hematuria, nocturia. Skin: Negative for rash. Hematology: Negative for easy bruising, blood clots. Musculo-skeletal: Negative for back pain, muscle pain, weakness. Neurologic: Negative for headaches, dizziness, vertigo, memory problems not related to HE. Psychology: Negative for anxiety, depression. FAMILY HISTORY:  The father has/had the following following chronic disease(s): prostate and colon cancer. The mother  of MVA. There is no family history of liver disease. SOCIAL HISTORY:  The patient has never been . The patient has no children. The patient has never used tobacco products. The patient has never consumed significant amounts of alcohol. 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       PHYSICAL EXAMINATION:  Visit Vitals  /64 (BP 1 Location: Right arm, BP Patient Position: Sitting)   Pulse 77 Temp 99 °F (37.2 °C) (Temporal)   Resp 16   Ht 5' 3\" (1.6 m)   Wt 101 lb (45.8 kg)   SpO2 100%   BMI 17.89 kg/m²     General: No acute distress. Eyes: Sclera anicteric. ENT: No oral lesions. Thyroid normal.  Nodes: No adenopathy. Skin: No spider angiomata. No jaundice. No palmar erythema. Respiratory: Lungs clear to auscultation. Cardiovascular: Regular heart rate. No murmurs. No JVD. Abdomen: Soft non-tender. Liver size normal to percussion/palpation. Spleen not palpable. No obvious ascites. Extremities: No edema. No muscle wasting. No gross arthritic changes. Neurologic: Alert and oriented. Cranial nerves grossly intact. No asterixis.     LABORATORY STUDIES:  [address] & Units 6/22/2020 1/31/2020   WBC 3.4 - 10.8 x10E3/uL 5.0    ANC 1.4 - 7.0 x10E3/uL     HGB 11.1 - 15.9 g/dL 11.7     - 450 x10E3/uL 148 (L)    INR 0.8 - 1.2     AST 0 - 40 IU/L 40 37   ALT 0 - 32 IU/L 40 (H) 56 (H)   Alk Phos 39 - 117 IU/L 286 (H) 243 (H)   Bili, Total 0.0 - 1.2 mg/dL 0.8 0.6   Bili, Direct 0.00 - 0.40 mg/dL 0.23    Albumin 3.8 - 4.9 g/dL 4.9 4.4   BUN 6 - 24 mg/dL 24 28 (H)   BUN (iSTAT) 9 - 20 mg/dL     Creat 0.57 - 1.00 mg/dL 1.49 (H) 1.21 (H)   Creat (iSTAT) 0.6 - 1.3 mg/dL     Na 134 - 144 mmol/L 139 146 (H)   K 3.5 - 5.2 mmol/L 4.9 4.5   Cl 96 - 106 mmol/L 100 104   CO2 20 - 29 mmol/L 20 24   Glucose 65 - 99 mg/dL 387 (H) 171 (H)   Magnesium 1.6 - 2.4 mg/dL       Liver [place] [address] Ref Rng & Units 12/19/2019   WBC 3.4 - 10.8 x10E3/uL 6.2   ANC 1.4 - 7.0 x10E3/uL 4.3   HGB 11.1 - 15.9 g/dL 11.9    - 450 x10E3/uL 242   INR 0.8 - 1.2 1.0   AST 0 - 40 IU/L 34   ALT 0 - 32 IU/L 52 (H)   Alk Phos 39 - 117 IU/L 328 (H)   Bili, Total 0.0 - 1.2 mg/dL 0.4   Bili, Direct 0.00 - 0.40 mg/dL 0.15   Albumin 3.8 - 4.9 g/dL 4.8   BUN 6 - 24 mg/dL 27 (H)   BUN (iSTAT) 9 - 20 mg/dL    Creat 0.57 - 1.00 mg/dL 1.30 (H)   Creat (iSTAT) 0.6 - 1.3 mg/dL    Na 134 - 144 mmol/L 145 (H)   K 3.5 - 5.2 mmol/L 4.5   Cl 96 - 106 mmol/L 103   CO2 20 - 29 mmol/L 23   Glucose 65 - 99 mg/dL 44 (L)   Magnesium 1.6 - 2.4 mg/dL    Additional lab values drawn at today's office visit are pending at the time of documentation. SEROLOGIES:  8/2018. HBsurface antigen negative, anti-HCV negative, Ferritin 1139, FE saturation 74%, JUN negative, ANCA negative    Serologies Latest Ref Rng & Units 12/19/2019 8/21/2019   Hep B Surface Ag Negative  Negative   Hep B Core Ab, Total Negative Negative    Hep B Surface AB QL  Reactive    Ferritin 15 - 150 ng/mL 445 (H) 384 (H)   Iron % Saturation 15 - 55 % 48 31   JUN, IFA  Negative    M2 Ab 0.0 - 20.0 Units  <20.0   Ceruloplasmin 19.0 - 39.0 mg/dL  32.5   Alpha-1 antitrypsin level 90 - 200 mg/dL  129     LIVER HISTOLOGY:  1/2020. [facility]. EkPa was 9.8. Suggested fibrosis level is F2-3. CAP score is 147.  2/2020. Slides reviewed by MLS. Non-specific mild portal inflammation, with No interface hepatitis, with No PMN. No lobular inflammation. No steatosis. Anish stage 3 fibrosis. Metavir stage 2 fibrosis. Knodell score (0011). The degree of portal fibrosis is greater than would be expected based upon the degree of inflammation. This suggests PSC.    ENDOSCOPIC PROCEDURES:  Not available or performed    RADIOLOGY:  2/2019. Ultrasound of liver. Normal appearing liver. No liver mass lesions. OTHER TESTING:  Not available or performed    FOLLOW-UP:  All of the issues listed above in the Assessment and Plan were discussed with the patient. All questions were answered. The patient expressed a clear understanding of the above. [facility] 87 in 6 months for monitoring.       Carrillo Velásquez PA-C  Liver [place] [facility] 59, [facility] 22.  [phone]  [address] [facility]

## 2020-11-11 NOTE — PROGRESS NOTES
Identified pt with two pt identifiers(name and ). Reviewed record in preparation for visit and have obtained necessary documentation. Chief Complaint   Patient presents with    Elevated Liver Enzymes     f/u      Vitals:    20 1419   BP: 118/64   Pulse: 77   Resp: 16   Temp: 99 °F (37.2 °C)   TempSrc: Temporal   SpO2: 100%   Weight: 101 lb (45.8 kg)   Height: 5' 3\" (1.6 m)   PainSc:   0 - No pain       Health Maintenance Review: Patient reminded of \"due or due soon\" health maintenance. I have asked the patient to contact his/her primary care provider (PCP) for follow-up on his/her health maintenance. Coordination of Care Questionnaire:  :   1) Have you been to an emergency room, urgent care, or hospitalized since your last visit? If yes, where when, and reason for visit? no       2. Have seen or consulted any other health care provider since your last visit? If yes, where when, and reason for visit? NO      Patient is accompanied by self I have received verbal consent from Nirali Hensley to discuss any/all medical information while they are present in the room.

## 2020-11-11 NOTE — LETTER
NOTIFICATION RETURN TO WORK / SCHOOL 
 
11/11/2020 2:58 PM 
 
Ms. Kelvin Ascencio 
[address] [facility] [address] [address] To Whom It May Concern: 
 
Kelvin Ascencio is currently under the care of [address]. Ms. Rock Mix was seen today (November 11, 2020). If there are questions or concerns please have the patient contact our office. Sincerely, ROWENA Chavez

## 2020-11-12 LAB
ALBUMIN SERPL-MCNC: 4.3 G/DL (ref 3.8–4.9)
ALP SERPL-CCNC: 237 IU/L (ref 39–117)
ALT SERPL-CCNC: 43 IU/L (ref 0–32)
AST SERPL-CCNC: 53 IU/L (ref 0–40)
BILIRUB DIRECT SERPL-MCNC: 0.13 MG/DL (ref 0–0.4)
BILIRUB SERPL-MCNC: 0.3 MG/DL (ref 0–1.2)
BUN SERPL-MCNC: 26 MG/DL (ref 6–24)
BUN/CREAT SERPL: 16 (ref 9–23)
CALCIUM SERPL-MCNC: 9.6 MG/DL (ref 8.7–10.2)
CHLORIDE SERPL-SCNC: 106 MMOL/L (ref 96–106)
CO2 SERPL-SCNC: 27 MMOL/L (ref 20–29)
CREAT SERPL-MCNC: 1.58 MG/DL (ref 0.57–1)
ERYTHROCYTE [DISTWIDTH] IN BLOOD BY AUTOMATED COUNT: 11.5 % (ref 11.7–15.4)
GLUCOSE SERPL-MCNC: 175 MG/DL (ref 65–99)
HCT VFR BLD AUTO: 30.6 % (ref 34–46.6)
HGB BLD-MCNC: 10.1 G/DL (ref 11.1–15.9)
MCH RBC QN AUTO: 31.5 PG (ref 26.6–33)
MCHC RBC AUTO-ENTMCNC: 33 G/DL (ref 31.5–35.7)
MCV RBC AUTO: 95 FL (ref 79–97)
PLATELET # BLD AUTO: 179 X10E3/UL (ref 150–450)
POTASSIUM SERPL-SCNC: 4.9 MMOL/L (ref 3.5–5.2)
PROT SERPL-MCNC: 6.4 G/DL (ref 6–8.5)
RBC # BLD AUTO: 3.21 X10E6/UL (ref 3.77–5.28)
SODIUM SERPL-SCNC: 144 MMOL/L (ref 134–144)
WBC # BLD AUTO: 5.7 X10E3/UL (ref 3.4–10.8)

## 2021-02-17 NOTE — PROGRESS NOTES
HISTORY OF PRESENT ILLNESS  Saintclair Kelly is a 60 y.o. female. HPI     Pt was last here 2/19/20. Pt is here for routine care. This is an established visit completed with telemedicine was completed with video assist  the patient acknowledges and agrees to this method of visitation doxyme      BP is controlled, no home readings   BP at [facility] 118/64     Uncontrolled type 1 diabetic  BS 80-100s, occasionally has 200  Labile glucose  Pt takes basaglar 30U, increased since lov per james  Now on glipizide 2.5 mg BID per james   No  Longer using humalog   Pt is no longer on metformin --stopped d/t SE    Pt follows with Dr Heladio Nichols (endo) for type I diabetes  Reviewed notes 2/19/21: Saintclair Kelly is a 60 y.o. female with a PMHx as noted below who is presenting to our endocrine clinic for the f/u evaluation of recent onset diabetes. 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 We noted that although she is at age 60, she is also diabetic and in the past when she had a recurring urine infection her diabetes was very much uncontrolled and required high doses of insulin through the day. She has a potential to get very sick as a diabetic, insulin dependent, if she acquires a covid infection.    Labs: 2021 DM prelabs pre ordered, she still plans on obtaining soon, will spend additional time reviewing.      Wt  Is 101 lbs per pt - down 6 lbs x lov    This is in the underweight range     Reminded pt to complete labs, she will go this week      Pt follows with Dr Perri Campos (hep) for elevated liver tests  Reviewed notes 11/11/20: PSC  Intermittent elevation in liver transaminases and persistent elevation in alkaline phosphatase. 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 The AST is normal.  ALT is elevated. ALP is elevated. Total bilirubin is normal.  Serum albumin is depressed. INR is normal.  The platelet count is normal.    Serologic testing for causes of chronic liver disease were negative. Assessment of liver fibrosis with Fibroscan was performed In 1/2020. The result was 9.8 kPa which correlates with Stage 3 bridging fibrosis. A liver biopsy from 2/2020 suggests PSC. There is mild portal inflammation and portal fibrosis with extension outside the portal tract that is greater than would be expected given the degree of inflammation. This is suggestive of PSC. This practice has recommended that we do an MRI/MRCP. She says she cannot tolerate the MRI. She has tried to do an MRI previously for another medical issue and could not stay in the scanner. We are currently part of a international clinical trial to develop a medication for PSC. This study requires MRIs performed every 6 months to monitor response to medication. As she cannot tolerate the MRI, this is not an option for her at present. Since there is currently no FDA approved treatment for [facility] [place] we can simply follow her at regular intervals. I have suggested return office visit in 6 months and have instructed her when to call with any additional symptoms in between visits including increased fatigue, abdominal pain, fever, and itching. We will continue to monitor her on a regular basis and offer additional treatments/trial options as available. Screening for Hepatocellular Carcinoma  HCC screening is not necessary as the patient has no evidence of cirrhosis. Baseline  was not elevated. Treatment of other medical problems in patients with chronic liver disease  There are no contraindications for the patient to take most medications that are necessary for treatment of other medical issues.   Counseling for alcohol in patients with chronic liver disease  The patient was counseled regarding alcohol consumption and the effect of alcohol on chronic liver disease. The patient does not consume any significant amount of alcohol. Vaccinations   Vaccination for viral hepatitis B is not needed. The patient has serologic evidence of prior exposure or vaccination with immunity. The need for vaccination against viral hepatitis A will be assessed with serologic and instituted as appropriate. Routine vaccinations against other bacterial and viral agents can be performed as indicated. Annual flu vaccination should be administered if indicated. No tx, will monitor liver tests  Has f/u 5/21     Pt follows with Dr Naga Chavez (nephro) for ckd, has had multiple arf episodes  Reviewed note 8/24/20: cr running around 1.5, autoimmune diabetes, labs, f/u 2/21  Last cr 1.58  Discussed f/u      Pt also is now seeing Dr Katie Boudreaux (uro) and ROWENA Harris for recurrent UTIs  Last visit was  10/18     Pt has been following with Dr Lizzeth Hernandez (ortho) for knee pain   Last there 12/19/19 from [address] [place] [address]:      She has also been following with Dr Levy Colorado (ortho) for sciatica   Last visit was 1/19 with ROWENA Mendez   Completing PT for this     She is following with Dr. Candace Worthington (ortho) for trigger thumb  Reviewed note 10/29/20: 1. I had a lengthy discussion with the patient concerning findings from today's physical exam, and how they relate to the patient's diagnosis. 2. Patient reported 100% relief with the cortisone injection for RIGHT thumb trigger digit. She was asymptomatic upon exam today. She will let comfort be her guide going forward. 3. Patient also reported no significant relief with the cortisone injection for RIGHT de Quervain's. Upon exam today, she mildly tender over the radial styloid as well as the intersection area. We discussed the risks and benefits of oral NSAIDs/splint versus cortisone injection.  Patient opted for splints and oral NSAIDs for symptom relief. She was provided a thumb spica splint. She understands to take NSAIDs on a scheduled basis with food for no longer than 10-14 days to aid the remaining inflammation. This pain is more of a nuisance type pain for her. 4. Patient will follow up as-needed for any new/worsening symptoms. She had cortisone injection    Pt has albuterol to use prn for her asthma  Does not have daily sx, occasional rare wheezing      Discussed covid vaccine     Reviewed mammo      PREVENTIVE:  Colonoscopy: 10/13/16, Dr Mick Williamson, repeat in 10 years, however fmhx - father so repeat in 11 years  Pap: [facility], 3/20  Mammogram: 3/9/20 negative, scheduled 3/21  Dexa: not yet needed  Tdap: ~2013  Pneumovax: 02/19/19   Shingrix: will consider getting   Flu shot: 9/20  Foot exam: 02/19/20   Micro: 1/20  some protein in urine   A1C: 2/19 POC 14.0, 3/19 8.3 per Dr Brina Bradley 7.7   Eye exam: Dr Wolff, 7/20  Lipids: 1/20  LDL 80     Patient Active Problem List    Diagnosis Date Noted    [facility]gly[facility] to type [facility] diabetes mellitus ([facility] 02/22/2021    PSC (primary sclerosing cholangitis) 02/22/2021    Stage 2 chronic kidney disease 02/22/2021    Mild intermittent asthma without complication 25/35/5612    Elevated liver enzymes 12/19/2019    Insulin dependent diabetes mellitus 06/20/2016    Pancreatic atrophy 06/20/2016     Current Outpatient Medications   Medication Sig Dispense Refill    Insulin Needles, Disposable, (Danae Pen Needle) 32 gauge x 5/32\" ndle Use with Basaglar pen daily. DX Code: E13.9 100 Pen Needle 3    albuterol (PROVENTIL VENTOLIN) 2.5 mg /3 mL (0.083 %) nebu 3 mL by Nebulization route every six (6) hours as needed for Wheezing. 30 Nebule 0    Nebulizer Accessories kit prn 1 Kit 0    Nebulizer & Compressor machine prn 1 Each 0    insulin glargine (BASAGLAR KWIKPEN U-100 INSULIN) 100 unit/mL (3 mL) inpn 26 Units by SubCUTAneous route Daily (before breakfast).  Reduce  By 2-3 units every few days if AM Sugars persist below 100--Dose change 12/27/19--updated med list--did not send prescription to the pharmacy (Patient taking differently: 25 Units by SubCUTAneous route Daily (before breakfast). ) 10 Pen 3     Past Surgical History:   Procedure Laterality Date    HX HEENT        Lab Results   Component Value Date/Time    WBC 5.7 11/11/2020 03:40 PM    HGB 10.1 (L) 11/11/2020 03:40 PM    HCT 30.6 (L) 11/11/2020 03:40 PM    Hematocrit (POC) 31 (L) 08/24/2018 01:15 PM    PLATELET 474 69/47/1654 03:40 PM    MCV 95 11/11/2020 03:40 PM     Lab Results   Component Value Date/Time    Cholesterol, total 191 01/31/2020 08:08 AM    HDL Cholesterol 100 01/31/2020 08:08 AM    LDL, calculated 80 01/31/2020 08:08 AM    Triglyceride 53 01/31/2020 08:08 AM     Lab Results   Component Value Date/Time    GFR est non-AA 36 (L) 11/11/2020 03:40 PM    GFRNA, POC 4 (L) 08/24/2018 01:15 PM    GFR est AA 42 (L) 11/11/2020 03:40 PM    GFRAA, POC 5 (L) 08/24/2018 01:15 PM    Creatinine 1.58 (H) 11/11/2020 03:40 PM    Creatinine (POC) 9.5 (H) 08/24/2018 01:15 PM    BUN 26 (H) 11/11/2020 03:40 PM    BUN (POC) >140 (H) 08/24/2018 01:15 PM    Sodium 144 11/11/2020 03:40 PM    Sodium (POC) 124 (L) 08/24/2018 01:15 PM    Potassium 4.9 11/11/2020 03:40 PM    Potassium (POC) 7.7 (HH) 08/24/2018 01:15 PM    Chloride 106 11/11/2020 03:40 PM    Chloride (POC) 91 (L) 08/24/2018 01:15 PM    CO2 27 11/11/2020 03:40 PM    Magnesium 1.4 (L) 02/22/2019 11:11 AM    Phosphorus 1.3 (L) 02/20/2019 10:00 PM    PTH, Intact 360.7 (H) 08/25/2018 10:36 AM        Review of Systems   Respiratory: Negative for shortness of breath. Cardiovascular: Negative for chest pain. Physical Exam  Constitutional:       General: She is not in acute distress. Appearance: Normal appearance. She is not ill-appearing, toxic-appearing or diaphoretic. HENT:      Head: Normocephalic and atraumatic.    Eyes:      General:         Right eye: No discharge. Left eye: No discharge. Conjunctiva/sclera: Conjunctivae normal.   Pulmonary:      Effort: No respiratory distress. Neurological:      Mental Status: She is alert and oriented to person, place, and time. Mental status is at baseline. Gait: Gait normal.   Psychiatric:         Mood and Affect: Mood normal.         Behavior: Behavior normal.         ASSESSMENT and PLAN    ICD-10-CM ICD-9-CM    1. PSC (primary sclerosing cholangitis)     Relatively new diagnosis no treatment available monitoring LFTs with hepatology clinic every 6 months           K83.01 576.1    2. Stage 2 chronic kidney disease       Creatinine running around 1.41.5    Patient is to see nephrology later this month discussed this with her she will schedule appointment       N18.2 585.2    3. Hyperglycemia due to type 1 diabetes mellitus ([facility]       Labile blood sugar due to type 1 diabetes    On Lantus 30 units    Glipizide twice daily    Overdue for labs    She is aware and will be getting them done later today or tomorrow     E10.65 250.01    4. Elevated liver enzymes       See above from primary sclerosing cholangitis     R74.8 790.5    5. Mild intermittent asthma without complication       Uses albuterol as needed     J45.20 493.90    6. Physical exam       Weight runs on the lower side has normal blood pressure    Flu shot up-to-date    Discussed Shingrix    Due for Covid vaccine she is working on this    Colonoscopy will be due in the fall    Eye exam report will be obtained    Mammogram is scheduled     Z00.00 V70.9            Scribed by Rachel Neal of [facility], as dictated by Dr. Deni Connolly. Current diagnosis and concerns discussed with pt at length. Pt understands risks and benefits or current treatment plan and medications, and accepts the treatment and medication with any possible risks. Pt asks appropriate questions, which were answered. Pt was instructed to call with any concerns or problems.       I have reviewed the note documented by the scribe. The services provided are my own. The documentation is accurate     Angela Tavarez, who was evaluated through a synchronous (real-time) audio-video encounter, and/or her healthcare decision maker, is aware that it is a billable service, with coverage as determined by her insurance carrier. She provided verbal consent to proceed: Yes, and patient identification was verified. It was conducted pursuant to the emergency declaration under the [address] [place] [address], [address] [place] [address] authority and the [employer] [place] [employer] Act. A caregiver was present when appropriate. Ability to conduct physical exam was limited. I was at home. The patient was at home.

## 2021-02-19 ENCOUNTER — VIRTUAL VISIT (OUTPATIENT)
Dept: ENDOCRINOLOGY | Age: 56
End: 2021-02-19
Payer: COMMERCIAL

## 2021-02-19 DIAGNOSIS — E10.65 HYPERGLYCEMIA DUE TO TYPE 1 DIABETES MELLITUS (HCC): ICD-10-CM

## 2021-02-19 DIAGNOSIS — E13.9 LADA (LATENT AUTOIMMUNE DIABETES IN ADULTS), MANAGED AS TYPE 1 (HCC): Primary | ICD-10-CM

## 2021-02-19 PROCEDURE — 99214 OFFICE O/P EST MOD 30 MIN: CPT | Performed by: INTERNAL MEDICINE

## 2021-02-19 NOTE — PROGRESS NOTES
**DUE TO PANDEMIC AND HEALTH CONCERNS IN THE COMMUNITY, THIS PATIENT WAS EITHER ILL OR FOUND TO BE HIGH RISK FOR IN-PERSON EVALUATION WITHIN THE CLINIC. THE FOLLOWING IS A VIRTUAL TELEMEDICINE VIDEO ENCOUNTER VIA G-mode, TO WHICH THE PATIENT AGREED. THE PURPOSE IS TO LIMIT INTERRUPTIONS IN HEALTHCARE AND TO PROVIDE FOR ONGOING URGENT NEEDS UNDER THE CURRENT CONDITIONS. CHIEF COMPLAINT: f/u diabetes management, (SHANTEL) Late onset autoimmune diabtes of the adult    HISTORY OF PRESENT ILLNESS:   Enriqueta Rose is a 60 y.o. female with a PMHx as noted below who is presenting to our endocrine clinic for the f/u evaluation of recently diagnosed diabetes. History of Type 1 Diabetes:  Patient reports that they were diagnosed in the ED with A1c of 15% June 2016     Pancreatic atrophy on CT   Family history is positive for diabetes in mother and maternal grandmother, both on insulin but type of diabetes unclear  Was started on novolog 70/30 insulin, did not tolerate any oral medications (neg antibodies, normal c-peptide)    INTERVAL HISTORY:  Busy working 12 hour shifts. Forgot to do blood work for today.      Current home regimen includes:   Basaglar: 30 units each morning  Restart Glipizide 2.5mg with breakfast and dinner,   To increase to 5mg with B&D if sugars rise during the daytime,  OFF METFORMIN (did not tolerate)  OFF Trulicity (did not tolerate due to nausea)  OFF Humalog (had not taken regularly, c-peptide was normal)    Home Blood glucose review:   Using fingersticks (prev freestyle lul adhesive caused her issues)  AM: ,   Bedtime: low 100's per her  Not having lows    Review of most recent diabetes-related labs:  Lab Results   Component Value Date    HBA1C 7.7 (H) 01/31/2020    HBA1C 7.6 (H) 08/21/2019    HBA1C 14.0 (H) 02/20/2019    SKP8IGCY 8.3 05/31/2019    VYT1CUHQ 14.2 02/18/2019    ZDE7JVVJ 10.6 11/01/2018    CHOL 191 01/31/2020    LDLC 80 01/31/2020    GFRAA 42 (L) 11/11/2020    GFRNA 36 (L) 11/11/2020    CREATEXT Cr 1.34, GFR NonAA 45/ AA 51 06/11/2019    MCACR 29 01/31/2020    TSH 1.690 01/31/2020    136047 <1.0 03/06/2018    GADLT <5.0 03/06/2018    CPEPLT 1.3 01/31/2020     Lab Key:  [id number] = IA-2 pancreatic islet cell autoantibody  GADLT = JORDANA-65 autoantibody   MCACR = Urine Microalbumin  INSUL = Insulin level  CPEPL = C-peptide level    PAST MEDICAL/SURGICAL HISTORY:   Past Medical History:   Diagnosis Date    Asthma     Diabetes ([facility]     Elevated transaminase level 6/29/2016    Insulin dependent diabetes mellitus 6/20/2016    Pancreatic atrophy 6/20/2016     Past Surgical History:   Procedure Laterality Date    HX HEENT         ALLERGIES:   Allergies   Allergen Reactions    Contrast Agent [Iodine] Itching    Hydrocodone Rash    Percocet [Oxycodone-Acetaminophen] Rash     Pt states she is not allergic to Tylenol.  Codeine Nausea and Vomiting    Metformin Diarrhea       MEDICATIONS ON ADMISSION:     Current Outpatient Medications:     albuterol (PROVENTIL VENTOLIN) 2.5 mg /3 mL (0.083 %) nebu, 3 mL by Nebulization route every six (6) hours as needed for Wheezing., Disp: 30 Nebule, Rfl: 0    Nebulizer & Compressor machine, prn, Disp: 1 Each, Rfl: 0    insulin glargine (BASAGLAR KWIKPEN U-100 INSULIN) 100 unit/mL (3 mL) inpn, 26 Units by SubCUTAneous route Daily (before breakfast). Reduce  By 2-3 units every few days if AM Sugars persist below 100--Dose change 12/27/19--updated med list--did not send prescription to the pharmacy (Patient taking differently: 25 Units by SubCUTAneous route Daily (before breakfast). ), Disp: 10 Pen, Rfl: 3    Insulin Needles, Disposable, (Danae Pen Needle) 32 gauge x 5/32\" ndle, Use with Basaglar pen daily.  DX Code: E13.9, Disp: 100 Pen Needle, Rfl: 3    Nebulizer Accessories kit, prn, Disp: 1 Kit, Rfl: 0    SOCIAL HISTORY:   Social History     Socioeconomic History    Marital status: SINGLE     Spouse name: Not on file    Number of children: Not on file    Years of education: Not on file    Highest education level: Not on file   Occupational History    Not on file   Social Needs    Financial resource strain: Not on file    Food insecurity     Worry: Not on file     Inability: Not on file    Transportation needs     Medical: Not on file     Non-medical: Not on file   Tobacco Use    Smoking status: Never Smoker    Smokeless tobacco: Never Used   Substance and Sexual Activity    Alcohol use: No     Frequency: Never     Comment: occasionally    Drug use: No    Sexual activity: Not Currently   Lifestyle    Physical activity     Days per week: Not on file     Minutes per session: Not on file    Stress: Not on file   Relationships    Social connections     Talks on phone: Not on file     Gets together: Not on file     Attends Sabianism service: Not on file     Active member of club or organization: Not on file     Attends meetings of clubs or organizations: Not on file     Relationship status: Not on file    Intimate partner violence     Fear of current or ex partner: Not on file     Emotionally abused: Not on file     Physically abused: Not on file     Forced sexual activity: Not on file   Other Topics Concern    Not on file   Social History Narrative    Not on file       FAMILY HISTORY:  Family History   Problem Relation Age of Onset    Cancer Father         prostate and colon    Diabetes Mother     Diabetes Maternal Grandmother     Cancer Maternal Aunt         breast       REVIEW OF SYSTEMS: Complete ROS assessed and noted for that which is described above, all else are negative.   Eyes: normal  ENT: normal  CVS: normal  Resp: normal  GI: normal  : normal  GYN: normal  Endocrine: normal  Integument: normal  Musculoskeletal: normal  Neuro: normal  Psych: normal    PHYSICAL EXAMINATION:    VITAL SIGNS:  Telemedicine Visit    GENERAL: NCAT, Appears well nourished  EYES: EOMI, non-icteric, no proptosis  Ear/Nose/Throat: NCAT, no visible inflammation or masses  CARDIOVASCULAR: no cyanosis, no visible JVD  RESPIRATORY: comfortable respirations observed, no cyanosis  MUSCULOSKELETAL: Normal ROM of upper extremities observed  SKIN: No edema, rash, or other significant changes observed  NEUROLOGIC:  AAOx3  PSYCHIATRIC: Normal affect, Normal insight and judgement    REVIEW OF LABORATORY AND RADIOLOGY DATA:   Labs and documentation have been reviewed as described above. ASSESSMENT AND PLAN:   Kathy Son is a 60 y.o. female with a PMHx as noted below who is presenting to our endocrine clinic for the f/u evaluation of recent onset diabetes. 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 We noted that although she is at age 60, she is also diabetic and in the past when she had a recurring urine infection her diabetes was very much uncontrolled and required high doses of insulin through the day. She has a potential to get very sick as a diabetic, insulin dependent, if she acquires a covid infection. Labs: 2021 DM prelabs pre ordered, she still plans on obtaining soon, will spend additional time reviewing. RTC: 4 months: June 17 at 11:50 AM,    20 minutes spent toward telemedicine visit today of which >50% of this time was spent in counseling and coordination of care. Darian Odonnell.  [address] [facility]  [address] [place] [address]

## 2021-02-22 ENCOUNTER — VIRTUAL VISIT (OUTPATIENT)
Dept: INTERNAL MEDICINE CLINIC | Age: 56
End: 2021-02-22
Payer: COMMERCIAL

## 2021-02-22 DIAGNOSIS — N18.2 STAGE 2 CHRONIC KIDNEY DISEASE: ICD-10-CM

## 2021-02-22 DIAGNOSIS — R74.8 ELEVATED LIVER ENZYMES: ICD-10-CM

## 2021-02-22 DIAGNOSIS — K83.01 PSC (PRIMARY SCLEROSING CHOLANGITIS): Primary | ICD-10-CM

## 2021-02-22 DIAGNOSIS — Z00.00 PHYSICAL EXAM: ICD-10-CM

## 2021-02-22 DIAGNOSIS — J45.20 MILD INTERMITTENT ASTHMA WITHOUT COMPLICATION: ICD-10-CM

## 2021-02-22 DIAGNOSIS — E10.65 HYPERGLYCEMIA DUE TO TYPE 1 DIABETES MELLITUS (HCC): ICD-10-CM

## 2021-02-22 PROCEDURE — 99213 OFFICE O/P EST LOW 20 MIN: CPT | Performed by: INTERNAL MEDICINE

## 2021-03-08 ENCOUNTER — APPOINTMENT (OUTPATIENT)
Dept: GENERAL RADIOLOGY | Age: 56
DRG: 871 | End: 2021-03-08
Attending: EMERGENCY MEDICINE
Payer: COMMERCIAL

## 2021-03-08 ENCOUNTER — HOSPITAL ENCOUNTER (INPATIENT)
Age: 56
LOS: 9 days | Discharge: HOME | DRG: 871 | End: 2021-03-17
Attending: EMERGENCY MEDICINE | Admitting: INTERNAL MEDICINE
Payer: COMMERCIAL

## 2021-03-08 ENCOUNTER — APPOINTMENT (OUTPATIENT)
Dept: ULTRASOUND IMAGING | Age: 56
DRG: 871 | End: 2021-03-08
Attending: INTERNAL MEDICINE
Payer: COMMERCIAL

## 2021-03-08 ENCOUNTER — APPOINTMENT (OUTPATIENT)
Dept: CT IMAGING | Age: 56
DRG: 871 | End: 2021-03-08
Attending: EMERGENCY MEDICINE
Payer: COMMERCIAL

## 2021-03-08 DIAGNOSIS — E10.10 DIABETIC KETOACIDOSIS WITHOUT COMA ASSOCIATED WITH TYPE 1 DIABETES MELLITUS (HCC): ICD-10-CM

## 2021-03-08 DIAGNOSIS — E87.20 LACTIC ACID ACIDOSIS: ICD-10-CM

## 2021-03-08 DIAGNOSIS — N17.9 AKI (ACUTE KIDNEY INJURY) (HCC): ICD-10-CM

## 2021-03-08 DIAGNOSIS — D72.829 LEUKOCYTOSIS, UNSPECIFIED TYPE: ICD-10-CM

## 2021-03-08 DIAGNOSIS — E11.65 UNCONTROLLED TYPE 2 DIABETES MELLITUS WITH HYPERGLYCEMIA (HCC): ICD-10-CM

## 2021-03-08 DIAGNOSIS — E11.00 HYPEROSMOLAR HYPERGLYCEMIC STATE (HHS) (HCC): Primary | ICD-10-CM

## 2021-03-08 DIAGNOSIS — R65.10 SIRS (SYSTEMIC INFLAMMATORY RESPONSE SYNDROME) (HCC): ICD-10-CM

## 2021-03-08 PROBLEM — E11.01 HHNC (HYPERGLYCEMIC HYPEROSMOLAR NONKETOTIC COMA) (HCC): Status: ACTIVE | Noted: 2021-03-08

## 2021-03-08 LAB
ADMINISTERED INITIALS, ADMINIT: NORMAL
ALBUMIN SERPL-MCNC: 3.7 G/DL (ref 3.5–5)
ALBUMIN/GLOB SERPL: 0.7 {RATIO} (ref 1.1–2.2)
ALP SERPL-CCNC: 205 U/L (ref 45–117)
ALT SERPL-CCNC: 30 U/L (ref 12–78)
ANION GAP SERPL CALC-SCNC: 11 MMOL/L (ref 5–15)
ANION GAP SERPL CALC-SCNC: 19 MMOL/L (ref 5–15)
ANION GAP SERPL CALC-SCNC: 7 MMOL/L (ref 5–15)
ANION GAP SERPL CALC-SCNC: 7 MMOL/L (ref 5–15)
ANION GAP SERPL CALC-SCNC: 8 MMOL/L (ref 5–15)
APPEARANCE UR: ABNORMAL
ARTERIAL PATENCY WRIST A: ABNORMAL
AST SERPL-CCNC: 17 U/L (ref 15–37)
ATRIAL RATE: 137 BPM
BACTERIA URNS QL MICRO: ABNORMAL /HPF
BASE EXCESS BLD CALC-SCNC: 1 MMOL/L
BASOPHILS # BLD: 0 K/UL (ref 0–0.1)
BASOPHILS NFR BLD: 0 % (ref 0–1)
BDY SITE: ABNORMAL
BILIRUB SERPL-MCNC: 0.3 MG/DL (ref 0.2–1)
BILIRUB UR QL: NEGATIVE
BUN SERPL-MCNC: 100 MG/DL (ref 6–20)
BUN SERPL-MCNC: 100 MG/DL (ref 6–20)
BUN SERPL-MCNC: 116 MG/DL (ref 6–20)
BUN SERPL-MCNC: 91 MG/DL (ref 6–20)
BUN SERPL-MCNC: 99 MG/DL (ref 6–20)
BUN/CREAT SERPL: 29 (ref 12–20)
BUN/CREAT SERPL: 30 (ref 12–20)
BUN/CREAT SERPL: 30 (ref 12–20)
BUN/CREAT SERPL: 31 (ref 12–20)
BUN/CREAT SERPL: 31 (ref 12–20)
CA-I BLD-SCNC: 1.14 MMOL/L (ref 1.12–1.32)
CALCIUM SERPL-MCNC: 10.4 MG/DL (ref 8.5–10.1)
CALCIUM SERPL-MCNC: 7.9 MG/DL (ref 8.5–10.1)
CALCIUM SERPL-MCNC: 8.3 MG/DL (ref 8.5–10.1)
CALCIUM SERPL-MCNC: 9 MG/DL (ref 8.5–10.1)
CALCIUM SERPL-MCNC: 9 MG/DL (ref 8.5–10.1)
CALCULATED P AXIS, ECG09: 86 DEGREES
CALCULATED R AXIS, ECG10: 79 DEGREES
CALCULATED T AXIS, ECG11: 66 DEGREES
CHLORIDE SERPL-SCNC: 101 MMOL/L (ref 97–108)
CHLORIDE SERPL-SCNC: 120 MMOL/L (ref 97–108)
CHLORIDE SERPL-SCNC: 121 MMOL/L (ref 97–108)
CHLORIDE SERPL-SCNC: 123 MMOL/L (ref 97–108)
CHLORIDE SERPL-SCNC: 125 MMOL/L (ref 97–108)
CO2 SERPL-SCNC: 22 MMOL/L (ref 21–32)
CO2 SERPL-SCNC: 23 MMOL/L (ref 21–32)
CO2 SERPL-SCNC: 26 MMOL/L (ref 21–32)
CO2 SERPL-SCNC: 26 MMOL/L (ref 21–32)
CO2 SERPL-SCNC: 27 MMOL/L (ref 21–32)
COLOR UR: ABNORMAL
COVID-19 RAPID TEST, COVR: NOT DETECTED
CREAT SERPL-MCNC: 2.98 MG/DL (ref 0.55–1.02)
CREAT SERPL-MCNC: 3.19 MG/DL (ref 0.55–1.02)
CREAT SERPL-MCNC: 3.26 MG/DL (ref 0.55–1.02)
CREAT SERPL-MCNC: 3.3 MG/DL (ref 0.55–1.02)
CREAT SERPL-MCNC: 4.07 MG/DL (ref 0.55–1.02)
CREAT UR-MCNC: 76.2 MG/DL
D50 ADMINISTERED, D50ADM: 0 ML
D50 ORDER, D50ORD: 0 ML
DIAGNOSIS, 93000: NORMAL
DIFFERENTIAL METHOD BLD: ABNORMAL
EOSINOPHIL # BLD: 0 K/UL (ref 0–0.4)
EOSINOPHIL NFR BLD: 0 % (ref 0–7)
EPITH CASTS URNS QL MICRO: ABNORMAL /LPF
ERYTHROCYTE [DISTWIDTH] IN BLOOD BY AUTOMATED COUNT: 13.9 % (ref 11.5–14.5)
EST. AVERAGE GLUCOSE BLD GHB EST-MCNC: 243 MG/DL
GAS FLOW.O2 O2 DELIVERY SYS: ABNORMAL L/MIN
GLOBULIN SER CALC-MCNC: 5.1 G/DL (ref 2–4)
GLSCOM COMMENTS: NORMAL
GLUCOSE BLD STRIP.AUTO-MCNC: 382 MG/DL (ref 65–100)
GLUCOSE BLD STRIP.AUTO-MCNC: 467 MG/DL (ref 65–100)
GLUCOSE BLD STRIP.AUTO-MCNC: 562 MG/DL (ref 65–100)
GLUCOSE BLD STRIP.AUTO-MCNC: 592 MG/DL (ref 65–100)
GLUCOSE BLD STRIP.AUTO-MCNC: >600 MG/DL (ref 65–100)
GLUCOSE SERPL-MCNC: 1227 MG/DL (ref 65–100)
GLUCOSE SERPL-MCNC: 463 MG/DL (ref 65–100)
GLUCOSE SERPL-MCNC: 729 MG/DL (ref 65–100)
GLUCOSE SERPL-MCNC: 843 MG/DL (ref 65–100)
GLUCOSE SERPL-MCNC: 908 MG/DL (ref 65–100)
GLUCOSE UR STRIP.AUTO-MCNC: >1000 MG/DL
GLUCOSE, GLC: 382 MG/DL
GLUCOSE, GLC: 467 MG/DL
GLUCOSE, GLC: 562 MG/DL
GLUCOSE, GLC: 592 MG/DL
GLUCOSE, GLC: 601 MG/DL
HBA1C MFR BLD: 10.1 % (ref 4–5.6)
HCO3 BLD-SCNC: 27.3 MMOL/L (ref 22–26)
HCT VFR BLD AUTO: 42.3 % (ref 35–47)
HGB BLD-MCNC: 12.7 G/DL (ref 11.5–16)
HGB UR QL STRIP: ABNORMAL
HIGH TARGET, HITG: 250 MG/DL
HIGH TARGET, HITG: 300 MG/DL
IMM GRANULOCYTES # BLD AUTO: 0.2 K/UL (ref 0–0.04)
IMM GRANULOCYTES NFR BLD AUTO: 1 % (ref 0–0.5)
INSULIN ADMINSTERED, INSADM: 10.8 UNITS/HOUR
INSULIN ADMINSTERED, INSADM: 10.8 UNITS/HOUR
INSULIN ADMINSTERED, INSADM: 16.2 UNITS/HOUR
INSULIN ADMINSTERED, INSADM: 21.6 UNITS/HOUR
INSULIN ADMINSTERED, INSADM: 27.1 UNITS/HOUR
INSULIN ADMINSTERED, INSADM: 29 UNITS/HOUR
INSULIN ADMINSTERED, INSADM: 31.9 UNITS/HOUR
INSULIN ADMINSTERED, INSADM: 32.6 UNITS/HOUR
INSULIN ADMINSTERED, INSADM: 35.1 UNITS/HOUR
INSULIN ORDER, INSORD: 10.8 UNITS/HOUR
INSULIN ORDER, INSORD: 10.8 UNITS/HOUR
INSULIN ORDER, INSORD: 16.2 UNITS/HOUR
INSULIN ORDER, INSORD: 21.6 UNITS/HOUR
INSULIN ORDER, INSORD: 27.1 UNITS/HOUR
INSULIN ORDER, INSORD: 29 UNITS/HOUR
INSULIN ORDER, INSORD: 31.9 UNITS/HOUR
INSULIN ORDER, INSORD: 32.6 UNITS/HOUR
INSULIN ORDER, INSORD: 35.1 UNITS/HOUR
KETONES UR QL STRIP.AUTO: ABNORMAL MG/DL
LACTATE BLD-SCNC: 1.63 MMOL/L (ref 0.4–2)
LACTATE BLD-SCNC: 4.36 MMOL/L (ref 0.4–2)
LEUKOCYTE ESTERASE UR QL STRIP.AUTO: NEGATIVE
LOW TARGET, LOT: 150 MG/DL
LOW TARGET, LOT: 200 MG/DL
LYMPHOCYTES # BLD: 0.8 K/UL (ref 0.8–3.5)
LYMPHOCYTES NFR BLD: 4 % (ref 12–49)
MAGNESIUM SERPL-MCNC: 2.9 MG/DL (ref 1.6–2.4)
MAGNESIUM SERPL-MCNC: 2.9 MG/DL (ref 1.6–2.4)
MAGNESIUM SERPL-MCNC: 3 MG/DL (ref 1.6–2.4)
MCH RBC QN AUTO: 30.2 PG (ref 26–34)
MCHC RBC AUTO-ENTMCNC: 30 G/DL (ref 30–36.5)
MCV RBC AUTO: 100.5 FL (ref 80–99)
MINUTES UNTIL NEXT BG, NBG: 60 MIN
MONOCYTES # BLD: 0.6 K/UL (ref 0–1)
MONOCYTES NFR BLD: 3 % (ref 5–13)
MULTIPLIER, MUL: 0.02
MULTIPLIER, MUL: 0.02
MULTIPLIER, MUL: 0.03
MULTIPLIER, MUL: 0.04
MULTIPLIER, MUL: 0.05
MULTIPLIER, MUL: 0.06
MULTIPLIER, MUL: 0.07
MULTIPLIER, MUL: 0.08
MULTIPLIER, MUL: 0.09
NEUTS SEG # BLD: 19.5 K/UL (ref 1.8–8)
NEUTS SEG NFR BLD: 92 % (ref 32–75)
NITRITE UR QL STRIP.AUTO: POSITIVE
NRBC # BLD: 0 K/UL (ref 0–0.01)
NRBC BLD-RTO: 0 PER 100 WBC
ORDER INITIALS, ORDINIT: NORMAL
P-R INTERVAL, ECG05: 116 MS
PCO2 BLD: 53.2 MMHG (ref 35–45)
PH BLD: 7.32 [PH] (ref 7.35–7.45)
PH UR STRIP: 5 [PH] (ref 5–8)
PHOSPHATE SERPL-MCNC: 2.4 MG/DL (ref 2.6–4.7)
PLATELET # BLD AUTO: 251 K/UL (ref 150–400)
PMV BLD AUTO: 12.9 FL (ref 8.9–12.9)
PO2 BLD: 35 MMHG (ref 80–100)
POTASSIUM SERPL-SCNC: 3.6 MMOL/L (ref 3.5–5.1)
POTASSIUM SERPL-SCNC: 3.7 MMOL/L (ref 3.5–5.1)
POTASSIUM SERPL-SCNC: 3.7 MMOL/L (ref 3.5–5.1)
POTASSIUM SERPL-SCNC: 4.6 MMOL/L (ref 3.5–5.1)
POTASSIUM SERPL-SCNC: 5.8 MMOL/L (ref 3.5–5.1)
PROT SERPL-MCNC: 8.8 G/DL (ref 6.4–8.2)
PROT UR STRIP-MCNC: ABNORMAL MG/DL
Q-T INTERVAL, ECG07: 300 MS
QRS DURATION, ECG06: 54 MS
QTC CALCULATION (BEZET), ECG08: 453 MS
RBC # BLD AUTO: 4.21 M/UL (ref 3.8–5.2)
RBC #/AREA URNS HPF: ABNORMAL /HPF (ref 0–5)
RBC MORPH BLD: ABNORMAL
SAO2 % BLD: 62 % (ref 92–97)
SARS-COV-2, COV2: NORMAL
SERVICE CMNT-IMP: ABNORMAL
SODIUM SERPL-SCNC: 146 MMOL/L (ref 136–145)
SODIUM SERPL-SCNC: 154 MMOL/L (ref 136–145)
SODIUM SERPL-SCNC: 158 MMOL/L (ref 136–145)
SODIUM UR-SCNC: 13 MMOL/L
SOURCE, COVRS: NORMAL
SP GR UR REFRACTOMETRY: 1.02 (ref 1–1.03)
SPECIMEN TYPE: ABNORMAL
TROPONIN I SERPL-MCNC: <0.05 NG/ML
UA: UC IF INDICATED,UAUC: ABNORMAL
UROBILINOGEN UR QL STRIP.AUTO: 0.2 EU/DL (ref 0.2–1)
VENTRICULAR RATE, ECG03: 137 BPM
WBC # BLD AUTO: 21.1 K/UL (ref 3.6–11)
WBC URNS QL MICRO: ABNORMAL /HPF (ref 0–4)

## 2021-03-08 PROCEDURE — 87077 CULTURE AEROBIC IDENTIFY: CPT

## 2021-03-08 PROCEDURE — 99285 EMERGENCY DEPT VISIT HI MDM: CPT

## 2021-03-08 PROCEDURE — 74011250636 HC RX REV CODE- 250/636: Performed by: INTERNAL MEDICINE

## 2021-03-08 PROCEDURE — 74011250637 HC RX REV CODE- 250/637: Performed by: INTERNAL MEDICINE

## 2021-03-08 PROCEDURE — 51702 INSERT TEMP BLADDER CATH: CPT

## 2021-03-08 PROCEDURE — 74011000258 HC RX REV CODE- 258: Performed by: INTERNAL MEDICINE

## 2021-03-08 PROCEDURE — 36415 COLL VENOUS BLD VENIPUNCTURE: CPT

## 2021-03-08 PROCEDURE — 93005 ELECTROCARDIOGRAM TRACING: CPT

## 2021-03-08 PROCEDURE — 83735 ASSAY OF MAGNESIUM: CPT

## 2021-03-08 PROCEDURE — 84300 ASSAY OF URINE SODIUM: CPT

## 2021-03-08 PROCEDURE — 74011000250 HC RX REV CODE- 250: Performed by: EMERGENCY MEDICINE

## 2021-03-08 PROCEDURE — 87086 URINE CULTURE/COLONY COUNT: CPT

## 2021-03-08 PROCEDURE — 82803 BLOOD GASES ANY COMBINATION: CPT

## 2021-03-08 PROCEDURE — 81001 URINALYSIS AUTO W/SCOPE: CPT

## 2021-03-08 PROCEDURE — 70450 CT HEAD/BRAIN W/O DYE: CPT

## 2021-03-08 PROCEDURE — 82570 ASSAY OF URINE CREATININE: CPT

## 2021-03-08 PROCEDURE — 74011000250 HC RX REV CODE- 250: Performed by: INTERNAL MEDICINE

## 2021-03-08 PROCEDURE — 87635 SARS-COV-2 COVID-19 AMP PRB: CPT

## 2021-03-08 PROCEDURE — 82962 GLUCOSE BLOOD TEST: CPT

## 2021-03-08 PROCEDURE — 71045 X-RAY EXAM CHEST 1 VIEW: CPT

## 2021-03-08 PROCEDURE — 87040 BLOOD CULTURE FOR BACTERIA: CPT

## 2021-03-08 PROCEDURE — 2709999900 HC NON-CHARGEABLE SUPPLY

## 2021-03-08 PROCEDURE — 84484 ASSAY OF TROPONIN QUANT: CPT

## 2021-03-08 PROCEDURE — 83605 ASSAY OF LACTIC ACID: CPT

## 2021-03-08 PROCEDURE — 87186 SC STD MICRODIL/AGAR DIL: CPT

## 2021-03-08 PROCEDURE — 96365 THER/PROPH/DIAG IV INF INIT: CPT

## 2021-03-08 PROCEDURE — 74176 CT ABD & PELVIS W/O CONTRAST: CPT

## 2021-03-08 PROCEDURE — 83930 ASSAY OF BLOOD OSMOLALITY: CPT

## 2021-03-08 PROCEDURE — 94640 AIRWAY INHALATION TREATMENT: CPT

## 2021-03-08 PROCEDURE — 84100 ASSAY OF PHOSPHORUS: CPT

## 2021-03-08 PROCEDURE — 80053 COMPREHEN METABOLIC PANEL: CPT

## 2021-03-08 PROCEDURE — 76770 US EXAM ABDO BACK WALL COMP: CPT

## 2021-03-08 PROCEDURE — 74011636637 HC RX REV CODE- 636/637: Performed by: INTERNAL MEDICINE

## 2021-03-08 PROCEDURE — 65660000000 HC RM CCU STEPDOWN

## 2021-03-08 PROCEDURE — 96375 TX/PRO/DX INJ NEW DRUG ADDON: CPT

## 2021-03-08 PROCEDURE — 74011250636 HC RX REV CODE- 250/636: Performed by: EMERGENCY MEDICINE

## 2021-03-08 PROCEDURE — 80048 BASIC METABOLIC PNL TOTAL CA: CPT

## 2021-03-08 PROCEDURE — 74011636637 HC RX REV CODE- 636/637: Performed by: EMERGENCY MEDICINE

## 2021-03-08 PROCEDURE — 74011000258 HC RX REV CODE- 258: Performed by: EMERGENCY MEDICINE

## 2021-03-08 PROCEDURE — 83036 HEMOGLOBIN GLYCOSYLATED A1C: CPT

## 2021-03-08 PROCEDURE — 85025 COMPLETE CBC W/AUTO DIFF WBC: CPT

## 2021-03-08 RX ORDER — HEPARIN SODIUM 5000 [USP'U]/ML
5000 INJECTION, SOLUTION INTRAVENOUS; SUBCUTANEOUS EVERY 12 HOURS
Status: DISCONTINUED | OUTPATIENT
Start: 2021-03-08 | End: 2021-03-17 | Stop reason: HOSPADM

## 2021-03-08 RX ORDER — SODIUM CHLORIDE 0.9 % (FLUSH) 0.9 %
5-10 SYRINGE (ML) INJECTION AS NEEDED
Status: DISCONTINUED | OUTPATIENT
Start: 2021-03-08 | End: 2021-03-17 | Stop reason: HOSPADM

## 2021-03-08 RX ORDER — GLIPIZIDE 2.5 MG/1
2.5 TABLET, EXTENDED RELEASE ORAL 2 TIMES DAILY
COMMUNITY
End: 2021-03-17

## 2021-03-08 RX ORDER — SODIUM CHLORIDE 0.9 % (FLUSH) 0.9 %
5-40 SYRINGE (ML) INJECTION AS NEEDED
Status: DISCONTINUED | OUTPATIENT
Start: 2021-03-08 | End: 2021-03-17 | Stop reason: HOSPADM

## 2021-03-08 RX ORDER — LORAZEPAM 2 MG/ML
1 INJECTION INTRAMUSCULAR
Status: COMPLETED | OUTPATIENT
Start: 2021-03-08 | End: 2021-03-08

## 2021-03-08 RX ORDER — DEXTROSE 50 % IN WATER (D50W) INTRAVENOUS SYRINGE
25-50 AS NEEDED
Status: DISCONTINUED | OUTPATIENT
Start: 2021-03-08 | End: 2021-03-10

## 2021-03-08 RX ORDER — POLYETHYLENE GLYCOL 3350 17 G/17G
17 POWDER, FOR SOLUTION ORAL DAILY PRN
Status: DISCONTINUED | OUTPATIENT
Start: 2021-03-08 | End: 2021-03-17 | Stop reason: HOSPADM

## 2021-03-08 RX ORDER — MAGNESIUM SULFATE 100 %
4 CRYSTALS MISCELLANEOUS AS NEEDED
Status: DISCONTINUED | OUTPATIENT
Start: 2021-03-08 | End: 2021-03-10

## 2021-03-08 RX ORDER — INSULIN GLARGINE 100 [IU]/ML
30 INJECTION, SOLUTION SUBCUTANEOUS DAILY
COMMUNITY
End: 2021-03-22

## 2021-03-08 RX ORDER — SODIUM CHLORIDE 450 MG/100ML
200 INJECTION, SOLUTION INTRAVENOUS CONTINUOUS
Status: DISCONTINUED | OUTPATIENT
Start: 2021-03-08 | End: 2021-03-08

## 2021-03-08 RX ORDER — SODIUM CHLORIDE 0.9 % (FLUSH) 0.9 %
5-40 SYRINGE (ML) INJECTION EVERY 8 HOURS
Status: DISCONTINUED | OUTPATIENT
Start: 2021-03-08 | End: 2021-03-17 | Stop reason: HOSPADM

## 2021-03-08 RX ORDER — INSULIN LISPRO 100 [IU]/ML
INJECTION, SOLUTION INTRAVENOUS; SUBCUTANEOUS
Status: DISCONTINUED | OUTPATIENT
Start: 2021-03-08 | End: 2021-03-10

## 2021-03-08 RX ORDER — PROMETHAZINE HYDROCHLORIDE 25 MG/1
12.5 TABLET ORAL
Status: DISCONTINUED | OUTPATIENT
Start: 2021-03-08 | End: 2021-03-17 | Stop reason: HOSPADM

## 2021-03-08 RX ORDER — ONDANSETRON 2 MG/ML
4 INJECTION INTRAMUSCULAR; INTRAVENOUS
Status: DISCONTINUED | OUTPATIENT
Start: 2021-03-08 | End: 2021-03-17 | Stop reason: HOSPADM

## 2021-03-08 RX ORDER — IPRATROPIUM BROMIDE AND ALBUTEROL SULFATE 2.5; .5 MG/3ML; MG/3ML
3 SOLUTION RESPIRATORY (INHALATION)
Status: DISCONTINUED | OUTPATIENT
Start: 2021-03-08 | End: 2021-03-10

## 2021-03-08 RX ADMIN — SODIUM CHLORIDE 29 UNITS/HR: 9 INJECTION, SOLUTION INTRAVENOUS at 22:59

## 2021-03-08 RX ADMIN — SODIUM CHLORIDE 1000 ML: 9 INJECTION, SOLUTION INTRAVENOUS at 15:29

## 2021-03-08 RX ADMIN — SODIUM CHLORIDE 200 ML/HR: 450 INJECTION, SOLUTION INTRAVENOUS at 16:02

## 2021-03-08 RX ADMIN — IPRATROPIUM BROMIDE AND ALBUTEROL SULFATE 3 ML: .5; 3 SOLUTION RESPIRATORY (INHALATION) at 19:37

## 2021-03-08 RX ADMIN — SODIUM CHLORIDE 10.8 UNITS/HR: 9 INJECTION, SOLUTION INTRAVENOUS at 15:12

## 2021-03-08 RX ADMIN — SODIUM CHLORIDE 31.9 UNITS/HR: 9 INJECTION, SOLUTION INTRAVENOUS at 19:50

## 2021-03-08 RX ADMIN — SODIUM CHLORIDE 1000 ML: 9 INJECTION, SOLUTION INTRAVENOUS at 13:53

## 2021-03-08 RX ADMIN — PIPERACILLIN AND TAZOBACTAM 3.38 G: 3; .375 INJECTION, POWDER, LYOPHILIZED, FOR SOLUTION INTRAVENOUS at 20:14

## 2021-03-08 RX ADMIN — PIPERACILLIN AND TAZOBACTAM 3.38 G: 3; .375 INJECTION, POWDER, LYOPHILIZED, FOR SOLUTION INTRAVENOUS at 15:20

## 2021-03-08 RX ADMIN — SODIUM CHLORIDE: 234 INJECTION, SOLUTION INTRAVENOUS at 20:50

## 2021-03-08 RX ADMIN — HEPARIN SODIUM 5000 UNITS: 5000 INJECTION INTRAVENOUS; SUBCUTANEOUS at 20:15

## 2021-03-08 RX ADMIN — SODIUM CHLORIDE 1000 ML: 9 INJECTION, SOLUTION INTRAVENOUS at 13:57

## 2021-03-08 RX ADMIN — SODIUM CHLORIDE 200 ML/HR: 450 INJECTION, SOLUTION INTRAVENOUS at 18:45

## 2021-03-08 RX ADMIN — LORAZEPAM 1 MG: 2 INJECTION INTRAMUSCULAR; INTRAVENOUS at 15:28

## 2021-03-08 RX ADMIN — SODIUM CHLORIDE 10 ML: 9 INJECTION, SOLUTION INTRAMUSCULAR; INTRAVENOUS; SUBCUTANEOUS at 21:51

## 2021-03-08 RX ADMIN — DIBASIC SODIUM PHOSPHATE, MONOBASIC POTASSIUM PHOSPHATE AND MONOBASIC SODIUM PHOSPHATE 1 TABLET: 852; 155; 130 TABLET ORAL at 20:14

## 2021-03-08 NOTE — PROGRESS NOTES
1750 - TRANSFER - IN REPORT:    Verbal report received from Rom(name) on Carlitos Corral  being received from ED(unit) for routine progression of care      Report consisted of patients Situation, Background, Assessment and   Recommendations(SBAR). Information from the following report(s) SBAR, ED Summary and MAR was reviewed with the receiving nurse. Opportunity for questions and clarification was provided. 1803 - Pt arrived via stretcher. Primary Nurse Delfina Concepcion RN and EMERSON Briones performed a dual skin assessment on this patient No impairment noted  Vinh score is 17    1825 - Current insulin gtt was running on DKA setting in gluco stabilizer. Set up pt in An[facility] Plater for [facility] ordered settings.

## 2021-03-08 NOTE — ED NOTES
TRANSFER - OUT REPORT:    Verbal report given to Damaso NORMAN(name) on Alis Salazar  being transferred to PCU (unit) for routine progression of care       Report consisted of patients Situation, Background, Assessment and   Recommendations(SBAR). Information from the following report(s) SBAR, ED Summary, [facility] and Recent Results was reviewed with the receiving nurse. Lines:   Peripheral IV 03/08/21 Right Antecubital (Active)       Peripheral IV 03/08/21 Left Antecubital (Active)        Opportunity for questions and clarification was provided.       Patient transported with:   Monitor  Registered Nurse

## 2021-03-08 NOTE — ED NOTES
Patient arrives to the emergency department via EMS with a chief complaint of AMS. Cousin came over and found disarray and her agitated.  BG was over 600 on arrival.

## 2021-03-08 NOTE — PROGRESS NOTES
Pharmacy Clarification of Prior to Admission Medication Regimen     The patient was not interviewed regarding clarification of the prior to admission medication regimen. Patient was not questioned regarding use of any other inhalers, topical products, over the counter medications, herbal medications, vitamin products or ophthalmic/nasal/otic medication use. Information Obtained From: query, note from prescribing  doctor    Pertinent Pharmacy Findings: was not able to verify last dose of medications taken. Found Dr. Bah Born note from 21 with current medications. Updated patients preferred outpatient pharmacy to: [facility]      PTA medication list was corrected to the following:     Prior to Admission Medications   Prescriptions Last Dose Informant Taking? albuterol (PROVENTIL VENTOLIN) 2.5 mg /3 mL (0.083 %) nebu  Other Yes   Sig: 3 mL by Nebulization route every six (6) hours as needed for Wheezing. glipiZIDE SR (GLUCOTROL XL) 2.5 mg CR tablet  Other Yes   Sig: Take 2.5 mg by mouth two (2) times a day. Breakfast and Dinner  May increase to 5 mg if BS increases   insulin glargine (Basaglar KwikPen U-100 Insulin) 100 unit/mL (3 mL) inpn  Other Yes   Si Units by SubCUTAneous route daily.  Every morning      Facility-Administered Medications: None        Thank you,  Hannah Naylor CPHT  Medication History Pharmacy Technician

## 2021-03-08 NOTE — ED PROVIDER NOTES
EMERGENCY DEPARTMENT HISTORY AND PHYSICAL EXAM      Date: 3/8/2021  Patient Name: Latosha Conner    Please note that this dictation was completed with [employer], the computer voice recognition software. Quite often unanticipated grammatical, syntax, homophones, and other interpretive errors are inadvertently transcribed by the computer software. Please disregard these errors. Please excuse any errors that have escaped final proofreading. History of Presenting Illness     Chief Complaint   Patient presents with    Altered mental status     AMS found by cousin today and house was in a mess.  per EMS     History Provided By: Patient, EMS history limited by altered mental status. HPI: Latosha Conner, 60 y.o. female, insulin-dependent diabetic female presenting the emergency department with altered mentation, elevated blood sugar. Family came to check on her today and found her confused, house in [address] Patient complains of nausea here in the ED. Blood sugar reading high. Review of systems and HPI limited secondary to patient's mental status change, just appears extremely fatigued, voice very weak, unable to answer on my questioning. She is oriented to the year. Denies any chest pain, denies any abdominal pain. No cough. No fever. PCP: Heather Isidro MD    No current facility-administered medications on file prior to encounter. Current Outpatient Medications on File Prior to Encounter   Medication Sig Dispense Refill    insulin glargine (Basaglar KwikPen U-100 Insulin) 100 unit/mL (3 mL) inpn 30 Units by SubCUTAneous route daily. Every morning      glipiZIDE SR (GLUCOTROL XL) 2.5 mg CR tablet Take 2.5 mg by mouth two (2) times a day. Breakfast and Dinner  May increase to 5 mg if BS increases      albuterol (PROVENTIL VENTOLIN) 2.5 mg /3 mL (0.083 %) nebu 3 mL by Nebulization route every six (6) hours as needed for Wheezing.  30 Nebule 0    [DISCONTINUED] insulin glargine (BASAGLAR KWIKPEN U-100 INSULIN) 100 unit/mL (3 mL) inpn 26 Units by SubCUTAneous route Daily (before breakfast). Reduce  By 2-3 units every few days if AM Sugars persist below 100--Dose change 12/27/19--updated med list--did not send prescription to the pharmacy (Patient taking differently: 25 Units by SubCUTAneous route Daily (before breakfast). ) 10 Pen 3       Past History     Past Medical History:  Past Medical History:   Diagnosis Date    Asthma     Diabetes ([address]     Elevated transaminase level 6/29/2016    Insulin dependent diabetes mellitus 6/20/2016    Pancreatic atrophy 6/20/2016       Past Surgical History:  Past Surgical History:   Procedure Laterality Date    HX HEENT         Family History:  Family History   Problem Relation Age of Onset    Cancer Father         prostate and colon    Diabetes Mother     Diabetes Maternal Grandmother     Cancer Maternal Aunt         breast       Social History:  Social History     Tobacco Use    Smoking status: Never Smoker    Smokeless tobacco: Never Used   Substance Use Topics    Alcohol use: No     Frequency: Never     Comment: occasionally    Drug use: No       Allergies: Allergies   Allergen Reactions    Contrast Agent [Iodine] Itching    Hydrocodone Rash    Percocet [Oxycodone-Acetaminophen] Rash     Pt states she is not allergic to Tylenol.  Codeine Nausea and Vomiting    Metformin Diarrhea         Review of Systems   Review of Systems   Reason unable to perform ROS: Limited by clinical condition, mental status change. Physical Exam   Physical Exam  Vitals signs and nursing note reviewed. Constitutional:       General: She is in acute distress. Appearance: She is ill-appearing. Comments: Frail cachectic female   HENT:      Head: Normocephalic and atraumatic. Eyes:      General:         Right eye: No discharge. Left eye: No discharge.       Conjunctiva/sclera: Conjunctivae normal.      Pupils: Pupils are equal, round, and reactive to light. Neck:      Musculoskeletal: Normal range of motion and neck supple. Trachea: No tracheal deviation. Cardiovascular:      Rate and Rhythm: Regular rhythm. Tachycardia present. Heart sounds: Normal heart sounds. No murmur. Pulmonary:      Effort: Pulmonary effort is normal. No respiratory distress. Breath sounds: Normal breath sounds. No wheezing or rales. Abdominal:      General: Bowel sounds are normal.      Palpations: Abdomen is soft. Tenderness: There is no abdominal tenderness. There is no guarding or rebound. Comments: Thin, scaphoid abdomen   Musculoskeletal: Normal range of motion. General: No tenderness or deformity. Skin:     General: Skin is warm and dry. Findings: No erythema or rash. Neurological:      Mental Status: She is alert. Comments: Patient is oriented to time, but not place, she think she is at [address] [place] [address]. She is not completely oriented to situation. Psychiatric:         Behavior: Behavior normal.         Diagnostic Study Results     Labs -     Recent Results (from the past 12 hour(s))   EKG, 12 LEAD, INITIAL    Collection Time: 03/08/21  1:27 PM   Result Value Ref Range    Ventricular Rate 137 BPM    Atrial Rate 137 BPM    P-R Interval 116 ms    QRS Duration 54 ms    Q-T Interval 300 ms    QTC Calculation (Bezet) 453 ms    Calculated P Axis 86 degrees    Calculated R Axis 79 degrees    Calculated T Axis 66 degrees    Diagnosis       Sinus tachycardia  Biatrial enlargement  When compared with ECG of 20-FEB-2019 19:08,  Vent.  rate has increased BY  56 BPM  Confirmed by Phani Tomas ([id number]) on 3/8/2021 5:23:18 PM     CBC WITH AUTOMATED DIFF    Collection Time: 03/08/21  1:30 PM   Result Value Ref Range    WBC 21.1 (H) 3.6 - 11.0 K/uL    RBC 4.21 3.80 - 5.20 M/uL    HGB 12.7 11.5 - 16.0 g/dL    HCT 42.3 35.0 - 47.0 %    .5 (H) 80.0 - 99.0 FL    MCH 30.2 26.0 - 34.0 PG    MCHC 30.0 30.0 - 36.5 g/dL    RDW 13.9 11.5 - 14.5 %    PLATELET 493 567 - 892 K/uL    MPV 12.9 8.9 - 12.9 FL    NRBC 0.0 0  WBC    ABSOLUTE NRBC 0.00 0.00 - 0.01 K/uL    NEUTROPHILS 92 (H) 32 - 75 %    LYMPHOCYTES 4 (L) 12 - 49 %    MONOCYTES 3 (L) 5 - 13 %    EOSINOPHILS 0 0 - 7 %    BASOPHILS 0 0 - 1 %    IMMATURE GRANULOCYTES 1 (H) 0.0 - 0.5 %    ABS. NEUTROPHILS 19.5 (H) 1.8 - 8.0 K/UL    ABS. LYMPHOCYTES 0.8 0.8 - 3.5 K/UL    ABS. MONOCYTES 0.6 0.0 - 1.0 K/UL    ABS. EOSINOPHILS 0.0 0.0 - 0.4 K/UL    ABS. BASOPHILS 0.0 0.0 - 0.1 K/UL    ABS. IMM. GRANS. 0.2 (H) 0.00 - 0.04 K/UL    DF SMEAR SCANNED      RBC COMMENTS NORMOCYTIC, NORMOCHROMIC     METABOLIC PANEL, COMPREHENSIVE    Collection Time: 03/08/21  1:30 PM   Result Value Ref Range    Sodium 146 (H) 136 - 145 mmol/L    Potassium 5.8 (H) 3.5 - 5.1 mmol/L    Chloride 101 97 - 108 mmol/L    CO2 26 21 - 32 mmol/L    Anion gap 19 (H) 5 - 15 mmol/L    Glucose 1,227 (HH) 65 - 100 mg/dL     (H) 6 - 20 MG/DL    Creatinine 4.07 (H) 0.55 - 1.02 MG/DL    BUN/Creatinine ratio 29 (H) 12 - 20      GFR est AA 14 (L) >60 ml/min/1.73m2    GFR est non-AA 11 (L) >60 ml/min/1.73m2    Calcium 10.4 (H) 8.5 - 10.1 MG/DL    Bilirubin, total 0.3 0.2 - 1.0 MG/DL    ALT (SGPT) 30 12 - 78 U/L    AST (SGOT) 17 15 - 37 U/L    Alk.  phosphatase 205 (H) 45 - 117 U/L    Protein, total 8.8 (H) 6.4 - 8.2 g/dL    Albumin 3.7 3.5 - 5.0 g/dL    Globulin 5.1 (H) 2.0 - 4.0 g/dL    A-G Ratio 0.7 (L) 1.1 - 2.2     TROPONIN I    Collection Time: 03/08/21  1:30 PM   Result Value Ref Range    Troponin-I, Qt. <0.05 <0.05 ng/mL   GLUCOSE, POC    Collection Time: 03/08/21  1:34 PM   Result Value Ref Range    Glucose (POC) >600 (HH) 65 - 100 mg/dL    Performed by Tejinder Mchugh    GLUCOSE, POC    Collection Time: 03/08/21  1:36 PM   Result Value Ref Range    Glucose (POC) >600 (HH) 65 - 100 mg/dL    Performed by Tejinder Mchugh    SARS-COV-2    Collection Time: 03/08/21  1:51 PM   Result Value Ref Range    SARS-CoV-2 Please find results under separate order     COVID-19 RAPID TEST    Collection Time: 03/08/21  1:51 PM   Result Value Ref Range    Specimen source Nasopharyngeal      COVID-19 rapid test Not detected NOTD     POC LACTIC ACID    Collection Time: 03/08/21  1:53 PM   Result Value Ref Range    Lactic Acid (POC) 4.36 (HH) 0.40 - 2.00 mmol/L   POC EG7    Collection Time: 03/08/21  2:26 PM   Result Value Ref Range    Calcium, ionized (POC) 1.14 1.12 - 1.32 mmol/L    pH (POC) 7.32 (L) 7.35 - 7.45      pCO2 (POC) 53.2 (H) 35.0 - 45.0 MMHG    pO2 (POC) 35 (LL) 80 - 100 MMHG    HCO3 (POC) 27.3 (H) 22 - 26 MMOL/L    Base excess (POC) 1 mmol/L    sO2 (POC) 62 (L) 92 - 97 %    Site OTHER      Device: OTHER      Allens test (POC) N/A      Specimen type (POC) VENOUS BLOOD     URINALYSIS W/ REFLEX CULTURE    Collection Time: 03/08/21  2:48 PM    Specimen: Urine   Result Value Ref Range    Color YELLOW/STRAW      Appearance HAZY (A) CLEAR      Specific gravity 1.020 1.003 - 1.030      pH (UA) 5.0 5.0 - 8.0      Protein TRACE (A) NEG mg/dL    Glucose >1,000 (A) NEG mg/dL    Ketone TRACE (A) NEG mg/dL    Bilirubin Negative NEG      Blood SMALL (A) NEG      Urobilinogen 0.2 0.2 - 1.0 EU/dL    Nitrites Positive (A) NEG      Leukocyte Esterase Negative NEG      WBC 20-50 0 - 4 /hpf    RBC 0-5 0 - 5 /hpf    Epithelial cells FEW FEW /lpf    Bacteria 4+ (A) NEG /hpf    UA:UC IF INDICATED URINE CULTURE ORDERED (A) CNI     SODIUM, UR, RANDOM    Collection Time: 03/08/21  2:48 PM   Result Value Ref Range    Sodium,urine random 13 MMOL/L   CREATININE, UR, RANDOM    Collection Time: 03/08/21  2:48 PM   Result Value Ref Range    Creatinine, urine 76.20 mg/dL   GLUCOSE, POC    Collection Time: 03/08/21  2:59 PM   Result Value Ref Range    Glucose (POC) >600 (HH) 65 - 100 mg/dL    Performed by Christine Washington    GLUCOSE, POC    Collection Time: 03/08/21  3:00 PM   Result Value Ref Range    Glucose (POC) >600 (HH) 65 - 100 mg/dL    Performed by Christine Washington    HEMOGLOBIN A1C WITH EAG Collection Time: 03/08/21  3:01 PM   Result Value Ref Range    Hemoglobin A1c 10.1 (H) 4.0 - 5.6 %    Est. average glucose 243 mg/dL   GLUCOSTABILIZER    Collection Time: 03/08/21  3:14 PM   Result Value Ref Range    Glucose 601 mg/dL    Insulin order 10.8 units/hour    Insulin adminstered 10.8 units/hour    Multiplier 0.020     Low target 150 mg/dL    High target 250 mg/dL    D50 order 0.0 ml    D50 administered 0.00 ml    Minutes until next BG 60 min    Order initials BC     Administered initials JW     GLSCOM Comments     METABOLIC PANEL, BASIC    Collection Time: 03/08/21  4:06 PM   Result Value Ref Range    Sodium 154 (H) 136 - 145 mmol/L    Potassium 4.6 3.5 - 5.1 mmol/L    Chloride 121 (H) 97 - 108 mmol/L    CO2 22 21 - 32 mmol/L    Anion gap 11 5 - 15 mmol/L    Glucose 908 (HH) 65 - 100 mg/dL     (H) 6 - 20 MG/DL    Creatinine 3.19 (H) 0.55 - 1.02 MG/DL    BUN/Creatinine ratio 31 (H) 12 - 20      GFR est AA 18 (L) >60 ml/min/1.73m2    GFR est non-AA 15 (L) >60 ml/min/1.73m2    Calcium 7.9 (L) 8.5 - 10.1 MG/DL   GLUCOSE, POC    Collection Time: 03/08/21  4:11 PM   Result Value Ref Range    Glucose (POC) >600 (HH) 65 - 100 mg/dL    Performed by Amie Faulkner    POC LACTIC ACID    Collection Time: 03/08/21  4:12 PM   Result Value Ref Range    Lactic Acid (POC) 1.63 0.40 - 2.00 mmol/L   GLUCOSE, POC    Collection Time: 03/08/21  4:14 PM   Result Value Ref Range    Glucose (POC) >600 (HH) 65 - 100 mg/dL    Performed by Emil Sales    Collection Time: 03/08/21  4:18 PM   Result Value Ref Range    Glucose 601 mg/dL    Insulin order 16.2 units/hour    Insulin adminstered 16.2 units/hour    Multiplier 0.030     Low target 150 mg/dL    High target 250 mg/dL    D50 order 0.0 ml    D50 administered 0.00 ml    Minutes until next BG 60 min    Order initials BC     Administered initials BC     GLSCOM Comments     GLUCOSE, POC    Collection Time: 03/08/21  5:14 PM   Result Value Ref Range    Glucose (POC) >600 (HH) 65 - 100 mg/dL    Performed by Cheryl Garner PCT    GLUCOSTABILIZER    Collection Time: 03/08/21  5:18 PM   Result Value Ref Range    Glucose 601 mg/dL    Insulin order 21.6 units/hour    Insulin adminstered 21.6 units/hour    Multiplier 0.040     Low target 150 mg/dL    High target 250 mg/dL    D50 order 0.0 ml    D50 administered 0.00 ml    Minutes until next BG 60 min    Order initials BC     Administered initials ME     GLSCOM Comments     METABOLIC PANEL, BASIC    Collection Time: 03/08/21  5:23 PM   Result Value Ref Range    Sodium 154 (H) 136 - 145 mmol/L    Potassium 3.7 3.5 - 5.1 mmol/L    Chloride 120 (H) 97 - 108 mmol/L    CO2 27 21 - 32 mmol/L    Anion gap 7 5 - 15 mmol/L    Glucose 843 (HH) 65 - 100 mg/dL     (H) 6 - 20 MG/DL    Creatinine 3.30 (H) 0.55 - 1.02 MG/DL    BUN/Creatinine ratio 30 (H) 12 - 20      GFR est AA 17 (L) >60 ml/min/1.73m2    GFR est non-AA 14 (L) >60 ml/min/1.73m2    Calcium 8.3 (L) 8.5 - 10.1 MG/DL   MAGNESIUM    Collection Time: 03/08/21  5:23 PM   Result Value Ref Range    Magnesium 2.9 (H) 1.6 - 2.4 mg/dL   PHOSPHORUS    Collection Time: 03/08/21  5:23 PM   Result Value Ref Range    Phosphorus 2.4 (L) 2.6 - 4.7 MG/DL   GLUCOSE, POC    Collection Time: 03/08/21  6:25 PM   Result Value Ref Range    Glucose (POC) >600 (HH) 65 - 100 mg/dL    Performed by Damaso Goodwin    Collection Time: 03/08/21  6:28 PM   Result Value Ref Range    Glucose 601 mg/dL    Insulin order 10.8 units/hour    Insulin adminstered 10.8 units/hour    Multiplier 0.020     Low target 200 mg/dL    High target 300 mg/dL    D50 order 0.0 ml    D50 administered 0.00 ml    Minutes until next BG 60 min    Order initials RB     Administered initials RB     GLSCOM Comments     GLUCOSTABILIZER    Collection Time: 03/08/21  6:31 PM   Result Value Ref Range    Glucose 601 mg/dL    Insulin order 27.1 units/hour    Insulin adminstered 27.1 units/hour    Multiplier 0.050 Low target 200 mg/dL    High target 300 mg/dL    D50 order 0.0 ml    D50 administered 0.00 ml    Minutes until next BG 60 min    Order initials RB     Administered initials RB     GLSCOM Comments     METABOLIC PANEL, BASIC    Collection Time: 03/08/21  7:27 PM   Result Value Ref Range    Sodium 154 (H) 136 - 145 mmol/L    Potassium 3.7 3.5 - 5.1 mmol/L    Chloride 123 (H) 97 - 108 mmol/L    CO2 23 21 - 32 mmol/L    Anion gap 8 5 - 15 mmol/L    Glucose 729 (HH) 65 - 100 mg/dL    BUN 99 (H) 6 - 20 MG/DL    Creatinine 3.26 (H) 0.55 - 1.02 MG/DL    BUN/Creatinine ratio 30 (H) 12 - 20      GFR est AA 18 (L) >60 ml/min/1.73m2    GFR est non-AA 15 (L) >60 ml/min/1.73m2    Calcium 9.0 8.5 - 10.1 MG/DL   MAGNESIUM    Collection Time: 03/08/21  7:27 PM   Result Value Ref Range    Magnesium 3.0 (H) 1.6 - 2.4 mg/dL   GLUCOSE, POC    Collection Time: 03/08/21  7:49 PM   Result Value Ref Range    Glucose (POC) 592 (H) 65 - 100 mg/dL    Performed by Fide Rodriguez    Collection Time: 03/08/21  7:50 PM   Result Value Ref Range    Glucose 592 mg/dL    Insulin order 31.9 units/hour    Insulin adminstered 31.9 units/hour    Multiplier 0.060     Low target 200 mg/dL    High target 300 mg/dL    D50 order 0.0 ml    D50 administered 0.00 ml    Minutes until next BG 60 min    Order initials RB     Administered initials RB     GLSCOM Comments     GLUCOSE, POC    Collection Time: 03/08/21  8:42 PM   Result Value Ref Range    Glucose (POC) 562 (H) 65 - 100 mg/dL    Performed by Monie Walls RN    GLUCOSTABILIZER    Collection Time: 03/08/21  8:52 PM   Result Value Ref Range    Glucose 562 mg/dL    Insulin order 35.1 units/hour    Insulin adminstered 35.1 units/hour    Multiplier 0.070     Low target 200 mg/dL    High target 300 mg/dL    D50 order 0.0 ml    D50 administered 0.00 ml    Minutes until next BG 60 min    Order initials aak     Administered initials aak     GLSCOM Comments     GLUCOSE, POC    Collection Time: 03/08/21  9:49 PM   Result Value Ref Range    Glucose (POC) 467 (H) 65 - 100 mg/dL    Performed by Rafael Davis RN    GLUCOSTABILIZER    Collection Time: 03/08/21  9:50 PM   Result Value Ref Range    Glucose 467 mg/dL    Insulin order 32.6 units/hour    Insulin adminstered 32.6 units/hour    Multiplier 0.080     Low target 200 mg/dL    High target 300 mg/dL    D50 order 0.0 ml    D50 administered 0.00 ml    Minutes until next BG 60 min    Order initials aak     Administered initials aak     GLSCOM Comments     METABOLIC PANEL, BASIC    Collection Time: 03/08/21 10:25 PM   Result Value Ref Range    Sodium 158 (H) 136 - 145 mmol/L    Potassium 3.6 3.5 - 5.1 mmol/L    Chloride 125 (H) 97 - 108 mmol/L    CO2 26 21 - 32 mmol/L    Anion gap 7 5 - 15 mmol/L    Glucose 463 (H) 65 - 100 mg/dL    BUN 91 (H) 6 - 20 MG/DL    Creatinine 2.98 (H) 0.55 - 1.02 MG/DL    BUN/Creatinine ratio 31 (H) 12 - 20      GFR est AA 20 (L) >60 ml/min/1.73m2    GFR est non-AA 16 (L) >60 ml/min/1.73m2    Calcium 9.0 8.5 - 10.1 MG/DL   MAGNESIUM    Collection Time: 03/08/21 10:25 PM   Result Value Ref Range    Magnesium 2.9 (H) 1.6 - 2.4 mg/dL   GLUCOSE, POC    Collection Time: 03/08/21 10:56 PM   Result Value Ref Range    Glucose (POC) 382 (H) 65 - 100 mg/dL    Performed by Rafael Davis RN    GLUCOSTABILIZER    Collection Time: 03/08/21 10:57 PM   Result Value Ref Range    Glucose 382 mg/dL    Insulin order 29.0 units/hour    Insulin adminstered 29.0 units/hour    Multiplier 0.090     Low target 200 mg/dL    High target 300 mg/dL    D50 order 0.0 ml    D50 administered 0.00 ml    Minutes until next BG 60 min    Order initials aak     Administered initials aak     GLSCOM Comments         Radiologic Studies -   US RETROPERITONEUM COMP   Final Result   Normal Renal Sonogram.         CT HEAD WO CONT   Final Result   No acute intracranial finding. CT ABD PELV WO CONT   Final Result   No Acute Disease.           XR CHEST PORT   Final Result No Acute Disease. CT Results  (Last 48 hours)               03/08/21 1554  CT HEAD WO CONT Final result    Impression:  No acute intracranial finding. Narrative:  INDICATION: Altered mental status. EXAM: CT HEAD without contrast.       TECHNIQUE: Unenhanced CT Head is performed. CT dose reduction was achieved   through use of a standardized protocol tailored for this examination and   automatic exposure control for dose modulation. FINDINGS: Brain parenchyma shows no CT apparent ischemia. There is no apparent   mass on unenhanced imaging. There is no bleed, shift, obstructive hydrocephalus   or significant extra-axial fluid collection. Bone windows are unremarkable. 03/08/21 1554  CT ABD PELV WO CONT Final result    Impression:  No Acute Disease. Narrative:  INDICATION: asssess of obstructing stone        EXAM: CT Abdomen and Pelvis without IV contrast. No oral contrast.   CT dose reduction was achieved through use of a standardized protocol tailored   for this examination and automatic exposure control for dose modulation. FINDINGS:    No urinary tract stones are seen. There is no hydroureteronephrosis. The kidneys   are normal in size. There is no perirenal fluid or ascites. Liver shows no apparent significant finding without contrast. Pancreas, adrenal   glands, spleen and aorta show no significant enlargement. No inflammation is   seen. There is no pneumoperitoneum or significant adenopathy. There is a catheter in the bladder. The distal ureters are not dilated. There is   no apparent pelvic mass. The appendix is normal. Bowels are not dilated. CXR Results  (Last 48 hours)               03/08/21 1401  XR CHEST PORT Final result    Impression:  No Acute Disease. Narrative:  EXAM: Portable CXR. 1348 hours. INDICATION: Altered mental status, Eval for Infiltrate       FINDINGS:   The lungs appear clear.  Heart is normal in size. There is no pulmonary edema. There is no evident pneumothorax or pleural effusion. Medical Decision Making   I am the first provider for this patient. I reviewed the vital signs, available nursing notes, past medical history, past surgical history, family history and social history. Vital Signs-Reviewed the patient's vital signs. Patient Vitals for the past 12 hrs:   Temp Pulse Resp BP SpO2   03/08/21 2302 97.7 °F (36.5 °C) (!) 104 11 (!) 122/52 100 %   03/08/21 1956 97.4 °F (36.3 °C) (!) 115 16 122/68 100 %   03/08/21 1937 -- -- -- -- 100 %   03/08/21 1815 -- (!) 104 19 118/62 100 %   03/08/21 1730 97.2 °F (36.2 °C) (!) 111 20 131/79 100 %   03/08/21 1654 -- (!) 101 24 (!) 106/55 --   03/08/21 1649 -- (!) 108 14 -- 99 %   03/08/21 1500 -- (!) 122 16 120/63 --   03/08/21 1439 -- (!) 137 21 -- --   03/08/21 1438 -- -- -- 107/69 --   03/08/21 1345 -- (!) 134 11 101/61 --   03/08/21 1332 96.9 °F (36.1 °C) (!) 136 23 106/67 100 %       EKG interpretation: (Preliminary)  EKG shows sinus tachycardia, rate 137. Normal axis. T waves appear mildly peaked. No evidence of ST elevation myocardial infarction. Interpreted by me    Records Reviewed:   Nursing notes, Prior visits         Provider Notes (Medical Decision Making):   Patient presentation most consistent with likely DKA. Will obtain CT head to assess for intracranial abnormality such as intracranial bleed given altered mentation. Will check labs, high concern for the possibility of hyperkalemia given peak T waves. Uncertain etiology for patient developing DKA or hyperglycemia, differential includes ACS, infectious process, worsening renal dysfunction. Anticipate hospitalization, patient is critically ill and can decompensate at any moment. ED Course:   Initial assessment performed. The patients presenting problems have been discussed, and they are in agreement with the care plan formulated and outlined with them.   I have encouraged them to ask questions as they arise throughout their visit. ED Course as of Mar 08 2340   Mon Mar 08, 2021   1602 Discussed with the intensivist, they feel like the patient is likely appropriate for stepdown, requesting a repeat BMP and repeat lactic. If these are trending down they do not feel that she needs ICU admission.    [AR]      ED Course User Index  [AR] Clint Choi DO               Critical Care Time:   I have spent 65 minutes of critical care time in evaluating and treating this patient. This includes time spent at bedside, time with family and decision makers, documentation, review of labs and imaging, and/or consultation with specialists. It does not include time spent on separately billed procedures. This patient presents with a critical illness or injury that acutely impairs one or more vital organ systems such that there is a high probability of imminent or life threatening deterioration in the patient's condition. This case involved decision making of high complexity to assess, manipulate, and support vital organ system failure and/or to prevent further life threatening deterioration of the patient's condition. Failure to initiate these interventions on an urgent basis would likely result in sudden, clinically significant or life threatening deterioration in the patient's condition. Abnormal findings supporting critical care: Diabetic ketoacidosis versus HHS, acute encephalopathy, hypoglycemia, renal failure  Interventions to support critical care: IV fluids, insulin drip, empiric antibiotics, lab interpretation, repeat lab monitoring  Failure to intervene may result in: Cerebral edema, apnea, death, renal failure    Disposition:    Patient is being admitted to the hospital by Dr. Laurel Benitez. The results of their tests and reasons for their admission have been discussed with them and/or available family.   They convey agreement and understanding for the need to be admitted and for their admission diagnosis. Consultation has been made with the inpatient physician specialist for hospitalization. PLAN:  1. Current Discharge Medication List        2. Follow-up Information    None         Return to ED if worse     Diagnosis     Clinical Impression:   1. Hyperosmolar hyperglycemic state (HHS) ([facility] [address]    2. Diabetic ketoacidosis without coma associated with type 1 diabetes mellitus ([facility] [address] [facility]    3. NEERAJ (acute kidney injury) ([facility] [address]    4. Lactic acid acidosis    5. Leukocytosis, unspecified type    6. SIRS (systemic inflammatory response syndrome) ([facility])        Attestations:   This note was completed by Carlitos Khanna DO

## 2021-03-09 LAB
ADMINISTERED INITIALS, ADMINIT: NORMAL
ANION GAP SERPL CALC-SCNC: 4 MMOL/L (ref 5–15)
BUN SERPL-MCNC: 77 MG/DL (ref 6–20)
BUN/CREAT SERPL: 31 (ref 12–20)
CALCIUM SERPL-MCNC: 9.4 MG/DL (ref 8.5–10.1)
CHLORIDE SERPL-SCNC: 129 MMOL/L (ref 97–108)
CO2 SERPL-SCNC: 27 MMOL/L (ref 21–32)
CREAT SERPL-MCNC: 2.52 MG/DL (ref 0.55–1.02)
D50 ADMINISTERED, D50ADM: 0 ML
D50 ORDER, D50ORD: 0 ML
GLSCOM COMMENTS: NORMAL
GLUCOSE BLD STRIP.AUTO-MCNC: 109 MG/DL (ref 65–100)
GLUCOSE BLD STRIP.AUTO-MCNC: 117 MG/DL (ref 65–100)
GLUCOSE BLD STRIP.AUTO-MCNC: 119 MG/DL (ref 65–100)
GLUCOSE BLD STRIP.AUTO-MCNC: 124 MG/DL (ref 65–100)
GLUCOSE BLD STRIP.AUTO-MCNC: 126 MG/DL (ref 65–100)
GLUCOSE BLD STRIP.AUTO-MCNC: 153 MG/DL (ref 65–100)
GLUCOSE BLD STRIP.AUTO-MCNC: 154 MG/DL (ref 65–100)
GLUCOSE BLD STRIP.AUTO-MCNC: 208 MG/DL (ref 65–100)
GLUCOSE BLD STRIP.AUTO-MCNC: 212 MG/DL (ref 65–100)
GLUCOSE BLD STRIP.AUTO-MCNC: 216 MG/DL (ref 65–100)
GLUCOSE BLD STRIP.AUTO-MCNC: 220 MG/DL (ref 65–100)
GLUCOSE BLD STRIP.AUTO-MCNC: 238 MG/DL (ref 65–100)
GLUCOSE BLD STRIP.AUTO-MCNC: 259 MG/DL (ref 65–100)
GLUCOSE BLD STRIP.AUTO-MCNC: 261 MG/DL (ref 65–100)
GLUCOSE BLD STRIP.AUTO-MCNC: 276 MG/DL (ref 65–100)
GLUCOSE BLD STRIP.AUTO-MCNC: 287 MG/DL (ref 65–100)
GLUCOSE BLD STRIP.AUTO-MCNC: 291 MG/DL (ref 65–100)
GLUCOSE BLD STRIP.AUTO-MCNC: 305 MG/DL (ref 65–100)
GLUCOSE BLD STRIP.AUTO-MCNC: 342 MG/DL (ref 65–100)
GLUCOSE BLD STRIP.AUTO-MCNC: 352 MG/DL (ref 65–100)
GLUCOSE BLD STRIP.AUTO-MCNC: 99 MG/DL (ref 65–100)
GLUCOSE SERPL-MCNC: 93 MG/DL (ref 65–100)
GLUCOSE, GLC: 109 MG/DL
GLUCOSE, GLC: 117 MG/DL
GLUCOSE, GLC: 119 MG/DL
GLUCOSE, GLC: 124 MG/DL
GLUCOSE, GLC: 126 MG/DL
GLUCOSE, GLC: 153 MG/DL
GLUCOSE, GLC: 154 MG/DL
GLUCOSE, GLC: 208 MG/DL
GLUCOSE, GLC: 216 MG/DL
GLUCOSE, GLC: 259 MG/DL
GLUCOSE, GLC: 261 MG/DL
GLUCOSE, GLC: 276 MG/DL
GLUCOSE, GLC: 287 MG/DL
GLUCOSE, GLC: 291 MG/DL
GLUCOSE, GLC: 305 MG/DL
GLUCOSE, GLC: 342 MG/DL
GLUCOSE, GLC: 352 MG/DL
GLUCOSE, GLC: 99 MG/DL
HIGH TARGET, HITG: 300 MG/DL
INSULIN ADMINSTERED, INSADM: 0.1 UNITS/HOUR
INSULIN ADMINSTERED, INSADM: 0.2 UNITS/HOUR
INSULIN ADMINSTERED, INSADM: 0.5 UNITS/HOUR
INSULIN ADMINSTERED, INSADM: 0.8 UNITS/HOUR
INSULIN ADMINSTERED, INSADM: 14.8 UNITS/HOUR
INSULIN ADMINSTERED, INSADM: 2 UNITS/HOUR
INSULIN ADMINSTERED, INSADM: 2.6 UNITS/HOUR
INSULIN ADMINSTERED, INSADM: 2.7 UNITS/HOUR
INSULIN ADMINSTERED, INSADM: 2.9 UNITS/HOUR
INSULIN ADMINSTERED, INSADM: 23.1 UNITS/HOUR
INSULIN ADMINSTERED, INSADM: 28.2 UNITS/HOUR
INSULIN ADMINSTERED, INSADM: 3.8 UNITS/HOUR
INSULIN ADMINSTERED, INSADM: 4.2 UNITS/HOUR
INSULIN ADMINSTERED, INSADM: 4.5 UNITS/HOUR
INSULIN ADMINSTERED, INSADM: 6.1 UNITS/HOUR
INSULIN ADMINSTERED, INSADM: 7.5 UNITS/HOUR
INSULIN ORDER, INSORD: 0.1 UNITS/HOUR
INSULIN ORDER, INSORD: 0.2 UNITS/HOUR
INSULIN ORDER, INSORD: 0.5 UNITS/HOUR
INSULIN ORDER, INSORD: 0.8 UNITS/HOUR
INSULIN ORDER, INSORD: 14.8 UNITS/HOUR
INSULIN ORDER, INSORD: 2 UNITS/HOUR
INSULIN ORDER, INSORD: 2.6 UNITS/HOUR
INSULIN ORDER, INSORD: 2.7 UNITS/HOUR
INSULIN ORDER, INSORD: 2.9 UNITS/HOUR
INSULIN ORDER, INSORD: 23.1 UNITS/HOUR
INSULIN ORDER, INSORD: 28.2 UNITS/HOUR
INSULIN ORDER, INSORD: 3.8 UNITS/HOUR
INSULIN ORDER, INSORD: 4.2 UNITS/HOUR
INSULIN ORDER, INSORD: 4.5 UNITS/HOUR
INSULIN ORDER, INSORD: 6.1 UNITS/HOUR
INSULIN ORDER, INSORD: 7.5 UNITS/HOUR
LOW TARGET, LOT: 200 MG/DL
MAGNESIUM SERPL-MCNC: 2.6 MG/DL (ref 1.6–2.4)
MINUTES UNTIL NEXT BG, NBG: 60 MIN
MULTIPLIER, MUL: 0
MULTIPLIER, MUL: 0.01
MULTIPLIER, MUL: 0.01
MULTIPLIER, MUL: 0.02
MULTIPLIER, MUL: 0.03
MULTIPLIER, MUL: 0.04
MULTIPLIER, MUL: 0.05
MULTIPLIER, MUL: 0.06
MULTIPLIER, MUL: 0.08
MULTIPLIER, MUL: 0.1
ORDER INITIALS, ORDINIT: NORMAL
OSMOLALITY SERPL: 384 MOSM/KG H2O
POTASSIUM SERPL-SCNC: 3.8 MMOL/L (ref 3.5–5.1)
SERVICE CMNT-IMP: ABNORMAL
SERVICE CMNT-IMP: NORMAL
SODIUM SERPL-SCNC: 160 MMOL/L (ref 136–145)

## 2021-03-09 PROCEDURE — 65660000000 HC RM CCU STEPDOWN

## 2021-03-09 PROCEDURE — 74011250637 HC RX REV CODE- 250/637: Performed by: INTERNAL MEDICINE

## 2021-03-09 PROCEDURE — 74011636637 HC RX REV CODE- 636/637: Performed by: INTERNAL MEDICINE

## 2021-03-09 PROCEDURE — 77030005513 HC CATH URETH FOL11 MDII -B

## 2021-03-09 PROCEDURE — 74011000250 HC RX REV CODE- 250: Performed by: INTERNAL MEDICINE

## 2021-03-09 PROCEDURE — 94640 AIRWAY INHALATION TREATMENT: CPT

## 2021-03-09 PROCEDURE — 36415 COLL VENOUS BLD VENIPUNCTURE: CPT

## 2021-03-09 PROCEDURE — 80048 BASIC METABOLIC PNL TOTAL CA: CPT

## 2021-03-09 PROCEDURE — 74011000258 HC RX REV CODE- 258: Performed by: INTERNAL MEDICINE

## 2021-03-09 PROCEDURE — 82962 GLUCOSE BLOOD TEST: CPT

## 2021-03-09 PROCEDURE — 74011250636 HC RX REV CODE- 250/636: Performed by: INTERNAL MEDICINE

## 2021-03-09 PROCEDURE — 83735 ASSAY OF MAGNESIUM: CPT

## 2021-03-09 PROCEDURE — 74011000250 HC RX REV CODE- 250: Performed by: NURSE PRACTITIONER

## 2021-03-09 RX ORDER — SODIUM CHLORIDE 450 MG/100ML
100 INJECTION, SOLUTION INTRAVENOUS CONTINUOUS
Status: DISCONTINUED | OUTPATIENT
Start: 2021-03-09 | End: 2021-03-10

## 2021-03-09 RX ORDER — NYSTATIN 100000 [USP'U]/ML
500000 SUSPENSION ORAL 4 TIMES DAILY
Status: DISCONTINUED | OUTPATIENT
Start: 2021-03-09 | End: 2021-03-17 | Stop reason: HOSPADM

## 2021-03-09 RX ORDER — DEXTROSE MONOHYDRATE AND SODIUM CHLORIDE 5; .225 G/100ML; G/100ML
200 INJECTION, SOLUTION INTRAVENOUS CONTINUOUS
Status: DISCONTINUED | OUTPATIENT
Start: 2021-03-09 | End: 2021-03-09

## 2021-03-09 RX ORDER — INSULIN GLARGINE 100 [IU]/ML
2 INJECTION, SOLUTION SUBCUTANEOUS
Status: COMPLETED | OUTPATIENT
Start: 2021-03-09 | End: 2021-03-09

## 2021-03-09 RX ORDER — DEXTROSE MONOHYDRATE AND SODIUM CHLORIDE 5; .225 G/100ML; G/100ML
100 INJECTION, SOLUTION INTRAVENOUS CONTINUOUS
Status: DISCONTINUED | OUTPATIENT
Start: 2021-03-09 | End: 2021-03-10

## 2021-03-09 RX ADMIN — DEXTROSE MONOHYDRATE AND SODIUM CHLORIDE 200 ML/HR: 5; .225 INJECTION, SOLUTION INTRAVENOUS at 04:04

## 2021-03-09 RX ADMIN — SODIUM CHLORIDE 2.9 UNITS/HR: 9 INJECTION, SOLUTION INTRAVENOUS at 05:30

## 2021-03-09 RX ADMIN — SODIUM CHLORIDE: 234 INJECTION, SOLUTION INTRAVENOUS at 03:08

## 2021-03-09 RX ADMIN — HEPARIN SODIUM 5000 UNITS: 5000 INJECTION INTRAVENOUS; SUBCUTANEOUS at 21:21

## 2021-03-09 RX ADMIN — NYSTATIN 500000 UNITS: 100000 SUSPENSION ORAL at 17:42

## 2021-03-09 RX ADMIN — IPRATROPIUM BROMIDE AND ALBUTEROL SULFATE 3 ML: .5; 3 SOLUTION RESPIRATORY (INHALATION) at 01:24

## 2021-03-09 RX ADMIN — SODIUM CHLORIDE 10 ML: 9 INJECTION, SOLUTION INTRAMUSCULAR; INTRAVENOUS; SUBCUTANEOUS at 21:21

## 2021-03-09 RX ADMIN — PIPERACILLIN AND TAZOBACTAM 3.38 G: 3; .375 INJECTION, POWDER, LYOPHILIZED, FOR SOLUTION INTRAVENOUS at 21:20

## 2021-03-09 RX ADMIN — SODIUM CHLORIDE 4.2 UNITS/HR: 9 INJECTION, SOLUTION INTRAVENOUS at 18:00

## 2021-03-09 RX ADMIN — INSULIN GLARGINE 2 UNITS: 100 INJECTION, SOLUTION SUBCUTANEOUS at 14:11

## 2021-03-09 RX ADMIN — SODIUM CHLORIDE 100 ML/HR: 4.5 INJECTION, SOLUTION INTRAVENOUS at 12:22

## 2021-03-09 RX ADMIN — IPRATROPIUM BROMIDE AND ALBUTEROL SULFATE 3 ML: .5; 3 SOLUTION RESPIRATORY (INHALATION) at 19:32

## 2021-03-09 RX ADMIN — DEXTROSE MONOHYDRATE AND SODIUM CHLORIDE 100 ML/HR: 5; .225 INJECTION, SOLUTION INTRAVENOUS at 17:42

## 2021-03-09 RX ADMIN — NYSTATIN 500000 UNITS: 100000 SUSPENSION ORAL at 21:21

## 2021-03-09 RX ADMIN — IPRATROPIUM BROMIDE AND ALBUTEROL SULFATE 3 ML: .5; 3 SOLUTION RESPIRATORY (INHALATION) at 08:36

## 2021-03-09 RX ADMIN — IPRATROPIUM BROMIDE AND ALBUTEROL SULFATE 3 ML: .5; 3 SOLUTION RESPIRATORY (INHALATION) at 14:01

## 2021-03-09 RX ADMIN — SODIUM CHLORIDE 10 ML: 9 INJECTION, SOLUTION INTRAMUSCULAR; INTRAVENOUS; SUBCUTANEOUS at 12:22

## 2021-03-09 RX ADMIN — SODIUM CHLORIDE 10 ML: 9 INJECTION, SOLUTION INTRAMUSCULAR; INTRAVENOUS; SUBCUTANEOUS at 05:30

## 2021-03-09 RX ADMIN — PIPERACILLIN AND TAZOBACTAM 3.38 G: 3; .375 INJECTION, POWDER, LYOPHILIZED, FOR SOLUTION INTRAVENOUS at 07:58

## 2021-03-09 RX ADMIN — HEPARIN SODIUM 5000 UNITS: 5000 INJECTION INTRAVENOUS; SUBCUTANEOUS at 08:38

## 2021-03-09 NOTE — ROUTINE PROCESS
Bedside and Verbal shift change report given to AL (oncoming nurse) by TRINO (offgoing nurse). Report included the following information SBAR, Kardex, ED Summary, Intake/Output, MAR, Med Rec Status, Alarm Parameters  and Quality Measures. 0140: Pt combs came out due to frequent and forceful turning by Pt in bed. Messaged NP and re-order placed. 0325: Pt pulled out second combs. Will bladder scan in an hour and try to monitor Pt voiding. 5502: Bladder scanned 123 mL. End of Shift Note    Bedside shift change report given to Anselmo (oncoming nurse) by Addis Flores (offgoing nurse). Report included the following information SBAR, Kardex, ED Summary, Intake/Output, MAR, Recent Results, Med Rec Status, Alarm Parameters  and Quality Measures    Shift worked:  Night     Shift summary and any significant changes:     See above note. Concerns for physician to address:  . ... Zone phone for oncoming shift:   [phone]       Activity:  Activity Level: Bed Rest  Number times ambulated in hallways past shift: 0  Number of times OOB to chair past shift: 0    Cardiac:   Cardiac Monitoring: Yes      Cardiac Rhythm: Normal sinus rhythm    Access:   Current line(s): PIV     Genitourinary:   Urinary status: voiding and combs    Respiratory:   O2 Device: Room air  Chronic home O2 use?: NO  Incentive spirometer at bedside: NO     GI:     Current diet:  DIET NPO Except Meds  Passing flatus: YES  Tolerating current diet: YES       Pain Management:   Patient states pain is manageable on current regimen: YES    Skin:  Vinh Score: 15  Interventions: float heels and internal/external urinary devices    Patient Safety:  Fall Score:  Total Score: 4  Interventions: bed/chair alarm, gripper socks and pt to call before getting OOB  High Fall Risk: Yes    Length of Stay:  Expected LOS: - - -  Actual LOS: 1      Addis Flores

## 2021-03-09 NOTE — PROGRESS NOTES
Heparin dose adjustment for  Wt < 60kg  (44.9kg)    5000 units sc q8h >> 5000 units q12h    IMTIAZ Lemons SERA [facility]

## 2021-03-09 NOTE — PROGRESS NOTES
Transition of Care Plan:    Disposition: Home possible needing home health services  Follow up appointments:PCP  DME needed:TBD, Endocrinology  Transportation at [address] or means to access home: Family      IM Medicare letter:N/A due to pt having Arabella Muro-[phone]  Discharge Caregiver contacted prior to discharge? CM    Reason for Admission:  AMS                     RUR Score:    13%                 Plan for utilizing home health:  TBD-No hx of home health     PCP: First and Last name:  Varinder uTrk MD               Name of Practice:    Are you a current patient: Yes/No: Yes   Approximate date of last visit: Feb 26, 2021   Can you participate in a virtual visit with your PCP: Yes                  Current Advanced Directive/Advance Care Plan: Full Code    Rigo 13 (ACP) Conversation      Date of Conversation: 03/09/2021  Conducted with: Patient with Decision Making Capacity    Healthcare Decision Maker:     Primary Decision Maker: Shira Oseguera - Other Relative - [phone]    Secondary Decision Maker: Pee Kothari - Skyler Relative - [phone]  Click here to complete [address] including selection of the Healthcare Decision Maker Relationship (ie \"Primary\")  Today we documented Decision Maker(s) consistent with ACP documents on file. Content/Action Overview:    Has ACP document(s) on file - reflects the patient's care preferences  Reviewed DNR/DNI and patient elects Full Code (Attempt Resuscitation)         Length of Voluntary ACP Conversation in minutes:  <16 minutes (Non-Billable)    Elsi Erickson 238:   Click here to complete [address] including selection of the [address] Relationship (ie \"Primary\")             Primary Decision Maker: Shira Oseguear - Other Relative - [phone]    Secondary Decision Maker: Pee Mina Other Relative - [phone]                  Transition of Care Plan:                      Pt is a 59 yo female who was admitted to [address] [place] [address] with a dx of AMS. PMHx includes asthma, diabetes, elevated transaminase level, insulin dependent diabetes mellitus and pancreatic atrophy. CM confirmed demographics with pt. Pt lives alone in a two story home with four steps to enter. No DME reported. No hx of home health services or being admitted to a SNF or inpatient rehab. Pt has a hx of using outpatient rehab. Pt is independent in her ADLs and IADLs. Pt or pt's sister drives herself to her medical appointments. Pt uses [facility]. At the time of d/c family will transport. CM will continue to follow and assist with d/c planning. Care Management Interventions  PCP Verified by CM: Yes(Clarissa Prieto)  Mode of Transport at Discharge: Other (see comment)(Family to transport)  Transition of Care Consult (CM Consult): Discharge Planning, Home Health(Home alone possible needing home health services)  Discharge Durable Medical Equipment: No(No DME reported)  Physical Therapy Consult: No  Occupational Therapy Consult: No  Speech Therapy Consult: No  Current Support Network: Lives Alone, Family Lives Nearby(Pt lives alone but family is supportive )  Confirm Follow Up Transport: Family(Family transport pt to her medical appointments)  Discharge Location  Discharge Placement: Unable to determine at this time    Kelley Leblanc.   Care Manager [address] [place] [address]  [phone]

## 2021-03-09 NOTE — DIABETES MGMT
This is a 49-year-old -American female with a history of type 2 diabetes mellitus x5 years admitted with HHS. Patient is followed by Dr. Batool Slater and Dr. Rajni Rosado in endocrinology. She was most recently seen in February and was reportedly taking Basaglar 30 units daily plus glipizide 2.5 mg twice daily. She was referred to the emergency room on March 8, 2021 because of altered mental status. On admission to the emergency room she was found to have a glucose of 908 with a normal anion gap, a BUN of 100, creatinine 3.2, sodium 154, and A1c of 10.1%. She has been treated with vigorous fluid resuscitation as well as intravenous insulin. Past medical history  Chronic kidney disease stage IIIb  Cirrhosis on liver biopsy in early 2020; ? etiology    On examination this is a responsive but very disoriented woman who is frankly hallucinating. \"I do not want to get on the ship\"  Blood pressure 121/61  Pulse 99  Afebrile  100% on room air  HEENT unremarkable neck supple without thyromegaly   lungs clear  Heart reveals a regular rate and rhythm  Abdomen soft and nontender  Extremities unremarkable. The feet are dry without ulceration or tissue breakdown. Most recent laboratory data notable for sodium 160, glucose 93, creatinine 2.52, BUN 77    The patient remains on glucose stabilizer with minimal insulin infusion at this point. Receiving D5 quarter normal saline. Impression  1. HHS with persistent altered mental status despite improved glucose. 2.  History of type 2 diabetes mellitus with poor blood sugar control  3. History of abnormal liver function tests and cirrhosis which may be contributing to AMS  4. Chronic kidney disease with a baseline creatinine of 1.58 now with acute exacerbation    Plan:  1.   I would recommend continued glucostabilizer and fluid resuscitation until altered mental status is improved  2.  30 units of Basaglar insulin seems like a lot for this woman who weighs 45 kg. Once basal insulin is able to be given, I would recommend no more than 15 units particularly given her renal insufficiency  3. We continue to follow with you.     Total time 35 minutes

## 2021-03-09 NOTE — PROGRESS NOTES
Received message from patient's nurse Michael Kwong  stating :    POC  just now and insulin at 7.5     Discussion / orders: Added dextrose to IV fluids           Please note that this note was dictated using Dragon computer voice recognition software. Quite often unanticipated grammatical, syntax, homophones, and other interpretive errors are inadvertently transcribed by the computer software. Please disregard these errors. Please excuse any errors that have escaped final proofreading.

## 2021-03-09 NOTE — H&P
Hospitalist Admission Note    NAME: Valarie Panda   :  1965   MRN:  [id number]     Date/Time:  3/8/2021 7:14 PM    Patient PCP: Aakash Ferraro MD  ______________________________________________________________________  Given the patient's current clinical presentation, I have a high level of concern for decompensation if discharged from the emergency department. Complex decision making was performed, which includes reviewing the patient's available past medical records, laboratory results, and x-ray films. My assessment of this patient's clinical condition and my plan of care is as follows. Assessment / Plan:  Hyperosmolar hyperglycemic state versus DKA  Controlled diabetes mellitus  Admit to stepdown unit, continue with insulin drip until anion gap closed x2 and and until patient is awake alert and back to baseline. We will follow-up BMP mag Phos every 4 hours, replete as needed  Sodium is 146 on admission, has been worsening to 154, will change half-normal saline to quarter normal saline 200 cc/h  We will add dextrose once blood sugar below 300  Neurochecks, n.p.o. Check HbA1c  Hypernatremia  Acute kidney injury onCKD stage III  Hypophosphatemia  Baseline creatinine about 1.58, patient presented with creatinine of 4.07. Most likely prerenal.  Will check renal ultrasound  Continue with aggressive hydration  Replete with p.o. K-Phos    ? Severe sepsis secondary to UTI POA:  wbc 21k maki    continue with Zosyn started by the ED physician, follow blood cultures urine cultures     I have spent critical care time involved in lab review, consultations with specialist, family decision-making, and documentation. During this entire length of time I was immediately available to the patient.  The reason for providing this level of medical care for this critically ill patient was due to a critical illness that impaired one or more vital organ systems such that there was a high probability of imminent or life threatening deterioration in the patients condition. This care involved high complexity decision making to assess, manipulate, and support vital system functions, to treat this degreee vital organ system failure and to prevent further life threatening deterioration of the patients condition. Code Status: Full code  Surrogate Decision Maker:    DVT Prophylaxis: heparin  GI Prophylaxis: not indicated    Baseline: Ambulatory      Subjective:   CHIEF COMPLAINT: Minimally responsive    HISTORY OF PRESENT ILLNESS:     Johnie Simpson is a 60 y.o.   female medical history of diabetes, asthma who who was sent to the ED by family after being found  Confused and agitated by the cousin and patient house was found to be mess . Most of the HPI obtained from the ED notes as no family members at bedside and patient is still confused. In the ED, patient was found to be tachycardic, soft blood pressure. Her labs revealed evaded white blood cell count 21,000, BMP showed elevated blood sugar at 1200, hyperkalemia 5.8, elevated creatinine around 4.07, anion gap metabolic acidosis  BMP also showed hypernatremia  Her UA was concerning for UTI. CT of the brain is within normal limit, CT abdomen and pelvis did not show any acute pathology. We were asked to admit for work up and evaluation of the above problems.      Past Medical History:   Diagnosis Date    Asthma     Diabetes ([facility] [address]     Elevated transaminase level 6/29/2016    Insulin dependent diabetes mellitus 6/20/2016    Pancreatic atrophy 6/20/2016        Past Surgical History:   Procedure Laterality Date    HX HEENT         Social History     Tobacco Use    Smoking status: Never Smoker    Smokeless tobacco: Never Used   Substance Use Topics    Alcohol use: No     Frequency: Never     Comment: occasionally        Family History   Problem Relation Age of Onset    Cancer Father         prostate and colon    Diabetes Mother     Diabetes Maternal Grandmother     Cancer Maternal Aunt         breast     Allergies   Allergen Reactions    Contrast Agent [Iodine] Itching    Hydrocodone Rash    Percocet [Oxycodone-Acetaminophen] Rash     Pt states she is not allergic to Tylenol.  Codeine Nausea and Vomiting    Metformin Diarrhea        Prior to Admission medications    Medication Sig Start Date End Date Taking? Authorizing Provider   insulin glargine (Basaglar KwikPen U-100 Insulin) 100 unit/mL (3 mL) inpn 30 Units by SubCUTAneous route daily. Every morning   Yes Provider, Historical   glipiZIDE SR (GLUCOTROL XL) 2.5 mg CR tablet Take 2.5 mg by mouth two (2) times a day. Breakfast and Dinner  May increase to 5 mg if BS increases   Yes Provider, Historical   albuterol (PROVENTIL VENTOLIN) 2.5 mg /3 mL (0.083 %) nebu 3 mL by Nebulization route every six (6) hours as needed for Wheezing. 3/30/20  Yes Chacha Hernandez MD       REVIEW OF SYSTEMS:     I am not able to complete the review of systems because: The patient is intubated and sedated   x The patient has altered mental status due to his acute medical problems    The patient has baseline aphasia from prior stroke(s)    The patient has baseline dementia and is not reliable historian    The patient is in acute medical distress and unable to provide information             Objective:   VITALS:    Visit Vitals  /62   Pulse (!) 104   Temp 97.2 °F (36.2 °C)   Resp 19   Ht 5' 3\" (1.6 m)   Wt 44.9 kg (99 lb)   SpO2 100%   BMI 17.54 kg/m²       PHYSICAL EXAM:    General:    Sleepy and confused  HEENT: Atraumatic, anicteric sclerae, pink conjunctivae     No oral ulcers, mucosa moist, throat clear, dentition fair  Neck:  Supple, symmetrical,  thyroid: non tender  Lungs:   Clear to auscultation bilaterally. No Wheezing or Rhonchi. No rales. Chest wall:  No tenderness  No Accessory muscle use. Heart:   Regular  rhythm,  No  murmur   No edema  Abdomen:   Soft, non-tender. Not distended.   Bowel sounds normal  Extremities: No cyanosis. No clubbing,      Skin turgor normal, Capillary refill normal, Radial dial pulse 2+  Skin:     Not pale. Not Jaundiced  No rashes   Psych:  Unable to assess   neurologic: EOMs intact. No facial asymmetry. No aphasia or slurred speech. Symmetrical strength, Sensation grossly intact. Confused    _______________________________________________________________________  Care Plan discussed with:    Comments   Patient x    Family      RN x    Care Manager                    Consultant:      _______________________________________________________________________  Expected  Disposition:   Home with Family    HH/PT/OT/RN    SNF/LTC    JABARI    ________________________________________________________________________  TOTAL TIME:Minutes    Personal critical Care Provided   70  Minutes non procedure based      Comments    x Reviewed previous records   >50% of visit spent in counseling and coordination of care x Discussion with patient and/or family and questions answered       ________________________________________________________________________  Signed: Fani Baird MD    Procedures: see electronic medical records for all procedures/Xrays and details which were not copied into this note but were reviewed prior to creation of Plan. LAB DATA REVIEWED:    Recent Results (from the past 24 hour(s))   EKG, 12 LEAD, INITIAL    Collection Time: 03/08/21  1:27 PM   Result Value Ref Range    Ventricular Rate 137 BPM    Atrial Rate 137 BPM    P-R Interval 116 ms    QRS Duration 54 ms    Q-T Interval 300 ms    QTC Calculation (Bezet) 453 ms    Calculated P Axis 86 degrees    Calculated R Axis 79 degrees    Calculated T Axis 66 degrees    Diagnosis       Sinus tachycardia  Biatrial enlargement  When compared with ECG of 20-FEB-2019 19:08,  Vent.  rate has increased BY  56 BPM  Confirmed by Arturo Lee ([id number]) on 3/8/2021 5:23:18 PM     CBC WITH AUTOMATED DIFF    Collection Time: 03/08/21  1:30 PM   Result Value Ref Range    WBC 21.1 (H) 3.6 - 11.0 K/uL    RBC 4.21 3.80 - 5.20 M/uL    HGB 12.7 11.5 - 16.0 g/dL    HCT 42.3 35.0 - 47.0 %    .5 (H) 80.0 - 99.0 FL    MCH 30.2 26.0 - 34.0 PG    MCHC 30.0 30.0 - 36.5 g/dL    RDW 13.9 11.5 - 14.5 %    PLATELET 830 771 - 320 K/uL    MPV 12.9 8.9 - 12.9 FL    NRBC 0.0 0  WBC    ABSOLUTE NRBC 0.00 0.00 - 0.01 K/uL    NEUTROPHILS 92 (H) 32 - 75 %    LYMPHOCYTES 4 (L) 12 - 49 %    MONOCYTES 3 (L) 5 - 13 %    EOSINOPHILS 0 0 - 7 %    BASOPHILS 0 0 - 1 %    IMMATURE GRANULOCYTES 1 (H) 0.0 - 0.5 %    ABS. NEUTROPHILS 19.5 (H) 1.8 - 8.0 K/UL    ABS. LYMPHOCYTES 0.8 0.8 - 3.5 K/UL    ABS. MONOCYTES 0.6 0.0 - 1.0 K/UL    ABS. EOSINOPHILS 0.0 0.0 - 0.4 K/UL    ABS. BASOPHILS 0.0 0.0 - 0.1 K/UL    ABS. IMM. GRANS. 0.2 (H) 0.00 - 0.04 K/UL    DF SMEAR SCANNED      RBC COMMENTS NORMOCYTIC, NORMOCHROMIC     METABOLIC PANEL, COMPREHENSIVE    Collection Time: 03/08/21  1:30 PM   Result Value Ref Range    Sodium 146 (H) 136 - 145 mmol/L    Potassium 5.8 (H) 3.5 - 5.1 mmol/L    Chloride 101 97 - 108 mmol/L    CO2 26 21 - 32 mmol/L    Anion gap 19 (H) 5 - 15 mmol/L    Glucose 1,227 (HH) 65 - 100 mg/dL     (H) 6 - 20 MG/DL    Creatinine 4.07 (H) 0.55 - 1.02 MG/DL    BUN/Creatinine ratio 29 (H) 12 - 20      GFR est AA 14 (L) >60 ml/min/1.73m2    GFR est non-AA 11 (L) >60 ml/min/1.73m2    Calcium 10.4 (H) 8.5 - 10.1 MG/DL    Bilirubin, total 0.3 0.2 - 1.0 MG/DL    ALT (SGPT) 30 12 - 78 U/L    AST (SGOT) 17 15 - 37 U/L    Alk.  phosphatase 205 (H) 45 - 117 U/L    Protein, total 8.8 (H) 6.4 - 8.2 g/dL    Albumin 3.7 3.5 - 5.0 g/dL    Globulin 5.1 (H) 2.0 - 4.0 g/dL    A-G Ratio 0.7 (L) 1.1 - 2.2     TROPONIN I    Collection Time: 03/08/21  1:30 PM   Result Value Ref Range    Troponin-I, Qt. <0.05 <0.05 ng/mL   GLUCOSE, POC    Collection Time: 03/08/21  1:34 PM   Result Value Ref Range    Glucose (POC) >600 (HH) 65 - 100 mg/dL    Performed by Johnny Clayton GLUCOSE, POC    Collection Time: 03/08/21  1:36 PM   Result Value Ref Range    Glucose (POC) >600 (HH) 65 - 100 mg/dL    Performed by Christine Washington    SARS-COV-2    Collection Time: 03/08/21  1:51 PM   Result Value Ref Range    SARS-CoV-2 Please find results under separate order     COVID-19 RAPID TEST    Collection Time: 03/08/21  1:51 PM   Result Value Ref Range    Specimen source Nasopharyngeal      COVID-19 rapid test Not detected NOTD     POC LACTIC ACID    Collection Time: 03/08/21  1:53 PM   Result Value Ref Range    Lactic Acid (POC) 4.36 (HH) 0.40 - 2.00 mmol/L   POC EG7    Collection Time: 03/08/21  2:26 PM   Result Value Ref Range    Calcium, ionized (POC) 1.14 1.12 - 1.32 mmol/L    pH (POC) 7.32 (L) 7.35 - 7.45      pCO2 (POC) 53.2 (H) 35.0 - 45.0 MMHG    pO2 (POC) 35 (LL) 80 - 100 MMHG    HCO3 (POC) 27.3 (H) 22 - 26 MMOL/L    Base excess (POC) 1 mmol/L    sO2 (POC) 62 (L) 92 - 97 %    Site OTHER      Device: OTHER      Allens test (POC) N/A      Specimen type (POC) VENOUS BLOOD     URINALYSIS W/ REFLEX CULTURE    Collection Time: 03/08/21  2:48 PM    Specimen: Urine   Result Value Ref Range    Color YELLOW/STRAW      Appearance HAZY (A) CLEAR      Specific gravity 1.020 1.003 - 1.030      pH (UA) 5.0 5.0 - 8.0      Protein TRACE (A) NEG mg/dL    Glucose >1,000 (A) NEG mg/dL    Ketone TRACE (A) NEG mg/dL    Bilirubin Negative NEG      Blood SMALL (A) NEG      Urobilinogen 0.2 0.2 - 1.0 EU/dL    Nitrites Positive (A) NEG      Leukocyte Esterase Negative NEG      WBC 20-50 0 - 4 /hpf    RBC 0-5 0 - 5 /hpf    Epithelial cells FEW FEW /lpf    Bacteria 4+ (A) NEG /hpf    UA:UC IF INDICATED URINE CULTURE ORDERED (A) CNI     CREATININE, UR, RANDOM    Collection Time: 03/08/21  2:48 PM   Result Value Ref Range    Creatinine, urine 76.20 mg/dL   GLUCOSE, POC    Collection Time: 03/08/21  2:59 PM   Result Value Ref Range    Glucose (POC) >600 (HH) 65 - 100 mg/dL    Performed by Christine Washington    GLUCOSE, POC Collection Time: 03/08/21  3:00 PM   Result Value Ref Range    Glucose (POC) >600 (HH) 65 - 100 mg/dL    Performed by Bradly Reid    Collection Time: 03/08/21  3:14 PM   Result Value Ref Range    Glucose 601 mg/dL    Insulin order 10.8 units/hour    Insulin adminstered 10.8 units/hour    Multiplier 0.020     Low target 150 mg/dL    High target 250 mg/dL    D50 order 0.0 ml    D50 administered 0.00 ml    Minutes until next BG 60 min    Order initials BC     Administered initials JW     GLSCOM Comments     METABOLIC PANEL, BASIC    Collection Time: 03/08/21  4:06 PM   Result Value Ref Range    Sodium 154 (H) 136 - 145 mmol/L    Potassium 4.6 3.5 - 5.1 mmol/L    Chloride 121 (H) 97 - 108 mmol/L    CO2 22 21 - 32 mmol/L    Anion gap 11 5 - 15 mmol/L    Glucose 908 (HH) 65 - 100 mg/dL     (H) 6 - 20 MG/DL    Creatinine 3.19 (H) 0.55 - 1.02 MG/DL    BUN/Creatinine ratio 31 (H) 12 - 20      GFR est AA 18 (L) >60 ml/min/1.73m2    GFR est non-AA 15 (L) >60 ml/min/1.73m2    Calcium 7.9 (L) 8.5 - 10.1 MG/DL   GLUCOSE, POC    Collection Time: 03/08/21  4:11 PM   Result Value Ref Range    Glucose (POC) >600 (HH) 65 - 100 mg/dL    Performed by Fany Noe    POC LACTIC ACID    Collection Time: 03/08/21  4:12 PM   Result Value Ref Range    Lactic Acid (POC) 1.63 0.40 - 2.00 mmol/L   GLUCOSE, POC    Collection Time: 03/08/21  4:14 PM   Result Value Ref Range    Glucose (POC) >600 (HH) 65 - 100 mg/dL    Performed by Bradly Reid    Collection Time: 03/08/21  4:18 PM   Result Value Ref Range    Glucose 601 mg/dL    Insulin order 16.2 units/hour    Insulin adminstered 16.2 units/hour    Multiplier 0.030     Low target 150 mg/dL    High target 250 mg/dL    D50 order 0.0 ml    D50 administered 0.00 ml    Minutes until next BG 60 min    Order initials BC     Administered initials BC     GLSCOM Comments     GLUCOSE, POC    Collection Time: 03/08/21  5:14 PM   Result Value Ref Range    Glucose (POC) >600 (HH) 65 - 100 mg/dL    Performed by Leslie Joo PCT    GLUCOSTABILIZER    Collection Time: 03/08/21  5:18 PM   Result Value Ref Range    Glucose 601 mg/dL    Insulin order 21.6 units/hour    Insulin adminstered 21.6 units/hour    Multiplier 0.040     Low target 150 mg/dL    High target 250 mg/dL    D50 order 0.0 ml    D50 administered 0.00 ml    Minutes until next BG 60 min    Order initials BC     Administered initials ME     GLSCOM Comments     METABOLIC PANEL, BASIC    Collection Time: 03/08/21  5:23 PM   Result Value Ref Range    Sodium 154 (H) 136 - 145 mmol/L    Potassium 3.7 3.5 - 5.1 mmol/L    Chloride 120 (H) 97 - 108 mmol/L    CO2 27 21 - 32 mmol/L    Anion gap 7 5 - 15 mmol/L    Glucose 843 (HH) 65 - 100 mg/dL     (H) 6 - 20 MG/DL    Creatinine 3.30 (H) 0.55 - 1.02 MG/DL    BUN/Creatinine ratio 30 (H) 12 - 20      GFR est AA 17 (L) >60 ml/min/1.73m2    GFR est non-AA 14 (L) >60 ml/min/1.73m2    Calcium 8.3 (L) 8.5 - 10.1 MG/DL   MAGNESIUM    Collection Time: 03/08/21  5:23 PM   Result Value Ref Range    Magnesium 2.9 (H) 1.6 - 2.4 mg/dL   PHOSPHORUS    Collection Time: 03/08/21  5:23 PM   Result Value Ref Range    Phosphorus 2.4 (L) 2.6 - 4.7 MG/DL   GLUCOSE, POC    Collection Time: 03/08/21  6:25 PM   Result Value Ref Range    Glucose (POC) >600 (HH) 65 - 100 mg/dL    Performed by Luisana Martinez    Collection Time: 03/08/21  6:28 PM   Result Value Ref Range    Glucose 601 mg/dL    Insulin order 10.8 units/hour    Insulin adminstered 10.8 units/hour    Multiplier 0.020     Low target 200 mg/dL    High target 300 mg/dL    D50 order 0.0 ml    D50 administered 0.00 ml    Minutes until next BG 60 min    Order initials RB     Administered initials RB     GLSCOM Comments     GLUCOSTABILIZER    Collection Time: 03/08/21  6:31 PM   Result Value Ref Range    Glucose 601 mg/dL    Insulin order 27.1 units/hour    Insulin adminstered 27.1 units/hour    Multiplier 0.050     Low target 200 mg/dL    High target 300 mg/dL    D50 order 0.0 ml    D50 administered 0.00 ml    Minutes until next BG 60 min    Order initials RB     Administered initials RB     GLSCOM Comments

## 2021-03-09 NOTE — PROGRESS NOTES
Pharmacy Automatic Renal Dosing Protocol - Antimicrobials    Indication for Antimicrobials: UTI     Current Regimen of Each Antimicrobial:  Zosyn 3.375gm IV q8h >> 3.375gm IV q12h    (Start Date 3/8/21; Day # 1)    Previous Antimicrobial Therapy:    Significant Cultures:   3/8 Blood- pending  3/8 Urine - pending    Radiology / Imaging results: (X-ray, CT scan or MRI):     Paralysis, amputations, malnutrition: n/a    Labs:  Recent Labs     21  1723 21  1606 21  1330   CREA 3.30* 3.19* 4.07*   * 100* 116*   WBC  --   --  21.1*     Temp (24hrs), Av.1 °F (36.2 °C), Min:96.9 °F (36.1 °C), Max:97.2 °F (36.2 °C)      Is the Patient on Dialysis? No?    Creatinine Clearance (mL/min):   CrCl (Actual Body Weight): 13.3  CrCl (Adjusted Body Weight): 14.7  CrCl (Ideal Body Weight): 15.6    Impression/Plan:   Zosyn dose adjusted for Est Crcl < 20 ml/min;  3.375gm IV q12h  BMP ordered   Antimicrobial stop date      Pharmacy will follow daily and adjust medications as appropriate for renal function and/or serum levels.     Thank you,  Love Tadeo, [facility]

## 2021-03-09 NOTE — PROGRESS NOTES
Received message from patient's nurse Jaquan Box stating :    Pt is restless and removed Combs due to continuous uncontrolled turning in bed. Need new order for Combs. Discussion / orders:    Order entered for new combs         Please note that this note was dictated using Dragon computer voice recognition software. Quite often unanticipated grammatical, syntax, homophones, and other interpretive errors are inadvertently transcribed by the computer software. Please disregard these errors. Please excuse any errors that have escaped final proofreading.

## 2021-03-09 NOTE — PROGRESS NOTES
Hospitalist Progress Note    NAME: Romie Fleischer   :  1965   MRN:  [id number]       Assessment / Plan:  DKA resolved   -Anion gap closed  -DC insulin drip  -Start patient on sliding scale    Acute renal failure  -Continue IV fluid    Hypernatremia  -Change IV fluid to half NS  -Monitor sodium level    Sepsis secondary to UTI  -Continue Zosyn  -Follow-up blood culture and urine culture            less than 18.5 Underweight / Body mass index is 17.54 kg/m². Code status: Full  Prophylaxis: Hep SQ  Recommended Disposition: Home w/Family     Subjective:     Chief Complaint / Reason for Physician Visit  \"\". Discussed with RN events overnight. DKA resolved, DC insulin drip, start patient on sliding scale, continue IV fluids, start patient on on soft diet    Review of Systems:  Symptom Y/N Comments  Symptom Y/N Comments   Fever/Chills n   Chest Pain n    Poor Appetite n   Edema     Cough    Abdominal Pain n    Sputum    Joint Pain     SOB/GARLAND n   Pruritis/Rash     Nausea/vomit    Tolerating PT/OT     Diarrhea n   Tolerating Diet y    Constipation n   Other       Could NOT obtain due to:      Objective:     VITALS:   Last 24hrs VS reviewed since prior progress note.  Most recent are:  Patient Vitals for the past 24 hrs:   Temp Pulse Resp BP SpO2   21 0727 (P) 97.7 °F (36.5 °C) 83 12 (!) 111/90 100 %   21 0412 97.3 °F (36.3 °C) 96 17 (!) 114/57 100 %   21 0126 -- -- -- -- 100 %   21 2302 97.7 °F (36.5 °C) (!) 104 11 (!) 122/52 100 %   21 1956 97.4 °F (36.3 °C) (!) 115 16 122/68 100 %   21 1937 -- -- -- -- 100 %   21 1815 -- (!) 104 19 118/62 100 %   21 1730 97.2 °F (36.2 °C) (!) 111 20 131/79 100 %   21 1654 -- (!) 101 24 (!) 106/55 --   21 1649 -- (!) 108 14 -- 99 %   21 1500 -- (!) 122 16 120/63 --   21 1439 -- (!) 137 21 -- --   21 1438 -- -- -- 107/69 --   21 1345 -- (!) 134 11 101/61 --   21 1332 96.9 °F (36.1 °C) (!) 136 23 106/67 100 %       Intake/Output Summary (Last 24 hours) at 3/9/2021 0744  Last data filed at 3/9/2021 0308  Gross per 24 hour   Intake --   Output 1220 ml   Net -1220 ml        I had a face to face encounter and independently examined this patient on 3/9/2021, as outlined below:  PHYSICAL EXAM:  General: WD, WN. Alert, cooperative, no acute distress    EENT:  EOMI. Anicteric sclerae. MMM  Resp:  CTA bilaterally, no wheezing or rales. No accessory muscle use  CV:  Regular  rhythm,  No edema  GI:  Soft, Non distended, Non tender. +Bowel sounds  Neurologic:  Alert and oriented X 3, normal speech,   Psych:   Good insight. Not anxious nor agitated  Skin:  No rashes. No jaundice    Reviewed most current lab test results and cultures  YES  Reviewed most current radiology test results   YES  Review and summation of old records today    NO  Reviewed patient's current orders and MAR    YES  PMH/SH reviewed - no change compared to H&P  ________________________________________________________________________  Care Plan discussed with:    Comments   Patient y    Family      RN y    Care Manager     Consultant                        Multidiciplinary team rounds were held today with , nursing, pharmacist and clinical coordinator. Patient's plan of care was discussed; medications were reviewed and discharge planning was addressed. ________________________________________________________________________  Total NON critical care TIME: 35   Minutes    Total CRITICAL CARE TIME Spent:   Minutes non procedure based      Comments   >50% of visit spent in counseling and coordination of care y    ________________________________________________________________________  Randy Mendoza MD     Procedures: see electronic medical records for all procedures/Xrays and details which were not copied into this note but were reviewed prior to creation of Plan.       LABS:  I reviewed today's most current labs and imaging studies. Pertinent labs include:  Recent Labs     03/08/21  1330   WBC 21.1*   HGB 12.7   HCT 42.3        Recent Labs     03/09/21  0455 03/08/21  2225 03/08/21  1927 03/08/21  1723 03/08/21  1330 03/08/21  1330   * 158* 154* 154*   < > 146*   K 3.8 3.6 3.7 3.7   < > 5.8*   * 125* 123* 120*   < > 101   CO2 27 26 23 27   < > 26   GLU 93 463* 729* 843*   < > 1,227*   BUN 77* 91* 99* 100*   < > 116*   CREA 2.52* 2.98* 3.26* 3.30*   < > 4.07*   CA 9.4 9.0 9.0 8.3*   < > 10.4*   MG 2.6* 2.9* 3.0* 2.9*   < >  --    PHOS  --   --   --  2.4*  --   --    ALB  --   --   --   --   --  3.7   TBILI  --   --   --   --   --  0.3   ALT  --   --   --   --   --  30    < > = values in this interval not displayed. Signed:  Elaina Hernández MD

## 2021-03-10 LAB
ADMINISTERED INITIALS, ADMINIT: NORMAL
ANION GAP SERPL CALC-SCNC: 2 MMOL/L (ref 5–15)
BASOPHILS # BLD: 0 K/UL (ref 0–0.1)
BASOPHILS NFR BLD: 0 % (ref 0–1)
BUN SERPL-MCNC: 49 MG/DL (ref 6–20)
BUN/CREAT SERPL: 26 (ref 12–20)
CALCIUM SERPL-MCNC: 8.9 MG/DL (ref 8.5–10.1)
CHLORIDE SERPL-SCNC: 126 MMOL/L (ref 97–108)
CO2 SERPL-SCNC: 30 MMOL/L (ref 21–32)
CREAT SERPL-MCNC: 1.92 MG/DL (ref 0.55–1.02)
D50 ADMINISTERED, D50ADM: 0 ML
D50 ADMINISTERED, D50ADM: 10 ML
D50 ADMINISTERED, D50ADM: 18 ML
D50 ORDER, D50ORD: 0 ML
D50 ORDER, D50ORD: 10 ML
D50 ORDER, D50ORD: 18 ML
DIFFERENTIAL METHOD BLD: ABNORMAL
EOSINOPHIL # BLD: 0.1 K/UL (ref 0–0.4)
EOSINOPHIL NFR BLD: 1 % (ref 0–7)
ERYTHROCYTE [DISTWIDTH] IN BLOOD BY AUTOMATED COUNT: 13.6 % (ref 11.5–14.5)
GLSCOM COMMENTS: NORMAL
GLUCOSE BLD STRIP.AUTO-MCNC: 111 MG/DL (ref 65–100)
GLUCOSE BLD STRIP.AUTO-MCNC: 111 MG/DL (ref 65–100)
GLUCOSE BLD STRIP.AUTO-MCNC: 136 MG/DL (ref 65–100)
GLUCOSE BLD STRIP.AUTO-MCNC: 162 MG/DL (ref 65–100)
GLUCOSE BLD STRIP.AUTO-MCNC: 249 MG/DL (ref 65–100)
GLUCOSE BLD STRIP.AUTO-MCNC: 291 MG/DL (ref 65–100)
GLUCOSE BLD STRIP.AUTO-MCNC: 292 MG/DL (ref 65–100)
GLUCOSE BLD STRIP.AUTO-MCNC: 55 MG/DL (ref 65–100)
GLUCOSE BLD STRIP.AUTO-MCNC: 75 MG/DL (ref 65–100)
GLUCOSE BLD STRIP.AUTO-MCNC: 81 MG/DL (ref 65–100)
GLUCOSE BLD STRIP.AUTO-MCNC: 83 MG/DL (ref 65–100)
GLUCOSE BLD STRIP.AUTO-MCNC: 95 MG/DL (ref 65–100)
GLUCOSE BLD STRIP.AUTO-MCNC: 96 MG/DL (ref 65–100)
GLUCOSE SERPL-MCNC: 78 MG/DL (ref 65–100)
GLUCOSE, GLC: 111 MG/DL
GLUCOSE, GLC: 111 MG/DL
GLUCOSE, GLC: 136 MG/DL
GLUCOSE, GLC: 162 MG/DL
GLUCOSE, GLC: 55 MG/DL
GLUCOSE, GLC: 75 MG/DL
GLUCOSE, GLC: 81 MG/DL
GLUCOSE, GLC: 83 MG/DL
GLUCOSE, GLC: 95 MG/DL
GLUCOSE, GLC: 96 MG/DL
HCT VFR BLD AUTO: 30 % (ref 35–47)
HGB BLD-MCNC: 9.4 G/DL (ref 11.5–16)
HIGH TARGET, HITG: 300 MG/DL
IMM GRANULOCYTES # BLD AUTO: 0.3 K/UL (ref 0–0.04)
IMM GRANULOCYTES NFR BLD AUTO: 2 % (ref 0–0.5)
INSULIN ADMINSTERED, INSADM: 0 UNITS/HOUR
INSULIN ADMINSTERED, INSADM: 0.8 UNITS/HOUR
INSULIN ADMINSTERED, INSADM: 2 UNITS/HOUR
INSULIN ORDER, INSORD: 0 UNITS/HOUR
INSULIN ORDER, INSORD: 0.8 UNITS/HOUR
INSULIN ORDER, INSORD: 2 UNITS/HOUR
LOW TARGET, LOT: 200 MG/DL
LYMPHOCYTES # BLD: 1.9 K/UL (ref 0.8–3.5)
LYMPHOCYTES NFR BLD: 13 % (ref 12–49)
MAGNESIUM SERPL-MCNC: 1.8 MG/DL (ref 1.6–2.4)
MCH RBC QN AUTO: 30 PG (ref 26–34)
MCHC RBC AUTO-ENTMCNC: 31.3 G/DL (ref 30–36.5)
MCV RBC AUTO: 95.8 FL (ref 80–99)
MINUTES UNTIL NEXT BG, NBG: 15 MIN
MINUTES UNTIL NEXT BG, NBG: 15 MIN
MINUTES UNTIL NEXT BG, NBG: 60 MIN
MONOCYTES # BLD: 0.7 K/UL (ref 0–1)
MONOCYTES NFR BLD: 5 % (ref 5–13)
MULTIPLIER, MUL: 0
MULTIPLIER, MUL: 0.01
MULTIPLIER, MUL: 0.02
NEUTS SEG # BLD: 11.6 K/UL (ref 1.8–8)
NEUTS SEG NFR BLD: 80 % (ref 32–75)
NRBC # BLD: 0.02 K/UL (ref 0–0.01)
NRBC BLD-RTO: 0.1 PER 100 WBC
ORDER INITIALS, ORDINIT: NORMAL
PLATELET # BLD AUTO: 155 K/UL (ref 150–400)
PMV BLD AUTO: 12.5 FL (ref 8.9–12.9)
POTASSIUM SERPL-SCNC: 3.2 MMOL/L (ref 3.5–5.1)
RBC # BLD AUTO: 3.13 M/UL (ref 3.8–5.2)
SERVICE CMNT-IMP: ABNORMAL
SERVICE CMNT-IMP: NORMAL
SODIUM SERPL-SCNC: 158 MMOL/L (ref 136–145)
WBC # BLD AUTO: 14.5 K/UL (ref 3.6–11)

## 2021-03-10 PROCEDURE — 74011000250 HC RX REV CODE- 250: Performed by: INTERNAL MEDICINE

## 2021-03-10 PROCEDURE — 74011250636 HC RX REV CODE- 250/636: Performed by: INTERNAL MEDICINE

## 2021-03-10 PROCEDURE — 65660000000 HC RM CCU STEPDOWN

## 2021-03-10 PROCEDURE — 74011000258 HC RX REV CODE- 258: Performed by: INTERNAL MEDICINE

## 2021-03-10 PROCEDURE — 97161 PT EVAL LOW COMPLEX 20 MIN: CPT | Performed by: PHYSICAL THERAPIST

## 2021-03-10 PROCEDURE — 97116 GAIT TRAINING THERAPY: CPT | Performed by: PHYSICAL THERAPIST

## 2021-03-10 PROCEDURE — 36415 COLL VENOUS BLD VENIPUNCTURE: CPT

## 2021-03-10 PROCEDURE — 74011636637 HC RX REV CODE- 636/637: Performed by: INTERNAL MEDICINE

## 2021-03-10 PROCEDURE — 85025 COMPLETE CBC W/AUTO DIFF WBC: CPT

## 2021-03-10 PROCEDURE — 82962 GLUCOSE BLOOD TEST: CPT

## 2021-03-10 PROCEDURE — 83735 ASSAY OF MAGNESIUM: CPT

## 2021-03-10 PROCEDURE — 80048 BASIC METABOLIC PNL TOTAL CA: CPT

## 2021-03-10 PROCEDURE — 94640 AIRWAY INHALATION TREATMENT: CPT

## 2021-03-10 PROCEDURE — 99232 SBSQ HOSP IP/OBS MODERATE 35: CPT | Performed by: CLINICAL NURSE SPECIALIST

## 2021-03-10 PROCEDURE — 74011250637 HC RX REV CODE- 250/637: Performed by: INTERNAL MEDICINE

## 2021-03-10 RX ORDER — INSULIN GLARGINE 100 [IU]/ML
15 INJECTION, SOLUTION SUBCUTANEOUS
Status: COMPLETED | OUTPATIENT
Start: 2021-03-10 | End: 2021-03-10

## 2021-03-10 RX ORDER — INSULIN LISPRO 100 [IU]/ML
INJECTION, SOLUTION INTRAVENOUS; SUBCUTANEOUS
Status: DISCONTINUED | OUTPATIENT
Start: 2021-03-10 | End: 2021-03-17 | Stop reason: HOSPADM

## 2021-03-10 RX ORDER — POTASSIUM CHLORIDE 750 MG/1
20 TABLET, FILM COATED, EXTENDED RELEASE ORAL
Status: COMPLETED | OUTPATIENT
Start: 2021-03-10 | End: 2021-03-10

## 2021-03-10 RX ORDER — DEXTROSE 50 % IN WATER (D50W) INTRAVENOUS SYRINGE
25-50 AS NEEDED
Status: DISCONTINUED | OUTPATIENT
Start: 2021-03-10 | End: 2021-03-17 | Stop reason: HOSPADM

## 2021-03-10 RX ORDER — ALBUTEROL SULFATE 0.83 MG/ML
2.5 SOLUTION RESPIRATORY (INHALATION)
Status: DISCONTINUED | OUTPATIENT
Start: 2021-03-10 | End: 2021-03-17 | Stop reason: HOSPADM

## 2021-03-10 RX ORDER — MAGNESIUM SULFATE 100 %
4 CRYSTALS MISCELLANEOUS AS NEEDED
Status: DISCONTINUED | OUTPATIENT
Start: 2021-03-10 | End: 2021-03-17 | Stop reason: HOSPADM

## 2021-03-10 RX ORDER — POTASSIUM CHLORIDE AND SODIUM CHLORIDE 450; 150 MG/100ML; MG/100ML
INJECTION, SOLUTION INTRAVENOUS CONTINUOUS
Status: DISCONTINUED | OUTPATIENT
Start: 2021-03-10 | End: 2021-03-16

## 2021-03-10 RX ADMIN — NYSTATIN 500000 UNITS: 100000 SUSPENSION ORAL at 17:25

## 2021-03-10 RX ADMIN — INSULIN LISPRO 2 UNITS: 100 INJECTION, SOLUTION INTRAVENOUS; SUBCUTANEOUS at 13:20

## 2021-03-10 RX ADMIN — NYSTATIN 500000 UNITS: 100000 SUSPENSION ORAL at 13:28

## 2021-03-10 RX ADMIN — HEPARIN SODIUM 5000 UNITS: 5000 INJECTION INTRAVENOUS; SUBCUTANEOUS at 20:24

## 2021-03-10 RX ADMIN — SODIUM CHLORIDE 10 ML: 9 INJECTION, SOLUTION INTRAMUSCULAR; INTRAVENOUS; SUBCUTANEOUS at 13:29

## 2021-03-10 RX ADMIN — PIPERACILLIN AND TAZOBACTAM 3.38 G: 3; .375 INJECTION, POWDER, LYOPHILIZED, FOR SOLUTION INTRAVENOUS at 20:24

## 2021-03-10 RX ADMIN — SODIUM CHLORIDE 10 ML: 9 INJECTION, SOLUTION INTRAMUSCULAR; INTRAVENOUS; SUBCUTANEOUS at 06:04

## 2021-03-10 RX ADMIN — INSULIN LISPRO 3 UNITS: 100 INJECTION, SOLUTION INTRAVENOUS; SUBCUTANEOUS at 17:25

## 2021-03-10 RX ADMIN — SODIUM CHLORIDE 0 UNITS/HR: 9 INJECTION, SOLUTION INTRAVENOUS at 09:21

## 2021-03-10 RX ADMIN — IPRATROPIUM BROMIDE AND ALBUTEROL SULFATE 3 ML: .5; 3 SOLUTION RESPIRATORY (INHALATION) at 14:04

## 2021-03-10 RX ADMIN — PIPERACILLIN AND TAZOBACTAM 3.38 G: 3; .375 INJECTION, POWDER, LYOPHILIZED, FOR SOLUTION INTRAVENOUS at 09:55

## 2021-03-10 RX ADMIN — POTASSIUM CHLORIDE AND SODIUM CHLORIDE: 450; 150 INJECTION, SOLUTION INTRAVENOUS at 14:42

## 2021-03-10 RX ADMIN — NYSTATIN 500000 UNITS: 100000 SUSPENSION ORAL at 22:28

## 2021-03-10 RX ADMIN — INSULIN GLARGINE 15 UNITS: 100 INJECTION, SOLUTION SUBCUTANEOUS at 13:28

## 2021-03-10 RX ADMIN — IPRATROPIUM BROMIDE AND ALBUTEROL SULFATE 3 ML: .5; 3 SOLUTION RESPIRATORY (INHALATION) at 09:46

## 2021-03-10 RX ADMIN — DEXTROSE MONOHYDRATE 18 ML: 25 INJECTION, SOLUTION INTRAVENOUS at 05:33

## 2021-03-10 RX ADMIN — POTASSIUM CHLORIDE 20 MEQ: 750 TABLET, FILM COATED, EXTENDED RELEASE ORAL at 16:04

## 2021-03-10 RX ADMIN — INSULIN LISPRO 2 UNITS: 100 INJECTION, SOLUTION INTRAVENOUS; SUBCUTANEOUS at 22:27

## 2021-03-10 RX ADMIN — NYSTATIN 500000 UNITS: 100000 SUSPENSION ORAL at 09:55

## 2021-03-10 RX ADMIN — DEXTROSE MONOHYDRATE 5 G: 25 INJECTION, SOLUTION INTRAVENOUS at 04:13

## 2021-03-10 RX ADMIN — HEPARIN SODIUM 5000 UNITS: 5000 INJECTION INTRAVENOUS; SUBCUTANEOUS at 09:55

## 2021-03-10 RX ADMIN — SODIUM CHLORIDE 10 ML: 9 INJECTION, SOLUTION INTRAMUSCULAR; INTRAVENOUS; SUBCUTANEOUS at 22:29

## 2021-03-10 NOTE — PROGRESS NOTES
Problem: Mobility Impaired (Adult and Pediatric)  Goal: *Acute Goals and Plan of Care (Insert Text)  Description: FUNCTIONAL STATUS PRIOR TO ADMISSION: Patient is a poor historian and has mild confusion during session. Patient reports she was independent and active without use of DME.    HOME SUPPORT PRIOR TO ADMISSION: The patient lived alone with no local support. Physical Therapy Goals  Initiated 3/10/2021  1. Patient will move from supine to sit and sit to supine  in bed with modified independence within 7 day(s). 2.  Patient will transfer from bed to chair and chair to bed with modified independence using the least restrictive device within 7 day(s). 3.  Patient will perform sit to stand with modified independence within 7 day(s). 4.  Patient will ambulate with modified independence for 250 feet with the least restrictive device within 7 day(s). 5.  Patient will ascend/descend 4 stairs with 1 handrail(s) with modified independence within 7 day(s). Outcome: Progressing Towards Goal   PHYSICAL THERAPY EVALUATION  Patient: Adleina Steven (66 y.o. female)  Date: 3/10/2021  Primary Diagnosis: HHNC (hyperglycemic [facility]osmolar nonketotic coma[facility] [E11.01]        Precautions:   Fall    ASSESSMENT  Based on the objective data described below, the patient presents with decreased functional mobility from baseline level of function. Patient mildly confused during session. Reports that she \"knows they are talking about her in the hallway\" and she is \"not making all of this up\". Tearful but able to redirected. Currently needing supervision to come to EOB and Ainka for sit to stand. Amb approx 12 feet with HHA to and from the bathroom. HR elevated to 130 bpm with activity. Due to patient's confusion she may need some supervision/assist at home. If no family available may need short rehab stay. Will continue to follow for mobility progression.     Other factors to consider for discharge: at risk for falls, confused, lives alone     Patient will benefit from skilled therapy intervention to address the above noted impairments. PLAN :  Recommendations and Planned Interventions: bed mobility training, transfer training, gait training, therapeutic exercises, neuromuscular re-education, patient and family training/education, and therapeutic activities      Frequency/Duration: Patient will be followed by physical therapy:  4 times a week to address goals. Recommendation for discharge: (in order for the patient to meet his/her long term goals)  Therapy up to 5 days/week in SNF setting vs HH PT with supervision/assist from family      IF patient discharges home will need the following DME: to be determined (TBD)         SUBJECTIVE:   Patient stated I know they are talking about me out there. If they knew about my childhood then they wouldn't be doing that.     OBJECTIVE DATA SUMMARY:   HISTORY:    Past Medical History:   Diagnosis Date    Asthma     Diabetes ([facility]     Elevated transaminase level 6/29/2016    Insulin dependent diabetes mellitus 6/20/2016    Pancreatic atrophy 6/20/2016     Past Surgical History:   Procedure Laterality Date    HX HEENT         Personal factors and/or comorbidities impacting plan of care:     Home Situation  Home Environment: Private residence  # Steps to Enter: 4  Rails to Enter: Yes  One/Two Story Residence: Two story  Living Alone: Yes  Patient Expects to be Discharged to[de-identified] Private residence  Current DME Used/Available at Home: None    EXAMINATION/PRESENTATION/DECISION MAKING:   Critical Behavior:  Neurologic State: Alert, Confused  Orientation Level: Oriented to person, Oriented to place  Cognition: Decreased attention/concentration, Poor safety awareness       Range Of Motion:  AROM: Generally decreased, functional                       Strength:    Strength: Generally decreased, functional       Functional Mobility:  Bed Mobility:  Rolling: Supervision  Supine to Sit: Supervision  Sit to Supine: Supervision  Scooting: Supervision  Transfers:  Sit to Stand: Minimum assistance;Assist x1  Stand to Sit: Minimum assistance;Assist x1                       Balance:   Sitting: Intact  Standing: Impaired  Standing - Static: Good  Standing - Dynamic : Fair  Ambulation/Gait Training:  Distance (ft): 15 Feet (ft)(x 2)  Assistive Device: Gait belt(HHA x 1)  Ambulation - Level of Assistance: Minimal assistance;Assist x1     Gait Description (WDL): Exceptions to WDL  Gait Abnormalities: Decreased step clearance;Shuffling gait        Base of Support: Center of gravity altered;Narrowed     Speed/Connie: Pace decreased (<100 feet/min); Shuffled; Slow  Step Length: Left shortened;Right shortened       Pain Rating:  No c/o pain    Activity Tolerance:   Fair and requires rest breaks    After treatment patient left in no apparent distress:   Supine in bed, Call bell within reach, Bed / chair alarm activated, and Side rails x 3    COMMUNICATION/EDUCATION:   The patients plan of care was discussed with: Physical therapist, Occupational therapist, and Registered nurse. Fall prevention education was provided and the patient/caregiver indicated understanding., Patient/family have participated as able in goal setting and plan of care. , and Patient/family agree to work toward stated goals and plan of care.     Thank you for this referral.  Beatrice Escobar, PT, DPT   Time Calculation: 13 mins

## 2021-03-10 NOTE — PROGRESS NOTES
ADULT PROTOCOL: JET AEROSOL ASSESSMENT    Patient  Marysol Elena     60 y.o.   female     3/9/2021  8:45 PM    Breath Sounds Pre Procedure: Right Breath Sounds: Diminished                               Left Breath Sounds: Diminished    Breath Sounds Post Procedure: Right Breath Sounds: Diminished                                 Left Breath Sounds: Diminished    Breathing pattern: Pre procedure Breathing Pattern: Regular          Post procedure Breathing Pattern: Regular    Heart Rate: Pre procedure Pulse: 99           Post procedure Pulse: 96    Resp Rate: Pre procedure Respirations: 20           Post procedure Respirations: 20    Cough: Pre procedure Cough: Non-productive               Post procedure Cough: Non-productive    Oxygen: O2 Device: Room air      Changed: NO    SpO2: Pre procedure SpO2: 100 % WITHOUT oxygen              Post procedure SpO2: 99 % WITHOUT oxygen    Nebulizer Therapy: Current medications Aerosolized Medications: DuoNeb      Changed: NO    Problem List:   Patient Active Problem List   Diagnosis Code    Insulin dependent diabetes mellitus [id number]    Pancreatic atrophy K86.89    Elevated liver enzymes R74.8    Hyperglycemia due to type 1 diabetes mellitus (HCC) E10.65    PSC (primary sclerosing cholangitis) K83.01    Stage 2 chronic kidney disease N18.2    Mild intermittent asthma without complication A91.49    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

## 2021-03-10 NOTE — PROGRESS NOTES
Transition of Care Plan:     Disposition: Home possible needing home health services  Follow up appointments:PCP  DME needed:TBD, Endocrinology  Transportation at Discharge:Family  [employer] or means to access home: Family      IM Medicare letter:N/A due to pt having [employer]  Caregiver Contact:Earl BarrazaUxrio-Xasaih-[phone]  Discharge Caregiver contacted prior to discharge?      Pt has a potential of being d/c within 1-2 days. CM is waiting for therapy to evaluate for disposition/recommanation.     CM will continue to follow and assist with d/c planning.    ARLEY Cardona.  Care Manager [facility]  [phone]

## 2021-03-10 NOTE — PROGRESS NOTES
Hospitalist Progress Note    NAME: Amanda Crum   :  1965   MRN:  [id number]       Assessment / Plan:  DKA resolved   -Anion gap closed  -DC insulin drip  -Start patient on sliding scale     Acute renal failure  -Continue IV fluid     Hypernatremia  -Change IV fluid to half NS  -Monitor sodium level     Sepsis secondary to UTI  -Continue Zosyn  -Follow-up blood culture   -- urine culture GRAM NEGATIVE RODS     Hypokalemia   -replaced                  less than 18.5 Underweight / Body mass index is 17.54 kg/m².     Code status: Full  Prophylaxis: Hep SQ  Recommended Disposition: Home w/Family         Subjective:     Chief Complaint / Reason for Physician Visit  . Discussed with RN events overnight. Review of Systems:  Symptom Y/N Comments  Symptom Y/N Comments   Fever/Chills n   Chest Pain n    Poor Appetite    Edema     Cough n   Abdominal Pain     Sputum    Joint Pain     SOB/GARLAND n   Pruritis/Rash     Nausea/vomit    Tolerating PT/OT     Diarrhea n   Tolerating Diet y    Constipation n   Other       Could NOT obtain due to:      Objective:     VITALS:   Last 24hrs VS reviewed since prior progress note. Most recent are:  Patient Vitals for the past 24 hrs:   Temp Pulse Resp BP SpO2   03/10/21 1404 -- -- -- -- 100 %   03/10/21 1049 98.6 °F (37 °C) (!) 106 16 121/62 100 %   03/10/21 0948 -- -- -- -- 100 %   03/10/21 0802 98.2 °F (36.8 °C) 86 18 118/61 100 %   03/10/21 0319 97.9 °F (36.6 °C) 80 17 115/61 100 %   21 2352 98.6 °F (37 °C) 96 16 124/63 100 %   21 1949 -- -- -- -- 100 %   21 1938 98.6 °F (37 °C) 97 16 117/67 100 %   21 -- -- -- -- 100 %     No intake or output data in the 24 hours ending 03/10/21 1459     I had a face to face encounter and independently examined this patient on 3/10/2021, as outlined below:  PHYSICAL EXAM:  General: WD, WN. Alert, cooperative, no acute distress    EENT:  EOMI. Anicteric sclerae. MMM  Resp:  CTA bilaterally, no wheezing or rales. No accessory muscle use  CV:  Regular  rhythm,  No edema  GI:  Soft, Non distended, Non tender. +Bowel sounds  Neurologic:  Alert and oriented X 3, normal speech,   Psych:   Good insight. Not anxious nor agitated  Skin:  No rashes. No jaundice    Reviewed most current lab test results and cultures  YES  Reviewed most current radiology test results   YES  Review and summation of old records today    NO  Reviewed patient's current orders and MAR    YES  PMH/SH reviewed - no change compared to H&P  ________________________________________________________________________  Care Plan discussed with:    Comments   Patient y    Family      RN y    Care Manager     Consultant                        Multidiciplinary team rounds were held today with , nursing, pharmacist and clinical coordinator. Patient's plan of care was discussed; medications were reviewed and discharge planning was addressed. ________________________________________________________________________  Total NON critical care TIME:  35   Minutes    Total CRITICAL CARE TIME Spent:   Minutes non procedure based      Comments   >50% of visit spent in counseling and coordination of care y    ________________________________________________________________________  Patricia Wylie MD     Procedures: see electronic medical records for all procedures/Xrays and details which were not copied into this note but were reviewed prior to creation of Plan. LABS:  I reviewed today's most current labs and imaging studies.   Pertinent labs include:  Recent Labs     03/10/21  0355 03/08/21  1330   WBC 14.5* 21.1*   HGB 9.4* 12.7   HCT 30.0* 42.3    251     Recent Labs     03/10/21  0355 03/09/21  0455 03/08/21  2225 03/08/21  1723 03/08/21  1723 03/08/21  1330 03/08/21  1330   * 160* 158*   < > 154*   < > 146*   K 3.2* 3.8 3.6   < > 3.7   < > 5.8*   * 129* 125*   < > 120*   < > 101   CO2 30 27 26   < > 27   < > 26   GLU 78 93 463*   < > 843*   < > 1,227*   BUN 49* 77* 91*   < > 100*   < > 116*   CREA 1.92* 2.52* 2.98*   < > 3.30*   < > 4.07*   CA 8.9 9.4 9.0   < > 8.3*   < > 10.4*   MG 1.8 2.6* 2.9*   < > 2.9*  --   --    PHOS  --   --   --   --  2.4*  --   --    ALB  --   --   --   --   --   --  3.7   TBILI  --   --   --   --   --   --  0.3   ALT  --   --   --   --   --   --  30    < > = values in this interval not displayed. Signed:  Salma Martino MD

## 2021-03-10 NOTE — PROGRESS NOTES
0720 Bedside and Verbal shift change report given to Cari Gonzalez (oncoming nurse) by . .. (offgoing nurse). Report included the following information SBAR, Kardex and MAR. .End of Shift Note    Bedside shift change report given to . .. (oncoming nurse) by Arin Goddard (offgoing nurse). Report included the following information SBAR, Kardex and MAR     Perfect Serve Note As Follows:  3/10/21 3:38 PM   [phone] Hospital or Facility: [facility] From: Arin Goddard RE: Oscar Walker 1965 RM: [id number] Patient tachy up to the 130's at 66 65 76, at 1502 she was 122 /62, 1547  Need Callback: NO CALLBACK REQ PCU  Read 3:58 PM     3/10/21 3:58 PM   Is she symptomatic     3/10/21 4:13 PM   no  Read 4:27 PM     3/10/21 4:27 PM   [id number] continue monitoring     3/10/21 4:43 PM   Lily  Unread      Shift worked:  0700 - 1900     Shift summary and any significant changes:    Insulin Drip Dc,d  ACHS orders started   0.455 Sodium chloride with KCL 20 mEq/L 100ml/hr cont. Concerns for physician to address:  Patient had Tachy episode, see message above      Zone phone for oncoming shift:   . .. Activity:  Activity Level: Bed Rest  Number times ambulated in hallways past shift: 0  Number of times OOB to chair past shift: 0    Cardiac:   Cardiac Monitoring: Yes      Cardiac Rhythm: Sinus tachycardia    Access:   Current line(s): PIV     Genitourinary:   Urinary status: voiding and external catheter    Respiratory:   O2 Device: Room air  Chronic home O2 use?: NO  Incentive spirometer at bedside: NO     GI:     Current diet:  DIET DIABETIC CONSISTENT CARB Regular  DIET NUTRITIONAL SUPPLEMENTS Dinner; Glucerna Shake  Passing flatus: YES  Tolerating current diet: YES  % Diet Eaten: 100 %    Pain Management:   Patient states pain is manageable on current regimen: YES    Skin:  Vinh Score: 15  Interventions: increase time out of bed and PT/OT consult    Patient Safety:  Fall Score:  Total Score: 4  Interventions: bed/chair alarm and gripper socks  High Fall Risk: Yes    Length of Stay:  Expected LOS: - - -  Actual LOS: 2      Jyotsna Mata

## 2021-03-10 NOTE — PROGRESS NOTES
Comprehensive Nutrition Assessment    Type and Reason for Visit: Initial, Low BMI    Nutrition Recommendations/Plan:   Continue CCD  Glucerna daily     Nutrition Assessment:     Patient medically noted for HHS, NEERAJ, and UTI. PMH DM and CKD. Visited patient at bedside who reports a good appetite at this time. Poor appetite a few days prior to admission due to not feeling well but typically eats well. She denies any significant weight changes recently. She had no questions regarding CCD. Will add glucerna daily. Encouraged intake of meals. Estimated Daily Nutrient Needs:  Energy (kcal): 1529 kcal (BMR 84 x 1.3AF +250kcal);  Weight Used for Energy Requirements: Current  Protein (g): 52g (1.2 g/kg bw); Weight Used for Protein Requirements: Current  Fluid (ml/day): 1500 mL; Method Used for Fluid Requirements: 1 ml/kcal    Nutrition Related Findings:       Na 158, K+ 3.2, -858-21-  +BS  Lantus, Humalog, IVF + KCl    Wounds:    None       Current Nutrition Therapies:  DIET DIABETIC CONSISTENT CARB Regular    Anthropometric Measures:  · Height:  5' 3\" (160 cm)  · Current Body Wt:  42.9 kg (94 lb 9.2 oz)   · BMI Category:  Underweight (BMI less than 18.5)       Nutrition Diagnosis:   · Altered nutrition-related lab values related to [facility]) as evidenced by (Na 159, K+ 3.2, )    Nutrition Interventions:   Food and/or Nutrient Delivery: Continue current diet, Start oral nutrition supplement  Nutrition Education and Counseling: No recommendations at this time  Coordination of Nutrition Care: No recommendation at this time    Goals:  PO intake >50% of meals/supplement next 5-7 days       Nutrition Monitoring and Evaluation:   Behavioral-Environmental Outcomes: None identified  Food/Nutrient Intake Outcomes: Food and nutrient intake, Supplement intake  Physical Signs/Symptoms Outcomes: Biochemical data, Weight    Discharge Planning:    Continue current diet     Electronically signed by Mary Marquez RD on 3/10/2021 at 2:47 PM    Contact: ext [phone]

## 2021-03-10 NOTE — DIABETES MGMT
[address] [facility]    CLINICAL NURSE SPECIALIST CONSULT     Initial Presentation   Bri Rousseau is a 60 y.o. female admitted 3/8/21 with altered mental status and hyperglycemia. On admission to the emergency room she was found to have a glucose of 908 with a normal anion gap, a BUN of 100, creatinine 3.2, sodium 154, and A1c of 10.1%. She had been treated with vigorous fluid resuscitation as well as intravenous insulin. HX:   Past Medical History:   Diagnosis Date    Asthma     Diabetes ([address]     Elevated transaminase level 6/29/2016    Insulin dependent diabetes mellitus 6/20/2016    Pancreatic atrophy 6/20/2016      DX: Hyperosmolar hyperglycemic state. NEERAJ. Severe sepsis secondary to UTI. Cirrhosis on liver biopsy in early 2020; ? Etiology. TX: IVF. Insulin. Clot prevention. IV [facility] course   Clinical progress has been uncomplicated. 3/10/21 CM saying disposition is home with possible home health services. Seen by nutritionist today. Urine culture growing gram negative rods. Blood cultures are negative. Diabetes    Patient has known Type 2 diabetes, treated with insulin PTA. Admission BG 1227 and A1c 10.1 indicate poor/acceptable diabetes control. Ambulatory blood glucose management provided by primary care provider Dr. Cordelia Segovia and endocrinologist, Dr Toño Nieto.   Consulted by Provider for advanced diabetes nursing assessment and care, specifically related to    [x] Inpatient management strategy    Diabetes-related medical history  Acute complications  HHNK  Microvascular disease  Nephropathy    Diabetes medication history  Drug class Currently in use Discontinued Never used   Biguanide      DDP-4 inhibitor       Sulfonylurea  Glipizide XR 2.5mg BID    Thiazolidinedione      GLP-1 RA      SGLT-2 inhibitors      Basal insulin 30 units of basaglar at night     Bolus insulin      Fixed Dose  Combinations        Subjective   I was diagnosed with diabetes around 2016.     Patient reports the following home diabetes self-care practices:  Eating pattern  [x] Breakfast Eggs and pizano  [x] Lunch  salad  [x] Dinner  Baked chicken, broccli  [x] Beverages water   Physical activity pattern: states she works in [employer], lives by herself and is active. Monitoring pattern: checks her BG at least 2x a day    Taking medications pattern  [x] Consistent administration  [x] Affordable     Objective   Physical exam  General Alert, and some disorientation. Conversant but easily distracted. Vital Signs   Visit Vitals  /62 (BP 1 Location: Left upper arm, BP Patient Position: At rest)   Pulse (!) 131   Temp 98.6 °F (37 °C)   Resp 21   Ht 5' 3\" (1.6 m)   Wt 42.9 kg (94 lb 8 oz)   SpO2 100%   BMI 16.74 kg/m²     Laboratory  Tests Today   A1c    BG 78   Anion gap 2   Serum triglycerides    WBC 14.5   Serum creatinine 1.92   GFR 33   AST    ALT                  Factors impacting BG management  Factor Dose Comments   Nutrition:  Carb-controlled meals   60 grams/meal   Good appetite per patient    Pain  Reports no pain    Infection IV ABX for UTI Urine culture positive   Other: kidney function  Avoid nephrotoxic medications GFR decreased     Blood glucose pattern        Assessment and Plan   Nursing Diagnosis Risk for unstable blood glucose pattern   Nursing Intervention Domain 5253 Decision-making Support   Nursing Interventions Examined current inpatient diabetes control   Explored factors facilitating and impeding inpatient management  Identified self-management practices impeding diabetes control  Explored corrective strategies with patient and responsible inpatient provider   Informed patient of rational for insulin strategy while hospitalized     Evaluation   This  female, with Type 2 diabetes, did not achieve diabetes control prior to admission, as evidenced by admission BG of 1227 and A1c of 10.1.  During this hospitalization, the patient has not achieved inpatient blood glucose target of 100-180mg/dl. Several factors have played a role in blood glucose management including:  [x] Critical nature of illness state given her initial AMS  [x]  Kidney dysfunction    The Subcutaneous Insulin Order set (5083) has not been in use. Hence, the next step in optimizing blood glucose control would be    [x] Implement the Subcutaneous Insulin order set  [x]  Optimize basal insulin using renal dosing   []  Add mealtime insulin    Patient was started on Glucostabilizer ([address] [place] [address]) in order to treat her HHS on 3/8/21 and she used a total of 29 units. It only took her until 0300 the following morning of 3/9/21 to obtain a BG below 250. Unsure why patient received 2 units of lantus yesterday afternoon as it seems as though insulin infusion was continued. However this morning patient had two hypoglycemic events that require dextrose at 0400 and 0500. Insulin infusion was stopped but no basal insulin was given. This patient has baseline basal insulin needs and this is evident since BG a few hours later sarita back into the 200s. Recommend to order basal insulin at half of her PTA dosing which would be renal dosed for her kidneys. She is tolerating a diet so far today so dependent upon the amount of correction used this could warrant the start of meal time insulin tomorrow. Of note, this patient would also benefit from diabetes self-management education and support BAUDILIO [facility]) after discharge.     Recommendations     [x] Use of Subcutaneous Insulin Order set (4604)  Insulin Dosing Specific recommendation   Basal                                      (Based on weight, BMI & GFR)   [x] 0.3 units/kg/D   15 units of lantus daily    Nutritional                                      (Based on CHO/dextrose load)     Corrective                                       (Useful in adjusting insulin dosing)   [x] HIGH sensitivity        [x] Referral to  [x] Program for Diabetes Health (Phone [phone] to schedule appointment) for [facility]    Billing Code(s)     [x] 44581 IP subsequent hospital care - 25 minutes [] 51802 Prolonged Services - 55 minutes     Before making these care recommendations, I personally reviewed the hospitalization record, including notes, laboratory & diagnostic data and current medications, and   examined the patient at the bedside (circumstances permitting) before making care recommendations.      Total minutes: [address], RN, ACCNS-AG  Diabetes Clinical Nurse Specialist  [facility] Diabetes [facility]  Access via [employer]

## 2021-03-11 LAB
ANION GAP SERPL CALC-SCNC: 3 MMOL/L (ref 5–15)
BACTERIA SPEC CULT: ABNORMAL
BACTERIA SPEC CULT: ABNORMAL
BASOPHILS # BLD: 0 K/UL (ref 0–0.1)
BASOPHILS NFR BLD: 0 % (ref 0–1)
BUN SERPL-MCNC: 37 MG/DL (ref 6–20)
BUN/CREAT SERPL: 24 (ref 12–20)
CALCIUM SERPL-MCNC: 8.2 MG/DL (ref 8.5–10.1)
CC UR VC: ABNORMAL
CHLORIDE SERPL-SCNC: 118 MMOL/L (ref 97–108)
CO2 SERPL-SCNC: 29 MMOL/L (ref 21–32)
CREAT SERPL-MCNC: 1.56 MG/DL (ref 0.55–1.02)
DIFFERENTIAL METHOD BLD: ABNORMAL
EOSINOPHIL # BLD: 0.1 K/UL (ref 0–0.4)
EOSINOPHIL NFR BLD: 2 % (ref 0–7)
ERYTHROCYTE [DISTWIDTH] IN BLOOD BY AUTOMATED COUNT: 14.1 % (ref 11.5–14.5)
GLUCOSE BLD STRIP.AUTO-MCNC: 185 MG/DL (ref 65–100)
GLUCOSE BLD STRIP.AUTO-MCNC: 232 MG/DL (ref 65–100)
GLUCOSE BLD STRIP.AUTO-MCNC: 306 MG/DL (ref 65–100)
GLUCOSE BLD STRIP.AUTO-MCNC: 397 MG/DL (ref 65–100)
GLUCOSE SERPL-MCNC: 179 MG/DL (ref 65–100)
HCT VFR BLD AUTO: 27.7 % (ref 35–47)
HGB BLD-MCNC: 8.4 G/DL (ref 11.5–16)
IMM GRANULOCYTES # BLD AUTO: 0.2 K/UL (ref 0–0.04)
IMM GRANULOCYTES NFR BLD AUTO: 2 % (ref 0–0.5)
LYMPHOCYTES # BLD: 1.6 K/UL (ref 0.8–3.5)
LYMPHOCYTES NFR BLD: 20 % (ref 12–49)
MCH RBC QN AUTO: 29.8 PG (ref 26–34)
MCHC RBC AUTO-ENTMCNC: 30.3 G/DL (ref 30–36.5)
MCV RBC AUTO: 98.2 FL (ref 80–99)
MONOCYTES # BLD: 0.4 K/UL (ref 0–1)
MONOCYTES NFR BLD: 5 % (ref 5–13)
NEUTS SEG # BLD: 5.7 K/UL (ref 1.8–8)
NEUTS SEG NFR BLD: 71 % (ref 32–75)
NRBC # BLD: 0 K/UL (ref 0–0.01)
NRBC BLD-RTO: 0 PER 100 WBC
PLATELET # BLD AUTO: 118 K/UL (ref 150–400)
PMV BLD AUTO: 12.3 FL (ref 8.9–12.9)
POTASSIUM SERPL-SCNC: 4.7 MMOL/L (ref 3.5–5.1)
RBC # BLD AUTO: 2.82 M/UL (ref 3.8–5.2)
SERVICE CMNT-IMP: ABNORMAL
SODIUM SERPL-SCNC: 150 MMOL/L (ref 136–145)
WBC # BLD AUTO: 8 K/UL (ref 3.6–11)

## 2021-03-11 PROCEDURE — 74011636637 HC RX REV CODE- 636/637: Performed by: INTERNAL MEDICINE

## 2021-03-11 PROCEDURE — 74011250636 HC RX REV CODE- 250/636: Performed by: INTERNAL MEDICINE

## 2021-03-11 PROCEDURE — 36415 COLL VENOUS BLD VENIPUNCTURE: CPT

## 2021-03-11 PROCEDURE — 80048 BASIC METABOLIC PNL TOTAL CA: CPT

## 2021-03-11 PROCEDURE — 97165 OT EVAL LOW COMPLEX 30 MIN: CPT

## 2021-03-11 PROCEDURE — 97535 SELF CARE MNGMENT TRAINING: CPT

## 2021-03-11 PROCEDURE — 85025 COMPLETE CBC W/AUTO DIFF WBC: CPT

## 2021-03-11 PROCEDURE — 97116 GAIT TRAINING THERAPY: CPT

## 2021-03-11 PROCEDURE — 99232 SBSQ HOSP IP/OBS MODERATE 35: CPT | Performed by: CLINICAL NURSE SPECIALIST

## 2021-03-11 PROCEDURE — 65660000000 HC RM CCU STEPDOWN

## 2021-03-11 PROCEDURE — 74011000258 HC RX REV CODE- 258: Performed by: INTERNAL MEDICINE

## 2021-03-11 PROCEDURE — 74011636637 HC RX REV CODE- 636/637: Performed by: NURSE PRACTITIONER

## 2021-03-11 PROCEDURE — 74011250637 HC RX REV CODE- 250/637: Performed by: INTERNAL MEDICINE

## 2021-03-11 PROCEDURE — 82962 GLUCOSE BLOOD TEST: CPT

## 2021-03-11 RX ORDER — GLIPIZIDE 2.5 MG/1
2.5 TABLET, EXTENDED RELEASE ORAL
Status: DISCONTINUED | OUTPATIENT
Start: 2021-03-11 | End: 2021-03-16

## 2021-03-11 RX ORDER — INSULIN GLARGINE 100 [IU]/ML
20 INJECTION, SOLUTION SUBCUTANEOUS DAILY
Status: DISCONTINUED | OUTPATIENT
Start: 2021-03-11 | End: 2021-03-12

## 2021-03-11 RX ORDER — INSULIN LISPRO 100 [IU]/ML
3 INJECTION, SOLUTION INTRAVENOUS; SUBCUTANEOUS ONCE
Status: COMPLETED | OUTPATIENT
Start: 2021-03-11 | End: 2021-03-11

## 2021-03-11 RX ADMIN — SODIUM CHLORIDE 10 ML: 9 INJECTION, SOLUTION INTRAMUSCULAR; INTRAVENOUS; SUBCUTANEOUS at 09:55

## 2021-03-11 RX ADMIN — INSULIN GLARGINE 20 UNITS: 100 INJECTION, SOLUTION SUBCUTANEOUS at 13:03

## 2021-03-11 RX ADMIN — PIPERACILLIN AND TAZOBACTAM 3.38 G: 3; .375 INJECTION, POWDER, LYOPHILIZED, FOR SOLUTION INTRAVENOUS at 22:46

## 2021-03-11 RX ADMIN — POTASSIUM CHLORIDE AND SODIUM CHLORIDE: 450; 150 INJECTION, SOLUTION INTRAVENOUS at 01:27

## 2021-03-11 RX ADMIN — NYSTATIN 500000 UNITS: 100000 SUSPENSION ORAL at 13:03

## 2021-03-11 RX ADMIN — NYSTATIN 500000 UNITS: 100000 SUSPENSION ORAL at 09:53

## 2021-03-11 RX ADMIN — HEPARIN SODIUM 5000 UNITS: 5000 INJECTION INTRAVENOUS; SUBCUTANEOUS at 09:53

## 2021-03-11 RX ADMIN — POTASSIUM CHLORIDE AND SODIUM CHLORIDE: 450; 150 INJECTION, SOLUTION INTRAVENOUS at 15:47

## 2021-03-11 RX ADMIN — INSULIN LISPRO 4 UNITS: 100 INJECTION, SOLUTION INTRAVENOUS; SUBCUTANEOUS at 13:03

## 2021-03-11 RX ADMIN — SODIUM CHLORIDE 10 ML: 9 INJECTION, SOLUTION INTRAMUSCULAR; INTRAVENOUS; SUBCUTANEOUS at 22:47

## 2021-03-11 RX ADMIN — PIPERACILLIN AND TAZOBACTAM 3.38 G: 3; .375 INJECTION, POWDER, LYOPHILIZED, FOR SOLUTION INTRAVENOUS at 09:54

## 2021-03-11 RX ADMIN — INSULIN LISPRO 3 UNITS: 100 INJECTION, SOLUTION INTRAVENOUS; SUBCUTANEOUS at 22:46

## 2021-03-11 NOTE — PROGRESS NOTES
Problem: Mobility Impaired (Adult and Pediatric)  Goal: *Acute Goals and Plan of Care (Insert Text)  Description: FUNCTIONAL STATUS PRIOR TO ADMISSION: Patient is a poor historian and has mild confusion during session. Patient reports she was independent and active without use of DME.    HOME SUPPORT PRIOR TO ADMISSION: The patient lived alone with no local support. Physical Therapy Goals  Initiated 3/10/2021  1. Patient will move from supine to sit and sit to supine  in bed with modified independence within 7 day(s). Met 3/11/21. 2.  Patient will transfer from bed to chair and chair to bed with modified independence using the least restrictive device within 7 day(s). 3.  Patient will perform sit to stand with modified independence within 7 day(s). Met 3/11/21. 4.  Patient will ambulate with modified independence for 250 feet with the least restrictive device within 7 day(s). 5.  Patient will ascend/descend 4 stairs with 1 handrail(s) with modified independence within 7 day(s). 3/11/2021 1524 by Valentino Bras, PT  Outcome: Progressing Towards Goal  PHYSICAL THERAPY TREATMENT  Patient: Brice Gilmore (36 y.o. female)  Date: 3/11/2021  Diagnosis: HHNC (hyperglycemic hyperosmolar nonketotic coma) ([facility] [E11.01] <principal problem not specified>       Precautions: Fall  Chart, physical therapy assessment, plan of care and goals were reviewed. ASSESSMENT  Patient continues with skilled PT services and is progressing towards goals. Doing much better activity tolerance, balance and mobility skills. Gait progressed to 400 feet with near normal gait pattern. Patient remains irritable, voicing concerns that people in the goins are whispering things about her all the time. Will no longer need HH PT follow-up upon discharge. Current Level of Function Impacting Discharge (mobility/balance): independent bed mobility, supine to sit, sit to stand and bed to chair.  Near independent ambulation 400 feet.    Other factors to consider for discharge: lives alone; has family that can check on her at home. PLAN :  Patient continues to benefit from skilled intervention to address the above impairments. Continue treatment per established plan of care. to address goals. Recommendation for discharge: (in order for the patient to meet his/her long term goals)  No skilled physical therapy/ follow up rehabilitation needs identified at this time. This discharge recommendation:  Has been made in collaboration with the attending provider and/or case management    IF patient discharges home will need the following DME: none       SUBJECTIVE:   Patient stated  everyone is talking about me.     OBJECTIVE DATA SUMMARY:   Critical Behavior:  Neurologic State: Alert  Orientation Level: Oriented X4  Cognition: Follows commands, Decreased attention/concentration  Safety/Judgement: Decreased awareness of need for assistance, Decreased insight into deficits, Decreased awareness of need for safety  Functional Mobility Training:  Bed Mobility:  Rolling: Independent  Supine to Sit: Independent  Sit to Supine: Independent  Scooting: Independent        Transfers:  Sit to Stand: Independent  Stand to Sit: Independent        Bed to Chair: Independent                    Balance:  Sitting: Intact  Standing: Impaired  Standing - Static: Good  Standing - Dynamic : Fair  Ambulation/Gait Training:  Distance (ft): 400 Feet (ft)  Assistive Device: Gait belt  Ambulation - Level of Assistance: Stand-by assistance        Gait Abnormalities: Path deviations(mild deviations/no lob)  Right Side Weight Bearing: Full  Left Side Weight Bearing: Full        Speed/Connie: (normal speed)                    Pain Rating:  None reported    Activity Tolerance:   Good and SpO2 stable on RA    After treatment patient left in no apparent distress:   Supine in bed, Call bell within reach, Caregiver / family present, and Side rails x 3    COMMUNICATION/COLLABORATION:   The patients plan of care was discussed with: Occupational therapist, Registered nurse, and Case management.      Nancie Guzman, PT

## 2021-03-11 NOTE — DIABETES MGMT
[address] [facility]    CLINICAL NURSE SPECIALIST CONSULT     Initial Presentation   Connor Jose is a 60 y.o. female admitted 3/8/21 with altered mental status and hyperglycemia. On admission to the emergency room she was found to have a glucose of 908 with a normal anion gap, a BUN of 100, creatinine 3.2, sodium 154, and A1c of 10.1%. She had been treated with vigorous fluid resuscitation as well as intravenous insulin. HX:   Past Medical History:   Diagnosis Date    Asthma     Diabetes ([address]     Elevated transaminase level 6/29/2016    Insulin dependent diabetes mellitus 6/20/2016    Pancreatic atrophy 6/20/2016      DX: Hyperosmolar hyperglycemic state. NEERAJ. Severe sepsis secondary to UTI. Cirrhosis on liver biopsy in early 2020; ? Etiology. TX: IVF. Insulin. Clot prevention. IV [facility] course   Clinical progress has been uncomplicated. 3/10/21 CM saying disposition is home with possible home health services. Seen by nutritionist today. Urine culture growing gram negative rods. Blood cultures are negative. 3/11/21 PT/OT ordered. Possible discharge per notes in 1-2 days. Intermittent confusion over night. Patient does not want to go to a SNF or have [facility]. Diabetes    Patient has known Type 2 diabetes, treated with insulin PTA. Admission BG 1227 and A1c 10.1 indicate poor/acceptable diabetes control. Ambulatory blood glucose management provided by primary care provider Dr. Amelia Dumont and endocrinologist, Dr Benton Stoner.   Consulted by Provider for advanced diabetes nursing assessment and care, specifically related to    [x] Inpatient management strategy    Diabetes-related medical history  Acute complications  HHNK  Microvascular disease  Nephropathy    Diabetes medication history  Drug class Currently in use Discontinued Never used   Biguanide      DDP-4 inhibitor       Sulfonylurea  Glipizide XR 2.5mg BID    Thiazolidinedione      GLP-1 RA SGLT-2 inhibitors      Basal insulin 30 units of basaglar at night     Bolus insulin      Fixed Dose  Combinations        Subjective   I was diagnosed with diabetes around 2016.     Patient reports the following home diabetes self-care practices:  Eating pattern  [x] Breakfast Eggs and pizano  [x] Lunch  salad  [x] Dinner  Baked chicken, broccli  [x] Beverages water   Physical activity pattern: states she works in [employer], lives by herself and is active. Monitoring pattern: checks her BG at least 2x a day    Taking medications pattern  [x] Consistent administration  [x] Affordable     Objective   Physical exam  General Alert, and better orientation today. Conversant and cooperative but easily distracted.    Vital Signs   Visit Vitals  /66 (BP 1 Location: Left upper arm, BP Patient Position: Sitting)   Pulse 98   Temp 98.5 °F (36.9 °C)   Resp 23   Ht 5' 3\" (1.6 m)   Wt 42.6 kg (94 lb)   SpO2 100%   BMI 16.65 kg/m²     Laboratory  Tests 3/11 3/10   A1c      78   Anion gap 3 2   Serum triglycerides     WBC 8.0 14.5   Serum creatinine 1.56 1.92   GFR 41 33   AST     ALT                      Factors impacting BG management  Factor Dose Comments   Nutrition:  Carb-controlled meals   60 grams/meal   Good appetite per patient    Pain  Reports no pain    Infection IV ABX for UTI Urine culture positive   Other: kidney function  Avoid nephrotoxic medications GFR decreased     Blood glucose pattern        Assessment and Plan   Nursing Diagnosis Risk for unstable blood glucose pattern   Nursing Intervention Domain 3818 Decision-making Support   Nursing Interventions Examined current inpatient diabetes control   Explored factors facilitating and impeding inpatient management  Identified self-management practices impeding diabetes control  Explored corrective strategies with patient and responsible inpatient provider   Informed patient of rational for insulin strategy while hospitalized     Evaluation   This  female, with Type 2 diabetes, did not achieve diabetes control prior to admission, as evidenced by admission BG of 1227 and A1c of 10.1. During this hospitalization, the patient has not achieved inpatient blood glucose target of 100-180mg/dl. Several factors have played a role in blood glucose management including:  [x] Critical nature of illness state given her initial AMS  [x]  Kidney dysfunction    Patient was started on Glucostabilizer ([address] [place] [address]) in order to treat her HHS on 3/8/21 and she used a total of 29 units. It only took her until 0300 the following morning of 3/9/21 to obtain a BG below 250. Unsure why patient received 2 units of lantus yesterday afternoon as it seems as though insulin infusion was continued. However this morning patient had two hypoglycemic events that require dextrose at 0400 and 0500. Insulin infusion was stopped but no basal insulin was given. This patient has baseline basal insulin needs and this is evident since BG a few hours later sarita back into the 200s. Recommend to order basal insulin at half of her PTA dosing which would be renal dosed for her kidneys. Note today that BG range is 170-180s this morning so basal dosing could be increased slightly to 0.4 units/kg/D. She continues to tolerate a diet well. PTA at one time she was taking glipizide and stated that she would consider taking this medication again as a sulfonylurea would help control her BG during meal time since she eating. Of note, this patient would also benefit from diabetes self-management education and support BAUDILIO [facility]) after discharge.     Recommendations     [x] Use of Subcutaneous Insulin Order set (4573)  Insulin Dosing Specific recommendation   Basal                                      (Based on weight, BMI & GFR)   [x] 0.4 units/kg/D   Increase to 20 units of lantus daily    Nutritional                                      (Based on CHO/dextrose load)  Start PTA dosing of glipizide BID Corrective                                       (Useful in adjusting insulin dosing)   [x] HIGH sensitivity        [x] Referral to  [x] Program for Diabetes Health (Phone [phone] to schedule appointment) for [facility]    Billing Code(s)     [x] 78492 IP subsequent hospital care - 25 minutes [] 32729 Prolonged Services - 55 minutes     Before making these care recommendations, I personally reviewed the hospitalization record, including notes, laboratory & diagnostic data and current medications, and   examined the patient at the bedside (circumstances permitting) before making care recommendations.      Total minutes: [address] MSN, RN, ACCNS-AG  Diabetes Clinical Nurse Specialist  [facility]  Access via [address] [place] [address]

## 2021-03-11 NOTE — PROGRESS NOTES
End of Shift Note    Bedside shift change report given to Tiff Andrade RN (oncoming nurse) by Rosa Maria Maxwell (offgoing nurse). Report included the following information SBAR, Kardex, Intake/Output and Recent Results    Shift worked:  7p-7a     Shift summary and any significant changes:     pt has some intermittent confusion; have to keep reminding her that she is not in the way of anyone while she is in the hospital and that her family will check on her      Concerns for physician to address:  confusion     Zone phone for oncoming shift:          Activity:  Activity Level: Bed Rest  Number times ambulated in hallways past shift: 0  Number of times OOB to chair past shift: 0    Cardiac:   Cardiac Monitoring: Yes      Cardiac Rhythm: Normal sinus rhythm    Access:   Current line(s): PIV     Genitourinary:   Urinary status: voiding    Respiratory:   O2 Device: Room air  Chronic home O2 use?: NO  Incentive spirometer at bedside: NO     GI:  Last Bowel Movement Date: 03/10/21  Current diet:  DIET DIABETIC CONSISTENT CARB Regular  DIET NUTRITIONAL SUPPLEMENTS Dinner; Glucerna Shake  Passing flatus: YES  Tolerating current diet: YES  % Diet Eaten: 100 %    Pain Management:   Patient states pain is manageable on current regimen: YES    Skin:  Vinh Score: 17  Interventions: increase time out of bed    Patient Safety:  Fall Score:  Total Score: 4  Interventions: gripper socks  High Fall Risk: Yes    Length of Stay:  Expected LOS: - - -  Actual LOS: [id number]

## 2021-03-11 NOTE — PROGRESS NOTES
Problem: Self Care Deficits Care Plan (Adult)  Goal: *Acute Goals and Plan of Care (Insert Text)  Description:   FUNCTIONAL STATUS PRIOR TO ADMISSION: Patient was independent and active without use of DME. Patient was independent for basic and instrumental ADLs. 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 HOME SUPPORT: The patient lived alone with no local support. Patient reported her cousin lives nearby but unclear if she can assist patient once discharged. Occupational Therapy Goals  Initiated 3/11/2021  1. Patient will perform grooming standing at sink with modified independence within 7 day(s). 2.  Patient will perform upper body dressing with modified independence within 7 day(s). 3.  Patient will perform lower body dressing with modified independence within 7 day(s). 4.  Patient will perform toilet transfers with modified independence within 7 day(s). 5.  Patient will perform all aspects of toileting with modified independence within 7 day(s). Outcome: Not Met   OCCUPATIONAL THERAPY EVALUATION  Patient: Lonnie Hess (48 y.o. female)  Date: 3/11/2021  Primary Diagnosis: HHNC (hyperglycemic hyperosmolar nonketotic coma) ([facility] [address] [E11.01]        Precautions: Fall    ASSESSMENT  Based on the objective data described below, the patient presents with decreased independence in self-care and functional mobility secondary to impaired standing balance, general weakness, mild confusion/paranoia, emotional lability, and decreased activity tolerance. Patient is functioning below her independent baseline for self-care and functional mobility, now completing self-care with set-up to min assist and functional mobility with supervision to Christine Oliver with [facility]. Patient tolerated session well with VSS during activities and HR in 90s. Patient would benefit from skilled OT services during acute hospital stay.     Current Level of Function Impacting Discharge (ADLs/self-care): set-up to min assist for self-care, supervision to [address] for functional mobility    Functional Outcome Measure: The patient scored 50 on the Barthel Index outcome measure which is indicative of 50% functional impairment. Other factors to consider for discharge: paranoid/intermittent confusion, lives alone     Patient will benefit from skilled therapy intervention to address the above noted impairments. PLAN :  Recommendations and Planned Interventions: self care training, functional mobility training, therapeutic exercise, balance training, therapeutic activities, endurance activities, patient education, home safety training and family training/education    Frequency/Duration: Patient will be followed by occupational therapy 3 times a week to address goals. Recommendation for discharge: (in order for the patient to meet his/her long term goals)  To be determined: Home with HHOT/PT and 24/7 supervision/assistance vs SNF rehab    This discharge recommendation:  Has been made in collaboration with the attending provider and/or case management    IF patient discharges home will need the following DME: TBD depending on progress       SUBJECTIVE:   Patient stated I don't know why that one girl was being so mean to me, I don't have covid.     OBJECTIVE DATA SUMMARY:   HISTORY:   Past Medical History:   Diagnosis Date    Asthma     Diabetes ([facility] [address]     Elevated transaminase level 6/29/2016    Insulin dependent diabetes mellitus 6/20/2016    Pancreatic atrophy 6/20/2016     Past Surgical History:   Procedure Laterality Date    HX HEENT         Expanded or extensive additional review of patient history:     Home Situation  Home Environment: Private residence  # Steps to Enter: 4  Rails to Enter: Yes  Hand Rails : Bilateral  One/Two Story Residence: One story  Living Alone: Yes  Support Systems: Family member(s)  Patient Expects to be Discharged to[de-identified] Private residence  Current DME Used/Available at Home: None  Tub or Shower Type: Tub/Shower combination    Hand dominance: Right    EXAMINATION OF PERFORMANCE DEFICITS:  Cognitive/Behavioral Status:  Neurologic State: Alert  Orientation Level: Oriented X4  Cognition: Follows commands;Decreased attention/concentration  Perception: Appears intact  Perseveration: Perseverates during conversation  Safety/Judgement: Decreased awareness of need for assistance;Decreased insight into deficits; Decreased awareness of need for safety    Hearing: Auditory  Auditory Impairment: None    Vision/Perceptual:    Acuity: Within Defined Limits    Corrective Lenses: Reading glasses    Range of Motion:  AROM: Generally decreased, functional  PROM: Within functional limits    Strength:  Strength: Generally decreased, functional    Coordination:  Coordination: Generally decreased, functional  Fine Motor Skills-Upper: Left Intact; Right Intact    Gross Motor Skills-Upper: Left Intact; Right Intact    Tone & Sensation:  Tone: Normal  Sensation: Intact    Balance:  Sitting: Intact  Standing: Impaired  Standing - Static: Good  Standing - Dynamic : Fair    Functional Mobility and Transfers for ADLs:  Bed Mobility:  Rolling: Supervision  Supine to Sit: Supervision  Scooting: Supervision    Transfers:  Sit to Stand: Contact guard assistance  Stand to Sit: Contact guard assistance  Bed to Chair: Contact guard assistance  Bathroom Mobility: Contact guard assistance  Toilet Transfer : Contact guard assistance    ADL Assessment:  Feeding: Setup  Oral Facial Hygiene/Grooming: Contact guard assistance  Bathing: Minimum assistance  Upper Body Dressing: Stand-by assistance  Lower Body Dressing: Stand-by assistance  Toileting: Contact guard assistance    ADL Intervention and task modifications:    Grooming  Position Performed: Standing(at sink)  Washing Face: Contact guard assistance  Washing Hands: Contact guard assistance    Upper Body Dressing Assistance  Shirt simulation with hospital gown: Stand-by assistance(sitting EOB)    Lower Body Dressing Assistance  Socks: Stand-by assistance(sitting EOB)  Leg Crossed Method Used: Yes    Toileting  Bladder Hygiene: Contact guard assistance  Clothing Management: Contact guard assistance    Cognitive Retraining  Safety/Judgement: Decreased awareness of need for assistance;Decreased insight into deficits; Decreased awareness of need for safety    Functional Measure:  Barthel Index:    Bathin  Bladder: 5  Bowels: 10  Groomin  Dressing: 10  Feeding: 10  Mobility: 0  Stairs: 0  Toilet Use: 5  Transfer (Bed to Chair and Back): 10  Total: 50/100        The Barthel ADL Index: Guidelines  1. The index should be used as a record of what a patient does, not as a record of what a patient could do. 2. The main aim is to establish degree of independence from any help, physical or verbal, however minor and for whatever reason. 3. The need for supervision renders the patient not independent. 4. A patient's performance should be established using the best available evidence. Asking the patient, friends/relatives and nurses are the usual sources, but direct observation and common sense are also important. However direct testing is not needed. 5. Usually the patient's performance over the preceding 24-48 hours is important, but occasionally longer periods will be relevant. 6. Middle categories imply that the patient supplies over 50 per cent of the effort. 7. Use of aids to be independent is allowed. Brian Alvarez., Barthel, D.W. (8763). Functional evaluation: the Barthel Index. [facility] (14)2. LARRY Gerard, Jai Trejo., Sonny Varghese.[facility], [address] [place] [address] (). Measuring the change indisability after inpatient rehabilitation; comparison of the responsiveness of the Barthel Index and Functional [place] Measure. Journal of Neurology, Neurosurgery, and Psychiatry, 66(4), 952-401.   Stan Escobar, N.J.A, JACKSON Payton, Ayo Snow, M.A. (2004.) Assessment of post-stroke quality of life in cost-effectiveness studies: The usefulness of the Barthel Index and the EuroQoL-5D. Quality of Life Research, 13, 335-11      Based on the above components, the patient evaluation is determined to be of the following complexity level: MEDIUM  Pain Rating:  Patient did not c/o pain during session    Activity Tolerance:   Fair, SpO2 stable on RA, and requires rest breaks    After treatment patient left in no apparent distress:    Sitting in chair, Call bell within reach, and Bed / chair alarm activated    COMMUNICATION/EDUCATION:   The patients plan of care was discussed with: Physical therapist and Registered nurse. Home safety education was provided and the patient/caregiver indicated understanding., Patient/family have participated as able in goal setting and plan of care. , and Patient/family agree to work toward stated goals and plan of care. This patients plan of care is appropriate for delegation to [facility].     Thank you for this referral.  Abigail Bedolla OTR/L  Time Calculation: 31 mins

## 2021-03-11 NOTE — PROGRESS NOTES
Hospitalist Progress Note    NAME: Nicanor Bonilla   :  1965   MRN:  [id number]       Assessment / Plan:    DKA resolved   -Anion gap closed  -DC insulin drip  - on sliding scale     Acute renal failure  -Continue IV fluid     Hypernatremia  -Change IV fluid to half NS  -Monitor sodium level     Sepsis secondary to UTI  -Continue Zosyn  -Follow-up blood culture   -- urine culture GRAM NEGATIVE RODS  E. coli     Hypokalemia   -Resolved                 less than 18.5 Underweight / Body mass index is 17.54 kg/m².     Code status: Full  Prophylaxis: Hep SQ  Recommended Disposition: Home w/Family          Subjective:     Chief Complaint / Reason for Physician Visit  \"  Discussed with RN events overnight. Patient feels better, renal function improving,    Review of Systems:  Symptom Y/N Comments  Symptom Y/N Comments   Fever/Chills n   Chest Pain n    Poor Appetite n   Edema     Cough    Abdominal Pain     Sputum    Joint Pain     SOB/GARLAND n   Pruritis/Rash     Nausea/vomit    Tolerating PT/OT     Diarrhea n   Tolerating Diet y    Constipation n   Other       Could NOT obtain due to:      Objective:     VITALS:   Last 24hrs VS reviewed since prior progress note.  Most recent are:  Patient Vitals for the past 24 hrs:   Temp Pulse Resp BP SpO2   21 1057 98.5 °F (36.9 °C) 98 23 110/66 100 %   21 0725 98 °F (36.7 °C) 87 14 127/69 100 %   21 0406 98.1 °F (36.7 °C) 84 20 98/61 98 %   21 0308 98.4 °F (36.9 °C) 87 20 98/61 95 %   03/10/21 2308 -- 82 21 118/64 100 %   03/10/21 2305 98.5 °F (36.9 °C) 86 15 118/64 100 %   03/10/21 2023 98.2 °F (36.8 °C) (!) 103 16 128/67 100 %   03/10/21 1535 -- (!) 117 18 -- 98 %   03/10/21 1530 -- (!) 106 18 -- 100 %   03/10/21 1511 -- (!) 129 11 -- 99 %   03/10/21 1503 98.6 °F (37 °C) (!) 131 21 126/62 --       Intake/Output Summary (Last 24 hours) at 3/11/2021 1408  Last data filed at 3/11/2021 1308  Gross per 24 hour   Intake 180 ml   Output 300 ml   Net -120 ml I had a face to face encounter and independently examined this patient on 3/11/2021, as outlined below:  PHYSICAL EXAM:  General: WD, WN. Alert, cooperative, no acute distress    EENT:  EOMI. Anicteric sclerae. MMM  Resp:  CTA bilaterally, no wheezing or rales. No accessory muscle use  CV:  Regular  rhythm,  No edema  GI:  Soft, Non distended, Non tender. +Bowel sounds  Neurologic:  Alert and oriented X 3, normal speech,   Psych:   Good insight. Not anxious nor agitated  Skin:  No rashes. No jaundice    Reviewed most current lab test results and cultures  YES  Reviewed most current radiology test results   YES  Review and summation of old records today    NO  Reviewed patient's current orders and MAR    YES  PMH/SH reviewed - no change compared to H&P  ________________________________________________________________________  Care Plan discussed with:    Comments   Patient y    Family      RN y    Care Manager     Consultant                        Multidiciplinary team rounds were held today with , nursing, pharmacist and clinical coordinator. Patient's plan of care was discussed; medications were reviewed and discharge planning was addressed. ________________________________________________________________________  Total NON critical care TIME:  35   Minutes    Total CRITICAL CARE TIME Spent:   Minutes non procedure based      Comments   >50% of visit spent in counseling and coordination of care y    ________________________________________________________________________  Clearance MD Chris     Procedures: see electronic medical records for all procedures/Xrays and details which were not copied into this note but were reviewed prior to creation of Plan. LABS:  I reviewed today's most current labs and imaging studies.   Pertinent labs include:  Recent Labs     03/11/21  0412 03/10/21  0355   WBC 8.0 14.5*   HGB 8.4* 9.4*   HCT 27.7* 30.0*   * 155     Recent Labs 03/11/21  0412 03/10/21  0355 03/09/21  0455 03/08/21  2225 03/08/21  1723 03/08/21  1723   * 158* 160* 158*   < > 154*   K 4.7 3.2* 3.8 3.6   < > 3.7   * 126* 129* 125*   < > 120*   CO2 29 30 27 26   < > 27   * 78 93 463*   < > 843*   BUN 37* 49* 77* 91*   < > 100*   CREA 1.56* 1.92* 2.52* 2.98*   < > 3.30*   CA 8.2* 8.9 9.4 9.0   < > 8.3*   MG  --  1.8 2.6* 2.9*   < > 2.9*   PHOS  --   --   --   --   --  2.4*    < > = values in this interval not displayed. Signed:  Huong Mar MD

## 2021-03-11 NOTE — PROGRESS NOTES
Transition of Care Plan:     Disposition: Home possible needing home health services  Follow up appointments:PCP, Endocrinology  DME needed:TBD  Transportation at [address] or means to access home: Family      IM Medicare letter:N/A due to pt having [phone]  Discharge Caregiver contacted prior to discharge?  CM spoke to Ms. Alley Murphy regarding d/c.    156 pm-CM spoke to Ms. Alley Murphy and is aware that pt will be d/c tomorrow. Ms. Justa Hightower will provide transportation for the pt about 1:30 pm tomorrow. 9543 am-CM spoke to pt regarding the recommendation is for her to have home health with 24 hrs supervision or go to a SNF. 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 CM explain to pt that home health will educate her with managing her medications and PT/OT will continue to work on getting her stronger. Pt is declining home health. CM spoke to pt's cousin, Joseline MCCLAIN([phone]) and she informed CM that she can't provide 24 hr supervision but will visit the pt. CM will continue to follow and assist with d/c planning. Apoorva Dickinson.Bela.   Care Manager [facility]  [phone]

## 2021-03-12 LAB
ANION GAP SERPL CALC-SCNC: 1 MMOL/L (ref 5–15)
BASOPHILS # BLD: 0 K/UL (ref 0–0.1)
BASOPHILS NFR BLD: 0 % (ref 0–1)
BUN SERPL-MCNC: 37 MG/DL (ref 6–20)
BUN/CREAT SERPL: 22 (ref 12–20)
CALCIUM SERPL-MCNC: 8.6 MG/DL (ref 8.5–10.1)
CHLORIDE SERPL-SCNC: 113 MMOL/L (ref 97–108)
CO2 SERPL-SCNC: 29 MMOL/L (ref 21–32)
CREAT SERPL-MCNC: 1.68 MG/DL (ref 0.55–1.02)
DIFFERENTIAL METHOD BLD: ABNORMAL
EOSINOPHIL # BLD: 0.1 K/UL (ref 0–0.4)
EOSINOPHIL NFR BLD: 1 % (ref 0–7)
ERYTHROCYTE [DISTWIDTH] IN BLOOD BY AUTOMATED COUNT: 14.4 % (ref 11.5–14.5)
GLUCOSE BLD STRIP.AUTO-MCNC: 226 MG/DL (ref 65–100)
GLUCOSE BLD STRIP.AUTO-MCNC: 240 MG/DL (ref 65–100)
GLUCOSE BLD STRIP.AUTO-MCNC: 319 MG/DL (ref 65–100)
GLUCOSE BLD STRIP.AUTO-MCNC: 342 MG/DL (ref 65–100)
GLUCOSE SERPL-MCNC: 233 MG/DL (ref 65–100)
HCT VFR BLD AUTO: 34.4 % (ref 35–47)
HGB BLD-MCNC: 10.3 G/DL (ref 11.5–16)
IMM GRANULOCYTES # BLD AUTO: 0.1 K/UL (ref 0–0.04)
IMM GRANULOCYTES NFR BLD AUTO: 2 % (ref 0–0.5)
LYMPHOCYTES # BLD: 1.5 K/UL (ref 0.8–3.5)
LYMPHOCYTES NFR BLD: 24 % (ref 12–49)
MCH RBC QN AUTO: 29.6 PG (ref 26–34)
MCHC RBC AUTO-ENTMCNC: 29.9 G/DL (ref 30–36.5)
MCV RBC AUTO: 98.9 FL (ref 80–99)
MONOCYTES # BLD: 0.5 K/UL (ref 0–1)
MONOCYTES NFR BLD: 8 % (ref 5–13)
NEUTS SEG # BLD: 4 K/UL (ref 1.8–8)
NEUTS SEG NFR BLD: 65 % (ref 32–75)
NRBC # BLD: 0 K/UL (ref 0–0.01)
NRBC BLD-RTO: 0 PER 100 WBC
PLATELET # BLD AUTO: 113 K/UL (ref 150–400)
PMV BLD AUTO: 12.7 FL (ref 8.9–12.9)
POTASSIUM SERPL-SCNC: 4.9 MMOL/L (ref 3.5–5.1)
RBC # BLD AUTO: 3.48 M/UL (ref 3.8–5.2)
SERVICE CMNT-IMP: ABNORMAL
SODIUM SERPL-SCNC: 143 MMOL/L (ref 136–145)
WBC # BLD AUTO: 6.1 K/UL (ref 3.6–11)

## 2021-03-12 PROCEDURE — 74011250637 HC RX REV CODE- 250/637: Performed by: INTERNAL MEDICINE

## 2021-03-12 PROCEDURE — 74011636637 HC RX REV CODE- 636/637: Performed by: INTERNAL MEDICINE

## 2021-03-12 PROCEDURE — 36415 COLL VENOUS BLD VENIPUNCTURE: CPT

## 2021-03-12 PROCEDURE — 65660000000 HC RM CCU STEPDOWN

## 2021-03-12 PROCEDURE — 82962 GLUCOSE BLOOD TEST: CPT

## 2021-03-12 PROCEDURE — 85025 COMPLETE CBC W/AUTO DIFF WBC: CPT

## 2021-03-12 PROCEDURE — 80048 BASIC METABOLIC PNL TOTAL CA: CPT

## 2021-03-12 PROCEDURE — 99232 SBSQ HOSP IP/OBS MODERATE 35: CPT | Performed by: CLINICAL NURSE SPECIALIST

## 2021-03-12 PROCEDURE — 74011250636 HC RX REV CODE- 250/636: Performed by: INTERNAL MEDICINE

## 2021-03-12 PROCEDURE — 74011000258 HC RX REV CODE- 258: Performed by: INTERNAL MEDICINE

## 2021-03-12 RX ORDER — INSULIN GLARGINE 100 [IU]/ML
30 INJECTION, SOLUTION SUBCUTANEOUS DAILY
Status: DISCONTINUED | OUTPATIENT
Start: 2021-03-13 | End: 2021-03-17 | Stop reason: HOSPADM

## 2021-03-12 RX ORDER — CIPROFLOXACIN 250 MG/1
250 TABLET, FILM COATED ORAL EVERY 12 HOURS
Status: COMPLETED | OUTPATIENT
Start: 2021-03-12 | End: 2021-03-14

## 2021-03-12 RX ADMIN — INSULIN GLARGINE 20 UNITS: 100 INJECTION, SOLUTION SUBCUTANEOUS at 08:25

## 2021-03-12 RX ADMIN — CIPROFLOXACIN HYDROCHLORIDE 250 MG: 250 TABLET, FILM COATED ORAL at 17:16

## 2021-03-12 RX ADMIN — SODIUM CHLORIDE 10 ML: 9 INJECTION, SOLUTION INTRAMUSCULAR; INTRAVENOUS; SUBCUTANEOUS at 16:57

## 2021-03-12 RX ADMIN — INSULIN LISPRO 2 UNITS: 100 INJECTION, SOLUTION INTRAVENOUS; SUBCUTANEOUS at 16:55

## 2021-03-12 RX ADMIN — SODIUM CHLORIDE 10 ML: 9 INJECTION, SOLUTION INTRAMUSCULAR; INTRAVENOUS; SUBCUTANEOUS at 05:23

## 2021-03-12 RX ADMIN — INSULIN LISPRO 2 UNITS: 100 INJECTION, SOLUTION INTRAVENOUS; SUBCUTANEOUS at 22:20

## 2021-03-12 RX ADMIN — POTASSIUM CHLORIDE AND SODIUM CHLORIDE: 450; 150 INJECTION, SOLUTION INTRAVENOUS at 09:00

## 2021-03-12 RX ADMIN — PIPERACILLIN AND TAZOBACTAM 3.38 G: 3; .375 INJECTION, POWDER, LYOPHILIZED, FOR SOLUTION INTRAVENOUS at 05:23

## 2021-03-12 RX ADMIN — CIPROFLOXACIN HYDROCHLORIDE 250 MG: 250 TABLET, FILM COATED ORAL at 22:21

## 2021-03-12 RX ADMIN — SODIUM CHLORIDE 10 ML: 9 INJECTION, SOLUTION INTRAMUSCULAR; INTRAVENOUS; SUBCUTANEOUS at 22:22

## 2021-03-12 RX ADMIN — GLIPIZIDE 2.5 MG: 2.5 TABLET, FILM COATED, EXTENDED RELEASE ORAL at 08:32

## 2021-03-12 RX ADMIN — GLIPIZIDE 2.5 MG: 2.5 TABLET, FILM COATED, EXTENDED RELEASE ORAL at 16:30

## 2021-03-12 RX ADMIN — INSULIN LISPRO 4 UNITS: 100 INJECTION, SOLUTION INTRAVENOUS; SUBCUTANEOUS at 12:10

## 2021-03-12 RX ADMIN — INSULIN LISPRO 2 UNITS: 100 INJECTION, SOLUTION INTRAVENOUS; SUBCUTANEOUS at 08:25

## 2021-03-12 NOTE — INTERDISCIPLINARY ROUNDS
Interdisciplinary team rounds were held 3/12/2021 with the following team members:Care Management, Lactation Consultant, Nutrition, Pharmacy, Physician and Charge RN and the patient. Plan of care discussed. See clinical pathway and/or care plan for interventions and desired outcomes. Team recommending [facility] due to patient weakness and incontinence. Patient refusing [facility] and stated [facility] members will be assisting her. Psych consult to be placed.

## 2021-03-12 NOTE — PROGRESS NOTES
Spiritual Care Assessment/Progress Note  [facility]      NAME: Fahad Montiel      MRN: [id number]  AGE: 60 y.o. SEX: female  Religion Affiliation: Rastafari   Language: English     3/12/2021     Total Time (in minutes): 8     Spiritual Assessment begun in [facility] PROGRESSIVE CARE through conversation with:         []Patient        [] Family    [] Friend(s)        Reason for Consult: Initial/Spiritual assessment, patient floor     Spiritual beliefs: (Please include comment if needed)     [] Identifies with a joshua tradition:         [] Supported by a joshua community:            [] Claims no spiritual orientation:           [] Seeking spiritual identity:                [] Adheres to an individual form of spirituality:           [x] Not able to assess:                           Identified resources for coping:      [] Prayer                               [] Music                  [] Guided Imagery     [] Family/friends                 [] Pet visits     [] Devotional reading                         [x] Unknown     [] Other:                                           Interventions offered during this visit: (See comments for more details)    Patient Interventions: Initial visit           Plan of Care:     [x] Support spiritual and/or cultural needs    [] Support AMD and/or advance care planning process      [] Support grieving process   [] Coordinate Rites and/or Rituals    [] Coordination with community clergy   [] No spiritual needs identified at this time   [] Detailed Plan of Care below (See Comments)  [] Make referral to Music Therapy  [] Make referral to Pet Therapy     [] Make referral to Addiction services  [] Make referral to [facility]  [] Make referral to Spiritual Care Partner  [] No future visits requested        [x] Follow up upon further referrals     Comments:  Attempted visit on PCU for spiritual assessment. No family/friends present.  Patient was off the unit at time of attempted visit.  Unable to assess spiritual needs/concerns at this time.     MISTI Everett, BCC, Staff [facility]     Paging Service  [phone])

## 2021-03-12 NOTE — PROGRESS NOTES
Physician Progress Note      PATIENT:               Richard Collazo  CSN #:                  [id number]  :                       1965  ADMIT DATE:       3/8/2021 1:14 PM  [address] [place] [address] DATE:  RESPONDING  PROVIDER #:        Miriam Ulrich MD          QUERY TEXT:    Dr Blanco Reach:    Pt admitted with Sepsis secondary to  UTI POA & DKA. Documentation of  encephalopathy in noted in ED. If possible, please document in progress notes and discharge summary further specificity regarding the type of encephalopathy:    The medical record reflects the following:  Risk Factors: 55yoF w/ UTI, DKA, DM on insulin, renal failure  Clinical Indicators:  AMS, found confused, agitated by cousin, house in [address];  ED -- oriented to time, but not place, she think she is at [facility]. \"  She is not completely oriented to situation. elevated A1C &  - 1227; elevated  ; Leukocytosis, lactic acidosis 4.36; Treatment: 3L IVF bolus in ED, IV Zosyn, IV Ativan, insulin, IVF. Thank you,  Alec Han RN, [facility]    Options provided:  -- Metabolic encephalopathy  -- Septic encephalopathy  -- Toxic encephalopathy  -- Encephalopathy due to DKA  -- Toxic metabolic encephalopathy  -- Other - I will add my own diagnosis  -- Disagree - Not applicable / Not valid  -- Disagree - Clinically unable to determine / Unknown  -- Refer to Clinical Documentation Reviewer    PROVIDER RESPONSE TEXT:    This patient has toxic metabolic encephalopathy.     Query created by: Jd Henley on 3/11/2021 10:39 AM      Electronically signed by:  Miriam Ulrich MD 3/12/2021 10:31 AM

## 2021-03-12 NOTE — PROGRESS NOTES
Received message from patient's nurse Sima Fonseca stating :    Pt's FSBG 397. How much insulin Humalog would you like to order? Discussion / orders:    Lispro 3 units sc x 1         Please note that this note was dictated using Dragon computer voice recognition software. Quite often unanticipated grammatical, syntax, homophones, and other interpretive errors are inadvertently transcribed by the computer software. Please disregard these errors. Please excuse any errors that have escaped final proofreading.

## 2021-03-12 NOTE — PROGRESS NOTES
Hospitalist Progress Note    NAME: Marli Helton   :  1965   MRN:  [id number]       Assessment / Plan:  DKA resolved   -Anion gap closed  -DC insulin drip  - on sliding scale     Acute renal failure  -Continue IV fluid  -Renal function improving     Hypernatremia  -Change IV fluid to half NS  -Monitor sodium level     Sepsis secondary to UTI  -Change Zosyn to Cipro  -Follow-up blood culture   -- urine culture GRAM NEGATIVE RODS  E. coli     Hypokalemia   -Resolved     Confusion/questionable psych disorder  -Psych consult           less than 18.5 Underweight / Body mass index is 17.54 kg/m².     Code status: Full  Prophylaxis: Hep SQ  Recommended Disposition: Rehab versus home health            Subjective:     Chief Complaint / Reason for Physician Visit    Discussed with RN events overnight. Patient very weak, still confused, questionable psych disorder, psych consult was placed    Review of Systems:  Symptom Y/N Comments  Symptom Y/N Comments   Fever/Chills n   Chest Pain n    Poor Appetite n   Edema     Cough    Abdominal Pain     Sputum    Joint Pain     SOB/GARLAND n   Pruritis/Rash     Nausea/vomit    Tolerating PT/OT y    Diarrhea y   Tolerating Diet     Constipation n   Other       Could NOT obtain due to:      Objective:     VITALS:   Last 24hrs VS reviewed since prior progress note.  Most recent are:  Patient Vitals for the past 24 hrs:   Temp Pulse Resp BP SpO2   21 1141 98.3 °F (36.8 °C) (!) 103 18 120/60 100 %   21 0737 98.5 °F (36.9 °C) 87 15 125/79 100 %   21 0323 97.9 °F (36.6 °C) 69 14 120/67 100 %   21 2256 97.9 °F (36.6 °C) 71 12 121/72 100 %   21 1928 98.4 °F (36.9 °C) 93 13 (!) 113/58 100 %       Intake/Output Summary (Last 24 hours) at 3/12/2021 1529  Last data filed at 3/12/2021 0507  Gross per 24 hour   Intake 3868.34 ml   Output 300 ml   Net 3568.34 ml        I had a face to face encounter and independently examined this patient on 3/12/2021, as outlined below:  PHYSICAL EXAM:  General: WD, WN. Alert, cooperative, no acute distress    EENT:  EOMI. Anicteric sclerae. MMM  Resp:  CTA bilaterally, no wheezing or rales. No accessory muscle use  CV:  Regular  rhythm,  No edema  GI:  Soft, Non distended, Non tender. +Bowel sounds  Neurologic:  Alert and oriented X 3, normal speech,   Psych:   Good insight. Not anxious nor agitated  Skin:  No rashes. No jaundice    Reviewed most current lab test results and cultures  YES  Reviewed most current radiology test results   YES  Review and summation of old records today    NO  Reviewed patient's current orders and MAR    YES  PMH/SH reviewed - no change compared to H&P  ________________________________________________________________________  Care Plan discussed with:    Comments   Patient y    Family  y    RN y    Care Manager     Consultant                        Multidiciplinary team rounds were held today with , nursing, pharmacist and clinical coordinator. Patient's plan of care was discussed; medications were reviewed and discharge planning was addressed. ________________________________________________________________________  Total NON critical care TIME:  35   Minutes    Total CRITICAL CARE TIME Spent:   Minutes non procedure based      Comments   >50% of visit spent in counseling and coordination of care y    ________________________________________________________________________  Alinda Peabody, MD     Procedures: see electronic medical records for all procedures/Xrays and details which were not copied into this note but were reviewed prior to creation of Plan. LABS:  I reviewed today's most current labs and imaging studies.   Pertinent labs include:  Recent Labs     03/12/21  0334 03/11/21  0412 03/10/21  0355   WBC 6.1 8.0 14.5*   HGB 10.3* 8.4* 9.4*   HCT 34.4* 27.7* 30.0*   * 118* 155     Recent Labs     03/12/21  0334 03/11/21 0412 03/10/21  0355    150* 158*   K 4.9 4.7 3.2*   * 118* 126*   CO2 29 29 30   * 179* 78   BUN 37* 37* 49*   CREA 1.68* 1.56* 1.92*   CA 8.6 8.2* 8.9   MG  --   --  1.8       Signed:  Lili Lee MD

## 2021-03-12 NOTE — PROGRESS NOTES
1900: Bedside shift change report given to Sammy Heard (oncoming nurse) by Angel Yan (offgoing nurse). Report included the following information SBAR, Kardex, ED Summary, Intake/Output, MAR, Recent Results and Cardiac Rhythm NSR.     2105: Pt's FSBG 297. [employer] NP Purveyor for insulin coverage. End of Shift Note    Bedside shift change report given to Angel Yan (oncoming nurse) by Sharonda Vo RN (offgoing nurse). Report included the following information SBAR, Kardex, ED Summary, Intake/Output, MAR, Recent Results and Cardiac Rhythm NSR    Shift worked:  [id number]     Shift summary and any significant changes:     patient having loose bowel movements     Concerns for physician to address:  loose bowel movements     Zone phone for oncoming shift:          Activity:  Activity Level: Up with Assistance  Number times ambulated in hallways past shift: 0  Number of times OOB to chair past shift: 0    Cardiac:   Cardiac Monitoring: Yes      Cardiac Rhythm: Normal sinus rhythm    Access:   Current line(s): PIV     Genitourinary:   Urinary status: voiding    Respiratory:   O2 Device: Room air  Chronic home O2 use?: NO  Incentive spirometer at bedside: NO     GI:  Last Bowel Movement Date: 03/11/21  Current diet:  DIET DIABETIC CONSISTENT CARB Regular  DIET NUTRITIONAL SUPPLEMENTS Dinner; Glucerna Shake  Passing flatus: YES  Tolerating current diet: YES  % Diet Eaten: 50 %    Pain Management:   Patient states pain is manageable on current regimen: YES    Skin:  Vinh Score: 19  Interventions: float heels and increase time out of bed    Patient Safety:  Fall Score:  Total Score: 3  Interventions: gripper socks, pt to call before getting OOB and stay with me (per policy)  High Fall Risk: Yes    Length of Stay:  Expected LOS: 4d 19h  Actual LOS: [address], RN

## 2021-03-12 NOTE — DIABETES MGMT
[facility]    CLINICAL NURSE SPECIALIST CONSULT     Initial Presentation   Marysol Elena is a 60 y.o. female admitted 3/8/21 with altered mental status and hyperglycemia. On admission to the emergency room she was found to have a glucose of 908 with a normal anion gap, a BUN of 100, creatinine 3.2, sodium 154, and A1c of 10.1%. She had been treated with vigorous fluid resuscitation as well as intravenous insulin. HX:   Past Medical History:   Diagnosis Date    Asthma     Diabetes ([address]     Elevated transaminase level 6/29/2016    Insulin dependent diabetes mellitus 6/20/2016    Pancreatic atrophy 6/20/2016      DX: Hyperosmolar hyperglycemic state. NEERAJ. Severe sepsis secondary to UTI. Cirrhosis on liver biopsy in early 2020; ? Etiology. TX: IVF. Insulin. Clot prevention. [facility]ly [facility], later [facility]    Hospital course   Clinical progress has been uncomplicated. 3/10/21 CM saying disposition is home with possible home health services. Seen by nutritionist today. Urine culture growing gram negative rods. Blood cultures are negative. 3/11/21 PT/OT ordered. Possible discharge per notes in 1-2 days. Intermittent confusion over night. Patient does not want to go to a SNF or have Merged with [facility].    3/12/21 Patient refusing Merged with [facility] and stated Denomin[employer] members will be assisting her. Patient very weak, still confused, questionable psych disorder, Psych consult to be placed. urine culture GRAM NEGATIVE RODS  E. coli    Diabetes    Patient has known Type 2 diabetes, treated with insulin PTA. Admission BG 1227 and A1c 10.1 indicate poor/acceptable diabetes control. Ambulatory blood glucose management provided by primary care provider Dr. Qi Mccauley and endocrinologist, Dr Trish Henderson.   Consulted by Provider for advanced diabetes nursing assessment and care, specifically related to    [x] Inpatient management strategy    Diabetes-related medical history  Acute complications  HHNK  Microvascular disease  Nephropathy    Diabetes medication history  Drug class Currently in use Discontinued Never used   Biguanide      DDP-4 inhibitor       Sulfonylurea  Glipizide XR 2.5mg BID    Thiazolidinedione      GLP-1 RA      SGLT-2 inhibitors      Basal insulin 30 units of basaglar at night     Bolus insulin      Fixed Dose  Combinations        Subjective   Patient reports the following home diabetes self-care practices:  Eating pattern  [x] Breakfast Eggs and pizano  [x] Lunch  salad  [x] Dinner  [employer], [employer]  [x] Beverages water   Physical activity pattern: states she works in [employer], lives by herself and is active. Monitoring pattern: checks her BG at least 2x a day    Taking medications pattern  [x] Consistent administration  [x] Affordable     Objective   Physical exam  General Alert but easily irritable. Conversant and cooperative but easily distracted.    Vital Signs   Visit Vitals  /60 (BP 1 Location: Right upper arm, BP Patient Position: Sitting)   Pulse (!) 103   Temp 98.3 °F (36.8 °C)   Resp 18   Ht 5' 3\" (1.6 m)   Wt 43.9 kg (96 lb 11.2 oz)   SpO2 100%   BMI 17.13 kg/m²     Laboratory  Tests 3/11 3/10   A1c      78   Anion gap 3 2   Serum triglycerides     WBC 8.0 14.5   Serum creatinine 1.56 1.92   GFR 41 33   AST     ALT                      Factors impacting BG management  Factor Dose Comments   Nutrition:  Carb-controlled meals   60 grams/meal   Good appetite per patient    Pain  Reports no pain    Infection IV ABX for UTI Urine culture positive   Other: kidney function  Avoid nephrotoxic medications GFR decreased     Blood glucose pattern        Assessment and Plan   Nursing Diagnosis Risk for unstable blood glucose pattern   Nursing Intervention Domain 4984 Decision-making Support   Nursing Interventions Examined current inpatient diabetes control   Explored factors facilitating and impeding inpatient management  Explored corrective strategies with patient and responsible inpatient provider      Evaluation   This  female, with Type 2 diabetes, did not achieve diabetes control prior to admission, as evidenced by admission BG of 1227 and A1c of 10.1. During this hospitalization, the patient has not achieved inpatient blood glucose target of 100-180mg/dl. Several factors have played a role in blood glucose management including:  [x] Critical nature of illness state given her initial AMS  [x]  Kidney dysfunction    Patient was started on Glucostabilizer ([address] [place] [address]) in order to treat her HHS on 3/8/21 and she used a total of 29 units. It only took her until 0300 the following morning of 3/9/21 to obtain a BG below 250. Unsure why patient received 2 units of lantus yesterday afternoon as it seems as though insulin infusion was continued. However this morning patient had two hypoglycemic events that require dextrose at 0400 and 0500. Insulin infusion was stopped but no basal insulin was given. This patient has baseline basal insulin needs and this is evident since BG a few hours later sarita back into the 200s. Recommend to order basal insulin at half of her PTA dosing which would be renal dosed for her kidneys. Note today that BG range is 170-180s this morning so basal dosing could be increased slightly to 0.4 units/kg/D. She continues to tolerate a diet well. PTA at one time she was taking glipizide and stated that she would consider taking this medication again as a sulfonylurea would help control her BG during meal time since she eating. Note today BG have trended back up, 230-300s with 6 units of correction used so far. Glipizide was restarted this morning as she is tolerating PO. Spoke with Dr. Arcelia Adams via [employer] and recommended to increase basal insulin to her PTA dose of 30 units of lantus which is about 0.7units/kg/D. GFR is also slightly decreased from yesterday.      Of note, this patient would also benefit from diabetes self-management education and support (DSMES) after discharge. Recommendations     [x] Use of Subcutaneous Insulin Order set (5153)  Insulin Dosing Specific recommendation   Basal                                      (Based on weight, BMI & GFR)   [x] 0.7 units/kg/D   Increase to 30 units of lantus daily    Nutritional                                      (Based on CHO/dextrose load)  Continue PTA dosing of glipizide BID   Corrective                                       (Useful in adjusting insulin dosing)   [x] HIGH sensitivity        [x] Referral to  [x] [facility] (Phone [phone] to schedule appointment) for [facility]    Billing Code(s)     [x] 33638 IP subsequent hospital care - 25 minutes [] 03494 Prolonged Services - 55 minutes     Before making these care recommendations, I personally reviewed the hospitalization record, including notes, laboratory & diagnostic data and current medications, and   examined the patient at the bedside (circumstances permitting) before making care recommendations.      Total minutes: [address], RN, ACCNS-AG  Diabetes Clinical Nurse Specialist  [facility]  Access via [employer]

## 2021-03-12 NOTE — PROGRESS NOTES
Transition of Care Plan:     Disposition: Home-Pt is refusing home health  Follow up appointments:PCP, Endocrinology  DME needed:TBD  Transportation at [address] or means to access home: Family      IM Medicare letter:N/A due to pt having [phone]  Discharge Caregiver contacted prior to discharge?      Pt was to be d/c home today but due pt's cognition d/c was placed on hold. Pt could possible be d/c over the weekend. Pt is still refusing home health services and is stating that she has [employer] friends that will assist her. Pt has a high risk of being readmitted to due to pt's cognition, inability of taking care of herself and manage her medications. Oracio Fisher.   Care Manager [facility]  [phone]

## 2021-03-13 LAB
ANION GAP SERPL CALC-SCNC: 2 MMOL/L (ref 5–15)
BUN SERPL-MCNC: 34 MG/DL (ref 6–20)
BUN/CREAT SERPL: 22 (ref 12–20)
CALCIUM SERPL-MCNC: 8.3 MG/DL (ref 8.5–10.1)
CHLORIDE SERPL-SCNC: 107 MMOL/L (ref 97–108)
CO2 SERPL-SCNC: 30 MMOL/L (ref 21–32)
CREAT SERPL-MCNC: 1.56 MG/DL (ref 0.55–1.02)
GLUCOSE BLD STRIP.AUTO-MCNC: 208 MG/DL (ref 65–100)
GLUCOSE BLD STRIP.AUTO-MCNC: 241 MG/DL (ref 65–100)
GLUCOSE BLD STRIP.AUTO-MCNC: 265 MG/DL (ref 65–100)
GLUCOSE BLD STRIP.AUTO-MCNC: 268 MG/DL (ref 65–100)
GLUCOSE SERPL-MCNC: 254 MG/DL (ref 65–100)
POTASSIUM SERPL-SCNC: 4.6 MMOL/L (ref 3.5–5.1)
SERVICE CMNT-IMP: ABNORMAL
SODIUM SERPL-SCNC: 139 MMOL/L (ref 136–145)

## 2021-03-13 PROCEDURE — 97530 THERAPEUTIC ACTIVITIES: CPT

## 2021-03-13 PROCEDURE — 74011250637 HC RX REV CODE- 250/637: Performed by: INTERNAL MEDICINE

## 2021-03-13 PROCEDURE — 74011636637 HC RX REV CODE- 636/637: Performed by: INTERNAL MEDICINE

## 2021-03-13 PROCEDURE — 97116 GAIT TRAINING THERAPY: CPT

## 2021-03-13 PROCEDURE — 36415 COLL VENOUS BLD VENIPUNCTURE: CPT

## 2021-03-13 PROCEDURE — 74011250636 HC RX REV CODE- 250/636: Performed by: INTERNAL MEDICINE

## 2021-03-13 PROCEDURE — 97110 THERAPEUTIC EXERCISES: CPT

## 2021-03-13 PROCEDURE — 82962 GLUCOSE BLOOD TEST: CPT

## 2021-03-13 PROCEDURE — 65660000000 HC RM CCU STEPDOWN

## 2021-03-13 PROCEDURE — 80048 BASIC METABOLIC PNL TOTAL CA: CPT

## 2021-03-13 RX ORDER — CIPROFLOXACIN 250 MG/1
250 TABLET, FILM COATED ORAL EVERY 12 HOURS
Qty: 6 TAB | Refills: 0 | Status: SHIPPED | OUTPATIENT
Start: 2021-03-13 | End: 2021-03-17

## 2021-03-13 RX ADMIN — POTASSIUM CHLORIDE AND SODIUM CHLORIDE: 450; 150 INJECTION, SOLUTION INTRAVENOUS at 22:01

## 2021-03-13 RX ADMIN — INSULIN LISPRO 1 UNITS: 100 INJECTION, SOLUTION INTRAVENOUS; SUBCUTANEOUS at 22:00

## 2021-03-13 RX ADMIN — GLIPIZIDE 2.5 MG: 2.5 TABLET, FILM COATED, EXTENDED RELEASE ORAL at 17:24

## 2021-03-13 RX ADMIN — SODIUM CHLORIDE 10 ML: 9 INJECTION, SOLUTION INTRAMUSCULAR; INTRAVENOUS; SUBCUTANEOUS at 22:02

## 2021-03-13 RX ADMIN — INSULIN LISPRO 3 UNITS: 100 INJECTION, SOLUTION INTRAVENOUS; SUBCUTANEOUS at 12:40

## 2021-03-13 RX ADMIN — INSULIN GLARGINE 30 UNITS: 100 INJECTION, SOLUTION SUBCUTANEOUS at 09:28

## 2021-03-13 RX ADMIN — INSULIN LISPRO 3 UNITS: 100 INJECTION, SOLUTION INTRAVENOUS; SUBCUTANEOUS at 16:49

## 2021-03-13 RX ADMIN — POTASSIUM CHLORIDE AND SODIUM CHLORIDE: 450; 150 INJECTION, SOLUTION INTRAVENOUS at 11:10

## 2021-03-13 RX ADMIN — CIPROFLOXACIN HYDROCHLORIDE 250 MG: 250 TABLET, FILM COATED ORAL at 22:01

## 2021-03-13 RX ADMIN — SODIUM CHLORIDE 10 ML: 9 INJECTION, SOLUTION INTRAMUSCULAR; INTRAVENOUS; SUBCUTANEOUS at 16:50

## 2021-03-13 RX ADMIN — CIPROFLOXACIN HYDROCHLORIDE 250 MG: 250 TABLET, FILM COATED ORAL at 08:17

## 2021-03-13 RX ADMIN — GLIPIZIDE 2.5 MG: 2.5 TABLET, FILM COATED, EXTENDED RELEASE ORAL at 08:17

## 2021-03-13 RX ADMIN — INSULIN LISPRO 2 UNITS: 100 INJECTION, SOLUTION INTRAVENOUS; SUBCUTANEOUS at 08:07

## 2021-03-13 RX ADMIN — POTASSIUM CHLORIDE AND SODIUM CHLORIDE: 450; 150 INJECTION, SOLUTION INTRAVENOUS at 01:16

## 2021-03-13 NOTE — PROGRESS NOTES
Transition of Care Plan:     Disposition: Home-Pt is refusing home health  Follow up appointments:PCP, Endocrinology  DME needed: No DME needs for d/c  Transportation at Discharge: Family  [place] or means to access home: Family      IM Medicare letter: N/A due to pt having [employer]  Caregiver Contact:Earl BarrazaTkmrj-Psktvn-[phone]  Discharge Caregiver contacted prior to discharge?      11:42 AM UPDATE:  CM has received call from attending RN that MD has cancelled discharge at this time pending psychiatry consult.     Original note:  CM met with pt to discuss discharge plans. CM acknowledges discharge order. CM talked with pt's RN concerning the need for possible home health skilled nursing to assist with medication management. Upon talking with pt about this, pt states that she does not need these services as she has two friends in the healthcare field that can check on her. She reports that she has been living alone for 50+ years and can take care of herself. Pt declining home health at this time. Pt states that Perla p) [phone] will transport her home. Pt's RN to notify physician that pt is refusing home health.

## 2021-03-13 NOTE — PROGRESS NOTES
Hospitalist Progress Note    NAME: Brice Gilmore   :  1965   MRN:  [id number]       Assessment / Plan:  DKA resolved   -Anion gap closed  -DC insulin drip  - on sliding scale     Acute renal failure  -Continue IV fluid  -Renal function improving     Hypernatremia  -Change IV fluid to half NS  -Monitor sodium level     Sepsis secondary to UTI  -Change Zosyn to Cipro  -Follow-up blood culture   -- urine culture GRAM NEGATIVE RODS  E. coli     Hypokalemia   -Resolved     Confusion/questionable psych disorder  -Psych consult      hypotension   -check Orthostatic        less than 18.5 Underweight / Body mass index is 17.54 kg/m².     Code status: Full  Prophylaxis: Hep SQ  Recommended Disposition: Rehab versus home health            Subjective:     Chief Complaint / Reason for Physician Visit    Discussed with RN events overnight. Stated feel better slightly Hypotensive      Review of Systems:  Symptom Y/N Comments  Symptom Y/N Comments   Fever/Chills n   Chest Pain n    Poor Appetite    Edema     Cough n   Abdominal Pain     Sputum    Joint Pain     SOB/GARLAND    Pruritis/Rash     Nausea/vomit n   Tolerating PT/OT     Diarrhea    Tolerating Diet y    Constipation n   Other       Could NOT obtain due to:      Objective:     VITALS:   Last 24hrs VS reviewed since prior progress note.  Most recent are:  Patient Vitals for the past 24 hrs:   Temp Pulse Resp BP SpO2   21 1103 98.1 °F (36.7 °C) 96 27 (!) 102/51 100 %   21 0723 98.3 °F (36.8 °C) 85 15 (!) 111/52 100 %   21 0456 97.9 °F (36.6 °C) 76 13 (!) 104/50 100 %   21 2302 98.2 °F (36.8 °C) 77 15 94/79 100 %   21 1944 98.2 °F (36.8 °C) 83 12 (!) 107/50 100 %       Intake/Output Summary (Last 24 hours) at 3/13/2021 1212  Last data filed at 3/13/2021 5078  Gross per 24 hour   Intake 1491.66 ml   Output 2600 ml   Net -1108.34 ml        I had a face to face encounter and independently examined this patient on 3/13/2021, as outlined below:  PHYSICAL EXAM:  General: WD, WN. Alert, cooperative, no acute distress    EENT:  EOMI. Anicteric sclerae. MMM  Resp:  CTA bilaterally, no wheezing or rales. No accessory muscle use  CV:  Regular  rhythm,  No edema  GI:  Soft, Non distended, Non tender. +Bowel sounds  Neurologic:  Alert and oriented X 3, normal speech,   Psych:   Good insight. Not anxious nor agitated  Skin:  No rashes. No jaundice    Reviewed most current lab test results and cultures  YES  Reviewed most current radiology test results   YES  Review and summation of old records today    NO  Reviewed patient's current orders and MAR    YES  PMH/SH reviewed - no change compared to H&P  ________________________________________________________________________  Care Plan discussed with:    Comments   Patient y    Family      RN y    Care Manager     Consultant                        Multidiciplinary team rounds were held today with , nursing, pharmacist and clinical coordinator. Patient's plan of care was discussed; medications were reviewed and discharge planning was addressed. ________________________________________________________________________  Total NON critical care TIME:  35   Minutes    Total CRITICAL CARE TIME Spent:   Minutes non procedure based      Comments   >50% of visit spent in counseling and coordination of care y    ________________________________________________________________________  Julian Molina MD     Procedures: see electronic medical records for all procedures/Xrays and details which were not copied into this note but were reviewed prior to creation of Plan. LABS:  I reviewed today's most current labs and imaging studies.   Pertinent labs include:  Recent Labs     03/12/21  0334 03/11/21  0412   WBC 6.1 8.0   HGB 10.3* 8.4*   HCT 34.4* 27.7*   * 118*     Recent Labs     03/13/21  0133 03/12/21 0334 03/11/21 0412    143 150*   K 4.6 4.9 4.7    113* 118*   CO2 30 29 29 * 233* 179*   BUN 34* 37* 37*   CREA 1.56* 1.68* 1.56*   CA 8.3* 8.6 8.2*       Signed:  Aaron Bond MD

## 2021-03-13 NOTE — DISCHARGE SUMMARY
Hospitalist Discharge Summary     Patient ID:  Kalyan Mena  [id number]  2 y.o.  1965  3/8/2021    PCP on record: Rylan Mcdaniel MD    Admit date: 3/8/2021  Discharge date and time: 3/13/2021    DISCHARGE DIAGNOSIS:    DKA     CONSULTATIONS:  IP CONSULT TO PSYCHIATRY    Excerpted HPI from H&P of Shea Gutierrez MD:  Kayleigh Melendez is a 60 y.o.   female medical history of diabetes, asthma who who was sent to the ED by family after being found  Confused and agitated by the cousin and patient house was found to be mess . Most of the HPI obtained from the ED notes as no family members at bedside and patient is still confused. In the ED, patient was found to be tachycardic, soft blood pressure. Her labs revealed evaded white blood cell count 21,000, BMP showed elevated blood sugar at 1200, hyperkalemia 5.8, elevated creatinine around 4.07, anion gap metabolic acidosis  BMP also showed hypernatremia  Her UA was concerning for UTI. CT of the brain is within normal limit, CT abdomen and pelvis did not show any acute pathology. We were asked to admit for work up and evaluation of the above problems. ______________________________________________________________________  DISCHARGE SUMMARY/HOSPITAL COURSE:  for full details see H&P, daily progress notes, labs, consult notes.      DKA resolved   -Anion gap closed  -DC insulin drip  - on sliding scale     Acute renal failure  -Continue IV fluid  -Renal function improving     Hypernatremia  -Change IV fluid to half NS  -Monitor sodium level     Sepsis secondary to UTI  -Change Zosyn to Cipro  -Follow-up blood culture   -- urine culture GRAM NEGATIVE RODS  E. coli     Hypokalemia   -Resolved     Confusion/questionable psych disorder  -Psych consult           less than 18.5 Underweight / Body mass index is 17.54 kg/m².     Code status: Full  Prophylaxis: Hep SQ  Recommended Disposition: Rehab versus home health        _______________________________________________________________________  Patient seen and examined by me on discharge day. Pertinent Findings:  Gen:    Not in distress  Chest: Clear lungs  CVS:   Regular rhythm. No edema  Abd:  Soft, not distended, not tender  Neuro:  Alert,   _______________________________________________________________________  DISCHARGE MEDICATIONS:   Current Discharge Medication List      START taking these medications    Details   ciprofloxacin HCl (CIPRO) 250 mg tablet Take 1 Tab by mouth every twelve (12) hours for 3 days. Qty: 6 Tab, Refills: 0         CONTINUE these medications which have NOT CHANGED    Details   insulin glargine (Basaglar KwikPen U-100 Insulin) 100 unit/mL (3 mL) inpn 30 Units by SubCUTAneous route daily. Every morning      glipiZIDE SR (GLUCOTROL XL) 2.5 mg CR tablet Take 2.5 mg by mouth two (2) times a day. Breakfast and Dinner  May increase to 5 mg if BS increases      albuterol (PROVENTIL VENTOLIN) 2.5 mg /3 mL (0.083 %) nebu 3 mL by Nebulization route every six (6) hours as needed for Wheezing. Qty: 30 Nebule, Refills: 0    Associated Diagnoses: Moderate persistent asthma without complication               Patient Follow Up Instructions: Activity: Activity as tolerated  Diet: Diabetic Diet  Wound Care: None needed    Follow-up with PCP  in 3 days.   Follow-up tests/labs     Follow-up Information     Follow up With Specialties Details Why Contact Info    Jessica Tate MD Endocrinology In 3 days A nurse will call you to schedule a hosptial follow up [place]  [place]  [phone]          ________________________________________________________________    Risk of deterioration: Moderate    Condition at Discharge:  Stable  __________________________________________________________________    Disposition  Home with family and home health services    ____________________________________________________________________    Code Status: Full Code  ___________________________________________________________________      Total time in minutes spent coordinating this discharge (includes going over instructions, follow-up, prescriptions, and preparing report for sign off to her PCP) :  >30 minutes    Signed:   Vianey Regalado MD

## 2021-03-13 NOTE — PROGRESS NOTES
[facility] with ALPHONSO Christianson about assisting with [address] for patient. Discharge scheduled for today. [place] spoke with patient, patient refused HH. MD made aware. Discharge cancelled for today.

## 2021-03-13 NOTE — PROGRESS NOTES
1900: Bedside shift change report given to Sammy Heard (oncoming nurse) by Dameon Zurita (offgoing nurse). Report included the following information SBAR, Kardex, ED Summary, Intake/Output, MAR, Recent Results and Cardiac Rhythm NSR. End of Shift Note    Bedside shift change report given to [address] [place] [address] (oncoming nurse) by Vera Flowers RN (offgoing nurse). Report included the following information SBAR, Kardex, Intake/Output, MAR, Recent Results and Cardiac Rhythm NSR    Shift worked:  3178-6336     Shift summary and any significant changes:     no changes at this time     Concerns for physician to address:  none at this time      Zone phone for oncoming shift:          Activity:  Activity Level: Up with Assistance  Number times ambulated in hallways past shift: 0  Number of times OOB to chair past shift: 1    Cardiac:   Cardiac Monitoring: Yes      Cardiac Rhythm: Normal sinus rhythm    Access:   Current line(s): PIV     Genitourinary:   Urinary status: voiding    Respiratory:   O2 Device: Room air  Chronic home O2 use?: NO  Incentive spirometer at bedside: NO     GI:  Last Bowel Movement Date: 03/12/21  Current diet:  DIET DIABETIC CONSISTENT CARB Regular  DIET NUTRITIONAL SUPPLEMENTS Dinner; Glucerna Shake  Passing flatus: YES  Tolerating current diet: YES  % Diet Eaten: 50 %    Pain Management:   Patient states pain is manageable on current regimen: YES    Skin:  Vinh Score: 19  Interventions: increase time out of bed    Patient Safety:  Fall Score:  Total Score: 2  Interventions: gripper socks and pt to call before getting OOB  High Fall Risk: Yes    Length of Stay:  Expected LOS: 4d 19h  Actual LOS: [facility], RN

## 2021-03-13 NOTE — PROGRESS NOTES
Problem: Mobility Impaired (Adult and Pediatric)  Goal: *Acute Goals and Plan of Care (Insert Text)  Description: FUNCTIONAL STATUS PRIOR TO ADMISSION: Patient is a poor historian and has mild confusion during session. Patient reports she was independent and active without use of DME.    HOME SUPPORT PRIOR TO ADMISSION: The patient lived alone with no local support. Physical Therapy Goals  Initiated 3/10/2021  1. Patient will move from supine to sit and sit to supine  in bed with modified independence within 7 day(s). Met 3/11/21. 2.  Patient will transfer from bed to chair and chair to bed with modified independence using the least restrictive device within 7 day(s). 3.  Patient will perform sit to stand with modified independence within 7 day(s). Met 3/11/21. 4.  Patient will ambulate with modified independence for 250 feet with the least restrictive device within 7 day(s). 5.  Patient will ascend/descend 4 stairs with 1 handrail(s) with modified independence within 7 day(s). Outcome: Progressing Towards Goal   PHYSICAL THERAPY TREATMENT  Patient: Angela Tavarez (72 y.o. female)  Date: 3/13/2021    Diagnosis: HHNC (hyperglycemic hyperosmolar nonketotic coma) ([facility] [address] [E11.01]     Precautions: Fall    Chart, physical therapy assessment, plan of care and goals were reviewed. ASSESSMENT: Patient continues with skilled PT services and is progressing towards goals, no LOB or SOB, slight LOB x1, does well with bed mob and transfers, good motivation, did well with ther-ex, vc's for safety. Current Level of Function Impacting Discharge (mobility/balance): Stand-by assistance;Assist x1       PLAN : Patient continues to benefit from skilled intervention to address the above impairments. Continue treatment per established plan of care  to address goals.     Recommendation for discharge: (in order for the patient to meet his/her long term goals) No skilled physical therapy/ follow up rehabilitation needs identified at this time. This discharge recommendation: Has been made in collaboration with the attending provider and/or case management    IF patient discharges home will need the following DME: none     OBJECTIVE DATA SUMMARY:     Critical Behavior:  Neurologic State: Appropriate for age  Orientation Level: Oriented X4  Cognition: Appropriate decision making, Appropriate for age attention/concentration, Appropriate safety awareness  Safety/Judgement: Decreased awareness of need for assistance, Decreased insight into deficits, Decreased awareness of need for safety    Functional Mobility Training:  Bed Mobility:  Rolling: Independent  Supine to Sit: Independent  Sit to Supine: Independent  Scooting: Independent  Level of Assistance: Independent     Transfers:  Sit to Stand: Independent  Stand to Sit: Independent  Bed to Chair: Independent  Interventions: Verbal cues  Level of Assistance: Independent    Balance:  Sitting: Intact; Without support  Standing: Intact; Without support  Standing - Static: Good; Unsupported  Standing - Dynamic : Fair    Ambulation/Gait Training:  Distance (ft): 300 Feet (ft)  Assistive Device: Gait belt  Ambulation - Level of Assistance: Stand-by assistance;Assist x1  Gait Abnormalities: Decreased step clearance  Right Side Weight Bearing: Full  Left Side Weight Bearing: Full  Base of Support: Narrowed  Speed/Connie: Pace decreased (<100 feet/min)  Step Length: Left shortened;Right shortened    Therapeutic Exercises:   sitting  EXERCISE   Sets   Reps   Active Active Assist   Passive   Comments   Ankle pumps 1 10 [x] [] [] bilat   Heel raises 1 10 [x] [] [] \"   Toe tap 1 10 [x] [] [] \"   Knee ext 1 10 [x] [] [] \"   Hip flex 1 10 [x] [] [] \"     Pain Ratin    Activity Tolerance: Good    After treatment patient left in no apparent distress: Sitting in chair, Call bell within reach, and Bed / chair alarm activated    COMMUNICATION/COLLABORATION:   The patients plan of care was discussed with: Registered nurse.      Hernesto Long PTA   Time Calculation: 30 mins

## 2021-03-14 LAB
ANION GAP SERPL CALC-SCNC: 2 MMOL/L (ref 5–15)
BACTERIA SPEC CULT: NORMAL
BACTERIA SPEC CULT: NORMAL
BUN SERPL-MCNC: 30 MG/DL (ref 6–20)
BUN/CREAT SERPL: 23 (ref 12–20)
CALCIUM SERPL-MCNC: 8.1 MG/DL (ref 8.5–10.1)
CHLORIDE SERPL-SCNC: 110 MMOL/L (ref 97–108)
CO2 SERPL-SCNC: 26 MMOL/L (ref 21–32)
CREAT SERPL-MCNC: 1.33 MG/DL (ref 0.55–1.02)
ERYTHROCYTE [DISTWIDTH] IN BLOOD BY AUTOMATED COUNT: 14.2 % (ref 11.5–14.5)
GLUCOSE BLD STRIP.AUTO-MCNC: 247 MG/DL (ref 65–100)
GLUCOSE BLD STRIP.AUTO-MCNC: 266 MG/DL (ref 65–100)
GLUCOSE BLD STRIP.AUTO-MCNC: 280 MG/DL (ref 65–100)
GLUCOSE BLD STRIP.AUTO-MCNC: 313 MG/DL (ref 65–100)
GLUCOSE SERPL-MCNC: 284 MG/DL (ref 65–100)
HCT VFR BLD AUTO: 23.7 % (ref 35–47)
HEMOCCULT STL QL: POSITIVE
HGB BLD-MCNC: 7.4 G/DL (ref 11.5–16)
MCH RBC QN AUTO: 30 PG (ref 26–34)
MCHC RBC AUTO-ENTMCNC: 31.2 G/DL (ref 30–36.5)
MCV RBC AUTO: 96 FL (ref 80–99)
NRBC # BLD: 0 K/UL (ref 0–0.01)
NRBC BLD-RTO: 0 PER 100 WBC
PLATELET # BLD AUTO: 102 K/UL (ref 150–400)
PMV BLD AUTO: 13 FL (ref 8.9–12.9)
POTASSIUM SERPL-SCNC: 5 MMOL/L (ref 3.5–5.1)
RBC # BLD AUTO: 2.47 M/UL (ref 3.8–5.2)
SERVICE CMNT-IMP: ABNORMAL
SERVICE CMNT-IMP: NORMAL
SERVICE CMNT-IMP: NORMAL
SODIUM SERPL-SCNC: 138 MMOL/L (ref 136–145)
WBC # BLD AUTO: 5.8 K/UL (ref 3.6–11)

## 2021-03-14 PROCEDURE — 80048 BASIC METABOLIC PNL TOTAL CA: CPT

## 2021-03-14 PROCEDURE — 85027 COMPLETE CBC AUTOMATED: CPT

## 2021-03-14 PROCEDURE — 74011250637 HC RX REV CODE- 250/637: Performed by: INTERNAL MEDICINE

## 2021-03-14 PROCEDURE — 82272 OCCULT BLD FECES 1-3 TESTS: CPT

## 2021-03-14 PROCEDURE — 82962 GLUCOSE BLOOD TEST: CPT

## 2021-03-14 PROCEDURE — 74011636637 HC RX REV CODE- 636/637: Performed by: INTERNAL MEDICINE

## 2021-03-14 PROCEDURE — 65660000000 HC RM CCU STEPDOWN

## 2021-03-14 PROCEDURE — 74011250636 HC RX REV CODE- 250/636: Performed by: INTERNAL MEDICINE

## 2021-03-14 PROCEDURE — 36415 COLL VENOUS BLD VENIPUNCTURE: CPT

## 2021-03-14 RX ADMIN — GLIPIZIDE 2.5 MG: 2.5 TABLET, FILM COATED, EXTENDED RELEASE ORAL at 10:12

## 2021-03-14 RX ADMIN — INSULIN LISPRO 3 UNITS: 100 INJECTION, SOLUTION INTRAVENOUS; SUBCUTANEOUS at 15:41

## 2021-03-14 RX ADMIN — POTASSIUM CHLORIDE AND SODIUM CHLORIDE: 450; 150 INJECTION, SOLUTION INTRAVENOUS at 10:23

## 2021-03-14 RX ADMIN — GLIPIZIDE 2.5 MG: 2.5 TABLET, FILM COATED, EXTENDED RELEASE ORAL at 15:42

## 2021-03-14 RX ADMIN — CIPROFLOXACIN HYDROCHLORIDE 250 MG: 250 TABLET, FILM COATED ORAL at 22:34

## 2021-03-14 RX ADMIN — INSULIN LISPRO 2 UNITS: 100 INJECTION, SOLUTION INTRAVENOUS; SUBCUTANEOUS at 22:34

## 2021-03-14 RX ADMIN — INSULIN GLARGINE 30 UNITS: 100 INJECTION, SOLUTION SUBCUTANEOUS at 08:06

## 2021-03-14 RX ADMIN — SODIUM CHLORIDE 10 ML: 9 INJECTION, SOLUTION INTRAMUSCULAR; INTRAVENOUS; SUBCUTANEOUS at 22:39

## 2021-03-14 RX ADMIN — SODIUM CHLORIDE 10 ML: 9 INJECTION, SOLUTION INTRAMUSCULAR; INTRAVENOUS; SUBCUTANEOUS at 13:36

## 2021-03-14 RX ADMIN — INSULIN LISPRO 2 UNITS: 100 INJECTION, SOLUTION INTRAVENOUS; SUBCUTANEOUS at 08:06

## 2021-03-14 RX ADMIN — INSULIN LISPRO 4 UNITS: 100 INJECTION, SOLUTION INTRAVENOUS; SUBCUTANEOUS at 11:23

## 2021-03-14 RX ADMIN — CIPROFLOXACIN HYDROCHLORIDE 250 MG: 250 TABLET, FILM COATED ORAL at 08:06

## 2021-03-14 NOTE — PROGRESS NOTES
1900: Bedside shift change report given to Sammy Heard (oncoming nurse) by Magnolia Johnson (offgoing nurse). Report included the following information SBAR, Kardex, Intake/Output, MAR, Recent Results and Cardiac Rhythm NSR/Sinus Tach. The beginning of Daylight Saving Time occurred at 0200 hrs. Documentation of patient care and medications administered is done with respect to the time change. End of Shift Note    Bedside shift change report given to CORNELIA Mercer (oncoming nurse) by Ruma Wood RN (offgoing nurse). Report included the following information SBAR, Kardex, ED Summary, Intake/Output, MAR, Recent Results and Cardiac Rhythm NSR    Shift worked:  4110-2102     Shift summary and any significant changes:     Ambulate to Bathroom with stand-by-assist. No acute changes at this time. Concerns for physician to address: [facility].4     [facility] phone for oncoming shift:          Activity:  Activity Level: Up with Assistance  Number times ambulated in hallways past shift: 0  Number of times OOB to chair past shift: 1    Cardiac:   Cardiac Monitoring: Yes      Cardiac Rhythm: Normal sinus rhythm    Access:   Current line(s): PIV     Genitourinary:   Urinary status: voiding    Respiratory:   O2 Device: Room air  Chronic home O2 use?: NO  Incentive spirometer at bedside: NO     GI:  Last Bowel Movement Date: 03/13/21  Current diet:  DIET DIABETIC CONSISTENT CARB Regular  DIET NUTRITIONAL SUPPLEMENTS Dinner; Glucerna Shake  Passing flatus: YES  Tolerating current diet: YES  % Diet Eaten: 100 %    Pain Management:   Patient states pain is manageable on current regimen: YES    Skin:  Vinh Score: 21  Interventions: increase time out of bed and PT/OT consult    Patient Safety:  Fall Score:  Total Score: 2  Interventions: gripper socks and pt to call before getting OOB  High Fall Risk: Yes    Length of Stay:  Expected LOS: 4d 19h  Actual LOS: [address] [facility] Moe, RN

## 2021-03-14 NOTE — PROGRESS NOTES
Bedside and Verbal shift change report given to Ya Bergeron RN (oncoming nurse) by Leyda Munoz RN (offgoing nurse).  Report included the following information SBAR, Kardex, Intake/Output, MAR and Recent Results.

## 2021-03-14 NOTE — BH NOTES
Psychiatric consult update  Attempted to call unit several times and is transferred to environmental services. Have called directly #[phone] and . Please call the consult line to provide number to reach unit so that consult may be completed. Thank you.

## 2021-03-14 NOTE — PROGRESS NOTES
Problem: Pressure Injury - Risk of  Goal: *Prevention of pressure injury  Description: Document Vinh Scale and appropriate interventions in the flowsheet. Outcome: Progressing Towards Goal  Note: Pressure Injury Interventions:  Sensory Interventions: Discuss PT/OT consult with provider    Moisture Interventions: Absorbent underpads    Activity Interventions: Increase time out of bed, PT/OT evaluation    Mobility Interventions: HOB 30 degrees or less    Nutrition Interventions: Document food/fluid/supplement intake    Friction and Shear Interventions: HOB 30 degrees or less                Problem: Falls - Risk of  Goal: *Absence of Falls  Description: Document Stephen Fall Risk and appropriate interventions in the flowsheet. Outcome: Progressing Towards Goal  Note: Fall Risk Interventions:  Mobility Interventions: Patient to call before getting OOB    Mentation Interventions: Adequate sleep, hydration, pain control, Bed/chair exit alarm, Update white board    Medication Interventions: Teach patient to arise slowly    Elimination Interventions: Call light in reach, Patient to call for help with toileting needs, Toilet paper/wipes in reach              Problem: Diabetes Self-Management  Goal: *Disease process and treatment process  Description: Define diabetes and identify own type of diabetes; list 3 options for treating diabetes. Outcome: Progressing Towards Goal  Goal: *Incorporating nutritional management into lifestyle  Description: Describe effect of type, amount and timing of food on blood glucose; list 3 methods for planning meals. Outcome: Progressing Towards Goal  Goal: *Incorporating physical activity into lifestyle  Description: State effect of exercise on blood glucose levels.   Outcome: Progressing Towards Goal  Goal: *Developing strategies to promote health/change behavior  Description: Define the ABC's of diabetes; identify appropriate screenings, schedule and personal plan for screenings.  Outcome: Progressing Towards Goal  Goal: *Using medications safely  Description: State effect of diabetes medications on diabetes; name diabetes medication taking, action and side effects.  Outcome: Progressing Towards Goal  Goal: *Monitoring blood glucose, interpreting and using results  Description: Identify recommended blood glucose targets  and personal targets.  Outcome: Progressing Towards Goal  Goal: *Prevention, detection, treatment of acute complications  Description: List symptoms of hyper- and hypoglycemia; describe how to treat low blood sugar and actions for lowering  high blood glucose level.  Outcome: Progressing Towards Goal  Goal: *Prevention, detection and treatment of chronic complications  Description: Define the natural course of diabetes and describe the relationship of blood glucose levels to long term complications of diabetes.  Outcome: Progressing Towards Goal  Goal: *Developing strategies to address psychosocial issues  Description: Describe feelings about living with diabetes; identify support needed and support network  Outcome: Progressing Towards Goal  Goal: *Insulin pump training  Outcome: Progressing Towards Goal  Goal: *Sick day guidelines  Outcome: Progressing Towards Goal  Goal: *Patient Specific Goal (EDIT GOAL, INSERT TEXT)  Outcome: Progressing Towards Goal

## 2021-03-14 NOTE — PROGRESS NOTES
0700- Report given to Ny Márquez RN by off going nurse. 1900- Report given to oncoming nurse by Ny Márquez RN.

## 2021-03-15 ENCOUNTER — ANESTHESIA EVENT (OUTPATIENT)
Dept: ENDOSCOPY | Age: 56
DRG: 871 | End: 2021-03-15
Payer: COMMERCIAL

## 2021-03-15 LAB
ANION GAP SERPL CALC-SCNC: 3 MMOL/L (ref 5–15)
BUN SERPL-MCNC: 31 MG/DL (ref 6–20)
BUN/CREAT SERPL: 24 (ref 12–20)
CALCIUM SERPL-MCNC: 8.4 MG/DL (ref 8.5–10.1)
CHLORIDE SERPL-SCNC: 107 MMOL/L (ref 97–108)
CO2 SERPL-SCNC: 27 MMOL/L (ref 21–32)
COVID-19 RAPID TEST, COVR: NOT DETECTED
CREAT SERPL-MCNC: 1.27 MG/DL (ref 0.55–1.02)
ERYTHROCYTE [DISTWIDTH] IN BLOOD BY AUTOMATED COUNT: 14.6 % (ref 11.5–14.5)
GLUCOSE BLD STRIP.AUTO-MCNC: 188 MG/DL (ref 65–100)
GLUCOSE BLD STRIP.AUTO-MCNC: 269 MG/DL (ref 65–100)
GLUCOSE BLD STRIP.AUTO-MCNC: 277 MG/DL (ref 65–100)
GLUCOSE BLD STRIP.AUTO-MCNC: 361 MG/DL (ref 65–100)
GLUCOSE SERPL-MCNC: 189 MG/DL (ref 65–100)
HCT VFR BLD AUTO: 25.2 % (ref 35–47)
HGB BLD-MCNC: 7.8 G/DL (ref 11.5–16)
MCH RBC QN AUTO: 29.5 PG (ref 26–34)
MCHC RBC AUTO-ENTMCNC: 31 G/DL (ref 30–36.5)
MCV RBC AUTO: 95.5 FL (ref 80–99)
NRBC # BLD: 0 K/UL (ref 0–0.01)
NRBC BLD-RTO: 0 PER 100 WBC
PLATELET # BLD AUTO: 158 K/UL (ref 150–400)
PMV BLD AUTO: 11.9 FL (ref 8.9–12.9)
POTASSIUM SERPL-SCNC: 4.6 MMOL/L (ref 3.5–5.1)
RBC # BLD AUTO: 2.64 M/UL (ref 3.8–5.2)
SERVICE CMNT-IMP: ABNORMAL
SODIUM SERPL-SCNC: 137 MMOL/L (ref 136–145)
SOURCE, COVRS: NORMAL
WBC # BLD AUTO: 7.4 K/UL (ref 3.6–11)

## 2021-03-15 PROCEDURE — 97116 GAIT TRAINING THERAPY: CPT | Performed by: PHYSICAL THERAPIST

## 2021-03-15 PROCEDURE — 36415 COLL VENOUS BLD VENIPUNCTURE: CPT

## 2021-03-15 PROCEDURE — 82962 GLUCOSE BLOOD TEST: CPT

## 2021-03-15 PROCEDURE — 87635 SARS-COV-2 COVID-19 AMP PRB: CPT

## 2021-03-15 PROCEDURE — 99232 SBSQ HOSP IP/OBS MODERATE 35: CPT | Performed by: CLINICAL NURSE SPECIALIST

## 2021-03-15 PROCEDURE — C9113 INJ PANTOPRAZOLE SODIUM, VIA: HCPCS | Performed by: INTERNAL MEDICINE

## 2021-03-15 PROCEDURE — 74011000250 HC RX REV CODE- 250: Performed by: INTERNAL MEDICINE

## 2021-03-15 PROCEDURE — 65660000000 HC RM CCU STEPDOWN

## 2021-03-15 PROCEDURE — 2709999900 HC NON-CHARGEABLE SUPPLY

## 2021-03-15 PROCEDURE — 80048 BASIC METABOLIC PNL TOTAL CA: CPT

## 2021-03-15 PROCEDURE — 74011250637 HC RX REV CODE- 250/637: Performed by: INTERNAL MEDICINE

## 2021-03-15 PROCEDURE — 74011636637 HC RX REV CODE- 636/637: Performed by: INTERNAL MEDICINE

## 2021-03-15 PROCEDURE — 74011250636 HC RX REV CODE- 250/636: Performed by: INTERNAL MEDICINE

## 2021-03-15 PROCEDURE — 85027 COMPLETE CBC AUTOMATED: CPT

## 2021-03-15 RX ORDER — PANTOPRAZOLE SODIUM 40 MG/10ML
40 INJECTION, POWDER, LYOPHILIZED, FOR SOLUTION INTRAVENOUS EVERY 12 HOURS
Status: DISCONTINUED | OUTPATIENT
Start: 2021-03-15 | End: 2021-03-17

## 2021-03-15 RX ADMIN — INSULIN LISPRO 3 UNITS: 100 INJECTION, SOLUTION INTRAVENOUS; SUBCUTANEOUS at 16:43

## 2021-03-15 RX ADMIN — PANTOPRAZOLE SODIUM 40 MG: 40 INJECTION, POWDER, FOR SOLUTION INTRAVENOUS at 11:38

## 2021-03-15 RX ADMIN — GLIPIZIDE 2.5 MG: 2.5 TABLET, FILM COATED, EXTENDED RELEASE ORAL at 16:43

## 2021-03-15 RX ADMIN — INSULIN LISPRO 2 UNITS: 100 INJECTION, SOLUTION INTRAVENOUS; SUBCUTANEOUS at 21:52

## 2021-03-15 RX ADMIN — INSULIN GLARGINE 30 UNITS: 100 INJECTION, SOLUTION SUBCUTANEOUS at 08:20

## 2021-03-15 RX ADMIN — GLIPIZIDE 2.5 MG: 2.5 TABLET, FILM COATED, EXTENDED RELEASE ORAL at 08:20

## 2021-03-15 RX ADMIN — PANTOPRAZOLE SODIUM 40 MG: 40 INJECTION, POWDER, FOR SOLUTION INTRAVENOUS at 21:52

## 2021-03-15 RX ADMIN — INSULIN LISPRO 7 UNITS: 100 INJECTION, SOLUTION INTRAVENOUS; SUBCUTANEOUS at 11:38

## 2021-03-15 RX ADMIN — SODIUM CHLORIDE 10 ML: 9 INJECTION, SOLUTION INTRAMUSCULAR; INTRAVENOUS; SUBCUTANEOUS at 15:05

## 2021-03-15 RX ADMIN — SODIUM CHLORIDE 20 ML: 9 INJECTION, SOLUTION INTRAMUSCULAR; INTRAVENOUS; SUBCUTANEOUS at 21:52

## 2021-03-15 RX ADMIN — SODIUM CHLORIDE 10 ML: 9 INJECTION, SOLUTION INTRAMUSCULAR; INTRAVENOUS; SUBCUTANEOUS at 05:25

## 2021-03-15 RX ADMIN — POTASSIUM CHLORIDE AND SODIUM CHLORIDE: 450; 150 INJECTION, SOLUTION INTRAVENOUS at 05:24

## 2021-03-15 NOTE — PROGRESS NOTES
Spiritual Care Assessment/Progress Note  [facility]      NAME: Connor Jose      MRN: [id number]  AGE: 60 y.o.  SEX: female  Anglican Affiliation: Restorationist   Language: English     3/15/2021     Total Time (in minutes): 5     Spiritual Assessment begun in [facility] PROGRESSIVE CARE through conversation with:         [x]Patient        [] Family    [] Friend(s)        Reason for Consult: Initial/Spiritual assessment, patient floor     Spiritual beliefs: (Please include comment if needed)     [] Identifies with a joshua tradition:         [] Supported by a joshua community:            [] Claims no spiritual orientation:           [] Seeking spiritual identity:                [] Adheres to an individual form of spirituality:           [x] Not able to assess:                           Identified resources for coping:      [] Prayer                               [] Music                  [] Guided Imagery     [] Family/friends                 [] Pet visits     [] Devotional reading                         [x] Unknown     [] Other:                                               Interventions offered during this visit: (See comments for more details)    Patient Interventions: Initial visit, Initial/Spiritual assessment, patient floor, Affirmation of emotions/emotional suffering           Plan of Care:     [] Support spiritual and/or cultural needs    [] Support AMD and/or advance care planning process      [] Support grieving process   [] Coordinate Rites and/or Rituals    [] Coordination with community clergy   [] No spiritual needs identified at this time   [] Detailed Plan of Care below (See Comments)  [] Make referral to Music Therapy  [] Make referral to Pet Therapy     [] Make referral to Addiction services  [] Make referral to [facility]  [] Make referral to Spiritual Care Partner  [] No future visits requested        [x] Follow up upon further referrals     Comments:     Initial Spiritual Care Assessment visit for Ms. Troy in 2271. Patient was alert, no family was present.  tried to explore her concerns, patient declined 's visit saying she is good. Advised of  availability.       [id number] Ramila Carter, M.S., Th.M.  Spiritual Care Provider   Paging Service [phone])

## 2021-03-15 NOTE — PROGRESS NOTES
Problem: Mobility Impaired (Adult and Pediatric)  Goal: *Acute Goals and Plan of Care (Insert Text)  Description: FUNCTIONAL STATUS PRIOR TO ADMISSION: Patient is a poor historian and has mild confusion during session. Patient reports she was independent and active without use of DME.    HOME SUPPORT PRIOR TO ADMISSION: The patient lived alone with no local support. Physical Therapy Goals  Initiated 3/10/2021  1. Patient will move from supine to sit and sit to supine  in bed with modified independence within 7 day(s). Met 3/11/21. 2.  Patient will transfer from bed to chair and chair to bed with modified independence using the least restrictive device within 7 day(s). 3.  Patient will perform sit to stand with modified independence within 7 day(s). Met 3/11/21. 4.  Patient will ambulate with modified independence for 250 feet with the least restrictive device within 7 day(s). 5.  Patient will ascend/descend 4 stairs with 1 handrail(s) with modified independence within 7 day(s). Outcome: Progressing Towards Goal   PHYSICAL THERAPY TREATMENT  Patient: Valarie Panda (88 y.o. female)  Date: 3/15/2021  Diagnosis: HHNC (hyperglycemic hyperosmolar nonketotic coma) ([facility] [E11.01] <principal problem not specified>  Procedure(s) (LRB):  ESOPHAGOGASTRODUODENOSCOPY (EGD) (N/A)    Precautions: Fall  Chart, physical therapy assessment, plan of care and goals were reviewed. ASSESSMENT  Patient continues with skilled PT services and is progressing towards goals. Patient overall moving well  however does have some intermittent balance loss. Otherwise patient moving well. Needs only SBA for transfer and amb approx 250 feet with CGA secondary to narrow BILL and 1 LOB. Patient is likely not far from her baseline and seemed less confused today. Other factors to consider for discharge: lives alone         PLAN :  Patient continues to benefit from skilled intervention to address the above impairments. Continue treatment per established plan of care. to address goals. Recommendation for discharge: (in order for the patient to meet his/her long term goals)  Physical therapy at least 2 days/week in the home         IF patient discharges home will need the following DME: patient owns DME required for discharge       SUBJECTIVE:   Patient stated Oh I need you to come work with me marley.     OBJECTIVE DATA SUMMARY:   Critical Behavior:  Neurologic State: Alert  Orientation Level: Oriented X4  Cognition: Follows commands  Safety/Judgement: Decreased awareness of need for assistance, Decreased insight into deficits, Decreased awareness of need for safety  Functional Mobility Training:  Bed Mobility:   Not tested                 Transfers:  Sit to Stand: Modified independent  Stand to Sit: Modified independent                             Balance:  Sitting: Intact  Standing: Intact  Ambulation/Gait Training:  Distance (ft): 250 Feet (ft)  Assistive Device: Gait belt  Ambulation - Level of Assistance: Contact guard assistance        Gait Abnormalities: Decreased step clearance        Base of Support: Widened     Speed/Connie: Pace decreased (<100 feet/min); Slow  Step Length: Left shortened;Right shortened       Pain Rating:  No c/o pain    Activity Tolerance:   Fair and requires rest breaks    After treatment patient left in no apparent distress:   Sitting in chair, Call bell within reach, and Bed / chair alarm activated    COMMUNICATION/COLLABORATION:   The patients plan of care was discussed with: Physical therapist, Occupational therapist, and Registered nurse.      Austin Faria PT, DPT   Time Calculation: 10 mins

## 2021-03-15 NOTE — CONSULTS
Gastroenterology Attending Physician Mamta Longoria) attestation statement and comments. This patient was seen and examined by me in a face-to-face visit today. I reviewed the medical record including lab work, imaging and other provider notes. I confirmed the history as described above. I spoke to the patient, reviewed the medical record including lab work, imaging and other provider notes. I discussed this case in detail with Colette Robles NP. I formulated an  assessment of this patient and developed a treatment plan. I agree with the above consultation note. I agree with the history, exam and assessment and plan as outlined in the note. I would like to add the followinyo F with hemoccult + stool and anemia. Last colon reviewed from  and noted Fam hx CRC (F). PE with RRR, CTA, S, NT, ND, NABS. Labs reviewed with drop in Hgb 12.7 to 7.8 with no overt bleeding. Suspect anemia related to her chronic DM. Will exclude UGI contribution via EGD, and if negative, offer her OP colonoscopy. Transfuse PRN. BID PPI for now. NPO after MN.   Audrey Hernandez MD            GI CONSULTATION NOTE  Byron Bennett NP  [phone] NP in-hospital cell phone M-F until 4:30  After 5pm or on weekends, please call  for physician on call    NAME: Alis Salazar   :  1965   MRN:  [id number]   Attending:  Dr Darwin Sheldon  Primary GI:  Dr Adalberto Alvarado - Dr Pia Vo covering  Date/Time:  3/15/2021 10:55 AM  Assessment:   Acute on Chronic Anemia  FOBT +  - No melena or hematochezia  - Hgb dropped from down to 7.4 yesterday and 7.8 today  - No home [address] but has been receiving heparin SQ while here  - No NSAID or PPI at home  - No previous EGD  - Colonoscopy  with only hemorrhoids, father  last year of colon cancer    2020 - WBC 5.7, Hgb 10.1, MCV 95, Plt 179  3/8/2021 - WBC 21.1, Hgb 12.7, .5, Plt 251 (likely hemo concentrated with acute DKA)  3/11/2021 - WBC 8.0, Hgb 8.4, MCV 98.2, Plt 118,   3/15/2021 - WBC 7.4, Hgb 7.8, MCV 95, Plt 158    DMI  ARF    Plan:   1. EGD tomorrow with Dr Blanco at 1130  2. NPO after midnight tomorrow  3. BID PPI  4. Agree with holding heparin SQ  5. Transfuse for Hgb < 7.0  6. Added iron panel   7. Rest per primary team.     Plan discussed with Dr Blanco. Thank you for consultation.   Subjective:     HISTORY OF PRESENT ILLNESS:     Anayeli Troy is an 55 y.o. AA female who we are asked to see for complaint of acute on chronic anemia. Medical history includes asthma, diabetes type I, pancreatic atrophy. Pt presented to the hospital for DKA with renal failure and electrolyte abnormalities on 3/8/21. Unfortunately, her baseline hgb ranges between 9 and 11 in chart since 2018, now it has dropped to 7.4 yesterday, and 7.8 today. FOBT + yesterday. Pt denies any melena, hematochezia. Did reports some darker than normal stool prior to admission. Denies NSAID use at home. No daily PPI or H2A. No use of anticoagulation but has been getting heparin SQ injections during hospitalization. No weight loss or abdominal pain.     Colonoscopy  by Dr Yee noting only hemorrhoids. Given 10 yr FU but father  last year of colon cancer, so she is now due for 5 yr FU.   No previous EGD.     2020 - WBC 5.7, Hgb 10.1, MCV 95, Plt 179  3/8/2021 - WBC 21.1, Hgb 12.7, .5, Plt 251 (likely hemo concentrated with acute DKA)  3/11/2021 - WBC 8.0, Hgb 8.4, MCV 98.2, Plt 118,   3/15/2021 - WBC 7.4, Hgb 7.8, MCV 95, Plt 158      Past Medical History:   Diagnosis Date   • Asthma    • Diabetes (HCC)    • Elevated transaminase level 2016   • Insulin dependent diabetes mellitus 2016   • Pancreatic atrophy 2016      Past Surgical History:   Procedure Laterality Date   • HX HEENT       Social History     Tobacco Use   • Smoking status: Never Smoker   • Smokeless tobacco: Never Used   Substance Use Topics   • Alcohol use: No     Frequency: Never     Comment: occasionally      Family History   Problem  Relation Age of Onset    Cancer Father         prostate and colon    Diabetes Mother     Diabetes Maternal Grandmother     Cancer Maternal Aunt         breast      Allergies   Allergen Reactions    Contrast Agent [Iodine] Itching    Hydrocodone Rash    Percocet [Oxycodone-Acetaminophen] Rash     Pt states she is not allergic to Tylenol.  Codeine Nausea and Vomiting    Metformin Diarrhea      Prior to Admission medications    Medication Sig Start Date End Date Taking? Authorizing Provider   ciprofloxacin HCl (CIPRO) 250 mg tablet Take 1 Tab by mouth every twelve (12) hours for 3 days. 3/13/21 3/16/21 Yes Grabiel Walker MD   insulin glargine (Basaglar KwikPen U-100 Insulin) 100 unit/mL (3 mL) inpn 30 Units by SubCUTAneous route daily. Every morning   Yes Provider, Historical   glipiZIDE SR (GLUCOTROL XL) 2.5 mg CR tablet Take 2.5 mg by mouth two (2) times a day. Breakfast and Dinner  May increase to 5 mg if BS increases   Yes Provider, Historical   albuterol (PROVENTIL VENTOLIN) 2.5 mg /3 mL (0.083 %) nebu 3 mL by Nebulization route every six (6) hours as needed for Wheezing.  3/30/20  Yes Abdirashid Baltazar MD       Patient Active Problem List   Diagnosis Code    Insulin dependent diabetes mellitus [id number]    Pancreatic atrophy K86.89    Elevated liver enzymes R74.8    Hyperglycemia due to type 1 diabetes mellitus (HCC) E10.65    PSC (primary sclerosing cholangitis) K83.01    Stage 2 chronic kidney disease N18.2    Mild intermittent asthma without complication U32.77    HHNC (hyperglycemic hyperosmolar nonketotic coma) ([address] E11.01       REVIEW OF SYSTEMS:    Constitutional: negative fever, negative chills, negative weight loss  Eyes:   negative visual changes  ENT:   negative sore throat, tongue or lip swelling   Respiratory:  negative cough, negative dyspnea  Cards:  negative for chest pain, palpitations, lower extremity edema  GI:   See HPI  :  negative for frequency, dysuria  Integument: negative for rash and pruritus  Heme:  negative for easy bruising and gum/nose bleeding  Musculoskel: negative for myalgias,  back pain and muscle weakness  Neuro: negative for headaches, dizziness, vertigo  Psych:  negative for feelings of anxiety, depression     Pertinent Positives: denies    Objective:   VITALS:    Visit Vitals  /60 (BP Patient Position: At rest)   Pulse (!) 105   Temp 98.3 °F (36.8 °C)   Resp 20   Ht 5' 3\" (1.6 m)   Wt 43.5 kg (95 lb 12.8 oz)   SpO2 100%   BMI 16.97 kg/m²       PHYSICAL EXAM:   General:          Alert, WD, thin, cooperative, no distress, appears stated age. Head:               Normocephalic, without obvious abnormality, atraumatic. Eyes:               Conjunctivae clear and pale, anicteric sclerae. Pupils are equal  Nose:               Nares normal. No drainage or sinus tenderness. Throat:             Lips, mucosa, and tongue normal.    Neck:               Supple, symmetrical,  no adenopathy, thyroid: non tender  Back:               Symmetric  Lungs:             CTA bilaterally. No wheezing/rhonchi/rales. Chest wall:      No tenderness or deformity. Heart:              Regular rate and rhythm,  no murmur, rub or gallop. Abdomen:        Soft, non-tender. Not distended. Bowel sounds normal. No masses  Extremities:     Atraumatic, No cyanosis. No edema. Skin:                Texture, turgor normal. No rashes/lesions/jaundice  Lymph:            Cervical, supraclavicular normal.  Psych:             Good insight. Not depressed. Not anxious or agitated. Neurologic:      EOMs intact. No facial asymmetry. No aphasia or slurred speech. Normal  strength, A/O X 3.      LAB DATA REVIEWED:    Recent Results (from the past 24 hour(s))   GLUCOSE, POC    Collection Time: 03/14/21  3:09 PM   Result Value Ref Range    Glucose (POC) 266 (H) 65 - 100 mg/dL    Performed by Ennice    GLUCOSE, POC    Collection Time: 03/14/21  9:38 PM   Result Value Ref Range    Glucose (POC) 280 (H) 65 - 100 mg/dL    Performed by Brenda Sandy RN    CBC W/O DIFF    Collection Time: 03/15/21  4:35 AM   Result Value Ref Range    WBC 7.4 3.6 - 11.0 K/uL    RBC 2.64 (L) 3.80 - 5.20 M/uL    HGB 7.8 (L) 11.5 - 16.0 g/dL    HCT 25.2 (L) 35.0 - 47.0 %    MCV 95.5 80.0 - 99.0 FL    MCH 29.5 26.0 - 34.0 PG    MCHC 31.0 30.0 - 36.5 g/dL    RDW 14.6 (H) 11.5 - 14.5 %    PLATELET 515 426 - 397 K/uL    MPV 11.9 8.9 - 12.9 FL    NRBC 0.0 0  WBC    ABSOLUTE NRBC 0.00 0.00 - 0.68 K/uL   METABOLIC PANEL, BASIC    Collection Time: 03/15/21  4:35 AM   Result Value Ref Range    Sodium 137 136 - 145 mmol/L    Potassium 4.6 3.5 - 5.1 mmol/L    Chloride 107 97 - 108 mmol/L    CO2 27 21 - 32 mmol/L    Anion gap 3 (L) 5 - 15 mmol/L    Glucose 189 (H) 65 - 100 mg/dL    BUN 31 (H) 6 - 20 MG/DL    Creatinine 1.27 (H) 0.55 - 1.02 MG/DL    BUN/Creatinine ratio 24 (H) 12 - 20      GFR est AA 53 (L) >60 ml/min/1.73m2    GFR est non-AA 43 (L) >60 ml/min/1.73m2    Calcium 8.4 (L) 8.5 - 10.1 MG/DL   GLUCOSE, POC    Collection Time: 03/15/21  7:35 AM   Result Value Ref Range    Glucose (POC) 188 (H) 65 - 100 mg/dL    Performed by Arcelia Daniels        IMAGING RESULTS:  I have personally reviewed the imaging reports      Total time spent with patient: 50 minutes ________________________________________________________________________  Care Plan discussed with:  Patient y   Family     RN y              Consultant:       CT  3/15/2021:  ________________________________________________________________________    ___________________________________________________  Consulting Provider:  Marleen Westbrook NP      3/15/2021  10:55 AM

## 2021-03-15 NOTE — PROGRESS NOTES
Transition of Care Plan:     Disposition: Home-Pt is refusing home health  Follow up appointments:PCP, Endocrinology  DME needed:TBD  Transportation at [address] or means to access home: Family      IM Medicare letter:N/A due to pt having [phone]  Discharge Caregiver contacted prior to discharge?      Pt is not medically stable for d/c due to pending psychiatry consult. CM will continue to follow and assist with d/c planning. Kristina Aleman.[place].   Care Manager [id number] [facility]  [phone]

## 2021-03-15 NOTE — PROGRESS NOTES
Hospitalist Progress Note    NAME: Parisa Oneal   :  1965   MRN:  [id number]       Assessment / Plan:  DKA resolved   -Anion gap closed  -DC insulin drip  - on sliding scale     Acute renal failure  -Continue IV fluid  -Renal function improving     Hypernatremia  -Change IV fluid to half NS  -Monitor sodium level     Sepsis secondary to UTI  -- finished antibiotics course   - blood culture negative    -- urine culture GRAM NEGATIVE RODS  E. coli     Hypokalemia   -Resolved     Confusion/questionable psych disorder  -Psych consult      hypotension   -continue IV fluid      Anemia blood loss anemia  , HG dropped r/o GI bleeding   monitor HG   Transfuse PRN    FOBT positive   Plan for EGD tomorrow            less than 18.5 Underweight / Body mass index is 17.54 kg/m².     Code status: Full  Prophylaxis: SCD  Recommended Disposition: Rehab versus home health               Subjective:     Chief Complaint / Reason for Physician Visit    Discussed with RN events overnight. Plan for EGD tomorrow     Review of Systems:  Symptom Y/N Comments  Symptom Y/N Comments   Fever/Chills n   Chest Pain n    Poor Appetite    Edema     Cough n   Abdominal Pain     Sputum    Joint Pain     SOB/GARLAND    Pruritis/Rash     Nausea/vomit n   Tolerating PT/OT     Diarrhea    Tolerating Diet y    Constipation n   Other       Could NOT obtain due to:      Objective:     VITALS:   Last 24hrs VS reviewed since prior progress note.  Most recent are:  Patient Vitals for the past 24 hrs:   Temp Pulse Resp BP SpO2   03/15/21 1424 98.3 °F (36.8 °C) 85 20 (!) 97/47 97 %   03/15/21 1022 98.3 °F (36.8 °C) (!) 105 20 127/60 100 %   03/15/21 0711 98.3 °F (36.8 °C) (!) 102 19 (!) 123/59 99 %   03/15/21 0304 98.1 °F (36.7 °C) 83 16 (!) 103/59 99 %   21 2317 98.3 °F (36.8 °C) 87 19 (!) 102/50 98 %   21 1920 98.1 °F (36.7 °C) 99 21 100/60 98 %       Intake/Output Summary (Last 24 hours) at 3/15/2021 1731  Last data filed at 3/15/2021 1200  Gross per 24 hour   Intake 714.17 ml   Output 600 ml   Net 114.17 ml        I had a face to face encounter and independently examined this patient on 3/15/2021, as outlined below:  PHYSICAL EXAM:  General: WD, WN. Alert, cooperative, no acute distress    EENT:  EOMI. Anicteric sclerae. MMM  Resp:  CTA bilaterally, no wheezing or rales. No accessory muscle use  CV:  Regular  rhythm,  No edema  GI:  Soft, Non distended, Non tender. +Bowel sounds  Neurologic:  Alert and oriented X 3, normal speech,   Psych:   Good insight. Not anxious nor agitated  Skin:  No rashes. No jaundice    Reviewed most current lab test results and cultures  YES  Reviewed most current radiology test results   YES  Review and summation of old records today    NO  Reviewed patient's current orders and MAR    YES  PMH/ reviewed - no change compared to H&P  ________________________________________________________________________  Care Plan discussed with:    Comments   Patient y    Family      RN y    Care Manager     Consultant                        Multidiciplinary team rounds were held today with , nursing, pharmacist and clinical coordinator. Patient's plan of care was discussed; medications were reviewed and discharge planning was addressed. ________________________________________________________________________  Total NON critical care TIME:  35  Minutes    Total CRITICAL CARE TIME Spent:   Minutes non procedure based      Comments   >50% of visit spent in counseling and coordination of care y    ________________________________________________________________________  Lesia Guzman MD     Procedures: see electronic medical records for all procedures/Xrays and details which were not copied into this note but were reviewed prior to creation of Plan. LABS:  I reviewed today's most current labs and imaging studies.   Pertinent labs include:  Recent Labs     03/15/21  0435 03/14/21  0406   WBC 7.4 5.8   HGB 7. 8* 7.4*   HCT 25.2* 23.7*    102*     Recent Labs     03/15/21  0435 03/14/21  0406 03/13/21  0133    138 139   K 4.6 5.0 4.6    110* 107   CO2 27 26 30   * 284* 254*   BUN 31* 30* 34*   CREA 1.27* 1.33* 1.56*   CA 8.4* 8.1* 8.3*       Signed:  Manfred Aparicio MD

## 2021-03-15 NOTE — PROGRESS NOTES
0700- Report given to Stevan Hussein RN by off going nurse. 1900- Report given to oncoming nurse by Stevan Hussein RN.

## 2021-03-15 NOTE — CONSULTS
Gastroenterology Consultation Note  ROWENA Cooper   for Dr. Vincent    Patient had colon by Dr. Yee in 2016.  [facility] notified of consult and forwarded consult info to VERITO.     ROWENA Cooper  03/15/21  9:46 AM

## 2021-03-15 NOTE — ADT AUTH CERT NOTES
Diabetes - Care Day 5 (3/12/2021) by Juan Luis Knowles       Review Status Review Entered   Completed 3/15/2021 08:13      Criteria Review      Care Day: 5 Care Date: 3/12/2021 Level of Care: Step Down    Guideline Day 2    Clinical Status    (X) * Hypotension absent    3/15/2021 08:11:16 EDT by Juan Luis Knowles      94/79; 111/52    ( ) * Mental status at baseline    3/15/2021 08:11:16 EDT by Constance Champion Confusion/questionable psych disorder  -Psych consult    (X) * Acidosis absent or improved    3/15/2021 08:11:16 EDT by Constance Champion no issue noted    (X) * Blood glucose under acceptable control or improved    3/15/2021 08:11:17 EDT by Juan Luis Knowles      improved    (X) * Dehydration absent    3/15/2021 08:12:34 EDT by Constance Champion absent    (X) * Electrolyte abnormalities absent or improved    3/15/2021 08:12:34 EDT by Juan Luis Knowles      improved    ( ) * Renal function at baseline or improved    Activity    (X) * Ambulatory    3/15/2021 08:12:34 EDT by Constance Champion as tolerated    Routes    (X) * Oral hydration, and medications    3/15/2021 08:12:34 EDT by Constance Champion as tolerated    Medications    ( ) * IV insulin absent    (X) * Outpatient diabetic medication regimen initiated    3/15/2021 08:12:34 EDT by Constance Champion initiated    * Milestone   Additional Notes   3/12/2021 Continued Stay Review   LOC- IP- Telemetry Bed      MEDS:   0.45% NS 50ml/hr IV   Glucotrol 2.5mg bid PO   Humalog qid&hs ss SC x4   Cipro 250mg q12hrs PO   Lantus 20u qd SC   Zosyn 3.375g q8hrs IV      Internal Medicine MD Notes:      Assessment / Plan:   DKA resolved    -Anion gap closed   -DC insulin drip   - on sliding scale       Acute renal failure   -Continue IV fluid   -Renal function improving       Hypernatremia   -Change IV fluid to half NS   -Monitor sodium level       Sepsis secondary to UTI   -Change Zosyn to Cipro   -Follow-up blood culture    -- urine culture Sanam Kidd NEGATIVE RODS  E. coli       Hypokalemia    -Resolved       Confusion/questionable psych disorder   -Psych consult       Prophylaxis: Hep SQ   Recommended Disposition: Rehab versus home health      Patient very weak, still confused, questionable psych disorder, psych consult was placed      LABS:         3/12/2021 03:34   WBC: 6.1   NRBC: 0.0   RBC: 3.48 (L)   HGB: 10.3 (L)   HCT: 34.4 (L)   MCV: 98.9   MCH: 29.6   MCHC: 29.9 (L)   RDW: 14.4   PLATELET: 748 (L)   MPV: 12.7   NEUTROPHILS: 65   LYMPHOCYTES: 24   MONOCYTES: 8   EOSINOPHILS: 1   BASOPHILS: 0   IMMATURE GRANULOCYTES: 2 (H)   DF: AUTOMATED   ABSOLUTE NRBC: 0.00   ABS. NEUTROPHILS: 4.0   ABS. IMM. GRANS.: 0.1 (H)   ABS. LYMPHOCYTES: 1.5   ABS. MONOCYTES: 0.5   ABS. EOSINOPHILS: 0.1   ABS. BASOPHILS: 0.0   Sodium: 143   Potassium: 4.9   Chloride: 113 (H)   CO2: 29   Anion gap: 1 (L)   Glucose: 233 (H)   BUN: 37 (H)   Creatinine: 1.68 (H)   BUN/Creatinine ratio: 22 (H)   Calcium: 8.6   GFR est non-AA: 31 (L)   GFR est AA: 38 (L)            Diabetes - Care Day 2 (3/9/2021) by Curtis Elizabeth       Review Status Review Entered   Completed 3/11/2021 12:43      Criteria Review      Care Day: 2 Care Date: 3/9/2021 Level of Care: Step Down    Guideline Day 2    Clinical Status    (X) * Hypotension absent    3/11/2021 12:31:51 EST by Curtis Elizabeth      108/58; 124/63    ( ) * Mental status at baseline    3/11/2021 12:43:24 EST by Curtis Elizabeth      update:  On examination this is a responsive but very disoriented woman who is frankly hallucinating.  \"I do not want to get on the ship\"    3/11/2021 12:31:51 EST by Curtis Elizabeth      baseline    (X) * Acidosis absent or improved    3/11/2021 12:31:51 EST by Curtis Elizabeth      CO2- 27    (X) * Blood glucose under acceptable control or improved    3/11/2021 12:32:28 EST by Curtis Elizabeth      glucose- 93    (X) * Dehydration absent    3/11/2021 12:33:45 EST by Curtis domínguez    (X) * Electrolyte abnormalities absent or improved    3/11/2021 12:33:45 EST by Amy Carter      improved    (X) * Renal function at baseline or improved    3/11/2021 12:33:45 EST by Amy Carter      improving-- 2.52    Activity    (X) * Ambulatory    3/11/2021 12:34:44 EST by Amy Carter      as tolerated    Routes    (X) * Oral hydration, and medications    3/11/2021 12:34:44 EST by Amy Carter      DIET DIABETIC CONSISTENT CARB Regular    Medications    ( ) * IV insulin absent    3/11/2021 12:35:41 EST by Sagar Amaro remains on insulin drip    (X) * Outpatient diabetic medication regimen initiated    3/11/2021 12:35:41 EST by Sagar Amaro initiated    * Milestone   Additional Notes   3/9/2021 Continued Stay Review   LOC- IP- Stepdown Bed      108/58; 97.9; 114; 18; 100% RA      MEDS:   Heparin 5000u q12hrs SC   Nystatin 500,000u qid PO   Lantus 2u now SC   NS 100ml/hr IV   Regular drip 0-50units/hr titrate IV x17 then d/c'd   Zosyn 3.375g q12hrs IV      Internal Medicine MD Notes:      Assessment / Plan:   DKA resolved    -Anion gap closed   -DC insulin drip   -Start patient on sliding scale       Acute renal failure   -Continue IV fluid       Hypernatremia   -Change IV fluid to half NS   -Monitor sodium level       Sepsis secondary to UTI   -Continue Zosyn   -Follow-up blood culture and urine culture       Prophylaxis: Hep SQ   Recommended Disposition: Home w/Family         Endocrinology MD Notes:      Ld Mitchell was referred to the emergency room on March 8, 2021 because of altered mental status.  On admission to the emergency room she was found to have a glucose of 908 with a normal anion gap, a BUN of 100, creatinine 3.2, sodium 154, and A1c of 10.1%.  She has been treated with vigorous fluid resuscitation as well as intravenous insulin.       Most recent laboratory data notable for sodium 160, glucose 93, creatinine 2.52, BUN 77       The patient remains on glucose stabilizer with minimal insulin infusion at this point.  Receiving D5 quarter normal saline.       Impression   1.  HHS with persistent altered mental status despite improved glucose.     2.  History of type 2 diabetes mellitus with poor blood sugar control   3.  History of abnormal liver function tests and cirrhosis which may be contributing to AMS   4.  Chronic kidney disease with a baseline creatinine of 1.58 now with acute exacerbation       Plan:   1.  I would recommend continued glucostabilizer and fluid resuscitation until altered mental status is improved   2.  30 units of Basaglar insulin seems like a lot for this woman who weighs 45 kg.  Once basal insulin is able to be given, I would recommend no more than 15 units particularly given her renal insufficiency      LABS:      3/9/2021 00:01   GLUCOSE,FAST - POC: 342 (H)      3/9/2021 01:05   GLUCOSE,FAST - POC: 291 (H)      3/9/2021 02:14   GLUCOSE,FAST - POC: 208 (H)      3/9/2021 03:10   GLUCOSE,FAST - POC: 154 (H)      3/9/2021 04:14   GLUCOSE,FAST - POC: 119 (H)      3/9/2021 04:55   Sodium: 160 (H)   Potassium: 3.8   Chloride: 129 (H)   CO2: 27   Anion gap: 4 (L)   Glucose: 93   BUN: 77 (H)   Creatinine: 2.52 (H)   BUN/Creatinine ratio: 31 (H)   Calcium: 9.4   Magnesium: 2.6 (H)   GFR est non-AA: 20 (L)   GFR est AA: 24 (L)      3/9/2021 05:28   GLUCOSE,FAST - POC: 117 (H)      3/9/2021 06:38   GLUCOSE,FAST - POC: 124 (H)      3/9/2021 07:46   GLUCOSE,FAST - POC: 126 (H)      3/9/2021 08:43   GLUCOSE,FAST - POC: 99      3/9/2021 09:46   GLUCOSE,FAST - POC: 109 (H)      3/9/2021 10:56   GLUCOSE,FAST - POC: 153 (H)      3/9/2021 12:18   GLUCOSE,FAST - POC: 216 (H)      3/9/2021 13:28   GLUCOSE,FAST - POC: 261 (H)      3/9/2021 14:46   GLUCOSE,FAST - POC: 287 (H)      3/9/2021 15:50   GLUCOSE,FAST - POC: 305 (H)      3/9/2021 16:52   GLUCOSE,FAST - POC: 352 (H)      3/9/2021 17:59   GLUCOSE,FAST - POC: 259 (H)      3/9/2021 18:45   GLUCOSE,FAST - POC: 276 (H)      3/9/2021 19:49   GLUCOSE,FAST - POC: 238 (H)      3/9/2021 21:27   GLUCOSE,FAST - POC: 220 (H)      3/9/2021 23:01   GLUCOSE,FAST - POC: 212 (H)

## 2021-03-15 NOTE — PROGRESS NOTES
End of Shift Note    Bedside shift change report given to Julienne Stoner RN (oncoming nurse) by Sheng Bui (offgoing nurse). Report included the following information SBAR, Kardex, Intake/Output and Recent Results    Shift worked:  7p-7a     Shift summary and any significant changes:    Pt rested quietly; no signs symptoms of distress; Concerns for physician to address:  hgb held overnight at 7.8     Zone phone for oncoming shift:       Activity:  Activity Level: Up with Assistance  Number times ambulated in hallways past shift: 0  Number of times OOB to chair past shift: 0    Cardiac:   Cardiac Monitoring: Yes      Cardiac Rhythm: Normal sinus rhythm    Access:   Current line(s): PIV     Genitourinary:   Urinary status: voiding    Respiratory:   O2 Device: Room air  Chronic home O2 use?: NO  Incentive spirometer at bedside: NO     GI:  Last Bowel Movement Date: 03/14/21  Current diet:  DIET DIABETIC CONSISTENT CARB Regular  DIET NUTRITIONAL SUPPLEMENTS Dinner; Glucerna Shake  Passing flatus: YES  Tolerating current diet: YES  % Diet Eaten: 75 %    Pain Management:   Patient states pain is manageable on current regimen: YES    Skin:  Vinh Score: 19  Interventions: increase time out of bed    Patient Safety:  Fall Score:  Total Score: 3  Interventions: bed/chair alarm and gripper socks  High Fall Risk: Yes    Length of Stay:  Expected LOS: 4d 19h  Actual LOS: [address]

## 2021-03-15 NOTE — DIABETES MGMT
[address] [facility]    CLINICAL NURSE SPECIALIST CONSULT     Initial Presentation   Lexus Reno is a 60 y.o. female admitted 3/8/21 with altered mental status and hyperglycemia. On admission to the emergency room she was found to have a glucose of 908 with a normal anion gap, a BUN of 100, creatinine 3.2, sodium 154, and A1c of 10.1%. She had been treated with vigorous fluid resuscitation as well as intravenous insulin. HX:   Past Medical History:   Diagnosis Date    Asthma     Diabetes ([facility] [address]     Elevated transaminase level 6/29/2016    Insulin dependent diabetes mellitus 6/20/2016    Pancreatic atrophy 6/20/2016      DX: Hyperosmolar hyperglycemic state. NEERAJ. Severe sepsis secondary to UTI. Cirrhosis on liver biopsy in early 2020; ? Etiology. TX: IVF. Insulin. protonix. Glipizide. Hospital course   Clinical progress has been uncomplicated. 3/10/21 CM saying disposition is home with possible home health services. Seen by nutritionist today. Urine culture growing gram negative rods. Blood cultures are negative. 3/11/21 PT/OT ordered. Possible discharge per notes in 1-2 days. Intermittent confusion over night. Patient does not want to go to a SNF or have [place].    3/12/21 Patient refusing [facility] [place] and stated [employer] members will be assisting her. Patient very weak, still confused, questionable psych disorder, Psych consult to be placed. urine culture GRAM NEGATIVE RODS  E. Coli  3/15/21 hgb dropped yesterday to 7.4, FOBT positive. Per GI, NPO after midnight for EGD, PPI BID, added iron panel. COVID negative. Diabetes    Patient has known Type 2 diabetes, treated with insulin PTA. Admission BG 1227 and A1c 10.1 indicate poor/acceptable diabetes control. Ambulatory blood glucose management provided by primary care provider Dr. Romero and endocrinologist, Dr Brayan Lino.   Consulted by Provider for advanced diabetes nursing assessment and care, specifically related to    [x] Inpatient management strategy    Diabetes-related medical history  Acute complications  HHNK  Microvascular disease  Nephropathy    Diabetes medication history  Drug class Currently in use Discontinued Never used   Biguanide      DDP-4 inhibitor       Sulfonylurea  Glipizide XR 2.5mg BID    Thiazolidinedione      GLP-1 RA      SGLT-2 inhibitors      Basal insulin 30 units of basaglar at night     Bolus insulin      Fixed Dose  Combinations        Subjective   \"I can't have anything to eat after midnight. \"    Patient reports the following home diabetes self-care practices:  Eating pattern  [x] Breakfast Eggs and pizano  [x] Lunch  salad  [x] Dinner  Baked chicken, broccli  [x] Beverages water   Physical activity pattern: states she works in [employer], lives by herself and is active. Monitoring pattern: checks her BG at least 2x a day    Taking medications pattern  [x] Consistent administration  [x] Affordable     Objective   Physical exam  General Alert and oriented today. Conversant and cooperative, ess distracted and able to converse about plan of care for tomorrow to have EGD. Vital Signs   Visit Vitals  BP (!) 97/47 (BP Patient Position: At rest)   Pulse 85   Temp 98.3 °F (36.8 °C)   Resp 20   Ht 5' 3\" (1.6 m)   Wt 43.5 kg (95 lb 12.8 oz)   SpO2 97%   BMI 16.97 kg/m²     Laboratory  Tests 3/15 3/11 3/10   A1c       179 78   Anion gap 3 3 2   Serum triglycerides      WBC 7.4 8.0 14.5   Serum creatinine 1.27 1.56 1.92   GFR 53 41 33   AST      ALT                          Factors impacting BG management  Factor Dose Comments   Nutrition:  Carb-controlled meals   60 grams/meal   Good appetite per patient and eating 75-90%   Pain  Reports no pain    Infection IV ABX for UTI: completed Urine culture positive.   Blood culture negative   Other: kidney function  Avoid nephrotoxic medications GFR 53     Blood glucose pattern        Assessment and Plan   Nursing Diagnosis Risk for unstable blood glucose pattern   Nursing Intervention Domain 0241 Decision-making Support   Nursing Interventions Examined current inpatient diabetes control   Explored factors facilitating and impeding inpatient management  Explored corrective strategies with patient and responsible inpatient provider      Evaluation   This  female, with Type 2 diabetes, did not achieve diabetes control prior to admission, as evidenced by admission BG of 1227 and A1c of 10.1. During this hospitalization, the patient has not achieved inpatient blood glucose target of 100-180mg/dl. Several factors have played a role in blood glucose management including:  [x] Critical nature of illness state given her initial AMS  [x]  Kidney dysfunction    Patient was started on Glucostabilizer ([address] [place] [address]) in order to treat her HHS on 3/8/21 and she used a total of 29 units. It only took her until 0300 the following morning of 3/9/21 to obtain a BG below 250. Recommend to order basal insulin at half of her PTA dosing which would be renal dosed for her kidneys. Note today that BG range is 170-180s this morning so basal dosing could be increased slightly to 0.4 units/kg/D. She continues to tolerate a diet well. PTA at one time she was taking glipizide and stated that she would consider taking this medication again as a sulfonylurea would help control her BG during meal time since she eating. 3/12/21, BG have trended back up, 230-300s with 6 units of correction used so far. Glipizide was restarted this morning as she is tolerating PO. Spoke with Dr. Chery Butler via [employer] and recommended to increase basal insulin to her PTA dose of 30 units of lantus which is about 0.7units/kg/D. Since she was last seen basal insulin has continued at PTA dosing as well as her sulfonylurea. BG have continued to remain elevated in the 200-300s although hgb had decreased yesterday and FOBT was positive.  She will be NPO after midnight but basal insulin should not be held. Glipizide will held due to her NPO status but upon her diet being reintroduced could increase this to 5mg BID. Recommendations     [x] Use of Subcutaneous Insulin Order set (5050)  Insulin Dosing Specific recommendation   Basal                                      (Based on weight, BMI & GFR)   [x] 0.7 units/kg/D   Continue 30 units of lantus daily even if NPO as this is her baseline basal insulin dose   Nutritional                                      (Based on CHO/dextrose load)  Could increase glipizide to 5mg BID once diet is reordered after EGD   Corrective                                       (Useful in adjusting insulin dosing)   [x] Continue HIGH sensitivity        [x] Referral to  [x] [facility] (Phone [phone] to schedule appointment) for [facility]    Billing Code(s)     [x] 28647 IP subsequent hospital care - 25 minutes [] 19329 Prolonged Services - 55 minutes     Before making these care recommendations, I personally reviewed the hospitalization record, including notes, laboratory & diagnostic data and current medications, and   examined the patient at the bedside (circumstances permitting) before making care recommendations.      Total minutes: [address] MSN, RN, ACCNS-AG  Diabetes Clinical Nurse Specialist  [facility]  Access via [address] [place] [address]

## 2021-03-15 NOTE — ANESTHESIA PREPROCEDURE EVALUATION
Relevant Problems   RESPIRATORY SYSTEM   (+) Mild intermittent asthma without complication      RENAL FAILURE   (+) Stage 2 chronic kidney disease      ENDOCRINE   (+) Insulin dependent diabetes mellitus       Anesthetic History   No history of anesthetic complications            Review of Systems / Medical History  Patient summary reviewed, nursing notes reviewed and pertinent labs reviewed    Pulmonary            Asthma        Neuro/Psych   Within defined limits           Cardiovascular      Valvular problems/murmurs: mitral insufficiency              Comments: ECG (3/8/21): Sinus tachycardia   Biatrial enlargement   When compared with ECG of 20-FEB-2019 19:08,   Vent. rate has increased BY  56 BPM    HAYDER (9/4/18): Left ventricle: Systolic function was normal. Ejection fraction was  estimated in the range of 55 % to 60 %. There were no regional wall motion  abnormalities. Atrial septum: Contrast injection was performed. There was no  right-to-left shunt, with provocative maneuvers to increase right atrial  pressure. Mitral valve: There was mild regurgitation. No obvious mass, vegetation or  thrombus noted. Aortic valve: No obvious mass, vegetation or thrombus noted.    GI/Hepatic/Renal         Renal disease  Liver disease    Comments: Primary Sclerosing Colangitis  Pancreatic Atrophy Endo/Other    Diabetes: poorly controlled, type 1, using insulin    Anemia     Other Findings            Physical Exam    Airway  Mallampati: II  TM Distance: > 6 cm  Neck ROM: normal range of motion   Mouth opening: Normal     Cardiovascular  Regular rate and rhythm,  S1 and S2 normal,  no murmur, click, rub, or gallop             Dental  No notable dental hx       Pulmonary  Breath sounds clear to auscultation               Abdominal  GI exam deferred       Other Findings            Anesthetic Plan    ASA: 3  Anesthesia type: MAC and total IV anesthesia          Induction: Intravenous  Anesthetic plan and risks discussed with: Patient

## 2021-03-15 NOTE — PROGRESS NOTES
Hospitalist Progress Note    NAME: Myla Panda   :  1965   MRN:  [id number]       Assessment / Plan:    DKA resolved   -Anion gap closed  -DC insulin drip  - on sliding scale     Acute renal failure  -Continue IV fluid  -Renal function improving     Hypernatremia  -Change IV fluid to half NS  -Monitor sodium level     Sepsis secondary to UTI  -Change Zosyn to Cipro  -Follow-up blood culture   -- urine culture GRAM NEGATIVE RODS  E. coli     Hypokalemia   -Resolved     Confusion/questionable psych disorder  -Psych consult      hypotension   -continue IV fluid     Anemia , HG dropped r/o GI bleeding   monitor HG   Transfuse PRN   Check FOBT           less than 18.5 Underweight / Body mass index is 17.54 kg/m².     Code status: Full  Prophylaxis: Hep SQ  Recommended Disposition: Rehab versus home health           Subjective:     Chief Complaint / Reason for Physician Visit    Discussed with RN events overnight. HG dropped , follow up FOBT     Review of Systems:  Symptom Y/N Comments  Symptom Y/N Comments   Fever/Chills n   Chest Pain n    Poor Appetite    Edema     Cough    Abdominal Pain     Sputum    Joint Pain     SOB/GARLAND n   Pruritis/Rash     Nausea/vomit    Tolerating PT/OT     Diarrhea n   Tolerating Diet y    Constipation    Other       Could NOT obtain due to:      Objective:     VITALS:   Last 24hrs VS reviewed since prior progress note.  Most recent are:  Patient Vitals for the past 24 hrs:   Temp Pulse Resp BP SpO2   21 1920 98.1 °F (36.7 °C) 99 21 100/60 98 %   21 1516 -- (!) 106 20 (!) 127/58 100 %   21 1515 -- (!) 110 19 (!) 111/57 100 %   21 1511 99.2 °F (37.3 °C) 85 18 (!) 104/51 100 %   21 1047 98 °F (36.7 °C) 80 15 (!) 101/57 100 %   21 0723 98.2 °F (36.8 °C) 80 12 (!) 106/52 100 %   21 0336 97.8 °F (36.6 °C) 89 11 (!) 98/49 100 %       Intake/Output Summary (Last 24 hours) at 3/14/2021 2305  Last data filed at 3/14/2021 1842  Gross per 24 hour Intake 1649.17 ml   Output 2900 ml   Net -1250.83 ml        I had a face to face encounter and independently examined this patient on 3/14/2021, as outlined below:  PHYSICAL EXAM:  General: WD, WN. Alert, cooperative, no acute distress    EENT:  EOMI. Anicteric sclerae. MMM  Resp:  CTA bilaterally, no wheezing or rales. No accessory muscle use  CV:  Regular  rhythm,  No edema  GI:  Soft, Non distended, Non tender. +Bowel sounds  Neurologic:  Alert and oriented X 3, normal speech,   Psych:   Good insight. Not anxious nor agitated  Skin:  No rashes. No jaundice    Reviewed most current lab test results and cultures  YES  Reviewed most current radiology test results   YES  Review and summation of old records today    NO  Reviewed patient's current orders and MAR    YES  PMH/SH reviewed - no change compared to H&P  ________________________________________________________________________  Care Plan discussed with:    Comments   Patient y    Family      RN y    Care Manager     Consultant                        Multidiciplinary team rounds were held today with , nursing, pharmacist and clinical coordinator. Patient's plan of care was discussed; medications were reviewed and discharge planning was addressed. ________________________________________________________________________  Total NON critical care TIME:  35   Minutes    Total CRITICAL CARE TIME Spent:   Minutes non procedure based      Comments   >50% of visit spent in counseling and coordination of care y    ________________________________________________________________________  Lissette Abraham MD     Procedures: see electronic medical records for all procedures/Xrays and details which were not copied into this note but were reviewed prior to creation of Plan. LABS:  I reviewed today's most current labs and imaging studies.   Pertinent labs include:  Recent Labs     03/14/21  0406 03/12/21  0334   WBC 5.8 6.1   HGB 7.4* 10.3*   HCT 23.7* 34.4*   * 113*     Recent Labs     03/14/21  0406 03/13/21  0133 03/12/21  0334    139 143   K 5.0 4.6 4.9   * 107 113*   CO2 26 30 29   * 254* 233*   BUN 30* 34* 37*   CREA 1.33* 1.56* 1.68*   CA 8.1* 8.3* 8.6       Signed:  Jovita Ramos MD

## 2021-03-16 ENCOUNTER — ANESTHESIA (OUTPATIENT)
Dept: ENDOSCOPY | Age: 56
DRG: 871 | End: 2021-03-16
Payer: COMMERCIAL

## 2021-03-16 LAB
ANION GAP SERPL CALC-SCNC: 5 MMOL/L (ref 5–15)
BASOPHILS # BLD: 0 K/UL (ref 0–0.1)
BASOPHILS NFR BLD: 0 % (ref 0–1)
BUN SERPL-MCNC: 29 MG/DL (ref 6–20)
BUN/CREAT SERPL: 17 (ref 12–20)
CALCIUM SERPL-MCNC: 8.3 MG/DL (ref 8.5–10.1)
CHLORIDE SERPL-SCNC: 105 MMOL/L (ref 97–108)
CO2 SERPL-SCNC: 26 MMOL/L (ref 21–32)
CREAT SERPL-MCNC: 1.71 MG/DL (ref 0.55–1.02)
DIFFERENTIAL METHOD BLD: ABNORMAL
EOSINOPHIL # BLD: 0 K/UL (ref 0–0.4)
EOSINOPHIL NFR BLD: 0 % (ref 0–7)
ERYTHROCYTE [DISTWIDTH] IN BLOOD BY AUTOMATED COUNT: 14.8 % (ref 11.5–14.5)
GLUCOSE BLD STRIP.AUTO-MCNC: 124 MG/DL (ref 65–100)
GLUCOSE BLD STRIP.AUTO-MCNC: 197 MG/DL (ref 65–100)
GLUCOSE BLD STRIP.AUTO-MCNC: 311 MG/DL (ref 65–100)
GLUCOSE BLD STRIP.AUTO-MCNC: 66 MG/DL (ref 65–100)
GLUCOSE BLD STRIP.AUTO-MCNC: 80 MG/DL (ref 65–100)
GLUCOSE SERPL-MCNC: 289 MG/DL (ref 65–100)
H PYLORI FROM TISSUE: NEGATIVE
HCT VFR BLD AUTO: 24.8 % (ref 35–47)
HGB BLD-MCNC: 8.1 G/DL (ref 11.5–16)
IMM GRANULOCYTES # BLD AUTO: 0 K/UL (ref 0–0.04)
IMM GRANULOCYTES NFR BLD AUTO: 0 % (ref 0–0.5)
IRON SATN MFR SERPL: 27 % (ref 20–50)
IRON SERPL-MCNC: 71 UG/DL (ref 35–150)
KIT LOT NO., HCLOLOT: NORMAL
LYMPHOCYTES # BLD: 2 K/UL (ref 0.8–3.5)
LYMPHOCYTES NFR BLD: 19 % (ref 12–49)
MCH RBC QN AUTO: 31.4 PG (ref 26–34)
MCHC RBC AUTO-ENTMCNC: 32.7 G/DL (ref 30–36.5)
MCV RBC AUTO: 96.1 FL (ref 80–99)
METAMYELOCYTES NFR BLD MANUAL: 5 %
MONOCYTES # BLD: 1.3 K/UL (ref 0–1)
MONOCYTES NFR BLD: 12 % (ref 5–13)
MYELOCYTES NFR BLD MANUAL: 2 %
NEGATIVE CONTROL: NEGATIVE
NEUTS BAND NFR BLD MANUAL: 2 %
NEUTS SEG # BLD: 6.6 K/UL (ref 1.8–8)
NEUTS SEG NFR BLD: 60 % (ref 32–75)
NRBC # BLD: 0 K/UL (ref 0–0.01)
NRBC BLD-RTO: 0 PER 100 WBC
PLATELET # BLD AUTO: 210 K/UL (ref 150–400)
PMV BLD AUTO: 11.6 FL (ref 8.9–12.9)
POSITIVE CONTROL: POSITIVE
POTASSIUM SERPL-SCNC: 4.6 MMOL/L (ref 3.5–5.1)
RBC # BLD AUTO: 2.58 M/UL (ref 3.8–5.2)
RBC MORPH BLD: ABNORMAL
SERVICE CMNT-IMP: ABNORMAL
SERVICE CMNT-IMP: NORMAL
SERVICE CMNT-IMP: NORMAL
SODIUM SERPL-SCNC: 136 MMOL/L (ref 136–145)
TIBC SERPL-MCNC: 264 UG/DL (ref 250–450)
WBC # BLD AUTO: 10.6 K/UL (ref 3.6–11)

## 2021-03-16 PROCEDURE — C9113 INJ PANTOPRAZOLE SODIUM, VIA: HCPCS | Performed by: INTERNAL MEDICINE

## 2021-03-16 PROCEDURE — 36415 COLL VENOUS BLD VENIPUNCTURE: CPT

## 2021-03-16 PROCEDURE — 77030019988 HC FCPS ENDOSC DISP BSC -B: Performed by: INTERNAL MEDICINE

## 2021-03-16 PROCEDURE — 97535 SELF CARE MNGMENT TRAINING: CPT

## 2021-03-16 PROCEDURE — 88305 TISSUE EXAM BY PATHOLOGIST: CPT

## 2021-03-16 PROCEDURE — 99231 SBSQ HOSP IP/OBS SF/LOW 25: CPT | Performed by: CLINICAL NURSE SPECIALIST

## 2021-03-16 PROCEDURE — 0DB68ZX EXCISION OF STOMACH, VIA NATURAL OR ARTIFICIAL OPENING ENDOSCOPIC, DIAGNOSTIC: ICD-10-PCS | Performed by: INTERNAL MEDICINE

## 2021-03-16 PROCEDURE — 76040000019: Performed by: INTERNAL MEDICINE

## 2021-03-16 PROCEDURE — 74011000250 HC RX REV CODE- 250: Performed by: ANESTHESIOLOGY

## 2021-03-16 PROCEDURE — 76060000031 HC ANESTHESIA FIRST 0.5 HR: Performed by: INTERNAL MEDICINE

## 2021-03-16 PROCEDURE — 85025 COMPLETE CBC W/AUTO DIFF WBC: CPT

## 2021-03-16 PROCEDURE — 74011250636 HC RX REV CODE- 250/636: Performed by: INTERNAL MEDICINE

## 2021-03-16 PROCEDURE — 80048 BASIC METABOLIC PNL TOTAL CA: CPT

## 2021-03-16 PROCEDURE — 83540 ASSAY OF IRON: CPT

## 2021-03-16 PROCEDURE — 65660000000 HC RM CCU STEPDOWN

## 2021-03-16 PROCEDURE — 74011250636 HC RX REV CODE- 250/636: Performed by: ANESTHESIOLOGY

## 2021-03-16 PROCEDURE — 2709999900 HC NON-CHARGEABLE SUPPLY: Performed by: INTERNAL MEDICINE

## 2021-03-16 PROCEDURE — 82962 GLUCOSE BLOOD TEST: CPT

## 2021-03-16 PROCEDURE — 74011636637 HC RX REV CODE- 636/637: Performed by: INTERNAL MEDICINE

## 2021-03-16 PROCEDURE — 74011250637 HC RX REV CODE- 250/637: Performed by: INTERNAL MEDICINE

## 2021-03-16 RX ORDER — EPINEPHRINE 0.1 MG/ML
1 INJECTION INTRACARDIAC; INTRAVENOUS
Status: DISCONTINUED | OUTPATIENT
Start: 2021-03-16 | End: 2021-03-16 | Stop reason: HOSPADM

## 2021-03-16 RX ORDER — DEXTROMETHORPHAN/PSEUDOEPHED 2.5-7.5/.8
1.2 DROPS ORAL
Status: DISCONTINUED | OUTPATIENT
Start: 2021-03-16 | End: 2021-03-16 | Stop reason: HOSPADM

## 2021-03-16 RX ORDER — ATROPINE SULFATE 0.1 MG/ML
0.5 INJECTION INTRAVENOUS
Status: DISCONTINUED | OUTPATIENT
Start: 2021-03-16 | End: 2021-03-16 | Stop reason: HOSPADM

## 2021-03-16 RX ORDER — NALOXONE HYDROCHLORIDE 0.4 MG/ML
0.4 INJECTION, SOLUTION INTRAMUSCULAR; INTRAVENOUS; SUBCUTANEOUS
Status: DISCONTINUED | OUTPATIENT
Start: 2021-03-16 | End: 2021-03-16 | Stop reason: HOSPADM

## 2021-03-16 RX ORDER — LIDOCAINE HYDROCHLORIDE 20 MG/ML
INJECTION, SOLUTION EPIDURAL; INFILTRATION; INTRACAUDAL; PERINEURAL AS NEEDED
Status: DISCONTINUED | OUTPATIENT
Start: 2021-03-16 | End: 2021-03-16 | Stop reason: HOSPADM

## 2021-03-16 RX ORDER — GLIPIZIDE 5 MG/1
5 TABLET, FILM COATED, EXTENDED RELEASE ORAL
Status: DISCONTINUED | OUTPATIENT
Start: 2021-03-16 | End: 2021-03-17 | Stop reason: HOSPADM

## 2021-03-16 RX ORDER — GLIPIZIDE 2.5 MG/1
5 TABLET, EXTENDED RELEASE ORAL
Status: DISCONTINUED | OUTPATIENT
Start: 2021-03-16 | End: 2021-03-16

## 2021-03-16 RX ORDER — SODIUM CHLORIDE 9 MG/ML
75 INJECTION, SOLUTION INTRAVENOUS CONTINUOUS
Status: DISPENSED | OUTPATIENT
Start: 2021-03-16 | End: 2021-03-16

## 2021-03-16 RX ORDER — SODIUM CHLORIDE 9 MG/ML
75 INJECTION, SOLUTION INTRAVENOUS CONTINUOUS
Status: DISCONTINUED | OUTPATIENT
Start: 2021-03-16 | End: 2021-03-17 | Stop reason: HOSPADM

## 2021-03-16 RX ORDER — SODIUM CHLORIDE 9 MG/ML
25 INJECTION, SOLUTION INTRAVENOUS CONTINUOUS
Status: DISCONTINUED | OUTPATIENT
Start: 2021-03-16 | End: 2021-03-16 | Stop reason: HOSPADM

## 2021-03-16 RX ORDER — PROPOFOL 10 MG/ML
INJECTION, EMULSION INTRAVENOUS AS NEEDED
Status: DISCONTINUED | OUTPATIENT
Start: 2021-03-16 | End: 2021-03-16 | Stop reason: HOSPADM

## 2021-03-16 RX ORDER — FLUMAZENIL 0.1 MG/ML
0.2 INJECTION INTRAVENOUS
Status: DISCONTINUED | OUTPATIENT
Start: 2021-03-16 | End: 2021-03-16 | Stop reason: HOSPADM

## 2021-03-16 RX ORDER — FENTANYL CITRATE 50 UG/ML
25 INJECTION, SOLUTION INTRAMUSCULAR; INTRAVENOUS
Status: DISCONTINUED | OUTPATIENT
Start: 2021-03-16 | End: 2021-03-16 | Stop reason: HOSPADM

## 2021-03-16 RX ORDER — MIDAZOLAM HYDROCHLORIDE 1 MG/ML
.25-5 INJECTION, SOLUTION INTRAMUSCULAR; INTRAVENOUS
Status: DISCONTINUED | OUTPATIENT
Start: 2021-03-16 | End: 2021-03-16 | Stop reason: HOSPADM

## 2021-03-16 RX ORDER — SODIUM CHLORIDE 0.9 % (FLUSH) 0.9 %
5-40 SYRINGE (ML) INJECTION AS NEEDED
Status: DISCONTINUED | OUTPATIENT
Start: 2021-03-16 | End: 2021-03-17 | Stop reason: HOSPADM

## 2021-03-16 RX ORDER — SODIUM CHLORIDE 0.9 % (FLUSH) 0.9 %
5-40 SYRINGE (ML) INJECTION EVERY 8 HOURS
Status: DISCONTINUED | OUTPATIENT
Start: 2021-03-16 | End: 2021-03-17 | Stop reason: HOSPADM

## 2021-03-16 RX ADMIN — Medication 10 ML: at 14:16

## 2021-03-16 RX ADMIN — PROPOFOL 200 MG: 10 INJECTION, EMULSION INTRAVENOUS at 11:11

## 2021-03-16 RX ADMIN — SODIUM CHLORIDE 10 ML: 9 INJECTION, SOLUTION INTRAMUSCULAR; INTRAVENOUS; SUBCUTANEOUS at 21:58

## 2021-03-16 RX ADMIN — LIDOCAINE HYDROCHLORIDE 60 MG: 20 INJECTION, SOLUTION EPIDURAL; INFILTRATION; INTRACAUDAL; PERINEURAL at 10:58

## 2021-03-16 RX ADMIN — PANTOPRAZOLE SODIUM 40 MG: 40 INJECTION, POWDER, FOR SOLUTION INTRAVENOUS at 21:57

## 2021-03-16 RX ADMIN — SODIUM CHLORIDE 75 ML/HR: 9 INJECTION, SOLUTION INTRAVENOUS at 18:44

## 2021-03-16 RX ADMIN — Medication 10 ML: at 21:58

## 2021-03-16 RX ADMIN — PANTOPRAZOLE SODIUM 40 MG: 40 INJECTION, POWDER, FOR SOLUTION INTRAVENOUS at 08:23

## 2021-03-16 RX ADMIN — INSULIN LISPRO 2 UNITS: 100 INJECTION, SOLUTION INTRAVENOUS; SUBCUTANEOUS at 21:57

## 2021-03-16 RX ADMIN — INSULIN GLARGINE 30 UNITS: 100 INJECTION, SOLUTION SUBCUTANEOUS at 08:23

## 2021-03-16 RX ADMIN — SODIUM CHLORIDE 10 ML: 9 INJECTION, SOLUTION INTRAMUSCULAR; INTRAVENOUS; SUBCUTANEOUS at 14:16

## 2021-03-16 RX ADMIN — SODIUM CHLORIDE 25 ML/HR: 9 INJECTION, SOLUTION INTRAVENOUS at 10:37

## 2021-03-16 RX ADMIN — POTASSIUM CHLORIDE AND SODIUM CHLORIDE: 450; 150 INJECTION, SOLUTION INTRAVENOUS at 01:33

## 2021-03-16 NOTE — PERIOP NOTES
Anesthesia reports 200mg Propofol, 60mg Lidocaine and 300mL NS given during procedure. Received report from anesthesia staff on vital signs and status of patient. Endoscope was pre-cleaned at the bedside immediately following procedure by Anup BEDOLLA

## 2021-03-16 NOTE — ANESTHESIA POSTPROCEDURE EVALUATION
Procedure(s):  ESOPHAGOGASTRODUODENOSCOPY (EGD)  ESOPHAGOGASTRODUODENAL (EGD) BIOPSY. total IV anesthesia    Anesthesia Post Evaluation        Patient location during evaluation: PACU  Note status: Adequate. Level of consciousness: responsive to verbal stimuli and sleepy but conscious  Pain management: satisfactory to patient  Airway patency: patent  Anesthetic complications: no  Cardiovascular status: acceptable  Respiratory status: acceptable  Hydration status: acceptable  Comments: +Post-Anesthesia Evaluation and Assessment    Patient: Connor Jose MRN: [id number]  SSN: [id number]   YOB: 1965  Age: 60 y.o. Sex: female      Cardiovascular Function/Vital Signs    /67   Pulse (!) 102   Temp 36.9 °C (98.4 °F)   Resp 17   Ht 5' 3\" (1.6 m)   Wt 43.4 kg (95 lb 10.9 oz)   SpO2 98%   BMI 16.95 kg/m²     Patient is status post Procedure(s):  ESOPHAGOGASTRODUODENOSCOPY (EGD)  ESOPHAGOGASTRODUODENAL (EGD) BIOPSY. Nausea/Vomiting: Controlled. Postoperative hydration reviewed and adequate. Pain:  Pain Scale 1: Numeric (0 - 10) (03/16/21 1029)  Pain Intensity 1: 0 (03/16/21 1029)   Managed. Neurological Status: At baseline. Mental Status and Level of Consciousness: Arousable. Pulmonary Status:   O2 Device: CO2 nasal cannula (03/16/21 1111)   Adequate oxygenation and airway patent. Complications related to anesthesia: None    Post-anesthesia assessment completed. No concerns. Signed By: Rasheeda Jacobs MD    3/16/2021  Post anesthesia nausea and vomiting:  controlled      INITIAL Post-op Vital signs:   Vitals Value Taken Time   /67 03/16/21 1139   Temp     Pulse 98 03/16/21 1147   Resp 18 03/16/21 1147   SpO2     Vitals shown include unvalidated device data.

## 2021-03-16 NOTE — PROCEDURES
NAME:  Kalyan Mena   :   1965   MRN:   [id number]     Date/Time:  3/16/2021 11:10 AM    Esophagogastroduodenoscopy (EGD) Procedure Note    Procedure: Esophagogastroduodenoscopy with biopsy, FABIENNE test    Indication:  Occult blood in stool with possible UGI origin  Pre-operative Diagnosis: see indication above  Post-operative Diagnosis: see findings below    Primary GI:  Mina Posada MD  :  Niall Faustin MD  Referring Provider:   -Amy Allne MD;-Bhavani Faith MD    Exam:  Airway: clear, no airway problems anticipated  Heart: RRR, without gallops or rubs  Lungs: clear bilaterally without wheezes, crackles, or rhonchi  Abdomen: soft, nontender, nondistended, bowel sounds present  Mental Status: awake, alert and oriented to person, place and time     Anethesia/Sedation:  MAC anesthesia Propofol 200mg IV  Procedure Details   After informed consent was obtained for the procedure, with all risks and benefits of procedure explained the patient was taken to the endoscopy suite and placed in the left lateral decubitus position. Following sequential administration of sedation as per above, the [id number] gastroscope was inserted into the mouth and advanced under direct vision to second portion of the duodenum. A careful inspection was made as the gastroscope was withdrawn, including a retroflexed view of the proximal stomach; findings and interventions are described below. Findings:    -Normal esophageal mucosa  -Normal gastric mucosa; biopsied to exclude helicobacter pylori infection and inflammation  -Normal duodenal mucosa    Therapies:  biopsy of stomach antrum  Specimens: FABIENNE test; #1 gastric  EBL:  None. Complications:   None; patient tolerated the procedure well. Impression:    -Normal esophageal mucosa  -Normal gastric mucosa; biopsied to exclude helicobacter pylori infection and inflammation  -Normal duodenal mucosa    Recommendations:  -Await pathology. , -Await FABIENNE test result and treat for Helicobacter pylori if positive. ,   -Resume diet. ,   -She will need outpatient colonoscopy given Father's hx of Colorectal cancer  -No need for continuation of PPI or acid suppression  -Consider for discharge home today  -Will sign off at this time    Discharge disposition:  To room after recovery in Endoscopy; consider for discharge home today    Bruce Carter MD

## 2021-03-16 NOTE — PROGRESS NOTES
Comprehensive Nutrition Assessment    Type and Reason for Visit: Reassess    Nutrition Recommendations/Plan:   Continue CCD  Continue Glucerna daily     Nutrition Assessment:     Chart reviewed; medically noted for acute blood loss anemia and DM. S/p EGD today. Diet advanced to CCD. Patient reports a good appetite and eating well. No nutrition questions or concerns regarding diet. Encouraged compliance with CCD. Patient Vitals for the past 72 hrs:   % Diet Eaten   03/15/21 1754 100 %   03/15/21 1200 80 %   03/15/21 0930 90 %   03/14/21 1230 75 %   03/14/21 0930 90 %       Estimated Daily Nutrient Needs:  Energy (kcal): 1529 kcal ( x 1.3AF +250kcal);  Weight Used for Energy Requirements: Current  Protein (g): 52g (1.2 g/kg bw); Weight Used for Protein Requirements: Current  Fluid (ml/day): 1500 mL; Method Used for Fluid Requirements: 1 ml/kcal    Nutrition Related Findings:        [phone]  BM 3/16  Lantus, humalog, Protonix     Wounds:    None       Current Nutrition Therapies:  DIET NUTRITIONAL SUPPLEMENTS Dinner; Glucerna Shake  DIET DIABETIC WITH OPTIONS Consistent Carb 1800kcal; Regular    Anthropometric Measures:  · Height:  5' 3\" (160 cm)  · Current Body Wt:  43.4 kg (95 lb 10.9 oz)   · BMI Category:  Underweight (BMI less than 18.5)       Nutrition Diagnosis:   · Altered nutrition-related lab values related to (DM) as evidenced by ( [phone])    Nutrition Interventions:   Food and/or Nutrient Delivery: Continue current diet, Continue oral nutrition supplement  Nutrition Education and Counseling: No recommendations at this time  Coordination of Nutrition Care: No recommendation at this time    Goals:  PO intake >70% of meals/supplement next 5-7 days       Nutrition Monitoring and Evaluation:   Behavioral-Environmental Outcomes: None identified  Food/Nutrient Intake Outcomes: Food and nutrient intake, Supplement intake  Physical Signs/Symptoms Outcomes: Biochemical data, Weight    Discharge Planning:    Continue current diet     Electronically signed by Claris Paget, RD on 3/16/2021 at 2:03 PM    Contact: ext [phone]

## 2021-03-16 NOTE — PROGRESS NOTES
End of Shift Note    Bedside shift change report given to Berta (oncoming nurse) by Germaine Toro RN (offgoing nurse).  Report included the following information SBAR    Shift worked:  7a-7p     Shift summary and any significant changes:    Pt transferred from PCU, oriented pt to the unit, completed initial assessment, pt on telemetry, completed 2 x RN skin check, notified MD of tachycardia, IVF running, pt resting comfortably in room   Concerns for physician to address:    Zone phone for oncoming shift:  [phone]      Activity:  Activity Level: Up ad renetta  Number times ambulated in hallways past shift: 0  Number of times OOB to chair past shift: 1    Cardiac:   Cardiac Monitoring: Yes      Cardiac Rhythm: Sinus tachycardia    Access:   Current line(s): PIV     Genitourinary:   Urinary status: voiding    Respiratory:   O2 Device: Room air  Chronic home O2 use?: NO  Incentive spirometer at bedside: NO     GI:  Last Bowel Movement Date: 03/16/21  Current diet:  DIET NUTRITIONAL SUPPLEMENTS Dinner; Glucerna Shake  DIET DIABETIC WITH OPTIONS Consistent Carb 1800kcal; Regular  Passing flatus: YES  Tolerating current diet: YES  % Diet Eaten: 100 %    Pain Management:   Patient states pain is manageable on current regimen: YES    Skin:  Vinh Score: 20  Interventions: increase time out of bed    Patient Safety:  Fall Score: Total Score: 1  Interventions: gripper socks and pt to call before getting OOB  High Fall Risk: Yes    Length of Stay:  Expected LOS: 4d 19h  Actual LOS: 8      Germaine Toro RN

## 2021-03-16 NOTE — PROGRESS NOTES
1400: Bedside shift change report given to Ginette Cortez (oncoming nurse) by Ismael Hensley RN (offgoing nurse). Report included the following information SBAR, Kardex, Intake/Output, MAR and Recent Results. 1700: TRANSFER - OUT REPORT:    Verbal report given to Cinda Parmar RN(name) on Goldie Dinero  being transferred to oncology(unit) for routine progression of care       Report consisted of patients Situation, Background, Assessment and   Recommendations(SBAR). Information from the following report(s) SBAR, Kardex, Intake/Output, MAR and Recent Results was reviewed with the receiving nurse. Lines:   Peripheral IV 03/16/21 Anterior;Right Forearm (Active)   Site Assessment Clean, dry, & intact 03/16/21 1405   Phlebitis Assessment 0 03/16/21 1405   Infiltration Assessment 0 03/16/21 1405   Dressing Status Clean, dry, & intact 03/16/21 1405   Dressing Type Tape;Transparent 03/16/21 1405   Hub Color/Line Status Blue; Infusing;Flushed 03/16/21 1405   Action Taken Open ports on tubing capped 03/16/21 1405   Alcohol Cap Used Yes 03/16/21 1405        Opportunity for questions and clarification was provided.       Patient transported with:   Registered Nurse

## 2021-03-16 NOTE — PROGRESS NOTES
Bedside shift change report given to Sammy Heard (oncoming nurse) by Kait Falcon (offgoing nurse). Report included the following information SBAR, Kardex, ED Summary, Intake/Output, MAR, Recent Results and Cardiac Rhythm NSR/Sinus Tach. End of Shift Note    Bedside shift change report given to CORNELIA Mercer (oncoming nurse) by Frantz Cheema RN (offgoing nurse). Report included the following information SBAR, Kardex, ED Summary, Intake/Output, MAR, Recent Results and Cardiac Rhythm NSR/Sinus Tach    Shift worked:  [id number]     Shift summary and any significant changes:     no acute changes overnight     Concerns for physician to address:  EGD today     Zone phone for oncoming shift:          Activity:  Activity Level: Up with Assistance  Number times ambulated in hallways past shift: 0  Number of times OOB to chair past shift: 0    Cardiac:   Cardiac Monitoring: Yes      Cardiac Rhythm: Sinus tachycardia    Access:   Current line(s): PIV     Genitourinary:   Urinary status: voiding    Respiratory:   O2 Device: Room air  Chronic home O2 use?: NO  Incentive spirometer at bedside: NO     GI:  Last Bowel Movement Date: 03/14/21  Current diet:  DIET NUTRITIONAL SUPPLEMENTS Dinner; Glucerna Shake  DIET NPO  Passing flatus: YES  Tolerating current diet: YES  % Diet Eaten: 100 %    Pain Management:   Patient states pain is manageable on current regimen: YES    Skin:  Vinh Score: 19  Interventions: float heels, increase time out of bed and PT/OT consult    Patient Safety:  Fall Score:  Total Score: 3  Interventions: gripper socks and pt to call before getting OOB  High Fall Risk: Yes    Length of Stay:  Expected LOS: 4d 19h  Actual LOS: 35 Sal Hope RN

## 2021-03-16 NOTE — ROUTINE PROCESS
Myla Stager  1965  [id number]    Situation:  Verbal report received from: Liliana  Procedure: Procedure(s):  ESOPHAGOGASTRODUODENOSCOPY (EGD)  ESOPHAGOGASTRODUODENAL (EGD) BIOPSY    Background:    Preoperative diagnosis: anemia  Postoperative diagnosis: Anemia    :    Assistant(s): Endoscopy Technician-1: Miroslava Yuan  Endoscopy RN-1: Flaco Lujan RN    Specimens:   ID Type Source Tests Collected by Time Destination   1 : Gastric biopsy Preservative Gastric  Diane Haider MD 3/16/2021 1105 Pathology     H. Pylori  no    Assessment:  Intra-procedure medications     Anesthesia gave intra-procedure sedation and medications, see anesthesia flow sheet yes    Intravenous fluids: NS@ KVO     Vital signs stable     Abdominal assessment: round and soft     Recommendation:  Discharge patient per MD order.   Return to floor  Family or Friend   Permission to share finding with family or friend yes

## 2021-03-16 NOTE — H&P
See prior Consultation Note. The patient was reexamined. No interval change in the last 2 days. Proceed with procedure(s) with deep sedation or conscious sedation as clinically indicated.

## 2021-03-16 NOTE — PROGRESS NOTES
Hospitalist Progress Note    NAME: Jasbir Gomez   :  1965   MRN:  [id number]       Assessment / Plan:  DKA resolved   -Anion gap closed  S/p  insulin drip, now  On lantus 30units   - on sliding scale  BS better controlled      Acute renal failure on CKD stage 3  On IVF   At baseline creat      Hypernatremia resolved   -   Sepsis secondary to UTI  -- finished antibiotics course   - blood culture negative    -- urine culture GRAM NEGATIVE RODS  E. coli     Hypokalemia   -Resolved     Confusion/questionable psych disorder  -Psych consult pending       hypotension   Sinus tachycardia   -continue IV fluid   SBP around 100  Monitor HR      Anemia blood loss anemia    S/p EGD this am  which came back negative   Biopsy done and FABIENNE   Op colonoscopy   No need for PPI         less than 18.5 Underweight / Body mass index is 17.54 kg/m².     Code status: Full  Prophylaxis: SCD  Recommended Disposition: Rehab versus home health               Subjective:     Chief Complaint / Reason for Physician Visit   FU DKA/ Discussed with RN events overnight. Seen after GD   No acute complaints   HR up     Review of Systems:  Symptom Y/N Comments  Symptom Y/N Comments   Fever/Chills n   Chest Pain n    Poor Appetite    Edema     Cough n   Abdominal Pain     Sputum    Joint Pain     SOB/GARLAND    Pruritis/Rash     Nausea/vomit n   Tolerating PT/OT     Diarrhea    Tolerating Diet y    Constipation n   Other       Could NOT obtain due to:      Objective:     VITALS:   Last 24hrs VS reviewed since prior progress note.  Most recent are:  Patient Vitals for the past 24 hrs:   Temp Pulse Resp BP SpO2   21 1750 98.4 °F (36.9 °C) (!) 114 18 105/71 100 %   21 1550 -- 96 -- -- --   21 1502 98.2 °F (36.8 °C) 96 20 104/62 98 %   21 1208 98.3 °F (36.8 °C) 94 18 109/83 99 %   21 1139 -- (!) 102 17 135/67 --   21 1136 -- 100 14 (!) 113/58 --   21 1133 -- (!) 113 12 (!) 123/59 --   21 1130 -- (!) 118 22 124/75 --   03/16/21 1127 -- (!) 103 22 107/72 --   03/16/21 1123 -- (!) 102 12 114/69 --   03/16/21 1111 -- (!) 111 21 (!) 93/49 98 %   03/16/21 1029 98.4 °F (36.9 °C) (!) 114 16 (!) 144/71 99 %   03/16/21 0820 98.7 °F (37.1 °C) (!) 125 18 123/63 100 %   03/16/21 0800 -- 94 -- -- --   03/16/21 0445 98.3 °F (36.8 °C) 100 21 (!) 99/52 100 %   03/15/21 2202 98.5 °F (36.9 °C) (!) 123 18 (!) 108/58 99 %   03/15/21 1905 98.1 °F (36.7 °C) (!) 109 16 (!) 99/54 100 %   03/15/21 1904 98.1 °F (36.7 °C) (!) 105 15 (!) 80/66 100 %       Intake/Output Summary (Last 24 hours) at 3/16/2021 1818  Last data filed at 3/16/2021 1405  Gross per 24 hour   Intake 1092.5 ml   Output 950 ml   Net 142.5 ml        I had a face to face encounter and independently examined this patient on 3/16/2021, as outlined below:  PHYSICAL EXAM:  General: WD, WN. Alert, cooperative, no acute distress    EENT:  EOMI. Anicteric sclerae. MMM  Resp:  CTA bilaterally, no wheezing or rales. No accessory muscle use  CV:  Regular  rhythm,  No edema  GI:  Soft, Non distended, Non tender. +Bowel sounds  Neurologic:  Alert and oriented X 3, normal speech,   Psych:   Good insight. Not anxious nor agitated  Skin:  No rashes. No jaundice    Reviewed most current lab test results and cultures  YES  Reviewed most current radiology test results   YES  Review and summation of old records today    NO  Reviewed patient's current orders and MAR    YES  PMH/SH reviewed - no change compared to H&P  ________________________________________________________________________  Care Plan discussed with:    Comments   Patient y    Family      RN y    Care Manager     Consultant                        Multidiciplinary team rounds were held today with , nursing, pharmacist and clinical coordinator. Patient's plan of care was discussed; medications were reviewed and discharge planning was addressed. ________________________________________________________________________  Total NON critical care TIME:  35  Minutes    Total CRITICAL CARE TIME Spent:   Minutes non procedure based      Comments   >50% of visit spent in counseling and coordination of care y    ________________________________________________________________________  Jacinto Greene MD     Procedures: see electronic medical records for all procedures/Xrays and details which were not copied into this note but were reviewed prior to creation of Plan. LABS:  I reviewed today's most current labs and imaging studies.   Pertinent labs include:  Recent Labs     03/16/21  0207 03/15/21  0435 03/14/21  0406   WBC 10.6 7.4 5.8   HGB 8.1* 7.8* 7.4*   HCT 24.8* 25.2* 23.7*    158 102*     Recent Labs     03/16/21  0207 03/15/21  0435 03/14/21  0406    137 138   K 4.6 4.6 5.0    107 110*   CO2 26 27 26   * 189* 284*   BUN 29* 31* 30*   CREA 1.71* 1.27* 1.33*   CA 8.3* 8.4* 8.1*       Signed: Jacinto Greene MD

## 2021-03-16 NOTE — PROGRESS NOTES
Problem: Self Care Deficits Care Plan (Adult)  Goal: *Acute Goals and Plan of Care (Insert Text)  Description:   FUNCTIONAL STATUS PRIOR TO ADMISSION: Patient was independent and active without use of DME. Patient was independent for basic and instrumental ADLs. 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 HOME SUPPORT: The patient lived alone with no local support. Patient reported her cousin lives nearby but unclear if she can assist patient once discharged. Occupational Therapy Goals  Initiated 3/11/2021  1. Patient will perform grooming standing at sink with modified independence within 7 day(s). 2.  Patient will perform upper body dressing with modified independence within 7 day(s). 3.  Patient will perform lower body dressing with modified independence within 7 day(s). 4.  Patient will perform toilet transfers with modified independence within 7 day(s). 5.  Patient will perform all aspects of toileting with modified independence within 7 day(s). Outcome: Resolved/Met   OCCUPATIONAL THERAPY TREATMENT/DISCHARGE  Patient: Jasbir Gomez (67 y.o. female)  Date: 3/16/2021  Diagnosis: HHNC (hyperglycemic hyperosmolar nonketotic coma) ([facility] [E11.01] <principal problem not specified>  Procedure(s) (LRB):  ESOPHAGOGASTRODUODENOSCOPY (EGD) (N/A)    Precautions: Fall  Chart, occupational therapy assessment, plan of care, and goals were reviewed. ASSESSMENT  Patient continues with skilled OT services and has progressed towards goals. Patient received semisupine in bed and agreeable for therapy. Patient completed bed mobility with mod I, sit <> stand with mod I, toilet transfers/toilet hygiene with mod I, grooming standing at sink with set-up/supervision to mod I, LB dressing with mod I, and functional mobility with supervision. Patient reported she is transferring to [facility] without assist throughout the day.  Patient tolerated session well and demonstrated no LOB with ambulation in the room. Patient does not have any additional OT needs at this time. Will complete order and sign off. Current Level of Function (ADLs/self-care): mod I to set-up/supervision for self-care    Other factors to consider for discharge: lives alone, has family, works full time         PLAN :  Rationale for discharge: Goals achieved  Recommendation for discharge: (in order for the patient to meet his/her long term goals)  Occupational therapy at least 2 days/week in the home (pt refusing but would benefit from HHOT/PT)    This discharge recommendation:  Has been made in collaboration with the attending provider and/or case management    IF patient discharges home will need the following DME:patient owns DME required for discharge       SUBJECTIVE:   Patient stated I ordered a shower chair.     OBJECTIVE DATA SUMMARY:   Cognitive/Behavioral Status:  Neurologic State: Alert  Orientation Level: Oriented X4  Cognition: Follows commands    Functional Mobility and Transfers for ADLs:  Bed Mobility:  Supine to Sit: Modified independent  Scooting: Modified independent    Transfers:  Sit to Stand: Modified independent  Functional Transfers  Bathroom Mobility: Supervision/set up  Toilet Transfer : Modified independent  Bed to Chair: Supervision    Balance:  Sitting: Intact  Standing: Intact    ADL Intervention:    Grooming  Washing Face: Set-up; Supervision  Washing Hands: Modified independent    Lower Body Dressing Assistance  Socks: Modified independent    Toileting  Bladder Hygiene: Supervision  Clothing Management: Modified independent    Pain:  Patient did not c/o pain during session    Activity Tolerance:   Good and tolerates ADLs without rest breaks    After treatment patient left in no apparent distress:   Sitting in chair and Call bell within reach    COMMUNICATION/COLLABORATION:   The patients plan of care was discussed with: Physical therapist and Registered nurse.      Maribell Hernandez OTR/L  Time Calculation: 23 mins

## 2021-03-16 NOTE — PERIOP NOTES
TRANSFER - OUT REPORT:    Verbal report given to [facility] RN(name) on Kathy Frame  being transferred to [place] [address](unit) for routine progression of care       Report consisted of patients Situation, Background, Assessment and   Recommendations(SBAR). Information from the following report(s) SBAR was reviewed with the receiving nurse. Lines:   Peripheral IV 03/16/21 Anterior;Right Forearm (Active)   Site Assessment Clean, dry, & intact 03/16/21 0820   Phlebitis Assessment 0 03/16/21 0820   Infiltration Assessment 0 03/16/21 0820   Dressing Status Clean, dry, & intact 03/16/21 0820   Dressing Type Tape;Transparent 03/16/21 0820   Hub Color/Line Status Blue; Infusing 03/16/21 0820        Opportunity for questions and clarification was provided.

## 2021-03-16 NOTE — PROGRESS NOTES
TRANSFER - IN REPORT:    Verbal report received from [facility] on Kathy Frame  being received from PCU for routine progression of care      Report consisted of patients Situation, Background, Assessment and   Recommendations(SBAR). Information from the following report(s) SBAR was reviewed with the receiving nurse. Opportunity for questions and clarification was provided. Assessment completed upon patients arrival to unit and care assumed.

## 2021-03-16 NOTE — DIABETES MGMT
[facility]    CLINICAL NURSE SPECIALIST CONSULT     Initial Presentation   Lexus Reno is a 60 y.o. female admitted 3/8/21 with altered mental status and hyperglycemia. On admission to the emergency room she was found to have a glucose of 908 with a normal anion gap, a BUN of 100, creatinine 3.2, sodium 154, and A1c of 10.1%. She had been treated with vigorous fluid resuscitation as well as intravenous insulin. HX:   Past Medical History:   Diagnosis Date    Asthma     Diabetes ([facility]     Elevated transaminase level 6/29/2016    Insulin dependent diabetes mellitus 6/20/2016    Pancreatic atrophy 6/20/2016      DX: Hyperosmolar hyperglycemic state. NEERAJ. Severe sepsis secondary to UTI. Cirrhosis on liver biopsy in early 2020; ? Etiology. [place]: IVF. Insulin. protonix. Glipizide. EGD 3/16/21. Hospital course   Clinical progress has been uncomplicated. 3/10/21 CM saying disposition is home with possible home health services. Seen by nutritionist today. Urine culture growing gram negative rods. Blood cultures are negative. 3/11/21 PT/OT ordered. Possible discharge per notes in 1-2 days. Intermittent confusion over night. Patient does not want to go to a SNF or have [facility].    3/12/21 Patient refusing [facility] and stated Religion members will be assisting her. Patient very weak, still confused, questionable psych disorder, Psych consult to be placed. urine culture GRAM NEGATIVE RODS  E. Coli  3/15/21 hgb dropped yesterday to 7.4, FOBT positive. Per GI, NPO after midnight for EGD, PPI BID, added iron panel. COVID negative. 3/16/21 per CM patient is still refusing home health and not ready for d/c due to pending psych consult. Finished IV ABX. EGD today. Per GI recommendations from EGD: await patho from gastric mucosa to r/o H pylori. Need outpatient colonoscopy. Resume diet and no need to continue PPI.       Diabetes    Patient has known Type 2 diabetes, treated with insulin PTA.    Admission BG 1227 and A1c 10.1 indicate poor/acceptable diabetes control. Ambulatory blood glucose management provided by primary care provider Dr. Batool Slater and endocrinologist, Dr Rajni Rosado. Consulted by Provider for advanced diabetes nursing assessment and care, specifically related to    [x] Inpatient management strategy    Diabetes-related medical history  Acute complications  HHNK  Microvascular disease  Nephropathy    Diabetes medication history  Drug class Currently in use Discontinued Never used   Biguanide      DDP-4 inhibitor       Sulfonylurea  Glipizide XR 2.5mg BID    Thiazolidinedione      GLP-1 RA      SGLT-2 inhibitors      Basal insulin 30 units of basaglar at night     Bolus insulin      Fixed Dose  Combinations        Subjective   Patient off the floor at this time for EGD. Patient reports the following home diabetes self-care practices:  Eating pattern  [x] Breakfast Eggs and pizano  [x] Lunch  salad  [x] Dinner  Baked chicken, broccli  [x] Beverages water   Physical activity pattern: states she works in [employer], lives by herself and is active. Monitoring pattern: checks her BG at least 2x a day    Taking medications pattern  [x] Consistent administration  [x] Affordable     Objective   Physical exam  General    Vital Signs   Visit Vitals  /75   Pulse (!) 118   Temp 98.4 °F (36.9 °C)   Resp 22   Ht 5' 3\" (1.6 m)   Wt 43.4 kg (95 lb 10.9 oz)   SpO2 98%   BMI 16.95 kg/m²     Laboratory  Tests 3/16 at 0200 3/15 3/11 3/10   A1c        189 179 78   Anion gap 5 3 3 2   Serum triglycerides       WBC 10.6 7.4 8.0 14.5   Serum creatinine 1.71 1.27 1.56 1.92   GFR 37 53 41 33   AST       ALT                              Factors impacting BG management  Factor Dose Comments   Nutrition:  Carb-controlled meals   60 grams/meal   She was NPO after midnight due to EGD   Pain  Reports no pain    Infection IV ABX for UTI: completed Urine culture positive.   Blood culture negative   Other: kidney function  Avoid nephrotoxic medications GFR 37     Blood glucose pattern        Assessment and Plan   Nursing Diagnosis Risk for unstable blood glucose pattern   Nursing Intervention Domain 9801 Decision-making Support   Nursing Interventions Examined current inpatient diabetes control   Explored factors facilitating and impeding inpatient management  Explored corrective strategies with patient and responsible inpatient provider      Evaluation   This  female, with Type 2 diabetes, did not achieve diabetes control prior to admission, as evidenced by admission BG of 1227 and A1c of 10.1. During this hospitalization, the patient has not achieved inpatient blood glucose target of 100-180mg/dl. Several factors have played a role in blood glucose management including:  [x] Critical nature of illness state given her initial AMS  [x]  Kidney dysfunction    Patient was started on Glucostabilizer ([address] [place] [address]) in order to treat her HHS on 3/8/21 and she used a total of 29 units. It only took her until 0300 the following morning of 3/9/21 to obtain a BG below 250. Recommend to order basal insulin at half of her PTA dosing which would be renal dosed for her kidneys. Note today that BG range is 170-180s this morning so basal dosing could be increased slightly to 0.4 units/kg/D. She continues to tolerate a diet well. PTA at one time she was taking glipizide and stated that she would consider taking this medication again as a sulfonylurea would help control her BG during meal time since she eating. 3/12/21, BG have trended back up, 230-300s with 6 units of correction used so far. Glipizide was restarted this morning as she is tolerating PO. Spoke with Dr. Ernie Alcantara via [employer] and recommended to increase basal insulin to her PTA dose of 30 units of lantus which is about 0.7units/kg/D. Since she was last seen basal insulin has continued at PTA dosing as well as her sulfonylurea. BG have continued to remain elevated in the 200-300s although hgb had decreased yesterday and FOBT was positive. She will be NPO after midnight but basal insulin should not be held. Glipizide will held due to her NPO status but upon her diet being reintroduced could increase this to 5mg BID. Recommendations     [x] Use of Subcutaneous Insulin Order set (3005)  Insulin Dosing Specific recommendation   Basal                                      (Based on weight, BMI & GFR)   [x] 0.7 units/kg/D   Continue 30 units of lantus daily even if NPO as this is her baseline basal insulin dose   Nutritional                                      (Based on CHO/dextrose load)  Could increase glipizide to 5mg BID once diet is reordered after EGD   Corrective                                       (Useful in adjusting insulin dosing)   [x] Continue HIGH sensitivity        [x] Referral to  [x] [facility] (Phone [phone] to schedule appointment) for [facility]    Billing Code(s)     [x] 88110 IP subsequent hospital care - 15 minutes [] 62088 Prolonged Services - 55 minutes     Before making these care recommendations, I personally reviewed the hospitalization record, including notes, laboratory & diagnostic data and current medications, and   examined the patient at the bedside (circumstances permitting) before making care recommendations.      Total minutes: [id number] [facility] MSN, RN, ACCNS-  Diabetes Clinical Nurse Specialist  [facility]  Access via [employer]

## 2021-03-16 NOTE — PROGRESS NOTES
Problem: Diabetes Self-Management  Goal: *Disease process and treatment process  Description: Define diabetes and identify own type of diabetes; list 3 options for treating diabetes. Outcome: Progressing Towards Goal  Goal: *Incorporating nutritional management into lifestyle  Description: Describe effect of type, amount and timing of food on blood glucose; list 3 methods for planning meals. Outcome: Progressing Towards Goal  Goal: *Incorporating physical activity into lifestyle  Description: State effect of exercise on blood glucose levels. Outcome: Progressing Towards Goal  Goal: *Developing strategies to promote health/change behavior  Description: Define the ABC's of diabetes; identify appropriate screenings, schedule and personal plan for screenings. Outcome: Progressing Towards Goal  Goal: *Using medications safely  Description: State effect of diabetes medications on diabetes; name diabetes medication taking, action and side effects. Outcome: Progressing Towards Goal  Goal: *Monitoring blood glucose, interpreting and using results  Description: Identify recommended blood glucose targets  and personal targets. Outcome: Progressing Towards Goal  Goal: *Prevention, detection, treatment of acute complications  Description: List symptoms of hyper- and hypoglycemia; describe how to treat low blood sugar and actions for lowering  high blood glucose level. Outcome: Progressing Towards Goal  Goal: *Prevention, detection and treatment of chronic complications  Description: Define the natural course of diabetes and describe the relationship of blood glucose levels to long term complications of diabetes.   Outcome: Progressing Towards Goal  Goal: *Developing strategies to address psychosocial issues  Description: Describe feelings about living with diabetes; identify support needed and support network  Outcome: Progressing Towards Goal  Goal: *Insulin pump training  Outcome: Progressing Towards Goal  Goal: *Sick day guidelines  Outcome: Progressing Towards Goal  Goal: *Patient Specific Goal (EDIT GOAL, INSERT TEXT)  Outcome: Progressing Towards Goal     Problem: Pressure Injury - Risk of  Goal: *Prevention of pressure injury  Description: Document Vinh Scale and appropriate interventions in the flowsheet. Outcome: Progressing Towards Goal  Note: Pressure Injury Interventions:  Sensory Interventions: Discuss PT/OT consult with provider, Float heels, Keep linens dry and wrinkle-free    Moisture Interventions: Absorbent underpads    Activity Interventions: Increase time out of bed, PT/OT evaluation    Mobility Interventions: Float heels, HOB 30 degrees or less, PT/OT evaluation    Nutrition Interventions: Document food/fluid/supplement intake    Friction and Shear Interventions: HOB 30 degrees or less                Problem: Falls - Risk of  Goal: *Absence of Falls  Description: Document Stephen Fall Risk and appropriate interventions in the flowsheet.   Outcome: Progressing Towards Goal  Note: Fall Risk Interventions:  Mobility Interventions: Patient to call before getting OOB, PT Consult for mobility concerns    Mentation Interventions: Adequate sleep, hydration, pain control    Medication Interventions: Patient to call before getting OOB, Teach patient to arise slowly    Elimination Interventions: Call light in reach, Patient to call for help with toileting needs, Stay With Me (per policy), Toileting schedule/hourly rounds

## 2021-03-16 NOTE — PROGRESS NOTES
0700- Report given to Maricruz Forrester RN by off going nurse. 1015- Pt taken to endo via stretcher. 1210-Pt back in room. VSS. No complaints of pain. Lunch ordered. 1400- Report given to oncoming nurse by Maricruz Forrester RN.

## 2021-03-17 VITALS
WEIGHT: 95.68 LBS | BODY MASS INDEX: 16.95 KG/M2 | HEIGHT: 63 IN | DIASTOLIC BLOOD PRESSURE: 64 MMHG | OXYGEN SATURATION: 100 % | TEMPERATURE: 97.7 F | RESPIRATION RATE: 18 BRPM | HEART RATE: 81 BPM | SYSTOLIC BLOOD PRESSURE: 113 MMHG

## 2021-03-17 LAB
GLUCOSE BLD STRIP.AUTO-MCNC: 118 MG/DL (ref 65–100)
SERVICE CMNT-IMP: ABNORMAL

## 2021-03-17 PROCEDURE — 74011250636 HC RX REV CODE- 250/636: Performed by: INTERNAL MEDICINE

## 2021-03-17 PROCEDURE — 74011636637 HC RX REV CODE- 636/637: Performed by: INTERNAL MEDICINE

## 2021-03-17 PROCEDURE — 82962 GLUCOSE BLOOD TEST: CPT

## 2021-03-17 RX ORDER — GLIPIZIDE 5 MG/1
5 TABLET, FILM COATED, EXTENDED RELEASE ORAL
Qty: 60 TAB | Refills: 2 | Status: SHIPPED | OUTPATIENT
Start: 2021-03-17 | End: 2021-06-15

## 2021-03-17 RX ADMIN — Medication 10 ML: at 05:57

## 2021-03-17 RX ADMIN — SODIUM CHLORIDE 75 ML/HR: 9 INJECTION, SOLUTION INTRAVENOUS at 07:57

## 2021-03-17 RX ADMIN — SODIUM CHLORIDE 10 ML: 9 INJECTION, SOLUTION INTRAMUSCULAR; INTRAVENOUS; SUBCUTANEOUS at 05:57

## 2021-03-17 RX ADMIN — INSULIN GLARGINE 30 UNITS: 100 INJECTION, SOLUTION SUBCUTANEOUS at 09:33

## 2021-03-17 NOTE — DISCHARGE SUMMARY
Hospitalist Discharge Summary     Patient ID:  Myla Panda  [id number]  83 y.o.  1965  3/8/2021    PCP on record: Marichuy Perdomo MD    Admit date: 3/8/2021  Discharge date and time: 3/17/2021    DISCHARGE DIAGNOSIS:  DKA resolved   Acute renal failure on CKD stage 3   Hypernatremia resolved   Sepsis secondary to UTI   Hypokalemia     hypotension   Sinus tachycardia   Anemia blood loss anemia       CONSULTATIONS:  IP CONSULT TO GASTROENTEROLOGY    Excerpted HPI from H&P of Chica Lopez MD:    CHIEF COMPLAINT: Minimally responsive     HISTORY OF PRESENT ILLNESS:     Liane Apley is a 60 y.o.   female medical history of diabetes, asthma who who was sent to the ED by family after being found  Confused and agitated by the cousin and patient house was found to be mess . Most of the HPI obtained from the ED notes as no family members at bedside and patient is still confused. In the ED, patient was found to be tachycardic, soft blood pressure. Her labs revealed evaded white blood cell count 21,000, BMP showed elevated blood sugar at 1200, hyperkalemia 5.8, elevated creatinine around 4.07, anion gap metabolic acidosis  BMP also showed hypernatremia  Her UA was concerning for UTI. CT of the brain is within normal limit, CT abdomen and pelvis did not show any acute pathology. We were asked to admit for work up and evaluation of the above problems. ______________________________________________________________________  DISCHARGE SUMMARY/HOSPITAL COURSE:  for full details see H&P, daily progress notes, labs, consult notes.      DKA resolved   -Anion gap closed  S/p  insulin drip, now  On lantus 30units   - on sliding scale  BS better controlled   Glipizide increased to 5mg bid      Acute renal failure on CKD stage 3  On IVF   At baseline creat      Hypernatremia resolved   -   Sepsis secondary to UTI  -- finished antibiotics course   - blood culture negative    -- urine culture GRAM NEGATIVE RODS  E. coli     Hypokalemia   -Resolved     Confusion/questionable psych disorder  -seen by psych , no psychotic disorder       hypotension   Sinus tachycardia   S/p  IV fluid   SBP around 100       Anemia blood loss anemia    S/p EGD this am  which came back negative   Biopsy done and FABIENNE   Op colonoscopy   No need for PPI            _______________________________________________________________________  Patient seen and examined by me on discharge day. Pertinent Findings:  Gen:    Not in distress  Chest: Clear lungs  CVS:   Regular rhythm. No edema  Abd:  Soft, not distended, not tender  Neuro:  Alert, orientedx3  _______________________________________________________________________  DISCHARGE MEDICATIONS:   Current Discharge Medication List      CONTINUE these medications which have CHANGED    Details   glipiZIDE SR (GLUCOTROL XL) 5 mg CR tablet Take 1 Tab by mouth Before breakfast and dinner for 90 days. Qty: 60 Tab, Refills: 2         CONTINUE these medications which have NOT CHANGED    Details   insulin glargine (Basaglar KwikPen U-100 Insulin) 100 unit/mL (3 mL) inpn 30 Units by SubCUTAneous route daily. Every morning      albuterol (PROVENTIL VENTOLIN) 2.5 mg /3 mL (0.083 %) nebu 3 mL by Nebulization route every six (6) hours as needed for Wheezing. Qty: 30 Nebule, Refills: 0    Associated Diagnoses: Moderate persistent asthma without complication               Patient Follow Up Instructions: Activity: Activity as tolerated  Diet: Diabetic Diet  Wound Care: None needed    Follow-up with pcp in 7 days.   Follow-up tests/labs     Follow-up Information     Follow up With Specialties Details Why Contact Info    Susan Serrano MD Endocrinology In 3 days A nurse will call you to schedule a hosptial follow up [phone]      Aakash Ferraro MD Internal Medicine   [address] [place] [address] [address]  [place] [address]  [phone]          ________________________________________________________________    Risk of deterioration: High    Condition at Discharge:  Stable  __________________________________________________________________    Disposition  Home with family, no needs    ____________________________________________________________________    Code Status: Full Code  ___________________________________________________________________      Total time in minutes spent coordinating this discharge (includes going over instructions, follow-up, prescriptions, and preparing report for sign off to her PCP) :  >30 minutes    Signed:  Estrellita Delacruz MD

## 2021-03-17 NOTE — PROGRESS NOTES
SPEECH THERAPY SCREENING:  SERVICES ARE NOT INDICATED AT THIS TIME    [facility] triggered for speech therapy based on results obtained during the nursing admission assessment. The patients chart was reviewed and the patient is not appropriate for a skilled therapy evaluation at this time. Please consult speech therapy if any therapy needs arise. Thank you. Per chart review, patient reports a good appetite and eating well.      Maggie Martinez, SLP

## 2021-03-17 NOTE — DISCHARGE INSTRUCTIONS
Patient Education        Diabetic Ketoacidosis (DKA): Care Instructions  Your Care Instructions  Diabetic ketoacidosis (DKA) happens when the body does not have enough insulin and can't get the sugar it needs for energy. When the body can't use sugar for energy, it starts to use fat for energy. This process makes fatty acids called ketones. The ketones build up in the blood and change the chemical balance in your body. This problem can be very dangerous and needs to be treated. Without treatment, it can lead to a coma or death. DKA occurs most often in people with type 1 diabetes. But people with type 2 diabetes also can get it. DKA can be caused by many things. It can happen if you don't take enough insulin. It can also happen if you have an infection or illness like the flu. Sometimes it happens if you are very dehydrated. DKA can only be treated with insulin and fluids. These are often given in a vein (IV). Follow-up care is a key part of your treatment and safety. Be sure to make and go to all appointments, and call your doctor if you are having problems. It's also a good idea to know your test results and keep a list of the medicines you take. How can you care for yourself at home? To reduce your chance of ketoacidosis:  · Take your insulin and other diabetes medicines on time and in the right dose. ? If an infection caused your DKA and your doctor prescribed antibiotics, take them as directed. Do not stop taking them just because you feel better. You need to take the full course of antibiotics. · Test your blood sugar before meals and at bedtime or as often as your doctor advises. This is the best way to know when your blood sugar is high so you can treat it early. Watching for symptoms is not as helpful. This is because you may not have symptoms until your blood sugar is very high. Or you may not notice them. · Teach others at work and at home how to check your blood sugar.  Make sure that someone else knows how do it in case you can't. · Wear or carry medical identification at all times. This is very important in case you are too sick or injured to speak for yourself. · Talk to your doctor about when you can start to exercise again. · Eat regular meals that spread your calories and carbohydrate throughout the day. This will help keep your blood sugar steady. · When you are sick:  ? Take your insulin and diabetes medicines. This is important even if you are vomiting and having trouble eating or drinking. Your blood sugar may go up because you are sick. If you are eating less than normal, you may need to change your dose of insulin. Talk with your doctor about a plan when you are well. Then you will know what to do when you are sick. ? Drink extra fluids to prevent dehydration. These include water, broth, and sugar-free drinks. If you don't drink enough, the insulin from your shot may not get into your blood. So your blood sugar may go up. ? Try to eat as you normally do, with a focus on healthy food choices. ? Check your blood sugar at least every 3 to 4 hours. Check it more often if it's rising fast. If your doctor has told you to take an extra insulin dose for high blood sugar levels (for example, above 240 mg/dL) be sure to take the right amount. If you're not sure how much to take, call your doctor. ? Check your temperature and pulse often. If your temperature goes up, call your doctor. You may be getting worse. ? If you take insulin, check your urine or blood for ketones, especially when you have high blood sugar (for example, above 240 mg/dL). Call your doctor if your ketone level is moderate or high. If you know your blood sugar is high, treat it before it gets worse. · If you missed your usual dose of insulin or other diabetes medicine, take the missed dose or take the amount your doctor told you to take if this happens.   · If you and your doctor decide on a dose of extra-fast-acting insulin, give yourself the right dose. If you take insulin and your doctor has not told you how much fast-acting insulin to take based on your blood sugar level, call your doctor. · Drink extra water or sugar-free drinks to prevent dehydration. · Wait 30 minutes after you take extra insulin or missed medicines. Then check your blood sugar again. · If symptoms of high blood sugar get worse or your blood sugar level keeps rising, call your doctor. If you start to feel sleepy or confused, call 911. When should you call for help? Call 911 anytime you think you may need emergency care. For example, call if:    · You passed out (lost consciousness).     · You are confused or cannot think clearly.     · Your blood sugar is very high or very low. Watch closely for changes in your health, and be sure to contact your doctor if:    · Your blood sugar stays outside the level your doctor set for you.     · You have any problems. Where can you learn more? Go to http://www.gray.com/  Enter [id number] in the search box to learn more about \"Diabetic Ketoacidosis (DKA): Care Instructions. \"  Current as of: December 20, 2019               Content Version: 12.6  © 4497-1833 [employer]. Care instructions adapted under license by [employer] (which disclaims liability or warranty for this information). If you have questions about a medical condition or this instruction, always ask your healthcare professional. [address]  any warranty or liability for your use of this   DISCHARGE DIAGNOSIS:  DKA resolved   Acute renal failure on CKD stage 3   Hypernatremia resolved   Sepsis secondary to UTI   Hypokalemia     hypotension   Sinus tachycardia   Anemia blood loss anemia         MEDICATIONS:  · It is important that you take the medication exactly as they are prescribed.    · Keep your medication in the bottles provided by the pharmacist and keep a list of the medication names, dosages, and times to be taken in your wallet. · Do not take other medications without consulting your doctor. Pain Management: per above medications    What to do at Home    Recommended diet:  Diabetic Diet    Recommended activity: Activity as tolerated    If you have questions regarding the hospital related prescriptions or hospital related issues please call [address] [place] [address] at . You can always direct your questions to your primary care doctor if you are unable to reach your hospital physician; your PCP works as an extension of your hospital doctor just like your hospital doctor is an extension of your PCP for your time at the hospital [facility]).     If you experience any of the following symptoms then please call your primary care physician or return to the emergency room if you cannot get hold of your doctor:  Fever, chills, nausea, vomiting, diarrhea, change in mentation, falling, bleeding, shortness of breath

## 2021-03-17 NOTE — PROGRESS NOTES
Problem: Diabetes Self-Management  Goal: *Incorporating nutritional management into lifestyle  Description: Describe effect of type, amount and timing of food on blood glucose; list 3 methods for planning meals. Outcome: Progressing Towards Goal  Goal: *Developing strategies to promote health/change behavior  Description: Define the ABC's of diabetes; identify appropriate screenings, schedule and personal plan for screenings.   Outcome: Progressing Towards Goal

## 2021-03-17 NOTE — CONSULTS
PSYCHIATRIC CONSULTATION NOTE VIA TELEHEALTH:    REASON FOR CONSULT:    A psychiatric consultation was requested by primary team to evaluate or provide advice/opinion related to evaluating hallucinations    HISTORY OF PRESENTING COMPLAINT:     Ms. Ceferino Chadwick is a 60 y.o. BLACK/ female who is currently admitted to the medical floor at [facility] on 3/8/2021 for the treatment of <principal problem not specified>. Patient presented with elevated blood glucose levels, during this time patient had some hallucinations. Patient denies psychiatric history and reports \"if your blood sugar is that high or low then you would hallucinate too. \" Patient denies SI/HIAVH/Paranoia    REVIEW OF SYMPTOMS:  Patient denies appetite change, weight change, vision change, sleep change, fever, night sweats, headache, cough, shortness of breath, chest pain, palpitations, nausea,vomiting, diarrhea, dizziness, weakness, tremors. PAST MEDICAL HISTORY:  Please see History & Physical for details. Past Medical History:   Diagnosis Date    Asthma     Diabetes ([facility] [address]     Elevated transaminase level 6/29/2016    Insulin dependent diabetes mellitus 6/20/2016    Pancreatic atrophy 6/20/2016         ALLERGIES:  Allergies   Allergen Reactions    Contrast Agent [Iodine] Itching    Hydrocodone Rash    Percocet [Oxycodone-Acetaminophen] Rash     Pt states she is not allergic to Tylenol.  Codeine Nausea and Vomiting    Metformin Diarrhea       MEDICATIONS PRIOR TO ADMISSION:  Medications Prior to Admission   Medication Sig    insulin glargine (Basaglar KwikPen U-100 Insulin) 100 unit/mL (3 mL) inpn 30 Units by SubCUTAneous route daily. Every morning    glipiZIDE SR (GLUCOTROL XL) 2.5 mg CR tablet Take 2.5 mg by mouth two (2) times a day.  Breakfast and Dinner  May increase to 5 mg if BS increases    albuterol (PROVENTIL VENTOLIN) 2.5 mg /3 mL (0.083 %) nebu 3 mL by Nebulization route every six (6) hours as needed for Wheezing.        CURRENT MEDICATIONS:    Current Facility-Administered Medications:     sodium chloride (NS) flush 5-40 mL, 5-40 mL, IntraVENous, Q8H, Irineo Hernandez MD, 10 mL at 03/17/21 0557    sodium chloride (NS) flush 5-40 mL, 5-40 mL, IntraVENous, PRN, Coleen Alexandra MD    glipiZIDE SR (GLUCOTROL XL) tablet 5 mg, 5 mg, Oral, ACB&D, Adithya Grace MD    0.9% sodium chloride infusion, 75 mL/hr, IntraVENous, CONTINUOUS, Adithya Grace MD, Last Rate: 75 mL/hr at 03/17/21 0757, 75 mL/hr at 03/17/21 0757    insulin glargine (LANTUS) injection 30 Units, 30 Units, SubCUTAneous, DAILY, Danica Murry MD, 30 Units at 03/16/21 5721    glucose chewable tablet 16 g, 4 Tab, Oral, PRN, Danica Murry MD    dextrose (D50W) injection syrg 12.5-25 g, 25-50 mL, IntraVENous, PRN, Danica Murry MD    glucagon [facility]) injection 1 mg, 1 mg, IntraMUSCular, PRN, Danica Murry MD    insulin lispro (HUMALOG) injection, , SubCUTAneous, AC&HS, Danica Murry MD, Stopped at 03/17/21 0730    albuterol (PROVENTIL VENTOLIN) nebulizer solution 2.5 mg, 2.5 mg, Nebulization, Q6H PRN, Danica Murry MD    nystatin (MYCOSTATIN) 100,000 unit/mL oral suspension 500,000 Units, 500,000 Units, Oral, QID, Danica Murry MD, 500,000 Units at 03/11/21 1303    sodium chloride (NS) flush 5-10 mL, 5-10 mL, IntraVENous, PRN, Adithya Grace MD    sodium chloride (NS) flush 5-40 mL, 5-40 mL, IntraVENous, Q8H, Adithya Grace MD, 10 mL at 03/17/21 0557    sodium chloride (NS) flush 5-40 mL, 5-40 mL, IntraVENous, PRN, Adithya Grace MD    polyethylene glycol (MIRALAX) packet 17 g, 17 g, Oral, DAILY PRN, Adithya Grace MD    promethazine (PHENERGAN) tablet 12.5 mg, 12.5 mg, Oral, Q6H PRN **OR** ondansetron (ZOFRAN) injection 4 mg, 4 mg, IntraVENous, Q6H PRN, Adithya Grace MD    [Held by provider] heparin (porcine) injection 5,000 Units, 5,000 Units, SubCUTAneous, Q12H, Adithya Grace MD, Stopped at 03/15/21 2100     LAB RESULTS:  Lab Results   Component Value Date/Time    WBC 10.6 2021 02:07 AM    HGB 8.1 (L) 2021 02:07 AM    Hematocrit (POC) 31 (L) 2018 01:15 PM    HCT 24.8 (L) 2021 02:07 AM    PLATELET 123  02:07 AM    MCV 96.1 2021 02:07 AM      Lab Results   Component Value Date/Time    Sodium 136 2021 02:07 AM    Potassium 4.6 2021 02:07 AM    Chloride 105 2021 02:07 AM    CO2 26 2021 02:07 AM    Anion gap 5 2021 02:07 AM    Glucose 289 (H) 2021 02:07 AM    BUN 29 (H) 2021 02:07 AM    Creatinine 1.71 (H) 2021 02:07 AM    BUN/Creatinine ratio 17 2021 02:07 AM    GFR est AA 37 (L) 2021 02:07 AM    GFR est non-AA 31 (L) 2021 02:07 AM    Calcium 8.3 (L) 2021 02:07 AM    Bilirubin, total 0.3 2021 01:30 PM    Alk. phosphatase 205 (H) 2021 01:30 PM    Protein, total 8.8 (H) 2021 01:30 PM    Albumin 3.7 2021 01:30 PM    Globulin 5.1 (H) 2021 01:30 PM    A-G Ratio 0.7 (L) 2021 01:30 PM    ALT (SGPT) 30 2021 01:30 PM        PAST PSYCHIATRIC HISTORY:  Patient denies  SUBSTANCE ABUSE HISTORY:  Social History     Substance and Sexual Activity   Drug Use No      Drug Screen Most Recent Result Date     No resulted procedures found. PSYCHOSOCIAL HISTORY:  See Chart  MENTAL STATUS EXAM:  General appearance:  Appropriate  Eye contact: Good  Speech: Normal  Affect: Congruent  Mood: Nothing is wrong with me\"  Orientation: Alert and Oriented x 4  Thought Process: Goal Directed,  Logical   Perception: Denies AVH/Paranoia   Thought Content: Denies SI/HI  Insight: Good  Judgement: Good  Cognition: Intact grossly  Impulse Control: Good    ASSESSMENT AND PLAN/RECOMMENDATION:  Nawaf Cross meets criteria for a diagnosis of Altered Mental Status due to elevated blood sugar. At this time I recommend/plan the followin.  At this time the patient will not benefit from inpatient psychiatric treatment. Please consult Psychiatry again for any concerns regarding the patient's mental health changes and/or management. Thank you for the opportunity to participate in the care of your patient.

## 2021-03-17 NOTE — PROGRESS NOTES
End of Shift Note    Bedside shift change report given to Estelle Braga (oncoming nurse) by Maikel Zarco (offgoing nurse). Report included the following information SBAR, Kardex, ED Summary, Intake/Output, MAR, Accordion and Recent Results    Shift worked:  night     Shift summary and any significant changes:    Pt stable throughout shift. Meds given. Concerns for physician to address:    Zone phone for oncoming shift:       Activity:  Activity Level: Up ad renetta  Number times ambulated in hallways past shift: 0  Number of times OOB to chair past shift: 1    Cardiac:   Cardiac Monitoring: Yes      Cardiac Rhythm: Normal sinus rhythm    Access:   Current line(s): PIV     Genitourinary:   Urinary status: voiding    Respiratory:   O2 Device: Room air  Chronic home O2 use?: NO  Incentive spirometer at bedside: NO     GI:  Last Bowel Movement Date: 03/16/21  Current diet:  DIET NUTRITIONAL SUPPLEMENTS Dinner; Glucerna Shake  DIET DIABETIC WITH OPTIONS Consistent Carb 1800kcal; Regular  Passing flatus: YES  Tolerating current diet: YES  % Diet Eaten: 100 %    Pain Management:   Patient states pain is manageable on current regimen: N/A    Skin:  Vinh Score: 20  Interventions: increase time out of bed    Patient Safety:  Fall Score:  Total Score: 1  Interventions: gripper socks and pt to call before getting OOB  High Fall Risk: Yes    Length of Stay:  Expected LOS: 4d 19h  Actual LOS: [facility]

## 2021-03-17 NOTE — PROGRESS NOTES
Discharge teaching done by primary RN prior to arrival to [address]. Discharge from [address] via wheelchair in stable condition to private vehicle. All personal belongings with patient.

## 2021-03-17 NOTE — PROGRESS NOTES
Transition of Care Plan:    RUR: 17%   Disposition: home with outpatient follow up   Follow up appointments: PCP, Endocrinology   DME needed: N/A  Transportation at Discharge: family to transport home, ETA 10:30AM  Keys or means to access home: yes      IM Medicare letter: N/A  Caregiver Contact: Sp Rutledge  Discharge Caregiver contacted prior to discharge? Yes    CM aware of discharge order. Met with pt at bedside this AM to finalize and review discharge plan. Psychiatry has completed telehealth consult this AM with no further recommendations identified at this time. Pt has been cleared from psychiatry standpoint for discharge. Pt continues to refuse home health care services after d/c. She identifies that her family member, Brett Tucker, is on the way to hospital now to pick her up for d/c. Pt voiced no questions/concerns regarding discharge plan. CM has contacted pt's sister, Rennie Bumpers, to notify of discharge today. She verbalized understanding and had no questions/concerns at this time. CM specialist has scheduled PCP follow up appointment with Dr. Chip Elizabeth on 3/26/21 @ 11:30AM (virtual). Pt being transferred to [address] until her ride arrives for discharge. Pt is ready for d/c from a CM standpoint. Assigned RN informed. Pt presents as moderate risk of readmission due to decline of services at time of discharge. Care Management Interventions  PCP Verified by CM: Yes(Dr. Baron Castellano)  Palliative Care Criteria Met (RRAT>21 & CHF Dx)?: No  Mode of Transport at Discharge:  Other (see comment)(family will  pt for d/c )  Transition of Care Consult (CM Consult): [facility] [address]  Reason Outside [facility]: Patient declined ordered home care/hospice services  Discharge Durable Medical Equipment: No  Physical Therapy Consult: Yes  Occupational Therapy Consult: Yes  Speech Therapy Consult: No  Current Support Network: Lives Alone, Family Lives Nearby  Confirm Follow Up Transport: Family  The Patient and/or Patient Representative was Provided with a Choice of Provider and Agrees with the Discharge Plan?: Yes  Freedom of Choice List was Provided with Basic Dialogue that Supports the Patient's Individualized Plan of Care/Goals, Treatment Preferences and Shares the Quality Data Associated with the Providers?: No   Resource Information Provided?: No  Discharge Location  Discharge Placement: Home    FREDY Barker   Care Manager, [address]  [phone]

## 2021-03-17 NOTE — PROGRESS NOTES
Psychiatric Consult Request    [phone] - Called number provided on consult. Patient has been transferred to Oncology. 3/17/2021 0800 - Called Oncology unit, spoke to Kevin Ponce RN. Will locate Telehealth machine for consult and call provider back.  Call back number provided

## 2021-03-17 NOTE — PROGRESS NOTES
Problem: Diabetes Self-Management  Goal: *Disease process and treatment process  Description: Define diabetes and identify own type of diabetes; list 3 options for treating diabetes. Outcome: Resolved/Met  Goal: *Incorporating nutritional management into lifestyle  Description: Describe effect of type, amount and timing of food on blood glucose; list 3 methods for planning meals. Outcome: Resolved/Met  Goal: *Incorporating physical activity into lifestyle  Description: State effect of exercise on blood glucose levels. Outcome: Resolved/Met  Goal: *Developing strategies to promote health/change behavior  Description: Define the ABC's of diabetes; identify appropriate screenings, schedule and personal plan for screenings. Outcome: Resolved/Met  Goal: *Using medications safely  Description: State effect of diabetes medications on diabetes; name diabetes medication taking, action and side effects. Outcome: Resolved/Met  Goal: *Monitoring blood glucose, interpreting and using results  Description: Identify recommended blood glucose targets  and personal targets. Outcome: Resolved/Met  Goal: *Prevention, detection, treatment of acute complications  Description: List symptoms of hyper- and hypoglycemia; describe how to treat low blood sugar and actions for lowering  high blood glucose level. Outcome: Resolved/Met  Goal: *Prevention, detection and treatment of chronic complications  Description: Define the natural course of diabetes and describe the relationship of blood glucose levels to long term complications of diabetes.   Outcome: Resolved/Met  Goal: *Developing strategies to address psychosocial issues  Description: Describe feelings about living with diabetes; identify support needed and support network  Outcome: Resolved/Met  Goal: *Insulin pump training  Outcome: Resolved/Met  Goal: *Sick day guidelines  Outcome: Resolved/Met  Goal: *Patient Specific Goal (EDIT GOAL, INSERT TEXT)  Outcome: Resolved/Met Problem: Patient Education: Go to Patient Education Activity  Goal: Patient/Family Education  Outcome: Resolved/Met     Problem: Falls - Risk of  Goal: *Absence of Falls  Description: Document Green Salvia Fall Risk and appropriate interventions in the flowsheet. Outcome: Resolved/Met  Note: Fall Risk Interventions:  Mobility Interventions: Patient to call before getting OOB    Mentation Interventions: Adequate sleep, hydration, pain control, Bed/chair exit alarm, More frequent rounding    Medication Interventions: Bed/chair exit alarm, Patient to call before getting OOB    Elimination Interventions: Call light in reach              Problem: Patient Education: Go to Patient Education Activity  Goal: Patient/Family Education  Outcome: Resolved/Met     Problem: Pressure Injury - Risk of  Goal: *Prevention of pressure injury  Description: Document Vinh Scale and appropriate interventions in the flowsheet. Outcome: Resolved/Met  Note: Pressure Injury Interventions:  Sensory Interventions: Assess changes in LOC, Check visual cues for pain, Minimize linen layers    Moisture Interventions: Absorbent underpads, Minimize layers    Activity Interventions: Increase time out of bed    Mobility Interventions: Assess need for specialty bed, HOB 30 degrees or less, Turn and reposition approx.  every two hours(pillow and wedges)    Nutrition Interventions: Document food/fluid/supplement intake    Friction and Shear Interventions: Apply protective barrier, creams and emollients, HOB 30 degrees or less, Minimize layers                Problem: Patient Education: Go to Patient Education Activity  Goal: Patient/Family Education  Outcome: Resolved/Met     Problem: Breathing Pattern - Ineffective  Goal: *Absence of hypoxia  Outcome: Resolved/Met  Goal: *Use of effective breathing techniques  Outcome: Resolved/Met  Goal: *PALLIATIVE CARE:  Alleviation of Dyspnea  Outcome: Resolved/Met     Problem: Patient Education: Go to Patient Education Activity  Goal: Patient/Family Education  Outcome: Resolved/Met     Problem: Patient Education: Go to Patient Education Activity  Goal: Patient/Family Education  Outcome: Resolved/Met     Problem: Patient Education: Go to Patient Education Activity  Goal: Patient/Family Education  Outcome: Resolved/Met

## 2021-03-18 ENCOUNTER — PATIENT OUTREACH (OUTPATIENT)
Dept: CASE MANAGEMENT | Age: 56
End: 2021-03-18

## 2021-03-18 ENCOUNTER — TELEPHONE (OUTPATIENT)
Dept: INTERNAL MEDICINE CLINIC | Age: 56
End: 2021-03-18

## 2021-03-18 NOTE — PROGRESS NOTES
Care Transitions Initial Follow Up Call    Call within 2 business days of discharge: Yes     Patient: Lexus Reno Patient : 1965 MRN: [id number]    Last Discharge [address]       Complaint Diagnosis Description Type Department Provider    3/8/21 Altered mental status Hyperosmolar hyperglycemic state (HHS) ([facility] [address] . .. ED to Hosp-Admission (Discharged) (ADMIT) Fiona Horowitz MD; Sangita Bennett,. .. Challenges to be reviewed by the provider   Additional needs identified to be addressed with provider no  refused- Home health         Method of communication with provider : chart routing    Discussed COVID-19 related testing which was available at this time. Test results were negative. Patient informed of results, if available? no     Advance Care Planning:   Does patient have an Advance Directive: yes, reviewed and current     Inpatient Readmission Risk score: Unplanned Readmit Risk Score: 16    Was this a readmission? no   Patient stated reason for the admission: BS    Patients top risk factors for readmission: level of motivation, medical condition and medication management asthma, diabetes and chronic kidney disease  Interventions to address risk factors: Scheduled appointment with PCP-3/26/21, Obtained and reviewed discharge summary and/or continuity of care documents and Assessment and support for treatment adherence and medication management-DM    Care Transition Nurse contacted the patient by telephone to perform post hospital discharge assessment. Verified name and  with patient as identifiers. Provided introduction to self, and explanation of the Care Transition Nurse role. Care Transition Nurse reviewed discharge instructions, medical action plan and red flags with patient who verbalized understanding. Were discharge instructions available to patient? yes.  Reviewed appropriate site of care based on symptoms and resources available to patient including: PCP, Specialist and Urgent Care Clinics. Patient given an opportunity to ask questions and does not have any further questions or concerns at this time. The patient agrees to contact the PCP office for questions related to their healthcare. Medication reconciliation was performed with patient, who verbalizes understanding of administration of home medications. Advised obtaining a 90-day supply of all daily and as-needed medications. Referral to Pharm D needed: no     Home Health/Outpatient orders at discharge: refused    Durable Medical Equipment ordered at discharge: None    Covid Risk Education    Patient has following risk factors of: asthma, diabetes and chronic kidney disease. Education provided regarding infection prevention, and signs and symptoms of COVID-19 and when to seek medical attention with patient who verbalized understanding. Discussed exposure protocols and quarantine From CDC: Are you at higher risk for severe illness?  and given an opportunity for questions and concerns. The patient agrees to contact the COVID-19 hotline [phone] or PCP office for questions related to COVID-19. For more information on steps you can take to protect yourself, see CDC's How to Protect Yourself     Was patient discharged with a pulse oximeter? no Discussed and confirmed pulse oximeter discharge instructions and when to notify provider or seek emergency care. Patient/family/caregiver given information for [employer] and agrees to enroll na  Patient's preferred e-mail: na  Patient's preferred phone number: na    Discussed follow-up appointments. If no appointment was previously scheduled, appointment scheduling offered: no Is follow up appointment scheduled within 7 days of discharge?  yes   [facility] follow up appointment(s):   Future Appointments   Date Time Provider Jessika Bar   3/26/2021 11:30 AM Milind Martin MD [facility] BS AMB   5/10/2021 10:20 AM ROWENA Beckman BS AMB   6/17/2021 11:50 AM Tyler Remy Vicente Wade MD [facility] [address] BS AMB     Non-[facility] follow up appointment(s): endo TBD    Plan for follow-up call in 7-10 days based on severity of symptoms and risk factors. Plan for next call: self management-DM and follow up appointment-pcp and endo  Care Transition Nurse provided contact information for future needs. Goals Addressed                 This Visit's Progress     Prevent complications post hospitalization. 3/18/21 Patient report she is feeling well, taking all medications as prescribed. Appointments PCP 3/26/21 and endo awaiting return call. Denies chest pain, SOB, fever, N/V/D. Patient will monitor and record BS, report endo appt details, and report at outreach in 7-10 days.  MONICA

## 2021-03-18 NOTE — TELEPHONE ENCOUNTER
Pt states she was in the hospital from 3/8 to 3/17. She states she is scheduled to return to work on 3/22. She states she has an appt with dr Sal Marroquin on 3/26 @ 11:30. Pt inquiring if she should return to work as scheduled (the date the hospital advised her to return to work) or after her visit with dr Sal Marroquin. If she needs to wait she will need a letter as well.         [phone]

## 2021-03-19 NOTE — PROGRESS NOTES
HISTORY OF PRESENT ILLNESS  Carlitos Corral is a 60 y.o. female. HPI     Pt was last here 2/22/21. Pt is here for routine care. This is an established visit completed with telemedicine was completed with video assist  the patient acknowledges and agrees to this method of visitation adilene      Ms. Archie Rubio is a 60y.o. year old female, she is seen today for Transition of Care services following a hospital discharge for DKA on 3/8-3/17/21. Our office Nurse Navigator performed an outreach to Ms. Troy on 3/18/21 (within 2 business days of discharge) to complete medication reconciliation and a telephonic assessment of her condition. She was in hospital 3/8/21-3/17/21 for DKA  Reviewed discharge note 3/17/21: DKA resolved   -Anion gap closed  S/p  insulin drip, now  On lantus 30units   - on sliding scale  BS better controlled   Glipizide increased to 5mg bid   Acute renal failure on CKD stage 3  On IVF   At baseline creat   Hypernatremia resolved   Sepsis secondary to UTI  -- finished antibiotics course    blood culture negative    -- urine culture GRAM NEGATIVE RODS  E. coli  Hypokalemia   -Resolved  Confusion/questionable psych disorder  seen by psych , no psychotic disorder    hypotension   Sinus tachycardia   S/p  IV fluid   SBP around 100  Anemia blood loss anemia    S/p EGD this am  which came back negative   Biopsy done and FABIENNE   Op colonoscopy   No need for PPI     Reviewed consult NP Angelita Ruiz (psych) 3/17/21:    1. At this time the patient will not benefit from inpatient psychiatric treatment    Reviewed consult Dr. José Drew (GI) 3/15/21: 1. EGD tomorrow with Dr Orlando Ferrer at [phone]  2. NPO after midnight tomorrow  3. BID PPI  4. Agree with holding heparin SQ  5. Transfuse for Hgb < 7.0  6. Added iron panel   7.  Rest per primary team  Had EGD 3/16/21: negative    Reviewed cxr 3/8/21: nl  Reviewed ct head 3/8/21: nl  Reviewed ct abd 3/8/21: nl  Reviewed us 3/8/21: nl    Reviewed hospital labs   Cr - 1.5, 1.7 on discharge   OP colo - reminded pt to schedule, provided referral   Glipizide was increased to 5mg BID   Was given abx for uti     No home health   Mental status back to baseline    She c/o itching on arms and legs  No rash  Discussed moisturizing and no hot showers    She c/o freq urination   She would like something to document that she may need more bathroom breaks - dicussed this is related to FMLA   Will fill out FMLA form with hospital visit 3/8-3/17/20, may need more frequent breaks during the day due to medical concern  Will give back to work 3/24/21    BP is controlled   No home readings, discussed purchasing bp cuff      Uncontrolled type 1 diabetic   125 in am 145 155 in afternoon  Labile glucose  Pt takes basaglar 30U   Glipizide was increased to 5 mgBID in hospital    No  Longer using humalog   Pt is no longer on metformin --stopped d/t SE  She plans on getting back on track, taking her meds, will let endo know if sugars climb     Pt follows with Dr Denver Bien (endo) for type I diabetes  Reviewed note 2/19/21: insulin 30U, glipizide 2.5 mg BID   f/u 5/21     Wt  was 95 lbs in hospital - down 6 lbs x lov    This is in the underweight range     Reminded pt to complete labs      Pt follows with Dr Jeff Jj (hep) for elevated liver tests  Last there 11/11/20  No tx, will monitor liver tests  Has f/u 5/21     Pt follows with Dr Irina Oscar (nephro) for ckd, has had multiple arf episodes  Last there 3/4/21  Baseline cr 1.4-1. 6     Pt also is now seeing Dr Mj Medellin (uro) and ROWENA Alvarez for recurrent UTIs  Last visit was  10/18     Pt has been following with Dr Kathryn Mitchell (ortho) for knee pain   Last there 12/19/19 from [address] [place] [address]:      She has also been following with Dr Glorious Snellen (ortho) for sciatica   Last visit was 1/19 with PA Jaclynn Gilford   Completing PT for this      She is following with Dr. Emeka Schmitz (ortho) for trigger thumb  Last there 10/29/20  She had cortisone injection     Pt has albuterol to use prn for her asthma  Does not have daily sx, occasional rare wheezing         PREVENTIVE:  Colonoscopy: 10/13/16, Dr Jazzy Juarez, repeat in 5 years, provided referral needs repeat, fmhx - father  EGD: 3/16/21 Dr. Aparna Gordon  Pap: [facility], 3/20  Mammogram: 3/9/20 negative, scheduled 3/21  Dexa: not yet needed  Tdap: ~2013  Pneumovax: 02/19/19   Shingrix: will consider getting   Flu shot: 9/20  Foot exam: 02/19/20   Micro: 1/20  some protein in urine   A1C: 2/19 POC 14.0, 3/19 8.3 per  3/21 10.1 Bhupendra 1/20 7.7 3/21 10.1  Eye exam: Dr Wolff, 7/20  Lipids: 1/20  LDL 80     Patient Active Problem List    Diagnosis Date Noted    20171 Project Fixup (hyperglycemic hyperosmolar nonketotic coma) ([facility] [address] 03/08/2021    Hyperglycemia due to type 1 diabetes mellitus ([facility] [address] 02/22/2021    [facility] (primary sclerosing cholangitis) 02/22/2021    Stage 2 chronic kidney disease 02/22/2021    Mild intermittent asthma without complication 92/12/8906    Elevated liver enzymes 12/19/2019    Insulin dependent diabetes mellitus 06/20/2016    Pancreatic atrophy 06/20/2016     Current Outpatient Medications   Medication Sig Dispense Refill    insulin glargine (Basaglar KwikPen U-100 Insulin) 100 unit/mL (3 mL) inpn 30 Units by SubCUTAneous route daily. Every morning      albuterol (PROVENTIL VENTOLIN) 2.5 mg /3 mL (0.083 %) nebu 3 mL by Nebulization route every six (6) hours as needed for Wheezing. 30 Nebule 0    glipiZIDE SR (GLUCOTROL XL) 5 mg CR tablet Take 1 Tab by mouth Before breakfast and dinner for 90 days.  60 Tab 2     Past Surgical History:   Procedure Laterality Date    HX HEENT      UPPER GI ENDOSCOPY,BIOPSY  3/16/2021           Lab Results   Component Value Date/Time    WBC 10.6 03/16/2021 02:07 AM    HGB 8.1 (L) 03/16/2021 02:07 AM    HCT 24.8 (L) 03/16/2021 02:07 AM    Hematocrit (POC) 31 (L) 08/24/2018 01:15 PM    PLATELET 937 26/85/7817 02:07 AM    MCV 96.1 03/16/2021 02:07 AM     Lab Results   Component Value Date/Time Cholesterol, total 191 01/31/2020 08:08 AM    HDL Cholesterol 100 01/31/2020 08:08 AM    LDL, calculated 80 01/31/2020 08:08 AM    Triglyceride 53 01/31/2020 08:08 AM     Lab Results   Component Value Date/Time    GFR est non-AA 31 (L) 03/16/2021 02:07 AM    GFRNA, POC 4 (L) 08/24/2018 01:15 PM    GFR est AA 37 (L) 03/16/2021 02:07 AM    GFRAA, POC 5 (L) 08/24/2018 01:15 PM    Creatinine 1.71 (H) 03/16/2021 02:07 AM    Creatinine (POC) 9.5 (H) 08/24/2018 01:15 PM    BUN 29 (H) 03/16/2021 02:07 AM    BUN (POC) >140 (H) 08/24/2018 01:15 PM    Sodium 136 03/16/2021 02:07 AM    Sodium (POC) 124 (L) 08/24/2018 01:15 PM    Potassium 4.6 03/16/2021 02:07 AM    Potassium (POC) 7.7 (HH) 08/24/2018 01:15 PM    Chloride 105 03/16/2021 02:07 AM    Chloride (POC) 91 (L) 08/24/2018 01:15 PM    CO2 26 03/16/2021 02:07 AM    Magnesium 1.8 03/10/2021 03:55 AM    Phosphorus 2.4 (L) 03/08/2021 05:23 PM    PTH, Intact 360.7 (H) 08/25/2018 10:36 AM        Review of Systems   Constitutional: Negative for chills and fever. HENT: Negative for hearing loss and tinnitus. Eyes: Negative for blurred vision and double vision. Respiratory: Negative for shortness of breath and wheezing. Cardiovascular: Negative for chest pain and palpitations. Gastrointestinal: Negative for nausea and vomiting. Genitourinary: Positive for frequency. Negative for dysuria. Musculoskeletal: Negative for back pain, falls and joint pain. Skin: Positive for itching. Negative for rash. Neurological: Negative for dizziness, loss of consciousness and headaches. Psychiatric/Behavioral: Negative for depression. The patient is not nervous/anxious. Physical Exam  Constitutional:       General: She is not in acute distress. Appearance: Normal appearance. She is not ill-appearing, toxic-appearing or diaphoretic. HENT:      Head: Normocephalic and atraumatic. Eyes:      General:         Right eye: No discharge. Left eye: No discharge. Conjunctiva/sclera: Conjunctivae normal.   Pulmonary:      Effort: No respiratory distress. Neurological:      Mental Status: She is alert and oriented to person, place, and time. Mental status is at baseline. Gait: Gait normal.   Psychiatric:         Mood and Affect: Mood normal.         Behavior: Behavior normal.         ASSESSMENT and PLAN    ICD-10-CM ICD-9-CM    1. HHNC (hyperglycemic hyperosmolar nonketotic coma) ([facility] [address]           Pt was recently admitted with Dka    BS improving    Now on lantus 30U and increased glipizide 5 mg bid    Home readings good    Needs to update labs that were ordered by endo in 10/20, she will complete    Has f/u scheduled with endo as well   E11.01 250.20      250.30    2. Colon cancer screening         Needs repeat colo, placed referral    Z12.11 V76.51 REFERRAL TO GASTROENTEROLOGY   3. Hyperglycemia due to type 1 diabetes mellitus ([facility]       See above   E10.65 250.01    4. PSC (primary sclerosing cholangitis)         Follows with hepatology    Monitoring lfts   K83.01 576.1    5. Stage 2 chronic kidney disease       Pt with ckd, cr runs 1.5-1.7 baseline  Following with nephrology    Had arf at admission, now back to baseline   N18.2 585.2    6. Hyponatremia       Related to dka, monitor na levels   E87.1 276.1    7. Mild intermittent asthma without complication     No recent flares   J45.20 493.90    8. Anemia, unspecified type         egd completed, needs to complete outpatient colo, she is aware   D64.9 285.9       Depression screen reviewed and negative      Scribed by Jabari Verduzco of [address] [place] [address] [address], as dictated by Dr. Romero. Current diagnosis and concerns discussed with pt at length. Pt understands risks and benefits or current treatment plan and medications, and accepts the treatment and medication with any possible risks. Pt asks appropriate questions, which were answered. Pt was instructed to call with any concerns or problems.       I have reviewed the note documented by the scribe. The services provided are my own. The documentation is accurate     Adelina Just, was evaluated through a synchronous (real-time) audio-video encounter. The patient (or guardian if applicable) is aware that this is a billable service. Verbal consent to proceed has been obtained within the past 12 months. The visit was conducted pursuant to the emergency declaration under the [address] [place] [address] and the [employer] and [email or web address] General Act. Patient identification was verified, and a caregiver was present when appropriate. The patient was located in a state where the provider was credentialed to provide care.

## 2021-03-19 NOTE — TELEPHONE ENCOUNTER
Called out and spoke to pt. Two pt identifiers confirmed. Pt states that she was told that she could go back to work on Monday, but pt is concerned that bc they are giving her more hours to work. Moved up pt's appt to 3/22/21 at 21 [phone]. Pt verbalized understanding of information discussed w/ no further questions at this time.

## 2021-03-22 ENCOUNTER — VIRTUAL VISIT (OUTPATIENT)
Dept: INTERNAL MEDICINE CLINIC | Age: 56
End: 2021-03-22
Payer: COMMERCIAL

## 2021-03-22 ENCOUNTER — TELEPHONE (OUTPATIENT)
Dept: INTERNAL MEDICINE CLINIC | Age: 56
End: 2021-03-22

## 2021-03-22 DIAGNOSIS — K83.01 PSC (PRIMARY SCLEROSING CHOLANGITIS): ICD-10-CM

## 2021-03-22 DIAGNOSIS — J45.20 MILD INTERMITTENT ASTHMA WITHOUT COMPLICATION: ICD-10-CM

## 2021-03-22 DIAGNOSIS — E10.65 HYPERGLYCEMIA DUE TO TYPE 1 DIABETES MELLITUS (HCC): ICD-10-CM

## 2021-03-22 DIAGNOSIS — N18.2 STAGE 2 CHRONIC KIDNEY DISEASE: ICD-10-CM

## 2021-03-22 DIAGNOSIS — E87.1 HYPONATREMIA: ICD-10-CM

## 2021-03-22 DIAGNOSIS — E11.01 HHNC (HYPERGLYCEMIC HYPEROSMOLAR NONKETOTIC COMA) (HCC): Primary | ICD-10-CM

## 2021-03-22 DIAGNOSIS — D64.9 ANEMIA, UNSPECIFIED TYPE: ICD-10-CM

## 2021-03-22 DIAGNOSIS — Z12.11 COLON CANCER SCREENING: ICD-10-CM

## 2021-03-22 PROCEDURE — 99214 OFFICE O/P EST MOD 30 MIN: CPT | Performed by: INTERNAL MEDICINE

## 2021-03-22 NOTE — LETTER
NOTIFICATION RETURN TO WORK / SCHOOL 
 
3/22/2021 10:09 AM 
 
Ms. Allie Queen 
[address] [place] [address] [address] [phone] To Whom It May Concern: 
 
Allie Queen is currently under the care of [facility]. She will return to work/school on: 03/24/21. If there are questions or concerns please have the patient contact our office.  
 
 
 
Sincerely, 
 
 
Gely Horne MD

## 2021-03-22 NOTE — TELEPHONE ENCOUNTER
Called out and spoke to pt. Two pt identifiers confirmed. Informed pt that RTW letter has been done. Pt be faxed to [place] w/ HR at [phone]. LPN RR to fax. Pt verbalized understanding of information discussed w/ no further questions at this time. RTW letter faxed to [id number] w/ HR at [phone] w/ confirmation received.

## 2021-03-22 NOTE — TELEPHONE ENCOUNTER
----- Message from Diane Fothergill sent at 3/22/2021  9:49 AM EDT -----  Regarding: Dr. Sola Mccollum  General Message/Vendor Calls    Caller's first and last name: Pt       Reason for call: Dr. Allison Chakraborty was going to get patient a note for work that she cannot go back to work until labs are back. She can  to day if you will have it ready.        Callback required yes/no and why: yes, to discuss      Best contact number(s): [phone]      Message from [facility]

## 2021-03-23 LAB
ERYTHROCYTE [DISTWIDTH] IN BLOOD BY AUTOMATED COUNT: 13.6 % (ref 11.7–15.4)
HCT VFR BLD AUTO: 29.4 % (ref 34–46.6)
HGB BLD-MCNC: 9.6 G/DL (ref 11.1–15.9)
MCH RBC QN AUTO: 31 PG (ref 26.6–33)
MCHC RBC AUTO-ENTMCNC: 32.7 G/DL (ref 31.5–35.7)
MCV RBC AUTO: 95 FL (ref 79–97)
PLATELET # BLD AUTO: 389 X10E3/UL (ref 150–450)
RBC # BLD AUTO: 3.1 X10E6/UL (ref 3.77–5.28)
WBC # BLD AUTO: 5.5 X10E3/UL (ref 3.4–10.8)

## 2021-03-23 NOTE — TELEPHONE ENCOUNTER
RTW letter faxed to [employer] w/ HR at [phone] was NOT successful in transmission. Called out and spoke to pt. Two pt identifiers confirmed. Informed pt that fax would NOT go through and pt can get letter from [address] Pt states that she was able to get the letter and get it to her HR. Nothing further needed at this time.

## 2021-03-24 LAB
ALBUMIN SERPL-MCNC: 3.9 G/DL (ref 3.8–4.9)
ALBUMIN/CREAT UR: 97 MG/G CREAT (ref 0–29)
ALBUMIN/GLOB SERPL: 1.4 {RATIO} (ref 1.2–2.2)
ALP SERPL-CCNC: 328 IU/L (ref 39–117)
ALT SERPL-CCNC: 67 IU/L (ref 0–32)
AST SERPL-CCNC: 67 IU/L (ref 0–40)
BILIRUB SERPL-MCNC: 0.5 MG/DL (ref 0–1.2)
BUN SERPL-MCNC: 20 MG/DL (ref 6–24)
BUN/CREAT SERPL: 17 (ref 9–23)
C PEPTIDE SERPL-MCNC: 0.6 NG/ML (ref 1.1–4.4)
CALCIUM SERPL-MCNC: 9.4 MG/DL (ref 8.7–10.2)
CHLORIDE SERPL-SCNC: 100 MMOL/L (ref 96–106)
CHOLEST SERPL-MCNC: 236 MG/DL (ref 100–199)
CO2 SERPL-SCNC: 25 MMOL/L (ref 20–29)
CREAT SERPL-MCNC: 1.16 MG/DL (ref 0.57–1)
CREAT UR-MCNC: 64.6 MG/DL
EST. AVERAGE GLUCOSE BLD GHB EST-MCNC: 192 MG/DL
GLOBULIN SER CALC-MCNC: 2.8 G/DL (ref 1.5–4.5)
GLUCOSE SERPL-MCNC: 183 MG/DL (ref 65–99)
HBA1C MFR BLD: 8.3 % (ref 4.8–5.6)
HDLC SERPL-MCNC: 100 MG/DL
IMP & REVIEW OF LAB RESULTS: NORMAL
INTERPRETATION: NORMAL
LDLC SERPL CALC-MCNC: 120 MG/DL (ref 0–99)
Lab: NORMAL
MICROALBUMIN UR-MCNC: 62.8 UG/ML
PDF IMAGE, 910387: NORMAL
POTASSIUM SERPL-SCNC: 4.7 MMOL/L (ref 3.5–5.2)
PROT SERPL-MCNC: 6.7 G/DL (ref 6–8.5)
SODIUM SERPL-SCNC: 141 MMOL/L (ref 134–144)
TRIGL SERPL-MCNC: 95 MG/DL (ref 0–149)
TSH SERPL DL<=0.005 MIU/L-ACNC: 1.01 UIU/ML (ref 0.45–4.5)
VLDLC SERPL CALC-MCNC: 16 MG/DL (ref 5–40)

## 2021-03-25 ENCOUNTER — PATIENT MESSAGE (OUTPATIENT)
Dept: ENDOCRINOLOGY | Age: 56
End: 2021-03-25

## 2021-03-25 NOTE — PROGRESS NOTES
Results reviewed, see message to patient. C-peptide level is dropping. Serum bicarb stable. Advised she may need to return to basal bolus insulin soon. Ryan Arrington.  [address] Endocrinology  [address] [place] [address]

## 2021-03-26 ENCOUNTER — PATIENT OUTREACH (OUTPATIENT)
Dept: CASE MANAGEMENT | Age: 56
End: 2021-03-26

## 2021-03-26 NOTE — PROGRESS NOTES
Goals      Prevent complications post hospitalization. 3/18/21 Patient report she is feeling well, taking all medications as prescribed. Appointments PCP 3/26/21 and endo awaiting return call. Denies chest pain, SOB, fever, N/V/D. Patient will monitor and record BS, report endo appt details, and report at outreach in 7-10 days. MONICA    3/26/21 Contact information left on voicemail  requesting return call.  MONICA

## 2021-04-02 ENCOUNTER — PATIENT OUTREACH (OUTPATIENT)
Dept: CASE MANAGEMENT | Age: 56
End: 2021-04-02

## 2021-04-02 NOTE — PROGRESS NOTES
Goals Addressed                 This Visit's Progress     Prevent complications post hospitalization. 3/18/21 Patient report she is feeling well, taking all medications as prescribed. Appointments PCP 3/26/21 and endo awaiting return call. Denies chest pain, SOB, fever, N/V/D. Patient will monitor and record BS, report endo appt details, and report at outreach in 7-10 days. [facility] [place]    3/26/21 Contact information left on voicemail  requesting return call. [facility] [place]    4/2/21  Patient report attendance of PCP appt, colonoscopy 4/15/21, BS range 125-152, feels great. Denies chest pain, SOB, fever, N/V/D. Patient will monitor and record BS, report at outreach in 10-14 days.  [facility] [place]

## 2021-04-16 ENCOUNTER — TRANSCRIBE ORDER (OUTPATIENT)
Dept: SCHEDULING | Age: 56
End: 2021-04-16

## 2021-04-16 DIAGNOSIS — Z12.31 SCREENING MAMMOGRAM FOR HIGH-RISK PATIENT: Primary | ICD-10-CM

## 2021-04-26 ENCOUNTER — PATIENT OUTREACH (OUTPATIENT)
Dept: CASE MANAGEMENT | Age: 56
End: 2021-04-26

## 2021-04-26 NOTE — PROGRESS NOTES
Patient has graduated from the Transitions of Care Coordination  program on 4/26/21. Patient/family has the ability to self-manage at this time Care management goals have been completed. Patient was not referred to the [facility] team for further management. Goals Addressed                 This Visit's Progress     COMPLETED: Prevent complications post hospitalization. 3/18/21 Patient report she is feeling well, taking all medications as prescribed. Appointments PCP 3/26/21 and endo awaiting return call. Denies chest pain, SOB, fever, N/V/D. Patient will monitor and record BS, report endo appt details, and report at outreach in 7-10 days. [facility]    3/26/21 Contact information left on voicemail  requesting return call. [facility]    4/2/21  Patient report attendance of PCP appt, colonoscopy 4/15/21, BS range 125-152, feels great. Denies chest pain, SOB, fever, N/V/D. Patient will monitor and record BS, report at outreach in 10-14 days. [facility]    04/26/21   Did not talk with patient today but she did have her EGD on 3/16/21 as schedule. Kayden Martinez MSN, RN, CCM / Care Transition Nurse / [phone]             Patient has Care Transition Nurse's contact information for any further questions, concerns, or needs.   Patients upcoming visits:    Future Appointments   Date Time Provider Jessika Bar   5/10/2021 10:20 AM Christine Dwyer BS AMB   5/19/2021 10:00 AM [facility]   6/17/2021 11:50 AM Elizabeth Foley MD RDE NATASHA 332 BS AMB   9/22/2021  9:15 AM José Miguel Alonso MD [facility] BS AMB

## 2021-05-10 ENCOUNTER — VIRTUAL VISIT (OUTPATIENT)
Dept: HEMATOLOGY | Age: 56
End: 2021-05-10
Payer: COMMERCIAL

## 2021-05-10 DIAGNOSIS — R74.8 ELEVATED LIVER ENZYMES: Primary | ICD-10-CM

## 2021-05-10 PROCEDURE — 99214 OFFICE O/P EST MOD 30 MIN: CPT | Performed by: PHYSICIAN ASSISTANT

## 2021-05-10 NOTE — PROGRESS NOTES
Identified pt with two pt identifiers(name and ). Reviewed record in preparation for visit and have obtained necessary documentation. No chief complaint on file. There were no vitals filed for this visit. Health Maintenance Review: Patient reminded of \"due or due soon\" health maintenance. I have asked the patient to contact his/her primary care provider (PCP) for follow-up on his/her health maintenance. Coordination of Care Questionnaire:  :   1) Have you been to an emergency room, urgent care, or hospitalized since your last visit? If yes, where when, and reason for visit? Yes, [facility], 3/22/21- 3/17/21 for high blood sugar and dehydration      2. Have seen or consulted any other health care provider since your last visit? If yes, where when, and reason for visit? PCP, 3/2021for f/u      Patient is accompanied by self I have received verbal consent from Toyin Oconnell to discuss any/all medical information while they are present in the room.

## 2021-05-10 NOTE — PROGRESS NOTES
Danica Adames MD, Graham Kobs, MD Danita Holter, PA-C Starlin Boot, [facility]     Claudia MILLAN Gilbert, [facility]   Michelle Mehta KALI-ITZEL Leiva, [facility]       Mark Borrego [facility] 136    at [address] [place], [address] [facility], [facility] 22.    [phone]    FAX: [address] [place] [address], [address], [address]    [phone]    FAX: [phone]       Patient Care Team:  Delonte Bravo MD as PCP - General (Internal Medicine)  Delonte Bravo MD as PCP - [facility]  Ventura Prajapati MD as Consulting Provider (Endocrinology)  Chen Barnett MD (Nephrology)  Jose Mora MD (Female Pelvic Medicine and Reconstructive Surgery)  Wilder Vizcaino MD (Optometry)      Problem List  Date Reviewed: 3/22/2021          Codes Class Noted    HHNC (hyperglycemic hyperosmolar nonketotic coma) ([facility] ICD-10-CM: E11.01  ICD-9-CM: 250.20, 250.30  3/8/2021        Hyperglycemia due to type 1 diabetes mellitus ([facility] ICD-10-CM: E10.65  ICD-9-CM: 250.01  2/22/2021        [facility] (primary sclerosing cholangitis) ICD-10-CM: K83.01  ICD-9-CM: 576.1  2/22/2021        Stage 2 chronic kidney disease ICD-10-CM: N18.2  ICD-9-CM: 585.2  2/22/2021        Mild intermittent asthma without complication MGI-22-EJ: S70.96  ICD-9-CM: 493.90  2/22/2021        Elevated liver enzymes ICD-10-CM: R74.8  ICD-9-CM: 790.5  12/19/2019        Insulin dependent diabetes mellitus ICD-10-CM: [id number]  ICD-9-CM: 250.00, V58.67  6/20/2016        Pancreatic atrophy ICD-10-CM: K86.89  ICD-9-CM: 577.8  6/20/2016            VIRTUAL TELEHEALTH VISIT PERFORMED DUE TO COVID-19 EPIDEMIC    CONSENT:  Gm García, who was seen by synchronous, real-time, audio-video technology, and/or her healthcare decision maker, is aware that this patient-initiated, Telehealth encounter on 5/10/2021 is a billable service, with coverage as determined by her insurance carrier. She is aware that she may receive a bill and has provided verbal consent to proceed. This patient was evaluated during a Virtual Telehealth visit. A caregiver was present if appropriate. Due to this being a TeleHealth encounter performed during the CJRDE-64 public health emergency, the physical examination was limited to that listed in the [address] [place] [address] returns to the [place] [address] for management of elevated liver enzymes. The active problem list, all pertinent past medical history, medications, radiologic findings and laboratory findings related to the liver disorder were reviewed with the patient. The patient is a 60 y.o. Black female who was found to have elevated liver transaminases and alkaline phosphatase in 2018. Serologic evaluation for markers of chronic liver disease were negative. Ultrasound of the liver was performed in 2/2019. The results of the imaging demonstrated a normal appearing liver. MRI was ordered at the time of last office visit, this has not yet been scheduled due to patient's significant claustrophobia. She is unable to tolerate MRI scanner. She has had recent CT scan in 3/2021 showing no liver mass/lesion or biliary ductal dilation. The patient underwent a liver biopsy in 2/2020. This demonstrates fibrosis that is far greater than the degree of portal inflammation suggesting PSC. The patient has no symptoms which can be attributed to the liver disorder. The patient is not currently experiencing the following symptoms of liver disease:  fatigue, pain in the right side over the liver, itching. Since her last office visit, she has had hospitalization for DKA in 3/2021 with associated UTI.   She has since been doing better and is stable on medications. She reports that her glucose at home is 250 or less always and she is taking better care of herself, trying to limit stress. She has been following with her PCP, seeing next month. Dr Gerardo Diaz is leaving practice in the next month and she is not sure if she will establish with new endocrinologist.     The patient completes all daily activities without any functional limitations. ASSESSMENT AND PLAN:  PSC  Intermittent elevation in liver transaminases and persistent elevation in alkaline phosphatase. 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 The AST and ALT were modestly elevated last month following her hospitalization. Will continue to monitor. ALP is elevated. Total bilirubin is normal.  Serum albumin is depressed. INR is normal.  The platelet count is normal.      Serologic testing for causes of chronic liver disease were negative. Assessment of liver fibrosis with Fibroscan was performed In 1/2020. The result was 9.8 kPa which correlates with Stage 3 bridging fibrosis. A liver biopsy from 2/2020 suggests PSC. There is mild portal inflammation and portal fibrosis with extension outside the portal tract that is greater than would be expected given the degree of inflammation. This is suggestive of PSC. This practice has recommended that we do an MRI/MRCP. She says she cannot tolerate the MRI. She has tried to do an MRI previously for another medical issue and could not stay in the scanner. She has had recent CT scan without mass/lesion or biliary changes in 3/2021. We are currently part of a international clinical trial to develop a medication for PSC. This study requires MRIs performed every 6 months to monitor response to medication. As she cannot tolerate the MRI, this is not an option for her at present. Since there is currently no FDA approved treatment for [facility] we can simply follow her at regular intervals.  I have suggested return office visit in 6 months and have instructed her when to call with any additional symptoms in between visits including increased fatigue, abdominal pain, fever, and itching. We will continue to monitor her on a regular basis and offer additional treatments/trial options as available. Will plan for her to return to the office for monitoring and repeat Fibroscan in 10/2021. She is in agreement with this plan. Screening for Hepatocellular Carcinoma  HCC screening is not necessary as the patient has no evidence of cirrhosis. Baseline  was not elevated. Treatment of other medical problems in patients with chronic liver disease  There are no contraindications for the patient to take most medications that are necessary for treatment of other medical issues. Counseling for alcohol in patients with chronic liver disease  The patient was counseled regarding alcohol consumption and the effect of alcohol on chronic liver disease. The patient does not consume any significant amount of alcohol. DM  She is focused on improved control at this point and working with PCP. I have asked that she have repeat labs done in the next month or so. Vaccinations   Vaccination for viral hepatitis B is not needed. The patient has serologic evidence of prior exposure or vaccination with immunity. The need for vaccination against viral hepatitis A will be assessed with serologic and instituted as appropriate. Routine vaccinations against other bacterial and viral agents can be performed as indicated. Annual flu vaccination should be administered if indicated. She has received COVID vaccine. ALLERGIES  Allergies   Allergen Reactions    Contrast Agent [Iodine] Itching    Hydrocodone Rash    Percocet [Oxycodone-Acetaminophen] Rash     Pt states she is not allergic to Tylenol.      Codeine Nausea and Vomiting    Metformin Diarrhea       MEDICATIONS  Current Outpatient Medications   Medication Sig    insulin glargine (Basaglar KwikPen U-100 Insulin) 100 unit/mL (3 mL) inpn Inject 30 units once daily    glipiZIDE SR (GLUCOTROL XL) 5 mg CR tablet Take 1 Tab by mouth Before breakfast and dinner for 90 days.  albuterol (PROVENTIL VENTOLIN) 2.5 mg /3 mL (0.083 %) nebu 3 mL by Nebulization route every six (6) hours as needed for Wheezing. No current facility-administered medications for this visit. SYSTEM REVIEW NOT RELATED TO LIVER DISEASE OR REVIEWED ABOVE:  Constitution systems: Negative for fever, chills, weight gain, weight loss. Eyes: Negative for visual changes. ENT: Negative for sore throat, painful swallowing. Respiratory: Negative for cough, hemoptysis, SOB. Cardiology: Negative for chest pain, palpitations. GI:  Negative for constipation or diarrhea. : Negative for urinary frequency, dysuria, hematuria, nocturia. Skin: Negative for rash. Hematology: Negative for easy bruising, blood clots. Musculo-skeletal: Negative for back pain, muscle pain, weakness. Neurologic: Negative for headaches, dizziness, vertigo, memory problems not related to HE. Psychology: Negative for anxiety, depression. FAMILY HISTORY:  The father has/had the following following chronic disease(s): prostate and colon cancer. The mother  of MVA. There is no family history of liver disease. SOCIAL HISTORY:  The patient has never been . The patient has no children. The patient has never used tobacco products. The patient has never consumed significant amounts of alcohol. The patient currently works full time: warehouse production. PHYSICAL EXAMINATION PERFORMED BY VIRTUAL TELEHEALTH:  VS: Not performed   General: No acute distress. Eyes: Sclera anicteric. ENT: No oral lesions. Skin: No rashes. spider angiomata. No jaundice. Abdomen: No obvious distention suggesting ascites. Extremities: No edema. No muscle wasting. Neurologic: Alert and oriented. Cranial nerves grossly intact.     LABORATORY STUDIES:  [facility] [address]  [address] Units 3/23/2021   WBC 3.4 - 10.8 x10E3/uL 5.5   ANC 1.8 - 8.0 K/UL    HGB 11.1 - 15.9 g/dL 9.6 (L)    - 450 x10E3/uL 389   INR 0.8 - 1.2    AST 0 - 40 IU/L 67 (H)   ALT 0 - 32 IU/L 67 (H)   Alk Phos 39 - 117 IU/L 328 (H)   Bili, Total 0.0 - 1.2 mg/dL 0.5   Bili, Direct 0.00 - 0.40 mg/dL    Albumin 3.8 - 4.9 g/dL 3.9   BUN 6 - 24 mg/dL 20   Creat 0.57 - 1.00 mg/dL 1.16 (H)   Na 134 - 144 mmol/L 141   K 3.5 - 5.2 mmol/L 4.7   Cl 96 - 106 mmol/L 100   CO2 20 - 29 mmol/L 25   Glucose 65 - 99 mg/dL 183 (H)   Magnesium 1.6 - 2.4 mg/dL      Liver [address] Ref Rng & Units 6/22/2020 1/31/2020   WBC 3.4 - 10.8 x10E3/uL 5.0    ANC 1.4 - 7.0 x10E3/uL     HGB 11.1 - 15.9 g/dL 11.7     - 450 x10E3/uL 148 (L)    INR 0.8 - 1.2     AST 0 - 40 IU/L 40 37   ALT 0 - 32 IU/L 40 (H) 56 (H)   Alk Phos 39 - 117 IU/L 286 (H) 243 (H)   Bili, Total 0.0 - 1.2 mg/dL 0.8 0.6   Bili, Direct 0.00 - 0.40 mg/dL 0.23    Albumin 3.8 - 4.9 g/dL 4.9 4.4   BUN 6 - 24 mg/dL 24 28 (H)   BUN (iSTAT) 9 - 20 mg/dL     Creat 0.57 - 1.00 mg/dL 1.49 (H) 1.21 (H)   Creat (iSTAT) 0.6 - 1.3 mg/dL     Na 134 - 144 mmol/L 139 146 (H)   K 3.5 - 5.2 mmol/L 4.9 4.5   Cl 96 - 106 mmol/L 100 104   CO2 20 - 29 mmol/L 20 24   Glucose 65 - 99 mg/dL 387 (H) 171 (H)   Magnesium 1.6 - 2.4 mg/dL       Liver [place] [address] Ref Rng & Units 12/19/2019   WBC 3.4 - 10.8 x10E3/uL 6.2   ANC 1.4 - 7.0 x10E3/uL 4.3   HGB 11.1 - 15.9 g/dL 11.9    - 450 x10E3/uL 242   INR 0.8 - 1.2 1.0   AST 0 - 40 IU/L 34   ALT 0 - 32 IU/L 52 (H)   Alk Phos 39 - 117 IU/L 328 (H)   Bili, Total 0.0 - 1.2 mg/dL 0.4   Bili, Direct 0.00 - 0.40 mg/dL 0.15   Albumin 3.8 - 4.9 g/dL 4.8   BUN 6 - 24 mg/dL 27 (H)   BUN (iSTAT) 9 - 20 mg/dL    Creat 0.57 - 1.00 mg/dL 1.30 (H)   Creat (iSTAT) 0.6 - 1.3 mg/dL    Na 134 - 144 mmol/L 145 (H)   K 3.5 - 5.2 mmol/L 4.5 Cl 96 - 106 mmol/L 103   CO2 20 - 29 mmol/L 23   Glucose 65 - 99 mg/dL 44 (L)   Magnesium 1.6 - 2.4 mg/dL      SEROLOGIES:  8/2018. HBsurface antigen negative, anti-HCV negative, Ferritin 1139, FE saturation 74%, JUN negative, ANCA negative    Serologies Latest Ref Rng & Units 12/19/2019 8/21/2019   Hep B Surface Ag Negative  Negative   Hep B Core Ab, Total Negative Negative    Hep B Surface AB QL  Reactive    Ferritin 15 - 150 ng/mL 445 (H) 384 (H)   Iron % Saturation 15 - 55 % 48 31   JUN, IFA  Negative    M2 Ab 0.0 - 20.0 Units  <20.0   Ceruloplasmin 19.0 - 39.0 mg/dL  32.5   Alpha-1 antitrypsin level 90 - 200 mg/dL  129     LIVER HISTOLOGY:  1/2020. [facility]. EkPa was 9.8. Suggested fibrosis level is F2-3. CAP score is 147.  2/2020. Slides reviewed by MLS. Non-specific mild portal inflammation, with No interface hepatitis, with No PMN. No lobular inflammation. No steatosis. Anish stage 3 fibrosis. Metavir stage 2 fibrosis. Knodell score (0011). The degree of portal fibrosis is greater than would be expected based upon the degree of inflammation. This suggests PSC.    ENDOSCOPIC PROCEDURES:  Not available or performed    RADIOLOGY:  2/2019. Ultrasound of liver. Normal appearing liver. No liver mass lesions. 3/2021. CT abd. Normal appearing liver. No fluid/ascites. OTHER TESTING:  Not available or performed    FOLLOW-UP AFTER VIRTUAL VISIT:  Pursuant to the emergency declaration under the [facility] 1135 waiver authority and the [employer] Act, this Virtual  Visit was conducted, with the patient's (and/or their legal guardian's) consent, to reduce the patient's risk of exposure to COVID-19 and provide necessary medical care. Services were provided through a video synchronous discussion virtually to substitute for an in-person clinic visit.   The patient was located in their home. The provider was located in the [place] [address] office. All of the issues listed above in the Assessment and Plan were discussed with the patient. All questions were answered. The patient expressed a clear understanding of the above. [address] [place] [address] in 5-6 months for monitoring and repeat Fibroscan at that time. Documentation reviewed and updated to reflect current, accurate patient information.     Niraj Guido PA-C  Liver [facility] 59, 2000 [facility] 22.  [phone]  [address] [place] [address]

## 2021-05-19 ENCOUNTER — HOSPITAL ENCOUNTER (OUTPATIENT)
Dept: MAMMOGRAPHY | Age: 56
Discharge: HOME | End: 2021-05-19
Attending: INTERNAL MEDICINE
Payer: COMMERCIAL

## 2021-05-19 DIAGNOSIS — Z12.31 SCREENING MAMMOGRAM FOR HIGH-RISK PATIENT: ICD-10-CM

## 2021-05-19 PROCEDURE — 77067 SCR MAMMO BI INCL CAD: CPT

## 2021-06-30 NOTE — PROGRESS NOTES
Hospitalist Progress Note    NAME: Yvette Espinosa   :  1965   MRN:  [id number]       Assessment / Plan:  SIRS unclear etiology, present on admission:  persistent leukocytosis with temp 100 overnight  - CT chest  with no evidence of acute process in the chest  - CT A/P  with no significant gastric distention. No bowel obstruction. No acute finding is noted in the abdomen or pelvis within limits of unenhanced technique. - Lawrence catheter removed , possible source. Nursing unable to obtain culture off Lawrence prior to its removal.  - competed rocephin for E Coli UTI, has cleared on repeat urine cutlure  - blood cultures sent last night. Will repeat tomorrow AM.  Acute anemia, unclear etiology: likely multifactorial including acute illness and renal failure, hematuria. - transfused 2u pRBCs this admission  - haptoglobin slightly elevated  - iron, R23 and folic acid levels WNL.  Ferritin elevated, ?acute phase reactant  - does have hematuria but do not feel this is enough blood loss alone to explain anemia  DKA (resolved) in setting of uncontrolled insulin-dependent DM2 with nephropathy: HgA1c 8.8.  Now off insulin gtt. - change lantus to home dose tomorrow AM; Pt has been eating better and glucoses more stable  - con't holding amaryl for now, not sure if this will be restarted on discharge as she is already on insulin  - lispro scheduled with meals and per sliding scale  NEERAJ and acute encephalopathy with severe hyperkalemia in setting of dehydration/DKA and sepsis/UTI, present on admission: Cr continues to improve.  HD stopped  due to rapid response for hypotension on HD.    - renal US  normal renal ultrasound examination  - urine cx >[id number] pansensitive E Coli, completed rocephin as above  - appreciate nephrology assistance; additional labs sent: JUN negative, ANCA pending, normal kappa/lambda ratio, iPTH very elevated  - removed Lawrence   Pseudohyponatremia, resolved  Abdominal pain, unclear etiology: CT A/P without contrast 8/24 negative      Code Status: full  Surrogate Decision Maker: Radames Meyers [id number]  DVT Prophylaxis: SCDs with anemia     Subjective:     Chief Complaint / Reason for Physician Visit  \"I'm fine\". No new issues; did not realize she had fever this morning. Discussed with RN events overnight. Review of Systems:  Symptom Y/N Comments  Symptom Y/N Comments   Fever/Chills n   Chest Pain n    Poor Appetite n   Edema n    Cough n   Abdominal Pain n    Sputum n   Joint Pain     SOB/GARLAND n   Pruritis/Rash     Nausea/vomit    Tolerating PT/OT     Diarrhea    Tolerating Diet     Constipation    Other       Could NOT obtain due to:      Objective:     VITALS:   Last 24hrs VS reviewed since prior progress note. Most recent are:  Patient Vitals for the past 24 hrs:   Temp Pulse Resp BP SpO2   09/01/18 0724 98.8 °F (37.1 °C) 74 16 106/67 98 %   09/01/18 0301 100 °F (37.8 °C) (!) 103 18 119/60 97 %   08/31/18 2314 98.5 °F (36.9 °C) 94 18 116/61 98 %   08/31/18 2015 98.7 °F (37.1 °C) 99 18 104/60 99 %   08/31/18 1916 98.8 °F (37.1 °C) 93 18 101/65 100 %   08/31/18 1800 - 95 18 113/60 100 %   08/31/18 1730 - 95 18 115/65 100 %   08/31/18 1700 - 96 18 112/65 100 %   08/31/18 1640 - 84 18 119/65 100 %   08/31/18 1620 - 86 18 116/67 100 %   08/31/18 1605 98.7 °F (37.1 °C) 81 - 113/72 -   08/31/18 1550 98.7 °F (37.1 °C) 81 18 113/72 100 %   08/31/18 1529 97.9 °F (36.6 °C) 81 18 118/73 100 %   08/31/18 1126 - 90 17 114/73 99 %       Intake/Output Summary (Last 24 hours) at 09/01/18 1054  Last data filed at 09/01/18 1030   Gross per 24 hour   Intake              360 ml   Output             1125 ml   Net             -765 ml        PHYSICAL EXAM:  General: WD, WN. Alert, cooperative, no acute distress    EENT:  EOMI. Anicteric sclerae. MMM  Resp:  CTA bilaterally, no wheezing or rales. No accessory muscle use  CV:  Regular rhythm,  No edema  GI:  Soft, Non distended, Non tender.  +Bowel sounds  Neurologic:  Alert and oriented X 3, normal speech,   Psych:   Fair insight. Not anxious nor agitated  Skin:  No rashes. No jaundice. No erythema at [place] site. Reviewed most current lab test results and cultures  YES  Reviewed most current radiology test results   YES  Review and summation of old records today    NO  Reviewed patient's current orders and MAR    YES  PMH/SH reviewed - no change compared to H&P  ________________________________________________________________________  Care Plan discussed with:    Comments   Patient x    Family      RN x    Care Manager     Consultant                        Multidiciplinary team rounds were held today with , nursing, pharmacist and clinical coordinator. Patient's plan of care was discussed; medications were reviewed and discharge planning was addressed. ________________________________________________________________________  Total NON critical care TIME:  25 Minutes    Total CRITICAL CARE TIME Spent:   Minutes non procedure based      Comments   >50% of visit spent in counseling and coordination of care x    ________________________________________________________________________  Doc MD [place]     Procedures: see electronic medical records for all procedures/Xrays and details which were not copied into this note but were reviewed prior to creation of Plan. LABS:  I reviewed today's most current labs and imaging studies.   Pertinent labs include:  Recent Labs      09/01/18   0019  08/31/18   0150  08/30/18   0229   WBC  28.7*  30.3*  30.0*   HGB  8.2*  6.3*  7.3*   HCT  24.2*  18.2*  20.4*   PLT  285  240  177     Recent Labs      09/01/18   0019  08/31/18   0150  08/30/18   0229   NA  139  142  144   K  4.3  4.1  3.6   CL  104  106  107   CO2  28  27  25   GLU  117*  186*  48*   BUN  70*  89*  110*   CREA  3.58*  4.26*  4.98*   CA  8.6  8.3*  8.2*   MG   --    --   1.7   PHOS   --    --   2.9   ALB   --   1.9*  1.9* TBILI   --   0.7  0.9   SGOT   --   30  40*   ALT   --   39  40       Signed: Yisel Patel MD no abdominal pain, no bloating, no constipation, no diarrhea, no nausea and no vomiting.

## 2021-09-07 ENCOUNTER — TELEPHONE (OUTPATIENT)
Dept: ENDOCRINOLOGY | Age: 56
End: 2021-09-07

## 2021-09-07 RX ORDER — INSULIN GLARGINE 100 [IU]/ML
INJECTION, SOLUTION SUBCUTANEOUS
Qty: 45 ML | Refills: 1 | Status: SHIPPED | OUTPATIENT
Start: 2021-09-07 | End: 2021-12-22 | Stop reason: SDUPTHER

## 2021-09-07 NOTE — TELEPHONE ENCOUNTER
----- Message from [facility] sent at 9/7/2021  8:59 AM EDT -----  Regarding: /Telephone  Caller (if not patient):Pt  Relationship of caller (if not patient):n/a  Best contact number(s):[phone]  Name of medication and dosage if known:insulin glargine (Basaglar KwikPen U-100 Insulin) 100 unit/mL (3 mL) inpn  Is patient out of this medication (yes/no): No  Pharmacy name:[facility]  Pharmacy listed in chart? (yes/no):Yes  Pharmacy phone [phone]   Date of last visit:2/19/21  Details to clarify the request:n/a

## 2021-09-21 NOTE — PROGRESS NOTES
HISTORY OF PRESENT ILLNESS  Cleave Hero is a 60 y.o. female. HPI     Pt was last here 3/22/21. Pt is here for routine care.       She was in hospital 3/8/21-3/17/21 for DKA     BP today is controlled not on medications     type 1 diabetic--recently under much better control she needs to find a new endocrinologist as her endocrinologist has left  BS 80, 90s  Pt takes basaglar 25U  decrease dose  Glipizide was increased to 5 mgBID in hospital-- she is not taking this  No longer using humalog either  Pt is no longer on metformin --stopped d/t SE     Pt follows with Dr. Harry Maguire (endo) for type I diabetes  Last visit 2/19/21  Dr. Harry Maguire has left, she is trying to schedule an appt with a new endo     Wt was 95 lbs in hospital     Reviewed labs  Will get POC A1c 6.2     Pt follows with Dr. Patrick Draper (hep) for elevated liver tests has primary sclerosing cholangitis  Last visit with ROWENA Joiner 5/10/21:  PSC  Intermittent elevation in liver transaminases and persistent elevation in alkaline phosphatase. 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 The AST and ALT were modestly elevated last month following her hospitalization. Will continue to monitor. ALP is elevated. Total bilirubin is normal.  Serum albumin is depressed. INR is normal.  The platelet count is normal.    Serologic testing for causes of chronic liver disease were negative. Assessment of liver fibrosis with Fibroscan was performed In 1/2020. The result was 9.8 kPa which correlates with Stage 3 bridging fibrosis. A liver biopsy from 2/2020 suggests PSC. There is mild portal inflammation and portal fibrosis with extension outside the portal tract that is greater than would be expected given the degree of inflammation. This is suggestive of PSC. This practice has recommended that we do an MRI/MRCP. She says she cannot tolerate the MRI.   She has tried to do an MRI previously for another medical issue and could not stay in the scanner. She has had recent CT scan without mass/lesion or biliary changes in 3/2021. We are currently part of a international clinical trial to develop a medication for PSC. This study requires MRIs performed every 6 months to monitor response to medication. As she cannot tolerate the MRI, this is not an option for her at present. Since there is currently no FDA approved treatment for [place] we can simply follow her at regular intervals. I have suggested return office visit in 6 months and have instructed her when to call with any additional symptoms in between visits including increased fatigue, abdominal pain, fever, and itching. We will continue to monitor her on a regular basis and offer additional treatments/trial options as available. Will plan for her to return to the office for monitoring and repeat Fibroscan in 10/2021. She is in agreement with this plan. Screening for Hepatocellular Carcinoma  HCC screening is not necessary as the patient has no evidence of cirrhosis. Baseline  was not elevated. Treatment of other medical problems in patients with chronic liver disease  There are no contraindications for the patient to take most medications that are necessary for treatment of other medical issues. Counseling for alcohol in patients with chronic liver disease  The patient was counseled regarding alcohol consumption and the effect of alcohol on chronic liver disease. The patient does not consume any significant amount of alcohol. DM  She is focused on improved control at this point and working with PCP. I have asked that she have repeat labs done in the next month or so. Vaccinations   Vaccination for viral hepatitis B is not needed. The patient has serologic evidence of prior exposure or vaccination with immunity. The need for vaccination against viral hepatitis A will be assessed with serologic and instituted as appropriate.   Routine vaccinations against other bacterial and viral agents can be performed as indicated. Annual flu vaccination should be administered if indicated. She has received COVID vaccine. No tx, will monitor liver tests     Pt follows with Dr. Jeanine Hargrove (nephro) for ckd, has had multiple arf episodes  Last there 9/07/21  9/10/21: A1c 6.5  Baseline cr 1.4-1. 6      seeing Dr. Kriss Hernandez (uro) and ROWENA Cummins for recurrent UTIs  Last visit was 10/18     Pt has been following with Dr Rajni Alexander (ortho) for knee pain   Last there 12/19/19 with ROWENA Garcia:      Dr Ke Mcdaniel (ortho) for sciatica   Last visit was 1/19 with ROWENA Rivas   Physical therapy in the past     She is following with Dr. Keanu Robles (ortho) for trigger thumb  Last there 10/29/20  She had cortisone injection     Pt has albuterol to use prn for her asthma  Denies wheezing has not needed this recently    Reviewed mammo 5/19/21 negative     PREVENTIVE:  Colonoscopy: 10/13/16, Dr Wing [place], repeat in 5 years, provided referral needs repeat, fmhx - father--still due  EGD: 3/16/21 Dr. Oliver Aceves  Pap: [facility], 3/20  Mammogram: 5/19/21 negative  Dexa: not yet needed  FFJJ: ~7281  Pneumovax: 02/19/19   Shingrix: will get today 09/22/21  Flu shot: 9/21  Foot exam: 09/22/21  Micro: 1/20  some protein in urine   A1C: 2/19 POC 14.0, 3/19 8.3 per  3/21 10.1 Bhupendra 1/20 7.7 3/21 10.1 9/21 6.2  Eye exam: Dr Wolff, 7/21-- small cataract per pt will f/u in 1 year  Lipids: 3/21  Covid: J&J    Patient Active Problem List    Diagnosis Date Noted    20171 RescueTime (hyperglycemic hyperosmolar nonketotic coma) ([facility] [address] 03/08/2021    Hyperglycemia due to type 1 diabetes mellitus ([facility] [address] 02/22/2021    [facility] (primary sclerosing cholangitis) 02/22/2021    Stage 2 chronic kidney disease 02/22/2021    Mild intermittent asthma without complication 99/83/9504    Elevated liver enzymes 12/19/2019    Insulin dependent diabetes mellitus 06/20/2016    Pancreatic atrophy 06/20/2016     Current Outpatient Medications   Medication Sig Dispense Refill  insulin glargine (Basaglar KwikPen U-100 Insulin) 100 unit/mL (3 mL) inpn Inject 30 units once daily. SEND FUTURE REFILL REQUESTS TO PCP AS DR GUERRA IS NO LONGER WITH THIS PRACTICE 45 mL 1    albuterol (PROVENTIL VENTOLIN) 2.5 mg /3 mL (0.083 %) nebu 3 mL by Nebulization route every six (6) hours as needed for Wheezing. 30 Nebule 0     Past Surgical History:   Procedure Laterality Date    HX HEENT      UPPER GI ENDOSCOPY,BIOPSY  3/16/2021           Lab Results   Component Value Date/Time    WBC 5.5 03/23/2021 09:16 AM    HGB 9.6 (L) 03/23/2021 09:16 AM    HCT 29.4 (L) 03/23/2021 09:16 AM    Hematocrit (POC) 31 (L) 08/24/2018 01:15 PM    PLATELET 909 78/62/7220 09:16 AM    MCV 95 03/23/2021 09:16 AM     Lab Results   Component Value Date/Time    Cholesterol, total 236 (H) 03/23/2021 09:17 AM    HDL Cholesterol 100 03/23/2021 09:17 AM    LDL, calculated 120 (H) 03/23/2021 09:17 AM    LDL, calculated 80 01/31/2020 08:08 AM    Triglyceride 95 03/23/2021 09:17 AM     Lab Results   Component Value Date/Time    GFR est non-AA 52 (L) 03/23/2021 09:17 AM    GFRNA, POC 4 (L) 08/24/2018 01:15 PM    GFR est AA 60 03/23/2021 09:17 AM    GFRAA, POC 5 (L) 08/24/2018 01:15 PM    Creatinine 1.16 (H) 03/23/2021 09:17 AM    Creatinine (POC) 9.5 (H) 08/24/2018 01:15 PM    BUN 20 03/23/2021 09:17 AM    BUN (POC) >140 (H) 08/24/2018 01:15 PM    Sodium 141 03/23/2021 09:17 AM    Sodium (POC) 124 (L) 08/24/2018 01:15 PM    Potassium 4.7 03/23/2021 09:17 AM    Potassium (POC) 7.7 (HH) 08/24/2018 01:15 PM    Chloride 100 03/23/2021 09:17 AM    Chloride (POC) 91 (L) 08/24/2018 01:15 PM    CO2 25 03/23/2021 09:17 AM    Magnesium 1.8 03/10/2021 03:55 AM    Phosphorus 2.4 (L) 03/08/2021 05:23 PM    PTH, Intact 360.7 (H) 08/25/2018 10:36 AM        Review of Systems   Respiratory: Negative for shortness of breath and wheezing. Cardiovascular: Negative for chest pain.        Physical Exam  Constitutional:       General: She is not in acute distress. Appearance: Normal appearance. She is not ill-appearing, toxic-appearing or diaphoretic. HENT:      Head: Normocephalic and atraumatic. Right Ear: External ear normal.      Left Ear: External ear normal.   Eyes:      General:         Right eye: No discharge. Left eye: No discharge. Conjunctiva/sclera: Conjunctivae normal.      Pupils: Pupils are equal, round, and reactive to light. Cardiovascular:      Rate and Rhythm: Normal rate and regular rhythm. Heart sounds: Murmur heard. No friction rub. No gallop. Pulmonary:      Effort: No respiratory distress. Breath sounds: Normal breath sounds. No wheezing or rales. Chest:      Chest wall: No tenderness. Musculoskeletal:         General: Normal range of motion. Cervical back: Normal range of motion and neck supple. Skin:     General: Skin is warm and dry. Neurological:      Mental Status: She is alert and oriented to person, place, and time. Mental status is at baseline. Coordination: Coordination normal.      Gait: Gait normal.      Comments: Sensory exam of the foot is normal, tested with the monofilament. Good pulses, no lesions or ulcers, good peripheral pulses. Psychiatric:         Mood and Affect: Mood normal.         Behavior: Behavior normal.         ASSESSMENT and PLAN    ICD-10-CM ICD-9-CM    1. HHNC (hyperglycemic hyperosmolar nonketotic coma) ([facility] [address]      E11.01 250.20  DIABETES FOOT EXAM   Resolved diabetes now well controlled compliant with regimen  250.30    2.  Hyperglycemia due to type 1 diabetes mellitus ([facility] [address]     Has had quite uncontrolled diabetes for quite some time recently improved A1c at goal continue Basaglar 25 units    Working on seeing a new endocrinology E10.65 250.01  DIABETES FOOT EXAM   3. Stage 2 chronic kidney disease       Creatinine runs around 1.4 baseline follows with nephrology up-to-date had recent labs last week with them the results are getting scanned into our system     N18.2 585.2    4. PSC (primary sclerosing cholangitis)     There is no treatment for this she is followed by the hepatology clinic    She is not interested in MRI so we will not be enrolling in a clinical trial we will follow up with him in 6 months     K83.01 576.1    5. Mild intermittent asthma without complication     Albuterol as needed no recent flares     J45.20 493.90     6 dyslipidemia diet for now monitor    Depression screen reviewed and negative     Scribed by Laura Conde [address], as dictated by Dr. Shayna Yu. Current diagnosis and concerns discussed with pt at length. Pt understands risks and benefits or current treatment plan and medications, and accepts the treatment and medication with any possible risks. Pt asks appropriate questions, which were answered. Pt was instructed to call with any concerns or problems. I have reviewed the note documented by the scribe. The services provided are my own.   The documentation is accurate

## 2021-09-22 ENCOUNTER — OFFICE VISIT (OUTPATIENT)
Dept: INTERNAL MEDICINE CLINIC | Age: 56
End: 2021-09-22
Payer: COMMERCIAL

## 2021-09-22 VITALS
BODY MASS INDEX: 17.79 KG/M2 | SYSTOLIC BLOOD PRESSURE: 111 MMHG | DIASTOLIC BLOOD PRESSURE: 72 MMHG | HEIGHT: 63 IN | HEART RATE: 78 BPM | TEMPERATURE: 97.9 F | WEIGHT: 100.4 LBS | RESPIRATION RATE: 16 BRPM | OXYGEN SATURATION: 97 %

## 2021-09-22 DIAGNOSIS — E11.01 HHNC (HYPERGLYCEMIC HYPEROSMOLAR NONKETOTIC COMA) (HCC): Primary | ICD-10-CM

## 2021-09-22 DIAGNOSIS — N18.2 STAGE 2 CHRONIC KIDNEY DISEASE: ICD-10-CM

## 2021-09-22 DIAGNOSIS — K83.01 PSC (PRIMARY SCLEROSING CHOLANGITIS): ICD-10-CM

## 2021-09-22 DIAGNOSIS — Z23 ENCOUNTER FOR IMMUNIZATION: ICD-10-CM

## 2021-09-22 DIAGNOSIS — J45.20 MILD INTERMITTENT ASTHMA WITHOUT COMPLICATION: ICD-10-CM

## 2021-09-22 DIAGNOSIS — E10.65 HYPERGLYCEMIA DUE TO TYPE 1 DIABETES MELLITUS (HCC): ICD-10-CM

## 2021-09-22 LAB — HBA1C MFR BLD HPLC: 6.2 %

## 2021-09-22 PROCEDURE — 90750 HZV VACC RECOMBINANT IM: CPT | Performed by: INTERNAL MEDICINE

## 2021-09-22 PROCEDURE — 99214 OFFICE O/P EST MOD 30 MIN: CPT | Performed by: INTERNAL MEDICINE

## 2021-09-22 PROCEDURE — 83036 HEMOGLOBIN GLYCOSYLATED A1C: CPT | Performed by: INTERNAL MEDICINE

## 2021-09-22 PROCEDURE — 90471 IMMUNIZATION ADMIN: CPT | Performed by: INTERNAL MEDICINE

## 2021-09-23 ENCOUNTER — TELEPHONE (OUTPATIENT)
Dept: INTERNAL MEDICINE CLINIC | Age: 56
End: 2021-09-23

## 2021-09-23 DIAGNOSIS — M79.671 RIGHT FOOT PAIN: Primary | ICD-10-CM

## 2021-09-23 DIAGNOSIS — E10.65 HYPERGLYCEMIA DUE TO TYPE 1 DIABETES MELLITUS (HCC): ICD-10-CM

## 2021-09-23 NOTE — TELEPHONE ENCOUNTER
Patient states Mike Camejo was just seen yesterday by Dr. Godwin Aguilera & needs a call back to be advised what to do about what patient reports as Discomfort in her Right Foot Between the Pinky toe & toe next to it but Pain is in the bone in that area\". Please call to advise.  Thank you

## 2021-09-24 NOTE — TELEPHONE ENCOUNTER
Called, spoke with Pt. Two pt identifiers confirmed. Pt given contact information for [facility] and [facility].   (electronically sent referral as well). Pt stated she will give them a call when they open. Pt verbalized understanding of information discussed w/ no further questions at this time.

## 2021-09-29 RX ORDER — PEN NEEDLE, DIABETIC 31 GX3/16"
NEEDLE, DISPOSABLE MISCELLANEOUS
Qty: 100 PEN NEEDLE | Refills: 3 | Status: SHIPPED | OUTPATIENT
Start: 2021-09-29

## 2021-10-12 ENCOUNTER — OFFICE VISIT (OUTPATIENT)
Dept: HEMATOLOGY | Age: 56
End: 2021-10-12
Payer: COMMERCIAL

## 2021-10-12 VITALS
OXYGEN SATURATION: 99 % | DIASTOLIC BLOOD PRESSURE: 66 MMHG | HEIGHT: 63 IN | WEIGHT: 101.2 LBS | TEMPERATURE: 96.7 F | BODY MASS INDEX: 17.93 KG/M2 | HEART RATE: 66 BPM | RESPIRATION RATE: 16 BRPM | SYSTOLIC BLOOD PRESSURE: 131 MMHG

## 2021-10-12 DIAGNOSIS — R74.8 ELEVATED LIVER ENZYMES: Primary | ICD-10-CM

## 2021-10-12 PROCEDURE — 91200 LIVER ELASTOGRAPHY: CPT | Performed by: PHYSICIAN ASSISTANT

## 2021-10-12 PROCEDURE — 99214 OFFICE O/P EST MOD 30 MIN: CPT | Performed by: PHYSICIAN ASSISTANT

## 2021-10-12 NOTE — PROGRESS NOTES
[address] [place] [address], MD, MD Johnny Zurita PA-C [facility]     April S Gilbert [facility]   MINO Banuelos-ITZEL [facility]       Mark Hoffman [address]    at [address], [address] [place] [address], [facility] 22.    [phone]    FAX: [address] [place] [address], [address]    [phone]    FAX: [phone]       Patient Care Team:  May Irving MD as PCP - General (Internal Medicine)  May Irving MD as PCP - [address] [place] [address] Provider  Chenet Brandon MD as Consulting Provider (Endocrinology)  Anyi Cespedes MD (Nephrology)  Tere Head MD (Female Pelvic Medicine and Reconstructive Surgery)  Carmelina Michael MD (Optometry)      Problem List  Date Reviewed: 9/22/2021        Codes Class Noted    HHNC (hyperglycemic hyperosmolar nonketotic coma) ([facility] 75.) ICD-10-CM: E11.01  ICD-9-CM: 250.20, 250.30  3/8/2021        Hyperglycemia due to type 1 diabetes mellitus ([facility] 75.) ICD-10-CM: E10.65  ICD-9-CM: 250.01  2/22/2021        [facility] (primary sclerosing cholangitis) ICD-10-CM: K83.01  ICD-9-CM: 576.1  2/22/2021        Stage 2 chronic kidney disease ICD-10-CM: N18.2  ICD-9-CM: 585.2  2/22/2021        Mild intermittent asthma without complication OYM-07-SV: G46.17  ICD-9-CM: 493.90  2/22/2021        Elevated liver enzymes ICD-10-CM: R74.8  ICD-9-CM: 790.5  12/19/2019        Insulin dependent diabetes mellitus ICD-10-CM: CFX3251  ICD-9-CM: 250.00, V58.67  6/20/2016        Pancreatic atrophy ICD-10-CM: K86.89  ICD-9-CM: 577.8  6/20/2016            [facility] elevated liver enzymes.   The active problem list, all pertinent past medical history, medications, radiologic findings and laboratory findings related to the liver disorder were reviewed with the patient. The patient is a 60 y.o. Black female who was found to have elevated liver transaminases and alkaline phosphatase in 2018. Serologic evaluation for markers of chronic liver disease were negative. Ultrasound of the liver was performed in 2/2019. The results of the imaging demonstrated a normal appearing liver. MRI was ordered at the time of last office visit, this has not yet been scheduled due to patient's significant claustrophobia - she is unable to tolerate MRI scanner. She has had recent CT scan in 3/2021 showing no liver mass/lesion or biliary ductal dilation. The patient underwent a liver biopsy in 2/2020. This demonstrates fibrosis that is far greater than the degree of portal inflammation suggesting PSC. We plan on obtaining Fibroscan in the office today. The patient has no symptoms which can be attributed to the liver disorder. The patient is not currently experiencing the following symptoms of liver disease:  fatigue, pain in the right side over the liver,fevers or itching. She has had hospitalization for DKA in 3/2021 with associated UTI. She has since been doing better and is stable on medications with no further hospitalizations and reduction in her A1c to 6.5%. She reports that her glucose at home is 250 or less always and she is taking better care of herself, trying to limit stress. She has been following with her PCP, seen last week. Dr Neto Mckeon has left practice and she is looking to establish with new endocrinologist in the near future. The patient completes all daily activities without any functional limitations. ASSESSMENT AND PLAN:  PSC  Intermittent elevation in liver transaminases and persistent elevation in alkaline phosphatase. 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      The AST and ALT remain modestly elevated associated with ongoing elevation of the ALK. Total bilirubin is normal.  Serum albumin is depressed. INR is normal.  The platelet count is normal.      Serologic testing for causes of chronic liver disease were negative. Assessment of liver fibrosis with Fibroscan was performed In 1/2020. The result was 9.8 kPa which correlates with Stage 3 bridging fibrosis. A liver biopsy from 2/2020 suggests PSC. There is mild portal inflammation and portal fibrosis with extension outside the portal tract that is greater than would be expected given the degree of inflammation. This is suggestive of PSC. Repeat Fibroscan in the office today shows minimal fibrosis with a score of 7.1 EkPa and no steatosis. This practice has recommended that we do an MRI/MRCP. She says she cannot tolerate the MRI. She has tried to do an MRI previously for another medical issue and could not stay in the scanner. She has had recent CT scan without mass/lesion or biliary changes in 3/2021. We are currently part of a international clinical trial to develop a medication for PSC/pruritus. As she has no significant itching as part of her symptoms, will defer study participation at present. Since there is currently no FDA approved treatment for [facility] we can simply follow her at regular intervals. I have suggested return office visit in 6 months and have instructed her when to call with any additional symptoms in between visits including increased fatigue, abdominal pain, fever, and itching. We will continue to monitor her on a regular basis and offer additional treatments/trial options as available. Screening for Hepatocellular Carcinoma  HCC screening is not necessary as the patient has no evidence of cirrhosis. Baseline  was not elevated. CT imaging negative in 3/2021.      Treatment of other medical problems in patients with chronic liver disease  There are no contraindications for the patient to take most medications that are necessary for treatment of other medical issues. Counseling for alcohol in patients with chronic liver disease  The patient was counseled regarding alcohol consumption and the effect of alcohol on chronic liver disease. The patient does not consume any significant amount of alcohol. DM  She is focused on improved control at this point and working with PCP for the time being while trying to establish with new endocrinologist.     Vaccinations   Vaccination for viral hepatitis B is not needed. The patient has serologic evidence of prior exposure or vaccination with immunity. The need for vaccination against viral hepatitis A will be assessed with serologic and instituted as appropriate. Routine vaccinations against other bacterial and viral agents can be performed as indicated. Annual flu vaccination should be administered if indicated. She has received COVID vaccine. ALLERGIES  Allergies   Allergen Reactions    Contrast Agent [Iodine] Itching    Hydrocodone Rash    Percocet [Oxycodone-Acetaminophen] Rash     Pt states she is not allergic to Tylenol.  Codeine Nausea and Vomiting    Metformin Diarrhea       MEDICATIONS  Current Outpatient Medications   Medication Sig    Insulin Needles, Disposable, (Danae Pen Needle) 32 gauge x 5/32\" ndle For use with insulin pen as directed. SEND FUTURE REFILL REQUESTS TO PCP AS DR GUERRA IS NO LONGER WITH THIS PRACTICE    insulin glargine (Basaglar KwikPen U-100 Insulin) 100 unit/mL (3 mL) inpn Inject 30 units once daily. SEND FUTURE REFILL REQUESTS TO PCP AS DR GUERRA IS NO LONGER WITH THIS PRACTICE    albuterol (PROVENTIL VENTOLIN) 2.5 mg /3 mL (0.083 %) nebu 3 mL by Nebulization route every six (6) hours as needed for Wheezing. No current facility-administered medications for this visit.        SYSTEM REVIEW NOT RELATED TO LIVER DISEASE OR REVIEWED ABOVE:  Constitution systems: Negative for fever, chills, weight gain, weight loss. Eyes: Negative for visual changes. ENT: Negative for sore throat, painful swallowing. Respiratory: Negative for cough, hemoptysis, SOB. Cardiology: Negative for chest pain, palpitations. GI:  Negative for constipation or diarrhea. : Negative for urinary frequency, dysuria, hematuria, nocturia. Skin: Negative for rash. Hematology: Negative for easy bruising, blood clots. Musculo-skeletal: Negative for back pain, muscle pain, weakness. Neurologic: Negative for headaches, dizziness, vertigo, memory problems not related to HE. Psychology: Negative for anxiety, depression. FAMILY HISTORY:  The father has/had the following following chronic disease(s): prostate and colon cancer. The mother  of MVA. There is no family history of liver disease. SOCIAL HISTORY:  The patient has never been . The patient has no children. The patient has never used tobacco products. The patient has never consumed significant amounts of alcohol. The patient currently works full time: warehouse production. PHYSICAL EXAMINATION:  VS: per nursing note  General: No acute distress. Eyes: Sclera anicteric. ENT: No oral lesions. Skin: No rashes. spider angiomata. No jaundice. Abdomen: No obvious distention suggesting ascites. Extremities: No edema. No muscle wasting. Neurologic: Alert and oriented. Cranial nerves grossly intact.     LABORATORY STUDIES:  From 2021  AST/ALT/ALP/T Bili/ALB: 56/66/364/0.4/4.6  WBC/HB/PLT/INR:6.2/11.8/176/X  NA/BUN/CREAT:145/29/1.40  Hgb A1c: 6.5%    Liver [place] of [address] [place] [address] 376  Units 3/23/2021   WBC 3.4 - 10.8 x10E3/uL 5.5   ANC 1.8 - 8.0 K/UL    HGB 11.1 - 15.9 g/dL 9.6 (L)    - 450 x10E3/uL 389   INR 0.8 - 1.2    AST 0 - 40 IU/L 67 (H)   ALT 0 - 32 IU/L 67 (H)   Alk Phos 39 - 117 IU/L 328 (H)   Bili, Total 0.0 - 1.2 mg/dL 0.5   Bili, Direct 0.00 - 0.40 mg/dL    Albumin 3.8 - 4.9 g/dL 3. 9   BUN 6 - 24 mg/dL 20   Creat 0.57 - 1.00 mg/dL 1.16 (H)   Na 134 - 144 mmol/L 141   K 3.5 - 5.2 mmol/L 4.7   Cl 96 - 106 mmol/L 100   CO2 20 - 29 mmol/L 25   Glucose 65 - 99 mg/dL 183 (H)   Magnesium 1.6 - 2.4 mg/dL      Liver [address] Units 6/22/2020 1/31/2020   WBC 3.4 - 10.8 x10E3/uL 5.0    ANC 1.4 - 7.0 x10E3/uL     HGB 11.1 - 15.9 g/dL 11.7     - 450 x10E3/uL 148 (L)    INR 0.8 - 1.2     AST 0 - 40 IU/L 40 37   ALT 0 - 32 IU/L 40 (H) 56 (H)   Alk Phos 39 - 117 IU/L 286 (H) 243 (H)   Bili, Total 0.0 - 1.2 mg/dL 0.8 0.6   Bili, Direct 0.00 - 0.40 mg/dL 0.23    Albumin 3.8 - 4.9 g/dL 4.9 4.4   BUN 6 - 24 mg/dL 24 28 (H)   BUN (iSTAT) 9 - 20 mg/dL     Creat 0.57 - 1.00 mg/dL 1.49 (H) 1.21 (H)   Creat (iSTAT) 0.6 - 1.3 mg/dL     Na 134 - 144 mmol/L 139 146 (H)   K 3.5 - 5.2 mmol/L 4.9 4.5   Cl 96 - 106 mmol/L 100 104   CO2 20 - 29 mmol/L 20 24   Glucose 65 - 99 mg/dL 387 (H) 171 (H)   Magnesium 1.6 - 2.4 mg/dL       Liver [address] Ref Rng & Units 12/19/2019   WBC 3.4 - 10.8 x10E3/uL 6.2   ANC 1.4 - 7.0 x10E3/uL 4.3   HGB 11.1 - 15.9 g/dL 11.9    - 450 x10E3/uL 242   INR 0.8 - 1.2 1.0   AST 0 - 40 IU/L 34   ALT 0 - 32 IU/L 52 (H)   Alk Phos 39 - 117 IU/L 328 (H)   Bili, Total 0.0 - 1.2 mg/dL 0.4   Bili, Direct 0.00 - 0.40 mg/dL 0.15   Albumin 3.8 - 4.9 g/dL 4.8   BUN 6 - 24 mg/dL 27 (H)   BUN (iSTAT) 9 - 20 mg/dL    Creat 0.57 - 1.00 mg/dL 1.30 (H)   Creat (iSTAT) 0.6 - 1.3 mg/dL    Na 134 - 144 mmol/L 145 (H)   K 3.5 - 5.2 mmol/L 4.5   Cl 96 - 106 mmol/L 103   CO2 20 - 29 mmol/L 23   Glucose 65 - 99 mg/dL 44 (L)   Magnesium 1.6 - 2.4 mg/dL      SEROLOGIES:  8/2018. HBsurface antigen negative, anti-HCV negative, Ferritin 1139, FE saturation 74%, JUN negative, ANCA negative    3/2021. Fe 71, TS% 27.     Serologies Latest Ref Rng & Units 12/19/2019 8/21/2019   Hep B Surface Ag Negative  Negative   Hep B Core Ab, Total Negative Negative    Hep B Surface AB QL  Reactive    Ferritin 15 - 150 ng/mL 445 (H) 384 (H)   Iron % Saturation 15 - 55 % 48 31   JUN, IFA  Negative    M2 Ab 0.0 - 20.0 Units  <20.0   Ceruloplasmin 19.0 - 39.0 mg/dL  32.5   Alpha-1 antitrypsin level 90 - 200 mg/dL  129     LIVER HISTOLOGY:  1/2020. FibroScan performed at [facility]. EkPa was 9.8. Suggested fibrosis level is F2-3. CAP score is 147.  2/2020. Slides reviewed by MLS. Non-specific mild portal inflammation, with No interface hepatitis, with No PMN. No lobular inflammation. No steatosis. Anish stage 3 fibrosis. Metavir stage 2 fibrosis. Knodell score (0011). The degree of portal fibrosis is greater than would be expected based upon the degree of inflammation. This suggests [facility].  10/2021. FibroScan performed at [facility]. EkPa was 7.1. Suggested fibrosis level is F1-2. CAP score is 171. ENDOSCOPIC PROCEDURES:  Not available or performed    RADIOLOGY:  2/2019. Ultrasound of liver. Normal appearing liver. No liver mass lesions. 3/2021. CT abdomen. Normal appearing liver. No fluid/ascites. OTHER TESTING:  Not available or performed    FOLLOW-UP:  All of the issues listed above in the Assessment and Plan were discussed with the patient. All questions were answered. The patient expressed a clear understanding of the above. [facility] 87 in 6 months for monitoring. Documentation reviewed and updated to reflect current, accurate patient information.     Thomas Gamez PA-C  Liver [facility] 59 [address] [facility] Angelita Wolff  22.  [phone]  [address] [facility]

## 2021-10-12 NOTE — PROGRESS NOTES
Identified pt with two pt identifiers(name and ). Reviewed record in preparation for visit and have obtained necessary documentation. Chief Complaint   Patient presents with    Other     [facility]   Fibroscan f/u      Vitals:    10/12/21 0947   BP: 131/66   Pulse: 66   Resp: 16   Temp: (!) 96.7 °F (35.9 °C)   TempSrc: Temporal   SpO2: 99%   Weight: 101 lb 3.2 oz (45.9 kg)   Height: 5' 3\" (1.6 m)   PainSc:   0 - No pain       Health Maintenance Review: Patient reminded of \"due or due soon\" health maintenance. I have asked the patient to contact his/her primary care provider (PCP) for follow-up on his/her health maintenance. Coordination of Care Questionnaire:  :   1) Have you been to an emergency room, urgent care, or hospitalized since your last visit? If yes, where when, and reason for visit? Yes, [facility] pain in pinky toe on right foot      2. Have seen or consulted any other health care provider since your last visit? If yes, where when, and reason for visit? YES, PCP referred to ankle and foot specialist, 10/4/2, stress fracture      Patient is accompanied by self I have received verbal consent from Jasbir Gomez to discuss any/all medical information while they are present in the room.

## 2021-12-21 NOTE — PROGRESS NOTES
HISTORY OF PRESENT ILLNESS  Enriqueta Rose is a 60 y.o. female. HPI     Pt was last here 9/22/21. Pt is here for routine care.      She c/o r thumb / finger locking up  Discussed returning to see Dr. Delma Leung (ortho), see below     BP today is 124/71     type 1 diabetic  BS AM 85 115 140, denies lows under 70, highest 174  Pt takes basaglar 25U  No longer using humalog either  Pt is no longer on metformin --stopped d/t SE     Pt followed with Dr. Aaron Gaviria (endo) for type I diabetes  Last visit 2/19/21  Provided information for Dr. Andry Crawford (endo)     Wt today is 106 lbs, up 5 lbs x lov     Reviewed labs     Pt follows with Dr. Елена Melton (hep) for elevated liver tests has primary sclerosing cholangitis  Reviewed note with ROWENA Joiner 10/12/21:  PSC  Intermittent elevation in liver transaminases and persistent elevation in alkaline phosphatase. 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 The AST and ALT remain modestly elevated associated with ongoing elevation of the ALK. Total bilirubin is normal.  Serum albumin is depressed. INR is normal.  The platelet count is normal.    Serologic testing for causes of chronic liver disease were negative. Assessment of liver fibrosis with Fibroscan was performed In 1/2020. The result was 9.8 kPa which correlates with Stage 3 bridging fibrosis. A liver biopsy from 2/2020 suggests PSC. There is mild portal inflammation and portal fibrosis with extension outside the portal tract that is greater than would be expected given the degree of inflammation. This is suggestive of PSC. Repeat Fibroscan in the office today shows minimal fibrosis with a score of 7.1 EkPa and no steatosis. This practice has recommended that we do an MRI/MRCP. She says she cannot tolerate the MRI. She has tried to do an MRI previously for another medical issue and could not stay in the scanner. She has had recent CT scan without mass/lesion or biliary changes in 3/2021.    We are currently part of a international clinical trial to develop a medication for PSC/pruritus. As she has no significant itching as part of her symptoms, will defer study participation at present. Since there is currently no FDA approved treatment for [facility] we can simply follow her at regular intervals. I have suggested return office visit in 6 months and have instructed her when to call with any additional symptoms in between visits including increased fatigue, abdominal pain, fever, and itching. We will continue to monitor her on a regular basis and offer additional treatments/trial options as available. Screening for Hepatocellular Carcinoma  HCC screening is not necessary as the patient has no evidence of cirrhosis. Baseline  was not elevated. CT imaging negative in 3/2021. Treatment of other medical problems in patients with chronic liver disease  There are no contraindications for the patient to take most medications that are necessary for treatment of other medical issues. Counseling for alcohol in patients with chronic liver disease  The patient was counseled regarding alcohol consumption and the effect of alcohol on chronic liver disease. The patient does not consume any significant amount of alcohol. DM  She is focused on improved control at this point and working with PCP for the time being while trying to establish with new endocrinologist.   Vaccinations   Vaccination for viral hepatitis B is not needed. The patient has serologic evidence of prior exposure or vaccination with immunity. The need for vaccination against viral hepatitis A will be assessed with serologic and instituted as appropriate. Routine vaccinations against other bacterial and viral agents can be performed as indicated. Annual flu vaccination should be administered if indicated. She has received COVID vaccine.    No tx, will monitor liver tests     Pt follows with Dr. Radha Peterson (nephro) for ckd, has had multiple arf episodes  Last there 9/07/21  9/10/21: A1c 6.5  Baseline cr 1.4-1. 6      seeing Dr. Elliott Mahoney (uro) and ROWENA Donnelly for recurrent UTIs  Last visit was 10/18     Pt has been following with Dr Brendan Gilbert (ortho) for knee pain   Last there 12/19/19 with ROWENA Farias Daily   Dr Suzanna Scott (ortho) for sciatica   Last visit was 1/19 with ROWENA Kapoor   Physical therapy in the past     She is following with Dr. Lucia Ortiz (ortho) for trigger thumb  Last there 10/29/20  She had cortisone injection see above     Pt has albuterol to use prn for her asthma  Denies wheezing has not needed this recently     PREVENTIVE:  Colonoscopy: 4/21 Dr. Fernanda Merrill fmx - father  EGD: 3/16/21 Dr. Latosha Aguirre  Pap: [facility], 3/20  Mammogram: 5/19/21 negative  Dexa: not yet needed  Tdap: ~2013  Pneumovax: 02/19/19   Shingrix: 09/22/21  Flu shot: 9/21  Foot exam: 09/22/21  Micro: 1/20  some protein in urine   A1C: 2/19 POC 14.0, 3/19 8.3 per  3/21 10.1 Bhupendra 1/20 7.7 3/21 10.1 9/21 6.2  Eye exam: Dr Wolff, 7/21-- small cataract per pt will f/u in 1 year  Lipids: 3/21  Covid: J&J + Pfizer booster    Patient Active Problem List    Diagnosis Date Noted    Hyperglycemia due to type 1 diabetes mellitus ([facility] [address] 02/22/2021    PSC (primary sclerosing cholangitis) 02/22/2021    Stage 2 chronic kidney disease 02/22/2021    Mild intermittent asthma without complication 66/14/1026    Elevated liver enzymes 12/19/2019    Insulin dependent diabetes mellitus 06/20/2016    Pancreatic atrophy 06/20/2016     Current Outpatient Medications   Medication Sig Dispense Refill    insulin glargine (Basaglar KwikPen U-100 Insulin) 100 unit/mL (3 mL) inpn Inject 25 units once daily. SEND FUTURE REFILL REQUESTS TO PCP AS DR GUERRA IS NO LONGER WITH THIS PRACTICE 45 mL 1    Insulin Needles, Disposable, (Danae Pen Needle) 32 gauge x 5/32\" ndle For use with insulin pen as directed.  SEND FUTURE REFILL REQUESTS TO PCP AS DR GUERRA IS NO LONGER WITH THIS PRACTICE 100 Pen Needle 3    albuterol (PROVENTIL VENTOLIN) 2.5 mg /3 mL (0.083 %) nebu 3 mL by Nebulization route every six (6) hours as needed for Wheezing. 30 Nebule 0     Past Surgical History:   Procedure Laterality Date    HX HEENT      UPPER GI ENDOSCOPY,BIOPSY  3/16/2021           Lab Results   Component Value Date/Time    WBC 5.5 03/23/2021 09:16 AM    HGB 9.6 (L) 03/23/2021 09:16 AM    HCT 29.4 (L) 03/23/2021 09:16 AM    Hematocrit (POC) 31 (L) 08/24/2018 01:15 PM    PLATELET 401 86/68/1987 09:16 AM    MCV 95 03/23/2021 09:16 AM     Lab Results   Component Value Date/Time    Cholesterol, total 236 (H) 03/23/2021 09:17 AM    HDL Cholesterol 100 03/23/2021 09:17 AM    LDL, calculated 120 (H) 03/23/2021 09:17 AM    LDL, calculated 80 01/31/2020 08:08 AM    Triglyceride 95 03/23/2021 09:17 AM     Lab Results   Component Value Date/Time    GFR est non-AA 52 (L) 03/23/2021 09:17 AM    GFRNA, POC 4 (L) 08/24/2018 01:15 PM    GFR est AA 60 03/23/2021 09:17 AM    GFRAA, POC 5 (L) 08/24/2018 01:15 PM    Creatinine 1.16 (H) 03/23/2021 09:17 AM    Creatinine (POC) 9.5 (H) 08/24/2018 01:15 PM    BUN 20 03/23/2021 09:17 AM    BUN (POC) >140 (H) 08/24/2018 01:15 PM    Sodium 141 03/23/2021 09:17 AM    Sodium (POC) 124 (L) 08/24/2018 01:15 PM    Potassium 4.7 03/23/2021 09:17 AM    Potassium (POC) 7.7 (HH) 08/24/2018 01:15 PM    Chloride 100 03/23/2021 09:17 AM    Chloride (POC) 91 (L) 08/24/2018 01:15 PM    CO2 25 03/23/2021 09:17 AM    Magnesium 1.8 03/10/2021 03:55 AM    Phosphorus 2.4 (L) 03/08/2021 05:23 PM    PTH, Intact 360.7 (H) 08/25/2018 10:36 AM        Review of Systems   Respiratory: Negative for shortness of breath. Cardiovascular: Negative for chest pain. Physical Exam  Constitutional:       General: She is not in acute distress. Appearance: Normal appearance. She is not ill-appearing, toxic-appearing or diaphoretic. HENT:      Head: Normocephalic and atraumatic.       Right Ear: External ear normal. Left Ear: External ear normal.   Eyes:      General:         Right eye: No discharge. Left eye: No discharge. Conjunctiva/sclera: Conjunctivae normal.      Pupils: Pupils are equal, round, and reactive to light. Neck:      Vascular: No carotid bruit. Cardiovascular:      Rate and Rhythm: Normal rate and regular rhythm. Heart sounds: No murmur heard. No friction rub. No gallop. Pulmonary:      Effort: No respiratory distress. Breath sounds: Normal breath sounds. No wheezing or rales. Chest:      Chest wall: No tenderness. Abdominal:      General: Abdomen is flat. There is no distension. Palpations: Abdomen is soft. There is no mass. Tenderness: There is no abdominal tenderness. There is no guarding or rebound. Hernia: No hernia is present. Musculoskeletal:         General: Normal range of motion. Cervical back: Normal range of motion and neck supple. Skin:     General: Skin is warm and dry. Neurological:      Mental Status: She is alert and oriented to person, place, and time. Mental status is at baseline. Coordination: Coordination normal.      Gait: Gait normal.   Psychiatric:         Mood and Affect: Mood normal.         Behavior: Behavior normal.         ASSESSMENT and PLAN    ICD-10-CM ICD-9-CM    1. Physical exam      Z00.00 V70.9 MICROALBUMIN, UR, RAND W/ MICROALB/CREAT RATIO      LIPID PANEL      METABOLIC PANEL, COMPREHENSIVE   Labs ordered normal weight Shingrix today flu shot up-to-date Covid shot up-to-date will get colonoscopy report mammogram up-to-date pelvic exam due next year eye exam up-to-date blood pressure normal   HEMOGLOBIN A1C WITH EAG      TSH 3RD GENERATION      CBC W/O DIFF      MICROALBUMIN, UR, RAND W/ MICROALB/CREAT RATIO      LIPID PANEL      METABOLIC PANEL, COMPREHENSIVE      HEMOGLOBIN A1C WITH EAG      TSH 3RD GENERATION      CBC W/O DIFF   2.  Hyperglycemia due to type 1 diabetes mellitus ([facility] [address]  E10.65 250.01 REFERRAL TO ENDOCRINOLOGY      MICROALBUMIN, UR, RAND W/ MICROALB/CREAT RATIO   Home readings good needs establish with new endocrinology as she is type I diabetic continue Basaglar 25 units   HEMOGLOBIN A1C WITH EAG      MICROALBUMIN, UR, RAND W/ MICROALB/CREAT RATIO      HEMOGLOBIN A1C WITH EAG   3. PSC (primary sclerosing cholangitis)     Follows w hepatology      K83.01 576.1    4. Mild intermittent asthma without complication       Albuterol as needed J45.20 493.90    5. Stage 2 chronic kidney disease       Pathology creatinine 1.4-1.6 baseline N18.2 585.2    6. Trigger finger of right thumb     Refer to ortho M65.311 727.03         Depression screen reviewed and negative     Scribed by Mitali Hester, as dictated by Dr. Tessa Kaplan. Current diagnosis and concerns discussed with pt at length. Pt understands risks and benefits or current treatment plan and medications, and accepts the treatment and medication with any possible risks. Pt asks appropriate questions, which were answered. Pt was instructed to call with any concerns or problems. I have reviewed the note documented by the scribe. The services provided are my own.   The documentation is accurate

## 2021-12-22 ENCOUNTER — OFFICE VISIT (OUTPATIENT)
Dept: INTERNAL MEDICINE CLINIC | Age: 56
End: 2021-12-22
Payer: COMMERCIAL

## 2021-12-22 VITALS
DIASTOLIC BLOOD PRESSURE: 71 MMHG | HEIGHT: 63 IN | RESPIRATION RATE: 16 BRPM | OXYGEN SATURATION: 98 % | WEIGHT: 106 LBS | HEART RATE: 74 BPM | SYSTOLIC BLOOD PRESSURE: 124 MMHG | TEMPERATURE: 97.9 F | BODY MASS INDEX: 18.78 KG/M2

## 2021-12-22 DIAGNOSIS — J45.20 MILD INTERMITTENT ASTHMA WITHOUT COMPLICATION: ICD-10-CM

## 2021-12-22 DIAGNOSIS — M65.311 TRIGGER FINGER OF RIGHT THUMB: ICD-10-CM

## 2021-12-22 DIAGNOSIS — E10.65 HYPERGLYCEMIA DUE TO TYPE 1 DIABETES MELLITUS (HCC): ICD-10-CM

## 2021-12-22 DIAGNOSIS — Z23 ENCOUNTER FOR IMMUNIZATION: ICD-10-CM

## 2021-12-22 DIAGNOSIS — Z00.00 PHYSICAL EXAM: Primary | ICD-10-CM

## 2021-12-22 DIAGNOSIS — N18.2 STAGE 2 CHRONIC KIDNEY DISEASE: ICD-10-CM

## 2021-12-22 DIAGNOSIS — K83.01 PSC (PRIMARY SCLEROSING CHOLANGITIS): ICD-10-CM

## 2021-12-22 PROBLEM — E11.01 HHNC (HYPERGLYCEMIC HYPEROSMOLAR NONKETOTIC COMA) (HCC): Status: RESOLVED | Noted: 2021-03-08 | Resolved: 2021-12-22

## 2021-12-22 PROCEDURE — 90750 HZV VACC RECOMBINANT IM: CPT | Performed by: INTERNAL MEDICINE

## 2021-12-22 PROCEDURE — 99396 PREV VISIT EST AGE 40-64: CPT | Performed by: INTERNAL MEDICINE

## 2021-12-22 PROCEDURE — 90471 IMMUNIZATION ADMIN: CPT | Performed by: INTERNAL MEDICINE

## 2021-12-22 RX ORDER — INSULIN GLARGINE 100 [IU]/ML
INJECTION, SOLUTION SUBCUTANEOUS
Qty: 45 ML | Refills: 1 | Status: SHIPPED | OUTPATIENT
Start: 2021-12-22 | End: 2022-09-26 | Stop reason: SDUPTHER

## 2021-12-23 LAB
ALBUMIN SERPL-MCNC: 4.4 G/DL (ref 3.8–4.9)
ALBUMIN/CREAT UR: 55 MG/G CREAT (ref 0–29)
ALBUMIN/GLOB SERPL: 1.9 {RATIO} (ref 1.2–2.2)
ALP SERPL-CCNC: 245 IU/L (ref 44–121)
ALT SERPL-CCNC: 68 IU/L (ref 0–32)
AST SERPL-CCNC: 59 IU/L (ref 0–40)
BILIRUB SERPL-MCNC: 0.4 MG/DL (ref 0–1.2)
BUN SERPL-MCNC: 24 MG/DL (ref 6–24)
BUN/CREAT SERPL: 16 (ref 9–23)
CALCIUM SERPL-MCNC: 9.4 MG/DL (ref 8.7–10.2)
CHLORIDE SERPL-SCNC: 102 MMOL/L (ref 96–106)
CHOLEST SERPL-MCNC: 200 MG/DL (ref 100–199)
CO2 SERPL-SCNC: 25 MMOL/L (ref 20–29)
CREAT SERPL-MCNC: 1.47 MG/DL (ref 0.57–1)
CREAT UR-MCNC: 54.6 MG/DL
ERYTHROCYTE [DISTWIDTH] IN BLOOD BY AUTOMATED COUNT: 11.3 % (ref 11.7–15.4)
EST. AVERAGE GLUCOSE BLD GHB EST-MCNC: 126 MG/DL
GLOBULIN SER CALC-MCNC: 2.3 G/DL (ref 1.5–4.5)
GLUCOSE SERPL-MCNC: 304 MG/DL (ref 65–99)
HBA1C MFR BLD: 6 % (ref 4.8–5.6)
HCT VFR BLD AUTO: 31.9 % (ref 34–46.6)
HDLC SERPL-MCNC: 118 MG/DL
HGB BLD-MCNC: 10.3 G/DL (ref 11.1–15.9)
LDLC SERPL CALC-MCNC: 69 MG/DL (ref 0–99)
MCH RBC QN AUTO: 30.9 PG (ref 26.6–33)
MCHC RBC AUTO-ENTMCNC: 32.3 G/DL (ref 31.5–35.7)
MCV RBC AUTO: 96 FL (ref 79–97)
MICROALBUMIN UR-MCNC: 30.3 UG/ML
PLATELET # BLD AUTO: 159 X10E3/UL (ref 150–450)
POTASSIUM SERPL-SCNC: 5.5 MMOL/L (ref 3.5–5.2)
PROT SERPL-MCNC: 6.7 G/DL (ref 6–8.5)
RBC # BLD AUTO: 3.33 X10E6/UL (ref 3.77–5.28)
SODIUM SERPL-SCNC: 140 MMOL/L (ref 134–144)
TRIGL SERPL-MCNC: 76 MG/DL (ref 0–149)
TSH SERPL DL<=0.005 MIU/L-ACNC: 1.1 UIU/ML (ref 0.45–4.5)
VLDLC SERPL CALC-MCNC: 13 MG/DL (ref 5–40)
WBC # BLD AUTO: 4.8 X10E3/UL (ref 3.4–10.8)

## 2021-12-23 NOTE — PROGRESS NOTES
Please call patient back about results  Improving anemia, had egd/colo done this spring  Add ferritin/iron to blood at lab  Dm controlled, monitor bs make sure no lows  Ck stable  Lft stable seeing liver clinic for psc

## 2021-12-23 NOTE — PROGRESS NOTES
Called, no answer left message to call office back re: lab results. Faxed lab add on to [employer] w/ confirmation received.

## 2021-12-24 LAB
FERRITIN SERPL-MCNC: 218 NG/ML (ref 15–150)
IRON SERPL-MCNC: 147 UG/DL (ref 27–159)
SPECIMEN STATUS REPORT, ROLRST: NORMAL

## 2021-12-27 NOTE — PROGRESS NOTES
Called, spoke with Pt. Two pt identifiers confirmed. Pt given lab results and recommendations per Dr. Crystal Boyd. Pt verbalized understanding of information discussed w/ no further questions at this time.

## 2022-03-18 ENCOUNTER — OFFICE VISIT (OUTPATIENT)
Dept: ENDOCRINOLOGY | Age: 57
End: 2022-03-18
Payer: COMMERCIAL

## 2022-03-18 DIAGNOSIS — E13.9 LADA (LATENT AUTOIMMUNE DIABETES IN ADULTS), MANAGED AS TYPE 1 (HCC): Primary | ICD-10-CM

## 2022-03-18 DIAGNOSIS — K86.9 DIABETES MELLITUS ASSOCIATED WITH PANCREATIC DISEASE (HCC): ICD-10-CM

## 2022-03-18 DIAGNOSIS — E11.69 DIABETES MELLITUS ASSOCIATED WITH PANCREATIC DISEASE (HCC): ICD-10-CM

## 2022-03-18 PROBLEM — R74.8 ELEVATED LIVER ENZYMES: Status: ACTIVE | Noted: 2019-12-19

## 2022-03-18 PROCEDURE — 99215 OFFICE O/P EST HI 40 MIN: CPT | Performed by: INTERNAL MEDICINE

## 2022-03-18 NOTE — PROGRESS NOTES
Chief Complaint   Patient presents with    New Patient     Diabetes    Other     pcp and pharmacy confirmed       Patient was last seen: New Patient Visit - Last seen by Dr Silvio Weston  2/19    General:   DM 6/16 - is type 1 - initially mis-diagnosed (initially C-peptides were normal, then 3/21 was low)  Related to pancreas atrophy  So had been tried on non-insulin options 1st  Metformin - 'allergic' to it - diarrhea, itching, abd pain  Sulfonylurea - 'reaction' to it - felt like hallucination  No TZD, no DPP5, no SGLT  Tried trulicity - tolerated, but did not work b/c DM1  On insulin 2-3 years ago (~ 1 yr after diagnosed)    Freestyle made arm break out - not interested - tried other things too   Stress can increase her sugars     A1c: last a1c was 6.0     DM Medications:    Basaglar 25units daily    Last Changes: : no changes at last visit    Sugar Checks: checks up to 3 x day     AM: reports:  14 d 99    PM: reports: lowest 55, highest low 200 from stress -not frequent    LOWs:  infrequent - no specific time of day      DIET: feels does well with diabetic diet   Bk: variable - can be oatmeal and fruit --> has no sugar spike; egg/Bk  Ln:  Salad, vege  Dn: like lunch   Sn: granola bar   Dr: water    EXERCISE: does not exercise- works in [employer]      HTN: at goal, on no meds     LIPIDS: at goal, on no meds    RENAL: has mild renal insufficency, has positive CHAD-r , in the past year, is followed by Nephrology     EYES: eye exam in past year, has no retinopathy     FEET: has no current issues, no numbness or tingling     DENTAL:  has seen dentist in past year     HEART:  no chest pain, shortness of breath or claudication, has no cardiac history     ASA:  does not take aspirin or other blood thinner     SYMPTOMS: no polyuria, thirst or blurred vision     THYROID: no known thyroid issue    DIABETES HISTORY: see above      LABS/STUDIES:     Lab Results   Component Value Date/Time    C-Peptide 0.6 (L) 03/23/2021 09:17 AM C-Peptide 1.3 01/31/2020 08:08 AM    C-Peptide 1.7 07/23/2018 07:56 AM    C-Peptide 2.2 03/06/2018 07:53 AM       Lab Results   Component Value Date/Time    Hemoglobin A1c 6.0 (H) 12/22/2021 10:09 AM    Hemoglobin A1c 8.3 (H) 03/23/2021 09:17 AM    Hemoglobin A1c 10.1 (H) 03/08/2021 03:01 PM    Hemoglobin A1c, External 6.5 09/10/2021 12:00 AM    Glucose 304 (H) 12/22/2021 10:09 AM    Glucose (POC) 118 (H) 03/17/2021 08:17 AM    GFR est AA 45 (L) 12/22/2021 10:09 AM    GFR est non-AA 39 (L) 12/22/2021 10:09 AM    Microalb/Creat ratio (ug/mg creat.) 55 (H) 12/22/2021 10:09 AM    Creatinine (POC) 9.5 (H) 08/24/2018 01:15 PM    Creatinine 1.47 (H) 12/22/2021 10:09 AM        Lab Results   Component Value Date/Time    Cholesterol, total 200 (H) 12/22/2021 10:09 AM    Triglyceride 76 12/22/2021 10:09 AM    HDL Cholesterol 118 12/22/2021 10:09 AM    LDL, calculated 69 12/22/2021 10:09 AM    LDL, calculated 80 01/31/2020 08:08 AM          Past Medical History:   Diagnosis Date    Asthma     Diabetes ([facility] [place] [address]     Elevated transaminase level 6/29/2016    Insulin dependent diabetes mellitus 6/20/2016    Pancreatic atrophy 6/20/2016         Employer:  [employer]        Blood pressure 123/68, weight 108 lb (49 kg).      Weight Metrics 3/18/2022 12/22/2021 10/12/2021 9/22/2021 3/16/2021 11/11/2020 6/22/2020   Weight 108 lb 106 lb 101 lb 3.2 oz 100 lb 6.4 oz 95 lb 10.9 oz 101 lb 101 lb   BMI 19.13 kg/m2 18.78 kg/m2 17.93 kg/m2 17.79 kg/m2 16.95 kg/m2 17.89 kg/m2 17.89 kg/m2        EXAM  - GENERAL: NCAT, Appears well nourished   - EYES: EOMI, non-icteric, no proptosis   - Ear/Nose/Throat: NCAT, no visible inflammation or masses   - CARDIOVASCULAR: no cyanosis, no visible JVD   - RESPIRATORY: respiratory effort normal without any distress or labored breathing   - MUSCULOSKELETAL: Normal ROM of neck and upper extremities observed   - SKIN: No rash on face  - NEUROLOGIC:  No facial asymmetry (Cranial nerve 7 motor function), No gaze palsy   - PSYCHIATRIC: Normal affect, Normal insight and judgement      Assessment/Plan:     1. Diabetes mellitus associated with pancreatic disease ([facility] [place] [address]    Unusual to only need basal insulin -but is very, very active at work so why may get away with out meal insulin  Usually DM from pancreatic issues are more brittle, but seems to be doing fine  No changes  Not interested in continuous glucose monitor    40+ minutes spend face to face and reviewing records (labs/notes/studies)    Orders Placed This Encounter    HEMOGLOBIN A1C WITH EAG      Follow-up and Dispositions    · Return in about 4 months (around 7/18/2022).

## 2022-03-19 PROBLEM — J45.20 MILD INTERMITTENT ASTHMA WITHOUT COMPLICATION: Status: ACTIVE | Noted: 2021-02-22

## 2022-03-19 PROBLEM — E10.65 HYPERGLYCEMIA DUE TO TYPE 1 DIABETES MELLITUS (HCC): Status: ACTIVE | Noted: 2021-02-22

## 2022-03-19 PROBLEM — K83.01 PSC (PRIMARY SCLEROSING CHOLANGITIS): Status: ACTIVE | Noted: 2021-02-22

## 2022-03-20 PROBLEM — N18.2 STAGE 2 CHRONIC KIDNEY DISEASE: Status: ACTIVE | Noted: 2021-02-22

## 2022-03-21 VITALS — SYSTOLIC BLOOD PRESSURE: 123 MMHG | WEIGHT: 108 LBS | DIASTOLIC BLOOD PRESSURE: 68 MMHG | BODY MASS INDEX: 19.13 KG/M2

## 2022-03-21 NOTE — PROGRESS NOTES
HISTORY OF PRESENT ILLNESS  Minh Jennings is a 60 y.o. female.   HPI     Pt was last here 12/22/21. Pt is here for routine care.       BP today is 133/91 repeat improved     Type 1 diabetic  BS 90 112 125 59, high 229, 57  Pt takes basaglar 25U  No longer using humalog either  Pt is no longer on metformin --stopped d/t SE     type I diabetes  Now following with Dr. Kashif Alvares (endo)  Reviewed note 3/18/22:  40+ minutes spend face to face and reviewing records (labs/notes/studies)  No adjustments made to insulin, will f/u 7/22    Wt today is 107 lbs, up 1 lb x lov     Reviewed labs  Will order repeat echo murmur heard on exam today    Discussed if anemia isn't resolved could f/u with hematologist     Pt follows with Dean Machuca (hep) for elevated liver tests has primary sclerosing cholangitis  Last visit with ROWENA Joiner 10/12/21    No tx, will monitor liver tests     Pt follows with Dr. Murphy (nephro) for ckd, has had multiple arf episodes  Last there 9/10/21 office scheduled for May    9/10/21: A1c 6.5  Baseline cr 1.4-1.6     Seeing Juan J Chaudhary (uro) and ROWENA Szymanski for recurrent UTIs  Last visit was 10/18     Pt has been following with Dr Leona Dillon (ortho) for knee pain   Last there 12/19/19 with ROWENA Burgess (ortho) for sciatica   Last visit was 1/19 with ROWENA Duncan   Physical therapy in the past     She is following with Dr. Marco Trimble (ortho) for trigger thumb  Last there 10/29/20  She had cortisone injection see above     Pt has albuterol to use prn for her asthma  Denies wheezing has not needed this recently    Reviewed note with Dr. Rosalee Dill (GI) 6/4/21: discussed colo completed in April 2021  Discussed scheduling f/u     PREVENTIVE:  Colonoscopy: 4/21 Dr. Rosalee Dill fmhx - father  EGD: 3/16/21 Dr. King Hutchins  Pap: [facility], 3/20  Mammogram: 5/19/21 negative  Dexa: not yet needed  SARV: ~6136  Pneumovax: 02/19/19   Shingrix: 09/22/21  Flu shot: 9/21  Foot exam: 09/22/21  Micro: 12/21 some protein in urine  A1C:1/20 7.7 3/21 10.1 9/21 6.2 12/21 6.0  Eye exam: Dr Wolff, 7/21-- small cataract per pt will f/u in 1 year  Lipids: 12/21 LDL 69  Covid: J&J + Pfizer booster    Patient Active Problem List    Diagnosis Date Noted    Hyperglycemia due to type 1 diabetes mellitus ([address] 02/22/2021    PSC (primary sclerosing cholangitis) 02/22/2021    Stage 2 chronic kidney disease 02/22/2021    Mild intermittent asthma without complication 52/39/2990    Elevated liver enzymes 12/19/2019    Insulin dependent diabetes mellitus 06/20/2016    Pancreatic atrophy 06/20/2016     Current Outpatient Medications   Medication Sig Dispense Refill    insulin glargine (Basaglar KwikPen U-100 Insulin) 100 unit/mL (3 mL) inpn Inject 25 units once daily. SEND FUTURE REFILL REQUESTS TO PCP AS DR GUERRA IS NO LONGER WITH THIS PRACTICE 45 mL 1    Insulin Needles, Disposable, (Danae Pen Needle) 32 gauge x 5/32\" ndle For use with insulin pen as directed. SEND FUTURE REFILL REQUESTS TO PCP AS DR GUERRA IS NO LONGER WITH THIS PRACTICE 100 Pen Needle 3    albuterol (PROVENTIL VENTOLIN) 2.5 mg /3 mL (0.083 %) nebu 3 mL by Nebulization route every six (6) hours as needed for Wheezing.  30 Nebule 0     Past Surgical History:   Procedure Laterality Date    HX HEENT      UPPER GI ENDOSCOPY,BIOPSY  3/16/2021           Lab Results   Component Value Date/Time    WBC 4.8 12/22/2021 10:09 AM    HGB 10.3 (L) 12/22/2021 10:09 AM    HCT 31.9 (L) 12/22/2021 10:09 AM    Hematocrit (POC) 31 (L) 08/24/2018 01:15 PM    PLATELET 257 90/06/6468 10:09 AM    MCV 96 12/22/2021 10:09 AM     Lab Results   Component Value Date/Time    Cholesterol, total 200 (H) 12/22/2021 10:09 AM    HDL Cholesterol 118 12/22/2021 10:09 AM    LDL, calculated 69 12/22/2021 10:09 AM    LDL, calculated 80 01/31/2020 08:08 AM    Triglyceride 76 12/22/2021 10:09 AM     Lab Results   Component Value Date/Time    GFR est non-AA 39 (L) 12/22/2021 10:09 AM    GFRNA, POC 4 (L) 08/24/2018 01:15 PM    GFR est AA 45 (L) 12/22/2021 10:09 AM    GFRAA, POC 5 (L) 08/24/2018 01:15 PM    Creatinine 1.47 (H) 12/22/2021 10:09 AM    Creatinine (POC) 9.5 (H) 08/24/2018 01:15 PM    BUN 24 12/22/2021 10:09 AM    BUN (POC) >140 (H) 08/24/2018 01:15 PM    Sodium 140 12/22/2021 10:09 AM    Sodium (POC) 124 (L) 08/24/2018 01:15 PM    Potassium 5.5 (H) 12/22/2021 10:09 AM    Potassium (POC) 7.7 (HH) 08/24/2018 01:15 PM    Chloride 102 12/22/2021 10:09 AM    Chloride (POC) 91 (L) 08/24/2018 01:15 PM    CO2 25 12/22/2021 10:09 AM    Magnesium 1.8 03/10/2021 03:55 AM    Phosphorus 2.4 (L) 03/08/2021 05:23 PM    PTH, Intact 360.7 (H) 08/25/2018 10:36 AM        Review of Systems   Respiratory: Negative for shortness of breath. Cardiovascular: Negative for chest pain. Physical Exam  Constitutional:       General: She is not in acute distress. Appearance: Normal appearance. She is not ill-appearing, toxic-appearing or diaphoretic. HENT:      Head: Normocephalic and atraumatic. Right Ear: External ear normal.      Left Ear: External ear normal.   Eyes:      General:         Right eye: No discharge. Left eye: No discharge. Conjunctiva/sclera: Conjunctivae normal.      Pupils: Pupils are equal, round, and reactive to light. Cardiovascular:      Rate and Rhythm: Normal rate and regular rhythm. Heart sounds: Murmur (R sternal border) heard. No friction rub. No gallop. Pulmonary:      Effort: No respiratory distress. Breath sounds: Normal breath sounds. No wheezing or rales. Chest:      Chest wall: No tenderness. Musculoskeletal:         General: Normal range of motion. Cervical back: Normal range of motion and neck supple. Skin:     General: Skin is warm and dry. Neurological:      Mental Status: She is alert and oriented to person, place, and time. Mental status is at baseline.       Coordination: Coordination normal.      Gait: Gait normal.   Psychiatric: Mood and Affect: Mood normal.         Behavior: Behavior normal.         ASSESSMENT and PLAN    ICD-10-CM ICD-9-CM    1. Stage 3 chronic kidney disease, unspecified whether stage 3a or 3b CKD (HCC)    N18.30 585.3 HEMOGLOBIN A1C WITH EAG      METABOLIC PANEL, COMPREHENSIVE   Creatinine runs around 1.4 baseline continue to monitor will be seeing nephrology next month   CBC W/O DIFF      HEMOGLOBIN A1C WITH EAG      METABOLIC PANEL, COMPREHENSIVE      CBC W/O DIFF   2. PSC (primary sclerosing cholangitis)  K83.01 576.1 HEMOGLOBIN A1C WITH EAG      METABOLIC PANEL, COMPREHENSIVE   Has chronic LFT elevations from this     follows closely with liver clinic we will repeat liver test and for her to hepatology   CBC W/O DIFF      HEMOGLOBIN A1C WITH EAG      METABOLIC PANEL, COMPREHENSIVE      CBC W/O DIFF   3. Hyperglycemia due to type 1 diabetes mellitus (HCC)  E10.65 250.01 HEMOGLOBIN A1C WITH EAG      METABOLIC PANEL, COMPREHENSIVE      CBC W/O DIFF      HEMOGLOBIN A1C WITH EAG   Last A1c was at goal    Readings overall okay    Continues on Basaglar 25 units    Follows with endocrinology routinely now   METABOLIC PANEL, COMPREHENSIVE      CBC W/O DIFF   4. Iron deficiency anemia, unspecified iron deficiency anemia type  D50.9 280.9 HEMOGLOBIN A1C WITH EAG      METABOLIC PANEL, COMPREHENSIVE   Patient had GI evaluation to include endoscopy and colonoscopy    Her last CBC showed improved anemia and iron levels were normal    We will repeat CBC if anemia persist we will send her to hematology   CBC W/O DIFF      HEMOGLOBIN A1C WITH EAG      METABOLIC PANEL, COMPREHENSIVE      CBC W/O DIFF   5. Cardiac murmur check echo R01.1 785. 2         Depression screen reviewed and negative     Scribed by Jordyn Townsend of [facility], as dictated by Dr. Khari Alexander. Current diagnosis and concerns discussed with pt at length.  Pt understands risks and benefits or current treatment plan and medications, and accepts the treatment and medication with any possible risks. Pt asks appropriate questions, which were answered. Pt was instructed to call with any concerns or problems. I have reviewed the note documented by the scribe. The services provided are my own.   The documentation is accurate

## 2022-03-22 ENCOUNTER — OFFICE VISIT (OUTPATIENT)
Dept: INTERNAL MEDICINE CLINIC | Age: 57
End: 2022-03-22
Payer: COMMERCIAL

## 2022-03-22 VITALS
WEIGHT: 107.6 LBS | HEIGHT: 63 IN | SYSTOLIC BLOOD PRESSURE: 128 MMHG | BODY MASS INDEX: 19.07 KG/M2 | HEART RATE: 91 BPM | DIASTOLIC BLOOD PRESSURE: 70 MMHG | TEMPERATURE: 97.9 F | RESPIRATION RATE: 16 BRPM | OXYGEN SATURATION: 98 %

## 2022-03-22 DIAGNOSIS — R01.1 CARDIAC MURMUR: ICD-10-CM

## 2022-03-22 DIAGNOSIS — N18.30 STAGE 3 CHRONIC KIDNEY DISEASE, UNSPECIFIED WHETHER STAGE 3A OR 3B CKD (HCC): Primary | ICD-10-CM

## 2022-03-22 DIAGNOSIS — D50.9 IRON DEFICIENCY ANEMIA, UNSPECIFIED IRON DEFICIENCY ANEMIA TYPE: ICD-10-CM

## 2022-03-22 DIAGNOSIS — K83.01 PSC (PRIMARY SCLEROSING CHOLANGITIS): ICD-10-CM

## 2022-03-22 DIAGNOSIS — E10.65 HYPERGLYCEMIA DUE TO TYPE 1 DIABETES MELLITUS (HCC): ICD-10-CM

## 2022-03-22 PROCEDURE — 99214 OFFICE O/P EST MOD 30 MIN: CPT | Performed by: INTERNAL MEDICINE

## 2022-03-22 NOTE — PROGRESS NOTES
1. \"Have you been to the ER, urgent care clinic since your last visit? Hospitalized since your last visit? \" No    2. \"Have you seen or consulted any other health care providers outside of the [address] [place] [address] since your last visit? \" No     3. For patients aged 39-70: Has the patient had a colonoscopy / FIT/ Cologuard? Yes - no Care Gap present      If the patient is female:    4. For patients aged 41-77: Has the patient had a mammogram within the past 2 years? Yes - no Care Gap present      5. For patients aged 21-65: Has the patient had a pap smear?  Yes - no Care Gap present

## 2022-03-23 LAB
ALBUMIN SERPL-MCNC: 4.5 G/DL (ref 3.8–4.9)
ALBUMIN/GLOB SERPL: 1.9 {RATIO} (ref 1.2–2.2)
ALP SERPL-CCNC: 245 IU/L (ref 44–121)
ALT SERPL-CCNC: 51 IU/L (ref 0–32)
AST SERPL-CCNC: 48 IU/L (ref 0–40)
BILIRUB SERPL-MCNC: 0.4 MG/DL (ref 0–1.2)
BUN SERPL-MCNC: 28 MG/DL (ref 6–24)
BUN/CREAT SERPL: 20 (ref 9–23)
CALCIUM SERPL-MCNC: 9.6 MG/DL (ref 8.7–10.2)
CHLORIDE SERPL-SCNC: 105 MMOL/L (ref 96–106)
CO2 SERPL-SCNC: 23 MMOL/L (ref 20–29)
CREAT SERPL-MCNC: 1.4 MG/DL (ref 0.57–1)
EGFR: 44 ML/MIN/1.73
ERYTHROCYTE [DISTWIDTH] IN BLOOD BY AUTOMATED COUNT: 11.7 % (ref 11.7–15.4)
EST. AVERAGE GLUCOSE BLD GHB EST-MCNC: 134 MG/DL
GLOBULIN SER CALC-MCNC: 2.4 G/DL (ref 1.5–4.5)
GLUCOSE SERPL-MCNC: 105 MG/DL (ref 65–99)
HBA1C MFR BLD: 6.3 % (ref 4.8–5.6)
HCT VFR BLD AUTO: 32.5 % (ref 34–46.6)
HGB BLD-MCNC: 10.4 G/DL (ref 11.1–15.9)
MCH RBC QN AUTO: 30.8 PG (ref 26.6–33)
MCHC RBC AUTO-ENTMCNC: 32 G/DL (ref 31.5–35.7)
MCV RBC AUTO: 96 FL (ref 79–97)
PLATELET # BLD AUTO: 171 X10E3/UL (ref 150–450)
POTASSIUM SERPL-SCNC: 4.4 MMOL/L (ref 3.5–5.2)
PROT SERPL-MCNC: 6.9 G/DL (ref 6–8.5)
RBC # BLD AUTO: 3.38 X10E6/UL (ref 3.77–5.28)
SODIUM SERPL-SCNC: 146 MMOL/L (ref 134–144)
WBC # BLD AUTO: 12.2 X10E3/UL (ref 3.4–10.8)

## 2022-03-23 NOTE — PROGRESS NOTES
Please call patient back about results  Still anemic--refer to hematology    Dm controlled, cr stable  Lft stable--f/U w hepatology as scheduled

## 2022-03-24 ENCOUNTER — TELEPHONE (OUTPATIENT)
Dept: INTERNAL MEDICINE CLINIC | Age: 57
End: 2022-03-24

## 2022-03-25 DIAGNOSIS — D50.9 IRON DEFICIENCY ANEMIA, UNSPECIFIED IRON DEFICIENCY ANEMIA TYPE: Primary | ICD-10-CM

## 2022-03-25 NOTE — TELEPHONE ENCOUNTER
Called pt, left vm to return call to office for lab results  Letter drafted and mailed to pt.  closing

## 2022-04-12 ENCOUNTER — OFFICE VISIT (OUTPATIENT)
Dept: HEMATOLOGY | Age: 57
End: 2022-04-12
Payer: COMMERCIAL

## 2022-04-12 VITALS
TEMPERATURE: 96.9 F | WEIGHT: 108 LBS | BODY MASS INDEX: 19.14 KG/M2 | SYSTOLIC BLOOD PRESSURE: 125 MMHG | HEART RATE: 90 BPM | DIASTOLIC BLOOD PRESSURE: 78 MMHG | HEIGHT: 63 IN

## 2022-04-12 DIAGNOSIS — K83.01 PSC (PRIMARY SCLEROSING CHOLANGITIS): Primary | ICD-10-CM

## 2022-04-12 PROCEDURE — 99214 OFFICE O/P EST MOD 30 MIN: CPT | Performed by: PHYSICIAN ASSISTANT

## 2022-04-12 NOTE — PROGRESS NOTES
[facility], [place], [address], [place], [place]      Rachel Daniel, HAYDEE Aguayo, [facility]-BC     Claudia Hunt, [facility]   TALIA PhelanP-ITZEL Chi, [facility]       Mark Borrego [address]    at [address], [address] [place] [address]    [facility] [facility] 22.    [phone]    FAX: [address] [place] [address] [address], [address]    [phone]    FAX: [phone]       Patient Care Team:  Ramiro Silva MD as PCP - General (Internal Medicine)  Ramiro Silva MD as PCP - [facility]  Yvette Palma MD as Consulting Provider (Endocrinology)  Parker Parker MD (Nephrology)  Kaushal Capone MD (Female Pelvic Medicine and Reconstructive Surgery)  Rafael Benitez MD (Optometry)  Galen Rivera MD (Gastroenterology)      Problem List  Date Reviewed: 3/22/2022          Codes Class Noted    Hyperglycemia due to type 1 diabetes mellitus ([facility] ICD-10-CM: E10.65  ICD-9-CM: 250.01  2/22/2021        [facility] (primary sclerosing cholangitis) ICD-10-CM: K83.01  ICD-9-CM: 576.1  2/22/2021        Stage 2 chronic kidney disease ICD-10-CM: N18.2  ICD-9-CM: 585.2  2/22/2021        Mild intermittent asthma without complication DCT-04-IG: T38.02  ICD-9-CM: 493.90  2/22/2021        Elevated liver enzymes ICD-10-CM: R74.8  ICD-9-CM: 790.5  12/19/2019        Insulin dependent diabetes mellitus ICD-10-CM: MRY9281  ICD-9-CM: 250.00, V58.67  6/20/2016        Pancreatic atrophy ICD-10-CM: K86.89  ICD-9-CM: 577.8  6/20/2016            [facility].   The active problem list, all pertinent past medical history, medications, radiologic findings and laboratory findings related to the liver disorder were reviewed with the patient. The patient is a 60 y.o. Black female who was found to have elevated liver transaminases and alkaline phosphatase in 2018. Serologic evaluation for markers of chronic liver disease were negative. Ultrasound of the liver was performed in 2/2019. The results of the imaging demonstrated a normal appearing liver. MRI has been ordered in the past, this has not yet been scheduled due to patient's significant claustrophobia - she is unable to tolerate MRI scanner. She has had recent CT scan in 3/2021 showing no liver mass/lesion or biliary ductal dilation. The patient underwent a liver biopsy in 2/2020. This demonstrates fibrosis that is far greater than the degree of portal inflammation suggesting PSC. We have done Fibroscan at a past office visit and this showed EkPa was 7.1. Suggested fibrosis level is F1-2. CAP score is 171. The patient has no symptoms which can be attributed to the liver disorder. The patient is not currently experiencing the following symptoms of liver disease:  fatigue, pain in the right side over the liver, fevers or itching. She had hospitalization for DKA in 3/2021 with associated UTI. She has since been doing better and is stable on medications with no further hospitalizations and reduction in her A1c to ~6%. The patient completes all daily activities without any functional limitations. ASSESSMENT AND PLAN:  PSC  Intermittent elevation in liver transaminases and persistent elevation in alkaline phosphatase. 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 I have reviewed in detail with her the results of the recent PCP labs from 3/2022. The AST and ALT remain modestly elevated associated with ongoing elevation of the ALP. Total bilirubin is normal.  Serum albumin is depressed.   INR is normal.  The platelet count is normal.      Serologic testing for causes of chronic liver disease were negative. Assessment of liver fibrosis with Fibroscan was performed In 1/2020. The result was 9.8 kPa which correlates with Stage 3 bridging fibrosis. A liver biopsy from 2/2020 suggests PSC. There is mild portal inflammation and portal fibrosis with extension outside the portal tract that is greater than would be expected given the degree of inflammation. This is suggestive of PSC. Repeat Fibroscan in 10/2021 shows minimal fibrosis with a score of 7.1 EkPa and no steatosis. This practice has recommended that we do an MRI/MRCP. She says she cannot tolerate the MRI. She has tried to do an MRI previously for another medical issue and could not stay in the scanner. She has had recent CT scan without mass/lesion or biliary changes in 3/2021. We are currently part of a international clinical trial to develop a medication for PSC/pruritus. As she has no significant itching as part of her symptoms, will defer study participation at present. She has also indicated that she is not interested in clinical trials options. Since there is currently no FDA approved treatment for [facility] we can simply follow her at regular intervals. I have suggested return office visit in 6 months and have instructed her when to call with any additional symptoms in between visits including increased fatigue, abdominal pain, fever, and itching. We will continue to monitor her on a regular basis and offer additional treatments/trial options as available. Screening for Hepatocellular Carcinoma  HCC screening is not necessary as the patient has no evidence of cirrhosis. Baseline  was not elevated. CT imaging negative in 3/2021. Treatment of other medical problems in patients with chronic liver disease  There are no contraindications for the patient to take most medications that are necessary for treatment of other medical issues.     Counseling for alcohol in patients with chronic liver disease  The patient was counseled regarding alcohol consumption and the effect of alcohol on chronic liver disease. The patient does not consume any significant amount of alcohol. DM  She is focused on improved control at this point and has established with new endocrinologist. Her Hgb A1c is ~6%. Vaccinations   Vaccination for viral hepatitis B is not needed. The patient has serologic evidence of prior exposure or vaccination with immunity. The need for vaccination against viral hepatitis A will be assessed with serologic and instituted as appropriate. Routine vaccinations against other bacterial and viral agents can be performed as indicated. Annual flu vaccination should be administered if indicated. She has received COVID vaccine. ALLERGIES  Allergies   Allergen Reactions    Contrast Agent [Iodine] Itching    Hydrocodone Rash    Percocet [Oxycodone-Acetaminophen] Rash     Pt states she is not allergic to Tylenol.  Codeine Nausea and Vomiting    Metformin Diarrhea       MEDICATIONS  Current Outpatient Medications   Medication Sig    insulin glargine (Basaglar KwikPen U-100 Insulin) 100 unit/mL (3 mL) inpn Inject 25 units once daily. SEND FUTURE REFILL REQUESTS TO PCP AS DR GUERRA IS NO LONGER WITH THIS PRACTICE    Insulin Needles, Disposable, (Danae Pen Needle) 32 gauge x 5/32\" ndle For use with insulin pen as directed. SEND FUTURE REFILL REQUESTS TO PCP AS DR GUERRA IS NO LONGER WITH THIS PRACTICE    albuterol (PROVENTIL VENTOLIN) 2.5 mg /3 mL (0.083 %) nebu 3 mL by Nebulization route every six (6) hours as needed for Wheezing. No current facility-administered medications for this visit. SYSTEM REVIEW NOT RELATED TO LIVER DISEASE OR REVIEWED ABOVE:  Constitution systems: Negative for fever, chills, weight gain, weight loss. Eyes: Negative for visual changes. ENT: Negative for sore throat, painful swallowing. Respiratory: Negative for cough, hemoptysis, SOB.    Cardiology: Negative for chest pain, palpitations. GI:  Negative for constipation or diarrhea. : Negative for urinary frequency, dysuria, hematuria, nocturia. Skin: Negative for rash. Hematology: Negative for easy bruising, blood clots. Musculo-skeletal: Negative for back pain, muscle pain, weakness. Neurologic: Negative for headaches, dizziness, vertigo, memory problems not related to HE. Psychology: Negative for anxiety, depression. FAMILY HISTORY:  The father has/had the following following chronic disease(s): prostate and colon cancer. The mother  of MVA. There is no family history of liver disease. SOCIAL HISTORY:  The patient has never been . The patient has no children. The patient has never used tobacco products. The patient has never consumed significant amounts of alcohol. The patient currently works full time: warehouse production. PHYSICAL EXAMINATION:  VS: per nursing note  General: No acute distress. Eyes: Sclera anicteric. ENT: No oral lesions. Skin: No rashes. spider angiomata. No jaundice. Abdomen: No obvious distention suggesting ascites. Extremities: No edema. No muscle wasting. Neurologic: Alert and oriented. Cranial nerves grossly intact.     LABORATORY STUDIES:  Liver [place] of [address] Units 3/22/2022 2021   WBC 3.4 - 10.8 x10E3/uL 12.2 (H) 4.8   ANC 1.8 - 8.0 K/UL     HGB 11.1 - 15.9 g/dL 10.4 (L) 10.3 (L)    - 450 x10E3/uL 171 159   INR 0.8 - 1.2     AST 0 - 40 IU/L 48 (H) 59 (H)   ALT 0 - 32 IU/L 51 (H) 68 (H)   Alk Phos 44 - 121 IU/L 245 (H) 245 (H)   Bili, Total 0.0 - 1.2 mg/dL 0.4 0.4   Bili, Direct 0.00 - 0.40 mg/dL     Albumin 3.8 - 4.9 g/dL 4.5 4.4   BUN 6 - 24 mg/dL 28 (H) 24   Creat 0.57 - 1.00 mg/dL 1.40 (H) 1.47 (H)   Na 134 - 144 mmol/L 146 (H) 140   K 3.5 - 5.2 mmol/L 4.4 5.5 (H)   Cl 96 - 106 mmol/L 105 102   CO2 20 - 29 mmol/L 23 25   Glucose 65 - 99 mg/dL 105 (H) 304 (H)   Magnesium 1.6 - 2.4 mg/dL Liver [place] [address] Ref Rng & Units 3/23/2021   WBC 3.4 - 10.8 x10E3/uL 5.5   ANC 1.8 - 8.0 K/UL    HGB 11.1 - 15.9 g/dL 9.6 (L)    - 450 x10E3/uL 389   INR 0.8 - 1.2    AST 0 - 40 IU/L 67 (H)   ALT 0 - 32 IU/L 67 (H)   Alk Phos 44 - 121 IU/L 328 (H)   Bili, Total 0.0 - 1.2 mg/dL 0.5   Bili, Direct 0.00 - 0.40 mg/dL    Albumin 3.8 - 4.9 g/dL 3.9   BUN 6 - 24 mg/dL 20   Creat 0.57 - 1.00 mg/dL 1.16 (H)   Na 134 - 144 mmol/L 141   K 3.5 - 5.2 mmol/L 4.7   Cl 96 - 106 mmol/L 100   CO2 20 - 29 mmol/L 25   Glucose 65 - 99 mg/dL 183 (H)   Magnesium 1.6 - 2.4 mg/dL      SEROLOGIES:  8/2018. HBsurface antigen negative, anti-HCV negative, Ferritin 1139, FE saturation 74%, JUN negative, ANCA negative    3/2021. Fe 71, TS% 27. Serologies Latest Ref Rng & Units 12/19/2019 8/21/2019   Hep B Surface Ag Negative  Negative   Hep B Core Ab, Total Negative Negative    Hep B Surface AB QL  Reactive    Ferritin 15 - 150 ng/mL 445 (H) 384 (H)   Iron % Saturation 15 - 55 % 48 31   JUN, IFA  Negative    M2 Ab 0.0 - 20.0 Units  <20.0   Ceruloplasmin 19.0 - 39.0 mg/dL  32.5   Alpha-1 antitrypsin level 90 - 200 mg/dL  129     LIVER HISTOLOGY:  1/2020. FibroScan performed at [facility]. EkPa was 9.8. Suggested fibrosis level is F2-3. CAP score is 147.  2/2020. Slides reviewed by MLS. Non-specific mild portal inflammation, with No interface hepatitis, with No PMN. No lobular inflammation. No steatosis. Anish stage 3 fibrosis. Metavir stage 2 fibrosis. Knodell score (0011). The degree of portal fibrosis is greater than would be expected based upon the degree of inflammation. This suggests [facility].  10/2021. FibroScan performed at [facility]. EkPa was 7.1. Suggested fibrosis level is F1-2. CAP score is 171. ENDOSCOPIC PROCEDURES:  Not available or performed    RADIOLOGY:  2/2019. Ultrasound of liver. Normal appearing liver. No liver mass lesions. 3/2021.   CT abdomen. Normal appearing liver. No fluid/ascites. OTHER TESTING:  Not available or performed    FOLLOW-UP:  All of the issues listed above in the Assessment and Plan were discussed with the patient. All questions were answered. The patient expressed a clear understanding of the above. [facility] 87 in 6 months for monitoring. Documentation reviewed and updated to reflect current, accurate patient information.     Juliana Brand PA-C  Liver [facility] 59, [facility] Angelita Wolff  22.  [phone]  [address] [place] [address]

## 2022-04-12 NOTE — PROGRESS NOTES
Identified pt with two pt identifiers(name and ). Reviewed record in preparation for visit and have obtained necessary documentation. Chief Complaint   Patient presents with    Elevated Liver Enzymes     6month f/u with Amy Meade      Vitals:    22 1249   BP: 125/78   Pulse: 90   Temp: 96.9 °F (36.1 °C)   TempSrc: Temporal   Weight: 108 lb (49 kg)   Height: 5' 3\" (1.6 m)   PainSc:   0 - No pain       Health Maintenance Review: Patient reminded of \"due or due soon\" health maintenance. I have asked the patient to contact his/her primary care provider (PCP) for follow-up on his/her health maintenance. Coordination of Care Questionnaire:  :   1) Have you been to an emergency room, urgent care, or hospitalized since your last visit? If yes, where when, and reason for visit? no       2. Have seen or consulted any other health care provider since your last visit? If yes, where when, and reason for visit? YES. PCP      Patient is accompanied by self I have received verbal consent from Emily Peterson to discuss any/all medical information while they are present in the room.

## 2022-04-18 ENCOUNTER — HOSPITAL ENCOUNTER (OUTPATIENT)
Dept: NON INVASIVE DIAGNOSTICS | Age: 57
Discharge: HOME | End: 2022-04-18
Attending: INTERNAL MEDICINE
Payer: COMMERCIAL

## 2022-04-18 VITALS
WEIGHT: 108.03 LBS | BODY MASS INDEX: 19.14 KG/M2 | HEIGHT: 63 IN | SYSTOLIC BLOOD PRESSURE: 125 MMHG | DIASTOLIC BLOOD PRESSURE: 78 MMHG

## 2022-04-18 DIAGNOSIS — R01.1 CARDIAC MURMUR: ICD-10-CM

## 2022-04-18 LAB
ECHO AV AREA PEAK VELOCITY: 1.4 CM2
ECHO AV AREA VTI: 1.3 CM2
ECHO AV AREA/BSA PEAK VELOCITY: 0.9 CM2/M2
ECHO AV AREA/BSA VTI: 0.9 CM2/M2
ECHO AV MEAN GRADIENT: 4 MMHG
ECHO AV MEAN VELOCITY: 0.9 M/S
ECHO AV PEAK GRADIENT: 7 MMHG
ECHO AV PEAK VELOCITY: 1.3 M/S
ECHO AV VELOCITY RATIO: 0.92
ECHO AV VTI: 27.9 CM
ECHO EST RA PRESSURE: 10 MMHG
ECHO LA DIAMETER INDEX: 1.41 CM/M2
ECHO LA DIAMETER: 2.1 CM
ECHO LA VOL 4C: 19 ML (ref 22–52)
ECHO LA VOLUME INDEX A4C: 13 ML/M2 (ref 16–34)
ECHO LV E' LATERAL VELOCITY: 11 CM/S
ECHO LV E' SEPTAL VELOCITY: 11 CM/S
ECHO LV FRACTIONAL SHORTENING: 31 % (ref 28–44)
ECHO LV INTERNAL DIMENSION DIASTOLE INDEX: 2.82 CM/M2
ECHO LV INTERNAL DIMENSION DIASTOLIC: 4.2 CM (ref 3.9–5.3)
ECHO LV INTERNAL DIMENSION SYSTOLIC INDEX: 1.95 CM/M2
ECHO LV INTERNAL DIMENSION SYSTOLIC: 2.9 CM
ECHO LV IVSD: 0.6 CM (ref 0.6–0.9)
ECHO LV MASS 2D: 70 G (ref 67–162)
ECHO LV MASS INDEX 2D: 47 G/M2 (ref 43–95)
ECHO LV POSTERIOR WALL DIASTOLIC: 0.6 CM (ref 0.6–0.9)
ECHO LV RELATIVE WALL THICKNESS RATIO: 0.29
ECHO LVOT AREA: 1.5 CM2
ECHO LVOT AV VTI INDEX: 0.95
ECHO LVOT DIAM: 1.4 CM
ECHO LVOT MEAN GRADIENT: 3 MMHG
ECHO LVOT PEAK GRADIENT: 6 MMHG
ECHO LVOT PEAK VELOCITY: 1.2 M/S
ECHO LVOT STROKE VOLUME INDEX: 27.3 ML/M2
ECHO LVOT SV: 40.6 ML
ECHO LVOT VTI: 26.4 CM
ECHO MV A VELOCITY: 0.67 M/S
ECHO MV AREA VTI: 1.7 CM2
ECHO MV E DECELERATION TIME (DT): 210 MS
ECHO MV E VELOCITY: 0.97 M/S
ECHO MV E/A RATIO: 1.45
ECHO MV E/E' LATERAL: 8.82
ECHO MV E/E' RATIO (AVERAGED): 8.82
ECHO MV E/E' SEPTAL: 8.82
ECHO MV LVOT VTI INDEX: 0.88
ECHO MV MAX VELOCITY: 1.1 M/S
ECHO MV MEAN GRADIENT: 1 MMHG
ECHO MV MEAN VELOCITY: 0.5 M/S
ECHO MV PEAK GRADIENT: 5 MMHG
ECHO MV VTI: 23.3 CM
ECHO PV MAX VELOCITY: 0.9 M/S
ECHO PV PEAK GRADIENT: 3 MMHG
ECHO RIGHT VENTRICULAR SYSTOLIC PRESSURE (RVSP): 34 MMHG
ECHO RV FREE WALL PEAK S': 13 CM/S
ECHO RV TAPSE: 2.3 CM (ref 1.7–?)
ECHO TV REGURGITANT MAX VELOCITY: 2.43 M/S
ECHO TV REGURGITANT PEAK GRADIENT: 24 MMHG

## 2022-04-18 PROCEDURE — 93306 TTE W/DOPPLER COMPLETE: CPT

## 2022-04-18 PROCEDURE — 93306 TTE W/DOPPLER COMPLETE: CPT | Performed by: INTERNAL MEDICINE

## 2022-09-20 NOTE — PROGRESS NOTES
HISTORY OF PRESENT ILLNESS  Ceferino Chadwick is a 60 y.o. female. HPI  Pt was last here 3/22/22. Pt is here for routine care. Pt c/o a lump on her R index finger x 1 week. States it was originally red but is not anymore. Rx'd Keflex for 1 week. Referred to derm if sx's do not improve. BP today is 139/72     Type 1 diabetic  BS AM 80s, PM 90s-100s  Denies lows <70, highs < 300  Pt takes basaglar 25U  No longer using humalog either  Pt is no longer on metformin --stopped d/t SE     Now following with Dr. Turner So (endo)  Lov 3/18/22, No adjustments made to insulin  Was scheduled for 7/22, pt missed appt  She would like an endocrinologist who is closer by. Gave info for Dr. Lia Vazquez today is 106 lbs, stable since lov      Reviewed labs  Ordered labs  Discussed pt is persistently anemic. Have referred to hematology in the past, pt has not gone. Encouraged pt to go  Reviewed echo 4/18/22:    Left Ventricle: Left ventricle size is normal. Normal wall thickness. The EF by visual approximation is 55 - 60%. Pt follows with Dr. Jose Guadalupe Chin (hep) for elevated liver tests has primary sclerosing cholangitis  Last visit with ROWENA Joiner 4/12/22, will f/U in 6 months  Reviewed note:    ASSESSMENT AND PLAN:  PSC  Intermittent elevation in liver transaminases and persistent elevation in alkaline phosphatase. 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 I have reviewed in detail with her the results of the recent PCP labs from 3/2022. The AST and ALT remain modestly elevated associated with ongoing elevation of the ALP. Total bilirubin is normal.  Serum albumin is depressed. INR is normal.  The platelet count is normal.    Serologic testing for causes of chronic liver disease were negative. Assessment of liver fibrosis with Fibroscan was performed In 1/2020. The result was 9.8 kPa which correlates with Stage 3 bridging fibrosis. A liver biopsy from 2/2020 suggests PSC.   There is mild portal inflammation and portal fibrosis with extension outside the portal tract that is greater than would be expected given the degree of inflammation. This is suggestive of PSC. Repeat Fibroscan in 10/2021 shows minimal fibrosis with a score of 7.1 EkPa and no steatosis. This practice has recommended that we do an MRI/MRCP. She says she cannot tolerate the MRI. She has tried to do an MRI previously for another medical issue and could not stay in the scanner. She has had recent CT scan without mass/lesion or biliary changes in 3/2021. We are currently part of a international clinical trial to develop a medication for PSC/pruritus. As she has no significant itching as part of her symptoms, will defer study participation at present. She has also indicated that she is not interested in clinical trials options. Since there is currently no FDA approved treatment for [place] we can simply follow her at regular intervals. I have suggested return office visit in 6 months and have instructed her when to call with any additional symptoms in between visits including increased fatigue, abdominal pain, fever, and itching. We will continue to monitor her on a regular basis and offer additional treatments/trial options as available. Screening for Hepatocellular Carcinoma  HCC screening is not necessary as the patient has no evidence of cirrhosis. Baseline  was not elevated. CT imaging negative in 3/2021. Treatment of other medical problems in patients with chronic liver disease  There are no contraindications for the patient to take most medications that are necessary for treatment of other medical issues. Counseling for alcohol in patients with chronic liver disease  The patient was counseled regarding alcohol consumption and the effect of alcohol on chronic liver disease. The patient does not consume any significant amount of alcohol.   DM  She is focused on improved control at this point and has established with new endocrinologist. Her Hgb A1c is ~6%. Vaccinations   Vaccination for viral hepatitis B is not needed. The patient has serologic evidence of prior exposure or vaccination with immunity. The need for vaccination against viral hepatitis A will be assessed with serologic and instituted as appropriate. Routine vaccinations against other bacterial and viral agents can be performed as indicated. Annual flu vaccination should be administered if indicated. She has received COVID vaccine.       Pt follows with Dr. Lou Wilkinson (nephro) for ckd, has had multiple arf episodes  Last there 5/22, all stable, no med changes  Creatinine running around 1.4     Seeing Dr. Ariella Moralez (uro) and ROWENA Cooper for recurrent UTIs  Last visit was 10/18     Pt has been following with Dr Kelly Joseph (ortho) for knee pain   Last there 12/19/19 with ROWENA Connors (ortho) for sciatica   Last visit was 1/19 with PA Conception Clock   Physical therapy in the past     She is following with Dr. Nate Wall (ortho) for trigger thumb  Last there 10/29/20  She had cortisone injection see above     Pt has albuterol to use prn for her asthma  Denies wheezing has not needed this recently     She saw Dr. Jazzy Juarez (GI) 6/4/21     PREVENTIVE:  Colonoscopy: 4/21 Dr. Jazzy Juarez fmhx - father  EGD: 3/16/21 Dr. Aparna Gordon  Pap: [facility], 3/20, due ordered  Mammogram: 5/19/21 negative, due ordered  Dexa: not yet needed  Tdap: ~2013  Pneumovax: 02/19/19   Shingrix: complete, 09/22/21, 12/22/21  Flu shot: 9/21, will get today 9/26/22  Foot exam: 9/26/22  Micro: 12/21 some protein in urine  A1C: 1/20 7.7 3/21 10.1 9/21 6.2 12/21 6.0 3/22 6.3  Eye exam: Dr Stacy Hernandez, 8/22, will get notes -- small cataract per pt will f/u in 1 year  Lipids: 12/21 LDL 69  Covid: J&J + Pfizer booster      Patient Active Problem List    Diagnosis Date Noted    Hyperglycemia due to type 1 diabetes mellitus ([facility] [address] 02/22/2021    PSC (primary sclerosing cholangitis) 02/22/2021    Stage 2 chronic kidney disease 02/22/2021    Mild intermittent asthma without complication 26/62/0764    Elevated liver enzymes 12/19/2019    Insulin dependent diabetes mellitus 06/20/2016    Pancreatic atrophy 06/20/2016     Current Outpatient Medications   Medication Sig Dispense Refill    insulin glargine (Basaglar KwikPen U-100 Insulin) 100 unit/mL (3 mL) inpn Inject 25 units once daily. SEND FUTURE REFILL REQUESTS TO PCP AS DR GUERRA IS NO LONGER WITH THIS PRACTICE 45 mL 1    Insulin Needles, Disposable, (Danae Pen Needle) 32 gauge x 5/32\" ndle For use with insulin pen as directed. SEND FUTURE REFILL REQUESTS TO PCP AS DR GUERRA IS NO LONGER WITH THIS PRACTICE 100 Pen Needle 3    albuterol (PROVENTIL VENTOLIN) 2.5 mg /3 mL (0.083 %) nebu 3 mL by Nebulization route every six (6) hours as needed for Wheezing.  30 Nebule 0     Past Surgical History:   Procedure Laterality Date    HX HEENT      UPPER GI ENDOSCOPY,BIOPSY  3/16/2021           Lab Results   Component Value Date/Time    WBC 12.2 (H) 03/22/2022 12:00 AM    HGB 10.4 (L) 03/22/2022 12:00 AM    HCT 32.5 (L) 03/22/2022 12:00 AM    Hematocrit (POC) 31 (L) 08/24/2018 01:15 PM    PLATELET 955 01/16/1528 12:00 AM    MCV 96 03/22/2022 12:00 AM     Lab Results   Component Value Date/Time    Cholesterol, total 200 (H) 12/22/2021 10:09 AM    HDL Cholesterol 118 12/22/2021 10:09 AM    LDL, calculated 69 12/22/2021 10:09 AM    LDL, calculated 80 01/31/2020 08:08 AM    Triglyceride 76 12/22/2021 10:09 AM     Lab Results   Component Value Date/Time    GFR est non-AA 39 (L) 12/22/2021 10:09 AM    GFRNA, POC 4 (L) 08/24/2018 01:15 PM    GFR est AA 45 (L) 12/22/2021 10:09 AM    GFRAA, POC 5 (L) 08/24/2018 01:15 PM    Creatinine 1.40 (H) 03/22/2022 12:00 AM    Creatinine (POC) 9.5 (H) 08/24/2018 01:15 PM    BUN 28 (H) 03/22/2022 12:00 AM    BUN (POC) >140 (H) 08/24/2018 01:15 PM    Sodium 146 (H) 03/22/2022 12:00 AM    Sodium (POC) 124 (L) 08/24/2018 01:15 PM    Potassium 4.4 03/22/2022 12:00 AM    Potassium (POC) 7.7 (HH) 08/24/2018 01:15 PM    Chloride 105 03/22/2022 12:00 AM    Chloride (POC) 91 (L) 08/24/2018 01:15 PM    CO2 23 03/22/2022 12:00 AM    Magnesium 1.8 03/10/2021 03:55 AM    Phosphorus 2.4 (L) 03/08/2021 05:23 PM    PTH, Intact 360.7 (H) 08/25/2018 10:36 AM        Review of Systems   Respiratory:  Negative for shortness of breath. Cardiovascular:  Negative for chest pain. Physical Exam  Constitutional:       General: She is not in acute distress. Appearance: She is not ill-appearing, toxic-appearing or diaphoretic. HENT:      Head: Normocephalic and atraumatic. Right Ear: External ear normal.      Left Ear: External ear normal.   Eyes:      General:         Right eye: No discharge. Left eye: No discharge. Conjunctiva/sclera: Conjunctivae normal.      Pupils: Pupils are equal, round, and reactive to light. Cardiovascular:      Rate and Rhythm: Normal rate and regular rhythm. Heart sounds: No murmur heard. No friction rub. No gallop. Pulmonary:      Effort: No respiratory distress. Breath sounds: Normal breath sounds. No wheezing or rales. Chest:      Chest wall: No tenderness. Musculoskeletal:         General: Normal range of motion. Cervical back: Normal.   Feet:      Comments: Sensory exam of the foot is normal, tested with the monofilament. Good pulses, no lesions or ulcers, good peripheral pulses. Skin:     General: Skin is warm and dry. Neurological:      Mental Status: She is oriented to person, place, and time. Mental status is at baseline. Coordination: Coordination normal.      Gait: Gait normal.   Psychiatric:         Mood and Affect: Mood normal.         Behavior: Behavior normal.       ASSESSMENT and PLAN    ICD-10-CM ICD-9-CM    1.  Hyperglycemia due to type 1 diabetes mellitus (HCC)  E10.65 250.01 REFERRAL TO ENDOCRINOLOGY       DIABETES FOOT EXAM      MICROALBUMIN, UR, RAND W/ MICROALB/CREAT RATIO      LIPID PANEL   Last A1c at goal denies hypoglycemia currently on Basaglar 25 units daily    No showed her last appointment with endocrinology    She would like to have an endocrinologist around here place new referral I will cover her diabetes until she establishes with new endocrinologist check A1c today adjust medication as needed eye exam up-to-date we will get notes for review   METABOLIC PANEL, COMPREHENSIVE      HEMOGLOBIN A1C WITH EAG      TSH 3RD GENERATION      REFERRAL TO HEMATOLOGY ONCOLOGY      CBC W/O DIFF      2. PSC (primary sclerosing cholangitis)  K83.01 576.1 MICROALBUMIN, UR, RAND W/ MICROALB/CREAT RATIO      LIPID PANEL      METABOLIC PANEL, COMPREHENSIVE   Monitor LFTs up-to-date with hep otology following every 6 months   HEMOGLOBIN A1C WITH EAG      TSH 3RD GENERATION      REFERRAL TO HEMATOLOGY ONCOLOGY      CBC W/O DIFF      3. Stage 2 chronic kidney disease  N18.2 585.2 MICROALBUMIN, UR, RAND W/ MICROALB/CREAT RATIO      LIPID PANEL      METABOLIC PANEL, COMPREHENSIVE      HEMOGLOBIN A1C WITH EAG   Up-to-date with nephrology creatinine running around 1.4 baseline check labs today   TSH 3RD GENERATION      REFERRAL TO HEMATOLOGY ONCOLOGY      CBC W/O DIFF      4. Mild intermittent asthma without complication  C13.92 002.90 MICROALBUMIN, UR, RAND W/ MICROALB/CREAT RATIO      LIPID PANEL      METABOLIC PANEL, COMPREHENSIVE      HEMOGLOBIN A1C WITH EAG   Albuterol as needed   TSH 3RD GENERATION      REFERRAL TO HEMATOLOGY ONCOLOGY      CBC W/O DIFF      5.  Anemia, unspecified type  D64.9 285.9 MICROALBUMIN, UR, RAND W/ MICROALB/CREAT RATIO      LIPID PANEL   Patient with persistent mild anemia on labs with normal iron and ferritin levels not iron deficient    Referred her to hematology for further evaluation last office visit but she never went we will go ahead and place referral again and encouraged her to schedule the appointment   METABOLIC PANEL, COMPREHENSIVE      HEMOGLOBIN A1C WITH EAG      TSH 3RD GENERATION      REFERRAL TO HEMATOLOGY ONCOLOGY      CBC W/O DIFF      6. Breast screening  Z12.39 V76.10 WIL[facility] MAMMO BI SCREENING INCL CAD   Mammogram ordered   7. Cellulitis of finger of right hand  L03.011 681.00    We will treat with Keflex     Depression screen reviewed and negative. Scribed by Tyron Patino, as dictated by Dr. Zay Joshi. Current diagnosis and concerns discussed with pt at length. Pt understands risks and benefits or current treatment plan and medications, and accepts the treatment and medication with any possible risks. Pt asks appropriate questions, which were answered. Pt was instructed to call with any concerns or problems. I have reviewed the note documented by the scribe. The services provided are my own. The documentation is accurate.

## 2022-09-26 ENCOUNTER — OFFICE VISIT (OUTPATIENT)
Dept: INTERNAL MEDICINE CLINIC | Age: 57
End: 2022-09-26
Payer: COMMERCIAL

## 2022-09-26 VITALS
HEIGHT: 63 IN | BODY MASS INDEX: 18.89 KG/M2 | TEMPERATURE: 97.9 F | SYSTOLIC BLOOD PRESSURE: 139 MMHG | OXYGEN SATURATION: 98 % | WEIGHT: 106.6 LBS | DIASTOLIC BLOOD PRESSURE: 72 MMHG | HEART RATE: 55 BPM | RESPIRATION RATE: 16 BRPM

## 2022-09-26 DIAGNOSIS — Z23 NEEDS FLU SHOT: ICD-10-CM

## 2022-09-26 DIAGNOSIS — N18.2 STAGE 2 CHRONIC KIDNEY DISEASE: ICD-10-CM

## 2022-09-26 DIAGNOSIS — Z12.39 BREAST SCREENING: ICD-10-CM

## 2022-09-26 DIAGNOSIS — J45.20 MILD INTERMITTENT ASTHMA WITHOUT COMPLICATION: ICD-10-CM

## 2022-09-26 DIAGNOSIS — E10.65 HYPERGLYCEMIA DUE TO TYPE 1 DIABETES MELLITUS (HCC): Primary | ICD-10-CM

## 2022-09-26 DIAGNOSIS — L03.011 CELLULITIS OF FINGER OF RIGHT HAND: ICD-10-CM

## 2022-09-26 DIAGNOSIS — K83.01 PSC (PRIMARY SCLEROSING CHOLANGITIS): ICD-10-CM

## 2022-09-26 DIAGNOSIS — D64.9 ANEMIA, UNSPECIFIED TYPE: ICD-10-CM

## 2022-09-26 PROCEDURE — 90686 IIV4 VACC NO PRSV 0.5 ML IM: CPT | Performed by: INTERNAL MEDICINE

## 2022-09-26 PROCEDURE — 99214 OFFICE O/P EST MOD 30 MIN: CPT | Performed by: INTERNAL MEDICINE

## 2022-09-26 PROCEDURE — 90471 IMMUNIZATION ADMIN: CPT | Performed by: INTERNAL MEDICINE

## 2022-09-26 RX ORDER — INSULIN GLARGINE 100 [IU]/ML
INJECTION, SOLUTION SUBCUTANEOUS
Qty: 45 ML | Refills: 1 | Status: SHIPPED | OUTPATIENT
Start: 2022-09-26

## 2022-09-26 RX ORDER — CEPHALEXIN 500 MG/1
500 CAPSULE ORAL 3 TIMES DAILY
Qty: 21 CAPSULE | Refills: 0 | Status: SHIPPED | OUTPATIENT
Start: 2022-09-26 | End: 2022-10-03

## 2022-09-26 NOTE — PROGRESS NOTES
After obtaining consent, and per orders of Dr. Christian Mcneal, injection of FLU VACCINE given by Jackie Avelar. Patient instructed to remain in clinic for 20 minutes afterwards, and to report any adverse reaction to me immediately.

## 2022-09-26 NOTE — LETTER
NOTIFICATION RETURN TO WORK / SCHOOL    9/26/2022 8:43 AM    Ms. Parisa Oneal  [place] [id number]      To Whom It May Concern:    Parisa Oneal is currently under the care of [facility].  Mrs. Irasema Freitas was seen today in the office    She will return to work on: 09/26/22    If there are questions or concerns please have the patient contact our office.         Sincerely,      Manfred Garcia MD

## 2022-09-26 NOTE — LETTER
9/28/2022 12:48 PM    Ms. Sue Fuentes  [address] [facility] [address]      Dear Care Provider    Please fax us the most recent office notes so that we may update the patient's records for continuity of care. Our fax number: [phone].     Patient:   Sue Fuentes  1965        Sincerely,      Hansel Kennedy MD

## 2022-09-26 NOTE — PROGRESS NOTES
1. \"Have you been to the ER, urgent care clinic since your last visit? Hospitalized since your last visit? \" No    2. \"Have you seen or consulted any other health care providers outside of the [address] [place] [address] since your last visit? \" No     3. For patients aged 39-70: Has the patient had a colonoscopy / FIT/ Cologuard? Yes - no Care Gap present      If the patient is female:    4. For patients aged 41-77: Has the patient had a mammogram within the past 2 years? Yes - no Care Gap present      5. For patients aged 21-65: Has the patient had a pap smear? Yes - Care Gap present.  Rooming MA/LPN to request most recent results

## 2022-09-27 LAB
ALBUMIN SERPL-MCNC: 4 G/DL (ref 3.5–5)
ALBUMIN/GLOB SERPL: 1.3 {RATIO} (ref 1.1–2.2)
ALP SERPL-CCNC: 296 U/L (ref 45–117)
ALT SERPL-CCNC: 79 U/L (ref 12–78)
ANION GAP SERPL CALC-SCNC: 3 MMOL/L (ref 5–15)
AST SERPL-CCNC: 69 U/L (ref 15–37)
BILIRUB SERPL-MCNC: 0.3 MG/DL (ref 0.2–1)
BUN SERPL-MCNC: 32 MG/DL (ref 6–20)
BUN/CREAT SERPL: 19 (ref 12–20)
CALCIUM SERPL-MCNC: 9.3 MG/DL (ref 8.5–10.1)
CHLORIDE SERPL-SCNC: 107 MMOL/L (ref 97–108)
CHOLEST SERPL-MCNC: 212 MG/DL
CO2 SERPL-SCNC: 29 MMOL/L (ref 21–32)
CREAT SERPL-MCNC: 1.66 MG/DL (ref 0.55–1.02)
CREAT UR-MCNC: 66.6 MG/DL
ERYTHROCYTE [DISTWIDTH] IN BLOOD BY AUTOMATED COUNT: 12.4 % (ref 11.5–14.5)
EST. AVERAGE GLUCOSE BLD GHB EST-MCNC: 192 MG/DL
GLOBULIN SER CALC-MCNC: 3.2 G/DL (ref 2–4)
GLUCOSE SERPL-MCNC: 266 MG/DL (ref 65–100)
HBA1C MFR BLD: 8.3 % (ref 4–5.6)
HCT VFR BLD AUTO: 38.4 % (ref 35–47)
HDLC SERPL-MCNC: 103 MG/DL
HDLC SERPL: 2.1 {RATIO} (ref 0–5)
HGB BLD-MCNC: 12.2 G/DL (ref 11.5–16)
LDLC SERPL CALC-MCNC: 93.8 MG/DL (ref 0–100)
MCH RBC QN AUTO: 31.4 PG (ref 26–34)
MCHC RBC AUTO-ENTMCNC: 31.8 G/DL (ref 30–36.5)
MCV RBC AUTO: 99 FL (ref 80–99)
MICROALBUMIN UR-MCNC: 5.36 MG/DL
MICROALBUMIN/CREAT UR-RTO: 80 MG/G (ref 0–30)
NRBC # BLD: 0 K/UL (ref 0–0.01)
NRBC BLD-RTO: 0 PER 100 WBC
PLATELET # BLD AUTO: 170 K/UL (ref 150–400)
PMV BLD AUTO: 12.9 FL (ref 8.9–12.9)
POTASSIUM SERPL-SCNC: 4.8 MMOL/L (ref 3.5–5.1)
PROT SERPL-MCNC: 7.2 G/DL (ref 6.4–8.2)
RBC # BLD AUTO: 3.88 M/UL (ref 3.8–5.2)
SODIUM SERPL-SCNC: 139 MMOL/L (ref 136–145)
TRIGL SERPL-MCNC: 76 MG/DL (ref ?–150)
TSH SERPL DL<=0.05 MIU/L-ACNC: 1.26 UIU/ML (ref 0.36–3.74)
VLDLC SERPL CALC-MCNC: 15.2 MG/DL
WBC # BLD AUTO: 5 K/UL (ref 3.6–11)

## 2022-09-27 NOTE — PROGRESS NOTES
Please call patient back about results  Dm worse, continue basaglar, 25 units, add novolog 2 units with lunch and dinner  Schedule new endo eval as discussed, monitor bs tid   Cr stable, follows w [facility], fax her copy labs     Jessica--lft for you--has appointment next week w you,

## 2022-09-29 NOTE — PROGRESS NOTES
Called, spoke to pt  Received two pt identifiers   Pt informed per Dr. Radha Trinidad has worsened  Pt informed per Dr. Brooke Monterroso continue basaglar, 25 units, add novolog 2 units with lunch and dinner. Pt states does not want to start another insulin and knows why her a1c has climbed. Pt states will work on her a1c with what she has and does not want new insulin called in. Pt states has evals set up for everyone already  Pt verbalizes understanding of the instructions and has no further questions at this time.

## 2022-10-12 ENCOUNTER — OFFICE VISIT (OUTPATIENT)
Dept: ONCOLOGY | Age: 57
End: 2022-10-12
Payer: COMMERCIAL

## 2022-10-12 VITALS
RESPIRATION RATE: 18 BRPM | TEMPERATURE: 98.7 F | HEIGHT: 63 IN | DIASTOLIC BLOOD PRESSURE: 78 MMHG | OXYGEN SATURATION: 99 % | WEIGHT: 108 LBS | HEART RATE: 75 BPM | SYSTOLIC BLOOD PRESSURE: 139 MMHG | BODY MASS INDEX: 19.14 KG/M2

## 2022-10-12 DIAGNOSIS — N18.30 STAGE 3 CHRONIC KIDNEY DISEASE, UNSPECIFIED WHETHER STAGE 3A OR 3B CKD (HCC): ICD-10-CM

## 2022-10-12 DIAGNOSIS — Z86.2 HISTORY OF ANEMIA: Primary | ICD-10-CM

## 2022-10-12 PROCEDURE — 99204 OFFICE O/P NEW MOD 45 MIN: CPT | Performed by: STUDENT IN AN ORGANIZED HEALTH CARE EDUCATION/TRAINING PROGRAM

## 2022-10-12 RX ORDER — CIPROFLOXACIN 500 MG/1
TABLET ORAL
COMMUNITY

## 2022-10-12 RX ORDER — AMOXICILLIN AND CLAVULANATE POTASSIUM 500; 125 MG/1; MG/1
TABLET, FILM COATED ORAL
COMMUNITY

## 2022-10-12 NOTE — PROGRESS NOTES
Toni Real is a 59 y.o. female  New pt here today for anemia. Pt reports constipation, pt denies numbness in fingers and toes, pt reports dizziness, pt denies chest pains, pt denies cold hands and feet, chills, pt denies headaches or ice cravings, pt denies night sweats. Pt denies rapid heart beat. Pt denies any excessive bruising or abnormal bleeding. Pt reports having a colonoscopy and endoscopy done but was told to come back every 5 yrs due to cancer running in the family. Pt states it's almost time for her to get another one.       Chief Complaint   Patient presents with    New Patient    Anemia

## 2022-10-12 NOTE — PROGRESS NOTES
[facility] [place] at [address] [facility] [address] [facility]  W: [phone] F: [phone]      Reason for Visit:   Venkatesh Montes is a 59 y.o. female who is seen in consultation at the request of Dr. Yenny Cardoso for evaluation of normocytic anemia. Hematology / Oncology Treatment History:     Hematological/Oncological Diagnosis: Chronic history of normocytic anemia    Date of Diagnosis: Greater than 2 years    Treatment course: Surveillance      History of Present Illness:     Very pleasant 61-year-old female with a relevant medical history significant for primary sclerosing cholangitis, insulin-dependent diabetes mellitus, asthma, CKD stage III, presents for evaluation and treatment of chronic anemia. Hemoglobin trend is as follows      No other cytopenias noted, MCV has been in the 90s. On most recent CBC, the patient has no evidence of anemia with hemoglobin of 12.2, hematocrit of 38.4%. Clinically she feels well and is without any other constitutional symptoms concerning for anemia. CMP review shows CKD stage III with creatinine of 1.66. Calcium is normal at 9.3 with normal albumin at 4.0. Family history reviewed, noncontributory. Notable for multiple cancers in the family. Social history reviewed, also noncontributory. Review of Systems: A complete review of systems was obtained, negative except as described above.     Past Medical History:   Diagnosis Date    Asthma     Diabetes ([address]     Elevated transaminase level 6/29/2016    Insulin dependent diabetes mellitus 6/20/2016    Pancreatic atrophy 6/20/2016      Past Surgical History:   Procedure Laterality Date    HX HEENT      UPPER GI ENDOSCOPY,BIOPSY  3/16/2021           Social History     Tobacco Use    Smoking status: Never    Smokeless tobacco: Never   Substance Use Topics    Alcohol use: No     Comment: occasionally      Family History   Problem Relation Age of Onset    Cancer Father         prostate and colon    Diabetes Mother     Diabetes Maternal Grandmother     Cancer Maternal Aunt         breast    Breast Cancer Cousin      Current Outpatient Medications   Medication Sig    insulin glargine (Basaglar KwikPen U-100 Insulin) 100 unit/mL (3 mL) inpn Inject 25 units once daily. SEND FUTURE REFILL REQUESTS TO PCP AS DR GUERRA IS NO LONGER WITH THIS PRACTICE    Insulin Needles, Disposable, (Danae Pen Needle) 32 gauge x 5/32\" ndle For use with insulin pen as directed. SEND FUTURE REFILL REQUESTS TO PCP AS DR GUERRA IS NO LONGER WITH THIS PRACTICE    albuterol (PROVENTIL VENTOLIN) 2.5 mg /3 mL (0.083 %) nebu 3 mL by Nebulization route every six (6) hours as needed for Wheezing. amoxicillin-clavulanate (AUGMENTIN) 500-125 mg per tablet Augmentin 500 mg-125 mg tablet   Take 1 tablet every 12 hours by oral route for 7 days. (Patient not taking: Reported on 10/12/2022)    ciprofloxacin HCl (CIPRO) 500 mg tablet ciprofloxacin 500 mg tablet   TAKE 1 TABLET BY MOUTH EVERY DAY FOR 5 DAYS (Patient not taking: Reported on 10/12/2022)     No current facility-administered medications for this visit. Allergies   Allergen Reactions    Contrast Agent [Iodine] Itching    Hydrocodone Rash    Percocet [Oxycodone-Acetaminophen] Rash     Pt states she is not allergic to Tylenol. Codeine Nausea and Vomiting    Metformin Diarrhea            Physical Exam:   Visit Vitals  /78 (BP 1 Location: Left upper arm, BP Patient Position: Sitting)   Pulse 75   Temp 98.7 °F (37.1 °C) (Oral)   Resp 18   Ht 5' 3\" (1.6 m)   Wt 108 lb (49 kg)   SpO2 99%   BMI 19.13 kg/m²     ECOG PS: 0  General: No distress  Eyes: PERRLA, anicteric sclerae  HENT: Atraumatic with normal appearance of ears and nose; OP clear  Neck: Supple; no visualized JVD  Lymphatic: No cervical, or supraclavicular lymphadenopathy.     Respiratory: CTAB, normal respiratory effort  CV: Normal rate, regular rhythm, no murmurs, no peripheral edema  GI: Soft, nontender, nondistended, no palpable masses, no hepatomegaly, no splenomegaly  MS: Normal gait and station. Digits without clubbing or cyanosis. Skin: No rashes, ecchymoses, or petechiae. Normal temperature, turgor, and texture. Neuro/Psych: Alert, oriented, appropriate affect, normal judgment/insight      Results:     Lab Results   Component Value Date/Time    WBC 5.0 09/26/2022 08:55 AM    HGB 12.2 09/26/2022 08:55 AM    HCT 38.4 09/26/2022 08:55 AM    PLATELET 702 45/00/3307 08:55 AM    MCV 99.0 09/26/2022 08:55 AM    ABS. NEUTROPHILS 6.6 03/16/2021 02:07 AM    Hematocrit (POC) 31 (L) 08/24/2018 01:15 PM     Lab Results   Component Value Date/Time    Sodium 139 09/26/2022 08:55 AM    Potassium 4.8 09/26/2022 08:55 AM    Chloride 107 09/26/2022 08:55 AM    CO2 29 09/26/2022 08:55 AM    Glucose 266 (H) 09/26/2022 08:55 AM    BUN 32 (H) 09/26/2022 08:55 AM    Creatinine 1.66 (H) 09/26/2022 08:55 AM    GFR est AA 38 (L) 09/26/2022 08:55 AM    GFR est non-AA 32 (L) 09/26/2022 08:55 AM    Calcium 9.3 09/26/2022 08:55 AM    Sodium (POC) 124 (L) 08/24/2018 01:15 PM    Potassium (POC) 7.7 (HH) 08/24/2018 01:15 PM    Chloride (POC) 91 (L) 08/24/2018 01:15 PM    Glucose (POC) 118 (H) 03/17/2021 08:17 AM    BUN (POC) >140 (H) 08/24/2018 01:15 PM    Creatinine (POC) 9.5 (H) 08/24/2018 01:15 PM    Calcium, ionized (POC) 0.96 (L) 08/24/2018 01:15 PM     Lab Results   Component Value Date/Time    Bilirubin, total 0.3 09/26/2022 08:55 AM    ALT (SGPT) 79 (H) 09/26/2022 08:55 AM    Alk.  phosphatase 296 (H) 09/26/2022 08:55 AM    Protein, total 7.2 09/26/2022 08:55 AM    Albumin 4.0 09/26/2022 08:55 AM    Globulin 3.2 09/26/2022 08:55 AM         Assessment and Recommendations:     # Normocytic Anemia     - The differential diagnosis for a normocytic anemia is broad, and includes blood loss, anemia of chronic disease, chronic renal insufficiency, iron deficiency, hypothyroidism, hemolysis, and bone marrow suppression. MDS, B12 deficiency, folate deficiency, and alcohol abuse can also lead to anemia, though it is generally macrocytic. In this particular patient's case, her anemia seems to have resolved independently. I suspect that there are multiple causes for this including anemia of chronic kidney disease as well as possible underlying iron deficiency anemia. The patient's primary sclerosing cholangitis can also increase inflammation causing anemia of chronic inflammatory disease. We will check the following work-up to evaluate etiology of the patient's anemia      -   Orders Placed This Encounter    CBC WITH AUTOMATED DIFF    METABOLIC PANEL, COMPREHENSIVE    IRON PROFILE    FERRITIN    ERYTHROPOIETIN    amoxicillin-clavulanate (AUGMENTIN) 500-125 mg per tablet    ciprofloxacin HCl (CIPRO) 500 mg tablet    PATHOLOGIST REVIEW SMEARS ([facility] Default)       Plan to discuss the above work-up with the patient over the phone in the next few days. If no iron deficiency or persistent anemia is noted, the patient should follow-up on an as-needed basis    If her hemoglobin were to drop below 10 g/dL, without iron deficiency, she would likely benefit from EPO supplementation. Plan for follow-up on an as-needed basis. The patient was asked to call should she have any new or worsening symptoms. She should have CBC with CMP, as well as iron studies every 6 to 12 months.         Signed By: Terrie Trevino MD      Attending Medical Oncologist   [facility]

## 2022-10-16 LAB
ALBUMIN SERPL-MCNC: 4.5 G/DL (ref 3.8–4.9)
ALBUMIN/GLOB SERPL: 2.4 {RATIO} (ref 1.2–2.2)
ALP SERPL-CCNC: 283 IU/L (ref 44–121)
ALT SERPL-CCNC: 102 IU/L (ref 0–32)
AST SERPL-CCNC: 94 IU/L (ref 0–40)
BASOPHILS # BLD AUTO: 0 X10E3/UL (ref 0–0.2)
BASOPHILS NFR BLD AUTO: 0 %
BILIRUB SERPL-MCNC: 0.4 MG/DL (ref 0–1.2)
BUN SERPL-MCNC: 31 MG/DL (ref 6–24)
BUN/CREAT SERPL: 16 (ref 9–23)
CALCIUM SERPL-MCNC: 9.3 MG/DL (ref 8.7–10.2)
CHLORIDE SERPL-SCNC: 99 MMOL/L (ref 96–106)
CO2 SERPL-SCNC: 24 MMOL/L (ref 20–29)
CREAT SERPL-MCNC: 1.89 MG/DL (ref 0.57–1)
EGFR: 30 ML/MIN/1.73
EOSINOPHIL # BLD AUTO: 0.1 X10E3/UL (ref 0–0.4)
EOSINOPHIL NFR BLD AUTO: 1 %
EPO SERPL-ACNC: 18.5 MIU/ML (ref 2.6–18.5)
ERYTHROCYTE [DISTWIDTH] IN BLOOD BY AUTOMATED COUNT: 11.5 % (ref 11.7–15.4)
FERRITIN SERPL-MCNC: 369 NG/ML (ref 15–150)
GLOBULIN SER CALC-MCNC: 1.9 G/DL (ref 1.5–4.5)
GLUCOSE SERPL-MCNC: 396 MG/DL (ref 70–99)
HCT VFR BLD AUTO: 27.8 % (ref 34–46.6)
HGB BLD-MCNC: 8.9 G/DL (ref 11.1–15.9)
IMM GRANULOCYTES # BLD AUTO: 0 X10E3/UL (ref 0–0.1)
IMM GRANULOCYTES NFR BLD AUTO: 0 %
IRON SATN MFR SERPL: 56 % (ref 15–55)
IRON SERPL-MCNC: 166 UG/DL (ref 27–159)
LYMPHOCYTES # BLD AUTO: 1.2 X10E3/UL (ref 0.7–3.1)
LYMPHOCYTES NFR BLD AUTO: 19 %
MCH RBC QN AUTO: 30.6 PG (ref 26.6–33)
MCHC RBC AUTO-ENTMCNC: 32 G/DL (ref 31.5–35.7)
MCV RBC AUTO: 96 FL (ref 79–97)
MONOCYTES # BLD AUTO: 0.4 X10E3/UL (ref 0.1–0.9)
MONOCYTES NFR BLD AUTO: 6 %
NEUTROPHILS # BLD AUTO: 4.5 X10E3/UL (ref 1.4–7)
NEUTROPHILS NFR BLD AUTO: 74 %
PATH INTERP BLD-IMP: ABNORMAL
PATH REV BLD -IMP: ABNORMAL
PATHOLOGIST NAME: ABNORMAL
PLATELET # BLD AUTO: 169 X10E3/UL (ref 150–450)
POTASSIUM SERPL-SCNC: 5 MMOL/L (ref 3.5–5.2)
PROT SERPL-MCNC: 6.4 G/DL (ref 6–8.5)
RBC # BLD AUTO: 2.91 X10E6/UL (ref 3.77–5.28)
SODIUM SERPL-SCNC: 136 MMOL/L (ref 134–144)
TIBC SERPL-MCNC: 296 UG/DL (ref 250–450)
UIBC SERPL-MCNC: 130 UG/DL (ref 131–425)
WBC # BLD AUTO: 6.1 X10E3/UL (ref 3.4–10.8)

## 2022-10-20 ENCOUNTER — HOSPITAL ENCOUNTER (OUTPATIENT)
Dept: MAMMOGRAPHY | Age: 57
Discharge: HOME | End: 2022-10-20
Attending: INTERNAL MEDICINE
Payer: COMMERCIAL

## 2022-10-20 DIAGNOSIS — Z12.39 BREAST SCREENING: ICD-10-CM

## 2022-10-20 PROCEDURE — 77067 SCR MAMMO BI INCL CAD: CPT

## 2022-10-21 LAB
BASOPHILS # BLD AUTO: 0 X10E3/UL (ref 0–0.2)
BASOPHILS NFR BLD AUTO: 0 %
EOSINOPHIL # BLD AUTO: 0.1 X10E3/UL (ref 0–0.4)
EOSINOPHIL NFR BLD AUTO: 1 %
ERYTHROCYTE [DISTWIDTH] IN BLOOD BY AUTOMATED COUNT: 11.6 % (ref 11.7–15.4)
HCT VFR BLD AUTO: 27.7 % (ref 34–46.6)
HGB BLD-MCNC: 8.8 G/DL (ref 11.1–15.9)
IMM GRANULOCYTES # BLD AUTO: 0 X10E3/UL (ref 0–0.1)
IMM GRANULOCYTES NFR BLD AUTO: 1 %
LYMPHOCYTES # BLD AUTO: 1.1 X10E3/UL (ref 0.7–3.1)
LYMPHOCYTES NFR BLD AUTO: 17 %
MCH RBC QN AUTO: 31.1 PG (ref 26.6–33)
MCHC RBC AUTO-ENTMCNC: 31.8 G/DL (ref 31.5–35.7)
MCV RBC AUTO: 98 FL (ref 79–97)
MONOCYTES # BLD AUTO: 0.4 X10E3/UL (ref 0.1–0.9)
MONOCYTES NFR BLD AUTO: 6 %
NEUTROPHILS # BLD AUTO: 4.8 X10E3/UL (ref 1.4–7)
NEUTROPHILS NFR BLD AUTO: 75 %
PATH INTERP BLD-IMP: ABNORMAL
PATH REV BLD -IMP: ABNORMAL
PATHOLOGIST NAME: ABNORMAL
PLATELET # BLD AUTO: 167 X10E3/UL (ref 150–450)
RBC # BLD AUTO: 2.83 X10E6/UL (ref 3.77–5.28)
WBC # BLD AUTO: 6.4 X10E3/UL (ref 3.4–10.8)

## 2023-01-20 NOTE — PROGRESS NOTES
HISTORY OF PRESENT ILLNESS  Allie Queen is a 59 y.o. female. HPI  Pt was last here 9/26/22. Pt is here for routine care. Pt reports increased anxiety from multiple stressors at home -- she has had many deaths in the family and has had irregular shifts at work. Rx'd lexapro 5 mg. Discussed would consider increasing dosage in 6 weeks. BP today is 138/73     Type 1 diabetic  BS 80s-90s, some 200s has had has some higher readings with stressors  She notes she has been under stress due to deaths in the family and an irregular work schedule  Denies lows <70, highs < 300  Pt takes basaglar 30U (she increased this herself from [address] [place] [address])  No longer using humalog either  Pt is no longer on metformin --stopped d/t SE      following with Dr. Felipe Flores (endo)  Lov 3/18/22 she missed her follow-up appointments needs to establish with endocrinology as soon as possible I discussed this again today  Lov pt wanted to see a endocrinologist who is closer by. I gave her info for Dr. Fernanda Smith but she did not call as she lost it. Gave her information again. Wt today is 112 lbs, up 6 lbs since lov   Her wt is in the nl range     Reviewed labs  Ordered labs  Recall last echo 4/18/22:    Left Ventricle: Left ventricle size is normal. Normal wall thickness. The EF by visual approximation is 55 - 60%. She saw Dr. Mike Villalba (hem onc) for anemia 10/12/22  She will f/U PRN  Reviewed note:  Assessment and Recommendations:    # Normocytic Anemia   - The differential diagnosis for a normocytic anemia is broad, and includes blood loss, anemia of chronic disease, chronic renal insufficiency, iron deficiency, hypothyroidism, hemolysis, and bone marrow suppression. MDS, B12 deficiency, folate deficiency, and alcohol abuse can also lead to anemia, though it is generally macrocytic. In this particular patient's case, her anemia seems to have resolved independently.   I suspect that there are multiple causes for this including anemia of chronic kidney disease as well as possible underlying iron deficiency anemia. The patient's primary sclerosing cholangitis can also increase inflammation causing anemia of chronic inflammatory disease.   We will check the following work-up to evaluate etiology of the patient's anemia    Pt follows with Dr. Ping Mejia (hepatology) for elevated liver tests has primary sclerosing cholangitis  Last visit with ROWENA Joiner 4/12/22, she had an appt 10/6/22 but pt cancelled it she needs to reschedule it discussed this with her at length LFTs have not improved on recent labs  Emphasized the importance of calling to r/s this      Pt follows with Dr. Rochelle Karimi (nephro) for ckd, has had multiple arf episodes  Last there 5/22, all stable, no med changes  Creatinine running around 1.4 most recently 1.8 she is due for follow-up overdue actually discussed this with her she states she will schedule  Discussed she is due for f/U, needs to f/U q 6 months      Seeing Dr. Janine Peterson (uro) and ROWENA Mckenzie for recurrent UTIs  Last visit was 10/18     Pt has been following with Dr Ariadna Mcdowell (ortho) for knee pain   Last there 12/19/19 with ROWENA Koo     She follows w/ Dr. Brian Capps (ortho) for sciatica   Last visit was 1/19 with ROWENA Graham   She completed physical therapy in the past     She is following with Dr. Angeles Doyle (ortho) for trigger thumb  Last there 10/29/20  She had cortisone injection see above     Pt has albuterol to use prn for her asthma  Denies wheezing, has not needed this recently     She saw Dr. Capri Escobar (GI) 6/4/21    Reviewed mammo 10/20/22: neg     PREVENTIVE:  Colonoscopy: 4/21 Dr. Capri Escobar fmhx - father  EGD: 3/16/21 Dr. Pavan Pyle  Pap: [address] [facility], 10/22   Mammogram: 10/20/22 neg  Dexa: not yet needed  Tdap: ~2013, will update today 01/23/23  Pneumovax: 02/19/19   Shingrix: complete, 09/22/21, 12/22/21  Flu shot: 9/26/22  Foot exam: 9/26/22  Micro: 9/22   A1C:  12/21 6.0 3/22 6.3 9/22 8.3   Eye exam: Dr Steve Santizo, 8/22, will get notes -- small cataract per pt will f/u in 1 year  Lipids: 9/22 LDL 93  Covid: 1 dose J&J + 1 dose Pfizer booster    Patient Active Problem List    Diagnosis Date Noted    Chronic renal disease, stage III 10/12/2022    Hyperglycemia due to type 1 diabetes mellitus ([facility] 02/22/2021    PSC (primary sclerosing cholangitis) 02/22/2021    Stage 2 chronic kidney disease 02/22/2021    Mild intermittent asthma without complication 20/61/0370    Elevated liver enzymes 12/19/2019    Insulin dependent diabetes mellitus 06/20/2016    Pancreatic atrophy 06/20/2016     Current Outpatient Medications   Medication Sig Dispense Refill    amoxicillin-clavulanate (AUGMENTIN) 500-125 mg per tablet Augmentin 500 mg-125 mg tablet   Take 1 tablet every 12 hours by oral route for 7 days. (Patient not taking: Reported on 10/12/2022)      ciprofloxacin HCl (CIPRO) 500 mg tablet ciprofloxacin 500 mg tablet   TAKE 1 TABLET BY MOUTH EVERY DAY FOR 5 DAYS (Patient not taking: Reported on 10/12/2022)      insulin glargine (Basaglar KwikPen U-100 Insulin) 100 unit/mL (3 mL) inpn Inject 25 units once daily. SEND FUTURE REFILL REQUESTS TO PCP AS DR GUERRA IS NO LONGER WITH THIS PRACTICE 45 mL 1    Insulin Needles, Disposable, (Danae Pen Needle) 32 gauge x 5/32\" ndle For use with insulin pen as directed. SEND FUTURE REFILL REQUESTS TO PCP AS DR GUERRA IS NO LONGER WITH THIS PRACTICE 100 Pen Needle 3    albuterol (PROVENTIL VENTOLIN) 2.5 mg /3 mL (0.083 %) nebu 3 mL by Nebulization route every six (6) hours as needed for Wheezing.  30 Nebule 0     Past Surgical History:   Procedure Laterality Date    HX HEENT      UPPER GI ENDOSCOPY,BIOPSY  3/16/2021           Lab Results   Component Value Date/Time    WBC 6.1 10/12/2022 03:07 PM    WBC 6.4 10/12/2022 03:07 PM    HGB 8.9 (L) 10/12/2022 03:07 PM    HGB 8.8 (L) 10/12/2022 03:07 PM    HCT 27.8 (L) 10/12/2022 03:07 PM    HCT 27.7 (L) 10/12/2022 03:07 PM    Hematocrit (POC) 31 (L) 08/24/2018 01:15 PM PLATELET 700 76/58/5001 03:07 PM    PLATELET 530 43/15/8780 03:07 PM    MCV 96 10/12/2022 03:07 PM    MCV 98 (H) 10/12/2022 03:07 PM     Lab Results   Component Value Date/Time    Cholesterol, total 212 (H) 09/26/2022 08:55 AM    HDL Cholesterol 103 09/26/2022 08:55 AM    LDL, calculated 93.8 09/26/2022 08:55 AM    Triglyceride 76 09/26/2022 08:55 AM    CHOL/HDL Ratio 2.1 09/26/2022 08:55 AM     Lab Results   Component Value Date/Time    GFR est non-AA 32 (L) 09/26/2022 08:55 AM    GFRNA, POC 4 (L) 08/24/2018 01:15 PM    GFR est AA 38 (L) 09/26/2022 08:55 AM    GFRAA, POC 5 (L) 08/24/2018 01:15 PM    Creatinine 1.89 (H) 10/12/2022 03:07 PM    Creatinine (POC) 9.5 (H) 08/24/2018 01:15 PM    BUN 31 (H) 10/12/2022 03:07 PM    BUN (POC) >140 (H) 08/24/2018 01:15 PM    Sodium 136 10/12/2022 03:07 PM    Sodium (POC) 124 (L) 08/24/2018 01:15 PM    Potassium 5.0 10/12/2022 03:07 PM    Potassium (POC) 7.7 (HH) 08/24/2018 01:15 PM    Chloride 99 10/12/2022 03:07 PM    Chloride (POC) 91 (L) 08/24/2018 01:15 PM    CO2 24 10/12/2022 03:07 PM    Magnesium 1.8 03/10/2021 03:55 AM    Phosphorus 2.4 (L) 03/08/2021 05:23 PM    PTH, Intact 360.7 (H) 08/25/2018 10:36 AM        Review of Systems   Respiratory:  Negative for shortness of breath. Cardiovascular:  Negative for chest pain. Physical Exam  Constitutional:       General: She is not in acute distress. Appearance: She is not ill-appearing, toxic-appearing or diaphoretic. HENT:      Head: Normocephalic and atraumatic. Right Ear: External ear normal.      Left Ear: External ear normal.   Eyes:      General:         Right eye: No discharge. Left eye: No discharge. Conjunctiva/sclera: Conjunctivae normal.      Pupils: Pupils are equal, round, and reactive to light. Cardiovascular:      Rate and Rhythm: Normal rate and regular rhythm. Heart sounds: Murmur heard. No friction rub. No gallop. Pulmonary:      Effort: No respiratory distress. Breath sounds: Normal breath sounds. No wheezing or rales. Chest:      Chest wall: No tenderness. Musculoskeletal:         General: Normal range of motion. Cervical back: Normal.   Skin:     General: Skin is warm and dry. Neurological:      Mental Status: She is oriented to person, place, and time. Mental status is at baseline. Coordination: Coordination normal.      Gait: Gait normal.   Psychiatric:         Mood and Affect: Mood normal.         Behavior: Behavior normal.       ASSESSMENT and PLAN    ICD-10-CM ICD-9-CM    1.  Hyperglycemia due to type 1 diabetes mellitus ([facility]  E10.65 250.01 REFERRAL TO ENDOCRINOLOGY      METABOLIC PANEL, BASIC      HEMOGLOBIN A1C WITH EAG   Patient is a brittle type I diabetic    She needs to stay under endocrinology care which we have discussed on multiple occasions    She has not seen her endocrinologist since March she missed her July appointment and still has not rescheduled we discussed this back in September    I discussed again today    Currently taking 30 units of Basaglar    Will check labs today    Provided referral again for endocrinology as she would like to see endocrinologist in [facility]   METABOLIC PANEL, BASIC      HEMOGLOBIN A1C WITH EAG      2. Stage 3 chronic kidney disease, unspecified whether stage 3a or 3b CKD ([facility]  L14.56 646.0 METABOLIC PANEL, BASIC      HEMOGLOBIN A1C WITH EAG      METABOLIC PANEL, BASIC   Creatinine runs around 1.4-1.6 baseline she follows with nephrology has had multiple episodes of acute renal failure in the past    Most recent creatinine worse at 1.8    I had discussed with her seeing her nephrologist and scheduling routine follow-up but she has not done this discussed again today wrote it down she states she will   HEMOGLOBIN A1C WITH EAG      3. PSC (primary sclerosing cholangitis)  C77.63 568.3 METABOLIC PANEL, BASIC      HEMOGLOBIN A1C WITH EAG   LFTs remain elevated she was supposed to see hepatology in October but she canceled the appointment I discussed this with her she needs to reschedule the appointment   METABOLIC PANEL, BASIC      HEMOGLOBIN A1C WITH EAG      4. Mild intermittent asthma without complication  P39.73 891.14 METABOLIC PANEL, BASIC      HEMOGLOBIN A1C WITH EAG      METABOLIC PANEL, BASIC   Stable albuterol infrequently   HEMOGLOBIN A1C WITH EAG      5. Anxiety  F41.9 300.00    Will start treatment with Lexapro 5 mg daily     Depression screen reviewed and negative. Scribed by Ines Pierre, as dictated by Dr. Montana Pedro. Current diagnosis and concerns discussed with pt at length. Pt understands risks and benefits or current treatment plan and medications, and accepts the treatment and medication with any possible risks. Pt asks appropriate questions, which were answered. Pt was instructed to call with any concerns or problems. I have reviewed the note documented by the scribe. The services provided are my own. The documentation is accurate.

## 2023-01-23 ENCOUNTER — OFFICE VISIT (OUTPATIENT)
Dept: INTERNAL MEDICINE CLINIC | Age: 58
End: 2023-01-23
Payer: COMMERCIAL

## 2023-01-23 VITALS
HEART RATE: 90 BPM | WEIGHT: 112.2 LBS | TEMPERATURE: 98.8 F | OXYGEN SATURATION: 97 % | DIASTOLIC BLOOD PRESSURE: 73 MMHG | HEIGHT: 64 IN | BODY MASS INDEX: 19.15 KG/M2 | RESPIRATION RATE: 16 BRPM | SYSTOLIC BLOOD PRESSURE: 138 MMHG

## 2023-01-23 DIAGNOSIS — Z23 ENCOUNTER FOR IMMUNIZATION: ICD-10-CM

## 2023-01-23 DIAGNOSIS — E10.65 HYPERGLYCEMIA DUE TO TYPE 1 DIABETES MELLITUS (HCC): Primary | ICD-10-CM

## 2023-01-23 DIAGNOSIS — F41.9 ANXIETY: ICD-10-CM

## 2023-01-23 DIAGNOSIS — J45.20 MILD INTERMITTENT ASTHMA WITHOUT COMPLICATION: ICD-10-CM

## 2023-01-23 DIAGNOSIS — N18.30 STAGE 3 CHRONIC KIDNEY DISEASE, UNSPECIFIED WHETHER STAGE 3A OR 3B CKD (HCC): ICD-10-CM

## 2023-01-23 DIAGNOSIS — K83.01 PSC (PRIMARY SCLEROSING CHOLANGITIS): ICD-10-CM

## 2023-01-23 PROBLEM — N18.31 STAGE 3A CHRONIC KIDNEY DISEASE (HCC): Status: ACTIVE | Noted: 2022-10-12

## 2023-01-23 PROBLEM — N18.2 STAGE 2 CHRONIC KIDNEY DISEASE: Status: RESOLVED | Noted: 2021-02-22 | Resolved: 2023-01-23

## 2023-01-23 PROCEDURE — 99214 OFFICE O/P EST MOD 30 MIN: CPT | Performed by: INTERNAL MEDICINE

## 2023-01-23 PROCEDURE — 90715 TDAP VACCINE 7 YRS/> IM: CPT | Performed by: INTERNAL MEDICINE

## 2023-01-23 PROCEDURE — 90471 IMMUNIZATION ADMIN: CPT | Performed by: INTERNAL MEDICINE

## 2023-01-23 RX ORDER — ESCITALOPRAM OXALATE 5 MG/1
5 TABLET ORAL DAILY
Qty: 90 TABLET | Refills: 0 | Status: SHIPPED | OUTPATIENT
Start: 2023-01-23

## 2023-01-23 NOTE — PROGRESS NOTES
1. \"Have you been to the ER, urgent care clinic since your last visit? Hospitalized since your last visit? \" No    2. \"Have you seen or consulted any other health care providers outside of the [address] [place] [address] since your last visit? \" No     3. For patients aged 39-70: Has the patient had a colonoscopy / FIT/ Cologuard? Yes - Care Gap present. Most recent result on file      If the patient is female:    4. For patients aged 41-77: Has the patient had a mammogram within the past 2 years? Yes - Care Gap present. Most recent result on file      5. For patients aged 21-65: Has the patient had a pap smear? Yes - Care Gap present.  Most recent result on file

## 2023-01-24 LAB
BUN SERPL-MCNC: 24 MG/DL (ref 6–24)
BUN/CREAT SERPL: 14 (ref 9–23)
CALCIUM SERPL-MCNC: 9.4 MG/DL (ref 8.7–10.2)
CHLORIDE SERPL-SCNC: 99 MMOL/L (ref 96–106)
CO2 SERPL-SCNC: 25 MMOL/L (ref 20–29)
CREAT SERPL-MCNC: 1.73 MG/DL (ref 0.57–1)
EGFR: 34 ML/MIN/1.73
EST. AVERAGE GLUCOSE BLD GHB EST-MCNC: 126 MG/DL
GLUCOSE SERPL-MCNC: 205 MG/DL (ref 70–99)
HBA1C MFR BLD: 6 % (ref 4.8–5.6)
POTASSIUM SERPL-SCNC: 5.1 MMOL/L (ref 3.5–5.2)
SODIUM SERPL-SCNC: 140 MMOL/L (ref 134–144)

## 2023-02-19 NOTE — TELEPHONE ENCOUNTER
----- Message from [facility] sent at 9/7/2021  8:59 AM EDT -----  Regarding: /Telephone  Caller (if not patient):Pt  Relationship of caller (if not patient):n/a  Best contact number(s):[phone]  Name of medication and dosage if known:insulin glargine (Basaglar KwikPen U-100 Insulin) 100 unit/mL (3 mL) inpn  Is patient out of this medication (yes/no): No  Pharmacy name:[id number]  Pharmacy listed in chart? (yes/no):Yes  Pharmacy phone [phone]   Date of last visit:2/19/21  Details to clarify the request:n/a       1.83

## 2023-04-03 PROBLEM — E11.01 HHNC (HYPERGLYCEMIC HYPEROSMOLAR NONKETOTIC COMA) (HCC): Status: RESOLVED | Noted: 2021-03-08 | Resolved: 2021-12-22

## 2023-04-12 ENCOUNTER — TELEPHONE (OUTPATIENT)
Dept: ENDOCRINOLOGY | Age: 58
End: 2023-04-12

## 2023-04-17 ENCOUNTER — OFFICE VISIT (OUTPATIENT)
Dept: HEMATOLOGY | Age: 58
End: 2023-04-17
Payer: COMMERCIAL

## 2023-04-17 VITALS
TEMPERATURE: 97.9 F | OXYGEN SATURATION: 99 % | HEART RATE: 79 BPM | HEIGHT: 61 IN | WEIGHT: 111.2 LBS | DIASTOLIC BLOOD PRESSURE: 65 MMHG | BODY MASS INDEX: 20.99 KG/M2 | SYSTOLIC BLOOD PRESSURE: 125 MMHG

## 2023-04-17 DIAGNOSIS — K83.01 PSC (PRIMARY SCLEROSING CHOLANGITIS): Primary | ICD-10-CM

## 2023-04-17 DIAGNOSIS — D64.9 ANEMIA, UNSPECIFIED TYPE: ICD-10-CM

## 2023-04-17 PROCEDURE — 99213 OFFICE O/P EST LOW 20 MIN: CPT | Performed by: PHYSICIAN ASSISTANT

## 2023-04-17 NOTE — PROGRESS NOTES
[facility], MD, FACP, [facility], [place]      Dean Alvarado PA-C    April S Gilbert, AGPCNP-BC   Jose Acevedo, [facility]-AG   Ady Mcclelland, FNP-ITZEL Kohler, FNP-C   Luca Lopez, AGPCNP-BC      [address]   at [address], [address] [place] [address]  22.   [phone]   FAX: [address]   at [address], [address] [place] [address], [address]   [phone]   FAX: [phone]     Patient Care Team:  Shelly Goncalves MD as PCP - General (Internal Medicine Physician)  Shelly Goncalves MD as PCP - [facility]  Stan Briceno MD (Nephrology)  Abrahan Valdez MD (Female Pelvic Medicine and Reconstructive Surgery)  Suzy Vásquez MD (Optometry)  Warner Madrigal MD (Gastroenterology)  Jatin Carty MD (Endocrinology Physician)      Problem List  Date Reviewed: 4/10/2023            Codes Class Noted    Stage 3a chronic kidney disease [facility]) ICD-10-CM: N18.31  ICD-9-CM: 585.3  10/12/2022        Hyperglycemia due to type 1 diabetes mellitus ([facility] ICD-10-CM: E10.65  ICD-9-CM: 250.01  2/22/2021        [facility] (primary sclerosing cholangitis) ICD-10-CM: K83.01  ICD-9-CM: 576.1  2/22/2021        Mild intermittent asthma without complication PWR-84-EE: U03.12  ICD-9-CM: 493.90  2/22/2021        Elevated liver enzymes ICD-10-CM: R74.8  ICD-9-CM: 790.5  12/19/2019        Pancreatic atrophy ICD-10-CM: K86.89  ICD-9-CM: 577.8  6/20/2016         Shelley Gavin returns to the [address] for management of elevated liver enzymes, [facility]. The active problem list, all pertinent past medical history, medications, radiologic findings and laboratory findings related to the liver disorder were reviewed with the patient. The patient is a 59 y.o.  Black female who was found to have elevated liver transaminases and alkaline phosphatase in 2018. Serologic evaluation for markers of chronic liver disease were negative. Ultrasound of the liver was performed in 2/2019. The results of the imaging demonstrated a normal appearing liver. MRI has been ordered in the past, this has not yet been scheduled due to patient's significant claustrophobia - she is unable to tolerate MRI scanner. She has had recent CT scan in 3/2021 showing no liver mass/lesion or biliary ductal dilation and no large ductal disease. The patient underwent a liver biopsy in 2/2020. This demonstrates fibrosis that is far greater than the degree of portal inflammation suggesting PSC. We have done Fibroscan at a past office visit and this showed EkPa was 7.1. Suggested fibrosis level is F1-2. CAP score is 171. The patient has no symptoms which can be attributed to the liver disorder. The patient is not currently experiencing the following symptoms of liver disease:  fatigue, pain in the right side over the liver, fevers or itching. This has never been the case. She had hospitalization for DKA in 3/2021 with associated UTI. She has since been doing better and is stable on medications with no further hospitalizations and reduction in her A1c to ~6%. She has seen heme/onc for evaluation of anemia, but no underlying cause identified, ?r/t renal disease. She has also continued to follow with nephrology and no changes in medications recommended. The patient completes all daily activities without any functional limitations. ASSESSMENT AND PLAN:  PSC  Intermittent elevation in liver transaminases and persistent elevation in alkaline phosphatase. 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 I have ordered additional labs today to follow enzymes and liver function. We have also drawn CA 19-9 values.  The AST and ALT remain modestly elevated associated with ongoing elevation of the ALP. Total bilirubin is normal.  Serum albumin is depressed. INR is normal.  The platelet count is normal.      Serologic testing for causes of chronic liver disease were negative. Assessment of liver fibrosis with Fibroscan was performed In 1/2020. The result was 9.8 kPa which correlates with Stage 3 bridging fibrosis. A liver biopsy from 2/2020 suggests PSC. There is mild portal inflammation and portal fibrosis with extension outside the portal tract that is greater than would be expected given the degree of inflammation. This is suggestive of PSC. Repeat Fibroscan in 10/2021 shows minimal fibrosis with a score of 7.1 EkPa and no steatosis. This practice has recommended that we do an MRI/MRCP. She says she cannot tolerate the MRI. She has tried to do an MRI previously for another medical issue and could not stay in the scanner. She has had recent CT scan without mass/lesion or biliary changes in 3/2021. We are currently part of a international clinical trial to develop a medication for PSC/pruritus. As she has no significant itching as part of her symptoms, will defer study participation at present. She has also indicated that she is not interested in clinical trials options and is not interested in taking any medications other than her insulin. .     Since there is currently no FDA approved treatment for [place] we can simply follow her at regular intervals. I have suggested return office visit in 6 months and have instructed her when to call with any additional symptoms in between visits including increased fatigue, abdominal pain, fever, and itching. We will continue to monitor her on a regular basis and offer additional treatments/trial options as available. We will plan on repeating Fibroscan to assess for any progression at the time of the next follow-up in 6 months.      Screening for Hepatocellular Carcinoma  HCC screening is not necessary as the patient has no evidence of cirrhosis and no large ductal changes of PSC. Baseline  was not elevated. CT imaging negative in 3/2021. Treatment of other medical problems in patients with chronic liver disease  There are no contraindications for the patient to take most medications that are necessary for treatment of other medical issues. Counseling for alcohol in patients with chronic liver disease  The patient was counseled regarding alcohol consumption and the effect of alcohol on chronic liver disease. The patient does not consume any significant amount of alcohol. DM  She is focused on improved control at this point and has established with new endocrinologist. Her Hgb A1c is ~6%. Vaccinations   Vaccination for viral hepatitis B is not needed. The patient has serologic evidence of prior exposure or vaccination with immunity. The need for vaccination against viral hepatitis A will be assessed with serologic and instituted as appropriate. Routine vaccinations against other bacterial and viral agents can be performed as indicated. Annual flu vaccination should be administered if indicated. She has received COVID vaccine. ALLERGIES  Allergies   Allergen Reactions    Contrast Agent [Iodine] Itching    Hydrocodone Rash    Percocet [Oxycodone-Acetaminophen] Rash     Pt states she is not allergic to Tylenol. Codeine Nausea and Vomiting    Metformin Diarrhea       MEDICATIONS  Current Outpatient Medications   Medication Sig    Basaglar KwikPen U-100 Insulin 100 unit/mL (3 mL) inpn Inject 30 units once daily    Danae Pen Needle 32 gauge x 5/32\" ndle For use with insulin pen as directed E10.69    albuterol (PROVENTIL VENTOLIN) 2.5 mg /3 mL (0.083 %) nebu 3 mL by Nebulization route every six (6) hours as needed for Wheezing. No current facility-administered medications for this visit.        SYSTEM REVIEW NOT RELATED TO LIVER DISEASE OR REVIEWED ABOVE:  Constitution systems: Negative for fever, chills, weight gain, weight loss. Eyes: Negative for visual changes. ENT: Negative for sore throat, painful swallowing. Respiratory: Negative for cough, hemoptysis, SOB. Cardiology: Negative for chest pain, palpitations. GI:  Negative for constipation or diarrhea. : Negative for urinary frequency, dysuria, hematuria, nocturia. Skin: Negative for rash. Hematology: Negative for easy bruising, blood clots. Musculo-skeletal: Negative for back pain, muscle pain, weakness. Neurologic: Negative for headaches, dizziness, vertigo, memory problems not related to HE. Psychology: Negative for anxiety, depression. FAMILY HISTORY:  The father has/had the following following chronic disease(s): prostate and colon cancer. The mother  of MVA. There is no family history of liver disease. SOCIAL HISTORY:  The patient has never been . The patient has no children. The patient has never used tobacco products. The patient has never consumed significant amounts of alcohol. The patient currently works full time: warehouse production. PHYSICAL EXAMINATION:  VS: per nursing note  General: No acute distress. Eyes: Sclera anicteric. ENT: No oral lesions. Skin: No rashes. spider angiomata. No jaundice. Abdomen: No obvious distention suggesting ascites. Extremities: No edema. No muscle wasting. Neurologic: Alert and oriented. Cranial nerves grossly intact.     LABORATORY STUDIES:  Liver [place] [address] Units 10/12/2022 2022   WBC 3.4 - 10.8 x10E3/uL 6.1 5.0   ANC 1.4 - 7.0 x10E3/uL 4.5    HGB 11.1 - 15.9 g/dL 8.9 (L) 12.2    - 450 x10E3/uL 169 170   PLT  Appear normal.    INR 0.8 - 1.2     AST 0 - 40 IU/L 94 (H) 69 (H)   ALT 0 - 32 IU/L 102 (H) 79 (H)   Alk Phos 44 - 121 IU/L 283 (H) 296 (H)   Bili, Total 0.0 - 1.2 mg/dL 0.4 0.3   Bili, Direct 0.00 - 0.40 mg/dL     Albumin 3.8 - 4.9 g/dL 4.5 4.0   BUN 6 - 24 mg/dL 31 (H) 32 (H)   Creat 0.57 - 1.00 mg/dL 1.89 (H) 1.66 (H)   Na 134 - 144 mmol/L 136 139   K 3.5 - 5.2 mmol/L 5.0 4.8   Cl 96 - 106 mmol/L 99 107   CO2 20 - 29 mmol/L 24 29   Glucose 70 - 99 mg/dL 396 (H) 266 (H)   Magnesium 1.6 - 2.4 mg/dL       Liver [place] [address] Ref Rng & Units 3/22/2022   WBC 3.4 - 10.8 x10E3/uL 12.2 (H)   ANC 1.4 - 7.0 x10E3/uL    HGB 11.1 - 15.9 g/dL 10.4 (L)    - 450 x10E3/uL 171   PLT     INR 0.8 - 1.2    AST 0 - 40 IU/L 48 (H)   ALT 0 - 32 IU/L 51 (H)   Alk Phos 44 - 121 IU/L 245 (H)   Bili, Total 0.0 - 1.2 mg/dL 0.4   Bili, Direct 0.00 - 0.40 mg/dL    Albumin 3.8 - 4.9 g/dL 4.5   BUN 6 - 24 mg/dL 28 (H)   Creat 0.57 - 1.00 mg/dL 1.40 (H)   Na 134 - 144 mmol/L 146 (H)   K 3.5 - 5.2 mmol/L 4.4   Cl 96 - 106 mmol/L 105   CO2 20 - 29 mmol/L 23   Glucose 70 - 99 mg/dL 105 (H)   Magnesium 1.6 - 2.4 mg/dL    Additional lab values drawn at today's office visit are pending at the time of documentation. SEROLOGIES:  8/2018. HBsurface antigen negative, anti-HCV negative, Ferritin 1139, FE saturation 74%, JUN negative, ANCA negative    3/2021. Fe 71, TS% 27. Serologies Latest Ref Rng & Units 12/19/2019 8/21/2019   Hep B Surface Ag Negative  Negative   Hep B Core Ab, Total Negative Negative    Hep B Surface AB QL  Reactive    Ferritin 15 - 150 ng/mL 445 (H) 384 (H)   Iron % Saturation 15 - 55 % 48 31   JUN, IFA  Negative    M2 Ab 0.0 - 20.0 Units  <20.0   Ceruloplasmin 19.0 - 39.0 mg/dL  32.5   Alpha-1 antitrypsin level 90 - 200 mg/dL  129     LIVER HISTOLOGY:  1/2020. [facility]. EkPa was 9.8. Suggested fibrosis level is F2-3. CAP score is 147.  2/2020. Slides reviewed by MLS. Non-specific mild portal inflammation, with No interface hepatitis, with No PMN. No lobular inflammation. No steatosis. Anish stage 3 fibrosis. Metavir stage 2 fibrosis. Knodell score (0011).   The degree of portal fibrosis is greater than would be expected based upon the degree of inflammation. This suggests [facility].  10/2021. [facility]. EkPa was 7.1. Suggested fibrosis level is F1-2. CAP score is 171. ENDOSCOPIC PROCEDURES:  Patient reports she had colonoscopy done at ~48 years, within normal limits. RADIOLOGY:  2/2019. Ultrasound of liver. Normal appearing liver. No liver mass lesions. 3/2021. CT abdomen. Normal appearing liver. No fluid/ascites. OTHER TESTING:  Not available or performed    FOLLOW-UP:  All of the issues listed above in the Assessment and Plan were discussed with the patient. All questions were answered. The patient expressed a clear understanding of the above. [address] [id number] in 6 months for monitoring and repeat Fibroscan. Will forward copy of labs from today to other providers. Documentation reviewed and updated to reflect current, accurate patient information.     Alphonso Gonzalez PA-C  Liver [facility], [address] [facility] Angelita Wolff  22.  [phone]  [address] [place] [address]

## 2023-04-17 NOTE — PROGRESS NOTES
Identified pt with two pt identifiers(name and ). Reviewed record in preparation for visit and have obtained necessary documentation. Chief Complaint   Patient presents with    Elevated Liver Enzymes     Follow up      Vitals:    23 1247   BP: 125/65   Pulse: 79   Temp: 97.9 °F (36.6 °C)   TempSrc: Temporal   SpO2: 99%   Weight: 111 lb 3.2 oz (50.4 kg)   Height: 5' 1\" (1.549 m)   PainSc:   0 - No pain       Health Maintenance Review: Patient reminded of \"due or due soon\" health maintenance. I have asked the patient to contact his/her primary care provider (PCP) for follow-up on his/her health maintenance. Coordination of Care Questionnaire:  :   1) Have you been to an emergency room, urgent care, or hospitalized since your last visit? If yes, where when, and reason for visit? no       2. Have seen or consulted any other health care provider since your last visit? If yes, where when, and reason for visit? NO      Patient is accompanied by self I have received verbal consent from Kvng Sellers to discuss any/all medical information while they are present in the room.

## 2023-04-18 LAB
ALBUMIN SERPL-MCNC: 5.3 G/DL (ref 3.8–4.9)
ALP SERPL-CCNC: 352 IU/L (ref 44–121)
ALT SERPL-CCNC: 71 IU/L (ref 0–32)
AST SERPL-CCNC: 67 IU/L (ref 0–40)
BILIRUB DIRECT SERPL-MCNC: 0.11 MG/DL (ref 0–0.4)
BILIRUB SERPL-MCNC: 0.3 MG/DL (ref 0–1.2)
BUN SERPL-MCNC: 29 MG/DL (ref 6–24)
BUN/CREAT SERPL: 18 (ref 9–23)
CALCIUM SERPL-MCNC: 10.1 MG/DL (ref 8.7–10.2)
CANCER AG19-9 SERPL-ACNC: 13 U/ML (ref 0–35)
CHLORIDE SERPL-SCNC: 98 MMOL/L (ref 96–106)
CO2 SERPL-SCNC: 24 MMOL/L (ref 20–29)
CREAT SERPL-MCNC: 1.64 MG/DL (ref 0.57–1)
EGFRCR SERPLBLD CKD-EPI 2021: 36 ML/MIN/1.73
ERYTHROCYTE [DISTWIDTH] IN BLOOD BY AUTOMATED COUNT: 11.3 % (ref 11.7–15.4)
FERRITIN SERPL-MCNC: 341 NG/ML (ref 15–150)
GLUCOSE SERPL-MCNC: 175 MG/DL (ref 70–99)
HCT VFR BLD AUTO: 34.7 % (ref 34–46.6)
HGB BLD-MCNC: 11.4 G/DL (ref 11.1–15.9)
IRON SATN MFR SERPL: 12 % (ref 15–55)
IRON SERPL-MCNC: 48 UG/DL (ref 27–159)
MCH RBC QN AUTO: 31.1 PG (ref 26.6–33)
MCHC RBC AUTO-ENTMCNC: 32.9 G/DL (ref 31.5–35.7)
MCV RBC AUTO: 95 FL (ref 79–97)
PLATELET # BLD AUTO: 193 X10E3/UL (ref 150–450)
POTASSIUM SERPL-SCNC: 4.7 MMOL/L (ref 3.5–5.2)
PROT SERPL-MCNC: 7.9 G/DL (ref 6–8.5)
RBC # BLD AUTO: 3.66 X10E6/UL (ref 3.77–5.28)
SODIUM SERPL-SCNC: 142 MMOL/L (ref 134–144)
TIBC SERPL-MCNC: 398 UG/DL (ref 250–450)
UIBC SERPL-MCNC: 350 UG/DL (ref 131–425)
WBC # BLD AUTO: 7.6 X10E3/UL (ref 3.4–10.8)

## 2023-04-19 NOTE — PROGRESS NOTES
Pt notified via [facility] letter of stable findings, no elevation in bili or tumor marker. Will continue to monitor every 6 months, remain on oral iron, Hgb improved.

## 2023-04-24 NOTE — PROGRESS NOTES
HISTORY OF PRESENT ILLNESS  Queenie Brown is a 59 y.o. female. HPI    Pt was last here 1/23/23. Pt is here for routine care. BP today is 128/78      Type 1 diabetic  BS 100s 90s one high 251, she was stressed out at work   Denies lows <70, highs > 300  Pt takes basaglar 30U   No longer using humalog either  Pt is no longer on metformin --stopped d/t SE     Previously followed with Dr. Ludivina Jones (endo)  Lov 3/18/22   She saw Dr. Gino Cisneros (endo) 4/10/23  Reviewed note:  Assessment/Plan:      1. Diabetes mellitus associated with pancreatic disease ([address]  Unusual to only need basal insulin -but is very, very active at work so why may get away with out meal insulin  Usually DM from pancreatic issues are more brittle, but seems to be doing fine  No changes  Not interested in continuous glucose monitor or other technology     PLAN  A1c goal: 7%     Medications: Continue Basaglar 30 units daily     Advised to check glucose 3 times daily  Provided with glucose log sheets for later review. She is confident in her diabetes knowledge not requiring referral to DM education     HTN: BP stable on current medications, no changes today. HLD: Fasting lipids to be obtained, currently off statins likely due to underlying elevated liver enzymes  RTC 3 months with prelabs     Wt today is 111 lbs, stable since lov  Her wt is in the nl range     Reviewed labs  Ordered labs  Recall last echo 4/18/22:    Left Ventricle: Left ventricle size is normal. Normal wall thickness. The EF by visual approximation is 55 - 60%. She saw Dr. Rayo Tesfaye (hem onc) for anemia 10/12/22  She will f/U PRN      Pt follows with Dr. 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       I have ordered additional labs today to follow enzymes and liver function. We have also drawn CA 19-9 values. The AST and ALT remain modestly elevated associated with ongoing elevation of the ALP. Total bilirubin is normal.  Serum albumin is depressed. INR is normal.  The platelet count is normal.       Serologic testing for causes of chronic liver disease were negative. Assessment of liver fibrosis with Fibroscan was performed In 1/2020. The result was 9.8 kPa which correlates with Stage 3 bridging fibrosis. A liver biopsy from 2/2020 suggests PSC. There is mild portal inflammation and portal fibrosis with extension outside the portal tract that is greater than would be expected given the degree of inflammation. This is suggestive of PSC. Repeat Fibroscan in 10/2021 shows minimal fibrosis with a score of 7.1 EkPa and no steatosis. This practice has recommended that we do an MRI/MRCP. She says she cannot tolerate the MRI. She has tried to do an MRI previously for another medical issue and could not stay in the scanner. She has had recent CT scan without mass/lesion or biliary changes in 3/2021. We are currently part of a international clinical trial to develop a medication for PSC/pruritus. As she has no significant itching as part of her symptoms, will defer study participation at present. She has also indicated that she is not interested in clinical trials options and is not interested in taking any medications other than her insulin. .      Since there is currently no FDA approved treatment for [place] we can simply follow her at regular intervals. I have suggested return office visit in 6 months and have instructed her when to call with any additional symptoms in between visits including increased fatigue, abdominal pain, fever, and itching. We will continue to monitor her on a regular basis and offer additional treatments/trial options as available.       We will plan on repeating Fibroscan to assess for any progression at the time of the next follow-up in 6 months. Screening for Hepatocellular Carcinoma  HCC screening is not necessary as the patient has no evidence of cirrhosis and no large ductal changes of PSC. Baseline  was not elevated. CT imaging negative in 3/2021. Treatment of other medical problems in patients with chronic liver disease  There are no contraindications for the patient to take most medications that are necessary for treatment of other medical issues. Counseling for alcohol in patients with chronic liver disease  The patient was counseled regarding alcohol consumption and the effect of alcohol on chronic liver disease. The patient does not consume any significant amount of alcohol. DM  She is focused on improved control at this point and has established with new endocrinologist. Her Hgb A1c is ~6%. Vaccinations   Vaccination for viral hepatitis B is not needed. The patient has serologic evidence of prior exposure or vaccination with immunity. The need for vaccination against viral hepatitis A will be assessed with serologic and instituted as appropriate. Routine vaccinations against other bacterial and viral agents can be performed as indicated. Annual flu vaccination should be administered if indicated. She has received COVID vaccine.    Not taking any medication     Pt follows with Dr. Juliann Carrizales (nephro) for ckd, has had multiple arf episodes  Last there 1/31/23, no med changes   Will f/U 5/23 once more before Dr. Zapata Risk  Creatinine running around 1.6-1.7 most recently 1.6     Seeing Dr. Sara Wilkins (uro) and ROWENA Avelar for recurrent UTIs  Last visit was 10/18     Pt has been following with Dr Niyah Cabral (ortho) for knee pain   Last there 12/19/19 with ROWENA Saab     She follows w/ Dr. Alvaro Weathers (ortho) for sciatica   Last visit was 1/19 with ROWENA Goins   She completed physical therapy in the past     She is following with Dr. Lennox Becerril (ortho) for trigger thumb  Last there 10/29/20  She had cortisone injection see above     Pt has albuterol to use prn for her asthma  Denies wheezing, has not needed this recently     She saw Dr. Jon Schulz (GI) 6/4/21    Lov she tried lexapro 5 mg for anxiety and depression, she felt like it helped but did not refill it so is not currently taking it   She will think about whether she wants to continue, will let me know   Pt has had multiple stressors at home -- she has had many deaths in the family and has had irregular shifts at work. PREVENTIVE:  Colonoscopy: 4/21 Dr. Jon Schulz 5 year repeat, fmhx - father  EGD: 3/16/21 Dr. Margaret Prajapati  Pap: [facility], 10/22   Mammogram: 10/20/22 neg  Dexa: not yet needed  Tdap: 01/23/23  Pneumovax: 02/19/19   Shingrix: complete, 09/22/21, 12/22/21  Flu shot: 9/26/22  Foot exam: 9/26/22  Micro: 9/22   A1C: 12/21 6.0 3/22 6.3 9/22 8.3 1/23 6.0 4/23 POC 6.7   Eye exam: Dr Jayy Garcia, 8/22, will get notes -- small cataract per pt will f/u in 1 year  Lipids: 9/22 LDL 93  Covid: 1 dose J&J + 1 dose Pfizer booster, declines further boosters       Patient Active Problem List   Diagnosis Code    Pancreatic atrophy K86.89    Elevated liver enzymes R74.8    Hyperglycemia due to type 1 diabetes mellitus (HCC) E10.65    PSC (primary sclerosing cholangitis) K83.01    Mild intermittent asthma without complication X61.32    Stage 3a chronic kidney disease (HCC) N18.31     Current Outpatient Medications   Medication Sig Dispense Refill    Basaglar KwikPen U-100 Insulin 100 unit/mL (3 mL) inpn Inject 30 units once daily 15 mL 5    Danae Pen Needle 32 gauge x 5/32\" ndle For use with insulin pen as directed E10.69 100 Pen Needle 5    albuterol (PROVENTIL VENTOLIN) 2.5 mg /3 mL (0.083 %) nebu 3 mL by Nebulization route every six (6) hours as needed for Wheezing.  30 Nebule 0     Past Surgical History:   Procedure Laterality Date    HX HEENT      UPPER GI ENDOSCOPY,BIOPSY  3/16/2021              Lab Results Component Value Date    WBC 7.6 04/17/2023    HGB 11.4 04/17/2023    HCT 34.7 04/17/2023    MCV 95 04/17/2023     04/17/2023     Lab Results   Component Value Date/Time    Cholesterol, total 212 (H) 09/26/2022 08:55 AM    HDL Cholesterol 103 09/26/2022 08:55 AM    LDL, calculated 93.8 09/26/2022 08:55 AM    VLDL, calculated 15.2 09/26/2022 08:55 AM    Triglyceride 76 09/26/2022 08:55 AM    CHOL/HDL Ratio 2.1 09/26/2022 08:55 AM     Lab Results   Component Value Date/Time    GFR est AA 38 (L) 09/26/2022 08:55 AM    GFR est non-AA 32 (L) 09/26/2022 08:55 AM    Creatinine (POC) 9.5 (H) 08/24/2018 01:15 PM    Creatinine 1.64 (H) 04/17/2023 12:00 AM    BUN 29 (H) 04/17/2023 12:00 AM    BUN (POC) >140 (H) 08/24/2018 01:15 PM    Sodium (POC) 124 (L) 08/24/2018 01:15 PM    Sodium 142 04/17/2023 12:00 AM    Potassium 4.7 04/17/2023 12:00 AM    Potassium (POC) 7.7 (HH) 08/24/2018 01:15 PM    Chloride (POC) 91 (L) 08/24/2018 01:15 PM    Chloride 98 04/17/2023 12:00 AM    CO2 24 04/17/2023 12:00 AM         Review of Systems   Respiratory:  Negative for shortness of breath. Cardiovascular:  Negative for chest pain. Physical Exam  Constitutional:       General: She is not in acute distress. Appearance: Normal appearance. She is not ill-appearing, toxic-appearing or diaphoretic. HENT:      Head: Normocephalic and atraumatic. Right Ear: Tympanic membrane, ear canal and external ear normal.      Left Ear: Tympanic membrane, ear canal and external ear normal.   Eyes:      General:         Right eye: No discharge. Left eye: No discharge. Conjunctiva/sclera: Conjunctivae normal.      Pupils: Pupils are equal, round, and reactive to light. Neck:      Vascular: No carotid bruit. Cardiovascular:      Rate and Rhythm: Normal rate and regular rhythm. Heart sounds: No murmur heard. No friction rub. No gallop. Pulmonary:      Effort: No respiratory distress.       Breath sounds: Normal breath sounds. No wheezing or rales. Chest:      Chest wall: No tenderness. Musculoskeletal:         General: Normal range of motion. Cervical back: Normal range of motion. Right lower leg: No edema. Left lower leg: No edema. Skin:     General: Skin is warm and dry. Neurological:      General: No focal deficit present. Mental Status: She is oriented to person, place, and time. Gait: Gait normal.   Psychiatric:         Mood and Affect: Mood normal.         Behavior: Behavior normal.         ASSESSMENT and PLAN    ICD-10-CM ICD-9-CM    1. Physical exam  Z00.00 V70.9        Weight stable and normal    Reviewed recent labs    Mammogram up-to-date annual    Pelvic exam up-to-date    Tdap up-to-date Shingrix up-to-date pneumonia vaccine up-to-date flu shot up-to-date eye exam due this summer    Colonoscopy up-to-date has had COVID-vaccine   2. Hyperglycemia due to type 1 diabetes mellitus ([facility] [address]  E10.65 250.01        Follows with endocrinology at this time continues on Basaglar insulin A1c at goal        3. Stage 3a chronic kidney disease (HCC)  N18.31 585. 3        Follows with nephrology routinely up-to-date creatinine runs around 1.4 baseline monitor labs   4. PSC (primary sclerosing cholangitis)  K83.01 576.1          Follows with hepatology clinic routinely LFTs are monitored and stable up-to-date       5. Mild intermittent asthma without complication  Q32.01 776.41    No recent flares uses albuterol as needed           Depression screen reviewed and negative. Scribed by Ava Graham, as dictated by Dr. Ector Delcid. Current diagnosis and concerns discussed with pt at length. Pt understands risks and benefits or current treatment plan and medications, and accepts the treatment and medication with any possible risks. Pt asks appropriate questions, which were answered. Pt was instructed to call with any concerns or problems.     I have reviewed the note documented by the scribe. The services provided are my own. The documentation is accurate.

## 2023-04-25 ENCOUNTER — OFFICE VISIT (OUTPATIENT)
Dept: INTERNAL MEDICINE CLINIC | Age: 58
End: 2023-04-25
Payer: COMMERCIAL

## 2023-04-25 VITALS
TEMPERATURE: 98.8 F | HEART RATE: 64 BPM | WEIGHT: 108 LBS | DIASTOLIC BLOOD PRESSURE: 78 MMHG | RESPIRATION RATE: 16 BRPM | BODY MASS INDEX: 20.39 KG/M2 | HEIGHT: 61 IN | SYSTOLIC BLOOD PRESSURE: 128 MMHG | OXYGEN SATURATION: 100 %

## 2023-04-25 DIAGNOSIS — N18.31 STAGE 3A CHRONIC KIDNEY DISEASE (HCC): ICD-10-CM

## 2023-04-25 DIAGNOSIS — K83.01 PSC (PRIMARY SCLEROSING CHOLANGITIS): ICD-10-CM

## 2023-04-25 DIAGNOSIS — E10.65 HYPERGLYCEMIA DUE TO TYPE 1 DIABETES MELLITUS (HCC): ICD-10-CM

## 2023-04-25 DIAGNOSIS — Z00.00 PHYSICAL EXAM: Primary | ICD-10-CM

## 2023-04-25 DIAGNOSIS — J45.20 MILD INTERMITTENT ASTHMA WITHOUT COMPLICATION: ICD-10-CM

## 2023-04-25 LAB — HBA1C MFR BLD HPLC: 6.7 %

## 2023-04-25 PROCEDURE — 83036 HEMOGLOBIN GLYCOSYLATED A1C: CPT | Performed by: INTERNAL MEDICINE

## 2023-04-25 PROCEDURE — 99396 PREV VISIT EST AGE 40-64: CPT | Performed by: INTERNAL MEDICINE

## 2023-04-25 RX ORDER — ESCITALOPRAM OXALATE 5 MG/1
TABLET ORAL
Qty: 90 TABLET | Refills: 0 | Status: SHIPPED | OUTPATIENT
Start: 2023-04-25

## 2023-06-02 NOTE — IP AVS SNAPSHOT
[address] [facility] 
[phone] Patient: Solmon Brittle MRN: [id number] OJT:5/[birth year]0157 A check humaira indicates which time of day the medication should be taken. My Medications CONTINUE taking these medications Instructions Each Dose to Equal  
 Morning Noon Evening Bedtime * albuterol 2.5 mg /3 mL (0.083 %) nebulizer solution Commonly known as:  PROVENTIL VENTOLIN Your last dose was: Your next dose is:    
   
   
 2.5 mg by Nebulization route every four (4) hours as needed for Shortness of Breath. 2.5 mg  
    
   
   
   
  
 * albuterol 90 mcg/actuation inhaler Commonly known as:  PROVENTIL HFA, VENTOLIN HFA, PROAIR HFA Your last dose was: Your next dose is: Take 2 Puffs by inhalation every four (4) hours as needed for Wheezing. 2 Puff BASAGLAR KWIKPEN U-100 INSULIN 100 unit/mL (3 mL) Inpn Generic drug:  insulin glargine Your last dose was: Your next dose is: TAKE 34 UNITS ONCE DAILY SUBCUTANEOUSLY * Notice: This list has 2 medication(s) that are the same as other medications prescribed for you. Read the directions carefully, and ask your doctor or other care provider to review them with you. STOP taking these medications ALEVE 220 mg tablet Generic drug:  naproxen sodium  
   
  
 glimepiride 1 mg tablet Commonly known as:  AMARYL  
   
  
  
ASK your doctor about these medications Instructions Each Dose to Equal  
 Morning Noon Evening Bedtime  
 naproxen sodium 220 mg tablet Commonly known as:  Freddy Hensley Your last dose was: Your next dose is: Take 1 Tab by mouth two (2) times daily (with meals).   
 220 mg  
    
   
   
   
  
  
 Admission

## 2023-06-22 ENCOUNTER — CLINICAL DOCUMENTATION (OUTPATIENT)
Age: 58
End: 2023-06-22

## 2023-06-22 NOTE — PROGRESS NOTES
Progress notes received from [facility] (encounter date 6.22.23) and placed in provider's box to review.

## 2023-07-10 DIAGNOSIS — K86.9 DIABETES MELLITUS ASSOCIATED WITH PANCREATIC DISEASE (HCC): ICD-10-CM

## 2023-07-10 DIAGNOSIS — E11.69 DIABETES MELLITUS ASSOCIATED WITH PANCREATIC DISEASE (HCC): ICD-10-CM

## 2023-07-11 ENCOUNTER — OFFICE VISIT (OUTPATIENT)
Age: 58
End: 2023-07-11
Payer: COMMERCIAL

## 2023-07-11 VITALS
BODY MASS INDEX: 20.81 KG/M2 | WEIGHT: 110.2 LBS | HEART RATE: 76 BPM | SYSTOLIC BLOOD PRESSURE: 116 MMHG | DIASTOLIC BLOOD PRESSURE: 59 MMHG | HEIGHT: 61 IN

## 2023-07-11 DIAGNOSIS — E10.22 TYPE 1 DIABETES MELLITUS WITH STAGE 3B CHRONIC KIDNEY DISEASE (HCC): Primary | ICD-10-CM

## 2023-07-11 DIAGNOSIS — E78.2 MIXED HYPERLIPIDEMIA: ICD-10-CM

## 2023-07-11 DIAGNOSIS — N18.32 TYPE 1 DIABETES MELLITUS WITH STAGE 3B CHRONIC KIDNEY DISEASE (HCC): Primary | ICD-10-CM

## 2023-07-11 PROCEDURE — 99214 OFFICE O/P EST MOD 30 MIN: CPT | Performed by: GENERAL ACUTE CARE HOSPITAL

## 2023-07-11 PROCEDURE — 3044F HG A1C LEVEL LT 7.0%: CPT | Performed by: GENERAL ACUTE CARE HOSPITAL

## 2023-07-11 NOTE — PATIENT INSTRUCTIONS
Keep up the good work! Continue on Basaglar 30 units once a day. Diabetes and Meal Planning    Meal planning can be a key part of managing diabetes. Planning meals and snacks with the right balance of carbohydrate, protein, and fat can help you keep your blood sugar at the target level. You don't have to eat special foods. You can eat what your family eats, including sweets once in a while. But you do have to pay attention to how often you eat and how much you eat of certain foods. Your plate  The plate format is a simple way to help you manage how you eat. You plan meals by learning how much space each food should take on a plate. It can make it easier to keep your blood sugar level within your target range. It also helps you see if you're eating healthy portion sizes. To use the plate format, you put non-starchy vegetables on half your plate. Add lean protein foods, such as fish, lean meats and poultry, or soy products, on one-quarter of the plate. Put a grain or starchy vegetable (such as brown rice or a potato) on the final quarter of the plate. You can add a small piece of fruit and some low-fat or fat-free milk or yogurt, depending on your carbohydrate goal for each meal.  Make sure that you are not using an oversized plate. A 9-inch plate is best.    Carbohydrates  Carbohydrate raises blood sugar higher and more quickly than any other nutrient. It is found in desserts, breads and cereals, and fruit. It's also found in starchy vegetables such as potatoes and corn, grains such as rice and pasta, and milk and yogurt. You can help keep your blood sugar levels within your target range by planning how much carbohydrate to have at meals and snacks. The amount you need depends on several things. These include your weight, how active you are, which diabetes medicines you take, and what your goals are for your blood sugar levels.    An example of a carbohydrate counting plan is:  45 to 60 grams at each meal.

## 2023-07-26 RX ORDER — ESCITALOPRAM OXALATE 5 MG/1
TABLET ORAL
Qty: 90 TABLET | Refills: 1 | Status: SHIPPED | OUTPATIENT
Start: 2023-07-26

## 2023-10-04 ENCOUNTER — CLINICAL DOCUMENTATION (OUTPATIENT)
Age: 58
End: 2023-10-04

## 2023-10-04 NOTE — PROGRESS NOTES
Office notes received from [facility] (encounter date 10.3.23) received and placed in provider's box to review.

## 2023-10-11 DIAGNOSIS — N18.32 TYPE 1 DIABETES MELLITUS WITH STAGE 3B CHRONIC KIDNEY DISEASE (HCC): ICD-10-CM

## 2023-10-11 DIAGNOSIS — E10.22 TYPE 1 DIABETES MELLITUS WITH STAGE 3B CHRONIC KIDNEY DISEASE (HCC): ICD-10-CM

## 2023-10-12 LAB
ALBUMIN SERPL-MCNC: 4.5 G/DL (ref 3.8–4.9)
ALBUMIN/GLOB SERPL: 2 {RATIO} (ref 1.2–2.2)
ALP SERPL-CCNC: 244 IU/L (ref 44–121)
ALT SERPL-CCNC: 35 IU/L (ref 0–32)
AST SERPL-CCNC: 33 IU/L (ref 0–40)
BILIRUB SERPL-MCNC: 0.3 MG/DL (ref 0–1.2)
BUN SERPL-MCNC: 21 MG/DL (ref 8–27)
BUN/CREAT SERPL: 15 (ref 12–28)
CALCIUM SERPL-MCNC: 9.6 MG/DL (ref 8.7–10.3)
CHLORIDE SERPL-SCNC: 101 MMOL/L (ref 96–106)
CHOLEST SERPL-MCNC: 204 MG/DL (ref 100–199)
CO2 SERPL-SCNC: 24 MMOL/L (ref 20–29)
CREAT SERPL-MCNC: 1.44 MG/DL (ref 0.57–1)
EGFRCR SERPLBLD CKD-EPI 2021: 42 ML/MIN/1.73
GLOBULIN SER CALC-MCNC: 2.2 G/DL (ref 1.5–4.5)
GLUCOSE SERPL-MCNC: 182 MG/DL (ref 70–99)
HBA1C MFR BLD: 6 % (ref 4.8–5.6)
HDLC SERPL-MCNC: 108 MG/DL
IMP & REVIEW OF LAB RESULTS: NORMAL
LDLC SERPL CALC-MCNC: 77 MG/DL (ref 0–99)
POTASSIUM SERPL-SCNC: 4.6 MMOL/L (ref 3.5–5.2)
PROT SERPL-MCNC: 6.7 G/DL (ref 6–8.5)
REPORT: NORMAL
SODIUM SERPL-SCNC: 140 MMOL/L (ref 134–144)
TRIGL SERPL-MCNC: 111 MG/DL (ref 0–149)
TSH SERPL DL<=0.005 MIU/L-ACNC: 1.08 UIU/ML (ref 0.45–4.5)
VLDLC SERPL CALC-MCNC: 19 MG/DL (ref 5–40)

## 2023-10-18 ASSESSMENT — ENCOUNTER SYMPTOMS: SHORTNESS OF BREATH: 0

## 2023-10-18 NOTE — PROGRESS NOTES
portal hypertensive signs given ongoing F2-3 estimates. Treatment of other medical problems in patients with chronic liver disease  There are no contraindications for the patient to take most medications that are necessary for treatment of other medical issues. Counseling for alcohol in patients with chronic liver disease  The patient was counseled regarding alcohol consumption and the effect of alcohol on chronic liver disease. The patient does not consume any significant amount of alcohol. DM  She is focused on improved control at this point and has established with new endocrinologist. Her Hgb A1c is ~6%. Vaccinations   Vaccination for viral hepatitis B is not needed. The patient has serologic evidence of prior exposure or vaccination with immunity. The need for vaccination against viral hepatitis A will be assessed with serologic and instituted as appropriate. Routine vaccinations against other bacterial and viral agents can be performed as indicated. Annual flu vaccination should be administered if indicated. Not taking any medication            Pt follows with Dr. Vicente King (nephro) for ckd, has had multiple arf episodes  Last there 6/23  Reviewed note:   continue follow up with sim   US from 2/19-without hydro   microalbum/creat ratio 9/22-80   Reviewed notes from pcp and hepatology-seeing dr. Sana Bueno   bp controlled   creatinine baseline 1.2 - 1.6 since 2014 (with omar in 2018)   consider arb/ace/farxiga--has had hyperkalemia in past    Creatinine running around 1.6-1.7 most recently 1.4  Now seeing Dr Marc Lorenz (nephro), lov 10/3/23 - no med changes  Reviewed note:  HPI Notes: PCP- Dago Ceron - Dr NUZHAT WESLEY [facility] Hepatologist- Dr Cassie Easley. Monisha Sims had an office visit today for follow up of CKD 3, Hx of Diabetic Ketoacidosis, Hyperkalemia, and Type I Diabetes with diabetic CKD. She is following with Kennedy montemayor for anemia. SHe follows with hepatology for [facility].  LFTS in 10/22 were

## 2023-10-19 ENCOUNTER — TRANSCRIBE ORDERS (OUTPATIENT)
Facility: HOSPITAL | Age: 58
End: 2023-10-19

## 2023-10-19 ENCOUNTER — OFFICE VISIT (OUTPATIENT)
Age: 58
End: 2023-10-19
Payer: COMMERCIAL

## 2023-10-19 VITALS
HEART RATE: 67 BPM | WEIGHT: 108.8 LBS | BODY MASS INDEX: 20.54 KG/M2 | TEMPERATURE: 97.8 F | SYSTOLIC BLOOD PRESSURE: 137 MMHG | DIASTOLIC BLOOD PRESSURE: 69 MMHG | OXYGEN SATURATION: 99 % | HEIGHT: 61 IN | RESPIRATION RATE: 15 BRPM

## 2023-10-19 DIAGNOSIS — K83.01 PRIMARY SCLEROSING CHOLANGITIS: Primary | ICD-10-CM

## 2023-10-19 DIAGNOSIS — Z12.31 VISIT FOR SCREENING MAMMOGRAM: Primary | ICD-10-CM

## 2023-10-19 PROCEDURE — 99214 OFFICE O/P EST MOD 30 MIN: CPT | Performed by: PHYSICIAN ASSISTANT

## 2023-10-19 PROCEDURE — 91200 LIVER ELASTOGRAPHY: CPT | Performed by: PHYSICIAN ASSISTANT

## 2023-10-19 ASSESSMENT — PATIENT HEALTH QUESTIONNAIRE - PHQ9
SUM OF ALL RESPONSES TO PHQ QUESTIONS 1-9: 0
1. LITTLE INTEREST OR PLEASURE IN DOING THINGS: 0
SUM OF ALL RESPONSES TO PHQ9 QUESTIONS 1 & 2: 0
2. FEELING DOWN, DEPRESSED OR HOPELESS: 0
SUM OF ALL RESPONSES TO PHQ QUESTIONS 1-9: 0

## 2023-10-19 NOTE — PROGRESS NOTES
MD Nuris, [address] [facility] [facility], [place], [place]      LIZETH Sidhu, [facility]   Renetta Lauren, [facility]-   Darren Jones, UNIQUEPGENIE Chao FNP-C   Marcelle Sarabia, [facility]      [address]   at [facility]   [address] [facility], [address] [facility], [address]   [phone]   FAX: [id number]   at [facility], [address] [facility], [address]   [phone]   FAX: [phone]     Patient Care Team:  Ritesh Peralta MD as PCP - General  Ritesh Peralta MD as [facility]CP [facility]  Lise Delatorre MD as Consulting Physician    Patient Active Problem List   Diagnosis    Elevated liver enzymes    Pancreatic atrophy    Hyperglycemia due to type 1 diabetes mellitus ([address] [facility])    PSC (primary sclerosing cholangitis)    Mild intermittent asthma without complication    Stage 3a chronic kidney disease ([address] [facility])       Dhruv Brewster returns to the [address] [facility] for management of elevated liver enzymes, PSC. The active problem list, all pertinent past medical history, medications, radiologic findings and laboratory findings related to the liver disorder were reviewed with the patient. The patient is a 59 y.o. Black female who was found to have elevated liver transaminases and alkaline phosphatase in 2018. Serologic evaluation for markers of chronic liver disease were negative. Ultrasound of the liver was performed in 2/2019. The results of the imaging demonstrated a normal appearing liver. MRI has been ordered in the past, this has not yet been scheduled due to patient's significant claustrophobia - she is unable to tolerate MRI scanner. She has had CT scan in 3/2021 showing no liver mass/lesion or biliary ductal dilation and no large ductal disease.       The

## 2023-10-20 RX ORDER — INSULIN GLARGINE 100 [IU]/ML
INJECTION, SOLUTION SUBCUTANEOUS
Qty: 30 ML | Refills: 5 | Status: SHIPPED | OUTPATIENT
Start: 2023-10-20

## 2023-10-25 ENCOUNTER — OFFICE VISIT (OUTPATIENT)
Age: 58
End: 2023-10-25
Payer: COMMERCIAL

## 2023-10-25 ENCOUNTER — HOSPITAL ENCOUNTER (OUTPATIENT)
Facility: HOSPITAL | Age: 58
Discharge: HOME | End: 2023-10-28
Payer: COMMERCIAL

## 2023-10-25 VITALS
BODY MASS INDEX: 20.2 KG/M2 | HEART RATE: 66 BPM | SYSTOLIC BLOOD PRESSURE: 119 MMHG | WEIGHT: 107 LBS | RESPIRATION RATE: 14 BRPM | TEMPERATURE: 98.4 F | OXYGEN SATURATION: 99 % | HEIGHT: 61 IN | DIASTOLIC BLOOD PRESSURE: 70 MMHG

## 2023-10-25 DIAGNOSIS — K83.01 PRIMARY SCLEROSING CHOLANGITIS: ICD-10-CM

## 2023-10-25 DIAGNOSIS — F33.0 MAJOR DEPRESSIVE DISORDER, RECURRENT, MILD (HCC): ICD-10-CM

## 2023-10-25 DIAGNOSIS — J45.20 MILD INTERMITTENT ASTHMA WITHOUT COMPLICATION: ICD-10-CM

## 2023-10-25 DIAGNOSIS — K83.01 PSC (PRIMARY SCLEROSING CHOLANGITIS): ICD-10-CM

## 2023-10-25 DIAGNOSIS — E10.65 HYPERGLYCEMIA DUE TO TYPE 1 DIABETES MELLITUS (HCC): Primary | ICD-10-CM

## 2023-10-25 DIAGNOSIS — N18.31 STAGE 3A CHRONIC KIDNEY DISEASE (HCC): ICD-10-CM

## 2023-10-25 DIAGNOSIS — Z23 NEED FOR PROPHYLACTIC VACCINATION AGAINST STREPTOCOCCUS PNEUMONIAE (PNEUMOCOCCUS): ICD-10-CM

## 2023-10-25 DIAGNOSIS — Z23 NEEDS FLU SHOT: ICD-10-CM

## 2023-10-25 LAB
ALBUMIN, URINE, POC: 30 MG/L
CREATININE, URINE, POC: 200 MG/DL
MICROALB/CREAT RATIO, POC: <30 MG/G

## 2023-10-25 PROCEDURE — 90677 PCV20 VACCINE IM: CPT | Performed by: INTERNAL MEDICINE

## 2023-10-25 PROCEDURE — 90674 CCIIV4 VAC NO PRSV 0.5 ML IM: CPT | Performed by: INTERNAL MEDICINE

## 2023-10-25 PROCEDURE — 90472 IMMUNIZATION ADMIN EACH ADD: CPT | Performed by: INTERNAL MEDICINE

## 2023-10-25 PROCEDURE — 90471 IMMUNIZATION ADMIN: CPT | Performed by: INTERNAL MEDICINE

## 2023-10-25 PROCEDURE — 82044 UR ALBUMIN SEMIQUANTITATIVE: CPT | Performed by: INTERNAL MEDICINE

## 2023-10-25 PROCEDURE — 99214 OFFICE O/P EST MOD 30 MIN: CPT | Performed by: INTERNAL MEDICINE

## 2023-10-25 PROCEDURE — 3044F HG A1C LEVEL LT 7.0%: CPT | Performed by: INTERNAL MEDICINE

## 2023-10-25 PROCEDURE — 76700 US EXAM ABDOM COMPLETE: CPT

## 2023-10-25 SDOH — ECONOMIC STABILITY: FOOD INSECURITY: WITHIN THE PAST 12 MONTHS, YOU WORRIED THAT YOUR FOOD WOULD RUN OUT BEFORE YOU GOT MONEY TO BUY MORE.: NEVER TRUE

## 2023-10-25 SDOH — ECONOMIC STABILITY: HOUSING INSECURITY
IN THE LAST 12 MONTHS, WAS THERE A TIME WHEN YOU DID NOT HAVE A STEADY PLACE TO SLEEP OR SLEPT IN A SHELTER (INCLUDING NOW)?: NO

## 2023-10-25 SDOH — ECONOMIC STABILITY: INCOME INSECURITY: HOW HARD IS IT FOR YOU TO PAY FOR THE VERY BASICS LIKE FOOD, HOUSING, MEDICAL CARE, AND HEATING?: NOT HARD AT ALL

## 2023-10-25 SDOH — ECONOMIC STABILITY: FOOD INSECURITY: WITHIN THE PAST 12 MONTHS, THE FOOD YOU BOUGHT JUST DIDN'T LAST AND YOU DIDN'T HAVE MONEY TO GET MORE.: NEVER TRUE

## 2023-10-25 ASSESSMENT — PATIENT HEALTH QUESTIONNAIRE - PHQ9
SUM OF ALL RESPONSES TO PHQ QUESTIONS 1-9: 0
10. IF YOU CHECKED OFF ANY PROBLEMS, HOW DIFFICULT HAVE THESE PROBLEMS MADE IT FOR YOU TO DO YOUR WORK, TAKE CARE OF THINGS AT HOME, OR GET ALONG WITH OTHER PEOPLE: 0
8. MOVING OR SPEAKING SO SLOWLY THAT OTHER PEOPLE COULD HAVE NOTICED. OR THE OPPOSITE, BEING SO FIGETY OR RESTLESS THAT YOU HAVE BEEN MOVING AROUND A LOT MORE THAN USUAL: 0
SUM OF ALL RESPONSES TO PHQ QUESTIONS 1-9: 0
SUM OF ALL RESPONSES TO PHQ QUESTIONS 1-9: 0
4. FEELING TIRED OR HAVING LITTLE ENERGY: 0
2. FEELING DOWN, DEPRESSED OR HOPELESS: 0
6. FEELING BAD ABOUT YOURSELF - OR THAT YOU ARE A FAILURE OR HAVE LET YOURSELF OR YOUR FAMILY DOWN: 0
SUM OF ALL RESPONSES TO PHQ QUESTIONS 1-9: 0
SUM OF ALL RESPONSES TO PHQ9 QUESTIONS 1 & 2: 0
5. POOR APPETITE OR OVEREATING: 0
7. TROUBLE CONCENTRATING ON THINGS, SUCH AS READING THE NEWSPAPER OR WATCHING TELEVISION: 0
3. TROUBLE FALLING OR STAYING ASLEEP: 0
1. LITTLE INTEREST OR PLEASURE IN DOING THINGS: 0
9. THOUGHTS THAT YOU WOULD BE BETTER OFF DEAD, OR OF HURTING YOURSELF: 0

## 2023-10-26 ENCOUNTER — OFFICE VISIT (OUTPATIENT)
Age: 58
End: 2023-10-26
Payer: COMMERCIAL

## 2023-10-26 VITALS
WEIGHT: 111.1 LBS | DIASTOLIC BLOOD PRESSURE: 68 MMHG | HEART RATE: 86 BPM | BODY MASS INDEX: 20.97 KG/M2 | SYSTOLIC BLOOD PRESSURE: 125 MMHG | HEIGHT: 61 IN

## 2023-10-26 DIAGNOSIS — E10.22 TYPE 1 DIABETES MELLITUS WITH STAGE 3B CHRONIC KIDNEY DISEASE (HCC): Primary | ICD-10-CM

## 2023-10-26 DIAGNOSIS — N18.32 TYPE 1 DIABETES MELLITUS WITH STAGE 3B CHRONIC KIDNEY DISEASE (HCC): Primary | ICD-10-CM

## 2023-10-26 PROCEDURE — 99214 OFFICE O/P EST MOD 30 MIN: CPT | Performed by: GENERAL ACUTE CARE HOSPITAL

## 2023-10-26 PROCEDURE — 3044F HG A1C LEVEL LT 7.0%: CPT | Performed by: GENERAL ACUTE CARE HOSPITAL

## 2023-10-26 RX ORDER — PEN NEEDLE, DIABETIC 32GX 5/32"
NEEDLE, DISPOSABLE MISCELLANEOUS
Qty: 100 EACH | Refills: 3 | Status: SHIPPED | OUTPATIENT
Start: 2023-10-26

## 2023-10-26 RX ORDER — INSULIN GLARGINE 100 [IU]/ML
INJECTION, SOLUTION SUBCUTANEOUS
Qty: 30 ML | Refills: 5 | Status: SHIPPED | OUTPATIENT
Start: 2023-10-26

## 2023-10-30 ENCOUNTER — HOSPITAL ENCOUNTER (OUTPATIENT)
Facility: HOSPITAL | Age: 58
Discharge: HOME | End: 2023-11-02
Attending: INTERNAL MEDICINE
Payer: COMMERCIAL

## 2023-10-30 DIAGNOSIS — Z12.31 VISIT FOR SCREENING MAMMOGRAM: ICD-10-CM

## 2023-10-30 PROCEDURE — 77063 BREAST TOMOSYNTHESIS BI: CPT

## 2024-01-02 ENCOUNTER — CLINICAL DOCUMENTATION (OUTPATIENT)
Age: 59
End: 2024-01-02

## 2024-01-02 NOTE — PROGRESS NOTES
Progress notes received from [facility] (encounter date 12.29.23) and placed in provider's box to review.

## 2024-04-17 NOTE — PROGRESS NOTES
HISTORY OF PRESENT ILLNESS  Macey Clemente is a 58 y.o. female.  HPI    Pt was last here 10/25/23. Pt is here for routine care.      BP today is 135/77     Type 1 diabetic   110 120  She reports low BS when she is stressed, but not so often  Pt takes basaglar 25U(decreased from 30U) sometimes takes 30 units  No longer using humalog either  Pt is no longer on metformin --stopped d/t SE        She saw Dr. Holley (endo) for type 1 diabetes  Lov 4/29/24  Reviewed note:  PLAN  A1c goal: 7%     Medications: Basaglar >>> Lantus 30 units daily     Advised to check glucose 1 time daily  She is confident in her diabetes knowledge not requiring referral to DM education  CKD stable, following regularly with Nephrologist     HTN: BP stable on current medications, no changes today.  HLD: Fasting lipids reviewed, stable, currently off statins likely due to underlying elevated liver enzymes related to PSC, following with gastrointestinal specialiast         Wt today is 107 lbs, down 4 lbs since lov   Her wt is in the nl range     Reviewed labs  Ordered labs    Has a heart murmur  Recall last echo 4/18/22:    Left Ventricle: Left ventricle size is normal. Normal wall thickness. The EF by visual approximation is 55 - 60%.      She is seeing Dr Hernandez(plastic surg) for laceration of dorsum hand occurred at work  Lov 1/18/24  Reviewed note:  Plan:  Hand therapy (+)  Go back to work as planned if not confident call us back to extend restriction     Scar massage  Follow up in 4 weeks based on pain and scar -> steroid injection will be considered       Has a history of anemia  She saw Dr. Urena (hem onc) for anemia 10/12/22  She will f/U PRN            Pt follows with Dr. Aguayo (hepatology) for elevated liver tests has primary sclerosing cholangitis   Last visit with JABARI Lynn 4/22/24  Reviewed note:  ASSESSMENT AND PLAN:  PSC  Intermittent elevation in liver transaminases and persistent elevation in alkaline phosphatase. This is most

## 2024-04-22 ENCOUNTER — OFFICE VISIT (OUTPATIENT)
Age: 59
End: 2024-04-22
Payer: COMMERCIAL

## 2024-04-22 VITALS
OXYGEN SATURATION: 97 % | DIASTOLIC BLOOD PRESSURE: 71 MMHG | SYSTOLIC BLOOD PRESSURE: 122 MMHG | HEART RATE: 100 BPM | HEIGHT: 61 IN | BODY MASS INDEX: 21.11 KG/M2 | WEIGHT: 111.8 LBS | RESPIRATION RATE: 16 BRPM

## 2024-04-22 DIAGNOSIS — K83.01 PRIMARY SCLEROSING CHOLANGITIS: Primary | ICD-10-CM

## 2024-04-22 PROCEDURE — 99214 OFFICE O/P EST MOD 30 MIN: CPT | Performed by: PHYSICIAN ASSISTANT

## 2024-04-22 NOTE — PROGRESS NOTES
Identified pt with two pt identifiers(name and ). Reviewed record in preparation for visit and have obtained necessary documentation.  Chief Complaint   Patient presents with    Follow-up      Vitals:    24 1332   BP: 122/71   Site: Right Upper Arm   Position: Sitting   Cuff Size: Medium Adult   Pulse: 100   Resp: 16   SpO2: 97%   Weight: 50.7 kg (111 lb 12.8 oz)  Comment: wearing steel toe boots   Height: 1.549 m (5' 1\")      Pain Scale: 0 - No pain/10    Coordination of Care Questionnaire:  :   1. Have you been to the ER, urgent care clinic since your last visit?  Hospitalized since your last visit?No    2. Have you seen or consulted any other health care providers outside of the [facility] since your last visit?  Include any pap smears or colon screening. No    
fibrosis with Fibroscan was performed In 1/2020.  The result was 9.8 kPa which correlates with Stage 3 bridging fibrosis.    A liver biopsy from 2/2020 suggests PSC.  There is mild portal inflammation and portal fibrosis with extension outside the portal tract that is greater than would be expected given the degree of inflammation.  This is suggestive of PSC.   Repeat Fibroscan in 10/2021 shows minimal fibrosis with a score of 7.1 EkPa and no steatosis.   This was last performed in 10/2023 continuing to show F2-3 changes, no overt progression to cirrhosis. Will plan on repeat every 12 months or so.     This practice has recommended that we do an MRI/MRCP.  She says she cannot tolerate the MRI.  She has tried to do an MRI previously for another medical issue and could not stay in the scanner.    She has had recent CT scan without mass/lesion or biliary changes in 3/2021. I have recommended that we repeat imaging of the liver and bile ducts periodically.  This was last done with US in 10/2023 and no biliary changes identified and no signs of portal hypertension.     We are currently part of a international clinical trial to develop a medication for PSC/pruritus. As she has no significant itching as part of her symptoms, will defer study participation at present. She has also indicated that she is not interested in clinical trials options and is not interested in taking any medications other than her insulin.     Since there is currently no FDA approved treatment for PSC we can simply follow her at regular intervals. I have suggested return office visit in 6 months and have instructed her when to call with any additional symptoms in between visits including increased fatigue, abdominal pain, fever, and itching.  We will continue to monitor her on a regular basis and offer additional treatments/trial options as available.     Screening for Hepatocellular Carcinoma  Patient has no evidence of cirrhosis and no large

## 2024-04-23 LAB
ALBUMIN SERPL-MCNC: 4.5 G/DL (ref 3.8–4.9)
ALP SERPL-CCNC: 282 IU/L (ref 44–121)
ALT SERPL-CCNC: 81 IU/L (ref 0–32)
AST SERPL-CCNC: 57 IU/L (ref 0–40)
BILIRUB DIRECT SERPL-MCNC: 0.11 MG/DL (ref 0–0.4)
BILIRUB SERPL-MCNC: 0.3 MG/DL (ref 0–1.2)
BUN SERPL-MCNC: 24 MG/DL (ref 8–27)
BUN/CREAT SERPL: 17 (ref 12–28)
CALCIUM SERPL-MCNC: 9.6 MG/DL (ref 8.7–10.3)
CHLORIDE SERPL-SCNC: 105 MMOL/L (ref 96–106)
CO2 SERPL-SCNC: 24 MMOL/L (ref 20–29)
CREAT SERPL-MCNC: 1.43 MG/DL (ref 0.57–1)
EGFRCR SERPLBLD CKD-EPI 2021: 42 ML/MIN/1.73
ERYTHROCYTE [DISTWIDTH] IN BLOOD BY AUTOMATED COUNT: 11.6 % (ref 11.7–15.4)
GLUCOSE SERPL-MCNC: 122 MG/DL (ref 70–99)
HCT VFR BLD AUTO: 33.2 % (ref 34–46.6)
HGB BLD-MCNC: 10.9 G/DL (ref 11.1–15.9)
MCH RBC QN AUTO: 30.8 PG (ref 26.6–33)
MCHC RBC AUTO-ENTMCNC: 32.8 G/DL (ref 31.5–35.7)
MCV RBC AUTO: 94 FL (ref 79–97)
PLATELET # BLD AUTO: 191 X10E3/UL (ref 150–450)
POTASSIUM SERPL-SCNC: 4.6 MMOL/L (ref 3.5–5.2)
PROT SERPL-MCNC: 6.7 G/DL (ref 6–8.5)
RBC # BLD AUTO: 3.54 X10E6/UL (ref 3.77–5.28)
SODIUM SERPL-SCNC: 145 MMOL/L (ref 134–144)
WBC # BLD AUTO: 8.2 X10E3/UL (ref 3.4–10.8)

## 2024-04-29 ENCOUNTER — OFFICE VISIT (OUTPATIENT)
Age: 59
End: 2024-04-29

## 2024-04-29 VITALS
HEART RATE: 72 BPM | DIASTOLIC BLOOD PRESSURE: 87 MMHG | WEIGHT: 108.6 LBS | HEIGHT: 61 IN | BODY MASS INDEX: 20.5 KG/M2 | SYSTOLIC BLOOD PRESSURE: 153 MMHG | RESPIRATION RATE: 14 BRPM | OXYGEN SATURATION: 98 %

## 2024-04-29 DIAGNOSIS — N18.32 TYPE 1 DIABETES MELLITUS WITH STAGE 3B CHRONIC KIDNEY DISEASE (HCC): Primary | ICD-10-CM

## 2024-04-29 DIAGNOSIS — E10.22 TYPE 1 DIABETES MELLITUS WITH STAGE 3B CHRONIC KIDNEY DISEASE (HCC): Primary | ICD-10-CM

## 2024-04-29 LAB — HBA1C MFR BLD: 7 %

## 2024-04-29 RX ORDER — PEN NEEDLE, DIABETIC 32GX 5/32"
NEEDLE, DISPOSABLE MISCELLANEOUS
Qty: 100 EACH | Refills: 3 | Status: SHIPPED | OUTPATIENT
Start: 2024-04-29

## 2024-04-29 RX ORDER — INSULIN GLARGINE 100 [IU]/ML
INJECTION, SOLUTION SUBCUTANEOUS
Qty: 30 ML | Refills: 5 | Status: SHIPPED | OUTPATIENT
Start: 2024-04-29

## 2024-04-29 NOTE — PROGRESS NOTES
Identified pt with two pt identifiers (name and ). Reviewed chart in preparation for visit and have obtained necessary documentation.    Macey Clemente is a 58 y.o. female  Chief Complaint   Patient presents with    Diabetes     6 month     BP (!) 153/87 (Site: Left Upper Arm, Position: Sitting, Cuff Size: Small Adult)   Pulse 72   Resp 14   Ht 1.549 m (5' 1\")   Wt 49.3 kg (108 lb 9.6 oz)   LMP  (LMP Unknown)   SpO2 98%   BMI 20.52 kg/m²     1. Have you been to the ER, urgent care clinic since your last visit?  Hospitalized since your last visit?no    2. Have you seen or consulted any other health care providers outside of the [facility] since your last visit?  Include any pap smears or colon screening. No    Patient and provider made aware of elevated BP x2. Patient asymptomatic. Patient reminded to monitor BP, continue to take BP medications if prescribed, and follow up with PCP/Cardiologist.  Patient expressed understanding and agreement.

## 2024-04-29 NOTE — PROGRESS NOTES
[facility]  DR CHELSIE SHETH     PMH:  Primary Sclerosing Cholangitis  IDDM     Assessment/Plan:     Diabetes mellitus associated with pancreatic disease  Complications CKD 3b    Unusual to only need basal insulin -but is very active at work so why may get away without meal time insulin  Usually DM from pancreatic issues are more brittle, but is doing fine  Basaglar not covered by insurance anymore, changed to Lantus, to take same amount.  Not interested in continuous glucose monitor or other technology  Wishing to continue the same therapy  Wants renewal of her accomodation papers with work    PLAN  A1c goal: 7%    Medications: Basaglar >>> Lantus 30 units daily    Advised to check glucose 1 time daily  She is confident in her diabetes knowledge not requiring referral to DM education  CKD stable, following regularly with Nephrologist    HTN: BP stable on current medications, no changes today.  HLD: Fasting lipids reviewed, stable, currently off statins likely due to underlying elevated liver enzymes related to PSC, following with gastrointestinal specialiast    Lab Results   Component Value Date/Time    CHOL 204 10/11/2023 03:29 PM    CHOL 212 09/26/2022 08:55 AM    TRIG 111 10/11/2023 03:29 PM     10/11/2023 03:29 PM    CHOLHDLRATIO 2.1 09/26/2022 08:55 AM     Lab Results   Component Value Date    LABCREA 66.60 09/26/2022    LABCREA 54.6 12/22/2021    LABCREA 64.6 03/23/2021    MALBCR <30 10/25/2023    MALBCR 80 (H) 09/26/2022    MALBCR 55 (H) 12/22/2021      ---------------------------------------------------------------------------------------------------------------------------------     4/29/24   Denies new complaints,   Hypoglycemia last week blood sugar 69 before dinner after skipping lunch    10/26/2023  Ms Clemente feels well and denies new complaints today. In the last visit we had her continue taking Basaglar 30 units daily which worked well for her. However due to

## 2024-04-29 NOTE — PATIENT INSTRUCTIONS
Keep up the good work!    Lantus take 30 units at bedtime    For more food/recipe information:    American Diabetes Association website: https://www.diabetesfoodhub.org    DiaTribe : https://diatribe.org/diabetes-recipes      Hypoglycemia:    Hypoglycemia means that your blood sugar is low and your body is not getting enough fuel. Some people get low blood sugar from not eating often enough. Some medicines to treat diabetes can cause low blood sugar. People who have had surgery on their stomachs or intestines may get hypoglycemia. Problems with the pancreas, kidneys, or liver also can cause low blood sugar.  A snack or drink with sugar in it will raise your blood sugar and should ease your symptoms right away.  How can you care for yourself at home?  Learn your signs of low blood sugar. They are different for everyone. Some common early signs include:  Nausea.  Hunger.  Feeling nervous, irritable, or shaky.  Cold, clammy skin.  Sweating (when you're not exercising).  Use the \"rule of 15\" to treat low blood sugar. This includes eating 15 grams of carbohydrate from a quick-sugar food, such as 3 or 4 glucose tablets or ½ cup of juice. Wait 15 minutes and check your blood sugar. If it is still below 70 mg/dL, eat another 15 grams of carbohydrate. Repeat this every 15 minutes until your blood sugar is in a safe target range.  Once your blood sugar is in a safe range, eat a snack or meal to prevent recurrent low blood sugar.  Make sure family, friends, and coworkers know the symptoms of low blood sugar and know how to get your sugar level up.  If you were prescribed glucagon, always have it with you. Make sure friends and family know how to use it.  When should you call for help?     Call 911 anytime you think you may need emergency care. For example, call if:    You passed out (lost consciousness).     You are confused or cannot think clearly.     Your blood sugar is very high or very low.     Watch closely for changes

## 2024-04-30 LAB — CANCER AG19-9 SERPL-ACNC: 13 U/ML (ref 0–35)

## 2024-05-15 ENCOUNTER — OFFICE VISIT (OUTPATIENT)
Age: 59
End: 2024-05-15
Payer: COMMERCIAL

## 2024-05-15 VITALS
SYSTOLIC BLOOD PRESSURE: 135 MMHG | HEIGHT: 61 IN | TEMPERATURE: 98.3 F | DIASTOLIC BLOOD PRESSURE: 77 MMHG | HEART RATE: 88 BPM | WEIGHT: 108.6 LBS | BODY MASS INDEX: 20.5 KG/M2 | OXYGEN SATURATION: 94 % | RESPIRATION RATE: 20 BRPM

## 2024-05-15 DIAGNOSIS — Z00.00 PHYSICAL EXAM: Primary | ICD-10-CM

## 2024-05-15 DIAGNOSIS — F41.9 ANXIETY: ICD-10-CM

## 2024-05-15 DIAGNOSIS — K83.01 PSC (PRIMARY SCLEROSING CHOLANGITIS): ICD-10-CM

## 2024-05-15 DIAGNOSIS — E10.22 TYPE 1 DIABETES MELLITUS WITH STAGE 3B CHRONIC KIDNEY DISEASE (HCC): ICD-10-CM

## 2024-05-15 DIAGNOSIS — N18.31 STAGE 3A CHRONIC KIDNEY DISEASE (HCC): ICD-10-CM

## 2024-05-15 DIAGNOSIS — N18.32 TYPE 1 DIABETES MELLITUS WITH STAGE 3B CHRONIC KIDNEY DISEASE (HCC): ICD-10-CM

## 2024-05-15 DIAGNOSIS — F33.0 MAJOR DEPRESSIVE DISORDER, RECURRENT, MILD (HCC): ICD-10-CM

## 2024-05-15 DIAGNOSIS — J45.20 MILD INTERMITTENT ASTHMA WITHOUT COMPLICATION: ICD-10-CM

## 2024-05-15 DIAGNOSIS — Z00.00 PHYSICAL EXAM: ICD-10-CM

## 2024-05-15 PROCEDURE — 99396 PREV VISIT EST AGE 40-64: CPT | Performed by: INTERNAL MEDICINE

## 2024-05-15 ASSESSMENT — PATIENT HEALTH QUESTIONNAIRE - PHQ9
2. FEELING DOWN, DEPRESSED OR HOPELESS: NOT AT ALL
5. POOR APPETITE OR OVEREATING: NOT AT ALL
1. LITTLE INTEREST OR PLEASURE IN DOING THINGS: NOT AT ALL
7. TROUBLE CONCENTRATING ON THINGS, SUCH AS READING THE NEWSPAPER OR WATCHING TELEVISION: NOT AT ALL
SUM OF ALL RESPONSES TO PHQ QUESTIONS 1-9: 0
SUM OF ALL RESPONSES TO PHQ9 QUESTIONS 1 & 2: 0
8. MOVING OR SPEAKING SO SLOWLY THAT OTHER PEOPLE COULD HAVE NOTICED. OR THE OPPOSITE, BEING SO FIGETY OR RESTLESS THAT YOU HAVE BEEN MOVING AROUND A LOT MORE THAN USUAL: NOT AT ALL
4. FEELING TIRED OR HAVING LITTLE ENERGY: NOT AT ALL
3. TROUBLE FALLING OR STAYING ASLEEP: NOT AT ALL
SUM OF ALL RESPONSES TO PHQ QUESTIONS 1-9: 0
10. IF YOU CHECKED OFF ANY PROBLEMS, HOW DIFFICULT HAVE THESE PROBLEMS MADE IT FOR YOU TO DO YOUR WORK, TAKE CARE OF THINGS AT HOME, OR GET ALONG WITH OTHER PEOPLE: NOT DIFFICULT AT ALL
SUM OF ALL RESPONSES TO PHQ QUESTIONS 1-9: 0
SUM OF ALL RESPONSES TO PHQ QUESTIONS 1-9: 0
9. THOUGHTS THAT YOU WOULD BE BETTER OFF DEAD, OR OF HURTING YOURSELF: NOT AT ALL
6. FEELING BAD ABOUT YOURSELF - OR THAT YOU ARE A FAILURE OR HAVE LET YOURSELF OR YOUR FAMILY DOWN: NOT AT ALL

## 2024-05-15 NOTE — PROGRESS NOTES
\"Have you been to the ER, urgent care clinic since your last visit?  Hospitalized since your last visit?\"    NO    “Have you seen or consulted any other health care providers outside of [facility] since your last visit?”    NO     “Have you had a pap smear?”    NO    No cervical cancer screening on file             Click Here for Release of Records Request

## 2024-07-12 LAB
BUN SERPL-MCNC: 24 MG/DL (ref 8–27)
BUN/CREAT SERPL: 15 (ref 12–28)
CALCIUM SERPL-MCNC: 9 MG/DL (ref 8.7–10.3)
CHLORIDE SERPL-SCNC: 100 MMOL/L (ref 96–106)
CO2 SERPL-SCNC: 22 MMOL/L (ref 20–29)
CREAT SERPL-MCNC: 1.55 MG/DL (ref 0.57–1)
EGFRCR SERPLBLD CKD-EPI 2021: 38 ML/MIN/1.73
GLUCOSE SERPL-MCNC: 414 MG/DL (ref 70–99)
POTASSIUM SERPL-SCNC: 5.1 MMOL/L (ref 3.5–5.2)
SODIUM SERPL-SCNC: 138 MMOL/L (ref 134–144)

## 2024-07-17 ENCOUNTER — TELEPHONE (OUTPATIENT)
Age: 59
End: 2024-07-17

## 2024-07-17 DIAGNOSIS — N18.32 TYPE 1 DIABETES MELLITUS WITH STAGE 3B CHRONIC KIDNEY DISEASE (HCC): Primary | ICD-10-CM

## 2024-07-17 DIAGNOSIS — E10.22 TYPE 1 DIABETES MELLITUS WITH STAGE 3B CHRONIC KIDNEY DISEASE (HCC): Primary | ICD-10-CM

## 2024-07-17 DIAGNOSIS — R74.8 ELEVATED LIVER ENZYMES: ICD-10-CM

## 2024-07-17 DIAGNOSIS — N18.31 STAGE 3A CHRONIC KIDNEY DISEASE (HCC): ICD-10-CM

## 2024-07-17 NOTE — TELEPHONE ENCOUNTER
----- Message from Jayy Abbott sent at 7/17/2024  2:29 PM EDT -----  Regarding: ECC Message to Provider  ECC Message to Provider    Relationship to Patient: Self     Additional Information PT CALLED IN BECAUSE SHE RECEIVED A CALL FROM THE OFFICE AND THERE IS NO VOICE MAIL MESSAGE TO IT.  --------------------------------------------------------------------------------------------------------------------------    Call Back Information: OK to leave message on voicemail  Preferred Call Back Number: Phone [phone]

## 2024-08-08 DIAGNOSIS — N18.32 TYPE 1 DIABETES MELLITUS WITH STAGE 3B CHRONIC KIDNEY DISEASE (HCC): ICD-10-CM

## 2024-08-08 DIAGNOSIS — E10.22 TYPE 1 DIABETES MELLITUS WITH STAGE 3B CHRONIC KIDNEY DISEASE (HCC): ICD-10-CM

## 2024-08-08 DIAGNOSIS — N18.31 STAGE 3A CHRONIC KIDNEY DISEASE (HCC): ICD-10-CM

## 2024-08-08 DIAGNOSIS — R74.8 ELEVATED LIVER ENZYMES: ICD-10-CM

## 2024-08-09 LAB
ALBUMIN/CREAT UR: 43 MG/G CREAT (ref 0–29)
BUN SERPL-MCNC: 20 MG/DL (ref 8–27)
BUN/CREAT SERPL: 14 (ref 12–28)
CALCIUM SERPL-MCNC: 9.4 MG/DL (ref 8.7–10.3)
CHLORIDE SERPL-SCNC: 107 MMOL/L (ref 96–106)
CO2 SERPL-SCNC: 23 MMOL/L (ref 20–29)
CREAT SERPL-MCNC: 1.47 MG/DL (ref 0.57–1)
CREAT UR-MCNC: 84.6 MG/DL
EGFRCR SERPLBLD CKD-EPI 2021: 41 ML/MIN/1.73
ERYTHROCYTE [DISTWIDTH] IN BLOOD BY AUTOMATED COUNT: 11.5 % (ref 11.7–15.4)
FERRITIN SERPL-MCNC: 284 NG/ML (ref 15–150)
GLUCOSE SERPL-MCNC: 230 MG/DL (ref 70–99)
HBA1C MFR BLD: 8 % (ref 4.8–5.6)
HCT VFR BLD AUTO: 31.2 % (ref 34–46.6)
HGB BLD-MCNC: 10.1 G/DL (ref 11.1–15.9)
IRON SERPL-MCNC: 45 UG/DL (ref 27–159)
MCH RBC QN AUTO: 31.4 PG (ref 26.6–33)
MCHC RBC AUTO-ENTMCNC: 32.4 G/DL (ref 31.5–35.7)
MCV RBC AUTO: 97 FL (ref 79–97)
MICROALBUMIN UR-MCNC: 36.6 UG/ML
PLATELET # BLD AUTO: 168 X10E3/UL (ref 150–450)
POTASSIUM SERPL-SCNC: 4.6 MMOL/L (ref 3.5–5.2)
RBC # BLD AUTO: 3.22 X10E6/UL (ref 3.77–5.28)
SODIUM SERPL-SCNC: 144 MMOL/L (ref 134–144)
WBC # BLD AUTO: 6.2 X10E3/UL (ref 3.4–10.8)

## 2024-08-13 ENCOUNTER — TELEPHONE (OUTPATIENT)
Age: 59
End: 2024-08-13

## 2024-08-13 ASSESSMENT — ENCOUNTER SYMPTOMS: SHORTNESS OF BREATH: 0

## 2024-08-13 NOTE — TELEPHONE ENCOUNTER
Patient states she needs a call back to discuss Plan of Care for Left Little Toe Injury that happened about 1 week ago that is Still Sore & Painful. Patient states she is concerned due to being a Diabetic. Please call to advise What to do. Thank you

## 2024-08-13 NOTE — TELEPHONE ENCOUNTER
Called, Spoke with Pt  Received two pt identifiers  Pt states pinky toe has been hurting x 1 week  Had an injury last week that has worsened  Pt offered and accepted appt for 08/14/24 @ 815  Pt verbalizes understanding of the instructions and has no further questions at this time.

## 2024-08-13 NOTE — PROGRESS NOTES
HISTORY OF PRESENT ILLNESS  Macey Clemente is a 58 y.o. female.  HPI  Pt was last here 5/15/24. Pt is here for routine care.     She notes bruising and swelling on her L pinky toe x 1 week after hitting it against her dresser. She reports using ice, soaks, and tylenol with little improvement. Will order XR, suspected fracture. Advised to immobilize with firm soled shoe, order a half turf toe, buddy tape.      BP today is 125/72     Type 1 diabetic  BS: 110/115 on the lower end however since she is been a lot less than it been much higher in-200s  Taking lantus 30U daily, advised to increase to 32 or 34U with higher sugars   She reports higher BS after injury when stressed  No longer using humalog, basaglar either  Pt is no longer on metformin --stopped d/t SE      She saw Dr. Holley (endo) for type 1 diabetes  Lov 4/29/24     Wt today is 103 lbs, lov 107 lbs  Her wt is in the nl range     Reviewed labs  Ordered labs     Has a heart murmur  Recall last echo 4/18/22:    Left Ventricle: Left ventricle size is normal. Normal wall thickness. The EF by visual approximation is 55 - 60%.              Has a history of anemia felt to be related to CKD or PSC  She saw Dr. Urena (hem onc) for anemia 10/12/22  Please recall following # Normocytic Anemia       - The differential diagnosis for a normocytic anemia is broad,  .  I suspect that there are multiple causes for this including anemia of chronic kidney disease  as well as possible underlying iron deficiency anemia.    The patient's primary sclerosing cholangitis can also increase inflammation causing anemia of chronic inflammatory disease.  We will check the following work-up to evaluate etiology of the patient's  anemia       Plan to discuss the above work-up with the patient over the phone in the next few days.  If no iron deficiency or persistent anemia is noted, the patient should follow-up on an as-needed basis      If her hemoglobin were to drop below 10 g/dL, without

## 2024-08-14 ENCOUNTER — OFFICE VISIT (OUTPATIENT)
Age: 59
End: 2024-08-14
Payer: COMMERCIAL

## 2024-08-14 ENCOUNTER — HOSPITAL ENCOUNTER (OUTPATIENT)
Facility: HOSPITAL | Age: 59
Discharge: HOME | End: 2024-08-17
Payer: COMMERCIAL

## 2024-08-14 VITALS
BODY MASS INDEX: 19.56 KG/M2 | HEART RATE: 80 BPM | SYSTOLIC BLOOD PRESSURE: 125 MMHG | WEIGHT: 103.6 LBS | DIASTOLIC BLOOD PRESSURE: 72 MMHG | TEMPERATURE: 98.3 F | RESPIRATION RATE: 15 BRPM | OXYGEN SATURATION: 99 % | HEIGHT: 61 IN

## 2024-08-14 DIAGNOSIS — N18.31 ANEMIA DUE TO STAGE 3A CHRONIC KIDNEY DISEASE (HCC): ICD-10-CM

## 2024-08-14 DIAGNOSIS — M79.672 FOOT PAIN, LEFT: ICD-10-CM

## 2024-08-14 DIAGNOSIS — N18.32 TYPE 1 DIABETES MELLITUS WITH STAGE 3B CHRONIC KIDNEY DISEASE (HCC): Primary | ICD-10-CM

## 2024-08-14 DIAGNOSIS — N18.31 STAGE 3A CHRONIC KIDNEY DISEASE (HCC): ICD-10-CM

## 2024-08-14 DIAGNOSIS — D63.1 ANEMIA DUE TO STAGE 3A CHRONIC KIDNEY DISEASE (HCC): ICD-10-CM

## 2024-08-14 DIAGNOSIS — K83.01 PSC (PRIMARY SCLEROSING CHOLANGITIS): ICD-10-CM

## 2024-08-14 DIAGNOSIS — E10.22 TYPE 1 DIABETES MELLITUS WITH STAGE 3B CHRONIC KIDNEY DISEASE (HCC): Primary | ICD-10-CM

## 2024-08-14 PROCEDURE — 99214 OFFICE O/P EST MOD 30 MIN: CPT | Performed by: INTERNAL MEDICINE

## 2024-08-14 PROCEDURE — 3052F HG A1C>EQUAL 8.0%<EQUAL 9.0%: CPT | Performed by: INTERNAL MEDICINE

## 2024-08-14 PROCEDURE — 73630 X-RAY EXAM OF FOOT: CPT

## 2024-08-14 RX ORDER — ACETAMINOPHEN 325 MG/1
325 TABLET ORAL EVERY 6 HOURS PRN
COMMUNITY

## 2024-08-14 ASSESSMENT — PATIENT HEALTH QUESTIONNAIRE - PHQ9
SUM OF ALL RESPONSES TO PHQ QUESTIONS 1-9: 0
SUM OF ALL RESPONSES TO PHQ QUESTIONS 1-9: 0
SUM OF ALL RESPONSES TO PHQ9 QUESTIONS 1 & 2: 0
SUM OF ALL RESPONSES TO PHQ QUESTIONS 1-9: 0
SUM OF ALL RESPONSES TO PHQ QUESTIONS 1-9: 0
1. LITTLE INTEREST OR PLEASURE IN DOING THINGS: NOT AT ALL
2. FEELING DOWN, DEPRESSED OR HOPELESS: NOT AT ALL

## 2024-08-14 NOTE — PROGRESS NOTES
\"Have you been to the ER, urgent care clinic since your last visit?  Hospitalized since your last visit?\"    NO    “Have you seen or consulted any other health care providers outside of [facility] since your last visit?”    NO     “Have you had a pap smear?”    YES - Where: [facility] Nurse/CMA to request most recent records if not in the chart    No cervical cancer screening on file             Click Here for Release of Records Request

## 2024-08-16 NOTE — RESULT ENCOUNTER NOTE
Pt called back and was given results  Pt has ordered the insert and was given the ortho oncall number as a precaution  No other questions

## 2024-08-20 ENCOUNTER — TELEPHONE (OUTPATIENT)
Age: 59
End: 2024-08-20

## 2024-08-20 NOTE — TELEPHONE ENCOUNTER
Patient called back and was asking about her foot imaging.   Pt discussed with ÁNGELA LARSON in result notes  closing

## 2024-08-20 NOTE — TELEPHONE ENCOUNTER
Patient called in stating she missed a call from our office regarding her Vit D, please return call during her lunch break at 11:45am.

## 2024-09-01 DIAGNOSIS — N18.32 TYPE 1 DIABETES MELLITUS WITH STAGE 3B CHRONIC KIDNEY DISEASE (HCC): ICD-10-CM

## 2024-09-01 DIAGNOSIS — E10.22 TYPE 1 DIABETES MELLITUS WITH STAGE 3B CHRONIC KIDNEY DISEASE (HCC): ICD-10-CM

## 2024-10-22 ENCOUNTER — OFFICE VISIT (OUTPATIENT)
Age: 59
End: 2024-10-22
Payer: COMMERCIAL

## 2024-10-22 VITALS
WEIGHT: 105 LBS | OXYGEN SATURATION: 98 % | DIASTOLIC BLOOD PRESSURE: 81 MMHG | BODY MASS INDEX: 19.83 KG/M2 | HEART RATE: 76 BPM | HEIGHT: 61 IN | SYSTOLIC BLOOD PRESSURE: 131 MMHG

## 2024-10-22 DIAGNOSIS — K83.01 PRIMARY SCLEROSING CHOLANGITIS: Primary | ICD-10-CM

## 2024-10-22 PROCEDURE — 91200 LIVER ELASTOGRAPHY: CPT | Performed by: PHYSICIAN ASSISTANT

## 2024-10-22 PROCEDURE — 99214 OFFICE O/P EST MOD 30 MIN: CPT | Performed by: PHYSICIAN ASSISTANT

## 2024-10-23 ENCOUNTER — TRANSCRIBE ORDERS (OUTPATIENT)
Facility: HOSPITAL | Age: 59
End: 2024-10-23

## 2024-10-23 DIAGNOSIS — Z12.31 SCREENING MAMMOGRAM FOR BREAST CANCER: Primary | ICD-10-CM

## 2024-10-23 LAB
ALBUMIN SERPL-MCNC: 4.4 G/DL (ref 3.9–4.9)
ALP SERPL-CCNC: 303 IU/L (ref 44–121)
ALT SERPL-CCNC: 38 IU/L (ref 0–32)
AST SERPL-CCNC: 28 IU/L (ref 0–40)
BILIRUB DIRECT SERPL-MCNC: <0.1 MG/DL (ref 0–0.4)
BILIRUB SERPL-MCNC: <0.2 MG/DL (ref 0–1.2)
BUN SERPL-MCNC: 32 MG/DL (ref 8–27)
BUN/CREAT SERPL: 21 (ref 12–28)
CALCIUM SERPL-MCNC: 10 MG/DL (ref 8.7–10.3)
CANCER AG19-9 SERPL-ACNC: 14 U/ML (ref 0–35)
CHLORIDE SERPL-SCNC: 97 MMOL/L (ref 96–106)
CO2 SERPL-SCNC: 25 MMOL/L (ref 20–29)
CREAT SERPL-MCNC: 1.51 MG/DL (ref 0.57–1)
EGFRCR SERPLBLD CKD-EPI 2021: 39 ML/MIN/1.73
ERYTHROCYTE [DISTWIDTH] IN BLOOD BY AUTOMATED COUNT: 11 % (ref 11.7–15.4)
GLUCOSE SERPL-MCNC: 461 MG/DL (ref 70–99)
HCT VFR BLD AUTO: 37.5 % (ref 34–46.6)
HGB BLD-MCNC: 11.8 G/DL (ref 11.1–15.9)
MCH RBC QN AUTO: 31.4 PG (ref 26.6–33)
MCHC RBC AUTO-ENTMCNC: 31.5 G/DL (ref 31.5–35.7)
MCV RBC AUTO: 100 FL (ref 79–97)
PLATELET # BLD AUTO: 189 X10E3/UL (ref 150–450)
POTASSIUM SERPL-SCNC: 5.2 MMOL/L (ref 3.5–5.2)
PROT SERPL-MCNC: 6.8 G/DL (ref 6–8.5)
RBC # BLD AUTO: 3.76 X10E6/UL (ref 3.77–5.28)
SODIUM SERPL-SCNC: 137 MMOL/L (ref 134–144)
WBC # BLD AUTO: 8.8 X10E3/UL (ref 3.4–10.8)

## 2024-10-25 ENCOUNTER — TELEPHONE (OUTPATIENT)
Age: 59
End: 2024-10-25

## 2024-10-25 NOTE — TELEPHONE ENCOUNTER
Patient called to let Tianna know that she got the message left for her. She was at work and couldn't answer her phone

## 2024-10-28 ENCOUNTER — TELEPHONE (OUTPATIENT)
Age: 59
End: 2024-10-28

## 2024-11-08 ENCOUNTER — HOSPITAL ENCOUNTER (OUTPATIENT)
Facility: HOSPITAL | Age: 59
Discharge: HOME | End: 2024-11-11
Attending: INTERNAL MEDICINE
Payer: COMMERCIAL

## 2024-11-08 VITALS — BODY MASS INDEX: 19.83 KG/M2 | HEIGHT: 61 IN | WEIGHT: 105 LBS

## 2024-11-08 DIAGNOSIS — Z12.31 SCREENING MAMMOGRAM FOR BREAST CANCER: ICD-10-CM

## 2024-11-08 PROCEDURE — 77063 BREAST TOMOSYNTHESIS BI: CPT

## 2025-02-17 ENCOUNTER — OFFICE VISIT (OUTPATIENT)
Age: 60
End: 2025-02-17

## 2025-02-17 VITALS
DIASTOLIC BLOOD PRESSURE: 72 MMHG | HEIGHT: 61 IN | SYSTOLIC BLOOD PRESSURE: 137 MMHG | TEMPERATURE: 97.6 F | RESPIRATION RATE: 18 BRPM | OXYGEN SATURATION: 100 % | WEIGHT: 107.25 LBS | HEART RATE: 64 BPM | BODY MASS INDEX: 20.25 KG/M2

## 2025-02-17 DIAGNOSIS — F33.0 MAJOR DEPRESSIVE DISORDER, RECURRENT, MILD: ICD-10-CM

## 2025-02-17 DIAGNOSIS — Z23 NEEDS FLU SHOT: ICD-10-CM

## 2025-02-17 DIAGNOSIS — R03.0 ELEVATED BP WITHOUT DIAGNOSIS OF HYPERTENSION: ICD-10-CM

## 2025-02-17 DIAGNOSIS — D63.1 ANEMIA DUE TO STAGE 3A CHRONIC KIDNEY DISEASE (HCC): ICD-10-CM

## 2025-02-17 DIAGNOSIS — N18.31 ANEMIA DUE TO STAGE 3A CHRONIC KIDNEY DISEASE (HCC): ICD-10-CM

## 2025-02-17 DIAGNOSIS — D64.9 ANEMIA, UNSPECIFIED TYPE: ICD-10-CM

## 2025-02-17 DIAGNOSIS — J45.20 MILD INTERMITTENT ASTHMA WITHOUT COMPLICATION: ICD-10-CM

## 2025-02-17 DIAGNOSIS — E10.65 HYPERGLYCEMIA DUE TO TYPE 1 DIABETES MELLITUS (HCC): Primary | ICD-10-CM

## 2025-02-17 DIAGNOSIS — N18.31 STAGE 3A CHRONIC KIDNEY DISEASE (HCC): ICD-10-CM

## 2025-02-17 DIAGNOSIS — N18.32 TYPE 1 DIABETES MELLITUS WITH STAGE 3B CHRONIC KIDNEY DISEASE (HCC): ICD-10-CM

## 2025-02-17 DIAGNOSIS — E10.22 TYPE 1 DIABETES MELLITUS WITH STAGE 3B CHRONIC KIDNEY DISEASE (HCC): ICD-10-CM

## 2025-02-17 DIAGNOSIS — K83.01 PSC (PRIMARY SCLEROSING CHOLANGITIS) (HCC): ICD-10-CM

## 2025-02-17 SDOH — ECONOMIC STABILITY: FOOD INSECURITY: WITHIN THE PAST 12 MONTHS, THE FOOD YOU BOUGHT JUST DIDN'T LAST AND YOU DIDN'T HAVE MONEY TO GET MORE.: NEVER TRUE

## 2025-02-17 SDOH — ECONOMIC STABILITY: FOOD INSECURITY: WITHIN THE PAST 12 MONTHS, YOU WORRIED THAT YOUR FOOD WOULD RUN OUT BEFORE YOU GOT MONEY TO BUY MORE.: NEVER TRUE

## 2025-02-17 ASSESSMENT — PATIENT HEALTH QUESTIONNAIRE - PHQ9
SUM OF ALL RESPONSES TO PHQ QUESTIONS 1-9: 0
SUM OF ALL RESPONSES TO PHQ9 QUESTIONS 1 & 2: 0
1. LITTLE INTEREST OR PLEASURE IN DOING THINGS: NOT AT ALL
SUM OF ALL RESPONSES TO PHQ QUESTIONS 1-9: 0
2. FEELING DOWN, DEPRESSED OR HOPELESS: NOT AT ALL

## 2025-02-17 NOTE — PROGRESS NOTES
\"Have you been to the ER, urgent care clinic since your last visit?  Hospitalized since your last visit?\"    NO    “Have you seen or consulted any other health care providers outside our system since your last visit?”    NO     “Have you had a pap smear?”    10/2024 [facility]    No cervical cancer screening on file       “Have you had a diabetic eye exam?”    NO     Date of last diabetic eye exam: 8/24/2022          
~8%.     Vaccinations   Vaccination for viral hepatitis B is not needed.  The patient has serologic evidence of prior exposure or vaccination with immunity.  The need for vaccination against viral hepatitis A will be assessed with serologic and instituted as appropriate.  Routine vaccinations against other bacterial and viral agents can be performed as indicated.  Annual flu vaccination should be administered if indicated.         History of ckd, has had multiple arf episodes  Creatinine running around 1.6-1.7 most recently 1.4  Now seeing Dr Guardado (nephro), lov 6/18/24 - no med changes, f/u 1 yr        Seeing Dr. Stephy Mix (uro) and JABARI Hope for recurrent UTIs  Last visit was 10/18        Pt has albuterol to use prn for her asthma  Denies wheezing, has not needed this recently     She saw Dr. Lane (GI) 6/4/21     She is no longer taking Lexapro 5 mg daily for anxiety and depression  doing well without it     Reviewed mammogram 11/8/24 - neg     PREVENTIVE:  Colonoscopy: 4/21 Dr. Lane 5 year repeat, fmhx - father  EGD: 3/16/21 Dr. Richard  Pap: [facility], 9/24  Mammogram: 11/8/24 neg   Dexa: not yet needed  Tdap: 01/23/23  Pneumovax: 02/19/19   [id number]: 10/25/23   Shingrix: complete, 09/22/21, 12/22/21  Flu shot: will do 2/17/25     RSV: discussed   Foot exam: 8/14/24  Micro: 8/8/24  A1C:  1/23 6.0 4/23 POC 6.7 10/23 6.0 4/24 7.0 8/24 8.0  Eye exam: Dr Trujillo 10/24  Lipids: 10/23 LDL 77  Covid: 1 dose J&J + 1 dose Pfizer booster, declines further boosters   Patient Active Problem List   Diagnosis    Elevated liver enzymes    Pancreatic atrophy    Hyperglycemia due to type 1 diabetes mellitus (HCC)    PSC (primary sclerosing cholangitis)    Mild intermittent asthma without complication    Type 1 diabetes mellitus with stage 3b chronic kidney disease (HCC)    Stage 3a chronic kidney disease (HCC)    Major depressive disorder, recurrent, mild    Anemia due to stage 3a chronic kidney disease (HCC)

## 2025-02-18 LAB
ALBUMIN/CREAT UR: 61 MG/G CREAT (ref 0–29)
BUN SERPL-MCNC: 20 MG/DL (ref 8–27)
BUN/CREAT SERPL: 15 (ref 12–28)
CALCIUM SERPL-MCNC: 9.1 MG/DL (ref 8.7–10.3)
CHLORIDE SERPL-SCNC: 100 MMOL/L (ref 96–106)
CHOLEST SERPL-MCNC: 222 MG/DL (ref 100–199)
CO2 SERPL-SCNC: 22 MMOL/L (ref 20–29)
CREAT SERPL-MCNC: 1.36 MG/DL (ref 0.57–1)
CREAT UR-MCNC: 77.6 MG/DL
EGFRCR SERPLBLD CKD-EPI 2021: 44 ML/MIN/1.73
GLUCOSE SERPL-MCNC: 258 MG/DL (ref 70–99)
HBA1C MFR BLD: 7.3 % (ref 4.8–5.6)
HDLC SERPL-MCNC: 129 MG/DL
LDLC SERPL CALC-MCNC: 80 MG/DL (ref 0–99)
MICROALBUMIN UR-MCNC: 47.6 UG/ML
POTASSIUM SERPL-SCNC: 4.6 MMOL/L (ref 3.5–5.2)
SODIUM SERPL-SCNC: 141 MMOL/L (ref 134–144)
TRIGL SERPL-MCNC: 74 MG/DL (ref 0–149)
TSH SERPL DL<=0.005 MIU/L-ACNC: 1.6 UIU/ML (ref 0.45–4.5)
VLDLC SERPL CALC-MCNC: 13 MG/DL (ref 5–40)

## 2025-04-02 NOTE — PROGRESS NOTES
Chief Complaint   Patient presents with    New Patient    Diabetes     Pcp and pharmacy onfirmed     Patient was last seen: 04/2023    PMH:  Primary Sclerosing Cholangitis  IDDM     Diabetes hx:  DM 6/16 - is type 1 - initially mis-diagnosed (initially C-peptides were normal, then 3/21 was low)  Related to pancreas atrophy  So had been tried on non-insulin options 1st  Metformin - 'allergic' to it - diarrhea, itching, abd pain  Sulfonylurea - 'reaction' to it - felt like hallucination  No TZD, no DPP5, no SGLT  Tried trulicity - tolerated, but did not work b/c DM1  On insulin 2-3 years ago (~ 1 yr after diagnosed)    Freestyle made arm break out - not interested - tried other things too   Stress can increase her sugars     A1c: last a1c was 6.0 01/23/2023    Home blood sugar review: checks up to 3 x day   Fasting    2 hrs after lunch:   Before dinner low 100s  Bedtime low 100s    Hypoglycemia denies  Hyperglycemia nothing above 168    DM Medications:    Basaglar 30 units daily  Not on mealtime    DIET: feels does well with diabetic diet   Bk: variable - can be oatmeal, grits, cereal and eggs, jade, sausage and fruit --> has no sugar spike; egg/Bk  Ln:  Salad, vege, spaghetti, fast food  Dn: like lunch, all types of meats, all types of veggies, potatoes, rice, past  Sn: granola bar, potato chips, crackers, chocolate, candy  Dr: water, juice, coffee, lemonade, sweet tea, soda, no alcoholic drinks    EXERCISE: does not exercise- works in [employer] and very active    RENAL: has mild renal insufficency, has positive FLAVIA-r , in the past year, is followed by Nephrology     EYES: eye exam recently, has no retinopathy , dr Zachary Khan, 05/2023    FEET: has no current issues, no numbness or tingling, last foot exam 04/2023    DENTAL:  has seen dentist in past year , next appt 10/2023    HEART:  no chest pain, shortness of breath or claudication, has no cardiac history     ASA:  does not take aspirin or other blood Patient's wife Dariana contacted and advised as per Dr. Centeno

## 2025-04-08 ENCOUNTER — OFFICE VISIT (OUTPATIENT)
Age: 60
End: 2025-04-08
Payer: COMMERCIAL

## 2025-04-08 VITALS
SYSTOLIC BLOOD PRESSURE: 146 MMHG | HEART RATE: 73 BPM | WEIGHT: 108.4 LBS | BODY MASS INDEX: 20.47 KG/M2 | HEIGHT: 61 IN | DIASTOLIC BLOOD PRESSURE: 84 MMHG

## 2025-04-08 DIAGNOSIS — E78.2 MIXED HYPERLIPIDEMIA: ICD-10-CM

## 2025-04-08 DIAGNOSIS — E10.22 TYPE 1 DIABETES MELLITUS WITH STAGE 3B CHRONIC KIDNEY DISEASE (HCC): Primary | ICD-10-CM

## 2025-04-08 DIAGNOSIS — N18.32 TYPE 1 DIABETES MELLITUS WITH STAGE 3B CHRONIC KIDNEY DISEASE (HCC): Primary | ICD-10-CM

## 2025-04-08 PROCEDURE — 99214 OFFICE O/P EST MOD 30 MIN: CPT | Performed by: GENERAL ACUTE CARE HOSPITAL

## 2025-04-08 PROCEDURE — 3051F HG A1C>EQUAL 7.0%<8.0%: CPT | Performed by: GENERAL ACUTE CARE HOSPITAL

## 2025-04-08 RX ORDER — INSULIN GLARGINE 100 [IU]/ML
INJECTION, SOLUTION SUBCUTANEOUS
Qty: 30 ML | Refills: 11 | Status: SHIPPED | OUTPATIENT
Start: 2025-04-08

## 2025-04-08 RX ORDER — MULTIVITAMIN
1 CAPSULE ORAL DAILY
COMMUNITY

## 2025-04-08 RX ORDER — PEN NEEDLE, DIABETIC 32GX 5/32"
NEEDLE, DISPOSABLE MISCELLANEOUS
Qty: 100 EACH | Refills: 11 | Status: SHIPPED | OUTPATIENT
Start: 2025-04-08

## 2025-04-08 NOTE — PATIENT INSTRUCTIONS
Continue current dose:  Lantus take 35 units at bedtime    Blood sugar targets:    -Fasting morning blood sugar = 80 to 130  -Rest of the day = Less than 200     For more food/recipe information:    American Diabetes Association website: https://www.diabetesfoodhub.org    DiaTribe : https://diatribe.org/diabetes-recipes      Hypoglycemia:    Hypoglycemia means that your blood sugar is low and your body is not getting enough fuel. Some people get low blood sugar from not eating often enough. Some medicines to treat diabetes can cause low blood sugar. People who have had surgery on their stomachs or intestines may get hypoglycemia. Problems with the pancreas, kidneys, or liver also can cause low blood sugar.  A snack or drink with sugar in it will raise your blood sugar and should ease your symptoms right away.  How can you care for yourself at home?  Learn your signs of low blood sugar. They are different for everyone. Some common early signs include:  Nausea.  Hunger.  Feeling nervous, irritable, or shaky.  Cold, clammy skin.  Sweating (when you're not exercising).  Use the \"rule of 15\" to treat low blood sugar. This includes eating 15 grams of carbohydrate from a quick-sugar food, such as 3 or 4 glucose tablets or ½ cup of juice. Wait 15 minutes and check your blood sugar. If it is still below 70 mg/dL, eat another 15 grams of carbohydrate. Repeat this every 15 minutes until your blood sugar is in a safe target range.  Once your blood sugar is in a safe range, eat a snack or meal to prevent recurrent low blood sugar.  Make sure family, friends, and coworkers know the symptoms of low blood sugar and know how to get your sugar level up.  If you were prescribed glucagon, always have it with you. Make sure friends and family know how to use it.  When should you call for help?     Call 911 anytime you think you may need emergency care. For example, call if:    You passed out (lost consciousness).     You are confused or

## 2025-04-08 NOTE — PROGRESS NOTES
[facility]  DR CHELSIE SHETH     The patient (or guardian, if applicable) and other individuals in attendance with the patient were advised that Artificial Intelligence will be utilized during this visit to record, process the conversation to generate a clinical note, and support improvement of the AI technology. The patient (or guardian, if applicable) and other individuals in attendance at the appointment consented to the use of AI, including the recording.        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  Complications CKD 3b    Unusual to only need basal insulin -but is very active at work so why may get away without meal time insulin  Usually DM from pancreatic issues are more brittle, but is doing fine  - A1c levels have shown an increase, likely due to stress  - Advised to maintain fasting blood glucose level <130 mg/dL and postprandial readings <200 mg/dL  - Counseled on stress management techniques including calming music, breathing exercises, muscle relaxation techniques, and journaling  - Advised to stay hydrated  - Lantus dosage increased to 35 units daily  - Prescriptions for Lantus and pen needles provided with 11 refills each, sufficient for a year  - Instructed to undergo an A1c test in approximately 3 to 4 months  - Encouraged to continue with annual eye examinations, biannual dental check-ups, and regular foot inspections  - If fasting blood glucose levels consistently exceed 130 mg/dL, dosage adjustment may be necessary  - If postprandial readings exceed 200 mg/dL without significant food intake, short-acting insulin may be required    PLAN  A1c goal: 7%    Medications:  Lantus 35 units daily    Advised to check glucose 1 time daily  She is confident in her diabetes knowledge not requiring referral to DM education  CKD stable, following regularly with

## 2025-04-21 ENCOUNTER — OFFICE VISIT (OUTPATIENT)
Age: 60
End: 2025-04-21
Payer: COMMERCIAL

## 2025-04-21 VITALS
HEIGHT: 61 IN | SYSTOLIC BLOOD PRESSURE: 151 MMHG | DIASTOLIC BLOOD PRESSURE: 72 MMHG | WEIGHT: 109 LBS | OXYGEN SATURATION: 99 % | HEART RATE: 93 BPM | TEMPERATURE: 98.2 F | BODY MASS INDEX: 20.58 KG/M2

## 2025-04-21 DIAGNOSIS — K83.01 PRIMARY SCLEROSING CHOLANGITIS (HCC): Primary | ICD-10-CM

## 2025-04-21 PROCEDURE — 99213 OFFICE O/P EST LOW 20 MIN: CPT | Performed by: PHYSICIAN ASSISTANT

## 2025-04-21 ASSESSMENT — PATIENT HEALTH QUESTIONNAIRE - PHQ9
10. IF YOU CHECKED OFF ANY PROBLEMS, HOW DIFFICULT HAVE THESE PROBLEMS MADE IT FOR YOU TO DO YOUR WORK, TAKE CARE OF THINGS AT HOME, OR GET ALONG WITH OTHER PEOPLE: NOT DIFFICULT AT ALL
SUM OF ALL RESPONSES TO PHQ QUESTIONS 1-9: 0
SUM OF ALL RESPONSES TO PHQ QUESTIONS 1-9: 0
1. LITTLE INTEREST OR PLEASURE IN DOING THINGS: NOT AT ALL
SUM OF ALL RESPONSES TO PHQ QUESTIONS 1-9: 0
9. THOUGHTS THAT YOU WOULD BE BETTER OFF DEAD, OR OF HURTING YOURSELF: NOT AT ALL
6. FEELING BAD ABOUT YOURSELF - OR THAT YOU ARE A FAILURE OR HAVE LET YOURSELF OR YOUR FAMILY DOWN: NOT AT ALL
7. TROUBLE CONCENTRATING ON THINGS, SUCH AS READING THE NEWSPAPER OR WATCHING TELEVISION: NOT AT ALL
8. MOVING OR SPEAKING SO SLOWLY THAT OTHER PEOPLE COULD HAVE NOTICED. OR THE OPPOSITE, BEING SO FIGETY OR RESTLESS THAT YOU HAVE BEEN MOVING AROUND A LOT MORE THAN USUAL: NOT AT ALL
5. POOR APPETITE OR OVEREATING: NOT AT ALL
SUM OF ALL RESPONSES TO PHQ QUESTIONS 1-9: 0
DEPRESSION UNABLE TO ASSESS: FUNCTIONAL CAPACITY MOTIVATION LIMITS ACCURACY
2. FEELING DOWN, DEPRESSED OR HOPELESS: NOT AT ALL
4. FEELING TIRED OR HAVING LITTLE ENERGY: NOT AT ALL
3. TROUBLE FALLING OR STAYING ASLEEP: NOT AT ALL

## 2025-04-21 ASSESSMENT — ANXIETY QUESTIONNAIRES
1. FEELING NERVOUS, ANXIOUS, OR ON EDGE: NOT AT ALL
IF YOU CHECKED OFF ANY PROBLEMS ON THIS QUESTIONNAIRE, HOW DIFFICULT HAVE THESE PROBLEMS MADE IT FOR YOU TO DO YOUR WORK, TAKE CARE OF THINGS AT HOME, OR GET ALONG WITH OTHER PEOPLE: NOT DIFFICULT AT ALL
7. FEELING AFRAID AS IF SOMETHING AWFUL MIGHT HAPPEN: NOT AT ALL
2. NOT BEING ABLE TO STOP OR CONTROL WORRYING: NOT AT ALL
3. WORRYING TOO MUCH ABOUT DIFFERENT THINGS: NOT AT ALL
5. BEING SO RESTLESS THAT IT IS HARD TO SIT STILL: NOT AT ALL
GAD7 TOTAL SCORE: 0
6. BECOMING EASILY ANNOYED OR IRRITABLE: NOT AT ALL
4. TROUBLE RELAXING: NOT AT ALL

## 2025-04-21 NOTE — PROGRESS NOTES
Chief Complaint   Patient presents with    Follow-up     N/A     Vitals:    04/21/25 1253   BP: (!) 151/72   BP Site: Left Upper Arm   Patient Position: Sitting   Pulse: 93   Temp: 98.2 °F (36.8 °C)   TempSrc: Temporal   SpO2: 99%   Weight: 49.4 kg (109 lb)   Height: 1.549 m (5' 1\")     .  \"Have you been to the ER, urgent care clinic since your last visit?  Hospitalized since your last visit?\"    NO    “Have you seen or consulted any other health care providers outside of [facility] since your last visit?”    NO     “Have you had a pap smear?”    NO    No cervical cancer screening on file             Click Here for Release of Records Request

## 2025-04-21 NOTE — PROGRESS NOTES
[facility]      Nick Aguayo MD, FACP, FACG, FAASLD      LIZETH Kirby, [facility]   Olivia Ratomar, [facility]   Yuli Gonzalez, FNP-C  Panfilo Barney, FNP-C   Jackie Irizarry, [facility]   [address], [address]   [place]  [address] [phone]   FAX: [phone]  [facility]   [address], [address]   [place]  [address] [phone]   FAX: [phone]     Patient Care Team:  Berta Pedraza MD as PCP - General  Berta Pedraza MD as PCP - Empaneled Provider  Chito French MD as Consulting Physician  Jerry Guardado MD (Nephrology)    Patient Active Problem List   Diagnosis    Elevated liver enzymes    Pancreatic atrophy    Hyperglycemia due to type 1 diabetes mellitus (HCC)    PSC (primary sclerosing cholangitis) (HCC)    Mild intermittent asthma without complication    Type 1 diabetes mellitus with stage 3b chronic kidney disease (HCC)    Stage 3a chronic kidney disease (HCC)    Major depressive disorder, recurrent, mild    Anemia due to stage 3a chronic kidney disease     Macey Clemente returns to the [facility] for management of elevated liver enzymes, PSC.  The active problem list, all pertinent past medical history, medications, radiologic findings and laboratory findings related to the liver disorder were reviewed with the patient.      The patient is a 59 y.o. Black female who was found to have elevated liver transaminases and alkaline phosphatase in 2018.      Serologic evaluation for markers of chronic liver disease were negative.    Ultrasound of the liver was performed in 2/2019.  The results of the imaging demonstrated a normal appearing liver.  MRI has been ordered in the past, this has not yet been scheduled due to

## 2025-04-22 LAB
ALBUMIN SERPL-MCNC: 4 G/DL (ref 3.9–4.9)
ALP SERPL-CCNC: 278 IU/L (ref 44–121)
ALT SERPL-CCNC: 58 IU/L (ref 0–32)
AST SERPL-CCNC: 60 IU/L (ref 0–40)
BILIRUB DIRECT SERPL-MCNC: 0.11 MG/DL (ref 0–0.4)
BILIRUB SERPL-MCNC: 0.3 MG/DL (ref 0–1.2)
BUN SERPL-MCNC: 33 MG/DL (ref 8–27)
BUN/CREAT SERPL: 20 (ref 12–28)
CALCIUM SERPL-MCNC: 9.2 MG/DL (ref 8.7–10.3)
CANCER AG19-9 SERPL-ACNC: 16 U/ML (ref 0–35)
CHLORIDE SERPL-SCNC: 99 MMOL/L (ref 96–106)
CO2 SERPL-SCNC: 25 MMOL/L (ref 20–29)
CREAT SERPL-MCNC: 1.69 MG/DL (ref 0.57–1)
EGFRCR SERPLBLD CKD-EPI 2021: 34 ML/MIN/1.73
ERYTHROCYTE [DISTWIDTH] IN BLOOD BY AUTOMATED COUNT: 11.4 % (ref 11.7–15.4)
GLUCOSE SERPL-MCNC: 306 MG/DL (ref 70–99)
HCT VFR BLD AUTO: 34.6 % (ref 34–46.6)
HGB BLD-MCNC: 10.8 G/DL (ref 11.1–15.9)
MCH RBC QN AUTO: 30.5 PG (ref 26.6–33)
MCHC RBC AUTO-ENTMCNC: 31.2 G/DL (ref 31.5–35.7)
MCV RBC AUTO: 98 FL (ref 79–97)
PLATELET # BLD AUTO: 165 X10E3/UL (ref 150–450)
POTASSIUM SERPL-SCNC: 4.4 MMOL/L (ref 3.5–5.2)
PROT SERPL-MCNC: 6.4 G/DL (ref 6–8.5)
RBC # BLD AUTO: 3.54 X10E6/UL (ref 3.77–5.28)
SODIUM SERPL-SCNC: 138 MMOL/L (ref 134–144)
WBC # BLD AUTO: 7.8 X10E3/UL (ref 3.4–10.8)

## 2025-04-23 ENCOUNTER — RESULTS FOLLOW-UP (OUTPATIENT)
Age: 60
End: 2025-04-23

## 2025-07-22 NOTE — PROGRESS NOTES
Hospitalist Progress Note    NAME: Abigail Barlow   :  1965   MRN:  [id number]       Assessment / Plan:  SIRS unclear etiology, present on admission:  persistent leukocytosis with low grade fevers. Pt continues to deny any infectious sx (no rash, dental pain, diarrhea, respiratory or urinary sx). - CT chest  with no evidence of acute process in the chest  - CT A/P  with no significant gastric distention. No bowel obstruction. No acute finding is noted in the abdomen or pelvis within limits of unenhanced technique. - Lawrence catheter removed    HAYDER negative   - competed rocephin for E Coli UTI, has cleared on repeat urine cutlure  - blood cultures no growth  Acute anemia, unclear etiology:  Liekly NEERAJ on CKD anemia likely multifactorial including acute illness and renal failure, hematuria. Hg overall stable. - transfused 2u pRBCs this admission  - haptoglobin slightly elevated, but not felt to be c/w hemolysis  - iron, J97 and folic acid levels WNL.  Ferritin elevated, ?acute phase reactant  - does have hematuria but do not feel this is enough blood loss alone to explain anemia  DKA (resolved) in setting of uncontrolled insulin-dependent DM2 with nephropathy: HgA1c 8.8.  Now off insulin gtt. - lantus dose decreased by half as she will be NPO for HAYDER in AM  - con't holding amaryl for now, not sure if this will be restarted on discharge as she is already on insulin  - lispro scheduled with meals and per sliding scale  NEERAJ and acute encephalopathy with severe hyperkalemia in setting of dehydration/DKA and sepsis/UTI, present on admission: Cr continues to improve. HD stopped  due to rapid response for hypotension on HD.    She gets admitted here for Hyperglycemia this is her Third admissin. She works in a [employer] , unreliable Poor Po liquid and hyperglycemia contributing liekly to chronic dehydration with slow Uremia.   Which responds to  [facility]  - renal US  normal renal US examination  - urine cx >[id number] pansensitive E Coli, completed rocephin. Not currently on Abx.  - appreciate nephrology assistance; additional labs sent: JUN negative, ANCA pending, normal kappa/lambda ratio, iPTH very elevated  - removed Lawrence 8/31  Pseudohyponatremia, resolved  Abdominal pain, unclear etiology: CT A/P without contrast 8/24 negative      Code Status: full  Surrogate Decision Maker: Joel Ramirez BY   DVT Prophylaxis: SCDs with anemia     Subjective:     Chief Complaint / Reason for Physician Visit  \"I'm fine\". Denies any sx. Discussed with RN events overnight. Review of Systems:  Symptom Y/N Comments  Symptom Y/N Comments   Fever/Chills n   Chest Pain n    Poor Appetite n   Edema n    Cough n   Abdominal Pain n    Sputum n   Joint Pain     SOB/GARLAND n   Pruritis/Rash     Nausea/vomit    Tolerating PT/OT     Diarrhea    Tolerating Diet     Constipation    Other       Could NOT obtain due to:      Objective:     VITALS:   Last 24hrs VS reviewed since prior progress note. Most recent are:  Patient Vitals for the past 24 hrs:   Temp Pulse Resp BP SpO2   09/04/18 1557 99.1 °F (37.3 °C) 84 18 109/59 98 %   09/04/18 1146 97.7 °F (36.5 °C) 64 18 134/79 99 %   09/04/18 0855 - 88 16 117/60 97 %   09/04/18 0840 - 89 12 134/74 98 %   09/04/18 0829 - 90 12 134/74 98 %   09/04/18 0826 - 88 12 119/71 95 %   09/04/18 0815 - 93 14 119/71 97 %   09/03/18 2318 98.1 °F (36.7 °C) 86 16 130/68 98 %       Intake/Output Summary (Last 24 hours) at 09/04/18 1838  Last data filed at 09/04/18 0700   Gross per 24 hour   Intake            56.25 ml   Output                0 ml   Net            56.25 ml        PHYSICAL EXAM:  General: WD, WN. Alert, cooperative, no acute distress    EENT:  EOMI. Anicteric sclerae. MMM  Resp:  CTA bilaterally, no wheezing or rales. No accessory muscle use  CV:  Regular rhythm, +ADALGISA.  No edema  GI:  Soft, Non distended, Non tender.  +Bowel sounds  Neurologic:  Alert and oriented X 3, normal speech,   Psych:   Fair insight. Not anxious nor agitated  Skin:  No rashes. No jaundice    Reviewed most current lab test results and cultures  YES  Reviewed most current radiology test results   YES  Review and summation of old records today    NO  Reviewed patient's current orders and MAR    YES  PMH/SH reviewed - no change compared to H&P  ________________________________________________________________________  Care Plan discussed with:    Comments   Patient x    Family  x Aunt over the phone per patient request   RN x    Care Manager     Consultant  x cards                     Multidiciplinary team rounds were held today with , nursing, pharmacist and clinical coordinator. Patient's plan of care was discussed; medications were reviewed and discharge planning was addressed. ________________________________________________________________________  Total NON critical care TIME:  30 Minutes    Total CRITICAL CARE TIME Spent:   Minutes non procedure based      Comments   >50% of visit spent in counseling and coordination of care x    ________________________________________________________________________  Dianna Villagomez MD     Procedures: see electronic medical records for all procedures/Xrays and details which were not copied into this note but were reviewed prior to creation of Plan. LABS:  I reviewed today's most current labs and imaging studies.   Pertinent labs include:  Recent Labs      09/04/18 0408 09/02/18 0412   WBC  17.6*  31.5*   HGB  7.2*  8.2*   HCT  22.7*  25.0*   PLT  355  344     Recent Labs      09/04/18   0408  09/03/18 0511  09/02/18 0412   NA  140  143  141   K  4.6  4.5  4.3   CL  102  106  104   CO2  31  30  30   GLU  49*  73  76   BUN  47*  50*  60*   CREA  2.56*  3.00*  3.28*   CA  8.8  8.6  8.8   ALB   --    --   2.3*   TBILI   --    --   1.0   SGOT   --    --   36   ALT   --    --   43       Signed: Dianna Villagomez MD 6 (moderate pain)

## 2025-08-19 ENCOUNTER — OFFICE VISIT (OUTPATIENT)
Age: 60
End: 2025-08-19
Payer: COMMERCIAL

## 2025-08-19 VITALS
DIASTOLIC BLOOD PRESSURE: 82 MMHG | TEMPERATURE: 97.7 F | HEART RATE: 81 BPM | BODY MASS INDEX: 20.25 KG/M2 | OXYGEN SATURATION: 97 % | SYSTOLIC BLOOD PRESSURE: 138 MMHG | HEIGHT: 61 IN | WEIGHT: 107.25 LBS

## 2025-08-19 DIAGNOSIS — J45.20 MILD INTERMITTENT ASTHMA WITHOUT COMPLICATION: Primary | ICD-10-CM

## 2025-08-19 DIAGNOSIS — K83.01 PSC (PRIMARY SCLEROSING CHOLANGITIS) (HCC): ICD-10-CM

## 2025-08-19 DIAGNOSIS — D63.1 ANEMIA DUE TO STAGE 3A CHRONIC KIDNEY DISEASE (HCC): ICD-10-CM

## 2025-08-19 DIAGNOSIS — E10.65 HYPERGLYCEMIA DUE TO TYPE 1 DIABETES MELLITUS (HCC): ICD-10-CM

## 2025-08-19 DIAGNOSIS — N18.31 ANEMIA DUE TO STAGE 3A CHRONIC KIDNEY DISEASE (HCC): ICD-10-CM

## 2025-08-19 DIAGNOSIS — N18.32 TYPE 1 DIABETES MELLITUS WITH STAGE 3B CHRONIC KIDNEY DISEASE (HCC): ICD-10-CM

## 2025-08-19 DIAGNOSIS — E10.22 TYPE 1 DIABETES MELLITUS WITH STAGE 3B CHRONIC KIDNEY DISEASE (HCC): ICD-10-CM

## 2025-08-19 DIAGNOSIS — F33.0 MAJOR DEPRESSIVE DISORDER, RECURRENT, MILD: ICD-10-CM

## 2025-08-19 DIAGNOSIS — N18.31 STAGE 3A CHRONIC KIDNEY DISEASE (HCC): ICD-10-CM

## 2025-08-19 PROCEDURE — 3051F HG A1C>EQUAL 7.0%<8.0%: CPT | Performed by: INTERNAL MEDICINE

## 2025-08-19 PROCEDURE — 99214 OFFICE O/P EST MOD 30 MIN: CPT | Performed by: INTERNAL MEDICINE

## 2025-08-19 ASSESSMENT — PATIENT HEALTH QUESTIONNAIRE - PHQ9
SUM OF ALL RESPONSES TO PHQ QUESTIONS 1-9: 0
2. FEELING DOWN, DEPRESSED OR HOPELESS: NOT AT ALL
1. LITTLE INTEREST OR PLEASURE IN DOING THINGS: NOT AT ALL
SUM OF ALL RESPONSES TO PHQ QUESTIONS 1-9: 0

## 2025-08-20 LAB
ALBUMIN/CREAT UR: 67 MG/G CREAT (ref 0–29)
BUN SERPL-MCNC: 30 MG/DL (ref 8–27)
BUN/CREAT SERPL: 18 (ref 12–28)
CALCIUM SERPL-MCNC: 9.9 MG/DL (ref 8.7–10.3)
CHLORIDE SERPL-SCNC: 103 MMOL/L (ref 96–106)
CO2 SERPL-SCNC: 23 MMOL/L (ref 20–29)
CREAT SERPL-MCNC: 1.71 MG/DL (ref 0.57–1)
CREAT UR-MCNC: 140.7 MG/DL
EGFRCR SERPLBLD CKD-EPI 2021: 34 ML/MIN/1.73
EST. AVERAGE GLUCOSE BLD GHB EST-MCNC: 166 MG/DL
GLUCOSE SERPL-MCNC: ABNORMAL MG/DL
HBA1C MFR BLD: 7.4 % (ref 4.8–5.6)
MICROALBUMIN UR-MCNC: 94 UG/ML
POTASSIUM SERPL-SCNC: ABNORMAL MMOL/L
SODIUM SERPL-SCNC: 144 MMOL/L (ref 134–144)
TSH SERPL DL<=0.005 MIU/L-ACNC: 1.03 UIU/ML (ref 0.45–4.5)

## 2025-08-22 ENCOUNTER — TELEPHONE (OUTPATIENT)
Age: 60
End: 2025-08-22

## 2025-08-22 DIAGNOSIS — E10.22 TYPE 1 DIABETES MELLITUS WITH STAGE 3B CHRONIC KIDNEY DISEASE (HCC): Primary | ICD-10-CM

## 2025-08-22 DIAGNOSIS — N18.32 TYPE 1 DIABETES MELLITUS WITH STAGE 3B CHRONIC KIDNEY DISEASE (HCC): Primary | ICD-10-CM

## (undated) DEVICE — BLOCK BITE ENDOSCP AD 21 MM W/ DIL BLU LF DISP

## (undated) DEVICE — 1200 GUARD II KIT W/5MM TUBE W/O VAC TUBE: Brand: GUARDIAN

## (undated) DEVICE — TOWEL 4 PLY TISS 19X30 SUE WHT

## (undated) DEVICE — NEONATAL-ADULT SPO2 SENSOR: Brand: NELLCOR

## (undated) DEVICE — NEEDLE HYPO 18GA L1.5IN PNK S STL HUB POLYPR SHLD REG BVL

## (undated) DEVICE — BASIN EMSIS 16OZ GRAPHITE PLAS KID SHP MOLD GRAD FOR ORAL

## (undated) DEVICE — Device

## (undated) DEVICE — MAX-CORE® DISPOSABLE CORE BIOPSY INSTRUMENT, 16G X 16CM: Brand: MAX-CORE

## (undated) DEVICE — SOLIDIFIER FLD 2OZ 1500CC N DISINF IN BTL DISP SAFESORB

## (undated) DEVICE — YANKAUER,TAPERED BULBOUS TIP,W/O VENT: Brand: MEDLINE

## (undated) DEVICE — ELECTRODE,RADIOTRANSLUCENT,FOAM,5PK: Brand: MEDLINE

## (undated) DEVICE — FORCEPS BX L160CM DIA8MM GRSP DISECT CUP TIP NONLOCKING ROT

## (undated) DEVICE — TRAY BX SFT TISS W/ RUL ALC PREP PD FEN DRP TWL LUERLOCK

## (undated) DEVICE — Z DISCONTINUED PER MEDLINE LINE GAS SAMPLING O2/CO2 LNG AD 13 FT NSL W/ TBNG FILTERLINE

## (undated) DEVICE — CATH IV AUTOGRD BC PNK 20GA 25 -- INSYTE

## (undated) DEVICE — SYR 3ML LL TIP 1/10ML GRAD --

## (undated) DEVICE — SET ADMIN 16ML TBNG L100IN 2 Y INJ SITE IV PIGGY BK DISP

## (undated) DEVICE — CONTAINER SPEC 20 ML LID NEUT BUFF FORMALIN 10 % POLYPR STS

## (undated) DEVICE — BAG SPEC BIOHZRD 10 X 10 IN --

## (undated) DEVICE — SYR 10ML LUER LOK 1/5ML GRAD --